# Patient Record
Sex: FEMALE | Race: WHITE | NOT HISPANIC OR LATINO | Employment: OTHER | URBAN - METROPOLITAN AREA
[De-identification: names, ages, dates, MRNs, and addresses within clinical notes are randomized per-mention and may not be internally consistent; named-entity substitution may affect disease eponyms.]

---

## 2023-02-10 PROBLEM — R06.02 SHORTNESS OF BREATH: Status: ACTIVE | Noted: 2023-02-10

## 2023-02-10 PROBLEM — I73.9 CLAUDICATION (CMS-HCC): Status: ACTIVE | Noted: 2023-02-10

## 2023-02-10 PROBLEM — E78.5 HYPERLIPIDEMIA: Status: ACTIVE | Noted: 2023-02-10

## 2023-02-10 PROBLEM — M54.2 NECK PAIN: Status: ACTIVE | Noted: 2023-02-10
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
## 2023-02-10 PROBLEM — H90.3 BILATERAL HIGH FREQUENCY SENSORINEURAL HEARING LOSS: Status: ACTIVE | Noted: 2023-02-10

## 2023-02-10 PROBLEM — R00.0 HEART RATE FAST: Status: ACTIVE | Noted: 2023-02-10

## 2023-02-10 PROBLEM — K44.9 HIATAL HERNIA: Status: ACTIVE | Noted: 2023-02-10

## 2023-02-10 PROBLEM — B99.9 ACUTE INFECTION: Status: ACTIVE | Noted: 2023-02-10

## 2023-02-10 PROBLEM — G47.00 INSOMNIA: Status: ACTIVE | Noted: 2023-02-10

## 2023-02-10 PROBLEM — I10 HYPERTENSION: Status: ACTIVE | Noted: 2023-02-10

## 2023-02-10 PROBLEM — J44.9 COPD (CHRONIC OBSTRUCTIVE PULMONARY DISEASE) (MULTI): Status: ACTIVE | Noted: 2023-02-10

## 2023-02-10 PROBLEM — A04.72 PSEUDOMEMBRANOUS COLITIS: Status: ACTIVE | Noted: 2023-02-10

## 2023-02-10 PROBLEM — G25.81 RESTLESS LEG SYNDROME: Status: ACTIVE | Noted: 2023-02-10

## 2023-02-10 PROBLEM — E04.2 NONTOXIC MULTINODULAR GOITER: Status: ACTIVE | Noted: 2023-02-10

## 2023-02-10 PROBLEM — R21 RASH: Status: ACTIVE | Noted: 2023-02-10

## 2023-02-10 PROBLEM — A04.72 C. DIFFICILE DIARRHEA: Status: ACTIVE | Noted: 2023-02-10

## 2023-02-10 PROBLEM — B35.3 TINEA PEDIS: Status: ACTIVE | Noted: 2023-02-10

## 2023-02-10 PROBLEM — M15.9 GENERALIZED OSTEOARTHROSIS: Status: ACTIVE | Noted: 2023-02-10

## 2023-02-10 PROBLEM — R19.4 CHANGE IN BOWEL HABIT: Status: ACTIVE | Noted: 2023-02-10

## 2023-02-10 PROBLEM — S12.9XXA FRACTURE, CERVICAL VERTEBRA (MULTI): Status: ACTIVE | Noted: 2023-02-10

## 2023-02-10 PROBLEM — E66.3 OVERWEIGHT WITH BODY MASS INDEX (BMI) 25.0-29.9: Status: ACTIVE | Noted: 2023-02-10

## 2023-02-10 PROBLEM — D64.9 ANEMIA: Status: ACTIVE | Noted: 2023-02-10

## 2023-02-10 PROBLEM — E04.1 THYROID NODULE: Status: ACTIVE | Noted: 2023-02-10

## 2023-02-10 PROBLEM — I73.9 ASYMPTOMATIC PERIPHERAL VASCULAR DISEASE (CMS-HCC): Status: ACTIVE | Noted: 2023-02-10

## 2023-02-10 PROBLEM — F41.9 ANXIETY: Status: ACTIVE | Noted: 2023-02-10

## 2023-02-10 PROBLEM — R60.9 EDEMA: Status: ACTIVE | Noted: 2023-02-10

## 2023-02-10 PROBLEM — E56.9 VITAMIN DEFICIENCY: Status: ACTIVE | Noted: 2023-02-10

## 2023-02-10 PROBLEM — R91.1 PULMONARY NODULE: Status: ACTIVE | Noted: 2023-02-10

## 2023-02-10 PROBLEM — R91.8 LUNG MASS: Status: ACTIVE | Noted: 2023-02-10

## 2023-02-10 PROBLEM — M54.9 BACK PAIN: Status: ACTIVE | Noted: 2023-02-10

## 2023-02-10 PROBLEM — I44.7 LBBB (LEFT BUNDLE BRANCH BLOCK): Status: ACTIVE | Noted: 2023-02-10

## 2023-02-10 PROBLEM — R13.10 DYSPHAGIA: Status: ACTIVE | Noted: 2023-02-10

## 2023-02-10 PROBLEM — R01.1 MURMUR, CARDIAC: Status: ACTIVE | Noted: 2023-02-10

## 2023-02-10 PROBLEM — E05.90 HYPERTHYROIDISM: Status: ACTIVE | Noted: 2023-02-10

## 2023-02-10 PROBLEM — G58.9 NERVE DISORDER: Status: ACTIVE | Noted: 2023-02-10

## 2023-02-10 PROBLEM — R07.9 CHEST PAIN: Status: ACTIVE | Noted: 2023-02-10

## 2023-02-10 PROBLEM — R47.9 SPEECH ABNORMALITY: Status: ACTIVE | Noted: 2023-02-10

## 2023-02-10 PROBLEM — L08.9 PUSTULE: Status: ACTIVE | Noted: 2023-02-10

## 2023-02-10 PROBLEM — I35.0 AORTIC STENOSIS: Status: ACTIVE | Noted: 2023-02-10

## 2023-02-10 PROBLEM — R42 DIZZINESS: Status: ACTIVE | Noted: 2023-02-10

## 2023-02-10 PROBLEM — M79.673 FOOT PAIN: Status: ACTIVE | Noted: 2023-02-10

## 2023-02-10 PROBLEM — K21.9 ESOPHAGEAL REFLUX: Status: ACTIVE | Noted: 2023-02-10

## 2023-02-10 RX ORDER — FUROSEMIDE 40 MG/1
40 TABLET ORAL DAILY
COMMUNITY
Start: 2018-11-13 | End: 2023-08-14

## 2023-02-10 RX ORDER — METOPROLOL SUCCINATE 50 MG/1
25 TABLET, EXTENDED RELEASE ORAL 2 TIMES DAILY
COMMUNITY
Start: 2022-12-19 | End: 2023-11-15 | Stop reason: HOSPADM

## 2023-02-10 RX ORDER — BACITRACIN 500 [USP'U]/G
1 OINTMENT TOPICAL 3 TIMES DAILY
Status: ON HOLD | COMMUNITY
Start: 2022-04-06 | End: 2023-10-22 | Stop reason: ALTCHOICE

## 2023-02-10 RX ORDER — TRAZODONE HYDROCHLORIDE 50 MG/1
50 TABLET ORAL NIGHTLY PRN
COMMUNITY
Start: 2019-10-29 | End: 2024-01-08

## 2023-02-10 RX ORDER — FAMOTIDINE 20 MG/1
1 TABLET, FILM COATED ORAL EVERY 12 HOURS
COMMUNITY
Start: 2020-08-06 | End: 2023-11-15 | Stop reason: HOSPADM

## 2023-02-10 RX ORDER — CALCITONIN SALMON 200 [IU]/.09ML
1 SPRAY, METERED NASAL SEE ADMIN INSTRUCTIONS
Status: ON HOLD | COMMUNITY
Start: 2022-06-16 | End: 2023-10-22 | Stop reason: ALTCHOICE

## 2023-02-10 RX ORDER — POTASSIUM CHLORIDE 1.5 G/1.58G
20 POWDER, FOR SOLUTION ORAL DAILY
COMMUNITY
Start: 2019-01-04 | End: 2023-12-19 | Stop reason: HOSPADM

## 2023-02-10 RX ORDER — OMEPRAZOLE 40 MG/1
40 CAPSULE, DELAYED RELEASE ORAL DAILY
Status: ON HOLD | COMMUNITY
Start: 2018-11-13 | End: 2023-10-22 | Stop reason: ALTCHOICE

## 2023-02-10 RX ORDER — LANOLIN ALCOHOL/MO/W.PET/CERES
1 CREAM (GRAM) TOPICAL 3 TIMES WEEKLY
COMMUNITY
Start: 2022-12-19

## 2023-02-10 RX ORDER — ATORVASTATIN CALCIUM 40 MG/1
1 TABLET, FILM COATED ORAL DAILY
COMMUNITY
Start: 2022-12-19 | End: 2023-07-18

## 2023-02-10 RX ORDER — PANTOPRAZOLE SODIUM 40 MG/1
40 TABLET, DELAYED RELEASE ORAL DAILY
COMMUNITY
Start: 2020-03-26 | End: 2023-07-31

## 2023-02-10 RX ORDER — ASPIRIN 81 MG/1
1 TABLET ORAL DAILY
COMMUNITY
Start: 2022-12-19 | End: 2023-11-15 | Stop reason: HOSPADM

## 2023-02-10 RX ORDER — TRIAMCINOLONE ACETONIDE 1 MG/G
1 CREAM TOPICAL 2 TIMES DAILY
COMMUNITY
Start: 2019-07-25 | End: 2023-07-11 | Stop reason: SDUPTHER

## 2023-02-10 RX ORDER — QUINIDINE GLUCONATE 324 MG
1 TABLET, EXTENDED RELEASE ORAL 3 TIMES WEEKLY
COMMUNITY
Start: 2020-06-01 | End: 2023-11-15 | Stop reason: HOSPADM

## 2023-02-10 RX ORDER — NYSTATIN 100000 U/G
1 CREAM TOPICAL 2 TIMES DAILY
Status: ON HOLD | COMMUNITY
Start: 2021-08-13 | End: 2023-10-22 | Stop reason: ALTCHOICE

## 2023-02-10 RX ORDER — LIDOCAINE 560 MG/1
PATCH PERCUTANEOUS; TOPICAL; TRANSDERMAL
COMMUNITY
Start: 2022-06-16 | End: 2023-11-15 | Stop reason: HOSPADM

## 2023-02-10 RX ORDER — SERTRALINE HYDROCHLORIDE 50 MG/1
1 TABLET, FILM COATED ORAL DAILY
COMMUNITY
Start: 2018-11-13 | End: 2023-07-11 | Stop reason: SDUPTHER

## 2023-02-10 RX ORDER — NITROGLYCERIN 0.4 MG/1
0.4 TABLET SUBLINGUAL EVERY 5 MIN PRN
COMMUNITY
Start: 2019-10-29 | End: 2023-11-15 | Stop reason: HOSPADM

## 2023-02-10 RX ORDER — TALC
3 POWDER (GRAM) TOPICAL NIGHTLY
COMMUNITY
Start: 2020-06-01

## 2023-02-10 RX ORDER — ALBUTEROL SULFATE 0.83 MG/ML
2.5 SOLUTION RESPIRATORY (INHALATION) 3 TIMES DAILY
COMMUNITY
Start: 2022-02-21 | End: 2023-11-15 | Stop reason: HOSPADM

## 2023-02-10 RX ORDER — LORATADINE 10 MG/1
1 TABLET ORAL DAILY
COMMUNITY
Start: 2019-11-25 | End: 2023-11-15 | Stop reason: HOSPADM

## 2023-02-10 RX ORDER — ROPINIROLE 5 MG/1
5 TABLET, FILM COATED ORAL NIGHTLY
COMMUNITY
Start: 2022-05-16

## 2023-03-06 ENCOUNTER — APPOINTMENT (OUTPATIENT)
Dept: PRIMARY CARE | Facility: CLINIC | Age: 88
End: 2023-03-06
Payer: MEDICARE

## 2023-03-10 ENCOUNTER — APPOINTMENT (OUTPATIENT)
Dept: PRIMARY CARE | Facility: CLINIC | Age: 88
End: 2023-03-10
Payer: MEDICARE

## 2023-04-03 ENCOUNTER — APPOINTMENT (OUTPATIENT)
Dept: PRIMARY CARE | Facility: CLINIC | Age: 88
End: 2023-04-03
Payer: COMMERCIAL

## 2023-04-05 ENCOUNTER — OFFICE VISIT (OUTPATIENT)
Dept: PRIMARY CARE | Facility: CLINIC | Age: 88
End: 2023-04-05
Payer: MEDICARE

## 2023-04-05 VITALS
OXYGEN SATURATION: 94 % | BODY MASS INDEX: 23.91 KG/M2 | SYSTOLIC BLOOD PRESSURE: 138 MMHG | WEIGHT: 121.8 LBS | HEART RATE: 56 BPM | HEIGHT: 60 IN | DIASTOLIC BLOOD PRESSURE: 76 MMHG

## 2023-04-05 DIAGNOSIS — I10 PRIMARY HYPERTENSION: ICD-10-CM

## 2023-04-05 DIAGNOSIS — E03.8 OTHER SPECIFIED HYPOTHYROIDISM: Primary | ICD-10-CM

## 2023-04-05 PROCEDURE — 3078F DIAST BP <80 MM HG: CPT | Performed by: INTERNAL MEDICINE

## 2023-04-05 PROCEDURE — 99214 OFFICE O/P EST MOD 30 MIN: CPT | Performed by: INTERNAL MEDICINE

## 2023-04-05 PROCEDURE — 3075F SYST BP GE 130 - 139MM HG: CPT | Performed by: INTERNAL MEDICINE

## 2023-04-05 NOTE — PROGRESS NOTES
OFFICE NOTE    NAME OF THE PATIENT: Kalie Ramos    YOB: 1929    CHIEF COMPLAINT:  Ms. Ramos today much better.  She is okay.  She has no problem with breathing.  She is doing okay.  Leg swelling is better.  Family is taking very good care of her.  She can keep the food down.  Things okay.  No problem.  She had a CT scan done.  She is here for follow-up.  She is not sure whether she needs to see an Endocrine.    PAST MEDICAL HISTORY:  Reviewed on EMR, unchanged.    CURRENT MEDICATIONS:  Reviewed on EMR, unchanged.  List reviewed.    ALLERGIES:  Reviewed on EMR, unchanged.    SOCIAL HISTORY:  Reviewed on EMR, unchanged.  She does not smoke and does not drink alcohol.    FAMILY HISTORY:  Reviewed on EMR, unchanged.    REVIEW OF SYSTEMS:  All 12 systems reviewed and pertaining covered in history and physical.    PHYSICAL EXAMINATION  VITAL SIGNS:  As recorded and reviewed from EMR.  RESPIRATORY:  The patient had normal inspirations and expirations.  The breath sounds were equal bilaterally and clear to auscultation.  CARDIOVASCULAR:  The patient had S1 normal, split S2 without obvious rubs, clicks, or murmurs.    GASTROINTESTINAL:  There was no hepatosplenomegaly.  There were no palpable masses and no inguinal nodes.  EXTREMITIES:  Legs had edema.  NEUROLOGIC:  The patient had normal cranial nerves.  The reflexes, sensory, and motor examination were grossly within normal limits.    LAB WORK:  Laboratory testing discussed.  CT scan reviewed.    ASSESSMENT AND PLAN:  Thyroid goiter in about same size.  I looked at the scan.  I do not think there is any pressure on trachea.  I will keep an eye.  I examined the patient.  The patient is doing good.  Pleural effusion, stable.  Monitor.  reactive.  I will just keep an eye.  Family does not want aggressive treatment or diagnostics.  Anemia.  Keep an eye.  Hypertension, okay.  Cholesterol, stable.  Edema, on Lasix.  History of anticipation, doing  okay.  Blood work in six weeks.  See me in six weeks.  Happy to see her anytime sooner if necessary.      Kindly review this note in conjunction with EMR.   Subjective   Patient ID: Kalie Ramos is a 89 y.o. female who presents for Follow-up.      HPI    Review of Systems    Objective   /76   Pulse 56   Ht 1.524 m (5')   Wt 55.2 kg (121 lb 12.8 oz)   SpO2 94%   BMI 23.79 kg/m²       Physical Exam    Assessment/Plan   Problem List Items Addressed This Visit    None

## 2023-05-17 ENCOUNTER — OFFICE VISIT (OUTPATIENT)
Dept: PRIMARY CARE | Facility: CLINIC | Age: 88
End: 2023-05-17
Payer: MEDICARE

## 2023-05-17 ENCOUNTER — LAB (OUTPATIENT)
Dept: LAB | Facility: LAB | Age: 88
End: 2023-05-17
Payer: MEDICARE

## 2023-05-17 VITALS
WEIGHT: 124 LBS | HEIGHT: 60 IN | SYSTOLIC BLOOD PRESSURE: 118 MMHG | BODY MASS INDEX: 24.35 KG/M2 | DIASTOLIC BLOOD PRESSURE: 70 MMHG

## 2023-05-17 DIAGNOSIS — I10 PRIMARY HYPERTENSION: ICD-10-CM

## 2023-05-17 DIAGNOSIS — E78.5 HYPERLIPIDEMIA, UNSPECIFIED HYPERLIPIDEMIA TYPE: ICD-10-CM

## 2023-05-17 DIAGNOSIS — E03.8 OTHER SPECIFIED HYPOTHYROIDISM: ICD-10-CM

## 2023-05-17 DIAGNOSIS — E05.90 HYPERTHYROIDISM: ICD-10-CM

## 2023-05-17 DIAGNOSIS — L57.8 ACTINIC DERMATITIS: ICD-10-CM

## 2023-05-17 LAB
BASOPHILS (10*3/UL) IN BLOOD BY AUTOMATED COUNT: 0.05 X10E9/L (ref 0–0.1)
BASOPHILS/100 LEUKOCYTES IN BLOOD BY AUTOMATED COUNT: 0.4 % (ref 0–2)
EOSINOPHILS (10*3/UL) IN BLOOD BY AUTOMATED COUNT: 0.07 X10E9/L (ref 0–0.4)
EOSINOPHILS/100 LEUKOCYTES IN BLOOD BY AUTOMATED COUNT: 0.5 % (ref 0–6)
ERYTHROCYTE DISTRIBUTION WIDTH (RATIO) BY AUTOMATED COUNT: 13.9 % (ref 11.5–14.5)
ERYTHROCYTE MEAN CORPUSCULAR HEMOGLOBIN CONCENTRATION (G/DL) BY AUTOMATED: 30.3 G/DL (ref 32–36)
ERYTHROCYTE MEAN CORPUSCULAR VOLUME (FL) BY AUTOMATED COUNT: 94 FL (ref 80–100)
ERYTHROCYTES (10*6/UL) IN BLOOD BY AUTOMATED COUNT: 4.36 X10E12/L (ref 4–5.2)
HEMATOCRIT (%) IN BLOOD BY AUTOMATED COUNT: 40.9 % (ref 36–46)
HEMOGLOBIN (G/DL) IN BLOOD: 12.4 G/DL (ref 12–16)
IMMATURE GRANULOCYTES/100 LEUKOCYTES IN BLOOD BY AUTOMATED COUNT: 0.4 % (ref 0–0.9)
LEUKOCYTES (10*3/UL) IN BLOOD BY AUTOMATED COUNT: 13.7 X10E9/L (ref 4.4–11.3)
LYMPHOCYTES (10*3/UL) IN BLOOD BY AUTOMATED COUNT: 1.06 X10E9/L (ref 0.8–3)
LYMPHOCYTES/100 LEUKOCYTES IN BLOOD BY AUTOMATED COUNT: 7.8 % (ref 13–44)
MONOCYTES (10*3/UL) IN BLOOD BY AUTOMATED COUNT: 0.67 X10E9/L (ref 0.05–0.8)
MONOCYTES/100 LEUKOCYTES IN BLOOD BY AUTOMATED COUNT: 4.9 % (ref 2–10)
NEUTROPHILS (10*3/UL) IN BLOOD BY AUTOMATED COUNT: 11.75 X10E9/L (ref 1.6–5.5)
NEUTROPHILS/100 LEUKOCYTES IN BLOOD BY AUTOMATED COUNT: 86 % (ref 40–80)
NRBC (PER 100 WBCS) BY AUTOMATED COUNT: 0 /100 WBC (ref 0–0)
PLATELETS (10*3/UL) IN BLOOD AUTOMATED COUNT: 274 X10E9/L (ref 150–450)

## 2023-05-17 PROCEDURE — 84443 ASSAY THYROID STIM HORMONE: CPT

## 2023-05-17 PROCEDURE — 3078F DIAST BP <80 MM HG: CPT | Performed by: INTERNAL MEDICINE

## 2023-05-17 PROCEDURE — 1159F MED LIST DOCD IN RCRD: CPT | Performed by: INTERNAL MEDICINE

## 2023-05-17 PROCEDURE — 85025 COMPLETE CBC W/AUTO DIFF WBC: CPT

## 2023-05-17 PROCEDURE — 99214 OFFICE O/P EST MOD 30 MIN: CPT | Performed by: INTERNAL MEDICINE

## 2023-05-17 PROCEDURE — 3074F SYST BP LT 130 MM HG: CPT | Performed by: INTERNAL MEDICINE

## 2023-05-17 PROCEDURE — 36415 COLL VENOUS BLD VENIPUNCTURE: CPT

## 2023-05-17 PROCEDURE — 80053 COMPREHEN METABOLIC PANEL: CPT

## 2023-05-17 RX ORDER — CLOTRIMAZOLE AND BETAMETHASONE DIPROPIONATE 10; .64 MG/G; MG/G
1 CREAM TOPICAL 2 TIMES DAILY
Qty: 15 G | Refills: 0 | Status: SHIPPED | OUTPATIENT
Start: 2023-05-17 | End: 2023-07-16

## 2023-05-17 NOTE — PROGRESS NOTES
OFFICE NOTE    NAME OF THE PATIENT: Kalie Ramos    YOB: 1929    CHIEF COMPLAINT:  It is always a delight to serve Ms. Ramos.  She accompanied with her daughter today.  Her leg swelling is much better without even pump she is doing okay.  She has some slight dizzy episode today.  I noticed a three-hour since she wake up she did not eat anything.  She keeps making bed and doing stuff, maybe hypoglycemic event.  She came here for follow-up on various conditions.  She has never passed out, never lost control in urine or bowel.    PAST MEDICAL HISTORY:  Reviewed on EMR, unchanged.    CURRENT MEDICATIONS:  Reviewed on EMR, unchanged.  List reviewed.    ALLERGIES:  Reviewed on EMR, unchanged.    SOCIAL HISTORY:  Reviewed on EMR, unchanged.  She does not smoke and does not drink alcohol.    FAMILY HISTORY:  Reviewed on EMR, unchanged.    REVIEW OF SYSTEMS:  All 12 systems reviewed and pertaining covered in history and physical.    PHYSICAL EXAMINATION  VITAL SIGNS:  As recorded and reviewed from EMR.  RESPIRATORY:  The patient had normal inspirations and expirations.  The breath sounds were equal bilaterally and clear to auscultation.  CARDIOVASCULAR:  The patient had S1 normal, split S2 without obvious rubs, clicks, or murmurs.    GASTROINTESTINAL:  There was no hepatosplenomegaly.  There were no palpable masses and no inguinal nodes.  EXTREMITIES:  Legs had no edema.  NEUROLOGIC:  The patient had normal cranial nerves.  The reflexes, sensory, and motor examination were grossly within normal limits.    LAB WORK:  Laboratory testing discussed.    ASSESSMENT AND PLAN:  Near dizziness, weakness, probably hypoglycemia.  Supportive care.  Eat immediately on waking up, not to delay.  Hypertension, okay.  High cholesterol, okay.  Leg edema, better.  Anticoagulation.  Monitor.  Follow-up appointment in six to eight weeks.  Continue to follow anytime.  She has two lesions in the right eye, looks suspicious.   Refer to  Derm.  Lesion on the right leg and degeneration.  No need to worry.      Kindly review this note in conjunction with EMR.     Subjective   Patient ID: Kalie Ramos is a 89 y.o. female who presents for Follow-up.      HPI    Review of Systems    Objective   /70   Ht 1.524 m (5')   Wt 56.2 kg (124 lb)   BMI 24.22 kg/m²       Physical Exam    Assessment/Plan   Problem List Items Addressed This Visit    None

## 2023-05-18 LAB
ALANINE AMINOTRANSFERASE (SGPT) (U/L) IN SER/PLAS: 34 U/L (ref 7–45)
ALBUMIN (G/DL) IN SER/PLAS: 3.9 G/DL (ref 3.4–5)
ALKALINE PHOSPHATASE (U/L) IN SER/PLAS: 115 U/L (ref 33–136)
ANION GAP IN SER/PLAS: 17 MMOL/L (ref 10–20)
ASPARTATE AMINOTRANSFERASE (SGOT) (U/L) IN SER/PLAS: 38 U/L (ref 9–39)
BILIRUBIN TOTAL (MG/DL) IN SER/PLAS: 1.1 MG/DL (ref 0–1.2)
CALCIUM (MG/DL) IN SER/PLAS: 9.6 MG/DL (ref 8.6–10.6)
CARBON DIOXIDE, TOTAL (MMOL/L) IN SER/PLAS: 31 MMOL/L (ref 21–32)
CHLORIDE (MMOL/L) IN SER/PLAS: 100 MMOL/L (ref 98–107)
CREATININE (MG/DL) IN SER/PLAS: 0.9 MG/DL (ref 0.5–1.05)
GFR FEMALE: 59 ML/MIN/1.73M2
GLUCOSE (MG/DL) IN SER/PLAS: 97 MG/DL (ref 74–99)
POTASSIUM (MMOL/L) IN SER/PLAS: 4.5 MMOL/L (ref 3.5–5.3)
PROTEIN TOTAL: 7.8 G/DL (ref 6.4–8.2)
SODIUM (MMOL/L) IN SER/PLAS: 143 MMOL/L (ref 136–145)
THYROTROPIN (MIU/L) IN SER/PLAS BY DETECTION LIMIT <= 0.05 MIU/L: 1.27 MIU/L (ref 0.44–3.98)
UREA NITROGEN (MG/DL) IN SER/PLAS: 15 MG/DL (ref 6–23)

## 2023-06-06 ENCOUNTER — LAB (OUTPATIENT)
Dept: LAB | Facility: LAB | Age: 88
End: 2023-06-06
Payer: MEDICARE

## 2023-06-06 ENCOUNTER — OFFICE VISIT (OUTPATIENT)
Dept: PRIMARY CARE | Facility: CLINIC | Age: 88
End: 2023-06-06
Payer: MEDICARE

## 2023-06-06 VITALS
DIASTOLIC BLOOD PRESSURE: 70 MMHG | WEIGHT: 124 LBS | HEIGHT: 60 IN | BODY MASS INDEX: 24.35 KG/M2 | SYSTOLIC BLOOD PRESSURE: 122 MMHG

## 2023-06-06 DIAGNOSIS — R07.9 CHEST PAIN, UNSPECIFIED TYPE: Primary | ICD-10-CM

## 2023-06-06 DIAGNOSIS — R07.9 CHEST PAIN, UNSPECIFIED TYPE: ICD-10-CM

## 2023-06-06 PROCEDURE — 85025 COMPLETE CBC W/AUTO DIFF WBC: CPT

## 2023-06-06 PROCEDURE — 80053 COMPREHEN METABOLIC PANEL: CPT

## 2023-06-06 PROCEDURE — 3074F SYST BP LT 130 MM HG: CPT | Performed by: INTERNAL MEDICINE

## 2023-06-06 PROCEDURE — 1159F MED LIST DOCD IN RCRD: CPT | Performed by: INTERNAL MEDICINE

## 2023-06-06 PROCEDURE — 99214 OFFICE O/P EST MOD 30 MIN: CPT | Performed by: INTERNAL MEDICINE

## 2023-06-06 PROCEDURE — 36415 COLL VENOUS BLD VENIPUNCTURE: CPT

## 2023-06-06 PROCEDURE — 3078F DIAST BP <80 MM HG: CPT | Performed by: INTERNAL MEDICINE

## 2023-06-07 LAB
ALANINE AMINOTRANSFERASE (SGPT) (U/L) IN SER/PLAS: 29 U/L (ref 7–45)
ALBUMIN (G/DL) IN SER/PLAS: 3.7 G/DL (ref 3.4–5)
ALKALINE PHOSPHATASE (U/L) IN SER/PLAS: 122 U/L (ref 33–136)
ANION GAP IN SER/PLAS: 11 MMOL/L (ref 10–20)
ASPARTATE AMINOTRANSFERASE (SGOT) (U/L) IN SER/PLAS: 29 U/L (ref 9–39)
BASOPHILS (10*3/UL) IN BLOOD BY AUTOMATED COUNT: 0.05 X10E9/L (ref 0–0.1)
BASOPHILS/100 LEUKOCYTES IN BLOOD BY AUTOMATED COUNT: 0.7 % (ref 0–2)
BILIRUBIN TOTAL (MG/DL) IN SER/PLAS: 1 MG/DL (ref 0–1.2)
CALCIUM (MG/DL) IN SER/PLAS: 9.4 MG/DL (ref 8.6–10.6)
CARBON DIOXIDE, TOTAL (MMOL/L) IN SER/PLAS: 31 MMOL/L (ref 21–32)
CHLORIDE (MMOL/L) IN SER/PLAS: 104 MMOL/L (ref 98–107)
CREATININE (MG/DL) IN SER/PLAS: 0.76 MG/DL (ref 0.5–1.05)
EOSINOPHILS (10*3/UL) IN BLOOD BY AUTOMATED COUNT: 0.21 X10E9/L (ref 0–0.4)
EOSINOPHILS/100 LEUKOCYTES IN BLOOD BY AUTOMATED COUNT: 3 % (ref 0–6)
ERYTHROCYTE DISTRIBUTION WIDTH (RATIO) BY AUTOMATED COUNT: 14.6 % (ref 11.5–14.5)
ERYTHROCYTE MEAN CORPUSCULAR HEMOGLOBIN CONCENTRATION (G/DL) BY AUTOMATED: 29.5 G/DL (ref 32–36)
ERYTHROCYTE MEAN CORPUSCULAR VOLUME (FL) BY AUTOMATED COUNT: 95 FL (ref 80–100)
ERYTHROCYTES (10*6/UL) IN BLOOD BY AUTOMATED COUNT: 4.44 X10E12/L (ref 4–5.2)
GFR FEMALE: 73 ML/MIN/1.73M2
GLUCOSE (MG/DL) IN SER/PLAS: 91 MG/DL (ref 74–99)
HEMATOCRIT (%) IN BLOOD BY AUTOMATED COUNT: 42.1 % (ref 36–46)
HEMOGLOBIN (G/DL) IN BLOOD: 12.4 G/DL (ref 12–16)
IMMATURE GRANULOCYTES/100 LEUKOCYTES IN BLOOD BY AUTOMATED COUNT: 0.3 % (ref 0–0.9)
LEUKOCYTES (10*3/UL) IN BLOOD BY AUTOMATED COUNT: 7.1 X10E9/L (ref 4.4–11.3)
LYMPHOCYTES (10*3/UL) IN BLOOD BY AUTOMATED COUNT: 1.19 X10E9/L (ref 0.8–3)
LYMPHOCYTES/100 LEUKOCYTES IN BLOOD BY AUTOMATED COUNT: 16.8 % (ref 13–44)
MONOCYTES (10*3/UL) IN BLOOD BY AUTOMATED COUNT: 0.53 X10E9/L (ref 0.05–0.8)
MONOCYTES/100 LEUKOCYTES IN BLOOD BY AUTOMATED COUNT: 7.5 % (ref 2–10)
NEUTROPHILS (10*3/UL) IN BLOOD BY AUTOMATED COUNT: 5.07 X10E9/L (ref 1.6–5.5)
NEUTROPHILS/100 LEUKOCYTES IN BLOOD BY AUTOMATED COUNT: 71.7 % (ref 40–80)
NRBC (PER 100 WBCS) BY AUTOMATED COUNT: 0 /100 WBC (ref 0–0)
PLATELETS (10*3/UL) IN BLOOD AUTOMATED COUNT: 236 X10E9/L (ref 150–450)
POTASSIUM (MMOL/L) IN SER/PLAS: 4.5 MMOL/L (ref 3.5–5.3)
PROTEIN TOTAL: 7.1 G/DL (ref 6.4–8.2)
SODIUM (MMOL/L) IN SER/PLAS: 141 MMOL/L (ref 136–145)
UREA NITROGEN (MG/DL) IN SER/PLAS: 15 MG/DL (ref 6–23)

## 2023-06-14 ENCOUNTER — APPOINTMENT (OUTPATIENT)
Dept: PRIMARY CARE | Facility: CLINIC | Age: 88
End: 2023-06-14
Payer: MEDICARE

## 2023-06-26 ENCOUNTER — OFFICE VISIT (OUTPATIENT)
Dept: PRIMARY CARE | Facility: CLINIC | Age: 88
End: 2023-06-26
Payer: MEDICARE

## 2023-06-26 VITALS
WEIGHT: 124 LBS | SYSTOLIC BLOOD PRESSURE: 122 MMHG | DIASTOLIC BLOOD PRESSURE: 74 MMHG | BODY MASS INDEX: 24.35 KG/M2 | HEIGHT: 60 IN

## 2023-06-26 DIAGNOSIS — R60.9 EDEMA, UNSPECIFIED TYPE: Primary | ICD-10-CM

## 2023-06-26 PROCEDURE — 99213 OFFICE O/P EST LOW 20 MIN: CPT | Performed by: INTERNAL MEDICINE

## 2023-06-26 PROCEDURE — 3078F DIAST BP <80 MM HG: CPT | Performed by: INTERNAL MEDICINE

## 2023-06-26 PROCEDURE — 3074F SYST BP LT 130 MM HG: CPT | Performed by: INTERNAL MEDICINE

## 2023-06-26 PROCEDURE — 1159F MED LIST DOCD IN RCRD: CPT | Performed by: INTERNAL MEDICINE

## 2023-06-26 NOTE — PROGRESS NOTES
OFFICE NOTE    NAME OF THE PATIENT: Kalie Ramos    YOB: 1929    CHIEF COMPLAINT:  Kalie Ramos today came here for multiple medical issues.  Appetite and weight are okay.  No problem.  No chest pain.  Taking medications regularly.  Legs okay.  She has a nice summer, family is visiting her.  She lost one hearing aid.  She came here for follow-up on various conditions.    PAST MEDICAL HISTORY:  Reviewed on EMR, unchanged.    CURRENT MEDICATIONS:  Reviewed on EMR, unchanged.  List reviewed.    ALLERGIES:  Reviewed on EMR, unchanged.    SOCIAL HISTORY:  Reviewed on EMR, unchanged.  She does not smoke and does not drink alcohol.    FAMILY HISTORY:  Reviewed on EMR, unchanged.    REVIEW OF SYSTEMS:  All 12 systems reviewed and pertaining covered in history and physical.    PHYSICAL EXAMINATION  VITAL SIGNS:  As recorded and reviewed from EMR.  RESPIRATORY:  The patient had normal inspirations and expirations.  The breath sounds were equal bilaterally and clear to auscultation.  CARDIOVASCULAR:  The patient had S1 normal, split S2 without obvious rubs, clicks, or murmurs.    GASTROINTESTINAL:  There was no hepatosplenomegaly.  There were no palpable masses and no inguinal nodes.  EXTREMITIES:  Legs had 1+ edema.  NEUROLOGIC:  The patient had normal cranial nerves.  The reflexes, sensory, and motor examination were grossly within normal limits.    LAB WORK:  Laboratory testing discussed.    ASSESSMENT AND PLAN:  Edema.  Please take water pill.  CHF, stable.  Anemia.  Monitor.  Dysphagia, doing much better.  Weakness ______ stable.  Repeat blood work ordered.  Follow up in four weeks.  Happy to see her anytime sooner if necessary.      Kindly review this note in conjunction with EMR.   Subjective   Patient ID: Kalie Ramos is a 89 y.o. female who presents for Follow-up.      HPI    Review of Systems    Objective   /74   Ht 1.524 m (5')   Wt 56.2 kg (124 lb)   BMI 24.22 kg/m²        Physical Exam    Assessment/Plan   Problem List Items Addressed This Visit    None

## 2023-06-28 ENCOUNTER — APPOINTMENT (OUTPATIENT)
Dept: PRIMARY CARE | Facility: CLINIC | Age: 88
End: 2023-06-28
Payer: MEDICARE

## 2023-07-11 DIAGNOSIS — F41.9 ANXIETY: ICD-10-CM

## 2023-07-11 DIAGNOSIS — R21 RASH: ICD-10-CM

## 2023-07-11 DIAGNOSIS — F41.9 ANXIETY: Primary | ICD-10-CM

## 2023-07-11 RX ORDER — TRIAMCINOLONE ACETONIDE 1 MG/G
1 CREAM TOPICAL 2 TIMES DAILY
Qty: 454 G | Refills: 3 | Status: SHIPPED | OUTPATIENT
Start: 2023-07-11 | End: 2023-10-27 | Stop reason: HOSPADM

## 2023-07-11 RX ORDER — SERTRALINE HYDROCHLORIDE 50 MG/1
50 TABLET, FILM COATED ORAL DAILY
Qty: 90 TABLET | Refills: 1 | Status: SHIPPED | OUTPATIENT
Start: 2023-07-11

## 2023-07-11 RX ORDER — SERTRALINE HYDROCHLORIDE 50 MG/1
TABLET, FILM COATED ORAL
Qty: 90 TABLET | Refills: 3 | Status: SHIPPED | OUTPATIENT
Start: 2023-07-11 | End: 2023-10-27 | Stop reason: HOSPADM

## 2023-07-11 RX ORDER — TRIAMCINOLONE ACETONIDE 1 MG/G
CREAM TOPICAL
Qty: 454 G | Refills: 11 | Status: SHIPPED | OUTPATIENT
Start: 2023-07-11 | End: 2023-10-27 | Stop reason: HOSPADM

## 2023-07-18 DIAGNOSIS — E78.5 HYPERLIPIDEMIA, UNSPECIFIED HYPERLIPIDEMIA TYPE: Primary | ICD-10-CM

## 2023-07-18 RX ORDER — ATORVASTATIN CALCIUM 40 MG/1
TABLET, FILM COATED ORAL
Qty: 90 TABLET | Refills: 3 | Status: SHIPPED | OUTPATIENT
Start: 2023-07-18 | End: 2023-11-15 | Stop reason: HOSPADM

## 2023-07-19 ENCOUNTER — OFFICE VISIT (OUTPATIENT)
Dept: PRIMARY CARE | Facility: CLINIC | Age: 88
End: 2023-07-19
Payer: MEDICARE

## 2023-07-19 VITALS
BODY MASS INDEX: 25.32 KG/M2 | SYSTOLIC BLOOD PRESSURE: 130 MMHG | DIASTOLIC BLOOD PRESSURE: 76 MMHG | HEIGHT: 60 IN | WEIGHT: 129 LBS

## 2023-07-19 DIAGNOSIS — H66.90 EAR INFECTION: ICD-10-CM

## 2023-07-19 DIAGNOSIS — I10 PRIMARY HYPERTENSION: ICD-10-CM

## 2023-07-19 DIAGNOSIS — R60.9 EDEMA, UNSPECIFIED TYPE: ICD-10-CM

## 2023-07-19 DIAGNOSIS — E05.90 HYPERTHYROIDISM: ICD-10-CM

## 2023-07-19 DIAGNOSIS — E78.5 HYPERLIPIDEMIA, UNSPECIFIED HYPERLIPIDEMIA TYPE: ICD-10-CM

## 2023-07-19 PROCEDURE — 3078F DIAST BP <80 MM HG: CPT | Performed by: INTERNAL MEDICINE

## 2023-07-19 PROCEDURE — 1126F AMNT PAIN NOTED NONE PRSNT: CPT | Performed by: INTERNAL MEDICINE

## 2023-07-19 PROCEDURE — 69210 REMOVE IMPACTED EAR WAX UNI: CPT | Performed by: INTERNAL MEDICINE

## 2023-07-19 PROCEDURE — 1159F MED LIST DOCD IN RCRD: CPT | Performed by: INTERNAL MEDICINE

## 2023-07-19 PROCEDURE — 3075F SYST BP GE 130 - 139MM HG: CPT | Performed by: INTERNAL MEDICINE

## 2023-07-19 PROCEDURE — 99213 OFFICE O/P EST LOW 20 MIN: CPT | Performed by: INTERNAL MEDICINE

## 2023-07-19 RX ORDER — OFLOXACIN 3 MG/ML
5 SOLUTION AURICULAR (OTIC) 2 TIMES DAILY
Qty: 10 ML | Refills: 0 | Status: SHIPPED | OUTPATIENT
Start: 2023-07-19 | End: 2023-07-26

## 2023-07-19 NOTE — PROGRESS NOTES
OFFICE NOTE    NAME OF THE PATIENT: Kalie Ramos    YOB: 1929    CHIEF COMPLAINT:  Ms. Ramos today came here for multiple medical issues.  Her both ears are blocked up.  She is going for new hearing aid fitting, which is [place] hearing aid.  She came here for follow-up on various conditions.  Appetite and weight are okay.  No problem.  She is enjoying summer.  She is off the blood thinner.  She does not want baby aspirin.    PAST MEDICAL HISTORY:  Reviewed on EMR, unchanged.    CURRENT MEDICATIONS:  Reviewed on EMR, unchanged.  List reviewed.    ALLERGIES:  Reviewed on EMR, unchanged.    SOCIAL HISTORY:  Reviewed on EMR, unchanged.  She does not smoke and does not drink alcohol.    FAMILY HISTORY:  Reviewed on EMR, unchanged.    REVIEW OF SYSTEMS:  All 12 systems reviewed and pertaining covered in history and physical.    PHYSICAL EXAMINATION  VITAL SIGNS:  As recorded and reviewed from EMR.  ENT:  Wax and debris.  RESPIRATORY:  The patient had normal inspirations and expirations.  The breath sounds were equal bilaterally and clear to auscultation.  CARDIOVASCULAR:  The patient had S1 normal, split S2 without obvious rubs, clicks, or murmurs.    GASTROINTESTINAL:  There was no hepatosplenomegaly.  There were no palpable masses and no inguinal nodes.  EXTREMITIES:  Legs had edema.  NEUROLOGIC:  The patient had normal cranial nerves.  The reflexes, sensory, and motor examination were grossly within normal limits.    PROCEDURE:  Under aseptic and antiseptic precautions, I cleaned out both the ears with curette and syringing.  Right ear canal is slight bruising.    LAB WORK:  Laboratory testing discussed.    ASSESSMENT AND PLAN:  Bilateral otitis externa.  Floxin drops.  Leg edema, on Lasix and potassium.  Anticoagulation, none.  ______, stable.  Follow-up appointment with me in a month with repeat test.      Kindly review this note in conjunction with EMR.   Subjective   Patient ID: Kalie  Rachel is a 89 y.o. female who presents for Follow-up (Ear cleaning).      HPI    Review of Systems    Objective   /76   Ht 1.524 m (5')   Wt 58.5 kg (129 lb)   BMI 25.19 kg/m²       Physical Exam    Assessment/Plan   Problem List Items Addressed This Visit    None

## 2023-07-24 ENCOUNTER — TELEPHONE (OUTPATIENT)
Dept: PRIMARY CARE | Facility: CLINIC | Age: 88
End: 2023-07-24
Payer: MEDICARE

## 2023-07-31 DIAGNOSIS — E78.5 HYPERLIPIDEMIA, UNSPECIFIED HYPERLIPIDEMIA TYPE: Primary | ICD-10-CM

## 2023-07-31 PROCEDURE — 99233 SBSQ HOSP IP/OBS HIGH 50: CPT | Performed by: INTERNAL MEDICINE

## 2023-07-31 RX ORDER — PANTOPRAZOLE SODIUM 40 MG/1
40 TABLET, DELAYED RELEASE ORAL DAILY
Qty: 90 TABLET | Refills: 3 | Status: SHIPPED | OUTPATIENT
Start: 2023-07-31

## 2023-08-01 ENCOUNTER — APPOINTMENT (OUTPATIENT)
Dept: PRIMARY CARE | Facility: CLINIC | Age: 88
End: 2023-08-01
Payer: MEDICARE

## 2023-08-01 PROCEDURE — 99232 SBSQ HOSP IP/OBS MODERATE 35: CPT | Performed by: INTERNAL MEDICINE

## 2023-08-02 PROCEDURE — 99238 HOSP IP/OBS DSCHRG MGMT 30/<: CPT | Performed by: INTERNAL MEDICINE

## 2023-08-03 ENCOUNTER — TELEPHONE (OUTPATIENT)
Dept: PRIMARY CARE | Facility: CLINIC | Age: 88
End: 2023-08-03
Payer: MEDICARE

## 2023-08-03 ENCOUNTER — PATIENT OUTREACH (OUTPATIENT)
Dept: CARE COORDINATION | Facility: CLINIC | Age: 88
End: 2023-08-03
Payer: MEDICARE

## 2023-08-03 NOTE — TELEPHONE ENCOUNTER
----- Message from Raina Fowler MA sent at 8/3/2023  3:58 PM EDT -----  Blanca is at pt's home getting pt started with [facility] pt is taking Protonix and Pepcid. Blanca would like to know if it is ok to take both or one or the other. Pepcid is taken bid and Protonix every day, Magnesium ox she has 400mg at her home but dc paper has different directions would like to know when the frequency is, ropinirole dc with was 5mg but has only 2mg what dose should she be taking,  Blanca can be reached at [phone].

## 2023-08-03 NOTE — PROGRESS NOTES
Outreach call to patient to support a smooth transition of care from recent admission.  Unable to leave a voicemail message for patient with my contact information.    YVONNE KhanN, RN

## 2023-08-07 ENCOUNTER — LAB (OUTPATIENT)
Dept: LAB | Facility: LAB | Age: 88
End: 2023-08-07
Payer: MEDICARE

## 2023-08-07 ENCOUNTER — OFFICE VISIT (OUTPATIENT)
Dept: PRIMARY CARE | Facility: CLINIC | Age: 88
End: 2023-08-07
Payer: MEDICARE

## 2023-08-07 VITALS
HEIGHT: 61 IN | BODY MASS INDEX: 24.35 KG/M2 | SYSTOLIC BLOOD PRESSURE: 128 MMHG | DIASTOLIC BLOOD PRESSURE: 72 MMHG | WEIGHT: 129 LBS

## 2023-08-07 DIAGNOSIS — E78.5 HYPERLIPIDEMIA, UNSPECIFIED HYPERLIPIDEMIA TYPE: ICD-10-CM

## 2023-08-07 DIAGNOSIS — I10 PRIMARY HYPERTENSION: ICD-10-CM

## 2023-08-07 PROBLEM — R29.6 REPEATED FALLS: Status: ACTIVE | Noted: 2022-11-22

## 2023-08-07 PROBLEM — Z86.711 PERSONAL HISTORY OF PULMONARY EMBOLISM: Status: ACTIVE | Noted: 2022-11-22

## 2023-08-07 PROBLEM — M81.0 AGE-RELATED OSTEOPOROSIS WITHOUT CURRENT PATHOLOGICAL FRACTURE: Status: ACTIVE | Noted: 2022-11-22

## 2023-08-07 PROBLEM — S01.81XD: Status: ACTIVE | Noted: 2022-11-22

## 2023-08-07 PROBLEM — R41.82 ALTERED MENTAL STATUS, UNSPECIFIED: Status: ACTIVE | Noted: 2022-11-22

## 2023-08-07 PROBLEM — Z85.3 PERSONAL HISTORY OF MALIGNANT NEOPLASM OF BREAST: Status: ACTIVE | Noted: 2022-11-22

## 2023-08-07 PROBLEM — K52.832 LYMPHOCYTIC COLITIS: Status: ACTIVE | Noted: 2022-11-22

## 2023-08-07 PROBLEM — H91.90 UNSPECIFIED HEARING LOSS, UNSPECIFIED EAR: Status: ACTIVE | Noted: 2022-11-22

## 2023-08-07 PROBLEM — R53.1 WEAKNESS: Status: ACTIVE | Noted: 2022-11-22

## 2023-08-07 PROBLEM — R44.1 VISUAL HALLUCINATIONS: Status: ACTIVE | Noted: 2022-11-22

## 2023-08-07 PROBLEM — Z98.49 CATARACT EXTRACTION STATUS, UNSPECIFIED EYE: Status: ACTIVE | Noted: 2022-11-22

## 2023-08-07 PROBLEM — M41.9 SCOLIOSIS, UNSPECIFIED: Status: ACTIVE | Noted: 2022-01-01

## 2023-08-07 PROBLEM — I50.32 CHRONIC DIASTOLIC (CONGESTIVE) HEART FAILURE (MULTI): Status: ACTIVE | Noted: 2022-11-19

## 2023-08-07 PROBLEM — Z90.49 ACQUIRED ABSENCE OF OTHER SPECIFIED PARTS OF DIGESTIVE TRACT: Status: ACTIVE | Noted: 2022-11-22

## 2023-08-07 PROBLEM — F33.9 MAJOR DEPRESSIVE DISORDER, RECURRENT, UNSPECIFIED (CMS-HCC): Status: ACTIVE | Noted: 2018-09-25

## 2023-08-07 PROBLEM — Z86.718 PERSONAL HISTORY OF OTHER VENOUS THROMBOSIS AND EMBOLISM: Status: ACTIVE | Noted: 2022-11-22

## 2023-08-07 LAB
ALANINE AMINOTRANSFERASE (SGPT) (U/L) IN SER/PLAS: 73 U/L (ref 7–45)
ALBUMIN (G/DL) IN SER/PLAS: 4.3 G/DL (ref 3.4–5)
ALKALINE PHOSPHATASE (U/L) IN SER/PLAS: 143 U/L (ref 33–136)
ANION GAP IN SER/PLAS: 16 MMOL/L (ref 10–20)
ASPARTATE AMINOTRANSFERASE (SGOT) (U/L) IN SER/PLAS: 69 U/L (ref 9–39)
BILIRUBIN TOTAL (MG/DL) IN SER/PLAS: 0.6 MG/DL (ref 0–1.2)
CALCIUM (MG/DL) IN SER/PLAS: 10.1 MG/DL (ref 8.6–10.6)
CARBON DIOXIDE, TOTAL (MMOL/L) IN SER/PLAS: 34 MMOL/L (ref 21–32)
CHLORIDE (MMOL/L) IN SER/PLAS: 98 MMOL/L (ref 98–107)
CREATININE (MG/DL) IN SER/PLAS: 1.08 MG/DL (ref 0.5–1.05)
ERYTHROCYTE DISTRIBUTION WIDTH (RATIO) BY AUTOMATED COUNT: 14.6 % (ref 11.5–14.5)
ERYTHROCYTE MEAN CORPUSCULAR HEMOGLOBIN CONCENTRATION (G/DL) BY AUTOMATED: 29.6 G/DL (ref 32–36)
ERYTHROCYTE MEAN CORPUSCULAR VOLUME (FL) BY AUTOMATED COUNT: 95 FL (ref 80–100)
ERYTHROCYTES (10*6/UL) IN BLOOD BY AUTOMATED COUNT: 4.36 X10E12/L (ref 4–5.2)
GFR FEMALE: 48 ML/MIN/1.73M2
GLUCOSE (MG/DL) IN SER/PLAS: 91 MG/DL (ref 74–99)
HEMATOCRIT (%) IN BLOOD BY AUTOMATED COUNT: 41.5 % (ref 36–46)
HEMOGLOBIN (G/DL) IN BLOOD: 12.3 G/DL (ref 12–16)
LEUKOCYTES (10*3/UL) IN BLOOD BY AUTOMATED COUNT: 7.3 X10E9/L (ref 4.4–11.3)
NRBC (PER 100 WBCS) BY AUTOMATED COUNT: 0 /100 WBC (ref 0–0)
PLATELETS (10*3/UL) IN BLOOD AUTOMATED COUNT: 350 X10E9/L (ref 150–450)
POTASSIUM (MMOL/L) IN SER/PLAS: 4.6 MMOL/L (ref 3.5–5.3)
PROTEIN TOTAL: 8.4 G/DL (ref 6.4–8.2)
SODIUM (MMOL/L) IN SER/PLAS: 143 MMOL/L (ref 136–145)
UREA NITROGEN (MG/DL) IN SER/PLAS: 25 MG/DL (ref 6–23)

## 2023-08-07 PROCEDURE — 82140 ASSAY OF AMMONIA: CPT

## 2023-08-07 PROCEDURE — 36415 COLL VENOUS BLD VENIPUNCTURE: CPT

## 2023-08-07 PROCEDURE — 80053 COMPREHEN METABOLIC PANEL: CPT

## 2023-08-07 PROCEDURE — 3074F SYST BP LT 130 MM HG: CPT | Performed by: INTERNAL MEDICINE

## 2023-08-07 PROCEDURE — 1159F MED LIST DOCD IN RCRD: CPT | Performed by: INTERNAL MEDICINE

## 2023-08-07 PROCEDURE — 3078F DIAST BP <80 MM HG: CPT | Performed by: INTERNAL MEDICINE

## 2023-08-07 PROCEDURE — 99214 OFFICE O/P EST MOD 30 MIN: CPT | Performed by: INTERNAL MEDICINE

## 2023-08-07 PROCEDURE — 1126F AMNT PAIN NOTED NONE PRSNT: CPT | Performed by: INTERNAL MEDICINE

## 2023-08-07 PROCEDURE — 85027 COMPLETE CBC AUTOMATED: CPT

## 2023-08-07 PROCEDURE — 1036F TOBACCO NON-USER: CPT | Performed by: INTERNAL MEDICINE

## 2023-08-07 NOTE — PROGRESS NOTES
OFFICE NOTE    NAME OF THE PATIENT: Kalie Ramos    YOB: 1929    CHIEF COMPLAINT:  Ms. Kalie Ramos today came here for multiple medical issues.  She was in a hospital.  She has aspiration pneumonitis and CHF.  She is improving.  Family is very careful in feeding.  We decided no more PEG tube ______.  She is coping well.  Her legs swell up at the end of the day.  Feeling okay.  Family is very loving and caring.    PAST MEDICAL HISTORY:  Reviewed on EMR, unchanged.    CURRENT MEDICATIONS:  Reviewed on EMR, unchanged.  List reviewed.    ALLERGIES:  Reviewed on EMR, unchanged.    SOCIAL HISTORY:  Reviewed on EMR, unchanged.    FAMILY HISTORY:  Reviewed on EMR, unchanged.    REVIEW OF SYSTEMS:  All 12 systems reviewed and pertaining covered in history and physical.    PHYSICAL EXAMINATION  VITAL SIGNS:  As recorded and reviewed from EMR.  RESPIRATORY:  The patient had normal inspirations and expirations.  The breath sounds were equal bilaterally and clear to auscultation.  CARDIOVASCULAR:  The patient had S1 normal, split S2 without obvious rubs, clicks, or murmurs.    GASTROINTESTINAL:  There was no hepatosplenomegaly.  There were no palpable masses and no inguinal nodes.  EXTREMITIES:  Legs had dependent edema.  NEUROLOGIC:  The patient had normal cranial nerves.  The reflexes, sensory, and motor examination were grossly within normal limits.  LOCAL EXAM:  Throat movements are good.  SKIN:  Dermatitis present.    LAB WORK:  Laboratory testing discussed.    ASSESSMENT AND PLAN:  Leg edema and dermatitis.  Continue water pills.  Skin irritation.  Bacitracin cream.  Dry skin.  Some coconut oil.  Congestive heart failure, okay.  Renal insufficiency.  Monitor.  Swallowing evaluation, aspiration pneumonitis.  Conservative treatment.  Take all the precautions.  Hypertension, okay.  High cholesterol, stable.  The patient is not on any anticoagulation.  No more blood thinner.  Follow-up appointment  "with me in a month.      Kindly review this note in conjunction with EMR.     Subjective   Patient ID: Kalie Ramos is a 89 y.o. female who presents for Follow-up.      HPI    Review of Systems    Objective   /72   Ht 1.549 m (5' 1\")   Wt 58.5 kg (129 lb)   BMI 24.37 kg/m²       Physical Exam    Assessment/Plan   Problem List Items Addressed This Visit    None        "

## 2023-08-08 LAB — AMMONIA (UMOL/L) IN PLASMA: 332 UMOL/L

## 2023-08-14 DIAGNOSIS — R60.9 EDEMA, UNSPECIFIED TYPE: Primary | ICD-10-CM

## 2023-08-14 RX ORDER — FUROSEMIDE 40 MG/1
40 TABLET ORAL DAILY
Qty: 90 TABLET | Refills: 3 | Status: SHIPPED | OUTPATIENT
Start: 2023-08-14 | End: 2024-02-03 | Stop reason: HOSPADM

## 2023-09-05 ENCOUNTER — LAB (OUTPATIENT)
Dept: LAB | Facility: LAB | Age: 88
End: 2023-09-05
Payer: MEDICARE

## 2023-09-05 ENCOUNTER — OFFICE VISIT (OUTPATIENT)
Dept: PRIMARY CARE | Facility: CLINIC | Age: 88
End: 2023-09-05
Payer: MEDICARE

## 2023-09-05 VITALS
SYSTOLIC BLOOD PRESSURE: 126 MMHG | BODY MASS INDEX: 23.6 KG/M2 | HEIGHT: 61 IN | WEIGHT: 125 LBS | DIASTOLIC BLOOD PRESSURE: 82 MMHG

## 2023-09-05 DIAGNOSIS — E78.5 HYPERLIPIDEMIA, UNSPECIFIED HYPERLIPIDEMIA TYPE: ICD-10-CM

## 2023-09-05 DIAGNOSIS — E05.90 HYPERTHYROIDISM: ICD-10-CM

## 2023-09-05 DIAGNOSIS — R07.9 CHEST PAIN, UNSPECIFIED TYPE: ICD-10-CM

## 2023-09-05 DIAGNOSIS — R60.9 EDEMA, UNSPECIFIED TYPE: ICD-10-CM

## 2023-09-05 DIAGNOSIS — I10 PRIMARY HYPERTENSION: ICD-10-CM

## 2023-09-05 LAB
ALANINE AMINOTRANSFERASE (SGPT) (U/L) IN SER/PLAS: 41 U/L (ref 7–45)
ALBUMIN (G/DL) IN SER/PLAS: 4.1 G/DL (ref 3.4–5)
ALKALINE PHOSPHATASE (U/L) IN SER/PLAS: 116 U/L (ref 33–136)
AMMONIA (UMOL/L) IN PLASMA: 42 UMOL/L
ANION GAP IN SER/PLAS: 15 MMOL/L (ref 10–20)
ASPARTATE AMINOTRANSFERASE (SGOT) (U/L) IN SER/PLAS: 42 U/L (ref 9–39)
BASOPHILS (10*3/UL) IN BLOOD BY AUTOMATED COUNT: 0.05 X10E9/L (ref 0–0.1)
BASOPHILS/100 LEUKOCYTES IN BLOOD BY AUTOMATED COUNT: 0.7 % (ref 0–2)
BILIRUBIN TOTAL (MG/DL) IN SER/PLAS: 0.7 MG/DL (ref 0–1.2)
CALCIUM (MG/DL) IN SER/PLAS: 9.8 MG/DL (ref 8.6–10.6)
CARBON DIOXIDE, TOTAL (MMOL/L) IN SER/PLAS: 33 MMOL/L (ref 21–32)
CHLORIDE (MMOL/L) IN SER/PLAS: 99 MMOL/L (ref 98–107)
CREATININE (MG/DL) IN SER/PLAS: 1.16 MG/DL (ref 0.5–1.05)
EOSINOPHILS (10*3/UL) IN BLOOD BY AUTOMATED COUNT: 0.28 X10E9/L (ref 0–0.4)
EOSINOPHILS/100 LEUKOCYTES IN BLOOD BY AUTOMATED COUNT: 3.9 % (ref 0–6)
ERYTHROCYTE DISTRIBUTION WIDTH (RATIO) BY AUTOMATED COUNT: 14.5 % (ref 11.5–14.5)
ERYTHROCYTE MEAN CORPUSCULAR HEMOGLOBIN CONCENTRATION (G/DL) BY AUTOMATED: 29.5 G/DL (ref 32–36)
ERYTHROCYTE MEAN CORPUSCULAR VOLUME (FL) BY AUTOMATED COUNT: 99 FL (ref 80–100)
ERYTHROCYTES (10*6/UL) IN BLOOD BY AUTOMATED COUNT: 3.8 X10E12/L (ref 4–5.2)
GFR FEMALE: 44 ML/MIN/1.73M2
GLUCOSE (MG/DL) IN SER/PLAS: 84 MG/DL (ref 74–99)
HEMATOCRIT (%) IN BLOOD BY AUTOMATED COUNT: 37.6 % (ref 36–46)
HEMOGLOBIN (G/DL) IN BLOOD: 11.1 G/DL (ref 12–16)
IMMATURE GRANULOCYTES/100 LEUKOCYTES IN BLOOD BY AUTOMATED COUNT: 0.4 % (ref 0–0.9)
LEUKOCYTES (10*3/UL) IN BLOOD BY AUTOMATED COUNT: 7.1 X10E9/L (ref 4.4–11.3)
LYMPHOCYTES (10*3/UL) IN BLOOD BY AUTOMATED COUNT: 1.3 X10E9/L (ref 0.8–3)
LYMPHOCYTES/100 LEUKOCYTES IN BLOOD BY AUTOMATED COUNT: 18.3 % (ref 13–44)
MAGNESIUM (MG/DL) IN SER/PLAS: 2.31 MG/DL (ref 1.6–2.4)
MONOCYTES (10*3/UL) IN BLOOD BY AUTOMATED COUNT: 0.53 X10E9/L (ref 0.05–0.8)
MONOCYTES/100 LEUKOCYTES IN BLOOD BY AUTOMATED COUNT: 7.5 % (ref 2–10)
NEUTROPHILS (10*3/UL) IN BLOOD BY AUTOMATED COUNT: 4.92 X10E9/L (ref 1.6–5.5)
NEUTROPHILS/100 LEUKOCYTES IN BLOOD BY AUTOMATED COUNT: 69.2 % (ref 40–80)
NRBC (PER 100 WBCS) BY AUTOMATED COUNT: 0 /100 WBC (ref 0–0)
PLATELETS (10*3/UL) IN BLOOD AUTOMATED COUNT: 229 X10E9/L (ref 150–450)
POTASSIUM (MMOL/L) IN SER/PLAS: 4.2 MMOL/L (ref 3.5–5.3)
PROTEIN TOTAL: 7.4 G/DL (ref 6.4–8.2)
SODIUM (MMOL/L) IN SER/PLAS: 143 MMOL/L (ref 136–145)
THYROTROPIN (MIU/L) IN SER/PLAS BY DETECTION LIMIT <= 0.05 MIU/L: 0.87 MIU/L (ref 0.44–3.98)
UREA NITROGEN (MG/DL) IN SER/PLAS: 25 MG/DL (ref 6–23)

## 2023-09-05 PROCEDURE — 36415 COLL VENOUS BLD VENIPUNCTURE: CPT

## 2023-09-05 PROCEDURE — 3074F SYST BP LT 130 MM HG: CPT | Performed by: INTERNAL MEDICINE

## 2023-09-05 PROCEDURE — 83735 ASSAY OF MAGNESIUM: CPT

## 2023-09-05 PROCEDURE — 85025 COMPLETE CBC W/AUTO DIFF WBC: CPT

## 2023-09-05 PROCEDURE — 3079F DIAST BP 80-89 MM HG: CPT | Performed by: INTERNAL MEDICINE

## 2023-09-05 PROCEDURE — 82140 ASSAY OF AMMONIA: CPT

## 2023-09-05 PROCEDURE — 80053 COMPREHEN METABOLIC PANEL: CPT

## 2023-09-05 PROCEDURE — 84443 ASSAY THYROID STIM HORMONE: CPT

## 2023-09-05 PROCEDURE — 1126F AMNT PAIN NOTED NONE PRSNT: CPT | Performed by: INTERNAL MEDICINE

## 2023-09-05 PROCEDURE — 1159F MED LIST DOCD IN RCRD: CPT | Performed by: INTERNAL MEDICINE

## 2023-09-05 PROCEDURE — 99213 OFFICE O/P EST LOW 20 MIN: CPT | Performed by: INTERNAL MEDICINE

## 2023-09-05 PROCEDURE — 1036F TOBACCO NON-USER: CPT | Performed by: INTERNAL MEDICINE

## 2023-09-05 ASSESSMENT — ENCOUNTER SYMPTOMS
LOSS OF SENSATION IN FEET: 0
DEPRESSION: 0
OCCASIONAL FEELINGS OF UNSTEADINESS: 0

## 2023-09-05 NOTE — PROGRESS NOTES
"OFFICE NOTE    NAME OF THE PATIENT: Kalie Ramos    YOB: 1929    CHIEF COMPLAINT:  This young lady today came here for multiple medical issues.  She is on the road trip to [place].  Appetite and weight are okay.  She came from [place].  She came here for follow-up on various conditions.    PAST MEDICAL HISTORY:  Reviewed on EMR, unchanged.    CURRENT MEDICATIONS:  Reviewed on EMR, unchanged.  List reviewed.    ALLERGIES:  Reviewed on EMR, unchanged.    SOCIAL HISTORY:  Reviewed on EMR, unchanged.  She does not smoke and does not drink alcohol.    FAMILY HISTORY:  Reviewed on EMR, unchanged.    REVIEW OF SYSTEMS:  All 12 systems reviewed and pertaining covered in history and physical.    PHYSICAL EXAMINATION  VITAL SIGNS:  As recorded and reviewed from EMR.  RESPIRATORY:  The patient had normal inspirations and expirations.  The breath sounds were equal bilaterally and clear to auscultation.  CARDIOVASCULAR:  The patient had S1 normal, split S2 without obvious rubs, clicks, or murmurs.    GASTROINTESTINAL:  There was no hepatosplenomegaly.  There were no palpable masses and no inguinal nodes.  EXTREMITIES:  Legs had no edema.  NEUROLOGIC:  The patient had normal cranial nerves.  The reflexes, sensory, and motor examination were grossly within normal limits.    LAB WORK:  Laboratory testing discussed.    ASSESSMENT AND PLAN:  Anemia.  Monitor.  High ammonia level.  I will keep an eye.  Status post DVT, doing okay.  Weakness, on PT/OT.  Diarrhea, on symptomatic treatment.  Hypoxia, on oxygen.  Question of travel.  She can enjoy her family time.  I will be available for her.      Kindly review this note in conjunction with EMR.   Subjective   Patient ID: Kalie Ramos is a 89 y.o. female who presents for Follow-up.      HPI    Review of Systems    Objective   /82   Ht 1.549 m (5' 1\")   Wt 56.7 kg (125 lb)   BMI 23.62 kg/m²       Physical Exam    Assessment/Plan   Problem List Items " Addressed This Visit       Hyperlipidemia    Relevant Orders    CBC and Auto Differential    Comprehensive Metabolic Panel    Urinalysis with Reflex Microscopic    Ammonia    Magnesium    Hyperthyroidism    Relevant Orders    CBC and Auto Differential    Comprehensive Metabolic Panel    Urinalysis with Reflex Microscopic    Magnesium

## 2023-09-11 DIAGNOSIS — G25.81 RESTLESS LEG SYNDROME: Primary | ICD-10-CM

## 2023-09-11 RX ORDER — ROPINIROLE 2 MG/1
2 TABLET, FILM COATED ORAL NIGHTLY
Qty: 90 TABLET | Refills: 3 | Status: SHIPPED | OUTPATIENT
Start: 2023-09-11 | End: 2023-11-15 | Stop reason: HOSPADM

## 2023-09-28 DIAGNOSIS — D50.9 IRON DEFICIENCY ANEMIA, UNSPECIFIED IRON DEFICIENCY ANEMIA TYPE: ICD-10-CM

## 2023-09-29 ENCOUNTER — OFFICE VISIT (OUTPATIENT)
Dept: PRIMARY CARE | Facility: CLINIC | Age: 88
End: 2023-09-29
Payer: MEDICARE

## 2023-09-29 VITALS
SYSTOLIC BLOOD PRESSURE: 118 MMHG | WEIGHT: 126 LBS | DIASTOLIC BLOOD PRESSURE: 70 MMHG | BODY MASS INDEX: 23.79 KG/M2 | HEIGHT: 61 IN

## 2023-09-29 DIAGNOSIS — Z23 FLU VACCINE NEED: ICD-10-CM

## 2023-09-29 DIAGNOSIS — R60.9 EDEMA, UNSPECIFIED TYPE: ICD-10-CM

## 2023-09-29 DIAGNOSIS — E78.00 HIGH CHOLESTEROL: ICD-10-CM

## 2023-09-29 DIAGNOSIS — N28.9 RENAL INSUFFICIENCY: ICD-10-CM

## 2023-09-29 DIAGNOSIS — L97.909 SUPERFICIAL ULCER OF LOWER EXTREMITY (MULTI): ICD-10-CM

## 2023-09-29 DIAGNOSIS — S80.811A ABRASION OF SKIN OF RIGHT LOWER LEG: ICD-10-CM

## 2023-09-29 DIAGNOSIS — I10 BENIGN HYPERTENSION: Primary | ICD-10-CM

## 2023-09-29 DIAGNOSIS — L03.115 CELLULITIS OF RIGHT LEG WITHOUT FOOT: ICD-10-CM

## 2023-09-29 PROCEDURE — 3078F DIAST BP <80 MM HG: CPT | Performed by: INTERNAL MEDICINE

## 2023-09-29 PROCEDURE — 1126F AMNT PAIN NOTED NONE PRSNT: CPT | Performed by: INTERNAL MEDICINE

## 2023-09-29 PROCEDURE — G0008 ADMIN INFLUENZA VIRUS VAC: HCPCS | Performed by: INTERNAL MEDICINE

## 2023-09-29 PROCEDURE — 90662 IIV NO PRSV INCREASED AG IM: CPT | Performed by: INTERNAL MEDICINE

## 2023-09-29 PROCEDURE — 1036F TOBACCO NON-USER: CPT | Performed by: INTERNAL MEDICINE

## 2023-09-29 PROCEDURE — 1159F MED LIST DOCD IN RCRD: CPT | Performed by: INTERNAL MEDICINE

## 2023-09-29 PROCEDURE — 3074F SYST BP LT 130 MM HG: CPT | Performed by: INTERNAL MEDICINE

## 2023-09-29 PROCEDURE — 99214 OFFICE O/P EST MOD 30 MIN: CPT | Performed by: INTERNAL MEDICINE

## 2023-09-29 RX ORDER — CIPROFLOXACIN 250 MG/1
250 TABLET, FILM COATED ORAL 2 TIMES DAILY
Qty: 20 TABLET | Refills: 0 | Status: SHIPPED | OUTPATIENT
Start: 2023-09-29 | End: 2023-10-09

## 2023-09-29 RX ORDER — DOXYCYCLINE 100 MG/1
100 CAPSULE ORAL 2 TIMES DAILY
Qty: 20 CAPSULE | Refills: 0 | Status: SHIPPED | OUTPATIENT
Start: 2023-09-29 | End: 2023-10-09

## 2023-09-29 ASSESSMENT — ENCOUNTER SYMPTOMS
LOSS OF SENSATION IN FEET: 0
DEPRESSION: 0
OCCASIONAL FEELINGS OF UNSTEADINESS: 0

## 2023-09-30 NOTE — PROGRESS NOTES
"Subjective   Patient ID: Kalie Ramos is a 89 y.o. female who presents for pain and swelling in leg, edema, cellulitis and Follow-up (on various conditions.).    Kalie Ramos today came here for pain and swelling in leg, edema, cellulitis.  She fell down and she pulled herself from the rug.  She has skin abrasion.  Otherwise okay.  No problem.  No fever.  No chills.  She is feeling somewhat down. She had successful family trip to [place].  She came for follow-up on various conditions.    I have personally reviewed the patient's Past Medical History, Medications, Allergies, Social History, and Family History in the EMR.    Review of Systems   All other systems reviewed and are negative.    Objective   /70   Ht 1.549 m (5' 1\")   Wt 57.2 kg (126 lb)   BMI 23.81 kg/m²     Physical Exam  Vitals reviewed.   Cardiovascular:      Heart sounds: Normal heart sounds, S1 normal and S2 normal. No murmur heard.     No friction rub.   Pulmonary:      Effort: Pulmonary effort is normal.      Breath sounds: Normal breath sounds and air entry.   Abdominal:      Palpations: There is no hepatomegaly, splenomegaly or mass.   Musculoskeletal:      Right lower leg: No edema.      Left lower leg: No edema.   Lymphadenopathy:      Lower Body: No right inguinal adenopathy. No left inguinal adenopathy.   Skin:     Comments: Right leg below knee and above ankle mild cellulitis, skin breakage.   Neurological:      Cranial Nerves: Cranial nerves 2-12 are intact.      Sensory: No sensory deficit.      Motor: Motor function is intact.      Deep Tendon Reflexes: Reflexes are normal and symmetric.     LAB WORK: Laboratory testing discussed.    Assessment/Plan   Problem List Items Addressed This Visit             ICD-10-CM       Symptoms and Signs    Edema R60.9    Relevant Medications    ciprofloxacin (Cipro) 250 mg tablet    doxycycline (Vibramycin) 100 mg capsule     Other Visit Diagnoses         Codes    Benign hypertension  "   -  Primary I10    Flu vaccine need     Z23    Relevant Orders    Flu vaccine, quadrivalent, high-dose, preservative free, age 65y+ (FLUZONE) (Completed)    High cholesterol     E78.00    Renal insufficiency     N28.9    Abrasion of skin of right lower leg     S80.811A    Superficial ulcer of lower extremity (CMS/HCC)     L97.909    Cellulitis of right leg without foot     L03.115        1. Skin abrasion, superficial ulcer.  Wash with hot water, bacitracin.  2. Cellulitis.  Cipro and doxycycline given.  3. Hypertension, okay.  4. Cholesterol.  Monitor.  5. Renal insufficiency.  Monitor.  6. Follow-up appointment with me in a week.  Happy to see her anytime sooner if necessary.    Scribe Attestation  By signing my name below, I, Jamie Dunlap   attest that this documentation has been prepared under the direction and in the presence of Avel Bailey MD.

## 2023-10-05 ENCOUNTER — PATIENT OUTREACH (OUTPATIENT)
Dept: CARE COORDINATION | Facility: CLINIC | Age: 88
End: 2023-10-05

## 2023-10-05 ENCOUNTER — APPOINTMENT (OUTPATIENT)
Dept: PRIMARY CARE | Facility: CLINIC | Age: 88
End: 2023-10-05
Payer: MEDICARE

## 2023-10-05 NOTE — PROGRESS NOTES
Outreach call to patient to support a smooth transition of care from recent admission.  Unable to leave a message for patient with my contact information.  Phone rings.    YVONNE KhanN, RN

## 2023-10-20 ENCOUNTER — APPOINTMENT (OUTPATIENT)
Dept: INFUSION THERAPY | Facility: CLINIC | Age: 88
End: 2023-10-20
Payer: MEDICARE

## 2023-10-22 ENCOUNTER — HOSPITAL ENCOUNTER (INPATIENT)
Facility: HOSPITAL | Age: 88
LOS: 5 days | Discharge: HOME | DRG: 291 | End: 2023-10-27
Attending: EMERGENCY MEDICINE | Admitting: INTERNAL MEDICINE
Payer: MEDICARE

## 2023-10-22 ENCOUNTER — APPOINTMENT (OUTPATIENT)
Dept: RADIOLOGY | Facility: HOSPITAL | Age: 88
DRG: 291 | End: 2023-10-22
Payer: MEDICARE

## 2023-10-22 DIAGNOSIS — J18.9 PNEUMONIA OF RIGHT MIDDLE LOBE DUE TO INFECTIOUS ORGANISM: Primary | ICD-10-CM

## 2023-10-22 DIAGNOSIS — J96.21 ACUTE ON CHRONIC RESPIRATORY FAILURE WITH HYPOXIA (MULTI): ICD-10-CM

## 2023-10-22 DIAGNOSIS — I50.23 ACUTE ON CHRONIC SYSTOLIC CONGESTIVE HEART FAILURE (MULTI): ICD-10-CM

## 2023-10-22 DIAGNOSIS — M41.04 INFANTILE IDIOPATHIC SCOLIOSIS OF THORACIC REGION: ICD-10-CM

## 2023-10-22 DIAGNOSIS — K59.00 CONSTIPATION, UNSPECIFIED CONSTIPATION TYPE: ICD-10-CM

## 2023-10-22 DIAGNOSIS — R29.6 REPEATED FALLS: ICD-10-CM

## 2023-10-22 LAB
ALBUMIN SERPL-MCNC: 3.3 G/DL (ref 3.5–5)
ALP BLD-CCNC: 158 U/L (ref 35–125)
ALT SERPL-CCNC: 21 U/L (ref 5–40)
ANION GAP BLDA CALCULATED.4IONS-SCNC: 1 MMO/L (ref 10–25)
ANION GAP SERPL CALC-SCNC: 13 MMOL/L
APPARATUS: ABNORMAL
APPEARANCE UR: CLEAR
AST SERPL-CCNC: 28 U/L (ref 5–40)
BASE EXCESS BLDA CALC-SCNC: 10 MMOL/L (ref -2–3)
BILIRUB SERPL-MCNC: 1.1 MG/DL (ref 0.1–1.2)
BILIRUB UR STRIP.AUTO-MCNC: NEGATIVE MG/DL
BODY TEMPERATURE: 37 DEGREES CELSIUS
BUN SERPL-MCNC: 15 MG/DL (ref 8–25)
CA-I BLDA-SCNC: 1.08 MMOL/L (ref 1.1–1.33)
CALCIUM SERPL-MCNC: 9.1 MG/DL (ref 8.5–10.4)
CHLORIDE BLDA-SCNC: 101 MMOL/L (ref 98–107)
CHLORIDE SERPL-SCNC: 98 MMOL/L (ref 97–107)
CO2 SERPL-SCNC: 27 MMOL/L (ref 24–31)
COLOR UR: YELLOW
CREAT SERPL-MCNC: 0.9 MG/DL (ref 0.4–1.6)
D DIMER PPP FEU-MCNC: 1.82 MG/L FEU (ref 0.19–0.5)
FLUAV RNA RESP QL NAA+PROBE: NOT DETECTED
FLUBV RNA RESP QL NAA+PROBE: NOT DETECTED
GFR SERPL CREATININE-BSD FRML MDRD: 60 ML/MIN/1.73M*2
GLUCOSE BLDA-MCNC: 106 MG/DL (ref 74–99)
GLUCOSE SERPL-MCNC: 101 MG/DL (ref 65–99)
GLUCOSE UR STRIP.AUTO-MCNC: NORMAL MG/DL
HCO3 BLDA-SCNC: 34.3 MMOL/L (ref 22–26)
HCT VFR BLD EST: 34 % (ref 36–46)
HGB BLDA-MCNC: 11.4 G/DL (ref 12–16)
INHALED O2 CONCENTRATION: 36 %
KETONES UR STRIP.AUTO-MCNC: NEGATIVE MG/DL
LACTATE BLDA-SCNC: 1.8 MMOL/L (ref 0.4–2)
LACTATE BLDV-SCNC: 2.2 MMOL/L (ref 0.4–2)
LEUKOCYTE ESTERASE UR QL STRIP.AUTO: ABNORMAL
MAGNESIUM SERPL-MCNC: 2 MG/DL (ref 1.6–3.1)
NITRITE UR QL STRIP.AUTO: NEGATIVE
OXYHGB MFR BLDA: 95.3 % (ref 94–98)
PCO2 BLDA: 44 MM HG (ref 38–42)
PH BLDA: 7.5 PH (ref 7.38–7.42)
PH UR STRIP.AUTO: 7 [PH]
PO2 BLDA: 93 MM HG (ref 85–95)
POTASSIUM BLDA-SCNC: 4 MMOL/L (ref 3.5–5.3)
POTASSIUM SERPL-SCNC: 4.5 MMOL/L (ref 3.4–5.1)
PROT SERPL-MCNC: 7.1 G/DL (ref 5.9–7.9)
PROT UR STRIP.AUTO-MCNC: ABNORMAL MG/DL
RBC # UR STRIP.AUTO: NEGATIVE /UL
SAO2 % BLDA: 97 % (ref 94–100)
SARS-COV-2 RNA RESP QL NAA+PROBE: NOT DETECTED
SODIUM BLDA-SCNC: 132 MMOL/L (ref 136–145)
SODIUM SERPL-SCNC: 138 MMOL/L (ref 133–145)
SP GR UR STRIP.AUTO: 1.02
UROBILINOGEN UR STRIP.AUTO-MCNC: NORMAL MG/DL

## 2023-10-22 PROCEDURE — 36415 COLL VENOUS BLD VENIPUNCTURE: CPT | Performed by: EMERGENCY MEDICINE

## 2023-10-22 PROCEDURE — 81003 URINALYSIS AUTO W/O SCOPE: CPT | Performed by: EMERGENCY MEDICINE

## 2023-10-22 PROCEDURE — 87636 SARSCOV2 & INF A&B AMP PRB: CPT | Performed by: EMERGENCY MEDICINE

## 2023-10-22 PROCEDURE — 2500000001 HC RX 250 WO HCPCS SELF ADMINISTERED DRUGS (ALT 637 FOR MEDICARE OP): Performed by: INTERNAL MEDICINE

## 2023-10-22 PROCEDURE — 96368 THER/DIAG CONCURRENT INF: CPT

## 2023-10-22 PROCEDURE — 87040 BLOOD CULTURE FOR BACTERIA: CPT | Mod: CMCLAB,TRILAB | Performed by: EMERGENCY MEDICINE

## 2023-10-22 PROCEDURE — 87075 CULTR BACTERIA EXCEPT BLOOD: CPT | Mod: CMCLAB,TRILAB | Performed by: EMERGENCY MEDICINE

## 2023-10-22 PROCEDURE — 82805 BLOOD GASES W/O2 SATURATION: CPT

## 2023-10-22 PROCEDURE — 85300 ANTITHROMBIN III ACTIVITY: CPT | Performed by: EMERGENCY MEDICINE

## 2023-10-22 PROCEDURE — 83735 ASSAY OF MAGNESIUM: CPT | Performed by: EMERGENCY MEDICINE

## 2023-10-22 PROCEDURE — 93010 ELECTROCARDIOGRAM REPORT: CPT | Performed by: INTERNAL MEDICINE

## 2023-10-22 PROCEDURE — 99285 EMERGENCY DEPT VISIT HI MDM: CPT | Performed by: EMERGENCY MEDICINE

## 2023-10-22 PROCEDURE — 99223 1ST HOSP IP/OBS HIGH 75: CPT | Performed by: INTERNAL MEDICINE

## 2023-10-22 PROCEDURE — 80053 COMPREHEN METABOLIC PANEL: CPT | Performed by: EMERGENCY MEDICINE

## 2023-10-22 PROCEDURE — 82947 ASSAY GLUCOSE BLOOD QUANT: CPT | Performed by: EMERGENCY MEDICINE

## 2023-10-22 PROCEDURE — 1100000001 HC PRIVATE ROOM DAILY

## 2023-10-22 PROCEDURE — 96365 THER/PROPH/DIAG IV INF INIT: CPT

## 2023-10-22 PROCEDURE — 87086 URINE CULTURE/COLONY COUNT: CPT | Mod: CMCLAB,TRILAB | Performed by: EMERGENCY MEDICINE

## 2023-10-22 PROCEDURE — 2500000004 HC RX 250 GENERAL PHARMACY W/ HCPCS (ALT 636 FOR OP/ED): Performed by: INTERNAL MEDICINE

## 2023-10-22 PROCEDURE — 71275 CT ANGIOGRAPHY CHEST: CPT | Mod: MG

## 2023-10-22 PROCEDURE — 83605 ASSAY OF LACTIC ACID: CPT | Performed by: EMERGENCY MEDICINE

## 2023-10-22 PROCEDURE — C9113 INJ PANTOPRAZOLE SODIUM, VIA: HCPCS | Performed by: INTERNAL MEDICINE

## 2023-10-22 PROCEDURE — 2550000001 HC RX 255 CONTRASTS: Performed by: EMERGENCY MEDICINE

## 2023-10-22 PROCEDURE — 2500000004 HC RX 250 GENERAL PHARMACY W/ HCPCS (ALT 636 FOR OP/ED): Performed by: EMERGENCY MEDICINE

## 2023-10-22 PROCEDURE — 71045 X-RAY EXAM CHEST 1 VIEW: CPT | Mod: FY

## 2023-10-22 RX ORDER — SERTRALINE HYDROCHLORIDE 50 MG/1
50 TABLET, FILM COATED ORAL DAILY
Status: DISCONTINUED | OUTPATIENT
Start: 2023-10-22 | End: 2023-10-22

## 2023-10-22 RX ORDER — QUINIDINE GLUCONATE 324 MG
27 TABLET, EXTENDED RELEASE ORAL DAILY
Status: DISCONTINUED | OUTPATIENT
Start: 2023-10-23 | End: 2023-10-22

## 2023-10-22 RX ORDER — METOPROLOL SUCCINATE 50 MG/1
50 TABLET, EXTENDED RELEASE ORAL DAILY
Status: DISCONTINUED | OUTPATIENT
Start: 2023-10-22 | End: 2023-10-22

## 2023-10-22 RX ORDER — ROPINIROLE 2 MG/1
2 TABLET, FILM COATED ORAL NIGHTLY
Status: DISCONTINUED | OUTPATIENT
Start: 2023-10-22 | End: 2023-10-22

## 2023-10-22 RX ORDER — ALBUTEROL SULFATE 0.83 MG/ML
2.5 SOLUTION RESPIRATORY (INHALATION)
Status: DISCONTINUED | OUTPATIENT
Start: 2023-10-23 | End: 2023-10-27 | Stop reason: HOSPADM

## 2023-10-22 RX ORDER — METOPROLOL TARTRATE 1 MG/ML
5 INJECTION, SOLUTION INTRAVENOUS ONCE
Status: DISCONTINUED | OUTPATIENT
Start: 2023-10-22 | End: 2023-10-22

## 2023-10-22 RX ORDER — BACITRACIN ZINC 500 UNIT/G
1 OINTMENT IN PACKET (EA) TOPICAL 3 TIMES DAILY
Status: DISCONTINUED | OUTPATIENT
Start: 2023-10-22 | End: 2023-10-22 | Stop reason: ENTERED-IN-ERROR

## 2023-10-22 RX ORDER — FERROUS GLUCONATE 325 MG
38 TABLET ORAL
Status: DISCONTINUED | OUTPATIENT
Start: 2023-10-23 | End: 2023-10-27 | Stop reason: HOSPADM

## 2023-10-22 RX ORDER — ALBUTEROL SULFATE 0.83 MG/ML
2.5 SOLUTION RESPIRATORY (INHALATION) 3 TIMES DAILY
Status: DISCONTINUED | OUTPATIENT
Start: 2023-10-22 | End: 2023-10-22

## 2023-10-22 RX ORDER — TALC
3 POWDER (GRAM) TOPICAL NIGHTLY
Status: DISCONTINUED | OUTPATIENT
Start: 2023-10-22 | End: 2023-10-27 | Stop reason: HOSPADM

## 2023-10-22 RX ORDER — POTASSIUM CHLORIDE 1.5 G/1.58G
20 POWDER, FOR SOLUTION ORAL DAILY
Status: DISCONTINUED | OUTPATIENT
Start: 2023-10-23 | End: 2023-10-27 | Stop reason: HOSPADM

## 2023-10-22 RX ORDER — GUAIFENESIN 100 MG/5ML
10 SOLUTION ORAL EVERY 4 HOURS PRN
Status: DISCONTINUED | OUTPATIENT
Start: 2023-10-22 | End: 2023-10-27 | Stop reason: HOSPADM

## 2023-10-22 RX ORDER — SERTRALINE HYDROCHLORIDE 50 MG/1
50 TABLET, FILM COATED ORAL DAILY
Status: DISCONTINUED | OUTPATIENT
Start: 2023-10-22 | End: 2023-10-27 | Stop reason: HOSPADM

## 2023-10-22 RX ORDER — TRIAMCINOLONE ACETONIDE 1 MG/G
1 CREAM TOPICAL 2 TIMES DAILY
Status: DISCONTINUED | OUTPATIENT
Start: 2023-10-22 | End: 2023-10-22

## 2023-10-22 RX ORDER — ACETAMINOPHEN 325 MG/1
650 TABLET ORAL EVERY 6 HOURS PRN
Status: DISCONTINUED | OUTPATIENT
Start: 2023-10-22 | End: 2023-10-27 | Stop reason: HOSPADM

## 2023-10-22 RX ORDER — LIDOCAINE 560 MG/1
1 PATCH PERCUTANEOUS; TOPICAL; TRANSDERMAL NIGHTLY
Status: DISCONTINUED | OUTPATIENT
Start: 2023-10-22 | End: 2023-10-27 | Stop reason: HOSPADM

## 2023-10-22 RX ORDER — LANOLIN ALCOHOL/MO/W.PET/CERES
400 CREAM (GRAM) TOPICAL
Status: DISCONTINUED | OUTPATIENT
Start: 2023-10-24 | End: 2023-10-27 | Stop reason: HOSPADM

## 2023-10-22 RX ORDER — LORATADINE 10 MG/1
10 TABLET ORAL DAILY PRN
Status: DISCONTINUED | OUTPATIENT
Start: 2023-10-22 | End: 2023-10-27 | Stop reason: HOSPADM

## 2023-10-22 RX ORDER — PANTOPRAZOLE SODIUM 40 MG/1
40 TABLET, DELAYED RELEASE ORAL DAILY
Status: DISCONTINUED | OUTPATIENT
Start: 2023-10-23 | End: 2023-10-27 | Stop reason: HOSPADM

## 2023-10-22 RX ORDER — ATORVASTATIN CALCIUM 40 MG/1
40 TABLET, FILM COATED ORAL NIGHTLY
Status: DISCONTINUED | OUTPATIENT
Start: 2023-10-22 | End: 2023-10-27 | Stop reason: HOSPADM

## 2023-10-22 RX ORDER — ALBUTEROL SULFATE 0.83 MG/ML
2.5 SOLUTION RESPIRATORY (INHALATION) EVERY 2 HOUR PRN
Status: DISCONTINUED | OUTPATIENT
Start: 2023-10-22 | End: 2023-10-27 | Stop reason: HOSPADM

## 2023-10-22 RX ORDER — FUROSEMIDE 10 MG/ML
40 INJECTION INTRAMUSCULAR; INTRAVENOUS 2 TIMES DAILY
Status: DISCONTINUED | OUTPATIENT
Start: 2023-10-22 | End: 2023-10-27 | Stop reason: HOSPADM

## 2023-10-22 RX ORDER — CEFTRIAXONE 2 G/50ML
2 INJECTION, SOLUTION INTRAVENOUS ONCE
Status: COMPLETED | OUTPATIENT
Start: 2023-10-22 | End: 2023-10-22

## 2023-10-22 RX ORDER — CALCITONIN SALMON 200 [IU]/.09ML
1 SPRAY, METERED NASAL DAILY
Status: DISCONTINUED | OUTPATIENT
Start: 2023-10-23 | End: 2023-10-27 | Stop reason: HOSPADM

## 2023-10-22 RX ORDER — IPRATROPIUM BROMIDE AND ALBUTEROL SULFATE 2.5; .5 MG/3ML; MG/3ML
3 SOLUTION RESPIRATORY (INHALATION)
Status: DISCONTINUED | OUTPATIENT
Start: 2023-10-22 | End: 2023-10-22

## 2023-10-22 RX ORDER — ASPIRIN 81 MG/1
81 TABLET ORAL ONCE
Status: COMPLETED | OUTPATIENT
Start: 2023-10-23 | End: 2023-10-23

## 2023-10-22 RX ORDER — LORATADINE 10 MG/1
10 TABLET ORAL DAILY
Status: DISCONTINUED | OUTPATIENT
Start: 2023-10-22 | End: 2023-10-22 | Stop reason: ENTERED-IN-ERROR

## 2023-10-22 RX ORDER — FAMOTIDINE 20 MG/1
20 TABLET, FILM COATED ORAL DAILY
Status: DISCONTINUED | OUTPATIENT
Start: 2023-10-24 | End: 2023-10-27 | Stop reason: HOSPADM

## 2023-10-22 RX ORDER — NYSTATIN 100000 U/G
1 CREAM TOPICAL 2 TIMES DAILY
Status: DISCONTINUED | OUTPATIENT
Start: 2023-10-22 | End: 2023-10-27 | Stop reason: HOSPADM

## 2023-10-22 RX ORDER — MULTIVIT-MIN/IRON FUM/FOLIC AC 7.5 MG-4
1 TABLET ORAL ONCE
Status: DISCONTINUED | OUTPATIENT
Start: 2023-10-22 | End: 2023-10-22 | Stop reason: ENTERED-IN-ERROR

## 2023-10-22 RX ORDER — ROPINIROLE 1 MG/1
5 TABLET, FILM COATED ORAL NIGHTLY
Status: DISCONTINUED | OUTPATIENT
Start: 2023-10-22 | End: 2023-10-27 | Stop reason: HOSPADM

## 2023-10-22 RX ORDER — ACETAMINOPHEN 325 MG/1
650 TABLET ORAL EVERY 6 HOURS PRN
Status: DISCONTINUED | OUTPATIENT
Start: 2023-10-22 | End: 2023-10-22

## 2023-10-22 RX ORDER — TRIAMCINOLONE ACETONIDE 1 MG/G
CREAM TOPICAL 2 TIMES DAILY
Status: DISCONTINUED | OUTPATIENT
Start: 2023-10-22 | End: 2023-10-27 | Stop reason: HOSPADM

## 2023-10-22 RX ORDER — ASPIRIN 81 MG/1
81 TABLET ORAL ONCE
Status: DISCONTINUED | OUTPATIENT
Start: 2023-10-22 | End: 2023-10-22 | Stop reason: ENTERED-IN-ERROR

## 2023-10-22 RX ORDER — NITROGLYCERIN 0.4 MG/1
0.4 TABLET SUBLINGUAL EVERY 5 MIN PRN
Status: DISCONTINUED | OUTPATIENT
Start: 2023-10-22 | End: 2023-10-27 | Stop reason: HOSPADM

## 2023-10-22 RX ORDER — PANTOPRAZOLE SODIUM 40 MG/10ML
40 INJECTION, POWDER, LYOPHILIZED, FOR SOLUTION INTRAVENOUS ONCE
Status: COMPLETED | OUTPATIENT
Start: 2023-10-22 | End: 2023-10-22

## 2023-10-22 RX ORDER — METOPROLOL SUCCINATE 25 MG/1
25 TABLET, EXTENDED RELEASE ORAL 2 TIMES DAILY
Status: DISCONTINUED | OUTPATIENT
Start: 2023-10-22 | End: 2023-10-27 | Stop reason: HOSPADM

## 2023-10-22 RX ORDER — FUROSEMIDE 40 MG/1
40 TABLET ORAL DAILY
Status: DISCONTINUED | OUTPATIENT
Start: 2023-10-22 | End: 2023-10-22

## 2023-10-22 RX ORDER — TRAZODONE HYDROCHLORIDE 50 MG/1
50 TABLET ORAL NIGHTLY PRN
Status: DISCONTINUED | OUTPATIENT
Start: 2023-10-22 | End: 2023-10-27 | Stop reason: HOSPADM

## 2023-10-22 RX ORDER — FAMOTIDINE 20 MG/1
20 TABLET, FILM COATED ORAL EVERY 12 HOURS
Status: DISCONTINUED | OUTPATIENT
Start: 2023-10-22 | End: 2023-10-22

## 2023-10-22 RX ORDER — DOCUSATE SODIUM 100 MG/1
100 CAPSULE, LIQUID FILLED ORAL 3 TIMES DAILY PRN
Status: DISCONTINUED | OUTPATIENT
Start: 2023-10-22 | End: 2023-10-27 | Stop reason: HOSPADM

## 2023-10-22 RX ORDER — LANOLIN ALCOHOL/MO/W.PET/CERES
400 CREAM (GRAM) TOPICAL DAILY
Status: DISCONTINUED | OUTPATIENT
Start: 2023-10-22 | End: 2023-10-22 | Stop reason: ENTERED-IN-ERROR

## 2023-10-22 RX ORDER — CEFTRIAXONE 1 G/50ML
1 INJECTION, SOLUTION INTRAVENOUS 2 TIMES DAILY
Status: DISCONTINUED | OUTPATIENT
Start: 2023-10-23 | End: 2023-10-23

## 2023-10-22 RX ADMIN — PANTOPRAZOLE SODIUM 40 MG: 40 INJECTION, POWDER, LYOPHILIZED, FOR SOLUTION INTRAVENOUS at 22:18

## 2023-10-22 RX ADMIN — Medication 3 MG: at 22:09

## 2023-10-22 RX ADMIN — METOPROLOL SUCCINATE 25 MG: 25 TABLET, EXTENDED RELEASE ORAL at 22:24

## 2023-10-22 RX ADMIN — ROPINIROLE HYDROCHLORIDE 5 MG: 1 TABLET, FILM COATED ORAL at 22:07

## 2023-10-22 RX ADMIN — DEXTROSE MONOHYDRATE 500 MG: 50 INJECTION, SOLUTION INTRAVENOUS at 16:00

## 2023-10-22 RX ADMIN — IOHEXOL 75 ML: 350 INJECTION, SOLUTION INTRAVENOUS at 17:35

## 2023-10-22 RX ADMIN — CEFTRIAXONE SODIUM 2 G: 2 INJECTION, SOLUTION INTRAVENOUS at 16:15

## 2023-10-22 RX ADMIN — ATORVASTATIN CALCIUM 40 MG: 40 TABLET, FILM COATED ORAL at 22:09

## 2023-10-22 SDOH — SOCIAL STABILITY: SOCIAL INSECURITY: DO YOU FEEL UNSAFE GOING BACK TO THE PLACE WHERE YOU ARE LIVING?: NO

## 2023-10-22 SDOH — SOCIAL STABILITY: SOCIAL INSECURITY: HAVE YOU HAD THOUGHTS OF HARMING ANYONE ELSE?: NO

## 2023-10-22 SDOH — SOCIAL STABILITY: SOCIAL INSECURITY: ARE THERE ANY APPARENT SIGNS OF INJURIES/BEHAVIORS THAT COULD BE RELATED TO ABUSE/NEGLECT?: NO

## 2023-10-22 SDOH — SOCIAL STABILITY: SOCIAL INSECURITY: DOES ANYONE TRY TO KEEP YOU FROM HAVING/CONTACTING OTHER FRIENDS OR DOING THINGS OUTSIDE YOUR HOME?: NO

## 2023-10-22 SDOH — SOCIAL STABILITY: SOCIAL INSECURITY: DO YOU FEEL ANYONE HAS EXPLOITED OR TAKEN ADVANTAGE OF YOU FINANCIALLY OR OF YOUR PERSONAL PROPERTY?: NO

## 2023-10-22 SDOH — SOCIAL STABILITY: SOCIAL INSECURITY: ARE YOU OR HAVE YOU BEEN THREATENED OR ABUSED PHYSICALLY, EMOTIONALLY, OR SEXUALLY BY ANYONE?: NO

## 2023-10-22 SDOH — SOCIAL STABILITY: SOCIAL INSECURITY: HAS ANYONE EVER THREATENED TO HURT YOUR FAMILY OR YOUR PETS?: NO

## 2023-10-22 SDOH — SOCIAL STABILITY: SOCIAL INSECURITY: ABUSE: ADULT

## 2023-10-22 ASSESSMENT — COLUMBIA-SUICIDE SEVERITY RATING SCALE - C-SSRS
2. HAVE YOU ACTUALLY HAD ANY THOUGHTS OF KILLING YOURSELF?: NO
1. IN THE PAST MONTH, HAVE YOU WISHED YOU WERE DEAD OR WISHED YOU COULD GO TO SLEEP AND NOT WAKE UP?: NO
6. HAVE YOU EVER DONE ANYTHING, STARTED TO DO ANYTHING, OR PREPARED TO DO ANYTHING TO END YOUR LIFE?: NO

## 2023-10-22 ASSESSMENT — ACTIVITIES OF DAILY LIVING (ADL)
FEEDING YOURSELF: INDEPENDENT
BATHING: INDEPENDENT
DRESSING YOURSELF: INDEPENDENT
WALKS IN HOME: INDEPENDENT
JUDGMENT_ADEQUATE_SAFELY_COMPLETE_DAILY_ACTIVITIES: YES
LACK_OF_TRANSPORTATION: NO
HEARING - LEFT EAR: HEARING AID
ADEQUATE_TO_COMPLETE_ADL: YES
PATIENT'S MEMORY ADEQUATE TO SAFELY COMPLETE DAILY ACTIVITIES?: YES
GROOMING: INDEPENDENT
TOILETING: INDEPENDENT
HEARING - RIGHT EAR: HEARING AID

## 2023-10-22 ASSESSMENT — COGNITIVE AND FUNCTIONAL STATUS - GENERAL
TURNING FROM BACK TO SIDE WHILE IN FLAT BAD: A LITTLE
PATIENT BASELINE BEDBOUND: NO
STANDING UP FROM CHAIR USING ARMS: A LITTLE
CLIMB 3 TO 5 STEPS WITH RAILING: A LITTLE
MOBILITY SCORE: 18
MOVING TO AND FROM BED TO CHAIR: A LITTLE
WALKING IN HOSPITAL ROOM: A LITTLE
DAILY ACTIVITIY SCORE: 24
MOVING FROM LYING ON BACK TO SITTING ON SIDE OF FLAT BED WITH BEDRAILS: A LITTLE

## 2023-10-22 ASSESSMENT — PAIN - FUNCTIONAL ASSESSMENT
PAIN_FUNCTIONAL_ASSESSMENT: 0-10
PAIN_FUNCTIONAL_ASSESSMENT: 0-10

## 2023-10-22 ASSESSMENT — CURB65 SCORE
BUN IS GREATER THAN 19 MG/DL: NO
CURB65 SCORE: 3
AGE IS GREATER THAN OR EQUAL TO 65: YES
AGE IS GREATER THAN OR EQUAL TO 65: YES
HAS CONFUSION: YES
SBP LESS THAN 90 OR DBNP LESS THAN 60: YES
RESPIRATORY RATE GREATER THAN OR EQUAL TO 30: NO
SBP LESS THAN 90 OR DBNP LESS THAN 60: YES
HAS CONFUSION: YES
RESPIRATORY RATE GREATER THAN OR EQUAL TO 30: NO

## 2023-10-22 ASSESSMENT — LIFESTYLE VARIABLES
SUBSTANCE_ABUSE_PAST_12_MONTHS: NO
HOW OFTEN DO YOU HAVE A DRINK CONTAINING ALCOHOL: NEVER
PRESCIPTION_ABUSE_PAST_12_MONTHS: NO
SKIP TO QUESTIONS 9-10: 1
AUDIT-C TOTAL SCORE: 0
HOW MANY STANDARD DRINKS CONTAINING ALCOHOL DO YOU HAVE ON A TYPICAL DAY: PATIENT DOES NOT DRINK
HOW OFTEN DO YOU HAVE 6 OR MORE DRINKS ON ONE OCCASION: NEVER
AUDIT-C TOTAL SCORE: 0

## 2023-10-22 ASSESSMENT — PATIENT HEALTH QUESTIONNAIRE - PHQ9
2. FEELING DOWN, DEPRESSED OR HOPELESS: NOT AT ALL
1. LITTLE INTEREST OR PLEASURE IN DOING THINGS: NOT AT ALL
SUM OF ALL RESPONSES TO PHQ9 QUESTIONS 1 & 2: 0

## 2023-10-22 ASSESSMENT — PAIN SCALES - GENERAL: PAINLEVEL_OUTOF10: 0 - NO PAIN

## 2023-10-22 NOTE — ED PROVIDER NOTES
HPI   Chief Complaint   Patient presents with    Shortness of Breath     Sob and confusion today has been being treated for leg infection on right        Patient is a 89-year-old female past medical history of breast cancer, CHF, presents today with complaints of shortness of breath.  Daughter reports that just started today.  She reports she woke up gasping for air and was not acting like herself, seeming confused and disoriented.  Reports she was in her normal state of health yesterday with no complaints.                        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    Body mass index (BMI) 22.0-22.9, adult     BMI 22.0-22.9, adult    Cervicalgia 05/08/2015    Neck pain    CHF (congestive heart failure) (CMS/[facility])     High cholesterol     Hypertension     Leg swelling     Malignant neoplasm of unspecified site of unspecified female breast (CMS/[facility]) 07/19/2013    Adenocarcinoma of breast    Other conditions influencing health status 10/21/2013    Urinary Tract Infection    Personal history of other infectious and parasitic diseases 03/17/2015    History of candidiasis of mouth    Personal history of other specified conditions 03/17/2015    History of abdominal pain    Personal history of pneumonia (recurrent) 09/10/2015    History of pneumonia    Personal history of transient ischemic attack (TIA), and cerebral infarction without residual deficits 09/04/2014    History of stroke     Past Surgical History:   Procedure Laterality Date    BREAST BIOPSY  09/10/2015    Biopsy Breast Open    BREAST LUMPECTOMY  07/19/2013    Breast Surgery Lumpectomy    CARPAL TUNNEL RELEASE  09/04/2014    Neuroplasty Decompression Median Nerve At Carpal Tunnel    CATARACT EXTRACTION  11/02/2015    Cataract Surgery    HAND TENDON SURGERY  11/02/2015    Hand Incision Tendon Sheath Of A Finger    HYSTERECTOMY  07/19/2013     Hysterectomy    MR HEAD ANGIO WO IV CONTRAST  12/17/2020    MR HEAD ANGIO WO IV CONTRAST [facility]    MR HEAD ANGIO WO IV CONTRAST  5/13/2021    MR HEAD ANGIO WO IV CONTRAST [facility]    MR HEAD ANGIO WO IV CONTRAST  5/24/2022    MR HEAD ANGIO WO IV CONTRAST [facility]    MR NECK ANGIO WO IV CONTRAST  5/24/2022    MR NECK ANGIO WO IV CONTRAST [facility]    OTHER SURGICAL HISTORY  11/02/2015    Wrist Carpectomy    ROTATOR CUFF REPAIR  09/10/2015    Rotator Cuff Repair    SENTINEL LYMPH NODE BIOPSY  09/10/2015    [place] Lymph Node Biopsy    SHOULDER SURGERY  07/19/2013    Shoulder Surgery     Family History   Problem Relation Name Age of Onset    Alzheimer's disease Father      Breast cancer Sister      Pancreatic cancer Sister      Arthritis Other       Social History     Tobacco Use    Smoking status: Never    Smokeless tobacco: Never   Substance Use Topics    Alcohol use: Never    Drug use: Never       Physical Exam   ED Triage Vitals [10/22/23 1436]   Temp Heart Rate Resp BP   37.2 °C (99 °F) 57 20 139/59      SpO2 Temp Source Heart Rate Source Patient Position   92 % Oral Monitor Sitting      BP Location FiO2 (%)     Right arm --       Physical Exam  Constitutional:       Appearance: Normal appearance.   HENT:      Head: Normocephalic and atraumatic.      Nose: Nose normal.      Mouth/Throat:      Mouth: Mucous membranes are moist.   Eyes:      Extraocular Movements: Extraocular movements intact.      Conjunctiva/sclera: Conjunctivae normal.      Pupils: Pupils are equal, round, and reactive to light.   Cardiovascular:      Rate and Rhythm: Normal rate and regular rhythm.      Pulses: Normal pulses.   Pulmonary:      Effort: Accessory muscle usage present. No respiratory distress.      Breath sounds: Examination of the right-upper field reveals rhonchi. Examination of the right-middle field reveals rhonchi. Rhonchi present.   Abdominal:      Palpations: Abdomen is soft.       Tenderness: There is no abdominal tenderness. There is no guarding.   Musculoskeletal:         General: No swelling or tenderness. Normal range of motion.      Cervical back: Normal range of motion and neck supple. No rigidity.   Skin:     General: Skin is warm and dry.      Capillary Refill: Capillary refill takes less than 2 seconds.      Findings: No rash.   Neurological:      General: No focal deficit present.      Mental Status: She is alert and oriented to person, place, and time.   Psychiatric:         Mood and Affect: Mood normal.         Thought Content: Thought content normal.         ED Course & MDM   ED Course as of 10/22/23 1815   Sun Oct 22, 2023   1627 D-Dimer Non VTE, Quant (mg/L FEU)(!): 1.82  Elevated, CTA chest ordered [JS]   1814 POCT Lactate, Venous(!): 2.2 [JS]   1814 POCT pH, Arterial(!): 7.50 [JS]   1814 POCT pCO2, Arterial(!): 44 [JS]   1814 POCT pO2, Arterial: 93 [JS]   1814 POCT Lactate, Arterial: 1.8 [JS]   1814 CT angio chest for pulmonary embolism  Bilateral airspace disease.  No pulmonary embolus noted.  I have reviewed and interpreted the images and agree with radiology findings. [JS]      ED Course User Index  [JS] Silvano Zimmer MD         Diagnoses as of 10/22/23 1815   Pneumonia of right middle lobe due to infectious organism       Medical Decision Making  Vital signs show bradycardia, mildly low O2 sat on 3 L (home setting)    89-year-old female presenting with shortness of breath that started this morning.  Daughter reports she was in her normal state of health yesterday.  Reports today she was confused and gasping for air.  Daughter also reports she was recently treated for cellulitis in her right lower extremity, last antibiotic 6 days ago.  Defers to her daughter with entire history, so all information is taken from daughter at patient's request.  Physical exam shows rhonchi in the right upper and the lung fields.  Patient does appear to be having mild difficulty with  breathing which improves when her home oxygen is placed.    Differential diagnosis includes but is not limited to flu, COVID-19, pneumonia, sepsis.    Ordered blood work, chest x-ray, EKG, Rocephin, blood cultures, UA with reflex urine culture.    EKG shows sinus bradycardia at a rate of 57, prolonged OK interval, long QRS, prolonged QTc, mild ST elevation in leads V1, V2 with minimal depression in V4,-V6.  No significant changes from March 2023.    Work-up shows mild lactic acidosis, elevated D-dimer, mild elevation of PCO2.  CTA shows bilateral airspace disease, right greater than left.  Discussed the patient with the primary care physician, patient will be admitted to the hospital    Amount and/or Complexity of Data Reviewed  Independent Historian: caregiver        Procedure  Procedures     Silvano Zimmer MD  10/22/23 1817

## 2023-10-22 NOTE — H&P
History Of Present Illness  Kalie Ramos is a 89 y.o. female presenting with weakness lethargy altered mental status confusion emergency room physician discussed with me patient was found to have pneumonia as well as pleural effusion possible PE D-dimer high it were decided to admit I came and saw the patient earlier in the day patient daughter who is a primary caregiver called me patient with mental status change increased confusion weakness lethargic she was short of breath she was found to pneumonias probably sepsis it were decided to admit right now lying in the bed very weak tired    Shortness of breath dyspnea on exertion extreme weakness not feeling good.     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    Body mass index (BMI) 22.0-22.9, adult     BMI 22.0-22.9, adult    Cervicalgia 05/08/2015    Neck pain    CHF (congestive heart failure) (CMS/Formerly [facility])     High cholesterol     Hypertension     Leg swelling     Malignant neoplasm of unspecified site of unspecified female breast (CMS/Formerly [facility]) 07/19/2013    Adenocarcinoma of breast    Other conditions influencing health status 10/21/2013    Urinary Tract Infection    Personal history of other infectious and parasitic diseases 03/17/2015    History of candidiasis of mouth    Personal history of other specified conditions 03/17/2015    History of abdominal pain    Personal history of pneumonia (recurrent) 09/10/2015    History of pneumonia    Personal history of transient ischemic attack (TIA), and cerebral infarction without residual deficits 09/04/2014    History of stroke     Congestive heart failure pleural effusion history of DVT and pulmonary embolism  Surgical History  Past Surgical History:   Procedure Laterality Date    BREAST BIOPSY  09/10/2015    Biopsy Breast Open    BREAST LUMPECTOMY  07/19/2013    Breast Surgery Lumpectomy    CARPAL TUNNEL RELEASE  09/04/2014     Neuroplasty Decompression Median Nerve At Carpal Tunnel    CATARACT EXTRACTION  11/02/2015    Cataract Surgery    HAND TENDON SURGERY  11/02/2015    Hand Incision Tendon Sheath Of A Finger    HYSTERECTOMY  07/19/2013    Hysterectomy    MR HEAD ANGIO WO IV CONTRAST  12/17/2020    MR HEAD ANGIO WO IV CONTRAST [facility]    MR HEAD ANGIO WO IV CONTRAST  5/13/2021    MR HEAD ANGIO WO IV CONTRAST [facility]    MR HEAD ANGIO WO IV CONTRAST  5/24/2022    MR HEAD ANGIO WO IV CONTRAST [facility]    MR NECK ANGIO WO IV CONTRAST  5/24/2022    MR NECK ANGIO WO IV CONTRAST [facility]    OTHER SURGICAL HISTORY  11/02/2015    Wrist Carpectomy    ROTATOR CUFF REPAIR  09/10/2015    Rotator Cuff Repair    SENTINEL LYMPH NODE BIOPSY  09/10/2015    [place] Lymph Node Biopsy    SHOULDER SURGERY  07/19/2013    Shoulder Surgery        Social History  She reports that she has never smoked. She has never used smokeless tobacco. She reports that she does not drink alcohol and does not use drugs.    Family History  Family History   Problem Relation Name Age of Onset    Alzheimer's disease Father      Breast cancer Sister      Pancreatic cancer Sister      Arthritis Other          Allergies  Niaspan extended-release [niacin] and Naproxen    Review of Systems  I reviewed all systems reviewed as above otherwise is negative.  Physical Exam  HEENT:  Head externally atraumatic, no pallor, no icterus, extraocular movements intact, pupils reactive to light, oral mucosa moist and throat clear.  Neck:  Supple, no JVP, no palpable adenopathy or thyromegaly.  No carotid bruit.  Chest:  Clear to auscultation and resonant.  Dull on percussion breath sounds diminished both sides  Heart:  Regular rate and rhythm, no murmur or gallop could be appreciated.  Abdomen:  Soft, nontender, bowel sounds present, normoactive, no palpable hepatosplenomegaly.  Extremities 2+ edema, pulses present, no cyanosis or clubbing.  CNS:   Patient alert, oriented to time, place and person.  Power 5/5 all over and deep tendon reflexes symmetrical, cranial nerves 2-12 grossly intact.  Skin:  No active rash.  Musculoskeletal:  No joint swelling or erythema, range of movement normal.  Leg 3+ edema red inflamed tender  Last Recorded Vitals  Heart Rate:  [57-62]   Temp:  [37.2 °C (99 °F)]   Resp:  [16-20]   BP: (130-139)/(59-74)   Height:  [152.4 cm (5')]   Weight:  [57 kg (125 lb 10.6 oz)]   SpO2:  [92 %-98 %]       Relevant Results        Results for orders placed or performed during the hospital encounter of 10/22/23 (from the past 24 hour(s))   Blood Gas Lactic Acid, Venous   Result Value Ref Range    POCT Lactate, Venous 2.2 (H) 0.4 - 2.0 mmol/L   Magnesium   Result Value Ref Range    Magnesium 2.00 1.60 - 3.10 mg/dL   Urinalysis with Reflex Microscopic and Culture   Result Value Ref Range    Color, Urine Yellow Light-Yellow, Yellow, Dark-Yellow    Appearance, Urine Clear Clear    Specific Gravity, Urine 1.020 1.005 - 1.035    pH, Urine 7.0 5.0, 5.5, 6.0, 6.5, 7.0, 7.5, 8.0    Protein, Urine 20 (TRACE) NEGATIVE, 10 (TRACE), 20 (TRACE) mg/dL    Glucose, Urine Normal Normal mg/dL    Blood, Urine NEGATIVE NEGATIVE    Ketones, Urine NEGATIVE NEGATIVE mg/dL    Bilirubin, Urine NEGATIVE NEGATIVE    Urobilinogen, Urine Normal Normal mg/dL    Nitrite, Urine NEGATIVE NEGATIVE    Leukocyte Esterase, Urine 75 Anurag/µL (A) NEGATIVE   D-Dimer, Quantitative Non VTE   Result Value Ref Range    D-Dimer Non VTE, Quant (mg/L FEU) 1.82 (H) 0.19 - 0.50 mg/L FEU   Influenza A, and B PCR   Result Value Ref Range    Flu A Result Not Detected Not Detected    Flu B Result Not Detected Not Detected   SARS-CoV-2 RT PCR   Result Value Ref Range    Coronavirus 2019, PCR Not Detected Not Detected   Comprehensive metabolic panel   Result Value Ref Range    Glucose 101 (H) 65 - 99 mg/dL    Sodium 138 133 - 145 mmol/L    Potassium 4.5 3.4 - 5.1 mmol/L    Chloride 98 97 - 107 mmol/L     Bicarbonate 27 24 - 31 mmol/L    Urea Nitrogen 15 8 - 25 mg/dL    Creatinine 0.90 0.40 - 1.60 mg/dL    eGFR 60 (L) >60 mL/min/1.73m*2    Calcium 9.1 8.5 - 10.4 mg/dL    Albumin 3.3 (L) 3.5 - 5.0 g/dL    Alkaline Phosphatase 158 (H) 35 - 125 U/L    Total Protein 7.1 5.9 - 7.9 g/dL    AST 28 5 - 40 U/L    Bilirubin, Total 1.1 0.1 - 1.2 mg/dL    ALT 21 5 - 40 U/L    Anion Gap 13 <=19 mmol/L   Blood Gas Arterial Full Panel   Result Value Ref Range    POCT pH, Arterial 7.50 (H) 7.38 - 7.42 pH    POCT pCO2, Arterial 44 (H) 38 - 42 mm Hg    POCT pO2, Arterial 93 85 - 95 mm Hg    POCT SO2, Arterial 97 94 - 100 %    POCT Oxy Hemoglobin, Arterial 95.3 94.0 - 98.0 %    POCT Hematocrit Calculated, Arterial 34.0 (L) 36.0 - 46.0 %    POCT Sodium, Arterial 132 (L) 136 - 145 mmol/L    POCT Potassium, Arterial 4.0 3.5 - 5.3 mmol/L    POCT Chloride, Arterial 101 98 - 107 mmol/L    POCT Ionized Calcium, Arterial 1.08 (L) 1.10 - 1.33 mmol/L    POCT Glucose, Arterial 106 (H) 74 - 99 mg/dL    POCT Lactate, Arterial 1.8 0.4 - 2.0 mmol/L    POCT Base Excess, Arterial 10.0 (H) -2.0 - 3.0 mmol/L    POCT HCO3 Calculated, Arterial 34.3 (H) 22.0 - 26.0 mmol/L    POCT Hemoglobin, Arterial 11.4 (L) 12.0 - 16.0 g/dL    POCT Anion Gap, Arterial 1 (L) 10 - 25 mmo/L    Patient Temperature 37.0 degrees Celsius    FiO2 36 %    Apparatus CANNULA      Prior to Admission medications    Medication Sig Start Date End Date Taking? Authorizing Provider   albuterol 2.5 mg /3 mL (0.083 %) nebulizer solution Take 3 mL (2.5 mg) by nebulization in the morning, at noon, and at bedtime. 2/21/22   Historical Provider, MD   aspirin 81 mg EC tablet Take 1 tablet (81 mg) by mouth 1 (one) time each day. 12/19/22   Historical Provider, MD   atorvastatin (Lipitor) 40 mg tablet TAKE 1 TABLET DAILY 7/18/23   Avel Bailey MD   bacitracin 500 unit/gram ointment Apply 1 Application topically in the morning and 1 Application at noon and 1 Application in the evening. APPLY  SPARINGLY TO AFFECTED AREA. 4/6/22   Historical Provider, MD   calcitonin, salmon, (Miacalcin) 200 unit/actuation nasal spray Administer 1 spray into affected nostril(s) See administration instructions. INSERT 1 SQUIRT IN ONE NOSTRIL EVERY DAY, ALTERNATING EACH NOSTRIL 6/16/22   Historical Provider, MD   famotidine (Pepcid) 20 mg tablet Take 1 tablet (20 mg) by mouth every 12 (twelve) hours. 8/6/20   Historical Provider, MD   ferrous gluconate (Fergon) 240 (27 Fe) MG tablet Take 1 tablet (27 mg) by mouth in the morning. 6/1/20   Historical Provider, MD   furosemide (Lasix) 40 mg tablet TAKE 1 TABLET DAILY 8/14/23   Avel Bailey MD   lidocaine 4 % patch APPLY AS DIRECTED. 6/16/22   Historical Provider, MD   loratadine (Claritin) 10 mg tablet Take 1 tablet (10 mg) by mouth 1 (one) time each day. 11/25/19   Historical Provider, MD   magnesium oxide (Mag-Ox) 400 mg (241.3 mg magnesium) tablet Take 1 tablet (400 mg) by mouth 1 (one) time each day. 12/19/22   Historical Provider, MD   melatonin 3 mg tablet Take 1 tablet (3 mg) by mouth at bedtime. 6/1/20   Historical Provider, MD   metoprolol succinate XL (Toprol-XL) 50 mg 24 hr tablet Take 1 tablet (50 mg) by mouth 1 (one) time each day. 12/19/22   Historical Provider, MD   multivit-min/folic acid/vit K1 (MULTI FOR HIM, NO IRON, ORAL) Take 1 capsule by mouth 1 (one) time each day. 12/19/22   Historical Provider, MD   nitroglycerin (Nitrostat) 0.4 mg SL tablet 1 tablet (0.4 mg) every 5 (five) minutes if needed for chest pain (UP TO 3 TIMES. IF NO RELIEF CALL 911.). UP TO 3 TIMES. IF NO RELIEF CALL 911. 10/29/19   Historical Provider, MD   nystatin (Mycostatin) cream Apply 1 Application topically in the morning and 1 Application in the evening. APPLY A THIN LAYER TO AFFECTED AREA(S) AND RUB IN WELL TWICE DAILY.. 8/13/21   Historical Provider, MD   omeprazole (PriLOSEC) 40 mg DR capsule Take 1 capsule (40 mg) by mouth 1 (one) time each day. 11/13/18   Historical  Provider, MD   pantoprazole (ProtoNix) 40 mg EC tablet TAKE 1 TABLET DAILY 7/31/23   Avel Bailey MD   potassium chloride (Klor-Con) 20 mEq packet Take 20 mEq by mouth in the morning. MIX AND DRINK 1/4/19   Historical Provider, MD   rOPINIRole (Requip) 2 mg tablet TAKE 1 TABLET AT BEDTIME 9/11/23   Avel Bailey MD   rOPINIRole (Requip) 5 mg tablet Take 1 tablet (5 mg) by mouth at bedtime. 5/16/22   Historical Provider, MD   sertraline (Zoloft) 50 mg tablet TAKE 1 TABLET DAILY 7/11/23   Avel Bailey MD   sertraline (Zoloft) 50 mg tablet Take 1 tablet (50 mg) by mouth once daily. 7/11/23 1/7/24  Avel Bailey MD   traZODone (Desyrel) 50 mg tablet Take 1 tablet (50 mg) by mouth if needed at bedtime for sleep. 10/29/19   Historical Provider, MD   triamcinolone (Kenalog) 0.1 % cream APPLY THIN LAYER TO AFFECTED AREA(S) TWICE A DAY 7/11/23   Avel Bailey MD   triamcinolone (Kenalog) 0.1 % cream Apply 1 Application topically 2 times a day. APPLY THIN LAYER TO AFFECTED AREA(S) TWICE A DAY 7/11/23   Avel Bailey MD       Current Facility-Administered Medications:     acetaminophen (Tylenol) tablet 650 mg, 650 mg, oral, q6h PRN, Avel Bailey MD    acetaminophen (Tylenol) tablet 650 mg, 650 mg, oral, q6h PRN, Avel Bailey MD    azithromycin (Zithromax) in sodium chloride 0.9 % 250 mL  mg, 500 mg, intravenous, q24h, Avel Bailey MD    benzocaine-menthol (Cepastat Sore Throat) 15-3.6 mg lozenge 1 lozenge, 1 lozenge, Mouth/Throat, q4h PRN, Avel Bailey MD    cefTRIAXone (Rocephin) IVPB 1 g, 1 g, intravenous, BID, Avel Bailey MD    docusate sodium (Colace) capsule 100 mg, 100 mg, oral, TID PRN, Avel Bailey MD    furosemide (Lasix) injection 40 mg, 40 mg, intravenous, BID, Avel Bailey MD    guaiFENesin (Robitussin) 100 mg/5 mL syrup 10 mL, 10 mL, oral, q4h PRN, Avel Bailey MD    ipratropium-albuteroL (Duo-Neb) 0.5-2.5 mg/3 mL nebulizer solution 3 mL, 3 mL,  nebulization, q6h, Avel Bailey MD    pantoprazole (ProtoNix) injection 40 mg, 40 mg, intravenous, Once, Avel Bailey MD    sodium chloride (Ocean) 0.65 % nasal spray 1 spray, 1 spray, Each Nostril, 4x daily, Avel Bailey MD    Current Outpatient Medications:     albuterol 2.5 mg /3 mL (0.083 %) nebulizer solution, Take 3 mL (2.5 mg) by nebulization in the morning, at noon, and at bedtime., Disp: , Rfl:     aspirin 81 mg EC tablet, Take 1 tablet (81 mg) by mouth 1 (one) time each day., Disp: , Rfl:     atorvastatin (Lipitor) 40 mg tablet, TAKE 1 TABLET DAILY, Disp: 90 tablet, Rfl: 3    bacitracin 500 unit/gram ointment, Apply 1 Application topically in the morning and 1 Application at noon and 1 Application in the evening. APPLY SPARINGLY TO AFFECTED AREA., Disp: , Rfl:     calcitonin, salmon, (Miacalcin) 200 unit/actuation nasal spray, Administer 1 spray into affected nostril(s) See administration instructions. INSERT 1 SQUIRT IN ONE NOSTRIL EVERY DAY, ALTERNATING EACH NOSTRIL, Disp: , Rfl:     famotidine (Pepcid) 20 mg tablet, Take 1 tablet (20 mg) by mouth every 12 (twelve) hours., Disp: , Rfl:     ferrous gluconate (Fergon) 240 (27 Fe) MG tablet, Take 1 tablet (27 mg) by mouth in the morning., Disp: , Rfl:     furosemide (Lasix) 40 mg tablet, TAKE 1 TABLET DAILY, Disp: 90 tablet, Rfl: 3    lidocaine 4 % patch, APPLY AS DIRECTED., Disp: , Rfl:     loratadine (Claritin) 10 mg tablet, Take 1 tablet (10 mg) by mouth 1 (one) time each day., Disp: , Rfl:     magnesium oxide (Mag-Ox) 400 mg (241.3 mg magnesium) tablet, Take 1 tablet (400 mg) by mouth 1 (one) time each day., Disp: , Rfl:     melatonin 3 mg tablet, Take 1 tablet (3 mg) by mouth at bedtime., Disp: , Rfl:     metoprolol succinate XL (Toprol-XL) 50 mg 24 hr tablet, Take 1 tablet (50 mg) by mouth 1 (one) time each day., Disp: , Rfl:     multivit-min/folic acid/vit K1 (MULTI FOR HIM, NO IRON, ORAL), Take 1 capsule by mouth 1 (one) time each day.,  Disp: , Rfl:     nitroglycerin (Nitrostat) 0.4 mg SL tablet, 1 tablet (0.4 mg) every 5 (five) minutes if needed for chest pain (UP TO 3 TIMES. IF NO RELIEF CALL 911.). UP TO 3 TIMES. IF NO RELIEF CALL 911., Disp: , Rfl:     nystatin (Mycostatin) cream, Apply 1 Application topically in the morning and 1 Application in the evening. APPLY A THIN LAYER TO AFFECTED AREA(S) AND RUB IN WELL TWICE DAILY.., Disp: , Rfl:     omeprazole (PriLOSEC) 40 mg DR capsule, Take 1 capsule (40 mg) by mouth 1 (one) time each day., Disp: , Rfl:     pantoprazole (ProtoNix) 40 mg EC tablet, TAKE 1 TABLET DAILY, Disp: 90 tablet, Rfl: 3    potassium chloride (Klor-Con) 20 mEq packet, Take 20 mEq by mouth in the morning. MIX AND DRINK, Disp: , Rfl:     rOPINIRole (Requip) 2 mg tablet, TAKE 1 TABLET AT BEDTIME, Disp: 90 tablet, Rfl: 3    rOPINIRole (Requip) 5 mg tablet, Take 1 tablet (5 mg) by mouth at bedtime., Disp: , Rfl:     sertraline (Zoloft) 50 mg tablet, TAKE 1 TABLET DAILY, Disp: 90 tablet, Rfl: 3    sertraline (Zoloft) 50 mg tablet, Take 1 tablet (50 mg) by mouth once daily., Disp: 90 tablet, Rfl: 1    traZODone (Desyrel) 50 mg tablet, Take 1 tablet (50 mg) by mouth if needed at bedtime for sleep., Disp: , Rfl:     triamcinolone (Kenalog) 0.1 % cream, APPLY THIN LAYER TO AFFECTED AREA(S) TWICE A DAY, Disp: 454 g, Rfl: 11    triamcinolone (Kenalog) 0.1 % cream, Apply 1 Application topically 2 times a day. APPLY THIN LAYER TO AFFECTED AREA(S) TWICE A DAY, Disp: 454 g, Rfl: 3  CT angio chest for pulmonary embolism    Addendum Date: 10/22/2023    Interpreted By:  Eloise Joshi, ADDENDUM: Large, heterogenous thyroid nodule measuring 5.9 x 6.8 x 2.6 cm: Thyroid ultrasound may be beneficial on an outpatient basis.   Enlarged prevascular node may be reactive.   Signed by: Eloise Joshi 10/22/2023 6:04 PM   -------- ORIGINAL REPORT -------- Dictation workstation:   [id number]    Result Date: 10/22/2023  Interpreted By:  Eloise Joshi, STUDY: CT ANGIO  CHEST FOR PULMONARY EMBOLISM;  10/22/2023 5:34 pm   INDICATION: Signs/Symptoms:acute onset SOB, dimer.   COMPARISON: Chest radiograph dated same day.   ACCESSION NUMBER(S): [id number]   ORDERING CLINICIAN: ALONDRA SELLERS   TECHNIQUE: Helical data acquisition of the chest was obtained contrast volume:without IV contrast material/Optiray 350/Isovue 370.  Images were reformatted in coronal and sagittal planes. Axial and coronal MIP images were created and reviewed.   FINDINGS: POTENTIAL LIMITATIONS OF THE STUDY: None   HEART AND VESSELS: No discrete filling defects within the main pulmonary artery or its branches.   The right and left pulmonary arteries are dilated. The right measures 2.8 cm, and the left measures 2.8 cm.   Atherosclerotic disease of the aorta.   Mild atherosclerotic disease of the coronary vasculature.   Heart size is enlarged.   No evidence of pericardial effusion.   MEDIASTINUM AND JAYDEN, LOWER NECK AND AXILLA: Large, heterogenous thyroid gland containing scattered areas of calcifications measures 5.9 x 6.8 by 2.6 cm (series 4, image 16, and series 6, image 30).   Enlarged prevascular lymph node measures 1.4 by 1.2 cm (series 4, image 24).   Esophagus appears within normal limits as seen.   LUNGS AND AIRWAYS: The trachea and central airways are patent. No endobronchial lesion.   Bilateral confluent airspace disease involving all lung lobes bilaterally, right-greater-than-left. Moderate bilateral pleural effusions.   UPPER ABDOMEN: The visualized subdiaphragmatic structures demonstrate no remarkable findings.   CHEST WALL AND OSSEOUS STRUCTURES: There are no suspicious osseous lesions. Multilevel degenerative changes are present       1. No emboli within the proximal pulmonary vasculature.   2. Bilateral confluent airspace disease involving all lung lobes, right greater than left.   3. Moderate bilateral pleural effusions.   4. Enlarged bilateral main pulmonary arteries can be seen with elevated  pulmonary arterial pressure.   5. Cardiomegaly.   MACRO: None   Signed by: Eloise Joshi 10/22/2023 5:53 PM Dictation workstation:   [id number]    XR chest 1 view    Result Date: 10/22/2023  Interpreted By:  Sally Stinson, STUDY: XR CHEST 1 VIEW; 10/22/2023 3:47 pm   INDICATION: Signs/Symptoms:SOB   COMPARISON: 07/27/2023   ACCESSION NUMBER(S): [id number]   ORDERING CLINICIAN: ALONDRA SELLERS   TECHNIQUE: Frontal chest radiograph   FINDINGS: The cardiomediastinal silhouette is unremarkable. Patchy airspace opacities are noted in both lungs particularly within the right upper lung field. Bibasilar pleural effusions associated atelectasis.   The osseous structures are intact.       Patchy bilateral airspace opacities are noted in both lungs particularly within the right midlung field is concerning for pneumonia. Bibasilar effusions associated atelectasis.     Signed by: Sally Stinson 10/22/2023 4:04 PM Dictation workstation:   [id number]         Assessment/Plan   Principal Problem:    Pneumonia of right middle lobe due to infectious organism  IV antibiotic pulmonary consult    Congestive heart failu earlier    Cardiology    Leg edema    Lasix    Altered mental status    Metabolic and cephalopathy cephalopathy   Because of pneumonia monitor    Possible PE    Anticoagulation    Hypertension  Medication    Chronic renal failure  Monitor        Avel Bailey MD

## 2023-10-23 LAB
ANION GAP SERPL CALC-SCNC: 9 MMOL/L
BASOPHILS # BLD AUTO: 0.03 X10*3/UL (ref 0–0.1)
BASOPHILS NFR BLD AUTO: 0.3 %
BUN SERPL-MCNC: 14 MG/DL (ref 8–25)
CALCIUM SERPL-MCNC: 8.3 MG/DL (ref 8.5–10.4)
CHLORIDE SERPL-SCNC: 100 MMOL/L (ref 97–107)
CO2 SERPL-SCNC: 26 MMOL/L (ref 24–31)
CREAT SERPL-MCNC: 0.9 MG/DL (ref 0.4–1.6)
EOSINOPHIL # BLD AUTO: 0.08 X10*3/UL (ref 0–0.4)
EOSINOPHIL NFR BLD AUTO: 0.8 %
ERYTHROCYTE [DISTWIDTH] IN BLOOD BY AUTOMATED COUNT: 14.7 % (ref 11.5–14.5)
GFR SERPL CREATININE-BSD FRML MDRD: 60 ML/MIN/1.73M*2
GLUCOSE SERPL-MCNC: 99 MG/DL (ref 65–99)
HCT VFR BLD AUTO: 35.6 % (ref 36–46)
HGB BLD-MCNC: 11.3 G/DL (ref 12–16)
IMM GRANULOCYTES # BLD AUTO: 0.03 X10*3/UL (ref 0–0.5)
IMM GRANULOCYTES NFR BLD AUTO: 0.3 % (ref 0–0.9)
LYMPHOCYTES # BLD AUTO: 1.09 X10*3/UL (ref 0.8–3)
LYMPHOCYTES NFR BLD AUTO: 11 %
MAGNESIUM SERPL-MCNC: 2.4 MG/DL (ref 1.6–3.1)
MCH RBC QN AUTO: 29 PG (ref 26–34)
MCHC RBC AUTO-ENTMCNC: 31.7 G/DL (ref 32–36)
MCV RBC AUTO: 92 FL (ref 80–100)
MONOCYTES # BLD AUTO: 0.93 X10*3/UL (ref 0.05–0.8)
MONOCYTES NFR BLD AUTO: 9.3 %
NEUTROPHILS # BLD AUTO: 7.79 X10*3/UL (ref 1.6–5.5)
NEUTROPHILS NFR BLD AUTO: 78.3 %
NRBC BLD-RTO: 0 /100 WBCS (ref 0–0)
NT-PROBNP SERPL-MCNC: ABNORMAL PG/ML (ref 0–624)
PLATELET # BLD AUTO: 197 X10*3/UL (ref 150–450)
PMV BLD AUTO: 10.6 FL (ref 7.5–11.5)
POTASSIUM SERPL-SCNC: 4.2 MMOL/L (ref 3.4–5.1)
RBC # BLD AUTO: 3.89 X10*6/UL (ref 4–5.2)
SODIUM SERPL-SCNC: 135 MMOL/L (ref 133–145)
WBC # BLD AUTO: 10 X10*3/UL (ref 4.4–11.3)

## 2023-10-23 PROCEDURE — 2500000002 HC RX 250 W HCPCS SELF ADMINISTERED DRUGS (ALT 637 FOR MEDICARE OP, ALT 636 FOR OP/ED): Performed by: INTERNAL MEDICINE

## 2023-10-23 PROCEDURE — 36415 COLL VENOUS BLD VENIPUNCTURE: CPT | Performed by: NURSE PRACTITIONER

## 2023-10-23 PROCEDURE — 2500000004 HC RX 250 GENERAL PHARMACY W/ HCPCS (ALT 636 FOR OP/ED): Performed by: NURSE PRACTITIONER

## 2023-10-23 PROCEDURE — 1100000001 HC PRIVATE ROOM DAILY

## 2023-10-23 PROCEDURE — 92610 EVALUATE SWALLOWING FUNCTION: CPT | Mod: GN | Performed by: SPEECH-LANGUAGE PATHOLOGIST

## 2023-10-23 PROCEDURE — 2500000001 HC RX 250 WO HCPCS SELF ADMINISTERED DRUGS (ALT 637 FOR MEDICARE OP): Performed by: INTERNAL MEDICINE

## 2023-10-23 PROCEDURE — 97161 PT EVAL LOW COMPLEX 20 MIN: CPT | Mod: GP

## 2023-10-23 PROCEDURE — 99232 SBSQ HOSP IP/OBS MODERATE 35: CPT | Performed by: INTERNAL MEDICINE

## 2023-10-23 PROCEDURE — 36415 COLL VENOUS BLD VENIPUNCTURE: CPT | Performed by: INTERNAL MEDICINE

## 2023-10-23 PROCEDURE — 94760 N-INVAS EAR/PLS OXIMETRY 1: CPT

## 2023-10-23 PROCEDURE — 94640 AIRWAY INHALATION TREATMENT: CPT

## 2023-10-23 PROCEDURE — 80048 BASIC METABOLIC PNL TOTAL CA: CPT | Performed by: INTERNAL MEDICINE

## 2023-10-23 PROCEDURE — 97165 OT EVAL LOW COMPLEX 30 MIN: CPT | Mod: GO

## 2023-10-23 PROCEDURE — 83880 ASSAY OF NATRIURETIC PEPTIDE: CPT | Performed by: NURSE PRACTITIONER

## 2023-10-23 PROCEDURE — 9420000001 HC RT PATIENT EDUCATION 5 MIN

## 2023-10-23 PROCEDURE — 2500000004 HC RX 250 GENERAL PHARMACY W/ HCPCS (ALT 636 FOR OP/ED): Performed by: INTERNAL MEDICINE

## 2023-10-23 PROCEDURE — 83735 ASSAY OF MAGNESIUM: CPT | Performed by: INTERNAL MEDICINE

## 2023-10-23 PROCEDURE — 85025 COMPLETE CBC W/AUTO DIFF WBC: CPT | Performed by: NURSE PRACTITIONER

## 2023-10-23 RX ORDER — CEFTRIAXONE 1 G/50ML
1 INJECTION, SOLUTION INTRAVENOUS EVERY 12 HOURS
Status: DISCONTINUED | OUTPATIENT
Start: 2023-10-23 | End: 2023-10-27 | Stop reason: HOSPADM

## 2023-10-23 RX ADMIN — AZITHROMYCIN MONOHYDRATE 500 MG: 500 INJECTION, POWDER, LYOPHILIZED, FOR SOLUTION INTRAVENOUS at 18:23

## 2023-10-23 RX ADMIN — PANTOPRAZOLE SODIUM 40 MG: 40 TABLET, DELAYED RELEASE ORAL at 09:35

## 2023-10-23 RX ADMIN — Medication 38 MG OF IRON: at 09:36

## 2023-10-23 RX ADMIN — METHYLPREDNISOLONE SODIUM SUCCINATE 20 MG: 40 INJECTION INTRAMUSCULAR; INTRAVENOUS at 21:27

## 2023-10-23 RX ADMIN — CALCITONIN SALMON 1 SPRAY: 200 SPRAY, METERED NASAL at 09:36

## 2023-10-23 RX ADMIN — ATORVASTATIN CALCIUM 40 MG: 40 TABLET, FILM COATED ORAL at 21:27

## 2023-10-23 RX ADMIN — ASPIRIN 81 MG: 81 TABLET, COATED ORAL at 09:35

## 2023-10-23 RX ADMIN — METOPROLOL SUCCINATE 25 MG: 25 TABLET, EXTENDED RELEASE ORAL at 21:27

## 2023-10-23 RX ADMIN — TRAZODONE HYDROCHLORIDE 50 MG: 50 TABLET ORAL at 21:33

## 2023-10-23 RX ADMIN — METHYLPREDNISOLONE SODIUM SUCCINATE 20 MG: 40 INJECTION INTRAMUSCULAR; INTRAVENOUS at 13:16

## 2023-10-23 RX ADMIN — CEFTRIAXONE SODIUM 1 G: 1 INJECTION, SOLUTION INTRAVENOUS at 10:33

## 2023-10-23 RX ADMIN — METOPROLOL SUCCINATE 25 MG: 25 TABLET, EXTENDED RELEASE ORAL at 09:35

## 2023-10-23 RX ADMIN — FUROSEMIDE 40 MG: 10 INJECTION, SOLUTION INTRAMUSCULAR; INTRAVENOUS at 18:22

## 2023-10-23 RX ADMIN — ALBUTEROL SULFATE 2.5 MG: 2.5 SOLUTION RESPIRATORY (INHALATION) at 06:48

## 2023-10-23 RX ADMIN — ROPINIROLE HYDROCHLORIDE 5 MG: 1 TABLET, FILM COATED ORAL at 21:27

## 2023-10-23 RX ADMIN — NYSTATIN 1 APPLICATION: 100000 CREAM TOPICAL at 21:34

## 2023-10-23 RX ADMIN — ALBUTEROL SULFATE 2.5 MG: 2.5 SOLUTION RESPIRATORY (INHALATION) at 19:17

## 2023-10-23 RX ADMIN — ALBUTEROL SULFATE 2.5 MG: 2.5 SOLUTION RESPIRATORY (INHALATION) at 12:09

## 2023-10-23 RX ADMIN — TRIAMCINOLONE ACETONIDE: 1 CREAM TOPICAL at 09:36

## 2023-10-23 RX ADMIN — Medication 3 MG: at 21:27

## 2023-10-23 RX ADMIN — CEFTRIAXONE SODIUM 1 G: 1 INJECTION, SOLUTION INTRAVENOUS at 22:45

## 2023-10-23 RX ADMIN — FUROSEMIDE 40 MG: 10 INJECTION, SOLUTION INTRAMUSCULAR; INTRAVENOUS at 06:09

## 2023-10-23 RX ADMIN — NYSTATIN 1 APPLICATION: 100000 CREAM TOPICAL at 09:36

## 2023-10-23 RX ADMIN — SERTRALINE 50 MG: 50 TABLET, FILM COATED ORAL at 09:35

## 2023-10-23 RX ADMIN — POTASSIUM CHLORIDE 20 MEQ: 1.5 FOR SOLUTION ORAL at 09:34

## 2023-10-23 RX ADMIN — TRIAMCINOLONE ACETONIDE: 1 CREAM TOPICAL at 21:34

## 2023-10-23 ASSESSMENT — ENCOUNTER SYMPTOMS
CONSTITUTIONAL NEGATIVE: 1
MUSCULOSKELETAL NEGATIVE: 1
HEMATOLOGIC/LYMPHATIC NEGATIVE: 1
NEUROLOGICAL NEGATIVE: 1
GASTROINTESTINAL NEGATIVE: 1
RESPIRATORY NEGATIVE: 1
EYES NEGATIVE: 1
ENDOCRINE NEGATIVE: 1
CARDIOVASCULAR NEGATIVE: 1
PSYCHIATRIC NEGATIVE: 1
ALLERGIC/IMMUNOLOGIC NEGATIVE: 1

## 2023-10-23 ASSESSMENT — COGNITIVE AND FUNCTIONAL STATUS - GENERAL
DRESSING REGULAR UPPER BODY CLOTHING: A LITTLE
STANDING UP FROM CHAIR USING ARMS: A LITTLE
TURNING FROM BACK TO SIDE WHILE IN FLAT BAD: A LITTLE
DAILY ACTIVITIY SCORE: 19
MOVING FROM LYING ON BACK TO SITTING ON SIDE OF FLAT BED WITH BEDRAILS: A LITTLE
DRESSING REGULAR UPPER BODY CLOTHING: A LITTLE
MOVING FROM LYING ON BACK TO SITTING ON SIDE OF FLAT BED WITH BEDRAILS: A LITTLE
DRESSING REGULAR LOWER BODY CLOTHING: A LITTLE
MOBILITY SCORE: 18
MOBILITY SCORE: 18
WALKING IN HOSPITAL ROOM: A LITTLE
TOILETING: A LOT
DRESSING REGULAR LOWER BODY CLOTHING: A LITTLE
STANDING UP FROM CHAIR USING ARMS: A LITTLE
DAILY ACTIVITIY SCORE: 18
CLIMB 3 TO 5 STEPS WITH RAILING: A LITTLE
EATING MEALS: A LITTLE
HELP NEEDED FOR BATHING: A LITTLE
CLIMB 3 TO 5 STEPS WITH RAILING: A LITTLE
MOVING TO AND FROM BED TO CHAIR: A LITTLE
TURNING FROM BACK TO SIDE WHILE IN FLAT BAD: A LITTLE
WALKING IN HOSPITAL ROOM: A LITTLE
HELP NEEDED FOR BATHING: A LITTLE
MOVING TO AND FROM BED TO CHAIR: A LITTLE
TOILETING: A LOT

## 2023-10-23 ASSESSMENT — ACTIVITIES OF DAILY LIVING (ADL)
ADL_ASSISTANCE: INDEPENDENT
BATHING_ASSISTANCE: MINIMAL
ADL_ASSISTANCE: INDEPENDENT

## 2023-10-23 ASSESSMENT — PAIN SCALES - GENERAL
PAINLEVEL_OUTOF10: 0 - NO PAIN

## 2023-10-23 ASSESSMENT — PAIN - FUNCTIONAL ASSESSMENT
PAIN_FUNCTIONAL_ASSESSMENT: 0-10
PAIN_FUNCTIONAL_ASSESSMENT: 0-10

## 2023-10-23 NOTE — NURSING NOTE
Pt resting with eyes closed, respirations even and non labored on 3L O2. Sinus rama on monitor.  Call light within reach, bed locked in low position with alarm on for safety.

## 2023-10-23 NOTE — PROGRESS NOTES
Occupational Therapy    Evaluation    Patient Name: Kalie Ramos  MRN: [id number]  Today's Date: 10/23/2023  Time Calculation  Start Time: 0737  Stop Time: 0756  Time Calculation (min): 19 min    Assessment  IP OT Assessment  OT Assessment: Patient is a 89 year old female admitted with PNA resulting in a decline in ADLs, strength, balance, and activity tolerance, resulting in an increased need for assistance with ADL tasks. Skilled OT to address the above deficits and increase patient's safety and independence with self-care tasks.  Prognosis: Good  Evaluation/Treatment Tolerance: Patient tolerated treatment well  End of Session Communication: Bedside nurse  End of Session Patient Position: Up in chair, Alarm on  Plan:  Treatment Interventions: ADL retraining, Functional transfer training, UE strengthening/ROM, Endurance training, Patient/family training, Neuromuscular reeducation, Compensatory technique education  OT Frequency: 2 times per week  OT Discharge Recommendations: Low intensity level of continued care, 24 hr supervision due to cognition    Subjective   Current Problem:  1. Pneumonia of right middle lobe due to infectious organism          General:  General  Reason for Referral: decline in ADLs and functional mobility  Referred By: Dr. Bailey  Past Medical History Relevant to Rehab: Patient is a 89 year old female who presented to the ED with weakness, lethargy, and AMS. Patient is admitted with PNA. PMH: anemia, HLD, hyperthyroidism  Co-Treatment: PT  Co-Treatment Reason: safety  Prior to Session Communication: Bedside nurse  Patient Position Received: Bed, 3 rail up, Alarm on  Preferred Learning Style: verbal  General Comment: Patient in bed upon arrival and agreeable to participate  Precautions:  Medical Precautions: Fall precautions    Pain:  Pain Assessment  Pain Assessment: 0-10  Pain Score: 0 - No pain    Objective   Cognition:  Overall Cognitive Status: Within Functional Limits     Home  Living:  Type of Home: Condo  Lives With: Alone  Home Adaptive Equipment: Walker rolling or standard, Cane, Other (Comment) (rollator)  Home Layout: One level  Home Access: Stairs to enter with rails  Entrance Stairs-Rails: Both (grab bar on each side)  Entrance Stairs-Number of Steps: 1  Bathroom Shower/Tub: Tub/shower unit  Bathroom Toilet: Standard  Bathroom Equipment: Grab bars in shower, Shower chair with back   Prior Function:  Level of [place]: Independent with ADLs and functional transfers, Needs assistance with homemaking  Receives Help From: Family (daughter)  ADL Assistance: Independent  Homemaking Assistance: Needs assistance  Shopping: Other (Comment) (patient's daughter assists with shopping)  Ambulatory Assistance: Needs assistance  Transfers: Assistive device (rollator in home, cane outside of home)  Gait: Assistive device (rollator in home, cane outside of home)  Stairs: Railings  Vocational: Retired  Prior Function Comments: patient on 3L O2 ATC  IADL History:  Homemaking Responsibilities: No  IADL Comments: daughter assists  ADL:  Eating Assistance: Stand by  Eating Deficit: Setup (seated with items within reach)  Grooming Assistance: Stand by  Grooming Deficit: Steadying, Supervision/safety, Increased time to complete (per clinical judgement)  Bathing Assistance: Minimal  Bathing Deficit: Right lower leg including foot, Left lower leg including foot, Buttocks (per clinical judgement)  UE Dressing Assistance: Minimal  UE Dressing Deficit: Increased time to complete, Steadying, Supervision/safety (doff/don gown)  LE Dressing Assistance: Maximal  LE Dressing Deficit: Thread RLE into pants, Thread LLE into pants, Thread RLE into underwear, Thread LLE into underwear, Pull up over hips, Don/doff R sock, Don/doff L sock (per clinical judgement)  Toileting Assistance with Device: Maximal  Toileting Deficit: Incontinent, Perineal hygiene    Bed Mobility/Transfers: Bed Mobility  Bed Mobility: Yes  Bed  Mobility 1  Bed Mobility 1: Supine to sitting  Level of Assistance 1: Minimum assistance  Bed Mobility Comments 1: head of bed slightly elevated   and Transfers  Transfer: Yes  Transfer 1  Transfer From 1: Bed to  Transfer to 1: Stand  Technique 1: Sit to stand  Transfer Device 1: Walker  Transfer Level of Assistance 1: Minimum assistance  Trials/Comments 1: x3 trials. 3rd stand pivots to the chair  Transfers 2  Transfer From 2: Chair with arms to  Transfer to 2: Stand  Technique 2: Sit to stand  Transfer Device 2: Walker  Transfer Level of Assistance 2: Minimum assistance    IADL's:   Homemaking Responsibilities: No  IADL Comments: daughter assists    Sensation:  Sensation Comment: patient denies numbness/tingling  Strength:  Strength Comments: B UE at least >/= 3+/5 grossly. observed functionally    Extremities:  R UE and LUE   RUE; Within Functional Limits  LUE: Within Functional Limits    Outcome Measures: [facility] Daily Activity  Putting on and taking off regular lower body clothing: A little  Bathing (including washing, rinsing, drying): A little  Putting on and taking off regular upper body clothing: A little  Toileting, which includes using toilet, bedpan or urinal: A lot  Taking care of personal grooming such as brushing teeth: None  Eating Meals: None  Daily Activity - Total Score: 19      Education Documentation  Body Mechanics, taught by Yolanda Foy OT at 10/23/2023  9:08 AM.  Learner: Patient  Readiness: Acceptance  Method: Explanation  Response: Needs Reinforcement    Precautions, taught by Yolanda Foy OT at 10/23/2023  9:08 AM.  Learner: Patient  Readiness: Acceptance  Method: Explanation  Response: Needs Reinforcement    ADL Training, taught by Yolanda Foy OT at 10/23/2023  9:08 AM.  Learner: Patient  Readiness: Acceptance  Method: Explanation  Response: Needs Reinforcement    Education Comments  No comments found.      Goals:   Problem: OT Goals  Goal: ADLs  Description: Patient will  complete ADL tasks with Mod I, using AE as needed, in order to increase patient's safety and independence with self-care tasks.  Outcome: Progressing  Goal: Functional Transfers  Description: Patient will complete functional transfers with Mod I in order to increase patient's safety and independence with ADL tasks.  Outcome: Progressing  Goal: B UE Strengthening  Description: Patient will increase patient's B UE strengthening to 4+/5 for functional transfers.  Outcome: Progressing  Goal: Functional Mobility  Description: Patient will demonstrate the ability to complete item retrieval and functional mobility tasks with Mod I in order to increase patient's safety and independence with self-care tasks.  Outcome: Progressing

## 2023-10-23 NOTE — PROGRESS NOTES
Physical Therapy    Physical Therapy Evaluation    Patient Name: Kalie Ramos  MRN: [id number]  Today's Date: 10/23/2023   Time Calculation  Start Time: 0739  Stop Time: 0758  Time Calculation (min): 19 min    Assessment/Plan   PT Assessment  PT Assessment Results: Decreased strength, Decreased endurance, Impaired balance, Decreased mobility, Decreased safety awareness  Rehab Prognosis: Good  End of Session Communication: Bedside nurse  Assessment Comment: 89 y/o female who presented wt waekaness due3 to PNA and pleural effusion. Pt is an increased falls risk due to impaired balnace, decreased strength and decreased activity tolerance.  End of Session Patient Position: Up in chair, Alarm on  IP OR SWING BED PT PLAN  Inpatient or Swing Bed: Inpatient  PT Plan  Treatment/Interventions: Bed mobility, Transfer training, Stair training, Gait training, Balance training, Strengthening, Endurance training  PT Plan: Skilled PT  PT Frequency: 4 times per week  PT Discharge Recommendations: Low intensity level of continued care, 24 hr supervision due to cognition  Equipment Recommended upon Discharge: Wheeled walker  PT Recommended Transfer Status: Assist x1, Assistive device  PT - OK to Discharge: Yes      Subjective   General Visit Information:  General  Reason for Referral: impaired mobility  Past Medical History Relevant to Rehab: Patient is a 89 year old female who presented to the ED with weakness, lethargy, and AMS. Patient is admitted with PNA. PMH: anemia, HLD, hyperthyroidism  Co-Treatment: OT  Co-Treatment Reason: safe mobility  Prior to Session Communication: Bedside nurse  Patient Position Received: Bed, 3 rail up, Alarm on  General Comment: Supine in bed upon arrival. Agreeable to participate. 3 L O2.  Home Living:  Home Living  Type of Home: Condo  Lives With: Alone  Home Adaptive Equipment: Walker rolling or standard, Cane, Other (Comment) (rollator)  Home Layout: One level  Home Access: Stairs to enter  with rails  Entrance Stairs-Rails: Both (grab bar on each side)  Bathroom Shower/Tub: Tub/shower unit  Bathroom Toilet: Standard  Bathroom Equipment: Grab bars in shower, Shower chair with back  Prior Level of Function:  Prior Function Per Pt/Caregiver Report  Level of [place]: Independent with ADLs and functional transfers, Needs assistance with homemaking  Receives Help From: Family (daughter)  ADL Assistance: Independent  Homemaking Assistance: Needs assistance  Shopping: Other (Comment) (patient's daughter assists with shopping)  Ambulatory Assistance: Needs assistance  Transfers: Assistive device (rollator in home, cane outside of home)  Gait: Assistive device (rollator in home, cane outside of home)  Stairs: Railings  Vocational: Retired  Prior Function Comments: patient on 3L O2 ATC  Precautions:  Precautions  Medical Precautions: Fall precautions  Vital Signs:       Objective   Pain:  Pain Assessment  Pain Assessment: 0-10  Pain Score: 0 - No pain  Cognition:  Cognition  Overall Cognitive Status: Impaired  Orientation Level: Oriented X4 (appeared confused at times)  Following Commands:  (able to follow most simple, motor commands. Repetition and cueing likely due to [place])    General Assessments:      Activity Tolerance  Endurance: Decreased tolerance for upright activites  Activity Tolerance Comments: 3 L O2    Sensation  Sensation Comment: patient denies numbness/tingling    Postural Control  Posture Comment: flexed posture    Static Sitting Balance  Static Sitting-Level of Assistance: Close supervision, Contact guard    Static Standing Balance  Static Standing-Level of Assistance: Contact guard  Static Standing-Comment/Number of Minutes: UE support of walker  Dynamic Standing Balance  Dynamic Standing-Comments: Pt stood with UE support of walker while therapist assisted with surya-care and changing of brief.  Functional Assessments:  Bed Mobility 1  Bed Mobility 1: Supine to sitting  Level of Assistance  1: Minimum assistance  Bed Mobility Comments 1: HOB elevated    Transfer 1  Technique 1: Sit to stand, Stand to sit  Transfer Device 1: Walker  Transfer Level of Assistance 1: Minimum assistance  Trials/Comments 1: x 3 trials. First and second from elevated EOB, third trial from low chair.  Transfers 2  Transfer From 2: Bed to  Transfer to 2: Chair with arms  Transfer Device 2: Walker  Transfer Level of Assistance 2: Contact guard    Ambulation/Gait Training  Ambulation/Gait Training Performed: Yes  Ambulation/Gait Training 1  Surface 1: Level tile  Device 1: Rolling walker  Assistance 1: Contact guard  Quality of Gait 1:  (decreased genia, minimal foot clearance, decreased step height and length, required assistance with walker management.)  Comments/Distance (ft) 1: about 15 ft  Extremity/Trunk Assessments:  Strength RLE  RLE Overall Strength: Greater than or equal to 3/5 as evidenced by functional mobility  LLE   LLE : Exceptions to WFL  Strength LLE  LLE Overall Strength: Greater than or equal to 3/5 as evidenced by functional mobility  Outcome Measures:  [facility] Basic Mobility  Turning from your back to your side while in a flat bed without using bedrails: A little  Moving from lying on your back to sitting on the side of a flat bed without using bedrails: A little  Moving to and from bed to chair (including a wheelchair): A little  Standing up from a chair using your arms (e.g. wheelchair or bedside chair): A little  To walk in hospital room: A little  Climbing 3-5 steps with railing: A little  Basic Mobility - Total Score: 18    Encounter Problems       Encounter Problems (Active)       Balance       dynamic (Progressing)       Start:  10/23/23    Expected End:  11/06/23       Pt will perform sitting/standing activities without LOB            Mobility       STG - Patient will ambulate up and down a curb/step (Progressing)       Start:  10/23/23    Expected End:  11/06/23            bed mobility (Progressing)        Start:  10/23/23    Expected End:  11/06/23       Pt will perform sup to/from sit transfers with supervision         ambulation (Progressing)       Start:  10/23/23    Expected End:  11/06/23       Pt will amb >  100 ft with wheeled walker and mod independence             Pain - Adult          Transfers       sit to stand (Progressing)       Start:  10/23/23    Expected End:  11/06/23       Pt will perform sit to stand transfer with walker and mod independence.                 Education Documentation  Precautions, taught by Shazia Hernandez PT at 10/23/2023 10:10 AM.  Learner: Patient  Readiness: Acceptance  Method: Explanation  Response: Needs Reinforcement    Mobility Training, taught by Shazia Hernandez PT at 10/23/2023 10:10 AM.  Learner: Patient  Readiness: Acceptance  Method: Explanation  Response: Needs Reinforcement    Education Comments  No comments found.

## 2023-10-23 NOTE — CARE PLAN
The patient's goals for the shift include no falls    The clinical goals for the shift include safety    Over the shift, the patient did not make progress toward the following goals. Barriers to progression include eating better. Recommendations to address these barriers include supplements added.

## 2023-10-23 NOTE — NURSING NOTE
Pt arrived to floor with daughter at bedside. Daughter states pt is DNR [facility] jenny bring paper work in tomorrow. Also would like Dr. GARRETT Bailey to look over medication list to see if maybe one of pts meds is causing her to be tired questioning Trazadone especially.  Pt daughter Annette said if Dr. GARRETT Bailey needed he can call her at [phone].

## 2023-10-23 NOTE — CARE PLAN
Problem: Respiratory  Goal: Wean oxygen to maintain O2 saturation per order/standard this shift  Outcome: Progressing

## 2023-10-23 NOTE — CARE PLAN
Problem: OT Goals  Goal: ADLs  Description: Patient will complete ADL tasks with Mod I, using AE as needed, in order to increase patient's safety and independence with self-care tasks.  Outcome: Progressing  Goal: Functional Transfers  Description: Patient will complete functional transfers with Mod I in order to increase patient's safety and independence with ADL tasks.  Outcome: Progressing  Goal: B UE Strengthening  Description: Patient will increase patient's B UE strengthening to 4+/5 for functional transfers.  Outcome: Progressing  Goal: Functional Mobility  Description: Patient will demonstrate the ability to complete item retrieval and functional mobility tasks with Mod I in order to increase patient's safety and independence with self-care tasks.  Outcome: Progressing

## 2023-10-23 NOTE — CARE PLAN
Problem: Balance  Goal: dynamic  Description: Pt will perform sitting/standing activities without LOB  Outcome: Progressing     Problem: Mobility  Goal: STG - Patient will ambulate up and down a curb/step  Outcome: Progressing  Goal: bed mobility  Description: Pt will perform sup to/from sit transfers with supervision  Outcome: Progressing  Goal: ambulation  Description: Pt will amb >  100 ft with wheeled walker and mod independence   Outcome: Progressing     Problem: Transfers  Goal: sit to stand  Description: Pt will perform sit to stand transfer with walker and mod independence.   Outcome: Progressing

## 2023-10-23 NOTE — PROGRESS NOTES
"Speech-Language Pathology    Inpatient Speech-Language Pathology Clinical Swallow Evaluation       Patient Name: Kalie Ramos  MRN: [id number]  : 1929  Today's Date: 10/23/23  Time Calculation  Start Time: 920  Stop Time: 940  Time Calculation (min): 20 min         Recommendations: regular solids with thin liquids. Meds whole or crushed in puree. Chin tuck and effortful swallow strategy with PO intake. Oral care prior to PO trials.          Assessment:  Pt participated in diagnostic trials of 5 oz thin water (3 oz successively swallowed), 3 tsp apple sauce, and 1 karina cracker.     OME and CNE are grossly WFL. Vocal quality and cough are perceptually WFL. Pt has a known h/o dysphagia, and participated in assessment via MBSS 2023 with the following results:     \" There was deep penetration without evidence for aspiration with thin liquids, nectar liquids,  honey liquids, and puree trials. Penetrated material remains in the laryngeal vestibule with high liklihood of eventual aspiration. Penetration depth and ejection is improved with use of effortful swallow and chin tuck strategies.     There are moderate-severe oropharyngeal deficits as characterized by: delayed swallow onset, diminished laryngeal elevation, diminished tongue base retraction, diminished epiglottic inversion, and pharyngeal residue.     Recommend: continuation of regular solids with thin liquids with strict use of the following strategies: effortful swallow, chin tuck, and oral care before all PO trials.\"    Per interview with pt and pt's dtrAnnette, pt is compliant with compensatory swallowing strategies as recommended during MBSS and recalls these independently. She participated in home health dysphagia therapy, but has not been keeping up with her oropharyngeal therex. Pt and dtr denies any difficulties with PO intake; denies any coughing, choking, or change in vocal quality with PO intake.     Oral phase - Oral mucosa moist " and normal in coloration. Mastication, bolus manipulation, and oral clearance timely and functional.     Pharyngeal phase - Although it is a limited assessment technique; Hyolaryngeal elevation/excursion assessed via digital palpation and appeared consistent across all trials. No coughing, choking, nor change in vocal quality present throughout trials. No overt s/s of aspiration present at the bedside. She demonstrates independent use of swallowing strategies (chin tuck and effortful swallow) with each trial completed throughout assessment.     RECOMMEND continuation of regular solids with thin liquids with strict use of the following strategies: effortful swallow, chin tuck, and oral care before all PO trials        Plan:    Discharge dysphagia therapy at this time. No skilled needs. Pt is at baseline function, and is independent with use and recall of compensatory swallowing strategies.           Subjective   Pt is pleasant and participative. Sitting upright in chair. Assisted into bed with walker per pt request. Call bell and personal items within reach.         Prior to Session Communication: Bedside nurse Gricel HENDRIX  Pain:    None      Inpatient Education:  Pt. educated on safe swallow strategies, S/S of aspiration to be aware of, risks of aspiration, recommended diet  Patient gave verbal understanding

## 2023-10-23 NOTE — CARE PLAN
The patient's goals for the shift include no falls    The clinical goals for the shift include no falls    Over the shift, the patient did not make progress toward the following goals. Barriers to progression include incontinence. Recommendations to address these barriers reminders for patient to ring to use restroom every two hours.

## 2023-10-23 NOTE — CONSULTS
Consults    Reason For Consult  Pneumonia    History Of Present Illness  Kalie Ramos is a 89 y.o. female who presented to [facility] Emergency Department yesterday with dyspnea, asthenia, lethargy and altered mental status since waking. Per patient's daughter she was in her normal state of health the previous day. Daughter also reports she was recently treated for cellulitis. A CT angio was obtained secondary to an elevated D-Dimer and was without pulmonary emboli within the proximal pulmonary vasculature but did show bilateral confluent airspace disease involving all lung lobes; right greater than left and moderate bilateral pleural effusions. We were asked to see the patient in this regard.  She denies fevers, chills, nausea, vomiting, diarrhea, chest or abdominal pain.     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    Body mass index (BMI) 22.0-22.9, adult     BMI 22.0-22.9, adult    Cervicalgia 05/08/2015    Neck pain    CHF (congestive heart failure) (CMS/[facility])     High cholesterol     Hypertension     Leg swelling     Malignant neoplasm of unspecified site of unspecified female breast (CMS/[facility]) 07/19/2013    Adenocarcinoma of breast    Other conditions influencing health status 10/21/2013    Urinary Tract Infection    Personal history of other infectious and parasitic diseases 03/17/2015    History of candidiasis of mouth    Personal history of other specified conditions 03/17/2015    History of abdominal pain    Personal history of pneumonia (recurrent) 09/10/2015    History of pneumonia    Personal history of transient ischemic attack (TIA), and cerebral infarction without residual deficits 09/04/2014    History of stroke       Surgical History  Past Surgical History:   Procedure Laterality Date    BREAST BIOPSY  09/10/2015    Biopsy Breast Open    BREAST LUMPECTOMY  07/19/2013    Breast Surgery  Lumpectomy    CARPAL TUNNEL RELEASE  09/04/2014    Neuroplasty Decompression Median Nerve At Carpal Tunnel    CATARACT EXTRACTION  11/02/2015    Cataract Surgery    HAND TENDON SURGERY  11/02/2015    Hand Incision Tendon Sheath Of A Finger    HYSTERECTOMY  07/19/2013    Hysterectomy    MR HEAD ANGIO WO IV CONTRAST  12/17/2020    MR HEAD ANGIO WO IV CONTRAST [facility]    MR HEAD ANGIO WO IV CONTRAST  5/13/2021    MR HEAD ANGIO WO IV CONTRAST [facility]    MR HEAD ANGIO WO IV CONTRAST  5/24/2022    MR HEAD ANGIO WO IV CONTRAST [facility]    MR NECK ANGIO WO IV CONTRAST  5/24/2022    MR NECK ANGIO WO IV CONTRAST [facility]    OTHER SURGICAL HISTORY  11/02/2015    Wrist Carpectomy    ROTATOR CUFF REPAIR  09/10/2015    Rotator Cuff Repair    SENTINEL LYMPH NODE BIOPSY  09/10/2015    [place] Lymph Node Biopsy    SHOULDER SURGERY  07/19/2013    Shoulder Surgery        Social History  Social History     Tobacco Use    Smoking status: Never    Smokeless tobacco: Never   Substance Use Topics    Alcohol use: Never    Drug use: Never       Family History  Family History   Problem Relation Name Age of Onset    Alzheimer's disease Father      Breast cancer Sister      Pancreatic cancer Sister      Arthritis Other          Allergies  Niaspan extended-release [niacin] and Naproxen    Review of Systems   Constitutional: Negative.    HENT: Negative.     Eyes: Negative.    Respiratory: Negative.     Cardiovascular: Negative.    Gastrointestinal: Negative.    Endocrine: Negative.    Genitourinary: Negative.    Musculoskeletal: Negative.    Allergic/Immunologic: Negative.    Neurological: Negative.    Hematological: Negative.    Psychiatric/Behavioral: Negative.          Physical Exam  Vitals and nursing note reviewed.   Constitutional:       Appearance: Normal appearance.   HENT:      Head: Normocephalic and atraumatic.      Nose: Nose normal.      Mouth/Throat:      Mouth: Mucous membranes are  "moist.   Eyes:      Extraocular Movements: Extraocular movements intact.      Conjunctiva/sclera: Conjunctivae normal.      Pupils: Pupils are equal, round, and reactive to light.   Cardiovascular:      Rate and Rhythm: Normal rate and regular rhythm.      Pulses: Normal pulses.      Heart sounds: Normal heart sounds.   Pulmonary:      Effort: Pulmonary effort is normal.      Breath sounds: Wheezing present.      Comments: Mild to moderate end expiratory wheezes bilaterally.  Abdominal:      General: Bowel sounds are normal.      Palpations: Abdomen is soft.   Musculoskeletal:         General: Normal range of motion.      Right lower leg: Edema present.      Left lower leg: Edema present.      Comments: Bilateral lower extremity erythema.   Skin:     General: Skin is warm and dry.      Capillary Refill: Capillary refill takes less than 2 seconds.   Neurological:      General: No focal deficit present.      Mental Status: She is alert and oriented to person, place, and time.   Psychiatric:         Mood and Affect: Mood normal.         Behavior: Behavior normal.          Vital Signs  Blood pressure 112/60, pulse 56, temperature 37 °C (98.6 °F), temperature source Oral, resp. rate 18, height 1.575 m (5' 2\"), weight 55.3 kg (121 lb 14.6 oz), SpO2 95 %.  Oxygen Therapy  SpO2: 95 %  Oxygen Therapy: Supplemental oxygen  O2 Delivery Method: Nasal cannula (3L)  FiO2 (%): 32 %    Medications:  albuterol, 2.5 mg, nebulization, q6h while awake  aspirin, 81 mg, oral, Once  atorvastatin, 40 mg, oral, Nightly  azithromycin, 500 mg, intravenous, q24h  calcitonin (salmon), 1 spray, One Nostril, Daily  cefTRIAXone, 1 g, intravenous, BID  [START ON 10/24/2023] famotidine, 20 mg, oral, Daily  ferrous gluconate, 38 mg of iron, oral, Daily with breakfast  furosemide, 40 mg, intravenous, BID  lidocaine, 1 patch, transdermal, Nightly  [START ON 10/24/2023] magnesium oxide, 400 mg, oral, Once per day on Tue Thu Sat  melatonin, 3 mg, oral, " Nightly  metoprolol succinate XL, 25 mg, oral, BID  nystatin, 1 Application, Topical, BID  pantoprazole, 40 mg, oral, Daily  potassium chloride, 20 mEq, oral, Daily  rOPINIRole, 5 mg, oral, Nightly  sertraline, 50 mg, oral, Daily  triamcinolone, , Topical, BID    PRN medications: acetaminophen, albuterol, benzocaine-menthol, docusate sodium, guaiFENesin, loratadine, nitroglycerin, sodium chloride, traZODone     Results for orders placed or performed during the hospital encounter of 10/22/23 (from the past 24 hour(s))   Blood Gas Lactic Acid, Venous   Result Value Ref Range    POCT Lactate, Venous 2.2 (H) 0.4 - 2.0 mmol/L   Magnesium   Result Value Ref Range    Magnesium 2.00 1.60 - 3.10 mg/dL   Urinalysis with Reflex Microscopic and Culture   Result Value Ref Range    Color, Urine Yellow Light-Yellow, Yellow, Dark-Yellow    Appearance, Urine Clear Clear    Specific Gravity, Urine 1.020 1.005 - 1.035    pH, Urine 7.0 5.0, 5.5, 6.0, 6.5, 7.0, 7.5, 8.0    Protein, Urine 20 (TRACE) NEGATIVE, 10 (TRACE), 20 (TRACE) mg/dL    Glucose, Urine Normal Normal mg/dL    Blood, Urine NEGATIVE NEGATIVE    Ketones, Urine NEGATIVE NEGATIVE mg/dL    Bilirubin, Urine NEGATIVE NEGATIVE    Urobilinogen, Urine Normal Normal mg/dL    Nitrite, Urine NEGATIVE NEGATIVE    Leukocyte Esterase, Urine 75 Anurag/µL (A) NEGATIVE   D-Dimer, Quantitative Non VTE   Result Value Ref Range    D-Dimer Non VTE, Quant (mg/L FEU) 1.82 (H) 0.19 - 0.50 mg/L FEU   Influenza A, and B PCR   Result Value Ref Range    Flu A Result Not Detected Not Detected    Flu B Result Not Detected Not Detected   SARS-CoV-2 RT PCR   Result Value Ref Range    Coronavirus 2019, PCR Not Detected Not Detected   Comprehensive metabolic panel   Result Value Ref Range    Glucose 101 (H) 65 - 99 mg/dL    Sodium 138 133 - 145 mmol/L    Potassium 4.5 3.4 - 5.1 mmol/L    Chloride 98 97 - 107 mmol/L    Bicarbonate 27 24 - 31 mmol/L    Urea Nitrogen 15 8 - 25 mg/dL    Creatinine 0.90 0.40 -  1.60 mg/dL    eGFR 60 (L) >60 mL/min/1.73m*2    Calcium 9.1 8.5 - 10.4 mg/dL    Albumin 3.3 (L) 3.5 - 5.0 g/dL    Alkaline Phosphatase 158 (H) 35 - 125 U/L    Total Protein 7.1 5.9 - 7.9 g/dL    AST 28 5 - 40 U/L    Bilirubin, Total 1.1 0.1 - 1.2 mg/dL    ALT 21 5 - 40 U/L    Anion Gap 13 <=19 mmol/L   Blood Gas Arterial Full Panel   Result Value Ref Range    POCT pH, Arterial 7.50 (H) 7.38 - 7.42 pH    POCT pCO2, Arterial 44 (H) 38 - 42 mm Hg    POCT pO2, Arterial 93 85 - 95 mm Hg    POCT SO2, Arterial 97 94 - 100 %    POCT Oxy Hemoglobin, Arterial 95.3 94.0 - 98.0 %    POCT Hematocrit Calculated, Arterial 34.0 (L) 36.0 - 46.0 %    POCT Sodium, Arterial 132 (L) 136 - 145 mmol/L    POCT Potassium, Arterial 4.0 3.5 - 5.3 mmol/L    POCT Chloride, Arterial 101 98 - 107 mmol/L    POCT Ionized Calcium, Arterial 1.08 (L) 1.10 - 1.33 mmol/L    POCT Glucose, Arterial 106 (H) 74 - 99 mg/dL    POCT Lactate, Arterial 1.8 0.4 - 2.0 mmol/L    POCT Base Excess, Arterial 10.0 (H) -2.0 - 3.0 mmol/L    POCT HCO3 Calculated, Arterial 34.3 (H) 22.0 - 26.0 mmol/L    POCT Hemoglobin, Arterial 11.4 (L) 12.0 - 16.0 g/dL    POCT Anion Gap, Arterial 1 (L) 10 - 25 mmo/L    Patient Temperature 37.0 degrees Celsius    FiO2 36 %    Apparatus CANNULA    Basic Metabolic Panel   Result Value Ref Range    Glucose 99 65 - 99 mg/dL    Sodium 135 133 - 145 mmol/L    Potassium 4.2 3.4 - 5.1 mmol/L    Chloride 100 97 - 107 mmol/L    Bicarbonate 26 24 - 31 mmol/L    Urea Nitrogen 14 8 - 25 mg/dL    Creatinine 0.90 0.40 - 1.60 mg/dL    eGFR 60 (L) >60 mL/min/1.73m*2    Calcium 8.3 (L) 8.5 - 10.4 mg/dL    Anion Gap 9 <=19 mmol/L   Magnesium   Result Value Ref Range    Magnesium 2.40 1.60 - 3.10 mg/dL        Relevant Results  CT angio chest for pulmonary embolism 10/22/20  No emboli within the proximal pulmonary vasculature. Bilateral confluent airspace disease involving all lung lobes, right greater than left. Moderate bilateral pleural effusions. Enlarged  bilateral main pulmonary arteries can be seen with elevated pulmonary arterial pressure. Cardiomegaly.       Assessment/Plan   Acute hypoxic respiratory failure  Acute on chronic systolic congestive heart failure  Pneumonia  Asthenia  Cellulitis  History of adenocarcinoma of breast      Wean oxygen as sats allow  Continue IBD/ICS  Continuous pulse oximetry  Incentive spirometry/pulmonary hygiene  We will add IV Solu-Medrol  Continue IV azithromycin, ceftriaxone  Sputum if able  Follow-up cultures  CBC with differential  proBNP  Cardiology consulted  Swallow evaluation per ST  IV Lasix  Prophylaxis  PT/OT/out of bed  Reviewed with collaborating physician Dr. Maddox  Thank you for this consultation    I spent 25 minutes in the professional and overall care of this patient.      Tracie Lutz, APRN-CNP  [facility]

## 2023-10-24 ENCOUNTER — APPOINTMENT (OUTPATIENT)
Dept: CARDIOLOGY | Facility: HOSPITAL | Age: 88
DRG: 291 | End: 2023-10-24
Payer: MEDICARE

## 2023-10-24 LAB
ATRIAL RATE: 57 BPM
BACTERIA UR CULT: NO GROWTH
P AXIS: 76 DEGREES
P OFFSET: 147 MS
P ONSET: 110 MS
PR INTERVAL: 208 MS
Q ONSET: 214 MS
QRS COUNT: 9 BEATS
QRS DURATION: 124 MS
QT INTERVAL: 538 MS
QTC CALCULATION(BAZETT): 523 MS
QTC FREDERICIA: 529 MS
R AXIS: 27 DEGREES
T AXIS: 174 DEGREES
T OFFSET: 483 MS
VENTRICULAR RATE: 57 BPM

## 2023-10-24 PROCEDURE — 99232 SBSQ HOSP IP/OBS MODERATE 35: CPT | Performed by: INTERNAL MEDICINE

## 2023-10-24 PROCEDURE — 97535 SELF CARE MNGMENT TRAINING: CPT | Mod: GO,CO

## 2023-10-24 PROCEDURE — 97116 GAIT TRAINING THERAPY: CPT | Mod: GP

## 2023-10-24 PROCEDURE — 9420000001 HC RT PATIENT EDUCATION 5 MIN

## 2023-10-24 PROCEDURE — 94760 N-INVAS EAR/PLS OXIMETRY 1: CPT

## 2023-10-24 PROCEDURE — 94640 AIRWAY INHALATION TREATMENT: CPT

## 2023-10-24 PROCEDURE — 99222 1ST HOSP IP/OBS MODERATE 55: CPT | Performed by: INTERNAL MEDICINE

## 2023-10-24 PROCEDURE — 2500000001 HC RX 250 WO HCPCS SELF ADMINISTERED DRUGS (ALT 637 FOR MEDICARE OP): Performed by: INTERNAL MEDICINE

## 2023-10-24 PROCEDURE — 2500000002 HC RX 250 W HCPCS SELF ADMINISTERED DRUGS (ALT 637 FOR MEDICARE OP, ALT 636 FOR OP/ED): Performed by: INTERNAL MEDICINE

## 2023-10-24 PROCEDURE — 2500000004 HC RX 250 GENERAL PHARMACY W/ HCPCS (ALT 636 FOR OP/ED): Performed by: NURSE PRACTITIONER

## 2023-10-24 PROCEDURE — 1100000001 HC PRIVATE ROOM DAILY

## 2023-10-24 PROCEDURE — 93005 ELECTROCARDIOGRAM TRACING: CPT

## 2023-10-24 PROCEDURE — 2500000004 HC RX 250 GENERAL PHARMACY W/ HCPCS (ALT 636 FOR OP/ED): Performed by: INTERNAL MEDICINE

## 2023-10-24 RX ADMIN — METHYLPREDNISOLONE SODIUM SUCCINATE 20 MG: 40 INJECTION INTRAMUSCULAR; INTRAVENOUS at 05:07

## 2023-10-24 RX ADMIN — Medication 3 MG: at 20:27

## 2023-10-24 RX ADMIN — ALBUTEROL SULFATE 2.5 MG: 2.5 SOLUTION RESPIRATORY (INHALATION) at 19:03

## 2023-10-24 RX ADMIN — SERTRALINE 50 MG: 50 TABLET, FILM COATED ORAL at 08:23

## 2023-10-24 RX ADMIN — CEFTRIAXONE SODIUM 1 G: 1 INJECTION, SOLUTION INTRAVENOUS at 10:03

## 2023-10-24 RX ADMIN — Medication 400 MG: at 08:23

## 2023-10-24 RX ADMIN — Medication 38 MG OF IRON: at 08:23

## 2023-10-24 RX ADMIN — METHYLPREDNISOLONE SODIUM SUCCINATE 20 MG: 40 INJECTION INTRAMUSCULAR; INTRAVENOUS at 20:26

## 2023-10-24 RX ADMIN — ROPINIROLE HYDROCHLORIDE 5 MG: 1 TABLET, FILM COATED ORAL at 20:26

## 2023-10-24 RX ADMIN — METHYLPREDNISOLONE SODIUM SUCCINATE 20 MG: 40 INJECTION INTRAMUSCULAR; INTRAVENOUS at 11:24

## 2023-10-24 RX ADMIN — FUROSEMIDE 40 MG: 10 INJECTION, SOLUTION INTRAMUSCULAR; INTRAVENOUS at 17:07

## 2023-10-24 RX ADMIN — NYSTATIN 1 APPLICATION: 100000 CREAM TOPICAL at 08:29

## 2023-10-24 RX ADMIN — CEFTRIAXONE SODIUM 1 G: 1 INJECTION, SOLUTION INTRAVENOUS at 23:14

## 2023-10-24 RX ADMIN — PANTOPRAZOLE SODIUM 40 MG: 40 TABLET, DELAYED RELEASE ORAL at 08:23

## 2023-10-24 RX ADMIN — FUROSEMIDE 40 MG: 10 INJECTION, SOLUTION INTRAMUSCULAR; INTRAVENOUS at 05:08

## 2023-10-24 RX ADMIN — POTASSIUM CHLORIDE 20 MEQ: 1.5 FOR SOLUTION ORAL at 08:24

## 2023-10-24 RX ADMIN — ALBUTEROL SULFATE 2.5 MG: 2.5 SOLUTION RESPIRATORY (INHALATION) at 07:06

## 2023-10-24 RX ADMIN — ATORVASTATIN CALCIUM 40 MG: 40 TABLET, FILM COATED ORAL at 20:27

## 2023-10-24 RX ADMIN — METOPROLOL SUCCINATE 25 MG: 25 TABLET, EXTENDED RELEASE ORAL at 08:23

## 2023-10-24 RX ADMIN — ALBUTEROL SULFATE 2.5 MG: 2.5 SOLUTION RESPIRATORY (INHALATION) at 13:09

## 2023-10-24 RX ADMIN — CALCITONIN SALMON 1 SPRAY: 200 SPRAY, METERED NASAL at 10:03

## 2023-10-24 RX ADMIN — AZITHROMYCIN MONOHYDRATE 500 MG: 500 INJECTION, POWDER, LYOPHILIZED, FOR SOLUTION INTRAVENOUS at 16:54

## 2023-10-24 RX ADMIN — METOPROLOL SUCCINATE 25 MG: 25 TABLET, EXTENDED RELEASE ORAL at 20:30

## 2023-10-24 RX ADMIN — TRIAMCINOLONE ACETONIDE: 1 CREAM TOPICAL at 08:29

## 2023-10-24 RX ADMIN — FAMOTIDINE 20 MG: 20 TABLET ORAL at 08:23

## 2023-10-24 ASSESSMENT — COGNITIVE AND FUNCTIONAL STATUS - GENERAL
DAILY ACTIVITIY SCORE: 17
MOVING TO AND FROM BED TO CHAIR: A LITTLE
DAILY ACTIVITIY SCORE: 19
DRESSING REGULAR UPPER BODY CLOTHING: A LITTLE
TURNING FROM BACK TO SIDE WHILE IN FLAT BAD: A LITTLE
CLIMB 3 TO 5 STEPS WITH RAILING: A LITTLE
PERSONAL GROOMING: A LITTLE
CLIMB 3 TO 5 STEPS WITH RAILING: A LITTLE
DRESSING REGULAR LOWER BODY CLOTHING: A LITTLE
STANDING UP FROM CHAIR USING ARMS: A LITTLE
MOVING FROM LYING ON BACK TO SITTING ON SIDE OF FLAT BED WITH BEDRAILS: A LITTLE
HELP NEEDED FOR BATHING: A LITTLE
STANDING UP FROM CHAIR USING ARMS: A LITTLE
WALKING IN HOSPITAL ROOM: A LITTLE
MOBILITY SCORE: 18
TOILETING: A LITTLE
MOVING TO AND FROM BED TO CHAIR: A LITTLE
HELP NEEDED FOR BATHING: A LITTLE
DRESSING REGULAR LOWER BODY CLOTHING: A LITTLE
EATING MEALS: A LITTLE
TURNING FROM BACK TO SIDE WHILE IN FLAT BAD: A LITTLE
DRESSING REGULAR UPPER BODY CLOTHING: A LITTLE
TOILETING: A LOT
WALKING IN HOSPITAL ROOM: A LITTLE
PERSONAL GROOMING: A LITTLE
MOVING FROM LYING ON BACK TO SITTING ON SIDE OF FLAT BED WITH BEDRAILS: A LITTLE
MOBILITY SCORE: 18

## 2023-10-24 ASSESSMENT — PAIN - FUNCTIONAL ASSESSMENT
PAIN_FUNCTIONAL_ASSESSMENT: 0-10
PAIN_FUNCTIONAL_ASSESSMENT: 0-10

## 2023-10-24 ASSESSMENT — PAIN SCALES - GENERAL
PAINLEVEL_OUTOF10: 0 - NO PAIN

## 2023-10-24 NOTE — PROGRESS NOTES
"Kalie Ramos is a 89 y.o. female on day 2 seen in follow-up for Pneumonia of right middle lobe due to infectious organism.    Subjective   On 2 L nasal cannula oxygen; oxygen sats 95%.  No respiratory complaints.  Denies pain.  Afebrile.     Objective     Physical Exam  Vitals and nursing note reviewed.   Constitutional:       Appearance: Normal appearance.   HENT:      Head: Normocephalic and atraumatic.      Nose: Nose normal.      Mouth/Throat:      Mouth: Mucous membranes are moist.   Eyes:      Extraocular Movements: Extraocular movements intact.      Conjunctiva/sclera: Conjunctivae normal.      Pupils: Pupils are equal, round, and reactive to light.   Cardiovascular:      Rate and Rhythm: Regular rhythm. Bradycardia present.      Pulses: Normal pulses.      Heart sounds: Normal heart sounds.   Pulmonary:      Effort: Pulmonary effort is normal.      Comments: Very mild end expiratory wheezes.    Abdominal:      General: Bowel sounds are normal.      Palpations: Abdomen is soft.   Musculoskeletal:         General: Normal range of motion.   Skin:     General: Skin is warm and dry.      Capillary Refill: Capillary refill takes less than 2 seconds.   Neurological:      General: No focal deficit present.      Mental Status: She is alert and oriented to person, place, and time.   Psychiatric:         Mood and Affect: Mood normal.         Behavior: Behavior normal.         Last Recorded Vitals  Blood pressure 118/52, pulse 65, temperature 36.3 °C (97.3 °F), temperature source Oral, resp. rate 17, height 1.575 m (5' 2\"), weight 55.3 kg (121 lb 14.6 oz), SpO2 95 %.  Intake/Output last 3 Shifts:  I/O last 3 completed shifts:  In: 470 (8.5 mL/kg) [P.O.:170; IV Piggyback:300]  Out: 0 (0 mL/kg)   Weight: 55.3 kg        Medications:   albuterol, 2.5 mg, nebulization, q6h while awake  atorvastatin, 40 mg, oral, Nightly  azithromycin, 500 mg, intravenous, q24h  calcitonin (salmon), 1 spray, One Nostril, " Daily  cefTRIAXone, 1 g, intravenous, q12h  famotidine, 20 mg, oral, Daily  ferrous gluconate, 38 mg of iron, oral, Daily with breakfast  furosemide, 40 mg, intravenous, BID  lidocaine, 1 patch, transdermal, Nightly  magnesium oxide, 400 mg, oral, Once per day on Tue Thu Sat  melatonin, 3 mg, oral, Nightly  methylPREDNISolone sodium succinate (PF), 20 mg, intravenous, q8h  metoprolol succinate XL, 25 mg, oral, BID  nystatin, 1 Application, Topical, BID  pantoprazole, 40 mg, oral, Daily  potassium chloride, 20 mEq, oral, Daily  rOPINIRole, 5 mg, oral, Nightly  sertraline, 50 mg, oral, Daily  triamcinolone, , Topical, BID     PRN medications: acetaminophen, albuterol, benzocaine-menthol, docusate sodium, guaiFENesin, loratadine, nitroglycerin, sodium chloride, traZODone        Results for orders placed or performed during the hospital encounter of 10/22/23 (from the past 24 hour(s))   CBC and Auto Differential   Result Value Ref Range    WBC 10.0 4.4 - 11.3 x10*3/uL    nRBC 0.0 0.0 - 0.0 /100 WBCs    RBC 3.89 (L) 4.00 - 5.20 x10*6/uL    Hemoglobin 11.3 (L) 12.0 - 16.0 g/dL    Hematocrit 35.6 (L) 36.0 - 46.0 %    MCV 92 80 - 100 fL    MCH 29.0 26.0 - 34.0 pg    MCHC 31.7 (L) 32.0 - 36.0 g/dL    RDW 14.7 (H) 11.5 - 14.5 %    Platelets 197 150 - 450 x10*3/uL    MPV 10.6 7.5 - 11.5 fL    Neutrophils % 78.3 40.0 - 80.0 %    Immature Granulocytes %, Automated 0.3 0.0 - 0.9 %    Lymphocytes % 11.0 13.0 - 44.0 %    Monocytes % 9.3 2.0 - 10.0 %    Eosinophils % 0.8 0.0 - 6.0 %    Basophils % 0.3 0.0 - 2.0 %    Neutrophils Absolute 7.79 (H) 1.60 - 5.50 x10*3/uL    Immature Granulocytes Absolute, Automated 0.03 0.00 - 0.50 x10*3/uL    Lymphocytes Absolute 1.09 0.80 - 3.00 x10*3/uL    Monocytes Absolute 0.93 (H) 0.05 - 0.80 x10*3/uL    Eosinophils Absolute 0.08 0.00 - 0.40 x10*3/uL    Basophils Absolute 0.03 0.00 - 0.10 x10*3/uL   NT Pro-BNP   Result Value Ref Range    PROBNP 13,676 (H) 0 - 624 pg/mL      CT angio chest for  pulmonary embolism  Result Date: 10/22/2023  FINDINGS: No emboli within the proximal pulmonary vasculature. Bilateral confluent airspace disease involving all lung lobes, right greater than left. Moderate bilateral pleural effusions. Enlarged bilateral main pulmonary arteries can be seen with elevated pulmonary arterial pressure. Cardiomegaly.       XR chest Result Date: 10/22/2023  FINDINGS: The cardiomediastinal silhouette is unremarkable. Patchy airspace opacities are noted in both lungs particularly within the right upper lung field. Bibasilar pleural effusions associated atelectasis.   The osseous structures are intact.       Assessment/Plan   Acute on chronic hypoxic respiratory failure  Acute on chronic systolic congestive heart failure  Pneumonia  Asthenia  Cellulitis  History of adenocarcinoma of breast    Oxygen at baseline  Continue IBD/ICS  Continuous pulse oximetry  Incentive spirometry/pulmonary hygiene  IV Solu-Medrol-will maintain current dose  Continue IV azithromycin, ceftriaxone  Follow-up cultures  IV Lasix  Prophylaxis  PT/OT/out of bed       I spent 20 minutes in the professional and overall care of this patien      Tracie Lutz, APRN-CNP  [facility]

## 2023-10-24 NOTE — PROGRESS NOTES
PCN received Careport response from [facility] team and they are able to accepot. Care Coordinator Ronald aware. Awaiting [facility] and definitive discharge date.    Omi Harmon

## 2023-10-24 NOTE — CARE PLAN
Problem: Respiratory  Goal: Clear secretions with interventions this shift  Outcome: Progressing  Goal: Minimal/no exertional discomfort or dyspnea this shift  Outcome: Progressing  Goal: No signs of respiratory distress (eg. Use of accessory muscles. Peds grunting)  Outcome: Progressing  Goal: Verbalize decreased shortness of breath this shift  Outcome: Progressing  Goal: Wean oxygen to maintain O2 saturation per order/standard this shift  Outcome: Progressing

## 2023-10-24 NOTE — PROGRESS NOTES
"Kalie Ramos is a 89 y.o. female on day 2 of admission presenting with Pneumonia of right middle lobe due to infectious organism.    Subjective   TCSW engaged with pt at bedside to discuss HHC post discharge. Pt requests TCSW to discuss HHC with her daughter Annette. TCSW placed TCT pt's daughter Annette at approx 12pm to discuss the above information. Annette states pt has discharged home with HHC in the past, and would prefer if she had those services again at discharge. Annette mentions pt has had [facility] in the past and would like to resume services if possible.   TCSW sent a message to HHC liaison Omi, informing him of the above information.        Objective     Physical Exam    Last Recorded Vitals  Blood pressure 118/52, pulse 65, temperature 36.3 °C (97.3 °F), temperature source Oral, resp. rate 17, height 1.575 m (5' 2\"), weight 55.3 kg (121 lb 14.6 oz), SpO2 95 %.  Intake/Output last 3 Shifts:  I/O last 3 completed shifts:  In: 470 (8.5 mL/kg) [P.O.:170; IV Piggyback:300]  Out: 0 (0 mL/kg)   Weight: 55.3 kg     Relevant Results                             Assessment/Plan   Principal Problem:    Pneumonia of right middle lobe due to infectious organism               Ronald Olmstead, LCSW      "

## 2023-10-24 NOTE — PROGRESS NOTES
Kalie Ramos is a 89 y.o. female on day 1 of admission presenting with Pneumonia of right middle lobe due to infectious organism.  Close of breath improving probably will tap pleural effusion  Subjective   Shortness of breath dyspnea on exertion improving       Objective   Orthopnea better  Physical Exam  HEENT:  Head externally atraumatic, no pallor, no icterus, extraocular movements intact, pulls reacting to light, oral mucosa moist and throat clear.  Neck:  Supple, no JVP, no palpable adenopathy or thyromegaly.  No carotid bruit.  Chest:  Clear to auscultation and resonant.  Very dull on percussion at the bases fluid in the lung orthopnea  Heart:  Regular rate and rhythm, no murmur or gallop could be appreciated.  Abdomen:  Soft, nontender, bowel sounds present, normoactive, no palpable hepatosplenomegaly.  Extremities:  No edema, pulses present, no cyanosis or clubbing.  CNS:  Patient alert, oriented to time, place and person.  Power 5/5 all over and deep tendon reflexes symmetrical, cranial nerves 2-12 grossly intact.  Skin:  No active rash.  Musculoskeletal:  No joint swelling or erythema, range of movement normal.  Last Recorded Vitals  Heart Rate:  [54-68]   Temp:  [36.5 °C (97.7 °F)-37 °C (98.6 °F)]   Resp:  [16-20]   BP: (102-136)/(40-75)   SpO2:  [94 %-98 %]     Intake/Output last 3 Shifts:  I/O last 3 completed shifts:  In: 220 (4 mL/kg) [P.O.:170; IV Piggyback:50]  Out: 0 (0 mL/kg)   Weight: 55.3 kg     Relevant Results  No results found for the last 90 days.    Results for orders placed or performed during the hospital encounter of 10/22/23 (from the past 24 hour(s))   Basic Metabolic Panel   Result Value Ref Range    Glucose 99 65 - 99 mg/dL    Sodium 135 133 - 145 mmol/L    Potassium 4.2 3.4 - 5.1 mmol/L    Chloride 100 97 - 107 mmol/L    Bicarbonate 26 24 - 31 mmol/L    Urea Nitrogen 14 8 - 25 mg/dL    Creatinine 0.90 0.40 - 1.60 mg/dL    eGFR 60 (L) >60 mL/min/1.73m*2    Calcium 8.3 (L) 8.5 -  10.4 mg/dL    Anion Gap 9 <=19 mmol/L   Magnesium   Result Value Ref Range    Magnesium 2.40 1.60 - 3.10 mg/dL   CBC and Auto Differential   Result Value Ref Range    WBC 10.0 4.4 - 11.3 x10*3/uL    nRBC 0.0 0.0 - 0.0 /100 WBCs    RBC 3.89 (L) 4.00 - 5.20 x10*6/uL    Hemoglobin 11.3 (L) 12.0 - 16.0 g/dL    Hematocrit 35.6 (L) 36.0 - 46.0 %    MCV 92 80 - 100 fL    MCH 29.0 26.0 - 34.0 pg    MCHC 31.7 (L) 32.0 - 36.0 g/dL    RDW 14.7 (H) 11.5 - 14.5 %    Platelets 197 150 - 450 x10*3/uL    MPV 10.6 7.5 - 11.5 fL    Neutrophils % 78.3 40.0 - 80.0 %    Immature Granulocytes %, Automated 0.3 0.0 - 0.9 %    Lymphocytes % 11.0 13.0 - 44.0 %    Monocytes % 9.3 2.0 - 10.0 %    Eosinophils % 0.8 0.0 - 6.0 %    Basophils % 0.3 0.0 - 2.0 %    Neutrophils Absolute 7.79 (H) 1.60 - 5.50 x10*3/uL    Immature Granulocytes Absolute, Automated 0.03 0.00 - 0.50 x10*3/uL    Lymphocytes Absolute 1.09 0.80 - 3.00 x10*3/uL    Monocytes Absolute 0.93 (H) 0.05 - 0.80 x10*3/uL    Eosinophils Absolute 0.08 0.00 - 0.40 x10*3/uL    Basophils Absolute 0.03 0.00 - 0.10 x10*3/uL   NT Pro-BNP   Result Value Ref Range    PROBNP 13,676 (H) 0 - 624 pg/mL        Current Facility-Administered Medications:     acetaminophen (Tylenol) tablet 650 mg, 650 mg, oral, q6h PRN, Avel Bailey MD    albuterol 2.5 mg /3 mL (0.083 %) nebulizer solution 2.5 mg, 2.5 mg, nebulization, q6h while awake, Avel Bailey MD, 2.5 mg at 10/23/23 1917    albuterol 2.5 mg /3 mL (0.083 %) nebulizer solution 2.5 mg, 2.5 mg, nebulization, q2h PRN, Avel Bailey MD    atorvastatin (Lipitor) tablet 40 mg, 40 mg, oral, Nightly, Avel Bailey MD, 40 mg at 10/22/23 2209    azithromycin (Zithromax) in dextrose 5 % in water (D5W) 250 mL  mg, 500 mg, intravenous, q24h, vAel Bailey MD, Last Rate: 250 mL/hr at 10/23/23 1823, 500 mg at 10/23/23 1823    benzocaine-menthol (Cepastat Sore Throat) 15-3.6 mg lozenge 1 lozenge, 1 lozenge, Mouth/Throat, q4h PRN, Avel  MD Leo    calcitonin (salmon) (Miacalcin) nasal spray 1 spray, 1 spray, One Nostril, Daily, Avel Bailey MD, 1 spray at 10/23/23 0936    cefTRIAXone (Rocephin) IVPB 1 g, 1 g, intravenous, q12h, Avel Bailey MD    docusate sodium (Colace) capsule 100 mg, 100 mg, oral, TID PRN, Avel Bailey MD    [START ON 10/24/2023] famotidine (Pepcid) tablet 20 mg, 20 mg, oral, Daily, Avel Bailey MD    ferrous gluconate (Fergon) 324 (38 Fe) mg tablet 38 mg of iron, 38 mg of iron, oral, Daily with breakfast, Avel Bailey MD, 38 mg of iron at 10/23/23 0936    furosemide (Lasix) injection 40 mg, 40 mg, intravenous, BID, Avel Bailey MD, 40 mg at 10/23/23 1822    guaiFENesin (Robitussin) 100 mg/5 mL syrup 10 mL, 10 mL, oral, q4h PRN, Avel Bailey MD    lidocaine 4 % patch 1 patch, 1 patch, transdermal, Nightly, Avel Bailey MD    loratadine (Claritin) tablet 10 mg, 10 mg, oral, Daily PRN, Avel Bailey MD    [START ON 10/24/2023] magnesium oxide (Mag-Ox) tablet 400 mg, 400 mg, oral, Once per day on Tue Thu Sat, Avel Bailey MD    melatonin tablet 3 mg, 3 mg, oral, Nightly, Avel Bailey MD, 3 mg at 10/22/23 2209    methylPREDNISolone sod succinate (PF) (SOLU-Medrol) 40 mg/mL injection 20 mg, 20 mg, intravenous, q8h, SARAH Wooten-CNP, 20 mg at 10/23/23 1316    metoprolol succinate XL (Toprol-XL) 24 hr tablet 25 mg, 25 mg, oral, BID, Avel Bailey MD, 25 mg at 10/23/23 0935    nitroglycerin (Nitrostat) SL tablet 0.4 mg, 0.4 mg, sublingual, q5 min PRN, Avel Bailey MD    nystatin (Mycostatin) cream 1 Application, 1 Application, Topical, BID, Avel Bailey MD, 1 Application at 10/23/23 0936    pantoprazole (ProtoNix) EC tablet 40 mg, 40 mg, oral, Daily, Avel Bailey MD, 40 mg at 10/23/23 0935    potassium chloride (Klor-Con) packet 20 mEq, 20 mEq, oral, Daily, Avel Bailey MD, 20 mEq at 10/23/23 0934    rOPINIRole (Requip) tablet 5 mg, 5 mg, oral, Nightly, Avel  MD Leo, 5 mg at 10/22/23 2207    sertraline (Zoloft) tablet 50 mg, 50 mg, oral, Daily, Avel Bailey MD, 50 mg at 10/23/23 0935    sodium chloride (Ocean) 0.65 % nasal spray 1 spray, 1 spray, Each Nostril, 4x daily PRN, Avel Bailey MD    traZODone (Desyrel) tablet 50 mg, 50 mg, oral, Nightly PRN, Avel Bailey MD    triamcinolone (Kenalog) 0.1 % cream, , Topical, BID, Avel Bailey MD, Given at 10/23/23 0936   Assessment/Plan   Principal Problem:    Pneumonia of right middle lobe due to infectious organism    CHF    Cardiology    Pleural effusion    Might tap      Hypertension    Medication    Hyperlipidemia    Medication    Weakness    PT OT    Fall precaution continue to follow         Avel Bailey MD

## 2023-10-24 NOTE — NURSING NOTE
Assumed care of patient. Patient is A&Ox3 and is sitting in bed watching tv. Call light in reach. Patient denies pain and is on 3Lnc. Patient is normal sinus on monitor @ 68bpm.

## 2023-10-24 NOTE — PROGRESS NOTES
PCN informed per Care Coordinator Cydmee that patient is agreeable to [facility] at discharge. Per Cydnee, patient has used [facility] team in the past and would like to again. PCN built and sent referral via careport to [facility]. ADOD tomorrow 10/25. [employer] aware. Awaiting response, [facility] and definitive discharge 10/25      Omi Harmon

## 2023-10-24 NOTE — CONSULTS
Consults  History Of Present Illness:    Kalie Ramos is a 89 y.o. female presenting with shortness of breath and altered mental status.     This patient is a 89-year-old white female with a past history including mild to moderate aortic valve stenosis, hypertension, complete left bundle branch block conduction delay, chronic lower extremity edema due to venous insufficiency, nontoxic multinodular goiter, breast lumpectomy for breast carcinoma, bilateral cataract extraction, carpal tunnel release, rotator cuff repair, sentinel lymph node biopsy at time of lumpectomy, and diaphragmatic hernia.  She has a longstanding history of hiatal hernia treated with proton pump inhibitor therapy and H2 blocker therapy with no symptoms of reflux.    The patient was admitted to [facility] 9/5/2018 for laparoscopic paraesophageal hernia repair along with laparoscopic cholecystectomy for documented cholelithiasis.  As part of preoperative evaluation echocardiogram 8/31/2018 demonstrated preserved LV ejection fraction 60 to 65% with moderate concentric LVH mild to moderate left atrial lodgment mild to moderate aortic valvular stenosis estimated PA systolic pressure 35 mmHg.  The peak systolic pressure gradient across the aortic valve was 21 mmHg.  She had a pharmacological nuclear stress test on 8/20/2018 that showed a preserved LV ejection fraction no ischemia or scar there.  The patient's operative procedure was done on 9/5/2018 with some persistent postoperative hypoxia requiring overnight observation in the ICU after extubation.  Patient ultimately restarted on usual Lasix 40 mg daily.    Patient presented back to [facility] emergency room 9/17/2018 with shortness of breath.  CT of the chest abdomen and pelvis showed diffuse thyroid megaly consistent with multinodular goiter no pulmonary embolism.  There was moderate to large bilateral pleural effusions basilar atelectasis.  There is evidence of cholecystectomy  and subcutaneous emphysema. Stool cultures were positive for Giardia antigen.  Repeat echocardiogram again demonstrated mild to moderate LVH preserved LV ejection fraction 60 to 64% mild to moderate aortic valve stenosis trace aortic valve regurgitation mild tricuspid valve regurgitation mild pulmonary hypertension estimated PA systolic pressure in the range of 40 mmHg range patient was treated with IV Lasix.  She developed an extensive DVT left lower extremity by ultrasound 10/2/2018.  A repeat chest CT showed segmental subsegmental pulmonary emboli right lower lobe and lingula without evidence of right-sided cardiac strain with bilateral pleural effusions by basilar atelectasis and large substernal goiter.  She was started on anticoagulation with Coumadin.    Patient was admitted to [facility], [place], operated by [facility] 8/4/2019 with shortness of breath and palpitations.  CT angiogram of the chest showed no pulmonary embolism.  There were bilateral pulmonary infiltrates left lower lobe greatest particularly in the upper portion.  There was moderate left pleural effusion small right pleural effusion enlarged mediastinal nodes reactive adenopathy.  There was again marked for bilateral thyroid megaly multinodular goiter.  There was a suspected nonocclusive DVT left lower extremity.  Was thought to be possibly chronic or recurrent.  proBNP was 3049.  Troponins were negative.  EKG showed sinus rhythm left axis deviation poor R wave progression.  She was switched from Coumadin to Xarelto 10 mg daily.  There was some concern for ongoing aspiration.    Patient was admitted 3/19/2020 for concerns of aspiration with dysphagia gagging choking occasional regurgitation.  CT scan of the neck demonstrated inflammation of the upper third of the esophagus possible neoplasm.  She was seen by speech therapy.  EGD 3/25/2020 showed small hiatal hernia large ulcer in the proximal esophagus highly suggestive of pill induced esophagitis.    Patient admitted  again 4/6/2020 with ongoing dysphagia and inability to swallow almost any consistency.  She was dehydrated Lasix was placed on hold.  Modified barium swallow showed tracheal penetration of thin liquids and some laryngeal penetration with nectar thick liquids.  There was some discussion about PEG tube placement ultimately deferred.    Patient sustained a fall going to the bathroom 12/16/2020 with a sensation of dizziness vertigo.  Her evaluation in the hospital was essentially unremarkable head CT negative for intracranial process.  Repeat echocardiogram demonstrated moderate aortic valve stenosis preserved LV function.    She was admitted again 5/12/2021 with dizziness the patient has been taken off Xarelto for several days prior to that and for dental extraction.  Head CT was negative for any acute findings.  Carotid ultrasound showed mild bilateral plaque.  EKG showed sinus rhythm left bundle branch block.  MRI of brain negative for CVA.  MRI did show 70% stenosis of the P1 segment on the left.  Echocardiogram again showed moderate concentric LV hypertrophy normal LV ejection fraction 55% with moderate aortic valve stenosis.  There was 1+ mitral 2+ tricuspid valve regurgitation moderate pulmonary pretension.    Patient was brought back to the hospital 5/19/2021 with dizziness despite as needed meclizine.  MRI of the brain showed no significant change.  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  Patient was kept on low-dose Lasix for peripheral edema.  Repeat echocardiogram again showed mild to moderate concentric LVH moderate to severe aortic valve stenosis.    Patient again admitted 5/22/2022 with shortness of breath chest x-ray showing bilateral pleural effusions.  Ultrasound lower extremities showed chronic nonocclusive thrombus in the left lower  extremity.  Echocardiogram 5 3/23/2022 showed LV ejection fraction up to 50% range abnormal septal motion due to left bundle branch block conduction delay moderately severe aortic valve stenosis peak systolic pressure gradient 56 mmHg trivial aortic insufficiency 2+ tricuspid valve vegetation moderately severe pulmonary hypertension.  Patient clinically not thought to be candidate for transcatheter aortic valve replacement.  MRI brain showed tiny punctate lacunar infarct right corona radiata related to small vessel disease rather than embolic.  She was kept on lower dose of Xarelto 10 mg daily.  MRA of the neck was negative for high-grade carotid stenosis MRI of brain and again showed high-grade stenosis distal P1 segment of left posterior cerebral artery.    Patient was admitted to [facility] 7/5/2022 with dizziness and unsteadiness.  Work-up was again relatively similar to past evaluations.    She was readmitted to [facility] 11/19/2022 after mechanical fall laceration of her forehead requiring suture repair.  Echocardiogram showed LV ejection fraction had been reduced to 40% with moderately severe aortic valve stenosis trace aortic insufficiency with moderate tricuspid valve regurgitation severe pulm hypertension PA systolic pressure 70 mmHg.  Patient again not thought to be candidate for TAVR.    Patient admitted again [facility] 1/6/2023 with increasing shortness of breath.  Both portable chest x-ray showed multifocal pneumonia bilateral infiltrates.  Ultrasound lower extremity done again positive for chronic DVT left common femoral vein.  Patient was continued on usual low-dose aspirin Atorvastatin Toprol.  Chest CT showed extensive right upper lobe consolidation due to pneumonia possibly tumor.  Moderate bilateral pleural effusions.    Patient again admitted 7/25/2023 with shortness of breath.  Creatinine was 0.9 proBNP 16,000 943.  High-sensitivity troponins negative.  Clinically she was  thought not to be volume overloaded despite elevated proBNP.  Patient had another echocardiogram performed 7/25/2023 showing LV ejection fraction again 35-40% moderate left atrial enlargement mild to moderate right atrial enlargement severe aortic valve stenosis peak systolic pressure gradient 56 mmHg.  There was 1+ mitral valve regurgitation 2+ tricuspid valve regurgitation severe pulmonary hypertension estimated PA systolic pressure 67 mmHg.    Patient is currently admitted through [facility] emergency room with confusion and increased weakness.  Evaluation in the emergency room included a comprehensive metabolic panel creatinine is 0.90.  Arterial blood gases pH 7.5 PCO2 44 PO2 93.  proBNP was 13,676.  CBC unremarkable except hemoglobin 11.3.  Portable chest x-ray showed patchy bilateral airspace opacities in both lungs particularly within the right midlung concerningly for pneumonia with small bibasilar effusions.  CT angiogram of the chest showed no pulmonary emboli by collateral confluent airspace disease all lung lobes right greater than left moderate bilateral pleural effusions enlarged bilateral main pulmonary arteries consistent pulmonary hypertension and cardiomegaly.  Last Recorded Vitals:  Vitals:    10/23/23 2359 10/24/23 0300 10/24/23 0707 10/24/23 0802   BP: (!) 110/48 127/53  118/52   BP Location: Right arm Right arm  Right arm   Patient Position: Lying Lying  Lying   Pulse: 55 63  65   Resp: 20 20  17   Temp: 36.7 °C (98.1 °F) 36.6 °C (97.9 °F)  36.3 °C (97.3 °F)   TempSrc: Oral Axillary  Oral   SpO2: 94% 91% 96% 95%   Weight:       Height:           Last Labs:  CBC - 10/23/2023: 12:50 PM  10.0 11.3 197    35.6      CMP - 10/23/2023:  4:30 AM  8.3 7.1 28 --- 1.1   _ 3.3 21 158      PTT - 1/26/2023:  6:26 PM  1.0   10.2 29.2     Hemoglobin A1C   Date/Time Value Ref Range Status   05/23/2022 10:43 PM 5.7 4.0 - 6.0 % Final     Comment:     Hemoglobin A1C levels are related to mean blood glucose during  the   preceding 2-3 months. The relationship table below may be used as a   general guide. Each 1% increase in HGB A1C is a reflection of an   increase in mean glucose of approximately 30 mg/dl.   Reference: Diabetes Care, volume 29, supplement 1 Jan. 2006                        HGB A1C ................. Approx. Mean Glucose   _______________________________________________   6%   ...............................  120 mg/dl   7%   ...............................  150 mg/dl   8%   ...............................  180 mg/dl   9%   ...............................  210 mg/dl   10%  ...............................  240 mg/dl  Performed at [address] [address]     12/17/2020 03:16 PM 5.7 4.0 - 6.0 % Final     Comment:     Hemoglobin A1C levels are related to mean blood glucose during the   preceding 2-3 months. The relationship table below may be used as a   general guide. Each 1% increase in HGB A1C is a reflection of an   increase in mean glucose of approximately 30 mg/dl.   Reference: Diabetes Care, volume 29, supplement 1 Jan. 2006                        HGB A1C ................. Approx. Mean Glucose   _______________________________________________   6%   ...............................  120 mg/dl   7%   ...............................  150 mg/dl   8%   ...............................  180 mg/dl   9%   ...............................  210 mg/dl   10%  ...............................  240 mg/dl  Performed at [address] [address]       LDL Calculated   Date/Time Value Ref Range Status   01/27/2023 06:04 AM 66 65 - 130 MG/DL Final   11/20/2022 02:47 AM 54 (L) 65 - 130 MG/DL Final   07/06/2022 01:13 AM 54 (L) 65 - 130 MG/DL Final     VLDL   Date/Time Value Ref Range Status   08/03/2018 09:41 AM 25 0 - 40 mg/dL Final   01/18/2018 02:30 PM 47 (H) 0 - 40 mg/dL Final   10/17/2017 11:18 AM 50 (H) 0 - 40 mg/dL Final      Last I/O:  I/O last 3 completed shifts:  In: 470 (8.5  "mL/kg) [P.O.:170; IV Piggyback:300]  Out: 0 (0 mL/kg)   Weight: 55.3 kg     Past Cardiology Tests (Last 3 Years):  EKG:  No results found for this or any previous visit from the past 1095 days.    Echo:  No results found for this or any previous visit from the past 1095 days.    Ejection Fractions:  No results found for: \"EF\"  Cath:  No results found for this or any previous visit from the past 1095 days.    Stress Test:  No results found for this or any previous visit from the past 1095 days.    Cardiac Imaging:  No results found for this or any previous visit from the past 1095 days.      Past Medical History:  She has a past medical history of Atrophic disorder of skin, unspecified (07/19/2013), Body mass index (BMI) 22.0-22.9, adult, Cervicalgia (05/08/2015), CHF (congestive heart failure) (CMS/[facility]), High cholesterol, Hypertension, Leg swelling, Malignant neoplasm of unspecified site of unspecified female breast (CMS/HCC) (07/19/2013), Other conditions influencing health status (10/21/2013), Personal history of other infectious and parasitic diseases (03/17/2015), Personal history of other specified conditions (03/17/2015), Personal history of pneumonia (recurrent) (09/10/2015), and Personal history of transient ischemic attack (TIA), and cerebral infarction without residual deficits (09/04/2014).    Past Surgical History:  She has a past surgical history that includes Breast biopsy (09/10/2015); Rotator cuff repair (09/10/2015); [place] lymph node biopsy (09/10/2015); Cataract extraction (11/02/2015); Other surgical history (11/02/2015); Hand tendon surgery (11/02/2015); Carpal tunnel release (09/04/2014); Breast lumpectomy (07/19/2013); Hysterectomy (07/19/2013); Shoulder surgery (07/19/2013); MR angio head wo IV contrast (12/17/2020); MR angio head wo IV contrast (5/13/2021); MR angio neck wo IV contrast (5/24/2022); and MR angio head wo IV contrast (5/24/2022).      Social History:  She reports that she has " never smoked. She has never used smokeless tobacco. She reports that she does not drink alcohol and does not use drugs.    Family History:  Family History   Problem Relation Name Age of Onset    Alzheimer's disease Father      Breast cancer Sister      Pancreatic cancer Sister      Arthritis Other          Allergies:  Niaspan extended-release [niacin] and Naproxen    Inpatient Medications:  Scheduled medications   Medication Dose Route Frequency    albuterol  2.5 mg nebulization q6h while awake    atorvastatin  40 mg oral Nightly    azithromycin  500 mg intravenous q24h    calcitonin (salmon)  1 spray One Nostril Daily    cefTRIAXone  1 g intravenous q12h    famotidine  20 mg oral Daily    ferrous gluconate  38 mg of iron oral Daily with breakfast    furosemide  40 mg intravenous BID    lidocaine  1 patch transdermal Nightly    magnesium oxide  400 mg oral Once per day on Tue Thu Sat    melatonin  3 mg oral Nightly    methylPREDNISolone sodium succinate (PF)  20 mg intravenous q8h    metoprolol succinate XL  25 mg oral BID    nystatin  1 Application Topical BID    pantoprazole  40 mg oral Daily    potassium chloride  20 mEq oral Daily    rOPINIRole  5 mg oral Nightly    sertraline  50 mg oral Daily    triamcinolone   Topical BID     PRN medications   Medication    acetaminophen    albuterol    benzocaine-menthol    docusate sodium    guaiFENesin    loratadine    nitroglycerin    sodium chloride    traZODone     Continuous Medications   Medication Dose Last Rate     Outpatient Medications:  Current Outpatient Medications   Medication Instructions    albuterol 2.5 mg, nebulization, 3 times daily    aspirin 81 mg EC tablet 1 tablet, oral, Daily    atorvastatin (Lipitor) 40 mg tablet TAKE 1 TABLET DAILY    famotidine (Pepcid) 20 mg tablet 1 tablet, oral, Every 12 hours    ferrous gluconate (Fergon) 240 (27 Fe) MG tablet 1 tablet, oral, 3 times weekly, Mon wed fri    furosemide (LASIX) 40 mg, oral, Daily    lidocaine 4 %  patch APPLY AS DIRECTED.    loratadine (Claritin) 10 mg tablet 1 tablet, oral, Daily    magnesium oxide (Mag-Ox) 400 mg (241.3 mg magnesium) tablet 1 tablet, oral, 3 times weekly, Tues thurs sat    melatonin 3 mg, oral, Nightly    metoprolol succinate XL (TOPROL-XL) 25 mg, oral, 2 times daily    nitroglycerin (NITROSTAT) 0.4 mg, Every 5 min PRN, UP TO 3 TIMES. IF NO RELIEF CALL 911.    pantoprazole (PROTONIX) 40 mg, oral, Daily    potassium chloride (Klor-Con) 20 mEq packet 20 mEq, oral, Daily, MIX AND DRINK    rOPINIRole (REQUIP) 5 mg, oral, Nightly    rOPINIRole (REQUIP) 2 mg, oral, Nightly    sertraline (Zoloft) 50 mg tablet TAKE 1 TABLET DAILY    sertraline (ZOLOFT) 50 mg, oral, Daily    traZODone (DESYREL) 50 mg, oral, Nightly PRN    triamcinolone (Kenalog) 0.1 % cream APPLY THIN LAYER TO AFFECTED AREA(S) TWICE A DAY    triamcinolone (Kenalog) 0.1 % cream 1 Application, Topical, 2 times daily, APPLY THIN LAYER TO AFFECTED AREA(S) TWICE A DAY       Physical Exam:  The patient is a thin upper elderly white female sitting upright in bed eating breakfast.  No respiratory distress on standard low-flow O2 nasal cannula 3 L/min  JVP not elevated carotid pulses 2+ thyroid is enlarged palpably  Moderate thoracic kyphosis shallow air movement diminished breath sounds  Cardiac rhythm regular no premature beats S1-S2 obscured grade 3/6 systolic ejection murmur  No peripheral edema.  Assessment/Plan   10/24: This patient is a 89-year-old white female with extensive issues that include coronary artery disease, severe aortic valvular stenosis, moderate tricuspid and mitral valve regurgitation, severe pulmonary hypertension, COPD with oxygen dependence, hypertension, left bundle branch block conduction delay, lower extremity edema due to venous insufficiency, chronic DVT left lower extremity femoral vein, laparoscopic paraesophageal hiatal hernia repair, ongoing aspiration pneumonias, nontoxic multinodular goiter.  The  patient's last echocardiogram on 7/25/2023 again demonstrated a LV ejection fraction 35-40% severe aortic valve stenosis peak systolic gradient 56 mmHg, mild mitral moderate tricuspid valve regurgitation and severe pulmonary hypertension estimated PA systolic pressure 67 mmHg.  The patient was thought not to be a candidate for transcatheter aortic valve replacement.  She does wear continuous oxygen at 3 L/min at home.  She surprisingly still lives independently in her own condominium.  She has been having increased shortness of breath.  Multiple potential etiologies including her chronic lung disease aspiration pneumonias interstitial fibrosis possible CHF.  She has been on Lasix 40 mg daily and at least for the short-term the dose has been doubled to 40 mg twice daily empirically.  Patient will need to be reevaluated by pulmonology for other etiologies and currently is on both ceftriaxone and azithromycin empirically.  Peripheral IV 10/22/23 20 G Distal;Right;Dorsal Wrist (Active)   Site Assessment Clean;Dry 10/24/23 0000   Dressing Status Clean;Dry 10/24/23 0000   Number of days: 2       Code Status:  No Order    I spent 30 minutes in the professional and overall care of this patient.        Jim Mckinney MD

## 2023-10-24 NOTE — PROGRESS NOTES
Occupational Therapy    OT Treatment    Patient Name: Kalie Ramos  MRN: [id number]  Today's Date: 10/24/2023  Time Calculation  Start Time: 1115  Stop Time: 1143  Time Calculation (min): 28 min         Assessment:  OT Assessment: Pt unsteady with mobility, decreased standing tolerance, presents with fair- balance.  OT Assessment Results: Decreased ADL status, Decreased functional mobility  Plan:  Treatment Interventions: ADL retraining, Functional transfer training  OT Frequency: 2 times per week  OT Discharge Recommendations: Low intensity level of continued care  Treatment Interventions: ADL retraining, Functional transfer training    Subjective   General:  OT Received On: 10/24/23  Prior to Session Communication: Bedside nurse  Patient Position Received: Bed, 3 rail up  General Comment: Agreeable to treatment., 2 Lnc intact  Vital Signs:     Pain:       Objective    Activities of Daily Living: Grooming  Grooming Level of Assistance: Contact guard  Grooming Where Assessed: Standing sinkside  Grooming Comments: unsteady with BUE use    UE Bathing  UE Bathing Level of Assistance: Close supervision  UE Bathing Where Assessed: Sitting sinkside  UE Bathing Comments: sponge bathing  Functional Standing Tolerance:  Time: 3 minutes  Activity: self care  Functional Standing Tolerance Comments: 1 slight LOB at sink  Bed Mobility/Transfers: Bed Mobility  Bed Mobility: Yes  Bed Mobility 1  Bed Mobility 1: Supine to sitting, Scooting (Pt able to scoot hips up to HOB without assist)  Level of Assistance 1: Close supervision  Bed Mobility Comments 1: HOB elevated    Transfer 1  Transfer From 1: Bed to  Transfer to 1: Bed  Transfer Device 1: Walker  Transfer Level of Assistance 1: Contact guard  Trials/Comments 1: Pt completes functional mobility ~ 10 feet x2, use of 02 from bed>bathoom>chair  Transfers 2  Transfer From 2: Chair without arms to  Transfer to 2: Chair without arms  Technique 2: Sit to stand, Stand to  sit  Transfer Level of Assistance 2: Contact guard  Trials/Comments 2: pt using UE on countertop    Outcome Measures:[facility] Daily Activity  Putting on and taking off regular lower body clothing: A little  Bathing (including washing, rinsing, drying): A little  Putting on and taking off regular upper body clothing: A little  Toileting, which includes using toilet, bedpan or urinal: A little  Taking care of personal grooming such as brushing teeth: A little  Eating Meals: None  Daily Activity - Total Score: 19        Education Documentation  ADL Training, taught by LAURENT Camacho at 10/24/2023 12:05 PM.  Learner: Patient  Readiness: Acceptance  Method: Explanation, Demonstration  Response: Demonstrated Understanding, Needs Reinforcement    Education Comments  No comments found.        OP EDUCATION:       Goals:  Problem: OT Goals  Goal: ADLs  Description: Patient will complete ADL tasks with Mod I, using AE as needed, in order to increase patient's safety and independence with self-care tasks.  Outcome: Progressing  Goal: Functional Transfers  Description: Patient will complete functional transfers with Mod I in order to increase patient's safety and independence with ADL tasks.  Outcome: Progressing  Goal: B UE Strengthening  Description: Patient will increase patient's B UE strengthening to 4+/5 for functional transfers.  Outcome: Progressing  Goal: Functional Mobility  Description: Patient will demonstrate the ability to complete item retrieval and functional mobility tasks with Mod I in order to increase patient's safety and independence with self-care tasks.  Outcome: Progressing

## 2023-10-24 NOTE — PROGRESS NOTES
Physical Therapy    Physical Therapy Treatment    Patient Name: Kalie Ramos  MRN: [id number]  Today's Date: 10/24/2023  Time Calculation  Start Time: 1345  Stop Time: 1358  Time Calculation (min): 13 min       Assessment/Plan   PT Assessment  Rehab Prognosis: Good  Medical Staff Made Aware: Yes  End of Session Communication: Bedside nurse  Assessment Comment: Improved and increased amb this date. Cont to require assist x 1 for safety. Cont to anticipate increased activity as medical status improves.  End of Session Patient Position: Up in chair, Alarm off, not on at start of session     PT Plan  Treatment/Interventions: Transfer training, Gait training, Balance training, Endurance training  PT Plan: Skilled PT  PT Frequency: 4 times per week  PT Discharge Recommendations: Low intensity level of continued care, 24 hr supervision due to cognition  Equipment Recommended upon Discharge: Wheeled walker  PT Recommended Transfer Status: Assist x1, Assistive device  PT - OK to Discharge: Yes      General Visit Information:   PT  Visit  PT Received On: 10/24/23    Family/Caregiver Present: Yes  Caregiver Feedback: family member present  Prior to Session Communication: Bedside nurse  Patient Position Received: Bed, 3 rail up  General Comment: Supine in bed, 2 L O2. Pt reluctantly agreeable due to food tray present. Agreeable to get OOB to chair to eat lunch. Pleasant/coopeartive otherwise.    Subjective   Precautions:  Precautions  Medical Precautions: Fall precautions  Vital Signs:       Objective   Pain:  Pain Assessment  Pain Assessment: 0-10  Pain Score: 0 - No pain  Cognition:  Cognition  Overall Cognitive Status: Within Functional Limits  Orientation Level: Oriented X4  Following Commands:  (able to follow commands appropriately)  Postural Control:  Postural Control  Posture Comment: kyphotic posture  Extremity/Trunk Assessments:  Kyphotic posture  Activity Tolerance:  Activity Tolerance  Endurance: Decreased  tolerance for upright activites  Activity Tolerance Comments: 2 L O2  Treatments:  Therapeutic Exercise  Therapeutic Exercise Performed: Yes  Therapeutic Exercise Activity 1: instructed pt on B ankle pumps, LAQs, seated marches and glute sets. Pt demo'd about 2 reps of each to demo understanding. Pt requesting to perform on own after eating.       Bed Mobility 1  Bed Mobility 1: Supine to sitting  Level of Assistance 1: Close supervision  Bed Mobility Comments 1: HOB elevated    Ambulation/Gait Training  Ambulation/Gait Training Performed: Yes  Ambulation/Gait Training 1  Surface 1: Level tile  Device 1: Rolling walker  Assistance 1: Close supervision, Contact guard  Quality of Gait 1:  (decreased genia, flexed posture. Required slight managmenet of walker with turning.)  Comments/Distance (ft) 1: about 20 ft x 2 trials due to line management of O2 tubing  Transfer 1  Technique 1: Sit to stand, Stand to sit  Transfer Device 1: Walker  Transfer Level of Assistance 1: Close supervision  Trials/Comments 1: From elevated EOB, 1 trial      Other Activity  Other Activity Performed: Yes  Other Activity 1: Instructed pt on pursed-lip breathing throughout session    Outcome Measures:  [facility] Basic Mobility  Turning from your back to your side while in a flat bed without using bedrails: A little  Moving from lying on your back to sitting on the side of a flat bed without using bedrails: A little  Moving to and from bed to chair (including a wheelchair): A little  Standing up from a chair using your arms (e.g. wheelchair or bedside chair): A little  To walk in hospital room: A little  Climbing 3-5 steps with railing: A little  Basic Mobility - Total Score: 18    Education Documentation  Precautions, taught by Shazia Hernandez PT at 10/24/2023  2:08 PM.  Learner: Patient  Readiness: Acceptance  Method: Explanation  Response: Verbalizes Understanding    Mobility Training, taught by Shazia Hernandez PT at 10/24/2023  2:08 PM.  Learner:  Patient  Readiness: Acceptance  Method: Explanation  Response: Verbalizes Understanding    Education Comments  No comments found.        OP EDUCATION:       Encounter Problems       Encounter Problems (Active)       Balance       dynamic (Progressing)       Start:  10/23/23    Expected End:  11/06/23       Pt will perform sitting/standing activities without LOB            Mobility       STG - Patient will ambulate up and down a curb/step (Progressing)       Start:  10/23/23    Expected End:  11/06/23            bed mobility (Progressing)       Start:  10/23/23    Expected End:  11/06/23       Pt will perform sup to/from sit transfers with supervision         ambulation (Progressing)       Start:  10/23/23    Expected End:  11/06/23       Pt will amb >  100 ft with wheeled walker and mod independence             Pain - Adult          Transfers       sit to stand (Progressing)       Start:  10/23/23    Expected End:  11/06/23       Pt will perform sit to stand transfer with walker and mod independence.

## 2023-10-25 PROCEDURE — 2500000004 HC RX 250 GENERAL PHARMACY W/ HCPCS (ALT 636 FOR OP/ED): Performed by: INTERNAL MEDICINE

## 2023-10-25 PROCEDURE — 9420000001 HC RT PATIENT EDUCATION 5 MIN

## 2023-10-25 PROCEDURE — 94640 AIRWAY INHALATION TREATMENT: CPT

## 2023-10-25 PROCEDURE — 97116 GAIT TRAINING THERAPY: CPT | Mod: GP

## 2023-10-25 PROCEDURE — 94760 N-INVAS EAR/PLS OXIMETRY 1: CPT

## 2023-10-25 PROCEDURE — 2500000002 HC RX 250 W HCPCS SELF ADMINISTERED DRUGS (ALT 637 FOR MEDICARE OP, ALT 636 FOR OP/ED): Performed by: INTERNAL MEDICINE

## 2023-10-25 PROCEDURE — 99232 SBSQ HOSP IP/OBS MODERATE 35: CPT | Performed by: INTERNAL MEDICINE

## 2023-10-25 PROCEDURE — 1100000001 HC PRIVATE ROOM DAILY

## 2023-10-25 PROCEDURE — 2500000001 HC RX 250 WO HCPCS SELF ADMINISTERED DRUGS (ALT 637 FOR MEDICARE OP): Performed by: INTERNAL MEDICINE

## 2023-10-25 PROCEDURE — 97530 THERAPEUTIC ACTIVITIES: CPT | Mod: GP

## 2023-10-25 PROCEDURE — 2500000004 HC RX 250 GENERAL PHARMACY W/ HCPCS (ALT 636 FOR OP/ED): Performed by: NURSE PRACTITIONER

## 2023-10-25 RX ORDER — PREDNISONE 10 MG/1
10 TABLET ORAL DAILY
Status: DISCONTINUED | OUTPATIENT
Start: 2023-10-31 | End: 2023-10-27 | Stop reason: HOSPADM

## 2023-10-25 RX ORDER — PREDNISONE 20 MG/1
20 TABLET ORAL DAILY
Status: COMPLETED | OUTPATIENT
Start: 2023-10-25 | End: 2023-10-27

## 2023-10-25 RX ORDER — PREDNISONE 5 MG/1
5 TABLET ORAL DAILY
Status: DISCONTINUED | OUTPATIENT
Start: 2023-11-03 | End: 2023-10-27 | Stop reason: HOSPADM

## 2023-10-25 RX ADMIN — ROPINIROLE HYDROCHLORIDE 5 MG: 1 TABLET, FILM COATED ORAL at 20:39

## 2023-10-25 RX ADMIN — NYSTATIN 1 APPLICATION: 100000 CREAM TOPICAL at 10:15

## 2023-10-25 RX ADMIN — ALBUTEROL SULFATE 2.5 MG: 2.5 SOLUTION RESPIRATORY (INHALATION) at 14:06

## 2023-10-25 RX ADMIN — ATORVASTATIN CALCIUM 40 MG: 40 TABLET, FILM COATED ORAL at 20:39

## 2023-10-25 RX ADMIN — Medication 38 MG OF IRON: at 10:13

## 2023-10-25 RX ADMIN — FAMOTIDINE 20 MG: 20 TABLET ORAL at 10:13

## 2023-10-25 RX ADMIN — POTASSIUM CHLORIDE 20 MEQ: 1.5 FOR SOLUTION ORAL at 10:13

## 2023-10-25 RX ADMIN — METOPROLOL SUCCINATE 25 MG: 25 TABLET, EXTENDED RELEASE ORAL at 20:40

## 2023-10-25 RX ADMIN — PREDNISONE 20 MG: 20 TABLET ORAL at 10:13

## 2023-10-25 RX ADMIN — TRIAMCINOLONE ACETONIDE: 1 CREAM TOPICAL at 10:15

## 2023-10-25 RX ADMIN — Medication 3 MG: at 20:39

## 2023-10-25 RX ADMIN — ALBUTEROL SULFATE 2.5 MG: 2.5 SOLUTION RESPIRATORY (INHALATION) at 07:12

## 2023-10-25 RX ADMIN — AZITHROMYCIN MONOHYDRATE 500 MG: 500 INJECTION, POWDER, LYOPHILIZED, FOR SOLUTION INTRAVENOUS at 18:20

## 2023-10-25 RX ADMIN — FUROSEMIDE 40 MG: 10 INJECTION, SOLUTION INTRAMUSCULAR; INTRAVENOUS at 18:20

## 2023-10-25 RX ADMIN — METHYLPREDNISOLONE SODIUM SUCCINATE 20 MG: 40 INJECTION INTRAMUSCULAR; INTRAVENOUS at 04:28

## 2023-10-25 RX ADMIN — FUROSEMIDE 40 MG: 10 INJECTION, SOLUTION INTRAMUSCULAR; INTRAVENOUS at 05:38

## 2023-10-25 RX ADMIN — CEFTRIAXONE SODIUM 1 G: 1 INJECTION, SOLUTION INTRAVENOUS at 22:55

## 2023-10-25 RX ADMIN — METOPROLOL SUCCINATE 25 MG: 25 TABLET, EXTENDED RELEASE ORAL at 10:13

## 2023-10-25 RX ADMIN — SERTRALINE 50 MG: 50 TABLET, FILM COATED ORAL at 10:13

## 2023-10-25 RX ADMIN — CEFTRIAXONE SODIUM 1 G: 1 INJECTION, SOLUTION INTRAVENOUS at 10:15

## 2023-10-25 RX ADMIN — PANTOPRAZOLE SODIUM 40 MG: 40 TABLET, DELAYED RELEASE ORAL at 10:13

## 2023-10-25 ASSESSMENT — COGNITIVE AND FUNCTIONAL STATUS - GENERAL
STANDING UP FROM CHAIR USING ARMS: A LITTLE
MOVING FROM LYING ON BACK TO SITTING ON SIDE OF FLAT BED WITH BEDRAILS: A LITTLE
MOVING TO AND FROM BED TO CHAIR: A LITTLE
PERSONAL GROOMING: A LITTLE
WALKING IN HOSPITAL ROOM: A LITTLE
TOILETING: A LITTLE
HELP NEEDED FOR BATHING: A LITTLE
TURNING FROM BACK TO SIDE WHILE IN FLAT BAD: A LITTLE
DAILY ACTIVITIY SCORE: 19
MOVING FROM LYING ON BACK TO SITTING ON SIDE OF FLAT BED WITH BEDRAILS: A LITTLE
TURNING FROM BACK TO SIDE WHILE IN FLAT BAD: A LITTLE
CLIMB 3 TO 5 STEPS WITH RAILING: A LITTLE
CLIMB 3 TO 5 STEPS WITH RAILING: A LITTLE
DRESSING REGULAR LOWER BODY CLOTHING: A LITTLE
WALKING IN HOSPITAL ROOM: A LITTLE
DRESSING REGULAR UPPER BODY CLOTHING: A LITTLE
TURNING FROM BACK TO SIDE WHILE IN FLAT BAD: A LITTLE
MOVING TO AND FROM BED TO CHAIR: A LITTLE
STANDING UP FROM CHAIR USING ARMS: A LITTLE
WALKING IN HOSPITAL ROOM: A LITTLE
WALKING IN HOSPITAL ROOM: A LITTLE
CLIMB 3 TO 5 STEPS WITH RAILING: A LITTLE
TOILETING: A LITTLE
HELP NEEDED FOR BATHING: A LITTLE
CLIMB 3 TO 5 STEPS WITH RAILING: A LITTLE
DRESSING REGULAR LOWER BODY CLOTHING: A LITTLE
STANDING UP FROM CHAIR USING ARMS: A LITTLE
MOBILITY SCORE: 18
DAILY ACTIVITIY SCORE: 19
PERSONAL GROOMING: A LITTLE
DAILY ACTIVITIY SCORE: 19
MOVING TO AND FROM BED TO CHAIR: A LITTLE
DRESSING REGULAR UPPER BODY CLOTHING: A LITTLE
MOBILITY SCORE: 18
TOILETING: A LITTLE
TURNING FROM BACK TO SIDE WHILE IN FLAT BAD: A LITTLE
MOVING FROM LYING ON BACK TO SITTING ON SIDE OF FLAT BED WITH BEDRAILS: A LITTLE
MOBILITY SCORE: 18
STANDING UP FROM CHAIR USING ARMS: A LITTLE
MOVING FROM LYING ON BACK TO SITTING ON SIDE OF FLAT BED WITH BEDRAILS: A LITTLE
MOVING TO AND FROM BED TO CHAIR: A LITTLE
MOBILITY SCORE: 18
DRESSING REGULAR LOWER BODY CLOTHING: A LITTLE
PERSONAL GROOMING: A LITTLE
HELP NEEDED FOR BATHING: A LITTLE
DRESSING REGULAR UPPER BODY CLOTHING: A LITTLE

## 2023-10-25 ASSESSMENT — PAIN SCALES - GENERAL
PAINLEVEL_OUTOF10: 0 - NO PAIN

## 2023-10-25 ASSESSMENT — PAIN - FUNCTIONAL ASSESSMENT
PAIN_FUNCTIONAL_ASSESSMENT: 0-10

## 2023-10-25 NOTE — CARE PLAN
The patient's goals for the shift include no falls    The clinical goals for the shift include safety      Problem: Safety - Adult  Goal: Free from fall injury  Outcome: Progressing

## 2023-10-25 NOTE — PROGRESS NOTES
Subjective Data:  Patient feeling relatively well not short of breath eating breakfast    Overnight Events:    No new overnight events.     Objective Data:  Last Recorded Vitals:  Vitals:    10/24/23 2020 10/25/23 0000 10/25/23 0429 10/25/23 0712   BP: 113/66 109/52 146/62 143/52   BP Location: Right arm Right arm Right arm Right arm   Patient Position: Lying Lying Lying Lying   Pulse: 72 62 68 64   Resp: 18 18 18 20   Temp: 36.6 °C (97.9 °F) 36.5 °C (97.7 °F)  36.6 °C (97.9 °F)   TempSrc: Oral Oral  Oral   SpO2: 98% 92% 90% 97%   Weight:       Height:           Last Labs:  CBC - 10/23/2023: 12:50 PM  10.0 11.3 197    35.6      CMP - 10/23/2023:  4:30 AM  8.3 7.1 28 --- 1.1   _ 3.3 21 158      PTT - 1/26/2023:  6:26 PM  1.0   10.2 29.2     HGBA1C   Date/Time Value Ref Range Status   05/23/2022 10:43 PM 5.7 4.0 - 6.0 % Final     Comment:     Hemoglobin A1C levels are related to mean blood glucose during the   preceding 2-3 months. The relationship table below may be used as a   general guide. Each 1% increase in HGB A1C is a reflection of an   increase in mean glucose of approximately 30 mg/dl.   Reference: Diabetes Care, volume 29, supplement 1 Jan. 2006                        HGB A1C ................. Approx. Mean Glucose   _______________________________________________   6%   ...............................  120 mg/dl   7%   ...............................  150 mg/dl   8%   ...............................  180 mg/dl   9%   ...............................  210 mg/dl   10%  ...............................  240 mg/dl  Performed at [address] [address]     12/17/2020 03:16 PM 5.7 4.0 - 6.0 % Final     Comment:     Hemoglobin A1C levels are related to mean blood glucose during the   preceding 2-3 months. The relationship table below may be used as a   general guide. Each 1% increase in HGB A1C is a reflection of an   increase in mean glucose of approximately 30 mg/dl.   Reference: Diabetes  "Care, volume 29, supplement 1 Jan. 2006                        HGB A1C ................. Approx. Mean Glucose   _______________________________________________   6%   ...............................  120 mg/dl   7%   ...............................  150 mg/dl   8%   ...............................  180 mg/dl   9%   ...............................  210 mg/dl   10%  ...............................  240 mg/dl  Performed at [address] [address]       LDLCALC   Date/Time Value Ref Range Status   01/27/2023 06:04 AM 66 65 - 130 MG/DL Final   11/20/2022 02:47 AM 54 65 - 130 MG/DL Final   07/06/2022 01:13 AM 54 65 - 130 MG/DL Final     VLDL   Date/Time Value Ref Range Status   08/03/2018 09:41 AM 25 0 - 40 mg/dL Final   01/18/2018 02:30 PM 47 0 - 40 mg/dL Final   10/17/2017 11:18 AM 50 0 - 40 mg/dL Final      Last I/O:  I/O last 3 completed shifts:  In: 350 (6.3 mL/kg) [IV Piggyback:350]  Out: 1000 (18.1 mL/kg) [Urine:1000 (0.5 mL/kg/hr)]  Weight: 55.3 kg     Past Cardiology Tests (Last 3 Years):  EKG:  ECG 12 lead 10/24/2023    Echo:  No results found for this or any previous visit from the past 1095 days.    Ejection Fractions:  No results found for: \"EF\"  Cath:  No results found for this or any previous visit from the past 1095 days.    Stress Test:  No results found for this or any previous visit from the past 1095 days.    Cardiac Imaging:  No results found for this or any previous visit from the past 1095 days.      Inpatient Medications:  Scheduled medications   Medication Dose Route Frequency    albuterol  2.5 mg nebulization q6h while awake    atorvastatin  40 mg oral Nightly    azithromycin  500 mg intravenous q24h    calcitonin (salmon)  1 spray One Nostril Daily    cefTRIAXone  1 g intravenous q12h    famotidine  20 mg oral Daily    ferrous gluconate  38 mg of iron oral Daily with breakfast    furosemide  40 mg intravenous BID    lidocaine  1 patch transdermal Nightly    magnesium oxide "  400 mg oral Once per day on Tue Thu Sat    melatonin  3 mg oral Nightly    methylPREDNISolone sodium succinate (PF)  20 mg intravenous q8h    metoprolol succinate XL  25 mg oral BID    nystatin  1 Application Topical BID    pantoprazole  40 mg oral Daily    potassium chloride  20 mEq oral Daily    rOPINIRole  5 mg oral Nightly    sertraline  50 mg oral Daily    triamcinolone   Topical BID     PRN medications   Medication    acetaminophen    albuterol    benzocaine-menthol    docusate sodium    guaiFENesin    loratadine    nitroglycerin    sodium chloride    traZODone     Continuous Medications   Medication Dose Last Rate       Physical Exam:  The patient is a thin upper elderly white female sitting upright in bed eating breakfast.  No respiratory distress on standard low-flow O2 nasal cannula 3 L/min  JVP not elevated carotid pulses 2+ thyroid is enlarged palpably  Moderate thoracic kyphosis shallow air movement diminished breath sounds  Cardiac rhythm regular no premature beats S1-S2 obscured grade 3/6 systolic ejection murmur  No peripheral edema.     Assessment/Plan   10/24: This patient is a 89-year-old white female with extensive issues that include coronary artery disease, severe aortic valvular stenosis, moderate tricuspid and mitral valve regurgitation, severe pulmonary hypertension, COPD with oxygen dependence, hypertension, left bundle branch block conduction delay, lower extremity edema due to venous insufficiency, chronic DVT left lower extremity femoral vein, laparoscopic paraesophageal hiatal hernia repair, ongoing aspiration pneumonias, nontoxic multinodular goiter.  The patient's last echocardiogram on 7/25/2023 again demonstrated a LV ejection fraction 35-40% severe aortic valve stenosis peak systolic gradient 56 mmHg, mild mitral moderate tricuspid valve regurgitation and severe pulmonary hypertension estimated PA systolic pressure 67 mmHg.  The patient was thought not to be a candidate for  transcatheter aortic valve replacement.  She does wear continuous oxygen at 3 L/min at home.  She surprisingly still lives independently in her own condominium.  She has been having increased shortness of breath.  Multiple potential etiologies including her chronic lung disease aspiration pneumonias interstitial fibrosis possible CHF.  She has been on Lasix 40 mg daily and at least for the short-term the dose has been doubled to 40 mg twice daily empirically.  Patient will need to be reevaluated by pulmonology for other etiologies and currently is on both ceftriaxone and azithromycin empirically.     10/25: The patient is sitting upright in bed not short of breath on standard low-flow O2 nasal cannula 3 L/min eating breakfast.  Recorded input output negative by 950 cc yesterday.  Patient now on increased dose Lasix 40 mg twice daily.  The patient does have progressive and ultimately life limiting valvular heart disease with severe aortic valve stenosis and a decline in her LV ejection fraction over the past surreal is now to 35-40%.  She also has severe pulmonary hypertension as well contributing to the pleural effusions.  As per previous evaluation/she is not a candidate at her age and with her comorbidities for a transcatheter aortic valve replacement.  Will continue increased dose of Lasix 40 mg twice daily for now.  Patient does have some degree of underlying lung disease and has had issues with aspiration in the past.  She has been coached to eat slowly and tilt her chin forward while swallowing in order to reduce potential for aspiration which has occurred in the past.  We will recheck labs which are not available from today.  The patient remains on empiric IV ceftriaxone and azithromycin.  Peripheral IV 10/22/23 20 G Distal;Right;Dorsal Wrist (Active)   Site Assessment Clean;Dry;Intact 10/25/23 0000   Dressing Status Clean;Dry 10/25/23 0000   Number of days: 3       Code Status:  No Order    I spent 20  minutes in the professional and overall care of this patient.        Jim Mckinney MD

## 2023-10-25 NOTE — CARE PLAN
Problem: Respiratory  Goal: No signs of respiratory distress (eg. Use of accessory muscles. Peds grunting)  Outcome: Progressing

## 2023-10-25 NOTE — PROGRESS NOTES
Physical Therapy    Physical Therapy Treatment    Patient Name: Kalie Ramos  MRN: [id number]  Today's Date: 10/25/2023  Time Calculation  Start Time: 1142  Stop Time: 1205  Time Calculation (min): 23 min       Assessment/Plan   PT Assessment  PT Assessment Results: Decreased strength, Decreased endurance, Impaired balance, Decreased mobility, Decreased safety awareness  Rehab Prognosis: Good  Evaluation/Treatment Tolerance: Patient tolerated treatment well  Medical Staff Made Aware: Yes  End of Session Communication: Bedside nurse  Assessment Comment: Improved and increased amb this date. Cont to require assist x 1 for safety. Cont to anticipate increased activity as medical status improves.  End of Session Patient Position: Up in chair, Alarm on     PT Plan  Treatment/Interventions: Transfer training, Gait training, Balance training, Endurance training  PT Plan: Skilled PT  PT Frequency: 4 times per week  PT Discharge Recommendations: Low intensity level of continued care, 24 hr supervision due to cognition  Equipment Recommended upon Discharge: Wheeled walker  PT Recommended Transfer Status: Assist x1, Assistive device  PT - OK to Discharge: Yes      General Visit Information:   PT  Visit  PT Received On: 10/25/23  General  Prior to Session Communication: Bedside nurse  Patient Position Received: Bed, 3 rail up  General Comment: Supine in bed, 3 L O2. Pt agreeable to ambulate in keith and get OOB to chair for lunch.    Subjective   Precautions:  Precautions  Medical Precautions: Fall precautions  Vital Signs:  Vital Signs  SpO2: 96 % (3 L O2 during ambulation)    Objective   Pain:  Pain Assessment  Pain Assessment: 0-10  Pain Score: 0 - No pain  Cognition:  Cognition  Overall Cognitive Status: Within Functional Limits  Orientation Level: Oriented X4  Following Commands:  (able to follow commands appropriately. Appeared confused at times, although able to redirect self.)  Postural Control:  Postural  "Control  Posture Comment: kyphotic posture  Extremity/Trunk Assessments:    Activity Tolerance:  Activity Tolerance  Endurance: Decreased tolerance for upright activites  Activity Tolerance Comments: SpO2 96% on 3 L O2 during ambulationn  Treatments:  Therapeutic Exercise  Therapeutic Exercise Performed: Yes  Therapeutic Exercise Activity 1: B LAQs, ankle pumps, seated marches         Bed Mobility 1  Bed Mobility 1: Supine to sitting  Level of Assistance 1: Close supervision  Bed Mobility Comments 1: HOB elevated    Ambulation/Gait Training  Ambulation/Gait Training Performed: Yes  Ambulation/Gait Training 1  Surface 1: Level tile  Device 1: Rolling walker  Gait Support Devices:  (O2 tank and IV pole)  Assistance 1: Close supervision, Contact guard  Quality of Gait 1:  (decreased genia, flexed posture. Required slight managmenet of walker with turning.)  Comments/Distance (ft) 1: about 250 ft total.  1 short standing rest break and about 125 ft. (\"this feels so good,\" pt stated.)  Transfer 1  Technique 1: Sit to stand, Stand to sit  Transfer Device 1: Walker  Transfer Level of Assistance 1: Close supervision  Trials/Comments 1: 1 trials from elevated EOB    Other Activity  Other Activity Performed: Yes  Other Activity 1:  (Instructed pt on pursed-lip breathing throughout session)    Outcome Measures:  [facility] Basic Mobility  Turning from your back to your side while in a flat bed without using bedrails: A little  Moving from lying on your back to sitting on the side of a flat bed without using bedrails: A little  Moving to and from bed to chair (including a wheelchair): A little  Standing up from a chair using your arms (e.g. wheelchair or bedside chair): A little  To walk in hospital room: A little  Climbing 3-5 steps with railing: A little  Basic Mobility - Total Score: 18    Education Documentation  Precautions, taught by Shazia Hernandez, PT at 10/25/2023  1:01 PM.  Learner: Patient  Readiness: Acceptance  Method: " Explanation  Response: Verbalizes Understanding    Body Mechanics, taught by Shazia Hernandez PT at 10/25/2023  1:01 PM.  Learner: Patient  Readiness: Acceptance  Method: Explanation  Response: Verbalizes Understanding    Mobility Training, taught by Shazia Hernandez PT at 10/25/2023  1:01 PM.  Learner: Patient  Readiness: Acceptance  Method: Explanation  Response: Verbalizes Understanding    Precautions, taught by Shazia Hernandez PT at 10/24/2023  2:08 PM.  Learner: Patient  Readiness: Acceptance  Method: Explanation  Response: Verbalizes Understanding    Mobility Training, taught by Shazia Hernandez PT at 10/24/2023  2:08 PM.  Learner: Patient  Readiness: Acceptance  Method: Explanation  Response: Verbalizes Understanding    Education Comments  No comments found.        OP EDUCATION:       Encounter Problems       Encounter Problems (Active)       Balance       dynamic (Progressing)       Start:  10/23/23    Expected End:  11/06/23       Pt will perform sitting/standing activities without LOB            Mobility       STG - Patient will ambulate up and down a curb/step (Progressing)       Start:  10/23/23    Expected End:  11/06/23            bed mobility (Progressing)       Start:  10/23/23    Expected End:  11/06/23       Pt will perform sup to/from sit transfers with supervision         ambulation (Progressing)       Start:  10/23/23    Expected End:  11/06/23       Pt will amb >  100 ft with wheeled walker and mod independence             Pain - Adult          Transfers       sit to stand (Progressing)       Start:  10/23/23    Expected End:  11/06/23       Pt will perform sit to stand transfer with walker and mod independence.

## 2023-10-25 NOTE — CARE PLAN
Problem: Safety - Adult  Goal: Free from fall injury  Outcome: Progressing     Problem: Discharge Planning  Goal: Discharge to home or other facility with appropriate resources  Outcome: Progressing     Problem: Chronic Conditions and Co-morbidities  Goal: Patient's chronic conditions and co-morbidity symptoms are monitored and maintained or improved  Outcome: Progressing     Problem: Skin  Goal: Participates in plan/prevention/treatment measures  Outcome: Progressing  Goal: Prevent/manage excess moisture  Outcome: Progressing  Goal: Prevent/minimize sheer/friction injuries  Outcome: Progressing  Goal: Promote/optimize nutrition  Outcome: Progressing

## 2023-10-25 NOTE — NURSING NOTE
Patient states she would prefer to go to Rehab vs. HHC.  Left message with [facility]

## 2023-10-25 NOTE — PROGRESS NOTES
"Kalie Ramos is a 89 y.o. female on day 3 seen in follow-up for Pneumonia of right middle lobe due to infectious organism.    Subjective   On 3 L nasal cannula oxygen; oxygen sats 95%.  No respiratory complaints.  Denies pain.  Afebrile.     Objective     Physical Exam  Vitals and nursing note reviewed.   Constitutional:       Appearance: Normal appearance.   HENT:      Head: Normocephalic and atraumatic.      Nose: Nose normal.      Mouth/Throat:      Mouth: Mucous membranes are moist.   Eyes:      Extraocular Movements: Extraocular movements intact.      Conjunctiva/sclera: Conjunctivae normal.      Pupils: Pupils are equal, round, and reactive to light.   Cardiovascular:      Rate and Rhythm: Normal rate and regular rhythm.      Pulses: Normal pulses.      Heart sounds: Normal heart sounds.   Pulmonary:      Effort: Pulmonary effort is normal.      Comments: Very mild end expiratory wheezes.    Abdominal:      General: Bowel sounds are normal.      Palpations: Abdomen is soft.   Musculoskeletal:         General: Normal range of motion.   Skin:     General: Skin is warm and dry.      Capillary Refill: Capillary refill takes less than 2 seconds.   Neurological:      General: No focal deficit present.      Mental Status: She is alert and oriented to person, place, and time.   Psychiatric:         Mood and Affect: Mood normal.         Behavior: Behavior normal.         Last Recorded Vitals  Blood pressure 143/52, pulse 64, temperature 36.6 °C (97.9 °F), temperature source Oral, resp. rate 20, height 1.575 m (5' 2\"), weight 55.3 kg (121 lb 14.6 oz), SpO2 97 %.  Intake/Output last 3 Shifts:  I/O last 3 completed shifts:  In: 350 (6.3 mL/kg) [IV Piggyback:350]  Out: 1000 (18.1 mL/kg) [Urine:1000 (0.5 mL/kg/hr)]  Weight: 55.3 kg        Medications:   albuterol, 2.5 mg, nebulization, q6h while awake  atorvastatin, 40 mg, oral, Nightly  azithromycin, 500 mg, intravenous, q24h  calcitonin (salmon), 1 spray, One " Nostril, Daily  cefTRIAXone, 1 g, intravenous, q12h  famotidine, 20 mg, oral, Daily  ferrous gluconate, 38 mg of iron, oral, Daily with breakfast  furosemide, 40 mg, intravenous, BID  lidocaine, 1 patch, transdermal, Nightly  magnesium oxide, 400 mg, oral, Once per day on Tue Thu Sat  melatonin, 3 mg, oral, Nightly  methylPREDNISolone sodium succinate (PF), 20 mg, intravenous, q8h  metoprolol succinate XL, 25 mg, oral, BID  nystatin, 1 Application, Topical, BID  pantoprazole, 40 mg, oral, Daily  potassium chloride, 20 mEq, oral, Daily  rOPINIRole, 5 mg, oral, Nightly  sertraline, 50 mg, oral, Daily  triamcinolone, , Topical, BID     PRN medications: acetaminophen, albuterol, benzocaine-menthol, docusate sodium, guaiFENesin, loratadine, nitroglycerin, sodium chloride, traZODone        Results for orders placed or performed during the hospital encounter of 10/22/23 (from the past 24 hour(s))   ECG 12 lead   Result Value Ref Range    Ventricular Rate 57 BPM    Atrial Rate 57 BPM    PA Interval 208 ms    QRS Duration 124 ms    QT Interval 538 ms    QTC Calculation(Bazett) 523 ms    P Axis 76 degrees    R Axis 27 degrees    T Axis 174 degrees    QRS Count 9 beats    Q Onset 214 ms    P Onset 110 ms    P Offset 147 ms    T Offset 483 ms    QTC Fredericia 529 ms      CT angio chest for pulmonary embolism  Result Date: 10/22/2023  FINDINGS: No emboli within the proximal pulmonary vasculature. Bilateral confluent airspace disease involving all lung lobes, right greater than left. Moderate bilateral pleural effusions. Enlarged bilateral main pulmonary arteries can be seen with elevated pulmonary arterial pressure. Cardiomegaly.       XR chest Result Date: 10/22/2023  FINDINGS: The cardiomediastinal silhouette is unremarkable. Patchy airspace opacities are noted in both lungs particularly within the right upper lung field. Bibasilar pleural effusions associated atelectasis.   The osseous structures are intact.        Assessment/Plan   Acute on chronic hypoxic respiratory failure  Acute on chronic systolic congestive heart failure  Pneumonia  Asthenia  Cellulitis  History of adenocarcinoma of breast    Oxygen at baseline  Continue IBD/ICS  Continuous pulse oximetry  Incentive spirometry/pulmonary hygiene  IV Solu-Medrol-will change to prednisone with taper  Continue IV azithromycin, ceftriaxone   Follow-up cultures  IV Lasix  Prophylaxis  PT/OT/out of bed  Discharge planning-home with home healthcare       SARAH Wooten-CNP  [facility]

## 2023-10-25 NOTE — NURSING NOTE
Resting comfortably in chair awaiting her lunch tray  Denies any pain or discomfort  Call light is within reach

## 2023-10-25 NOTE — NURSING NOTE
BSSR received  Patient is resting quietly in bed with eyes closed  Call light and belongings are within reach  On 3L NC  No acute distress noted

## 2023-10-25 NOTE — PROGRESS NOTES
Kalie Ramos is a 89 y.o. female on day 3 of admission presenting with Pneumonia of right middle lobe due to infectious organism.  Shortness of breath definite improvement orthopnea better wants to go home  Subjective   Shortness of breath improved       Objective     Physical Exam  HEENT:  Head externally atraumatic, no pallor, no icterus, extraocular movements intact, pulls reacting to light, oral mucosa moist and throat clear.  Neck:  Supple, no JVP, no palpable adenopathy or thyromegaly.  No carotid bruit.  Chest:  Clear to auscultation and resonant.  Basal crepitations breath sounds diminished labored improvement  Heart:  Regular rate and rhythm, no murmur or gallop could be appreciated.  Abdomen:  Soft, nontender, bowel sounds present, normoactive, no palpable hepatosplenomegaly.  Extremities:  No edema, pulses present, no cyanosis or clubbing.  CNS:  Patient alert, oriented to time, place and person.  Power 5/5 all over and deep tendon reflexes symmetrical, cranial nerves 2-12 grossly intact.  Skin:  No active rash.  Musculoskeletal:  No joint swelling or erythema, range of movement normal.  Last Recorded Vitals  Heart Rate:  [55-72]   Temp:  [36.5 °C (97.7 °F)-36.8 °C (98.2 °F)]   Resp:  [18-20]   BP: (109-146)/(43-66)   SpO2:  [90 %-100 %]     Intake/Output last 3 Shifts:  I/O last 3 completed shifts:  In: 350 (6.3 mL/kg) [IV Piggyback:350]  Out: 1000 (18.1 mL/kg) [Urine:1000 (0.5 mL/kg/hr)]  Weight: 55.3 kg     Relevant Results  No results found for the last 90 days.    No results found for this or any previous visit (from the past 24 hour(s)).     Current Facility-Administered Medications:     acetaminophen (Tylenol) tablet 650 mg, 650 mg, oral, q6h PRN, Avel Bailey MD    albuterol 2.5 mg /3 mL (0.083 %) nebulizer solution 2.5 mg, 2.5 mg, nebulization, q6h while awake, Avel Bailey MD, 2.5 mg at 10/25/23 0712    albuterol 2.5 mg /3 mL (0.083 %) nebulizer solution 2.5 mg, 2.5 mg,  nebulization, q2h PRN, Avel Bailey MD    atorvastatin (Lipitor) tablet 40 mg, 40 mg, oral, Nightly, Avel Bailey MD, 40 mg at 10/24/23 2027    azithromycin (Zithromax) in dextrose 5 % in water (D5W) 250 mL  mg, 500 mg, intravenous, q24h, Avel Bailey MD, Stopped at 10/24/23 1754    benzocaine-menthol (Cepastat Sore Throat) 15-3.6 mg lozenge 1 lozenge, 1 lozenge, Mouth/Throat, q4h PRN, Avel Bailey MD    calcitonin (salmon) (Miacalcin) nasal spray 1 spray, 1 spray, One Nostril, Daily, Avel Bailey MD, 1 spray at 10/24/23 1003    cefTRIAXone (Rocephin) IVPB 1 g, 1 g, intravenous, q12h, Avel Bailey MD, Stopped at 10/25/23 1045    docusate sodium (Colace) capsule 100 mg, 100 mg, oral, TID PRN, Avel Bailey MD    famotidine (Pepcid) tablet 20 mg, 20 mg, oral, Daily, Avel Bailey MD, 20 mg at 10/25/23 1013    ferrous gluconate (Fergon) 324 (38 Fe) mg tablet 38 mg of iron, 38 mg of iron, oral, Daily with breakfast, Avel Bailey MD, 38 mg of iron at 10/25/23 1013    furosemide (Lasix) injection 40 mg, 40 mg, intravenous, BID, Avel Bailey MD, 40 mg at 10/25/23 0538    guaiFENesin (Robitussin) 100 mg/5 mL syrup 10 mL, 10 mL, oral, q4h PRN, Avel Bailey MD    lidocaine 4 % patch 1 patch, 1 patch, transdermal, Nightly, Avel Bailey MD    loratadine (Claritin) tablet 10 mg, 10 mg, oral, Daily PRN, Avel Bailey MD    magnesium oxide (Mag-Ox) tablet 400 mg, 400 mg, oral, Once per day on Tue Thu Sat, Avel Bailey MD, 400 mg at 10/24/23 0823    melatonin tablet 3 mg, 3 mg, oral, Nightly, Avel Bailey MD, 3 mg at 10/24/23 2027    metoprolol succinate XL (Toprol-XL) 24 hr tablet 25 mg, 25 mg, oral, BID, Avel Bailey MD, 25 mg at 10/25/23 1013    nitroglycerin (Nitrostat) SL tablet 0.4 mg, 0.4 mg, sublingual, q5 min PRN, Avel Bailey MD    nystatin (Mycostatin) cream 1 Application, 1 Application, Topical, BID, Avel Bailey MD, 1 Application at 10/25/23  1015    pantoprazole (ProtoNix) EC tablet 40 mg, 40 mg, oral, Daily, Avel Bailey MD, 40 mg at 10/25/23 1013    potassium chloride (Klor-Con) packet 20 mEq, 20 mEq, oral, Daily, Avel Bailey MD, 20 mEq at 10/25/23 1013    predniSONE (Deltasone) tablet 20 mg, 20 mg, oral, Daily, 20 mg at 10/25/23 1013 **FOLLOWED BY** [START ON 10/28/2023] predniSONE (Deltasone) tablet 15 mg, 15 mg, oral, Daily **FOLLOWED BY** [START ON 10/31/2023] predniSONE (Deltasone) tablet 10 mg, 10 mg, oral, Daily **FOLLOWED BY** [START ON 11/3/2023] predniSONE (Deltasone) tablet 5 mg, 5 mg, oral, Daily, Tracie Lutz, APRN-CNP    rOPINIRole (Requip) tablet 5 mg, 5 mg, oral, Nightly, Avel Bailey MD, 5 mg at 10/24/23 2026    sertraline (Zoloft) tablet 50 mg, 50 mg, oral, Daily, Avel Bailey MD, 50 mg at 10/25/23 1013    sodium chloride (Ocean) 0.65 % nasal spray 1 spray, 1 spray, Each Nostril, 4x daily PRN, Avel Bailey MD    traZODone (Desyrel) tablet 50 mg, 50 mg, oral, Nightly PRN, Avel Bailey MD, 50 mg at 10/23/23 2133    triamcinolone (Kenalog) 0.1 % cream, , Topical, BID, Avel Bailey MD, Given at 10/25/23 1015   Assessment/Plan   Principal Problem:    Pneumonia of right middle lobe due to infectious organism    CHF stable    Pleural effusion  Monitor    Edema  Better    Hypertension  Med    Patient insist on going home possible home with home care         Avel Bailey MD

## 2023-10-25 NOTE — PROGRESS NOTES
Kalie Ramos is a 89 y.o. female on day 2 of admission presenting with Pneumonia of right middle lobe due to infectious organism.  Feeling better shortness of breath improved    Subjective   Orthopnea improving shortness of breath better       Objective   Feeling okay  Physical Exam  HEENT:  Head externally atraumatic, no pallor, no icterus, extraocular movements intact, pulls reacting to light, oral mucosa moist and throat clear.  Neck:  Supple, no JVP, no palpable adenopathy or thyromegaly.  No carotid bruit.  Chest:  Clear to auscultation and resonant.  Heart:  Regular rate and rhythm, no murmur or gallop could be appreciated.  Abdomen:  Soft, nontender, bowel sounds present, normoactive, no palpable hepatosplenomegaly.  Extremities:  No edema, pulses present, no cyanosis or clubbing.  CNS:  Patient alert, oriented to time, place and person.  Power 5/5 all over and deep tendon reflexes symmetrical, cranial nerves 2-12 grossly intact.  Skin:  No active rash.  Musculoskeletal:  No joint swelling or erythema, range of movement normal.  Last Recorded Vitals  Heart Rate:  [53-68]   Temp:  [36.2 °C (97.2 °F)-36.7 °C (98.1 °F)]   Resp:  [17-20]   BP: (110-129)/(43-53)   SpO2:  [91 %-100 %]     Intake/Output last 3 Shifts:  I/O last 3 completed shifts:  In: 470 (8.5 mL/kg) [P.O.:120; IV Piggyback:350]  Out: 1000 (18.1 mL/kg) [Urine:1000 (0.5 mL/kg/hr)]  Weight: 55.3 kg     Relevant Results  No results found for the last 90 days.    Results for orders placed or performed during the hospital encounter of 10/22/23 (from the past 24 hour(s))   ECG 12 lead   Result Value Ref Range    Ventricular Rate 57 BPM    Atrial Rate 57 BPM    IA Interval 208 ms    QRS Duration 124 ms    QT Interval 538 ms    QTC Calculation(Bazett) 523 ms    P Axis 76 degrees    R Axis 27 degrees    T Axis 174 degrees    QRS Count 9 beats    Q Onset 214 ms    P Onset 110 ms    P Offset 147 ms    T Offset 483 ms    QTC Fredericia 529 ms         Current Facility-Administered Medications:     acetaminophen (Tylenol) tablet 650 mg, 650 mg, oral, q6h PRN, Avel Bailey MD    albuterol 2.5 mg /3 mL (0.083 %) nebulizer solution 2.5 mg, 2.5 mg, nebulization, q6h while awake, Avel Bailey MD, 2.5 mg at 10/24/23 1903    albuterol 2.5 mg /3 mL (0.083 %) nebulizer solution 2.5 mg, 2.5 mg, nebulization, q2h PRN, Avel Bailey MD    atorvastatin (Lipitor) tablet 40 mg, 40 mg, oral, Nightly, Avel Bailey MD, 40 mg at 10/23/23 2127    azithromycin (Zithromax) in dextrose 5 % in water (D5W) 250 mL  mg, 500 mg, intravenous, q24h, Avel Bailey MD, Last Rate: 250 mL/hr at 10/24/23 1654, 500 mg at 10/24/23 1654    benzocaine-menthol (Cepastat Sore Throat) 15-3.6 mg lozenge 1 lozenge, 1 lozenge, Mouth/Throat, q4h PRN, Avel Bailey MD    calcitonin (salmon) (Miacalcin) nasal spray 1 spray, 1 spray, One Nostril, Daily, Avel Bailey MD, 1 spray at 10/24/23 1003    cefTRIAXone (Rocephin) IVPB 1 g, 1 g, intravenous, q12h, Avel Bailey MD, Stopped at 10/24/23 1033    docusate sodium (Colace) capsule 100 mg, 100 mg, oral, TID PRN, Avel Bailey MD    famotidine (Pepcid) tablet 20 mg, 20 mg, oral, Daily, Avel Bailey MD, 20 mg at 10/24/23 0823    ferrous gluconate (Fergon) 324 (38 Fe) mg tablet 38 mg of iron, 38 mg of iron, oral, Daily with breakfast, Avel Bailey MD, 38 mg of iron at 10/24/23 0823    furosemide (Lasix) injection 40 mg, 40 mg, intravenous, BID, Avel Bailey MD, 40 mg at 10/24/23 1707    guaiFENesin (Robitussin) 100 mg/5 mL syrup 10 mL, 10 mL, oral, q4h PRN, Avel Bailey MD    lidocaine 4 % patch 1 patch, 1 patch, transdermal, Nightly, Avel Bailey MD    loratadine (Claritin) tablet 10 mg, 10 mg, oral, Daily PRN, Avel Bailey MD    magnesium oxide (Mag-Ox) tablet 400 mg, 400 mg, oral, Once per day on Tue Thu Sat, Avel Bailey MD, 400 mg at 10/24/23 0823    melatonin tablet 3 mg, 3 mg, oral, Nightly,  Avel Bailey MD, 3 mg at 10/23/23 2127    methylPREDNISolone sod succinate (PF) (SOLU-Medrol) 40 mg/mL injection 20 mg, 20 mg, intravenous, q8h, SARAH Wooten-CNP, 20 mg at 10/24/23 1124    metoprolol succinate XL (Toprol-XL) 24 hr tablet 25 mg, 25 mg, oral, BID, Avel Bailey MD, 25 mg at 10/24/23 0823    nitroglycerin (Nitrostat) SL tablet 0.4 mg, 0.4 mg, sublingual, q5 min PRN, Avel Bailey MD    nystatin (Mycostatin) cream 1 Application, 1 Application, Topical, BID, Avel Bailey MD, 1 Application at 10/24/23 0829    pantoprazole (ProtoNix) EC tablet 40 mg, 40 mg, oral, Daily, Avel Bailey MD, 40 mg at 10/24/23 0823    potassium chloride (Klor-Con) packet 20 mEq, 20 mEq, oral, Daily, Avel Bailey MD, 20 mEq at 10/24/23 0824    rOPINIRole (Requip) tablet 5 mg, 5 mg, oral, Nightly, Avel Bailey MD, 5 mg at 10/23/23 2127    sertraline (Zoloft) tablet 50 mg, 50 mg, oral, Daily, Avel Bailey MD, 50 mg at 10/24/23 0823    sodium chloride (Ocean) 0.65 % nasal spray 1 spray, 1 spray, Each Nostril, 4x daily PRN, Avel Bailey MD    traZODone (Desyrel) tablet 50 mg, 50 mg, oral, Nightly PRN, Avel Bailey MD, 50 mg at 10/23/23 2133    triamcinolone (Kenalog) 0.1 % cream, , Topical, BID, Avel Bailey MD, Given at 10/24/23 0829   Assessment/Plan   Principal Problem:    Pneumonia of right middle lobe due to infectious organism    Congestive heart failure stable    Renal insufficiency  Monitor    Edema    Medication    Depression stable    Leg leg syndrome stable    Discussed with family stable         Avel Bailey MD

## 2023-10-26 PROBLEM — I50.23 ACUTE ON CHRONIC SYSTOLIC CONGESTIVE HEART FAILURE (MULTI): Status: ACTIVE | Noted: 2023-10-26

## 2023-10-26 PROBLEM — J96.21 ACUTE ON CHRONIC RESPIRATORY FAILURE WITH HYPOXIA (MULTI): Status: ACTIVE | Noted: 2023-10-26

## 2023-10-26 LAB
ANION GAP SERPL CALC-SCNC: 10 MMOL/L
BASOPHILS # BLD AUTO: 0.01 X10*3/UL (ref 0–0.1)
BASOPHILS NFR BLD AUTO: 0.1 %
BUN SERPL-MCNC: 25 MG/DL (ref 8–25)
CALCIUM SERPL-MCNC: 8.6 MG/DL (ref 8.5–10.4)
CHLORIDE SERPL-SCNC: 94 MMOL/L (ref 97–107)
CO2 SERPL-SCNC: 33 MMOL/L (ref 24–31)
CREAT SERPL-MCNC: 0.9 MG/DL (ref 0.4–1.6)
EOSINOPHIL # BLD AUTO: 0 X10*3/UL (ref 0–0.4)
EOSINOPHIL NFR BLD AUTO: 0 %
ERYTHROCYTE [DISTWIDTH] IN BLOOD BY AUTOMATED COUNT: 14.1 % (ref 11.5–14.5)
GFR SERPL CREATININE-BSD FRML MDRD: 60 ML/MIN/1.73M*2
GLUCOSE SERPL-MCNC: 106 MG/DL (ref 65–99)
HCT VFR BLD AUTO: 35 % (ref 36–46)
HGB BLD-MCNC: 11 G/DL (ref 12–16)
IMM GRANULOCYTES # BLD AUTO: 0.05 X10*3/UL (ref 0–0.5)
IMM GRANULOCYTES NFR BLD AUTO: 0.5 % (ref 0–0.9)
LYMPHOCYTES # BLD AUTO: 0.71 X10*3/UL (ref 0.8–3)
LYMPHOCYTES NFR BLD AUTO: 7 %
MCH RBC QN AUTO: 29.1 PG (ref 26–34)
MCHC RBC AUTO-ENTMCNC: 31.4 G/DL (ref 32–36)
MCV RBC AUTO: 93 FL (ref 80–100)
MONOCYTES # BLD AUTO: 0.65 X10*3/UL (ref 0.05–0.8)
MONOCYTES NFR BLD AUTO: 6.4 %
NEUTROPHILS # BLD AUTO: 8.69 X10*3/UL (ref 1.6–5.5)
NEUTROPHILS NFR BLD AUTO: 86 %
NRBC BLD-RTO: 0 /100 WBCS (ref 0–0)
PLATELET # BLD AUTO: 243 X10*3/UL (ref 150–450)
PMV BLD AUTO: 10.8 FL (ref 7.5–11.5)
POTASSIUM SERPL-SCNC: 3.9 MMOL/L (ref 3.4–5.1)
RBC # BLD AUTO: 3.78 X10*6/UL (ref 4–5.2)
SODIUM SERPL-SCNC: 137 MMOL/L (ref 133–145)
WBC # BLD AUTO: 10.1 X10*3/UL (ref 4.4–11.3)

## 2023-10-26 PROCEDURE — 36415 COLL VENOUS BLD VENIPUNCTURE: CPT | Performed by: INTERNAL MEDICINE

## 2023-10-26 PROCEDURE — 85025 COMPLETE CBC W/AUTO DIFF WBC: CPT | Performed by: INTERNAL MEDICINE

## 2023-10-26 PROCEDURE — 80048 BASIC METABOLIC PNL TOTAL CA: CPT | Performed by: INTERNAL MEDICINE

## 2023-10-26 PROCEDURE — 2500000004 HC RX 250 GENERAL PHARMACY W/ HCPCS (ALT 636 FOR OP/ED): Performed by: NURSE PRACTITIONER

## 2023-10-26 PROCEDURE — 2500000001 HC RX 250 WO HCPCS SELF ADMINISTERED DRUGS (ALT 637 FOR MEDICARE OP): Performed by: INTERNAL MEDICINE

## 2023-10-26 PROCEDURE — 2500000002 HC RX 250 W HCPCS SELF ADMINISTERED DRUGS (ALT 637 FOR MEDICARE OP, ALT 636 FOR OP/ED): Performed by: INTERNAL MEDICINE

## 2023-10-26 PROCEDURE — 94760 N-INVAS EAR/PLS OXIMETRY 1: CPT

## 2023-10-26 PROCEDURE — 2500000005 HC RX 250 GENERAL PHARMACY W/O HCPCS: Performed by: INTERNAL MEDICINE

## 2023-10-26 PROCEDURE — 94640 AIRWAY INHALATION TREATMENT: CPT

## 2023-10-26 PROCEDURE — 99232 SBSQ HOSP IP/OBS MODERATE 35: CPT | Performed by: INTERNAL MEDICINE

## 2023-10-26 PROCEDURE — 2500000004 HC RX 250 GENERAL PHARMACY W/ HCPCS (ALT 636 FOR OP/ED): Performed by: INTERNAL MEDICINE

## 2023-10-26 PROCEDURE — 1100000001 HC PRIVATE ROOM DAILY

## 2023-10-26 RX ADMIN — METOPROLOL SUCCINATE 25 MG: 25 TABLET, EXTENDED RELEASE ORAL at 22:03

## 2023-10-26 RX ADMIN — ROPINIROLE HYDROCHLORIDE 5 MG: 1 TABLET, FILM COATED ORAL at 22:05

## 2023-10-26 RX ADMIN — Medication 3 MG: at 22:03

## 2023-10-26 RX ADMIN — CEFTRIAXONE SODIUM 1 G: 1 INJECTION, SOLUTION INTRAVENOUS at 10:10

## 2023-10-26 RX ADMIN — Medication 400 MG: at 10:09

## 2023-10-26 RX ADMIN — METOPROLOL SUCCINATE 25 MG: 25 TABLET, EXTENDED RELEASE ORAL at 10:09

## 2023-10-26 RX ADMIN — PREDNISONE 20 MG: 20 TABLET ORAL at 10:09

## 2023-10-26 RX ADMIN — SERTRALINE 50 MG: 50 TABLET, FILM COATED ORAL at 10:09

## 2023-10-26 RX ADMIN — ALBUTEROL SULFATE 2.5 MG: 2.5 SOLUTION RESPIRATORY (INHALATION) at 07:37

## 2023-10-26 RX ADMIN — FUROSEMIDE 40 MG: 10 INJECTION, SOLUTION INTRAMUSCULAR; INTRAVENOUS at 17:50

## 2023-10-26 RX ADMIN — CEFTRIAXONE SODIUM 1 G: 1 INJECTION, SOLUTION INTRAVENOUS at 22:04

## 2023-10-26 RX ADMIN — AZITHROMYCIN MONOHYDRATE 500 MG: 500 INJECTION, POWDER, LYOPHILIZED, FOR SOLUTION INTRAVENOUS at 17:50

## 2023-10-26 RX ADMIN — TRIAMCINOLONE ACETONIDE: 1 CREAM TOPICAL at 10:11

## 2023-10-26 RX ADMIN — ATORVASTATIN CALCIUM 40 MG: 40 TABLET, FILM COATED ORAL at 22:03

## 2023-10-26 RX ADMIN — PANTOPRAZOLE SODIUM 40 MG: 40 TABLET, DELAYED RELEASE ORAL at 10:10

## 2023-10-26 RX ADMIN — NYSTATIN 1 APPLICATION: 100000 CREAM TOPICAL at 10:11

## 2023-10-26 RX ADMIN — FUROSEMIDE 40 MG: 10 INJECTION, SOLUTION INTRAMUSCULAR; INTRAVENOUS at 06:18

## 2023-10-26 RX ADMIN — Medication 38 MG OF IRON: at 10:09

## 2023-10-26 RX ADMIN — FAMOTIDINE 20 MG: 20 TABLET ORAL at 10:09

## 2023-10-26 ASSESSMENT — COGNITIVE AND FUNCTIONAL STATUS - GENERAL
STANDING UP FROM CHAIR USING ARMS: A LITTLE
DAILY ACTIVITIY SCORE: 19
MOVING TO AND FROM BED TO CHAIR: A LITTLE
DRESSING REGULAR LOWER BODY CLOTHING: A LITTLE
TURNING FROM BACK TO SIDE WHILE IN FLAT BAD: A LITTLE
HELP NEEDED FOR BATHING: A LITTLE
TOILETING: A LITTLE
MOVING FROM LYING ON BACK TO SITTING ON SIDE OF FLAT BED WITH BEDRAILS: A LITTLE
DRESSING REGULAR UPPER BODY CLOTHING: A LITTLE
WALKING IN HOSPITAL ROOM: A LITTLE
CLIMB 3 TO 5 STEPS WITH RAILING: A LITTLE
MOBILITY SCORE: 18
PERSONAL GROOMING: A LITTLE

## 2023-10-26 ASSESSMENT — PAIN SCALES - GENERAL: PAINLEVEL_OUTOF10: 0 - NO PAIN

## 2023-10-26 NOTE — PROGRESS NOTES
"Kalie Ramos is a 89 y.o. female on day 4 of admission presenting with Aortic stenosis.    Subjective   During nursing rounds, pt h as no new updates. Cardiology has cleared pt, awaiting clearance from Pulmonology. Pt will discharge home with [facility] when medically cleared.        Objective     Physical Exam    Last Recorded Vitals  Blood pressure 130/56, pulse 56, temperature 36.5 °C (97.7 °F), temperature source Oral, resp. rate 20, height 1.575 m (5' 2\"), weight 55.3 kg (121 lb 14.6 oz), SpO2 99 %.  Intake/Output last 3 Shifts:  I/O last 3 completed shifts:  In: 940 (17 mL/kg) [P.O.:940]  Out: 1001 (18.1 mL/kg) [Urine:400 (0.2 mL/kg/hr); Other:600; Stool:1]  Weight: 55.3 kg     Relevant Results                             Assessment/Plan   Principal Problem:    Aortic stenosis  Active Problems:    COPD (chronic obstructive pulmonary disease) (CMS/HCC)    Pneumonia of right middle lobe due to infectious organism    Acute on chronic respiratory failure with hypoxia (CMS/HCC)    Acute on chronic systolic congestive heart failure (CMS/HCC)               Ronald Olmstead LCSW      "

## 2023-10-26 NOTE — CARE PLAN
The patient's goals for the shift include no falls    The clinical goals for the shift include safety      Problem: Safety - Adult  Goal: Free from fall injury  Outcome: Progressing     Problem: Discharge Planning  Goal: Discharge to home or other facility with appropriate resources  Outcome: Progressing

## 2023-10-26 NOTE — NURSING NOTE
Agree with previous assesment. Patient resting in bed, call light within reach, denies needs and pain at this time.

## 2023-10-26 NOTE — PROGRESS NOTES
Subjective Data:  No new issues    Overnight Events:    None from cardiology     Objective Data:  Last Recorded Vitals:  Vitals:    10/26/23 0529 10/26/23 0737 10/26/23 0921 10/26/23 1125   BP: 143/63  130/56 130/56   BP Location: Right arm  Right arm Right arm   Patient Position: Lying  Lying Lying   Pulse: 51  63 56   Resp: 20  20 20   Temp: 36.6 °C (97.9 °F)  36.3 °C (97.3 °F) 36.5 °C (97.7 °F)   TempSrc: Oral  Oral Oral   SpO2: 99% 98% 98% 99%   Weight:       Height:           Last Labs:  CBC - 10/26/2023:  4:10 AM  10.1 11.0 243    35.0      CMP - 10/26/2023:  4:10 AM  8.6 7.1 28 --- 1.1   _ 3.3 21 158      PTT - 1/26/2023:  6:26 PM  1.0   10.2 29.2        Last I/O:  I/O last 3 completed shifts:  In: 940 (17 mL/kg) [P.O.:940]  Out: 1001 (18.1 mL/kg) [Urine:400 (0.2 mL/kg/hr); Other:600; Stool:1]  Weight: 55.3 kg       Inpatient Medications:  Scheduled medications   Medication Dose Route Frequency    albuterol  2.5 mg nebulization q6h while awake    atorvastatin  40 mg oral Nightly    azithromycin  500 mg intravenous q24h    calcitonin (salmon)  1 spray One Nostril Daily    cefTRIAXone  1 g intravenous q12h    famotidine  20 mg oral Daily    ferrous gluconate  38 mg of iron oral Daily with breakfast    furosemide  40 mg intravenous BID    lidocaine  1 patch transdermal Nightly    magnesium oxide  400 mg oral Once per day on Tue Thu Sat    melatonin  3 mg oral Nightly    metoprolol succinate XL  25 mg oral BID    nystatin  1 Application Topical BID    pantoprazole  40 mg oral Daily    potassium chloride  20 mEq oral Daily    predniSONE  20 mg oral Daily    Followed by    [START ON 10/28/2023] predniSONE  15 mg oral Daily    Followed by    [START ON 10/31/2023] predniSONE  10 mg oral Daily    Followed by    [START ON 11/3/2023] predniSONE  5 mg oral Daily    rOPINIRole  5 mg oral Nightly    sertraline  50 mg oral Daily    triamcinolone   Topical BID     PRN medications   Medication    acetaminophen    albuterol     benzocaine-menthol    docusate sodium    guaiFENesin    loratadine    nitroglycerin    sodium chloride    traZODone     Continuous Medications   Medication Dose Last Rate       Physical Exam:  Neck:  Normal jugular venous pressure,   Lungs:  Few bilateral rhonchi  eart:  Regular rate and rhythm normal S1 and S2, no 3/6 aortic DARIN, no pgallops rubs or clicks, PMI normal, no RV lift  Abdomen:  Soft, good bowel sounds, nontender, no hepatosplenomegaly, no pulsatile mass or bruits.  Extremities:  Good radial, femoral, pedal pulses without pretibial edema  Neurological:  No focal sensory or motor deficits, pupils equal and reactive     Assessment/Plan   1.Systolic HF now stable and euvolemic  2.Severe aortic stenosis, not a TAVR candidate    Plan: OK for discharge from a cardiac standpoint on furosemide 40mg twice dailyI discussed flexible dosing with her since her second lasix dose can be as needed for fluid retention and she agrees.     Site Assessment Clean;Dry;Intact 10/26/23 0800   Dressing Status Clean;Dry 10/26/23 0800   Number of days: 1       Code Status:  No Order            Jonah Lino DO

## 2023-10-26 NOTE — CARE PLAN
Pt. Refused treatment this evening. She stated she already had 2 treatments today.  Problem: Respiratory  Goal: Verbalize decreased shortness of breath this shift  Outcome: Progressing  Goal: Wean oxygen to maintain O2 saturation per order/standard this shift  Outcome: Progressing

## 2023-10-26 NOTE — PROGRESS NOTES
"Occupational Therapy                 Therapy Communication Note    Patient Name: Kalie Ramos  MRN: [id number]  Today's Date: 10/26/2023     Discipline: Occupational Therapy    Missed Visit Reason: Missed Visit Reason: Patient refused    Missed Time: Cancel    Comment:Patient declining OT session today - stating, \"I am going home today.\"   "

## 2023-10-26 NOTE — PROGRESS NOTES
Subjective Data:  Resting comfortably with no new cardiac issues  Overnight Events:    None for cardiology     Objective Data:  Last Recorded Vitals:  Vitals:    10/26/23 0017 10/26/23 0529 10/26/23 0737 10/26/23 0921   BP: 136/61 143/63  130/56   BP Location: Right arm Right arm  Right arm   Patient Position: Lying Lying  Lying   Pulse: 53 51  63   Resp: 20 20  20   Temp: 36.5 °C (97.7 °F) 36.6 °C (97.9 °F)  36.3 °C (97.3 °F)   TempSrc: Oral Oral  Oral   SpO2: 99% 99% 98% 98%   Weight:       Height:           Last Labs:  CBC - 10/26/2023:  4:10 AM  10.1 11.0 243    35.0      CMP - 10/26/2023:  4:10 AM  8.6 7.1 28 --- 1.1   _ 3.3 21 158      PTT - 1/26/2023:  6:26 PM  1.0   10.2 29.2          Last I/O:  I/O last 3 completed shifts:  In: 940 (17 mL/kg) [P.O.:940]  Out: 1001 (18.1 mL/kg) [Urine:400 (0.2 mL/kg/hr); Other:600; Stool:1]  Weight: 55.3 kg     Inpatient Medications:  Scheduled medications   Medication Dose Route Frequency    albuterol  2.5 mg nebulization q6h while awake    atorvastatin  40 mg oral Nightly    azithromycin  500 mg intravenous q24h    calcitonin (salmon)  1 spray One Nostril Daily    cefTRIAXone  1 g intravenous q12h    famotidine  20 mg oral Daily    ferrous gluconate  38 mg of iron oral Daily with breakfast    furosemide  40 mg intravenous BID    lidocaine  1 patch transdermal Nightly    magnesium oxide  400 mg oral Once per day on Tue Thu Sat    melatonin  3 mg oral Nightly    metoprolol succinate XL  25 mg oral BID    nystatin  1 Application Topical BID    pantoprazole  40 mg oral Daily    potassium chloride  20 mEq oral Daily    predniSONE  20 mg oral Daily    Followed by    [START ON 10/28/2023] predniSONE  15 mg oral Daily    Followed by    [START ON 10/31/2023] predniSONE  10 mg oral Daily    Followed by    [START ON 11/3/2023] predniSONE  5 mg oral Daily    rOPINIRole  5 mg oral Nightly    sertraline  50 mg oral Daily    triamcinolone   Topical BID     PRN medications   Medication     acetaminophen    albuterol    benzocaine-menthol    docusate sodium    guaiFENesin    loratadine    nitroglycerin    sodium chloride    traZODone     Continuous Medications   Medication Dose Last Rate       Physical Exam:  Neck:  Normal jugular venous pressure, carotid upstrokes brisk with no bruits  Lungs:  Clear to auscultation without wheezing or rhonchi or rales.  On nasal cannula at 2 L/min O2  Heart:  Regular rate and rhythm normal S1 and S2, 2/6 systolic ejection murmur aortic region, no gallops rubs   Abdomen:  Soft, good bowel sounds, nontender, no hepatosplenomegaly, no pulsatile mass or bruits.  Extremities:  Good radial, femoral, pedal pulses without pretibial edema  Neurological:  No focal sensory or motor deficits,      Assessment/Plan   10/24: This patient is a 89-year-old white female with extensive issues that include coronary artery disease, severe aortic valvular stenosis, moderate tricuspid and mitral valve regurgitation, severe pulmonary hypertension, COPD with oxygen dependence, hypertension, left bundle branch block conduction delay, lower extremity edema due to venous insufficiency, chronic DVT left lower extremity femoral vein, laparoscopic paraesophageal hiatal hernia repair, ongoing aspiration pneumonias, nontoxic multinodular goiter.  The patient's last echocardiogram on 7/25/2023 again demonstrated a LV ejection fraction 35-40% severe aortic valve stenosis peak systolic gradient 56 mmHg, mild mitral moderate tricuspid valve regurgitation and severe pulmonary hypertension estimated PA systolic pressure 67 mmHg.  The patient was thought not to be a candidate for transcatheter aortic valve replacement.  She does wear continuous oxygen at 3 L/min at home.  She surprisingly still lives independently in her own condominium.  She has been having increased shortness of breath.  Multiple potential etiologies including her chronic lung disease aspiration pneumonias interstitial fibrosis possible  CHF.  She has been on Lasix 40 mg daily and at least for the short-term the dose has been doubled to 40 mg twice daily empirically.  Patient will need to be reevaluated by pulmonology for other etiologies and currently is on both ceftriaxone and azithromycin empirically.      10/25: The patient is sitting upright in bed not short of breath on standard low-flow O2 nasal cannula 3 L/min eating breakfast.  Recorded input output negative by 950 cc yesterday.  Patient now on increased dose Lasix 40 mg twice daily.  The patient does have progressive and ultimately life limiting valvular heart disease with severe aortic valve stenosis and a decline in her LV ejection fraction over the past surreal is now to 35-40%.  She also has severe pulmonary hypertension as well contributing to the pleural effusions.  As per previous evaluation/she is not a candidate at her age and with her comorbidities for a transcatheter aortic valve replacement.  Will continue increased dose of Lasix 40 mg twice daily for now.  Patient does have some degree of underlying lung disease and has had issues with aspiration in the past.  She has been coached to eat slowly and tilt her chin forward while swallowing in order to reduce potential for aspiration which has occurred in the past.  We will recheck labs which are not available from today.  The patient remains on empiric IV ceftriaxone and azithromycin.    10/26: She appears clinically euvolemic today.  Her IO was only -61 cc for the last 24 hours.  Blood pressure 130/56 pulse 63, respirations 20, O2 sat on 2 L nasal cannula is 98%.  Converted to furosemide 40 mg twice daily.  As noted by Dr. Mckinney, she is not a candidate for TAVR due to her age and comorbidity.  No other cardiac issues presently.          Code Status:  No Order            Jonah Lino,

## 2023-10-26 NOTE — PROGRESS NOTES
Pulmonary Progress Note 10/26/23     Patient seen in follow-up for respiratory failure    Subjective   Interval History:   Tells me she is feeling better, at baseline.    Objective   Vital signs in last 24 hours:  Temp:  [36.3 °C (97.3 °F)-36.8 °C (98.2 °F)] 36.5 °C (97.7 °F)  Heart Rate:  [51-64] 56  Resp:  [20] 20  BP: (126-143)/(52-67) 130/56    Intake/Output last 3 shifts:  I/O last 3 completed shifts:  In: 940 (17 mL/kg) [P.O.:940]  Out: 1001 (18.1 mL/kg) [Urine:400 (0.2 mL/kg/hr); Other:600; Stool:1]  Weight: 55.3 kg   Intake/Output this shift:  No intake/output data recorded.    Physical Exam  Elderly, frail  Nasal cannula  Diminished without any wheezing  Positive murmur, S1-S2 +  No CCE  Pleasant mood and affect      Scheduled medications  albuterol, 2.5 mg, nebulization, q6h while awake  atorvastatin, 40 mg, oral, Nightly  azithromycin, 500 mg, intravenous, q24h  calcitonin (salmon), 1 spray, One Nostril, Daily  cefTRIAXone, 1 g, intravenous, q12h  famotidine, 20 mg, oral, Daily  ferrous gluconate, 38 mg of iron, oral, Daily with breakfast  furosemide, 40 mg, intravenous, BID  lidocaine, 1 patch, transdermal, Nightly  magnesium oxide, 400 mg, oral, Once per day on Tue Thu Sat  melatonin, 3 mg, oral, Nightly  metoprolol succinate XL, 25 mg, oral, BID  nystatin, 1 Application, Topical, BID  pantoprazole, 40 mg, oral, Daily  potassium chloride, 20 mEq, oral, Daily  predniSONE, 20 mg, oral, Daily   Followed by  [START ON 10/28/2023] predniSONE, 15 mg, oral, Daily   Followed by  [START ON 10/31/2023] predniSONE, 10 mg, oral, Daily   Followed by  [START ON 11/3/2023] predniSONE, 5 mg, oral, Daily  rOPINIRole, 5 mg, oral, Nightly  sertraline, 50 mg, oral, Daily  triamcinolone, , Topical, BID      Continuous medications     PRN medications  PRN medications: acetaminophen, albuterol, benzocaine-menthol, docusate sodium, guaiFENesin, loratadine, nitroglycerin, sodium chloride, traZODone     Labs:  CBC  and BMP reviewed    Imaging:  ECG 12 lead    Result Date: 10/24/2023  Sinus bradycardia Left ventricular hypertrophy with QRS widening and repolarization abnormality Abnormal ECG When compared with ECG of 20-MAR-2023 14:58, Premature ventricular complexes are no longer Present Confirmed by Domi Vaughan ([id number]) on 10/24/2023 10:43:31 AM    CT angio chest for pulmonary embolism    Addendum Date: 10/22/2023    Interpreted By:  Eloise Joshi, ADDENDUM: Large, heterogenous thyroid nodule measuring 5.9 x 6.8 x 2.6 cm: Thyroid ultrasound may be beneficial on an outpatient basis.   Enlarged prevascular node may be reactive.   Signed by: Eloise Joshi 10/22/2023 6:04 PM   -------- ORIGINAL REPORT -------- Dictation workstation:   [id number]    Result Date: 10/22/2023  Interpreted By:  Eloise Joshi, STUDY: CT ANGIO CHEST FOR PULMONARY EMBOLISM;  10/22/2023 5:34 pm   INDICATION: Signs/Symptoms:acute onset SOB, dimer.   COMPARISON: Chest radiograph dated same day.   ACCESSION NUMBER(S): [id number]   ORDERING CLINICIAN: ALONDRA SELLERS   TECHNIQUE: Helical data acquisition of the chest was obtained contrast volume:without IV contrast material/Optiray 350/Isovue 370.  Images were reformatted in coronal and sagittal planes. Axial and coronal MIP images were created and reviewed.   FINDINGS: POTENTIAL LIMITATIONS OF THE STUDY: None   HEART AND VESSELS: No discrete filling defects within the main pulmonary artery or its branches.   The right and left pulmonary arteries are dilated. The right measures 2.8 cm, and the left measures 2.8 cm.   Atherosclerotic disease of the aorta.   Mild atherosclerotic disease of the coronary vasculature.   Heart size is enlarged.   No evidence of pericardial effusion.   MEDIASTINUM AND JAYDEN, LOWER NECK AND AXILLA: Large, heterogenous thyroid gland containing scattered areas of calcifications measures 5.9 x 6.8 by 2.6 cm (series 4, image 16, and series 6, image 30).   Enlarged prevascular lymph node measures  1.4 by 1.2 cm (series 4, image 24).   Esophagus appears within normal limits as seen.   LUNGS AND AIRWAYS: The trachea and central airways are patent. No endobronchial lesion.   Bilateral confluent airspace disease involving all lung lobes bilaterally, right-greater-than-left. Moderate bilateral pleural effusions.   UPPER ABDOMEN: The visualized subdiaphragmatic structures demonstrate no remarkable findings.   CHEST WALL AND OSSEOUS STRUCTURES: There are no suspicious osseous lesions. Multilevel degenerative changes are present       1. No emboli within the proximal pulmonary vasculature.   2. Bilateral confluent airspace disease involving all lung lobes, right greater than left.   3. Moderate bilateral pleural effusions.   4. Enlarged bilateral main pulmonary arteries can be seen with elevated pulmonary arterial pressure.   5. Cardiomegaly.   MACRO: None   Signed by: Eloise Joshi 10/22/2023 5:53 PM Dictation workstation:   [id number]    XR chest 1 view    Result Date: 10/22/2023  Interpreted By:  Sally Stinson, STUDY: XR CHEST 1 VIEW; 10/22/2023 3:47 pm   INDICATION: Signs/Symptoms:SOB   COMPARISON: 07/27/2023   ACCESSION NUMBER(S): [id number]   ORDERING CLINICIAN: ALONDRA SELLERS   TECHNIQUE: Frontal chest radiograph   FINDINGS: The cardiomediastinal silhouette is unremarkable. Patchy airspace opacities are noted in both lungs particularly within the right upper lung field. Bibasilar pleural effusions associated atelectasis.   The osseous structures are intact.       Patchy bilateral airspace opacities are noted in both lungs particularly within the right midlung field is concerning for pneumonia. Bibasilar effusions associated atelectasis.     Signed by: Sally Stinson 10/22/2023 4:04 PM Dictation workstation:   [id number]              Assessment/Plan   Principal Problem:    Aortic stenosis  Active Problems:    COPD (chronic obstructive pulmonary disease) (CMS/HCC)    Pneumonia of right middle lobe due to  infectious organism    Acute on chronic respiratory failure with hypoxia (CMS/HCC)    Acute on chronic systolic congestive heart failure (CMS/HCC)    Suspect this is acute on chronic systolic heart failure in the setting of severe and advanced aortic stenosis.  Cannot rule out component of pneumonia.    Believe can observe off antibiotics  Maintenance Lasix per cardiology  She is at risk for aspiration but appears to have improved with diuresis and is back on her baseline oxygen.  We will recommend aspiration precautions.  Current antibiotic regimen did not necessarily cover for aspiration.  I also suspect a component of chronic lung disease.  She should continue with bronchodilator regimen for this.  Continue with supplemental oxygen  Given age and frailty use, not a candidate for invasive techniques.  Support palliative approach in this situation.       LOS: 4 days      negative...

## 2023-10-26 NOTE — CARE PLAN
Problem: Safety - Adult  Goal: Free from fall injury  Outcome: Progressing     Problem: Discharge Planning  Goal: Discharge to home or other facility with appropriate resources  Outcome: Progressing     Problem: Chronic Conditions and Co-morbidities  Goal: Patient's chronic conditions and co-morbidity symptoms are monitored and maintained or improved  Outcome: Progressing     Problem: Skin  Goal: Participates in plan/prevention/treatment measures  Outcome: Progressing  Goal: Prevent/manage excess moisture  Outcome: Progressing  Goal: Prevent/minimize sheer/friction injuries  Outcome: Progressing  Goal: Promote/optimize nutrition  Outcome: Progressing     Problem: Respiratory  Goal: Clear secretions with interventions this shift  Outcome: Met  Goal: Minimal/no exertional discomfort or dyspnea this shift  Outcome: Met  Goal: No signs of respiratory distress (eg. Use of accessory muscles. Peds grunting)  Outcome: Met

## 2023-10-26 NOTE — NURSING NOTE
BSSR received  Patient is resting in bed on 3L NC  Call light and belongings are within reach  No c/o pain or discomfort noted

## 2023-10-27 ENCOUNTER — PATIENT OUTREACH (OUTPATIENT)
Dept: CASE MANAGEMENT | Facility: HOSPITAL | Age: 88
End: 2023-10-27
Payer: MEDICARE

## 2023-10-27 VITALS
WEIGHT: 121.91 LBS | TEMPERATURE: 97.3 F | HEART RATE: 48 BPM | HEIGHT: 62 IN | RESPIRATION RATE: 18 BRPM | SYSTOLIC BLOOD PRESSURE: 142 MMHG | DIASTOLIC BLOOD PRESSURE: 47 MMHG | BODY MASS INDEX: 22.43 KG/M2 | OXYGEN SATURATION: 100 %

## 2023-10-27 DIAGNOSIS — J40 BRONCHITIS: ICD-10-CM

## 2023-10-27 LAB
ALBUMIN SERPL-MCNC: 3 G/DL (ref 3.5–5)
ALP BLD-CCNC: 130 U/L (ref 35–125)
ALT SERPL-CCNC: 63 U/L (ref 5–40)
ANION GAP SERPL CALC-SCNC: 9 MMOL/L
AST SERPL-CCNC: 66 U/L (ref 5–40)
BACTERIA BLD CULT: NORMAL
BACTERIA BLD CULT: NORMAL
BASOPHILS # BLD AUTO: 0.01 X10*3/UL (ref 0–0.1)
BASOPHILS NFR BLD AUTO: 0.1 %
BILIRUB SERPL-MCNC: 0.2 MG/DL (ref 0.1–1.2)
BUN SERPL-MCNC: 23 MG/DL (ref 8–25)
CALCIUM SERPL-MCNC: 8.7 MG/DL (ref 8.5–10.4)
CHLORIDE SERPL-SCNC: 93 MMOL/L (ref 97–107)
CO2 SERPL-SCNC: 35 MMOL/L (ref 24–31)
CREAT SERPL-MCNC: 0.9 MG/DL (ref 0.4–1.6)
EOSINOPHIL # BLD AUTO: 0.01 X10*3/UL (ref 0–0.4)
EOSINOPHIL NFR BLD AUTO: 0.1 %
ERYTHROCYTE [DISTWIDTH] IN BLOOD BY AUTOMATED COUNT: 13.9 % (ref 11.5–14.5)
GFR SERPL CREATININE-BSD FRML MDRD: 60 ML/MIN/1.73M*2
GLUCOSE SERPL-MCNC: 101 MG/DL (ref 65–99)
HCT VFR BLD AUTO: 38 % (ref 36–46)
HGB BLD-MCNC: 12.1 G/DL (ref 12–16)
IMM GRANULOCYTES # BLD AUTO: 0.04 X10*3/UL (ref 0–0.5)
IMM GRANULOCYTES NFR BLD AUTO: 0.4 % (ref 0–0.9)
LYMPHOCYTES # BLD AUTO: 1.1 X10*3/UL (ref 0.8–3)
LYMPHOCYTES NFR BLD AUTO: 11.7 %
MCH RBC QN AUTO: 28.9 PG (ref 26–34)
MCHC RBC AUTO-ENTMCNC: 31.8 G/DL (ref 32–36)
MCV RBC AUTO: 91 FL (ref 80–100)
MONOCYTES # BLD AUTO: 0.79 X10*3/UL (ref 0.05–0.8)
MONOCYTES NFR BLD AUTO: 8.4 %
NEUTROPHILS # BLD AUTO: 7.45 X10*3/UL (ref 1.6–5.5)
NEUTROPHILS NFR BLD AUTO: 79.3 %
NRBC BLD-RTO: 0 /100 WBCS (ref 0–0)
PLATELET # BLD AUTO: 278 X10*3/UL (ref 150–450)
PMV BLD AUTO: 10.9 FL (ref 7.5–11.5)
POTASSIUM SERPL-SCNC: 3.8 MMOL/L (ref 3.4–5.1)
PROT SERPL-MCNC: 6.8 G/DL (ref 5.9–7.9)
RBC # BLD AUTO: 4.19 X10*6/UL (ref 4–5.2)
SODIUM SERPL-SCNC: 137 MMOL/L (ref 133–145)
WBC # BLD AUTO: 9.4 X10*3/UL (ref 4.4–11.3)

## 2023-10-27 PROCEDURE — 2500000001 HC RX 250 WO HCPCS SELF ADMINISTERED DRUGS (ALT 637 FOR MEDICARE OP): Performed by: INTERNAL MEDICINE

## 2023-10-27 PROCEDURE — 2500000004 HC RX 250 GENERAL PHARMACY W/ HCPCS (ALT 636 FOR OP/ED): Performed by: NURSE PRACTITIONER

## 2023-10-27 PROCEDURE — 2500000004 HC RX 250 GENERAL PHARMACY W/ HCPCS (ALT 636 FOR OP/ED): Performed by: INTERNAL MEDICINE

## 2023-10-27 PROCEDURE — 94640 AIRWAY INHALATION TREATMENT: CPT

## 2023-10-27 PROCEDURE — 2500000002 HC RX 250 W HCPCS SELF ADMINISTERED DRUGS (ALT 637 FOR MEDICARE OP, ALT 636 FOR OP/ED): Performed by: INTERNAL MEDICINE

## 2023-10-27 PROCEDURE — 85025 COMPLETE CBC W/AUTO DIFF WBC: CPT | Performed by: INTERNAL MEDICINE

## 2023-10-27 PROCEDURE — 36415 COLL VENOUS BLD VENIPUNCTURE: CPT | Performed by: INTERNAL MEDICINE

## 2023-10-27 PROCEDURE — 94760 N-INVAS EAR/PLS OXIMETRY 1: CPT

## 2023-10-27 PROCEDURE — 9420000001 HC RT PATIENT EDUCATION 5 MIN

## 2023-10-27 PROCEDURE — 99239 HOSP IP/OBS DSCHRG MGMT >30: CPT | Performed by: INTERNAL MEDICINE

## 2023-10-27 PROCEDURE — 80053 COMPREHEN METABOLIC PANEL: CPT | Performed by: INTERNAL MEDICINE

## 2023-10-27 RX ORDER — AZITHROMYCIN 250 MG/1
TABLET, FILM COATED ORAL
Qty: 6 TABLET | Refills: 0 | Status: SHIPPED | OUTPATIENT
Start: 2023-10-27 | End: 2023-11-01

## 2023-10-27 RX ORDER — PREDNISONE 5 MG/1
15 TABLET ORAL DAILY
Qty: 9 TABLET | Refills: 0 | Status: SHIPPED | OUTPATIENT
Start: 2023-10-28 | End: 2023-10-31

## 2023-10-27 RX ORDER — CEFUROXIME AXETIL 250 MG/1
250 TABLET ORAL 2 TIMES DAILY
Qty: 10 TABLET | Refills: 0 | Status: ON HOLD | OUTPATIENT
Start: 2023-10-27 | End: 2023-11-15 | Stop reason: ALTCHOICE

## 2023-10-27 RX ORDER — GUAIFENESIN 100 MG/5ML
10 SOLUTION ORAL EVERY 4 HOURS PRN
Qty: 120 ML | Refills: 0 | Status: SHIPPED | OUTPATIENT
Start: 2023-10-27 | End: 2023-11-15 | Stop reason: HOSPADM

## 2023-10-27 RX ORDER — PREDNISONE 10 MG/1
10 TABLET ORAL DAILY
Qty: 3 TABLET | Refills: 0 | Status: SHIPPED | OUTPATIENT
Start: 2023-10-31 | End: 2023-11-03

## 2023-10-27 RX ORDER — CALCITONIN SALMON 200 [IU]/.09ML
1 SPRAY, METERED NASAL DAILY
Qty: 10 ML | Refills: 0 | Status: SHIPPED | OUTPATIENT
Start: 2023-10-28

## 2023-10-27 RX ORDER — PREDNISONE 5 MG/1
5 TABLET ORAL DAILY
Qty: 3 TABLET | Refills: 0 | Status: ON HOLD | OUTPATIENT
Start: 2023-11-03 | End: 2023-11-15

## 2023-10-27 RX ORDER — ACETAMINOPHEN 325 MG/1
650 TABLET ORAL EVERY 6 HOURS PRN
Qty: 30 TABLET | Refills: 0 | Status: SHIPPED | OUTPATIENT
Start: 2023-10-27 | End: 2024-03-29 | Stop reason: WASHOUT

## 2023-10-27 RX ORDER — DOCUSATE SODIUM 100 MG/1
100 CAPSULE, LIQUID FILLED ORAL 2 TIMES DAILY
Qty: 60 CAPSULE | Refills: 0 | Status: SHIPPED | OUTPATIENT
Start: 2023-10-27 | End: 2023-12-01 | Stop reason: WASHOUT

## 2023-10-27 RX ADMIN — METOPROLOL SUCCINATE 25 MG: 25 TABLET, EXTENDED RELEASE ORAL at 08:22

## 2023-10-27 RX ADMIN — PREDNISONE 20 MG: 20 TABLET ORAL at 08:21

## 2023-10-27 RX ADMIN — TRIAMCINOLONE ACETONIDE: 1 CREAM TOPICAL at 08:29

## 2023-10-27 RX ADMIN — CALCITONIN SALMON 1 SPRAY: 200 SPRAY, METERED NASAL at 08:24

## 2023-10-27 RX ADMIN — NYSTATIN 1 APPLICATION: 100000 CREAM TOPICAL at 08:23

## 2023-10-27 RX ADMIN — PANTOPRAZOLE SODIUM 40 MG: 40 TABLET, DELAYED RELEASE ORAL at 08:22

## 2023-10-27 RX ADMIN — SERTRALINE 50 MG: 50 TABLET, FILM COATED ORAL at 08:22

## 2023-10-27 RX ADMIN — FAMOTIDINE 20 MG: 20 TABLET ORAL at 08:22

## 2023-10-27 RX ADMIN — POTASSIUM CHLORIDE 20 MEQ: 1.5 FOR SOLUTION ORAL at 08:22

## 2023-10-27 RX ADMIN — Medication 38 MG OF IRON: at 08:22

## 2023-10-27 RX ADMIN — ALBUTEROL SULFATE 2.5 MG: 2.5 SOLUTION RESPIRATORY (INHALATION) at 07:28

## 2023-10-27 ASSESSMENT — COGNITIVE AND FUNCTIONAL STATUS - GENERAL
MOVING TO AND FROM BED TO CHAIR: A LITTLE
DRESSING REGULAR LOWER BODY CLOTHING: A LITTLE
MOBILITY SCORE: 18
PERSONAL GROOMING: A LITTLE
MOVING FROM LYING ON BACK TO SITTING ON SIDE OF FLAT BED WITH BEDRAILS: A LITTLE
TURNING FROM BACK TO SIDE WHILE IN FLAT BAD: A LITTLE
WALKING IN HOSPITAL ROOM: A LITTLE
DRESSING REGULAR UPPER BODY CLOTHING: A LITTLE
STANDING UP FROM CHAIR USING ARMS: A LITTLE
TOILETING: A LITTLE
DAILY ACTIVITIY SCORE: 19
CLIMB 3 TO 5 STEPS WITH RAILING: A LITTLE
HELP NEEDED FOR BATHING: A LITTLE

## 2023-10-27 ASSESSMENT — PAIN SCALES - GENERAL: PAINLEVEL_OUTOF10: 0 - NO PAIN

## 2023-10-27 ASSESSMENT — PAIN SCALES - WONG BAKER: WONGBAKER_NUMERICALRESPONSE: NO HURT

## 2023-10-27 NOTE — PROGRESS NOTES
Kalie Ramos is a 89 y.o. female on day 4 of admission presenting with Aortic stenosis.  Shortness of breath improving CHF improving pneumonia wants to go home    Subjective   Short: Shortness of breath orthopnea better       Objective     Physical Exam  HEENT:  Head externally atraumatic, no pallor, no icterus, extraocular movements intact, pulls reacting to light, oral mucosa moist and throat clear.  Neck:  Supple, no JVP, no palpable adenopathy or thyromegaly.  No carotid bruit.  Chest:  Clear to auscultation and resonant.  Breath sounds are diminished labored done on percussion  Heart:  Regular rate and rhythm, no murmur or gallop could be appreciated.  Abdomen:  Soft, nontender, bowel sounds present, normoactive, no palpable hepatosplenomegaly.  Extremities:  No edema, pulses present, no cyanosis or clubbing.  CNS:  Patient alert, oriented to time, place and person.  Power 5/5 all over and deep tendon reflexes symmetrical, cranial nerves 2-12 grossly intact.  Skin:  No active rash.  Musculoskeletal:  No joint swelling or erythema, range of movement normal.  Last Recorded Vitals  Heart Rate:  [51-63]   Temp:  [36.3 °C (97.3 °F)-37 °C (98.6 °F)]   Resp:  [18-20]   BP: (130-147)/(56-66)   SpO2:  [98 %-99 %]     Intake/Output last 3 Shifts:  I/O last 3 completed shifts:  In: 940 (17 mL/kg) [P.O.:940]  Out: 1601 (29 mL/kg) [Urine:1000 (0.5 mL/kg/hr); Other:600; Stool:1]  Weight: 55.3 kg     Relevant Results  No results found for the last 90 days.    Results for orders placed or performed during the hospital encounter of 10/22/23 (from the past 24 hour(s))   Basic Metabolic Panel   Result Value Ref Range    Glucose 106 (H) 65 - 99 mg/dL    Sodium 137 133 - 145 mmol/L    Potassium 3.9 3.4 - 5.1 mmol/L    Chloride 94 (L) 97 - 107 mmol/L    Bicarbonate 33 (H) 24 - 31 mmol/L    Urea Nitrogen 25 8 - 25 mg/dL    Creatinine 0.90 0.40 - 1.60 mg/dL    eGFR 60 (L) >60 mL/min/1.73m*2    Calcium 8.6 8.5 - 10.4 mg/dL     Anion Gap 10 <=19 mmol/L   CBC and Auto Differential   Result Value Ref Range    WBC 10.1 4.4 - 11.3 x10*3/uL    nRBC 0.0 0.0 - 0.0 /100 WBCs    RBC 3.78 (L) 4.00 - 5.20 x10*6/uL    Hemoglobin 11.0 (L) 12.0 - 16.0 g/dL    Hematocrit 35.0 (L) 36.0 - 46.0 %    MCV 93 80 - 100 fL    MCH 29.1 26.0 - 34.0 pg    MCHC 31.4 (L) 32.0 - 36.0 g/dL    RDW 14.1 11.5 - 14.5 %    Platelets 243 150 - 450 x10*3/uL    MPV 10.8 7.5 - 11.5 fL    Neutrophils % 86.0 40.0 - 80.0 %    Immature Granulocytes %, Automated 0.5 0.0 - 0.9 %    Lymphocytes % 7.0 13.0 - 44.0 %    Monocytes % 6.4 2.0 - 10.0 %    Eosinophils % 0.0 0.0 - 6.0 %    Basophils % 0.1 0.0 - 2.0 %    Neutrophils Absolute 8.69 (H) 1.60 - 5.50 x10*3/uL    Immature Granulocytes Absolute, Automated 0.05 0.00 - 0.50 x10*3/uL    Lymphocytes Absolute 0.71 (L) 0.80 - 3.00 x10*3/uL    Monocytes Absolute 0.65 0.05 - 0.80 x10*3/uL    Eosinophils Absolute 0.00 0.00 - 0.40 x10*3/uL    Basophils Absolute 0.01 0.00 - 0.10 x10*3/uL        Current Facility-Administered Medications:     acetaminophen (Tylenol) tablet 650 mg, 650 mg, oral, q6h PRN, Avel Bailey MD    albuterol 2.5 mg /3 mL (0.083 %) nebulizer solution 2.5 mg, 2.5 mg, nebulization, q6h while awake, Avel Bailey MD, 2.5 mg at 10/26/23 0737    albuterol 2.5 mg /3 mL (0.083 %) nebulizer solution 2.5 mg, 2.5 mg, nebulization, q2h PRN, Avel Bailey MD    atorvastatin (Lipitor) tablet 40 mg, 40 mg, oral, Nightly, Avel Bailey MD, 40 mg at 10/25/23 2039    azithromycin (Zithromax) in dextrose 5 % in water (D5W) 250 mL  mg, 500 mg, intravenous, q24h, Avel Bailey MD, Stopped at 10/26/23 1850    benzocaine-menthol (Cepastat Sore Throat) 15-3.6 mg lozenge 1 lozenge, 1 lozenge, Mouth/Throat, q4h PRN, Avel Bailey MD    calcitonin (salmon) (Miacalcin) nasal spray 1 spray, 1 spray, One Nostril, Daily, Avel Bailey MD, 1 spray at 10/24/23 1003    cefTRIAXone (Rocephin) IVPB 1 g, 1 g, intravenous, q12h,  Avel Bailey MD, Stopped at 10/26/23 1040    docusate sodium (Colace) capsule 100 mg, 100 mg, oral, TID PRN, Avel Bailey MD    famotidine (Pepcid) tablet 20 mg, 20 mg, oral, Daily, Avel Bailey MD, 20 mg at 10/26/23 1009    ferrous gluconate (Fergon) 324 (38 Fe) mg tablet 38 mg of iron, 38 mg of iron, oral, Daily with breakfast, Avel Bailey MD, 38 mg of iron at 10/26/23 1009    furosemide (Lasix) injection 40 mg, 40 mg, intravenous, BID, Avel Bailey MD, 40 mg at 10/26/23 1750    guaiFENesin (Robitussin) 100 mg/5 mL syrup 10 mL, 10 mL, oral, q4h PRN, Avel Bailey MD    lidocaine 4 % patch 1 patch, 1 patch, transdermal, Nightly, Avel Bailey MD    loratadine (Claritin) tablet 10 mg, 10 mg, oral, Daily PRN, Avel Bailey MD    magnesium oxide (Mag-Ox) tablet 400 mg, 400 mg, oral, Once per day on Tue Thu Sat, Avel Bailey MD, 400 mg at 10/26/23 1009    melatonin tablet 3 mg, 3 mg, oral, Nightly, Avel Bailey MD, 3 mg at 10/25/23 2039    metoprolol succinate XL (Toprol-XL) 24 hr tablet 25 mg, 25 mg, oral, BID, Avel Bailey MD, 25 mg at 10/26/23 1009    nitroglycerin (Nitrostat) SL tablet 0.4 mg, 0.4 mg, sublingual, q5 min PRN, Avel Bailey MD    nystatin (Mycostatin) cream 1 Application, 1 Application, Topical, BID, Avel Bailey MD, 1 Application at 10/26/23 1011    pantoprazole (ProtoNix) EC tablet 40 mg, 40 mg, oral, Daily, Avel Bailey MD, 40 mg at 10/26/23 1010    potassium chloride (Klor-Con) packet 20 mEq, 20 mEq, oral, Daily, Avel Bailey MD, 20 mEq at 10/25/23 1013    predniSONE (Deltasone) tablet 20 mg, 20 mg, oral, Daily, 20 mg at 10/26/23 1009 **FOLLOWED BY** [START ON 10/28/2023] predniSONE (Deltasone) tablet 15 mg, 15 mg, oral, Daily **FOLLOWED BY** [START ON 10/31/2023] predniSONE (Deltasone) tablet 10 mg, 10 mg, oral, Daily **FOLLOWED BY** [START ON 11/3/2023] predniSONE (Deltasone) tablet 5 mg, 5 mg, oral, Daily, Tracie Lutz, APRN-CNP     rOPINIRole (Requip) tablet 5 mg, 5 mg, oral, Nightly, Avel Bailey MD, 5 mg at 10/25/23 2039    sertraline (Zoloft) tablet 50 mg, 50 mg, oral, Daily, Avel Bailey MD, 50 mg at 10/26/23 1009    sodium chloride (Ocean) 0.65 % nasal spray 1 spray, 1 spray, Each Nostril, 4x daily PRN, Avel Bailey MD    traZODone (Desyrel) tablet 50 mg, 50 mg, oral, Nightly PRN, Avel Bailey MD, 50 mg at 10/23/23 2133    triamcinolone (Kenalog) 0.1 % cream, , Topical, BID, Avel Bailey MD, Given at 10/26/23 1011   Assessment/Plan   Principal Problem:    Aortic stenosis  Active Problems:    COPD (chronic obstructive pulmonary disease) (CMS/HCC)    Pneumonia of right middle lobe due to infectious organism    Acute on chronic respiratory failure with hypoxia (CMS/HCC)    Acute on chronic systolic congestive heart failure (CMS/HCC)    Weakness  PT OT  Hypoxia  Medication    Possible discharge today         Avel Bailey MD

## 2023-10-27 NOTE — PROGRESS NOTES
"Kalie Ramos is a 89 y.o. female on day 5 of admission presenting with Aortic stenosis.    Subjective   During nursing rounds, RN Informed TCSW pt will discharge today, home with [facility].        Objective     Physical Exam    Last Recorded Vitals  Blood pressure (!) 142/47, pulse (!) 48, temperature 36.3 °C (97.3 °F), temperature source Oral, resp. rate 18, height 1.575 m (5' 2\"), weight 55.3 kg (121 lb 14.6 oz), SpO2 100 %.  Intake/Output last 3 Shifts:  I/O last 3 completed shifts:  In: - (0 mL/kg)   Out: 1500 (27.1 mL/kg) [Urine:1500 (0.8 mL/kg/hr)]  Weight: 55.3 kg     Relevant Results                             Assessment/Plan   Principal Problem:    Aortic stenosis  Active Problems:    COPD (chronic obstructive pulmonary disease) (CMS/HCC)    Pneumonia of right middle lobe due to infectious organism    Acute on chronic respiratory failure with hypoxia (CMS/HCC)    Acute on chronic systolic congestive heart failure (CMS/HCC)    Discharging today, home with [facility]            Ronald Olmstead LCSW      "

## 2023-10-27 NOTE — PROGRESS NOTES
PCN received Careport response from [facility] team and due to the fact that patient just was discharged from [facility] services now Vivi CURRY wants full review on patient document before granting [facility] auth. PCN called and spoke with  Renee and they cannot provide definitive ADOD due to this development. TCC aware    Omi Harmon

## 2023-10-27 NOTE — CARE PLAN
The patient's goals for the shift include no falls    The clinical goals for the shift include antibiotics  Patient will be discharged today

## 2023-10-27 NOTE — PROGRESS NOTES
PCN checked EPIC and patient has an active discharge order. PCN sent Haiku message to Dr. GARRETT Bailey asking to please complete HHC orders for [facility] team. Dr. GARRETT Bailey completed and PCN attached in [facility] for [facility] team. Awaiting SOC.     Omi Harmon

## 2023-10-27 NOTE — PROGRESS NOTES
Admitting Dx  Pleural Effusions, PNA, CHF. PMH of CHF,Severe AVS, EF 35-40% (7/25/23),HTN,CVA,COPD-home 02. Met with pt @ bedside for information exchange & CHF education. Pt reports that she lives home alone. Her daughter drives her to her appts. She follows up regularly with her PCP, Dr GARRETT Bailey. She does not follow up with Cardiologist & does not plan on starting now. She has working scale @ home, but does not perform daily weights, takes her medications as directed. Reports that she is not aware of her CHF Dx, but is aware of her severe AVS.   Provided CHF booklet/folder & reviewed. Educated on CHF,EF,AVS & managing CHF. Reviewed HF flare up symptoms to call MD with. Pt verbalized understanding of CHF education & is ok with being followed via HF Nurse Navigator for CHF management.

## 2023-10-28 NOTE — PROGRESS NOTES
Kalie Ramos is a 89 y.o. female on day 5 of admission presenting with Aortic stenosis.  Feeling much better ready to go home okay by pulmonary and cardiology  Subjective   Shortness of breath improved wants to go home better appetite coming back     Objective   Shortness of breath improved  Physical Exam  HEENT:  Head externally atraumatic, no pallor, no icterus, extraocular movements intact, pulls reacting to light, oral mucosa moist and throat clear.  Neck:  Supple, no JVP, no palpable adenopathy or thyromegaly.  No carotid bruit.  Chest:  Clear to auscultation and resonant.  Heart:  Regular rate and rhythm, no murmur or gallop could be appreciated.  Abdomen:  Soft, nontender, bowel sounds present, normoactive, no palpable hepatosplenomegaly.  Extremities:  No edema, pulses present, no cyanosis or clubbing.  CNS:  Patient alert, oriented to time, place and person.  Power 5/5 all over and deep tendon reflexes symmetrical, cranial nerves 2-12 grossly intact.  Skin:  No active rash.  Musculoskeletal:  No joint swelling or erythema, range of movement normal.  Last Recorded Vitals  Heart Rate:  [48-62]   Temp:  [36.3 °C (97.3 °F)-36.8 °C (98.2 °F)]   Resp:  [16-18]   BP: (127-142)/(40-56)   SpO2:  [98 %-100 %]     Intake/Output last 3 Shifts:  I/O last 3 completed shifts:  In: - (0 mL/kg)   Out: 1250 (22.6 mL/kg) [Urine:1250 (0.6 mL/kg/hr)]  Weight: 55.3 kg     Relevant Results  No results found for the last 90 days.    Results for orders placed or performed during the hospital encounter of 10/22/23 (from the past 24 hour(s))   CBC and Auto Differential   Result Value Ref Range    WBC 9.4 4.4 - 11.3 x10*3/uL    nRBC 0.0 0.0 - 0.0 /100 WBCs    RBC 4.19 4.00 - 5.20 x10*6/uL    Hemoglobin 12.1 12.0 - 16.0 g/dL    Hematocrit 38.0 36.0 - 46.0 %    MCV 91 80 - 100 fL    MCH 28.9 26.0 - 34.0 pg    MCHC 31.8 (L) 32.0 - 36.0 g/dL    RDW 13.9 11.5 - 14.5 %    Platelets 278 150 - 450 x10*3/uL    MPV 10.9 7.5 - 11.5 fL     Neutrophils % 79.3 40.0 - 80.0 %    Immature Granulocytes %, Automated 0.4 0.0 - 0.9 %    Lymphocytes % 11.7 13.0 - 44.0 %    Monocytes % 8.4 2.0 - 10.0 %    Eosinophils % 0.1 0.0 - 6.0 %    Basophils % 0.1 0.0 - 2.0 %    Neutrophils Absolute 7.45 (H) 1.60 - 5.50 x10*3/uL    Immature Granulocytes Absolute, Automated 0.04 0.00 - 0.50 x10*3/uL    Lymphocytes Absolute 1.10 0.80 - 3.00 x10*3/uL    Monocytes Absolute 0.79 0.05 - 0.80 x10*3/uL    Eosinophils Absolute 0.01 0.00 - 0.40 x10*3/uL    Basophils Absolute 0.01 0.00 - 0.10 x10*3/uL   Comprehensive Metabolic Panel   Result Value Ref Range    Glucose 101 (H) 65 - 99 mg/dL    Sodium 137 133 - 145 mmol/L    Potassium 3.8 3.4 - 5.1 mmol/L    Chloride 93 (L) 97 - 107 mmol/L    Bicarbonate 35 (H) 24 - 31 mmol/L    Urea Nitrogen 23 8 - 25 mg/dL    Creatinine 0.90 0.40 - 1.60 mg/dL    eGFR 60 (L) >60 mL/min/1.73m*2    Calcium 8.7 8.5 - 10.4 mg/dL    Albumin 3.0 (L) 3.5 - 5.0 g/dL    Alkaline Phosphatase 130 (H) 35 - 125 U/L    Total Protein 6.8 5.9 - 7.9 g/dL    AST 66 (H) 5 - 40 U/L    Bilirubin, Total 0.2 0.1 - 1.2 mg/dL    ALT 63 (H) 5 - 40 U/L    Anion Gap 9 <=19 mmol/L      No current facility-administered medications for this encounter.    Current Outpatient Medications:     acetaminophen (Tylenol) 325 mg tablet, Take 2 tablets (650 mg) by mouth every 6 hours if needed for moderate pain (4 - 6)., Disp: 30 tablet, Rfl: 0    albuterol 2.5 mg /3 mL (0.083 %) nebulizer solution, Take 3 mL (2.5 mg) by nebulization in the morning, at noon, and at bedtime., Disp: , Rfl:     aspirin 81 mg EC tablet, Take 1 tablet (81 mg) by mouth once daily., Disp: , Rfl:     atorvastatin (Lipitor) 40 mg tablet, TAKE 1 TABLET DAILY, Disp: 90 tablet, Rfl: 3    azithromycin (Zithromax) 250 mg tablet, Take 2 tablets (500 mg) by mouth once daily for 1 day, THEN 1 tablet (250 mg) once daily for 4 days. Take 2 tabs (500 mg) by mouth today, than 1 daily for 4 days.., Disp: 6 tablet, Rfl: 0     benzocaine-menthol (Cepastat Sore Throat) 15-3.6 mg lozenge, Dissolve 1 lozenge in the mouth every 4 hours if needed for sore throat., Disp: 1 lozenge, Rfl: 0    [START ON 10/28/2023] calcitonin, salmon, (Miacalcin) 200 unit/actuation nasal spray, Administer 1 spray into one nostril once daily. Do not start before October 28, 2023., Disp: 10 mL, Rfl: 0    cefuroxime (Ceftin) 250 mg tablet, Take 1 tablet (250 mg) by mouth 2 times a day., Disp: 10 tablet, Rfl: 0    docusate sodium (Colace) 100 mg capsule, Take 1 capsule (100 mg) by mouth 2 times a day., Disp: 60 capsule, Rfl: 0    famotidine (Pepcid) 20 mg tablet, Take 1 tablet (20 mg) by mouth every 12 hours., Disp: , Rfl:     ferrous gluconate (Fergon) 240 (27 Fe) MG tablet, Take 1 tablet (27 mg) by mouth 3 (three) times a week. Mon wed fri, Disp: , Rfl:     furosemide (Lasix) 40 mg tablet, TAKE 1 TABLET DAILY, Disp: 90 tablet, Rfl: 3    guaiFENesin (Robitussin) 100 mg/5 mL syrup, Take 10 mL by mouth every 4 hours if needed for cough., Disp: 120 mL, Rfl: 0    lidocaine 4 % patch, APPLY AS DIRECTED., Disp: , Rfl:     loratadine (Claritin) 10 mg tablet, Take 1 tablet (10 mg) by mouth once daily., Disp: , Rfl:     magnesium oxide (Mag-Ox) 400 mg (241.3 mg magnesium) tablet, Take 1 tablet (400 mg) by mouth 3 times a week. Tues thurs sat, Disp: , Rfl:     melatonin 3 mg tablet, Take 1 tablet (3 mg) by mouth once daily at bedtime., Disp: , Rfl:     metoprolol succinate XL (Toprol-XL) 50 mg 24 hr tablet, Take 0.5 tablets (25 mg) by mouth 2 times a day., Disp: , Rfl:     nitroglycerin (Nitrostat) 0.4 mg SL tablet, 1 tablet (0.4 mg) every 5 minutes if needed for chest pain (UP TO 3 TIMES. IF NO RELIEF CALL 911.). UP TO 3 TIMES. IF NO RELIEF CALL 911., Disp: , Rfl:     oxygen (O2) gas therapy, Inhale 1 each once every 24 hours., Disp: , Rfl:     pantoprazole (ProtoNix) 40 mg EC tablet, TAKE 1 TABLET DAILY, Disp: 90 tablet, Rfl: 3    potassium chloride (Klor-Con) 20 mEq  packet, Take 20 mEq by mouth once daily. MIX AND DRINK, Disp: , Rfl:     [START ON 10/31/2023] predniSONE (Deltasone) 10 mg tablet, Take 1 tablet (10 mg) by mouth once daily for 3 doses. Do not start before October 31, 2023., Disp: 3 tablet, Rfl: 0    [START ON 10/28/2023] predniSONE (Deltasone) 5 mg tablet, Take 3 tablets (15 mg) by mouth once daily for 3 doses. Do not start before October 28, 2023., Disp: 9 tablet, Rfl: 0    [START ON 11/3/2023] predniSONE (Deltasone) 5 mg tablet, Take 1 tablet (5 mg) by mouth once daily for 3 doses. Do not start before November 3, 2023., Disp: 3 tablet, Rfl: 0    rOPINIRole (Requip) 2 mg tablet, TAKE 1 TABLET AT BEDTIME, Disp: 90 tablet, Rfl: 3    rOPINIRole (Requip) 5 mg tablet, Take 1 tablet (5 mg) by mouth once daily at bedtime., Disp: , Rfl:     sertraline (Zoloft) 50 mg tablet, Take 1 tablet (50 mg) by mouth once daily., Disp: 90 tablet, Rfl: 1    sodium chloride (Ocean) 0.65 % nasal spray, Administer 1 spray into each nostril 4 times a day as needed for congestion., Disp: 30 mL, Rfl: 12    traZODone (Desyrel) 50 mg tablet, Take 1 tablet (50 mg) by mouth as needed at bedtime for sleep., Disp: , Rfl:    Assessment/Plan   Principal Problem:    Aortic stenosis  Active Problems:    COPD (chronic obstructive pulmonary disease) (CMS/HCC)    Pneumonia of right middle lobe due to infectious organism    Acute on chronic respiratory failure with hypoxia (CMS/HCC)    Acute on chronic systolic congestive heart failure (CMS/HCC)    Significant improvement discharged home         Avel Bailey MD

## 2023-10-28 NOTE — DISCHARGE SUMMARY
Discharge Diagnosis  Aortic stenosis  Pneumonia shortness of breath CHF good  Issues Requiring Follow-Up  Electrolyte hypoxia    Test Results Pending At Discharge  Pending Labs       Order Current Status    Extra Urine Gray Tube Collected (10/22/23 1501)    Urinalysis with Reflex Microscopic and Culture In process            Hospital Course   89-year-old patient who shortness of breath CHF pneumonia pleural effusion slowly responding to treatment discharge home with home care seen by pulmonary and cardiology    Pertinent Physical Exam At Time of Discharge  Physical Exam  See progress note  Home Medications     Medication List      START taking these medications     acetaminophen 325 mg tablet; Commonly known as: Tylenol; Take 2 tablets   (650 mg) by mouth every 6 hours if needed for moderate pain (4 - 6).   benzocaine-menthol 15-3.6 mg lozenge; Commonly known as: Cepastat Sore   Throat; Dissolve 1 lozenge in the mouth every 4 hours if needed for sore   throat.   cefuroxime 250 mg tablet; Commonly known as: Ceftin; Take 1 tablet (250   mg) by mouth 2 times a day.   docusate sodium 100 mg capsule; Commonly known as: Colace; Take 1   capsule (100 mg) by mouth 2 times a day.   guaiFENesin 100 mg/5 mL syrup; Commonly known as: Robitussin; Take 10 mL   by mouth every 4 hours if needed for cough.   oxygen gas therapy; Commonly known as: O2; Inhale 1 each once every 24   hours.   * predniSONE 5 mg tablet; Commonly known as: Deltasone; Take 3 tablets   (15 mg) by mouth once daily for 3 doses. Do not start before October 28, 2023.; Start taking on: October 28, 2023   * predniSONE 10 mg tablet; Commonly known as: Deltasone; Take 1 tablet   (10 mg) by mouth once daily for 3 doses. Do not start before October 31, 2023.; Start taking on: October 31, 2023   * predniSONE 5 mg tablet; Commonly known as: Deltasone; Take 1 tablet (5   mg) by mouth once daily for 3 doses. Do not start before November 3,   2023.; Start taking on:  November 3, 2023   sodium chloride 0.65 % nasal spray; Commonly known as: Ocean; Administer   1 spray into each nostril 4 times a day as needed for congestion.  * This list has 3 medication(s) that are the same as other medications   prescribed for you. Read the directions carefully, and ask your doctor or   other care provider to review them with you.     CHANGE how you take these medications     calcitonin (salmon) 200 unit/actuation nasal spray; Commonly known as:   Miacalcin; Administer 1 spray into one nostril once daily. Do not start   before October 28, 2023.; Start taking on: October 28, 2023; What changed:   how to take this, when to take this, additional instructions   sertraline 50 mg tablet; Commonly known as: Zoloft; Take 1 tablet (50   mg) by mouth once daily.; What changed: Another medication with the same   name was removed. Continue taking this medication, and follow the   directions you see here.     CONTINUE taking these medications     albuterol 2.5 mg /3 mL (0.083 %) nebulizer solution   aspirin 81 mg EC tablet   atorvastatin 40 mg tablet; Commonly known as: Lipitor; TAKE 1 TABLET   DAILY   Claritin 10 mg tablet; Generic drug: loratadine   famotidine 20 mg tablet; Commonly known as: Pepcid   ferrous gluconate 240 (27 Fe) MG tablet; Commonly known as: Fergon   furosemide 40 mg tablet; Commonly known as: Lasix; TAKE 1 TABLET DAILY   lidocaine 4 % patch   magnesium oxide 400 mg (241.3 mg magnesium) tablet; Commonly known as:   Mag-Ox   melatonin 3 mg tablet   metoprolol succinate XL 50 mg 24 hr tablet; Commonly known as: Toprol-XL   nitroglycerin 0.4 mg SL tablet; Commonly known as: Nitrostat   pantoprazole 40 mg EC tablet; Commonly known as: ProtoNix; TAKE 1 TABLET   DAILY   potassium chloride 20 mEq packet; Commonly known as: Klor-Con   * rOPINIRole 5 mg tablet; Commonly known as: Requip   * rOPINIRole 2 mg tablet; Commonly known as: Requip; TAKE 1 TABLET AT   BEDTIME   traZODone 50 mg tablet;  Commonly known as: Desyrel  * This list has 2 medication(s) that are the same as other medications   prescribed for you. Read the directions carefully, and ask your doctor or   other care provider to review them with you.     STOP taking these medications     triamcinolone 0.1 % cream; Commonly known as: Kenalog       Outpatient Follow-Up  Future Appointments   Date Time Provider Department Center   11/6/2023  1:00 PM Avel Bailey MD [id number] [facility]   11/10/2023 12:30 PM INF 01 [facility] [id number] [facility]   1/2/2024  2:00 PM [facility]     [facility]year-[facility] see PCP 5 days with home care  Avel Bailey MD

## 2023-10-30 ENCOUNTER — PATIENT OUTREACH (OUTPATIENT)
Dept: CARE COORDINATION | Facility: CLINIC | Age: 88
End: 2023-10-30
Payer: MEDICARE

## 2023-10-30 NOTE — PROGRESS NOTES
Admitted 10/22/23 to 10/27/23 with CHF and pneumonia   Home with home care on Cefuroxime, Prednisone taper, and to stop Kenalog cream.   ,PMH:  aortic stenosis  Appt with  PCP 11/6.    Attempted outreach to patient for transition of care completion; left voice mail message.   Enrolled in conversa chat to monitor for 30 day transition period and will outreach if issues arise.    Maureen GORE RN CCM  RN Care Coordinator  [facility]  Phone [phone]

## 2023-10-31 DIAGNOSIS — M15.9 GENERALIZED OSTEOARTHROSIS: Primary | ICD-10-CM

## 2023-10-31 RX ORDER — ALBUTEROL SULFATE 0.83 MG/ML
3 SOLUTION RESPIRATORY (INHALATION) AS NEEDED
Status: CANCELLED | OUTPATIENT
Start: 2023-11-03

## 2023-10-31 RX ORDER — FAMOTIDINE 10 MG/ML
20 INJECTION INTRAVENOUS ONCE AS NEEDED
Status: CANCELLED | OUTPATIENT
Start: 2023-11-03

## 2023-10-31 RX ORDER — EPINEPHRINE 0.3 MG/.3ML
0.3 INJECTION SUBCUTANEOUS EVERY 5 MIN PRN
Status: CANCELLED | OUTPATIENT
Start: 2023-11-03

## 2023-10-31 RX ORDER — DIPHENHYDRAMINE HYDROCHLORIDE 50 MG/ML
50 INJECTION INTRAMUSCULAR; INTRAVENOUS AS NEEDED
Status: CANCELLED | OUTPATIENT
Start: 2023-11-03

## 2023-11-03 ENCOUNTER — PATIENT OUTREACH (OUTPATIENT)
Dept: CASE MANAGEMENT | Facility: HOSPITAL | Age: 88
End: 2023-11-03
Payer: MEDICARE

## 2023-11-03 NOTE — PROGRESS NOTES
Week 1 HF follow up call. Kalie reports that she is doing well with managing her CHF. Forgot to weigh herself this AM, follows low Na diet, takes medications as directed & verbalizes understanding of her cardiac medications. Reviewed HF flare up symptoms to report to MD. She is working on follow up appt with her PCP. She currently does not want to establish a Cardiologist d/t her age. I will continue to follow 30 days post hospital discharge for HF management.

## 2023-11-06 ENCOUNTER — OFFICE VISIT (OUTPATIENT)
Dept: PRIMARY CARE | Facility: CLINIC | Age: 88
End: 2023-11-06
Payer: MEDICARE

## 2023-11-06 ENCOUNTER — LAB (OUTPATIENT)
Dept: LAB | Facility: LAB | Age: 88
End: 2023-11-06
Payer: MEDICARE

## 2023-11-06 VITALS
WEIGHT: 118 LBS | DIASTOLIC BLOOD PRESSURE: 64 MMHG | HEIGHT: 62 IN | SYSTOLIC BLOOD PRESSURE: 102 MMHG | BODY MASS INDEX: 21.71 KG/M2

## 2023-11-06 DIAGNOSIS — I50.9 CONGESTIVE HEART FAILURE, UNSPECIFIED HF CHRONICITY, UNSPECIFIED HEART FAILURE TYPE (MULTI): Primary | ICD-10-CM

## 2023-11-06 DIAGNOSIS — Z79.01 CURRENT USE OF LONG TERM ANTICOAGULATION: ICD-10-CM

## 2023-11-06 DIAGNOSIS — J18.9 PNEUMONIA DUE TO INFECTIOUS ORGANISM, UNSPECIFIED LATERALITY, UNSPECIFIED PART OF LUNG: ICD-10-CM

## 2023-11-06 DIAGNOSIS — E78.00 HIGH CHOLESTEROL: ICD-10-CM

## 2023-11-06 DIAGNOSIS — D50.9 IRON DEFICIENCY ANEMIA, UNSPECIFIED IRON DEFICIENCY ANEMIA TYPE: ICD-10-CM

## 2023-11-06 DIAGNOSIS — N28.9 RENAL INSUFFICIENCY: ICD-10-CM

## 2023-11-06 DIAGNOSIS — D64.9 ANEMIA, UNSPECIFIED TYPE: ICD-10-CM

## 2023-11-06 DIAGNOSIS — I10 HYPERTENSION, UNSPECIFIED TYPE: ICD-10-CM

## 2023-11-06 DIAGNOSIS — M15.9 GENERALIZED OSTEOARTHROSIS: ICD-10-CM

## 2023-11-06 LAB
ALBUMIN SERPL BCP-MCNC: 3.7 G/DL (ref 3.4–5)
ALBUMIN SERPL BCP-MCNC: 3.7 G/DL (ref 3.4–5)
ALP SERPL-CCNC: 130 U/L (ref 33–136)
ALP SERPL-CCNC: 132 U/L (ref 33–136)
ALT SERPL W P-5'-P-CCNC: 49 U/L (ref 7–45)
ALT SERPL W P-5'-P-CCNC: 51 U/L (ref 7–45)
AMMONIA PLAS-SCNC: 29 UMOL/L (ref 16–53)
ANION GAP SERPL CALC-SCNC: 15 MMOL/L (ref 10–20)
ANION GAP SERPL CALC-SCNC: 15 MMOL/L (ref 10–20)
AST SERPL W P-5'-P-CCNC: 36 U/L (ref 9–39)
AST SERPL W P-5'-P-CCNC: 37 U/L (ref 9–39)
BILIRUB SERPL-MCNC: 0.6 MG/DL (ref 0–1.2)
BILIRUB SERPL-MCNC: 0.6 MG/DL (ref 0–1.2)
BUN SERPL-MCNC: 16 MG/DL (ref 6–23)
BUN SERPL-MCNC: 16 MG/DL (ref 6–23)
CA-I BLD-SCNC: 1.19 MMOL/L (ref 1.1–1.33)
CALCIUM SERPL-MCNC: 9.7 MG/DL (ref 8.6–10.6)
CALCIUM SERPL-MCNC: 9.7 MG/DL (ref 8.6–10.6)
CHLORIDE SERPL-SCNC: 101 MMOL/L (ref 98–107)
CHLORIDE SERPL-SCNC: 102 MMOL/L (ref 98–107)
CO2 SERPL-SCNC: 32 MMOL/L (ref 21–32)
CO2 SERPL-SCNC: 33 MMOL/L (ref 21–32)
CREAT SERPL-MCNC: 0.92 MG/DL (ref 0.5–1.05)
CREAT SERPL-MCNC: 0.94 MG/DL (ref 0.5–1.05)
ERYTHROCYTE [DISTWIDTH] IN BLOOD BY AUTOMATED COUNT: 14.6 % (ref 11.5–14.5)
GFR SERPL CREATININE-BSD FRML MDRD: 57 ML/MIN/1.73M*2
GFR SERPL CREATININE-BSD FRML MDRD: 58 ML/MIN/1.73M*2
GLUCOSE SERPL-MCNC: 108 MG/DL (ref 74–99)
GLUCOSE SERPL-MCNC: 108 MG/DL (ref 74–99)
HCT VFR BLD AUTO: 40.9 % (ref 36–46)
HGB BLD-MCNC: 12.1 G/DL (ref 12–16)
MAGNESIUM SERPL-MCNC: 2.21 MG/DL (ref 1.6–2.4)
MCH RBC QN AUTO: 29.2 PG (ref 26–34)
MCHC RBC AUTO-ENTMCNC: 29.6 G/DL (ref 32–36)
MCV RBC AUTO: 99 FL (ref 80–100)
NRBC BLD-RTO: 0 /100 WBCS (ref 0–0)
PLATELET # BLD AUTO: 329 X10*3/UL (ref 150–450)
POTASSIUM SERPL-SCNC: 5.1 MMOL/L (ref 3.5–5.3)
POTASSIUM SERPL-SCNC: 5.2 MMOL/L (ref 3.5–5.3)
PROT SERPL-MCNC: 7.1 G/DL (ref 6.4–8.2)
PROT SERPL-MCNC: 7.2 G/DL (ref 6.4–8.2)
RBC # BLD AUTO: 4.14 X10*6/UL (ref 4–5.2)
SODIUM SERPL-SCNC: 144 MMOL/L (ref 136–145)
SODIUM SERPL-SCNC: 144 MMOL/L (ref 136–145)
WBC # BLD AUTO: 11.4 X10*3/UL (ref 4.4–11.3)

## 2023-11-06 PROCEDURE — 1126F AMNT PAIN NOTED NONE PRSNT: CPT | Performed by: INTERNAL MEDICINE

## 2023-11-06 PROCEDURE — 83735 ASSAY OF MAGNESIUM: CPT

## 2023-11-06 PROCEDURE — 36415 COLL VENOUS BLD VENIPUNCTURE: CPT

## 2023-11-06 PROCEDURE — 99213 OFFICE O/P EST LOW 20 MIN: CPT | Performed by: INTERNAL MEDICINE

## 2023-11-06 PROCEDURE — 1036F TOBACCO NON-USER: CPT | Performed by: INTERNAL MEDICINE

## 2023-11-06 PROCEDURE — 1159F MED LIST DOCD IN RCRD: CPT | Performed by: INTERNAL MEDICINE

## 2023-11-06 PROCEDURE — 82330 ASSAY OF CALCIUM: CPT

## 2023-11-06 PROCEDURE — 3078F DIAST BP <80 MM HG: CPT | Performed by: INTERNAL MEDICINE

## 2023-11-06 PROCEDURE — 3074F SYST BP LT 130 MM HG: CPT | Performed by: INTERNAL MEDICINE

## 2023-11-06 PROCEDURE — 1111F DSCHRG MED/CURRENT MED MERGE: CPT | Performed by: INTERNAL MEDICINE

## 2023-11-06 PROCEDURE — 80053 COMPREHEN METABOLIC PANEL: CPT

## 2023-11-06 PROCEDURE — 82140 ASSAY OF AMMONIA: CPT

## 2023-11-06 PROCEDURE — 85027 COMPLETE CBC AUTOMATED: CPT

## 2023-11-06 NOTE — PROGRESS NOTES
"Subjective   Patient ID: Kalie Ramos is a 89 y.o. female who presents for Follow-up (on various conditions.).    Ms. Kalie Ramos today came here for follow-up appointment on various conditions.  She was in hospital with congestive heart failure, pneumonia.  It is getting better.  Leg swelling improved.  She is not on any anticoagulation.  She came for follow-up on various conditions.    I have personally reviewed the patient's Past Medical History, Medications, Allergies, Social History, and Family History in the EMR.    Review of Systems   All other systems reviewed and are negative.    Objective   /64   Ht 1.575 m (5' 2\")   Wt 53.5 kg (118 lb)   BMI 21.58 kg/m²     Physical Exam  Vitals reviewed.   Cardiovascular:      Heart sounds: Normal heart sounds, S1 normal and S2 normal. No murmur heard.     No friction rub.   Pulmonary:      Effort: Pulmonary effort is normal.      Breath sounds: Wheezing (bilateral) present.      Comments: Basal crepitus.  Breath sounds diminished.  Abdominal:      Palpations: There is no hepatomegaly, splenomegaly or mass.   Musculoskeletal:      Right lower le+ Edema present.      Left lower le+ Edema present.   Lymphadenopathy:      Lower Body: No right inguinal adenopathy. No left inguinal adenopathy.   Neurological:      Cranial Nerves: Cranial nerves 2-12 are intact.      Sensory: No sensory deficit.      Motor: Motor function is intact.      Deep Tendon Reflexes: Reflexes are normal and symmetric.     LAB WORK: Laboratory testing discussed.    Assessment/Plan   Problem List Items Addressed This Visit             ICD-10-CM       Cardiac and Vasculature    Hypertension I10       Hematology and Neoplasia    Anemia D64.9    Relevant Orders    CBC (Completed)     Other Visit Diagnoses         Codes    Congestive heart failure, unspecified HF chronicity, unspecified heart failure type (CMS/HCC)    -  Primary I50.9    Renal insufficiency     N28.9    Relevant " Orders    Comprehensive Metabolic Panel (Completed)    Magnesium    Current use of long term anticoagulation     Z79.01    Pneumonia due to infectious organism, unspecified laterality, unspecified part of lung     J18.9    High cholesterol     E78.00        1. Congestive heart failure, stable.  2. Anticoagulation, only aspirin.  3. Pneumonia, better.  4. Hypertension, okay.  5. High cholesterol, stable.  6. Renal insufficiency.  Monitor.  7. Anemia, stable.  8. I shall see her back in two to three week’s time.  Today’s blood work I will communicate.  9. I will continue to monitor.  10. She is on oxygen, continue oxygen, 3L.    Scribe Attestation  By signing my name below, I, Jamie Lieberman attest that this documentation has been prepared under the direction and in the presence of Avel Bailey MD.

## 2023-11-08 ENCOUNTER — PATIENT OUTREACH (OUTPATIENT)
Dept: CARE COORDINATION | Facility: CLINIC | Age: 88
End: 2023-11-08
Payer: MEDICARE

## 2023-11-08 NOTE — PROGRESS NOTES
Attempted patient outreach for post hospitalization provider appointment follow up; left voice mail message. Per 11/6 PCP note pneumonia improved and heart failure is stable, continues of O2 3L, no med changes, and will follow up on 11/27 .  Will continue to monitor for 30 day transition period and outreach if issues arise.    Maureen GORE RN CCM  RN Care Coordinator  [facility]  Phone [phone]

## 2023-11-09 RX ORDER — EPINEPHRINE 0.3 MG/.3ML
0.3 INJECTION SUBCUTANEOUS EVERY 5 MIN PRN
Status: CANCELLED | OUTPATIENT
Start: 2023-11-09

## 2023-11-09 RX ORDER — DIPHENHYDRAMINE HYDROCHLORIDE 50 MG/ML
50 INJECTION INTRAMUSCULAR; INTRAVENOUS AS NEEDED
Status: CANCELLED | OUTPATIENT
Start: 2023-11-09

## 2023-11-09 RX ORDER — ALBUTEROL SULFATE 0.83 MG/ML
3 SOLUTION RESPIRATORY (INHALATION) AS NEEDED
Status: CANCELLED | OUTPATIENT
Start: 2023-11-09

## 2023-11-09 RX ORDER — FAMOTIDINE 10 MG/ML
20 INJECTION INTRAVENOUS ONCE AS NEEDED
Status: CANCELLED | OUTPATIENT
Start: 2023-11-09

## 2023-11-10 ENCOUNTER — INFUSION (OUTPATIENT)
Dept: INFUSION THERAPY | Facility: CLINIC | Age: 88
End: 2023-11-10
Payer: MEDICARE

## 2023-11-10 VITALS
DIASTOLIC BLOOD PRESSURE: 56 MMHG | RESPIRATION RATE: 18 BRPM | HEART RATE: 49 BPM | OXYGEN SATURATION: 96 % | TEMPERATURE: 97.3 F | SYSTOLIC BLOOD PRESSURE: 149 MMHG

## 2023-11-10 DIAGNOSIS — M15.9 GENERALIZED OSTEOARTHROSIS: Primary | ICD-10-CM

## 2023-11-10 PROCEDURE — 96372 THER/PROPH/DIAG INJ SC/IM: CPT | Mod: INF | Performed by: INTERNAL MEDICINE

## 2023-11-10 PROCEDURE — 2500000004 HC RX 250 GENERAL PHARMACY W/ HCPCS (ALT 636 FOR OP/ED): Mod: JZ | Performed by: INTERNAL MEDICINE

## 2023-11-10 RX ORDER — DIPHENHYDRAMINE HYDROCHLORIDE 50 MG/ML
50 INJECTION INTRAMUSCULAR; INTRAVENOUS AS NEEDED
OUTPATIENT
Start: 2024-05-08

## 2023-11-10 RX ORDER — ALBUTEROL SULFATE 0.83 MG/ML
3 SOLUTION RESPIRATORY (INHALATION) AS NEEDED
OUTPATIENT
Start: 2024-05-08

## 2023-11-10 RX ORDER — EPINEPHRINE 0.3 MG/.3ML
0.3 INJECTION SUBCUTANEOUS EVERY 5 MIN PRN
OUTPATIENT
Start: 2024-05-08

## 2023-11-10 RX ORDER — FAMOTIDINE 10 MG/ML
20 INJECTION INTRAVENOUS ONCE AS NEEDED
OUTPATIENT
Start: 2024-05-08

## 2023-11-10 RX ADMIN — DENOSUMAB 60 MG: 60 INJECTION SUBCUTANEOUS at 13:37

## 2023-11-10 NOTE — PATIENT INSTRUCTIONS
Today you received: ( free text drug name and dose, including premedication's)  Prolia     For:   1. Generalized osteoarthrosis          Please read the  Medication Guide that was given to you and reviewed during todays visit.     (Tell all doctors including dentists that you are taking this medication)     Go to the emergency room or call 911 if:  -You have signs of allergic reaction:   o         Rash, hives, itching.   o         Swollen, blistered, peeling skin.   o         Swelling of face, lips, mouth, tongue or throat.   o         Tightness of chest, trouble breathing, swallowing or talking      Call your doctor:     - If IV / injection site gets red, warm, swollen, itchy or leaks fluid or pus.     (Leave dressing on your IV site for at least 2 hours and keep area clean and dry  - If you get sick or have symptoms of infection or are not feeling well for any reason.    (Wash your hands often, stay away from people who are sick)  - If you have side effects from your medication that do not go away or are bothersome.     (Refer to the teaching your nurse gave you for side effects to call your doctor about)     Common side effects may include:  stuffy nose, headache, feeling tired, muscle aches, upset stomach  - Before receiving any vaccines, Call the [facility] at   if:  - You get sick, are on antibiotics, have had a recent vaccine, have surgery or dental work and your doctor wants your visit rescheduled.  - You need to cancel and reschedule your visit for any reason. Call at least 2 days before your visit if you need to cancel.   - Your insurance changes before your next visit.    (We will need to get approval from your new insurance. This can take up to two weeks.)     The [facility] is opened Monday thru Friday. We are closed on weekends and holidays.     Voice mail will take your call if the center is closed. If you leave a message please allow 24 hours for a call back during  weekdays. If you leave a message on a weekend/holiday, we will call you back the next business day.

## 2023-11-10 NOTE — PROGRESS NOTES
[facility]   infusion Clinic Note   Date: November 10, 2023   Name: Kalie Ramos  : 1929   MRN: [id number]         Reason for Visit:   Injections (Prolia )      Accompanied by:Child/Children   Visit Type:: injection   Diagnosis: Generalized osteoarthrosis    Allergies:   Allergies as of 11/10/2023 - Reviewed 10/31/2023   Allergen Reaction Noted    Niaspan extended-release [niacin] Unknown 02/10/2023    Naproxen Rash 02/10/2023      Current Meds has a current medication list which includes the following prescription(s): acetaminophen, albuterol, aspirin, atorvastatin, benzocaine-menthol, calcitonin (salmon), cefuroxime, docusate sodium, famotidine, ferrous gluconate, furosemide, guaifenesin, lidocaine, loratadine, magnesium oxide, melatonin, metoprolol succinate xl, nitroglycerin, oxygen, pantoprazole, potassium chloride, ropinirole, ropinirole, sertraline, sodium chloride, and trazodone.        Vitals:  Vitals:    11/10/23 1335   BP: 149/56   Pulse: (!) 49   Resp: 18   Temp: 36.3 °C (97.3 °F)   SpO2: 96%      Infusion Pre-procedure Checklist   Allergies reviewed: yes   Medications reviewed: yes   Contraindications to treatment: no   Previous reaction to current treatment: no   Current Health Issues: None   Pain: '    Is the pain different from normal: No   Is the pain tolerable: Yes   Is your Doctor aware: Yes   Contraindications based on patient history: No   Provider notified: Not applicable   Labs:   Creatinine   Date Value Ref Range Status   2023 0.94 0.50 - 1.05 mg/dL Final   2023 0.92 0.50 - 1.05 mg/dL Final      POCT Calcium, Ionized   Date Value Ref Range Status   2023 1.19 1.1 - 1.33 mmol/L Final     Comment:     The performance characteristics of ionized calcium tested  in heparinized plasma or serum have been validated by the  individual  laboratory site where testing is performed.   Testing on heparinized plasma or serum is not approved by   the  FDA; however, such approval is not necessary.       Fall Risk Screening:      Review of Systems - Oncology   Negative for complaint: [] all other systems have been reviewed and are negative for complaint   Infusion Readiness:   Assess patient for the concerns below. Document provider notification as appropriate:  - Does not meet criteria to treat N/A  - Has an active or recent infection with/without current antibiotic use N/A  - Has recent/planned dental work N/A  -Pt confirms calcium supplementation N/A    Initiated By: Monet Coto RN   Time: 1:38 PM     We administered denosumab.    Patient visit conducted today by Monet Coto RN for administration of Prolia Subcutaneous injection administered in right arm(s) and well-tolerated.

## 2023-11-12 ENCOUNTER — HOSPITAL ENCOUNTER (INPATIENT)
Facility: HOSPITAL | Age: 88
LOS: 3 days | Discharge: HOME | DRG: 689 | End: 2023-11-15
Attending: STUDENT IN AN ORGANIZED HEALTH CARE EDUCATION/TRAINING PROGRAM | Admitting: INTERNAL MEDICINE
Payer: MEDICARE

## 2023-11-12 ENCOUNTER — APPOINTMENT (OUTPATIENT)
Dept: RADIOLOGY | Facility: HOSPITAL | Age: 88
DRG: 689 | End: 2023-11-12
Payer: MEDICARE

## 2023-11-12 DIAGNOSIS — J18.9 PNEUMONIA OF RIGHT MIDDLE LOBE DUE TO INFECTIOUS ORGANISM: ICD-10-CM

## 2023-11-12 DIAGNOSIS — N39.0 URINARY TRACT INFECTION WITHOUT HEMATURIA, SITE UNSPECIFIED: ICD-10-CM

## 2023-11-12 DIAGNOSIS — I50.23 ACUTE ON CHRONIC SYSTOLIC CONGESTIVE HEART FAILURE (MULTI): ICD-10-CM

## 2023-11-12 DIAGNOSIS — D52.0 DIETARY FOLATE DEFICIENCY ANEMIA: ICD-10-CM

## 2023-11-12 DIAGNOSIS — J30.89 ACUTE NON-SEASONAL ALLERGIC RHINITIS: ICD-10-CM

## 2023-11-12 DIAGNOSIS — R29.6 REPEATED FALLS: ICD-10-CM

## 2023-11-12 DIAGNOSIS — S09.90XA HEAD INJURY, INITIAL ENCOUNTER: ICD-10-CM

## 2023-11-12 DIAGNOSIS — M41.04 INFANTILE IDIOPATHIC SCOLIOSIS OF THORACIC REGION: ICD-10-CM

## 2023-11-12 DIAGNOSIS — R29.898 WEAKNESS OF BOTH LEGS: Primary | ICD-10-CM

## 2023-11-12 DIAGNOSIS — E86.0 DEHYDRATION: ICD-10-CM

## 2023-11-12 DIAGNOSIS — Z86.711 PERSONAL HISTORY OF PULMONARY EMBOLISM: ICD-10-CM

## 2023-11-12 DIAGNOSIS — I50.32 CHRONIC DIASTOLIC (CONGESTIVE) HEART FAILURE (MULTI): ICD-10-CM
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
## 2023-11-12 DIAGNOSIS — R53.1 WEAKNESS: ICD-10-CM

## 2023-11-12 DIAGNOSIS — J96.21 ACUTE ON CHRONIC RESPIRATORY FAILURE WITH HYPOXIA (MULTI): ICD-10-CM

## 2023-11-12 LAB
ALBUMIN SERPL-MCNC: 3.8 G/DL (ref 3.5–5)
ALP BLD-CCNC: 177 U/L (ref 35–125)
ALT SERPL-CCNC: 34 U/L (ref 5–40)
ANION GAP SERPL CALC-SCNC: 14 MMOL/L
APPEARANCE UR: CLEAR
AST SERPL-CCNC: 29 U/L (ref 5–40)
BASOPHILS # BLD AUTO: 0.03 X10*3/UL (ref 0–0.1)
BASOPHILS NFR BLD AUTO: 0.2 %
BILIRUB SERPL-MCNC: 1.2 MG/DL (ref 0.1–1.2)
BILIRUB UR STRIP.AUTO-MCNC: NEGATIVE MG/DL
BUN SERPL-MCNC: 14 MG/DL (ref 8–25)
CALCIUM SERPL-MCNC: 8.8 MG/DL (ref 8.5–10.4)
CHLORIDE SERPL-SCNC: 98 MMOL/L (ref 97–107)
CK SERPL-CCNC: 189 U/L (ref 24–195)
CO2 SERPL-SCNC: 29 MMOL/L (ref 24–31)
COLOR UR: ABNORMAL
CREAT SERPL-MCNC: 0.7 MG/DL (ref 0.4–1.6)
EOSINOPHIL # BLD AUTO: 0.02 X10*3/UL (ref 0–0.4)
EOSINOPHIL NFR BLD AUTO: 0.1 %
ERYTHROCYTE [DISTWIDTH] IN BLOOD BY AUTOMATED COUNT: 14.3 % (ref 11.5–14.5)
GFR SERPL CREATININE-BSD FRML MDRD: 81 ML/MIN/1.73M*2
GLUCOSE SERPL-MCNC: 122 MG/DL (ref 65–99)
GLUCOSE UR STRIP.AUTO-MCNC: NORMAL MG/DL
HCT VFR BLD AUTO: 46.5 % (ref 36–46)
HGB BLD-MCNC: 14.5 G/DL (ref 12–16)
HYALINE CASTS #/AREA URNS AUTO: ABNORMAL /LPF
IMM GRANULOCYTES # BLD AUTO: 0.09 X10*3/UL (ref 0–0.5)
IMM GRANULOCYTES NFR BLD AUTO: 0.6 % (ref 0–0.9)
KETONES UR STRIP.AUTO-MCNC: NEGATIVE MG/DL
LEUKOCYTE ESTERASE UR QL STRIP.AUTO: ABNORMAL
LYMPHOCYTES # BLD AUTO: 0.69 X10*3/UL (ref 0.8–3)
LYMPHOCYTES NFR BLD AUTO: 4.4 %
MCH RBC QN AUTO: 29.2 PG (ref 26–34)
MCHC RBC AUTO-ENTMCNC: 31.2 G/DL (ref 32–36)
MCV RBC AUTO: 94 FL (ref 80–100)
MONOCYTES # BLD AUTO: 0.75 X10*3/UL (ref 0.05–0.8)
MONOCYTES NFR BLD AUTO: 4.8 %
NEUTROPHILS # BLD AUTO: 14.14 X10*3/UL (ref 1.6–5.5)
NEUTROPHILS NFR BLD AUTO: 89.9 %
NITRITE UR QL STRIP.AUTO: NEGATIVE
NRBC BLD-RTO: 0 /100 WBCS (ref 0–0)
PH UR STRIP.AUTO: 8 [PH]
PLATELET # BLD AUTO: 240 X10*3/UL (ref 150–450)
POTASSIUM SERPL-SCNC: 5 MMOL/L (ref 3.4–5.1)
PROT SERPL-MCNC: 7.7 G/DL (ref 5.9–7.9)
PROT UR STRIP.AUTO-MCNC: ABNORMAL MG/DL
RBC # BLD AUTO: 4.96 X10*6/UL (ref 4–5.2)
RBC # UR STRIP.AUTO: NEGATIVE /UL
RBC #/AREA URNS AUTO: ABNORMAL /HPF
SODIUM SERPL-SCNC: 141 MMOL/L (ref 133–145)
SP GR UR STRIP.AUTO: 1.02
SQUAMOUS #/AREA URNS AUTO: ABNORMAL /HPF
TROPONIN T SERPL-MCNC: 35 NG/L
TROPONIN T SERPL-MCNC: 35 NG/L
UROBILINOGEN UR STRIP.AUTO-MCNC: NORMAL MG/DL
WBC # BLD AUTO: 15.7 X10*3/UL (ref 4.4–11.3)
WBC #/AREA URNS AUTO: ABNORMAL /HPF

## 2023-11-12 PROCEDURE — 94640 AIRWAY INHALATION TREATMENT: CPT

## 2023-11-12 PROCEDURE — 72125 CT NECK SPINE W/O DYE: CPT

## 2023-11-12 PROCEDURE — 1100000001 HC PRIVATE ROOM DAILY

## 2023-11-12 PROCEDURE — 36415 COLL VENOUS BLD VENIPUNCTURE: CPT | Performed by: PHYSICIAN ASSISTANT

## 2023-11-12 PROCEDURE — 93010 ELECTROCARDIOGRAM REPORT: CPT | Performed by: INTERNAL MEDICINE

## 2023-11-12 PROCEDURE — 71046 X-RAY EXAM CHEST 2 VIEWS: CPT | Mod: FY

## 2023-11-12 PROCEDURE — 2500000004 HC RX 250 GENERAL PHARMACY W/ HCPCS (ALT 636 FOR OP/ED): Performed by: PHYSICIAN ASSISTANT

## 2023-11-12 PROCEDURE — 84484 ASSAY OF TROPONIN QUANT: CPT | Performed by: PHYSICIAN ASSISTANT

## 2023-11-12 PROCEDURE — 2500000002 HC RX 250 W HCPCS SELF ADMINISTERED DRUGS (ALT 637 FOR MEDICARE OP, ALT 636 FOR OP/ED): Performed by: INTERNAL MEDICINE

## 2023-11-12 PROCEDURE — 2500000005 HC RX 250 GENERAL PHARMACY W/O HCPCS: Performed by: INTERNAL MEDICINE

## 2023-11-12 PROCEDURE — 99285 EMERGENCY DEPT VISIT HI MDM: CPT | Mod: 25 | Performed by: STUDENT IN AN ORGANIZED HEALTH CARE EDUCATION/TRAINING PROGRAM

## 2023-11-12 PROCEDURE — 2500000001 HC RX 250 WO HCPCS SELF ADMINISTERED DRUGS (ALT 637 FOR MEDICARE OP): Performed by: INTERNAL MEDICINE

## 2023-11-12 PROCEDURE — 82550 ASSAY OF CK (CPK): CPT | Performed by: PHYSICIAN ASSISTANT

## 2023-11-12 PROCEDURE — 94664 DEMO&/EVAL PT USE INHALER: CPT

## 2023-11-12 PROCEDURE — 81001 URINALYSIS AUTO W/SCOPE: CPT | Performed by: PHYSICIAN ASSISTANT

## 2023-11-12 PROCEDURE — 70450 CT HEAD/BRAIN W/O DYE: CPT

## 2023-11-12 PROCEDURE — 73502 X-RAY EXAM HIP UNI 2-3 VIEWS: CPT | Mod: RT,FY

## 2023-11-12 PROCEDURE — 9420000001 HC RT PATIENT EDUCATION 5 MIN

## 2023-11-12 PROCEDURE — 99222 1ST HOSP IP/OBS MODERATE 55: CPT | Performed by: INTERNAL MEDICINE

## 2023-11-12 PROCEDURE — 80053 COMPREHEN METABOLIC PANEL: CPT | Performed by: PHYSICIAN ASSISTANT

## 2023-11-12 PROCEDURE — 85025 COMPLETE CBC W/AUTO DIFF WBC: CPT | Performed by: PHYSICIAN ASSISTANT

## 2023-11-12 PROCEDURE — 87086 URINE CULTURE/COLONY COUNT: CPT | Mod: TRILAB | Performed by: PHYSICIAN ASSISTANT

## 2023-11-12 PROCEDURE — 2500000004 HC RX 250 GENERAL PHARMACY W/ HCPCS (ALT 636 FOR OP/ED): Performed by: INTERNAL MEDICINE

## 2023-11-12 RX ORDER — FAMOTIDINE 20 MG/1
20 TABLET, FILM COATED ORAL EVERY 12 HOURS
Status: DISCONTINUED | OUTPATIENT
Start: 2023-11-12 | End: 2023-11-15 | Stop reason: HOSPADM

## 2023-11-12 RX ORDER — ROPINIROLE 0.5 MG/1
2 TABLET, FILM COATED ORAL NIGHTLY
Status: DISCONTINUED | OUTPATIENT
Start: 2023-11-12 | End: 2023-11-15 | Stop reason: HOSPADM

## 2023-11-12 RX ORDER — ALBUTEROL SULFATE 0.83 MG/ML
2.5 SOLUTION RESPIRATORY (INHALATION) EVERY 2 HOUR PRN
Status: DISCONTINUED | OUTPATIENT
Start: 2023-11-12 | End: 2023-11-15 | Stop reason: HOSPADM

## 2023-11-12 RX ORDER — PANTOPRAZOLE SODIUM 40 MG/1
40 TABLET, DELAYED RELEASE ORAL DAILY
Status: DISCONTINUED | OUTPATIENT
Start: 2023-11-13 | End: 2023-11-15 | Stop reason: HOSPADM

## 2023-11-12 RX ORDER — TALC
3 POWDER (GRAM) TOPICAL NIGHTLY
Status: DISCONTINUED | OUTPATIENT
Start: 2023-11-12 | End: 2023-11-15 | Stop reason: HOSPADM

## 2023-11-12 RX ORDER — LIDOCAINE 560 MG/1
1 PATCH PERCUTANEOUS; TOPICAL; TRANSDERMAL DAILY
Status: DISCONTINUED | OUTPATIENT
Start: 2023-11-13 | End: 2023-11-15 | Stop reason: HOSPADM

## 2023-11-12 RX ORDER — ASPIRIN 81 MG/1
81 TABLET ORAL ONCE
Status: DISCONTINUED | OUTPATIENT
Start: 2023-11-12 | End: 2023-11-15 | Stop reason: HOSPADM

## 2023-11-12 RX ORDER — LORATADINE 10 MG/1
10 TABLET ORAL DAILY
Status: DISCONTINUED | OUTPATIENT
Start: 2023-11-13 | End: 2023-11-15 | Stop reason: HOSPADM

## 2023-11-12 RX ORDER — SERTRALINE HYDROCHLORIDE 50 MG/1
50 TABLET, FILM COATED ORAL DAILY
Status: DISCONTINUED | OUTPATIENT
Start: 2023-11-13 | End: 2023-11-15 | Stop reason: HOSPADM

## 2023-11-12 RX ORDER — PREDNISONE 5 MG/1
5 TABLET ORAL
Status: DISCONTINUED | OUTPATIENT
Start: 2023-11-13 | End: 2023-11-15 | Stop reason: HOSPADM

## 2023-11-12 RX ORDER — DOCUSATE SODIUM 100 MG/1
100 CAPSULE, LIQUID FILLED ORAL 2 TIMES DAILY
Status: DISCONTINUED | OUTPATIENT
Start: 2023-11-12 | End: 2023-11-15 | Stop reason: HOSPADM

## 2023-11-12 RX ORDER — POTASSIUM CHLORIDE 1.5 G/1.58G
20 POWDER, FOR SOLUTION ORAL
Status: DISCONTINUED | OUTPATIENT
Start: 2023-11-13 | End: 2023-11-15 | Stop reason: HOSPADM

## 2023-11-12 RX ORDER — GUAIFENESIN 100 MG/5ML
10 SOLUTION ORAL EVERY 4 HOURS PRN
Status: DISCONTINUED | OUTPATIENT
Start: 2023-11-12 | End: 2023-11-15 | Stop reason: HOSPADM

## 2023-11-12 RX ORDER — ACETAMINOPHEN 325 MG/1
650 TABLET ORAL EVERY 6 HOURS PRN
Status: DISCONTINUED | OUTPATIENT
Start: 2023-11-12 | End: 2023-11-15 | Stop reason: HOSPADM

## 2023-11-12 RX ORDER — NITROGLYCERIN 0.4 MG/1
0.4 TABLET SUBLINGUAL EVERY 5 MIN PRN
Status: DISCONTINUED | OUTPATIENT
Start: 2023-11-12 | End: 2023-11-15 | Stop reason: HOSPADM

## 2023-11-12 RX ORDER — CALCITONIN SALMON 200 [IU]/.09ML
1 SPRAY, METERED NASAL DAILY
Status: DISCONTINUED | OUTPATIENT
Start: 2023-11-13 | End: 2023-11-15 | Stop reason: HOSPADM

## 2023-11-12 RX ORDER — LANOLIN ALCOHOL/MO/W.PET/CERES
400 CREAM (GRAM) TOPICAL 3 TIMES WEEKLY
Status: DISCONTINUED | OUTPATIENT
Start: 2023-11-13 | End: 2023-11-15 | Stop reason: HOSPADM

## 2023-11-12 RX ORDER — DEXTROSE MONOHYDRATE AND SODIUM CHLORIDE 5; .9 G/100ML; G/100ML
50 INJECTION, SOLUTION INTRAVENOUS CONTINUOUS
Status: DISCONTINUED | OUTPATIENT
Start: 2023-11-12 | End: 2023-11-13

## 2023-11-12 RX ORDER — ENOXAPARIN SODIUM 100 MG/ML
40 INJECTION SUBCUTANEOUS DAILY
Status: DISCONTINUED | OUTPATIENT
Start: 2023-11-12 | End: 2023-11-15 | Stop reason: HOSPADM

## 2023-11-12 RX ORDER — ALBUTEROL SULFATE 0.83 MG/ML
2.5 SOLUTION RESPIRATORY (INHALATION) 3 TIMES DAILY
Status: DISCONTINUED | OUTPATIENT
Start: 2023-11-12 | End: 2023-11-15 | Stop reason: HOSPADM

## 2023-11-12 RX ORDER — FERROUS GLUCONATE 325 MG
324 TABLET ORAL 3 TIMES WEEKLY
Status: DISCONTINUED | OUTPATIENT
Start: 2023-11-13 | End: 2023-11-15 | Stop reason: HOSPADM

## 2023-11-12 RX ORDER — ATORVASTATIN CALCIUM 40 MG/1
40 TABLET, FILM COATED ORAL NIGHTLY
Status: DISCONTINUED | OUTPATIENT
Start: 2023-11-12 | End: 2023-11-15 | Stop reason: HOSPADM

## 2023-11-12 RX ORDER — CEFTRIAXONE 1 G/50ML
1 INJECTION, SOLUTION INTRAVENOUS 2 TIMES DAILY
Status: DISCONTINUED | OUTPATIENT
Start: 2023-11-13 | End: 2023-11-15 | Stop reason: HOSPADM

## 2023-11-12 RX ORDER — TRAZODONE HYDROCHLORIDE 50 MG/1
50 TABLET ORAL NIGHTLY PRN
Status: DISCONTINUED | OUTPATIENT
Start: 2023-11-12 | End: 2023-11-15 | Stop reason: HOSPADM

## 2023-11-12 RX ORDER — ACETAMINOPHEN 325 MG/1
650 TABLET ORAL EVERY 6 HOURS PRN
Status: DISCONTINUED | OUTPATIENT
Start: 2023-11-12 | End: 2023-11-12 | Stop reason: SDUPTHER

## 2023-11-12 RX ORDER — CEFTRIAXONE 1 G/50ML
1 INJECTION, SOLUTION INTRAVENOUS ONCE
Status: COMPLETED | OUTPATIENT
Start: 2023-11-12 | End: 2023-11-12

## 2023-11-12 RX ORDER — METOPROLOL SUCCINATE 25 MG/1
25 TABLET, EXTENDED RELEASE ORAL 2 TIMES DAILY
Status: DISCONTINUED | OUTPATIENT
Start: 2023-11-12 | End: 2023-11-14

## 2023-11-12 RX ADMIN — ALBUTEROL SULFATE 2.5 MG: 2.5 SOLUTION RESPIRATORY (INHALATION) at 23:23

## 2023-11-12 RX ADMIN — ATORVASTATIN CALCIUM 40 MG: 40 TABLET, FILM COATED ORAL at 23:22

## 2023-11-12 RX ADMIN — ENOXAPARIN SODIUM 40 MG: 40 INJECTION SUBCUTANEOUS at 20:26

## 2023-11-12 RX ADMIN — FAMOTIDINE 20 MG: 20 TABLET, FILM COATED ORAL at 23:22

## 2023-11-12 RX ADMIN — Medication 3 MG: at 23:22

## 2023-11-12 RX ADMIN — Medication: at 22:00

## 2023-11-12 RX ADMIN — ROPINIROLE HYDROCHLORIDE 2 MG: 0.5 TABLET, FILM COATED ORAL at 23:22

## 2023-11-12 RX ADMIN — DEXTROSE MONOHYDRATE AND SODIUM CHLORIDE 50 ML/HR: 5; .9 INJECTION, SOLUTION INTRAVENOUS at 23:32

## 2023-11-12 RX ADMIN — CEFTRIAXONE SODIUM 1 G: 1 INJECTION, SOLUTION INTRAVENOUS at 20:24

## 2023-11-12 SDOH — SOCIAL STABILITY: SOCIAL INSECURITY: ABUSE: ADULT

## 2023-11-12 SDOH — SOCIAL STABILITY: SOCIAL INSECURITY: DO YOU FEEL UNSAFE GOING BACK TO THE PLACE WHERE YOU ARE LIVING?: NO

## 2023-11-12 SDOH — SOCIAL STABILITY: SOCIAL INSECURITY: ARE THERE ANY APPARENT SIGNS OF INJURIES/BEHAVIORS THAT COULD BE RELATED TO ABUSE/NEGLECT?: NO

## 2023-11-12 SDOH — SOCIAL STABILITY: SOCIAL INSECURITY: WERE YOU ABLE TO COMPLETE ALL THE BEHAVIORAL HEALTH SCREENINGS?: YES

## 2023-11-12 SDOH — SOCIAL STABILITY: SOCIAL INSECURITY: DO YOU FEEL ANYONE HAS EXPLOITED OR TAKEN ADVANTAGE OF YOU FINANCIALLY OR OF YOUR PERSONAL PROPERTY?: NO

## 2023-11-12 SDOH — SOCIAL STABILITY: SOCIAL INSECURITY: DOES ANYONE TRY TO KEEP YOU FROM HAVING/CONTACTING OTHER FRIENDS OR DOING THINGS OUTSIDE YOUR HOME?: NO

## 2023-11-12 SDOH — SOCIAL STABILITY: SOCIAL INSECURITY: HAVE YOU HAD THOUGHTS OF HARMING ANYONE ELSE?: NO

## 2023-11-12 SDOH — SOCIAL STABILITY: SOCIAL INSECURITY: ARE YOU OR HAVE YOU BEEN THREATENED OR ABUSED PHYSICALLY, EMOTIONALLY, OR SEXUALLY BY ANYONE?: NO

## 2023-11-12 SDOH — SOCIAL STABILITY: SOCIAL INSECURITY: HAS ANYONE EVER THREATENED TO HURT YOUR FAMILY OR YOUR PETS?: NO

## 2023-11-12 ASSESSMENT — COGNITIVE AND FUNCTIONAL STATUS - GENERAL
WALKING IN HOSPITAL ROOM: A LITTLE
TURNING FROM BACK TO SIDE WHILE IN FLAT BAD: A LITTLE
MOBILITY SCORE: 21
DAILY ACTIVITIY SCORE: 24
CLIMB 3 TO 5 STEPS WITH RAILING: A LITTLE
PATIENT BASELINE BEDBOUND: NO

## 2023-11-12 ASSESSMENT — PATIENT HEALTH QUESTIONNAIRE - PHQ9
1. LITTLE INTEREST OR PLEASURE IN DOING THINGS: NOT AT ALL
SUM OF ALL RESPONSES TO PHQ9 QUESTIONS 1 & 2: 0
1. LITTLE INTEREST OR PLEASURE IN DOING THINGS: NOT AT ALL
2. FEELING DOWN, DEPRESSED OR HOPELESS: NOT AT ALL
SUM OF ALL RESPONSES TO PHQ9 QUESTIONS 1 & 2: 0
2. FEELING DOWN, DEPRESSED OR HOPELESS: NOT AT ALL

## 2023-11-12 ASSESSMENT — LIFESTYLE VARIABLES
PRESCIPTION_ABUSE_PAST_12_MONTHS: NO
HOW OFTEN DO YOU HAVE 6 OR MORE DRINKS ON ONE OCCASION: NEVER
PRESCIPTION_ABUSE_PAST_12_MONTHS: NO
AUDIT-C TOTAL SCORE: 0
SKIP TO QUESTIONS 9-10: 1
HOW OFTEN DO YOU HAVE A DRINK CONTAINING ALCOHOL: NEVER
AUDIT-C TOTAL SCORE: 0
SUBSTANCE_ABUSE_PAST_12_MONTHS: NO
HOW OFTEN DO YOU HAVE 6 OR MORE DRINKS ON ONE OCCASION: NEVER
SUBSTANCE_ABUSE_PAST_12_MONTHS: NO
AUDIT-C TOTAL SCORE: 0
HOW MANY STANDARD DRINKS CONTAINING ALCOHOL DO YOU HAVE ON A TYPICAL DAY: PATIENT DOES NOT DRINK
HOW OFTEN DO YOU HAVE A DRINK CONTAINING ALCOHOL: NEVER
SKIP TO QUESTIONS 9-10: 1
AUDIT-C TOTAL SCORE: 0
HOW MANY STANDARD DRINKS CONTAINING ALCOHOL DO YOU HAVE ON A TYPICAL DAY: PATIENT DOES NOT DRINK

## 2023-11-12 ASSESSMENT — COLUMBIA-SUICIDE SEVERITY RATING SCALE - C-SSRS
1. IN THE PAST MONTH, HAVE YOU WISHED YOU WERE DEAD OR WISHED YOU COULD GO TO SLEEP AND NOT WAKE UP?: NO
6. HAVE YOU EVER DONE ANYTHING, STARTED TO DO ANYTHING, OR PREPARED TO DO ANYTHING TO END YOUR LIFE?: NO
2. HAVE YOU ACTUALLY HAD ANY THOUGHTS OF KILLING YOURSELF?: NO
6. HAVE YOU EVER DONE ANYTHING, STARTED TO DO ANYTHING, OR PREPARED TO DO ANYTHING TO END YOUR LIFE?: NO
2. HAVE YOU ACTUALLY HAD ANY THOUGHTS OF KILLING YOURSELF?: NO
1. IN THE PAST MONTH, HAVE YOU WISHED YOU WERE DEAD OR WISHED YOU COULD GO TO SLEEP AND NOT WAKE UP?: NO

## 2023-11-12 ASSESSMENT — ACTIVITIES OF DAILY LIVING (ADL)
LACK_OF_TRANSPORTATION: NO
LACK_OF_TRANSPORTATION: NO

## 2023-11-12 ASSESSMENT — PAIN SCALES - GENERAL: PAINLEVEL_OUTOF10: 0 - NO PAIN

## 2023-11-12 ASSESSMENT — PAIN - FUNCTIONAL ASSESSMENT: PAIN_FUNCTIONAL_ASSESSMENT: 0-10

## 2023-11-12 NOTE — H&P
History Of Present Illness  Kalie Ramos is a 89 y.o. female presenting with fall weakness and lethargy she was found in the bathroom on the floor felt very weak and lethargic it were decided to admit for fall UTI mental status change and weakness right now lying in the bed very weak tired for exhausted not feeling good.     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    Body mass index (BMI) 22.0-22.9, adult     BMI 22.0-22.9, adult    Cervicalgia 05/08/2015    Neck pain    CHF (congestive heart failure) (CMS/[facility])     High cholesterol     Hypertension     Leg swelling     Malignant neoplasm of unspecified site of unspecified female breast (CMS/[facility]) 07/19/2013    Adenocarcinoma of breast    Other conditions influencing health status 10/21/2013    Urinary Tract Infection    Personal history of other infectious and parasitic diseases 03/17/2015    History of candidiasis of mouth    Personal history of other specified conditions 03/17/2015    History of abdominal pain    Personal history of pneumonia (recurrent) 09/10/2015    History of pneumonia    Personal history of transient ischemic attack (TIA), and cerebral infarction without residual deficits 09/04/2014    History of stroke       Surgical History  Past Surgical History:   Procedure Laterality Date    BREAST BIOPSY  09/10/2015    Biopsy Breast Open    BREAST LUMPECTOMY  07/19/2013    Breast Surgery Lumpectomy    CARPAL TUNNEL RELEASE  09/04/2014    Neuroplasty Decompression Median Nerve At Carpal Tunnel    CATARACT EXTRACTION  11/02/2015    Cataract Surgery    HAND TENDON SURGERY  11/02/2015    Hand Incision Tendon Sheath Of A Finger    HYSTERECTOMY  07/19/2013    Hysterectomy    MR HEAD ANGIO WO IV CONTRAST  12/17/2020    MR HEAD ANGIO WO IV CONTRAST [facility]    MR HEAD ANGIO WO IV CONTRAST  5/13/2021    MR HEAD ANGIO WO IV CONTRAST [facility]    MR  "HEAD ANGIO WO IV CONTRAST  5/24/2022    MR HEAD ANGIO WO IV CONTRAST [facility]    MR NECK ANGIO WO IV CONTRAST  5/24/2022    MR NECK ANGIO WO IV CONTRAST [facility]    OTHER SURGICAL HISTORY  11/02/2015    Wrist Carpectomy    ROTATOR CUFF REPAIR  09/10/2015    Rotator Cuff Repair    SENTINEL LYMPH NODE BIOPSY  09/10/2015    [place] Lymph Node Biopsy    SHOULDER SURGERY  07/19/2013    Shoulder Surgery        Social History  She reports that she has never smoked. She has never used smokeless tobacco. She reports that she does not drink alcohol and does not use drugs.    Family History  Family History   Problem Relation Name Age of Onset    Alzheimer's disease Father      Breast cancer Sister      Pancreatic cancer Sister      Arthritis Other          Allergies  Niaspan extended-release [niacin] and Naproxen    Review of Systems  I reviewed all systems reviewed as above otherwise is negative.  Previous history of fall and weak  Physical Exam  HEENT:  Head externally atraumatic, no pallor, no icterus, extraocular movements intact, pupils reactive to light, oral mucosa moist and throat clear.  Neck:  Supple, no JVP, no palpable adenopathy or thyromegaly.  No carotid bruit.  Chest:  Clear to auscultation and resonant.  Heart:  Regular rate and rhythm, no murmur or gallop could be appreciated.  Abdomen:  Soft, nontender, bowel sounds present, normoactive, no palpable hepatosplenomegaly.  Extremities:  No edema, pulses present, no cyanosis or clubbing.  CNS:  Patient alert, oriented to time, place and person.  Power 5/5 all over and deep tendon reflexes symmetrical, cranial nerves 2-12 grossly intact.  Generalized muscle pain all over the body weakness  Skin:  No active rash.  Musculoskeletal:  No joint swelling or erythema, range of movement normal.  Very dehydrated  Last Recorded Vitals  Heart Rate:  [59]   Temp:  [36.3 °C (97.3 °F)]   Resp:  [16]   BP: (159)/(71)   Height:  [167.6 cm (5' 6\")] "   Weight:  [52.6 kg (115 lb 15.7 oz)]   SpO2:  [100 %]       Relevant Results  CT head wo IV contrast    Result Date: 11/12/2023  Interpreted By:  Severiano Cano, STUDY: CT HEAD WO IV CONTRAST; 11/12/2023 4:49 pm   INDICATION: fall   COMPARISON: None   ACCESSION NUMBER(S): [id number]   ORDERING CLINICIAN: ALONDRA BRITT   TECHNIQUE: Axial images were obtained through the brain. No IV contrast was administered.   All CT examinations are performed with one or more of the following dose reduction techniques: Automated Exposure Control, adjustment of mA and/or kV according to patient size, or use of iterative reconstruction techniques.   FINDINGS: The ventricles are enlarged but midline in position, with proportional prominence of the sulci, due to atrophy. Patchy periventricular hypodensities are present suggestive of small vessel ischemic white matter disease. There is no intracranial hemorrhage. No mass or mass effect is seen. The osseus structures are unremarkable.       Redemonstration of age related changes without acute intracranial process.   Signed by: Severiano Cano 11/12/2023 5:01 PM Dictation workstation:   [id number]    XR chest 2 views    Result Date: 11/12/2023  Interpreted By:  Severiano Cano, STUDY: XR CHEST 2 VIEWS; 11/12/2023 4:39 pm   INDICATION: Signs/Symptoms:weakness   COMPARISON: October 2023.   ACCESSION NUMBER(S): [id number]   ORDERING CLINICIAN: ALONDRA BRITT   TECHNIQUE: Number of films: Two-view radiographs of the chest were obtained.   FINDINGS: The cardiac silhouette is mildly enlarged. The lungs are hyperinflated, with prominent interstitial markings bilaterally. Tissue prominence of the right paratracheal region is again seen, most likely due to ectatic/tortuous vessels. There are bilateral perihilar interstitial and mild alveolar infiltrates and small bilateral pleural effusions. Previously seen dense right upper lobe infiltrates have significantly improved. There is no  pneumothorax. Extensive degenerative changes involve the spine.       1. Cardiomegaly and mild congestive heart failure on a background of COPD. Also improved infiltrates in the right upper lobe. 2. Right paratracheal tissue prominence again seen, most likely due to ectatic/tortuous vessels.   Signed by: Severiano Cano 11/12/2023 5:00 PM Dictation workstation:   [id number]    XR hip right 2 or 3 views    Result Date: 11/12/2023  Interpreted By:  Severiano Cano, STUDY: XR HIP RIGHT 2 OR 3 VIEWS; ;  11/12/2023 4:39 pm   INDICATION: Signs/Symptoms:right hip xray.   COMPARISON: October 2017   ACCESSION NUMBER(S): [id number]   ORDERING CLINICIAN: ALONDRA BRITT   FINDINGS: There is no fracture or subluxation. Degenerative changes of both hip joints are present, with joint space narrowing and small osteophytes. The alignment is anatomic. The soft tissues are unremarkable. Degenerative change and disc disease also involves the visualized lower lumbar spine. Surgical staples are present on the right side of the abdomen.       Degenerative changes as above without acute fracture or subluxation.   Signed by: Severiano Cano 11/12/2023 4:55 PM Dictation workstation:   [id number]    ECG 12 lead    Result Date: 10/24/2023  Sinus bradycardia Left ventricular hypertrophy with QRS widening and repolarization abnormality Abnormal ECG When compared with ECG of 20-MAR-2023 14:58, Premature ventricular complexes are no longer Present Confirmed by Domi Vaughan ([id number]) on 10/24/2023 10:43:31 AM    CT angio chest for pulmonary embolism    Addendum Date: 10/22/2023    Interpreted By:  Eloise Joshi, ADDENDUM: Large, heterogenous thyroid nodule measuring 5.9 x 6.8 x 2.6 cm: Thyroid ultrasound may be beneficial on an outpatient basis.   Enlarged prevascular node may be reactive.   Signed by: Eloise Joshi 10/22/2023 6:04 PM   -------- ORIGINAL REPORT -------- Dictation workstation:   [id number]    Result Date: 10/22/2023  Interpreted By:  Madelyn  Eloise, STUDY: CT ANGIO CHEST FOR PULMONARY EMBOLISM;  10/22/2023 5:34 pm   INDICATION: Signs/Symptoms:acute onset SOB, dimer.   COMPARISON: Chest radiograph dated same day.   ACCESSION NUMBER(S): [id number]   ORDERING CLINICIAN: ALONDRA SELLERS   TECHNIQUE: Helical data acquisition of the chest was obtained contrast volume:without IV contrast material/Optiray 350/Isovue 370.  Images were reformatted in coronal and sagittal planes. Axial and coronal MIP images were created and reviewed.   FINDINGS: POTENTIAL LIMITATIONS OF THE STUDY: None   HEART AND VESSELS: No discrete filling defects within the main pulmonary artery or its branches.   The right and left pulmonary arteries are dilated. The right measures 2.8 cm, and the left measures 2.8 cm.   Atherosclerotic disease of the aorta.   Mild atherosclerotic disease of the coronary vasculature.   Heart size is enlarged.   No evidence of pericardial effusion.   MEDIASTINUM AND JAYDEN, LOWER NECK AND AXILLA: Large, heterogenous thyroid gland containing scattered areas of calcifications measures 5.9 x 6.8 by 2.6 cm (series 4, image 16, and series 6, image 30).   Enlarged prevascular lymph node measures 1.4 by 1.2 cm (series 4, image 24).   Esophagus appears within normal limits as seen.   LUNGS AND AIRWAYS: The trachea and central airways are patent. No endobronchial lesion.   Bilateral confluent airspace disease involving all lung lobes bilaterally, right-greater-than-left. Moderate bilateral pleural effusions.   UPPER ABDOMEN: The visualized subdiaphragmatic structures demonstrate no remarkable findings.   CHEST WALL AND OSSEOUS STRUCTURES: There are no suspicious osseous lesions. Multilevel degenerative changes are present       1. No emboli within the proximal pulmonary vasculature.   2. Bilateral confluent airspace disease involving all lung lobes, right greater than left.   3. Moderate bilateral pleural effusions.   4. Enlarged bilateral main pulmonary arteries can be  seen with elevated pulmonary arterial pressure.   5. Cardiomegaly.   MACRO: None   Signed by: lEoise Joshi 10/22/2023 5:53 PM Dictation workstation:   [id number]    XR chest 1 view    Result Date: 10/22/2023  Interpreted By:  Sally Stinson, STUDY: XR CHEST 1 VIEW; 10/22/2023 3:47 pm   INDICATION: Signs/Symptoms:SOB   COMPARISON: 07/27/2023   ACCESSION NUMBER(S): [id number]   ORDERING CLINICIAN: ALONDRA SELLERS   TECHNIQUE: Frontal chest radiograph   FINDINGS: The cardiomediastinal silhouette is unremarkable. Patchy airspace opacities are noted in both lungs particularly within the right upper lung field. Bibasilar pleural effusions associated atelectasis.   The osseous structures are intact.       Patchy bilateral airspace opacities are noted in both lungs particularly within the right midlung field is concerning for pneumonia. Bibasilar effusions associated atelectasis.     Signed by: Sally Stinson 10/22/2023 4:04 PM Dictation workstation:   [id number]   Results for orders placed or performed during the hospital encounter of 11/12/23 (from the past 24 hour(s))   CBC and Auto Differential   Result Value Ref Range    WBC 15.7 (H) 4.4 - 11.3 x10*3/uL    nRBC 0.0 0.0 - 0.0 /100 WBCs    RBC 4.96 4.00 - 5.20 x10*6/uL    Hemoglobin 14.5 12.0 - 16.0 g/dL    Hematocrit 46.5 (H) 36.0 - 46.0 %    MCV 94 80 - 100 fL    MCH 29.2 26.0 - 34.0 pg    MCHC 31.2 (L) 32.0 - 36.0 g/dL    RDW 14.3 11.5 - 14.5 %    Platelets 240 150 - 450 x10*3/uL    Neutrophils % 89.9 40.0 - 80.0 %    Immature Granulocytes %, Automated 0.6 0.0 - 0.9 %    Lymphocytes % 4.4 13.0 - 44.0 %    Monocytes % 4.8 2.0 - 10.0 %    Eosinophils % 0.1 0.0 - 6.0 %    Basophils % 0.2 0.0 - 2.0 %    Neutrophils Absolute 14.14 (H) 1.60 - 5.50 x10*3/uL    Immature Granulocytes Absolute, Automated 0.09 0.00 - 0.50 x10*3/uL    Lymphocytes Absolute 0.69 (L) 0.80 - 3.00 x10*3/uL    Monocytes Absolute 0.75 0.05 - 0.80 x10*3/uL    Eosinophils Absolute 0.02 0.00 - 0.40  x10*3/uL    Basophils Absolute 0.03 0.00 - 0.10 x10*3/uL   Comprehensive metabolic panel   Result Value Ref Range    Glucose 122 (H) 65 - 99 mg/dL    Sodium 141 133 - 145 mmol/L    Potassium 5.0 3.4 - 5.1 mmol/L    Chloride 98 97 - 107 mmol/L    Bicarbonate 29 24 - 31 mmol/L    Urea Nitrogen 14 8 - 25 mg/dL    Creatinine 0.70 0.40 - 1.60 mg/dL    eGFR 81 >60 mL/min/1.73m*2    Calcium 8.8 8.5 - 10.4 mg/dL    Albumin 3.8 3.5 - 5.0 g/dL    Alkaline Phosphatase 177 (H) 35 - 125 U/L    Total Protein 7.7 5.9 - 7.9 g/dL    AST 29 5 - 40 U/L    Bilirubin, Total 1.2 0.1 - 1.2 mg/dL    ALT 34 5 - 40 U/L    Anion Gap 14 <=19 mmol/L   Cardiac Enzymes - CPK   Result Value Ref Range    Creatine Kinase 189 24 - 195 U/L   Serial Troponin, Initial ([facility])   Result Value Ref Range    Troponin T, High Sensitivity 35 (H) <=15 ng/L   Urinalysis with Reflex Microscopic and Culture   Result Value Ref Range    Color, Urine Light-Yellow Light-Yellow, Yellow, Dark-Yellow    Appearance, Urine Clear Clear    Specific Gravity, Urine 1.017 1.005 - 1.035    pH, Urine 8.0 5.0, 5.5, 6.0, 6.5, 7.0, 7.5, 8.0    Protein, Urine 30 (1+) (A) NEGATIVE, 10 (TRACE), 20 (TRACE) mg/dL    Glucose, Urine Normal Normal mg/dL    Blood, Urine NEGATIVE NEGATIVE    Ketones, Urine NEGATIVE NEGATIVE mg/dL    Bilirubin, Urine NEGATIVE NEGATIVE    Urobilinogen, Urine Normal Normal mg/dL    Nitrite, Urine NEGATIVE NEGATIVE    Leukocyte Esterase, Urine 25 Anurag/µL (A) NEGATIVE   Microscopic Only, Urine   Result Value Ref Range    WBC, Urine 1-5 1-5, NONE /HPF    RBC, Urine 1-2 NONE, 1-2, 3-5 /HPF    Squamous Epithelial Cells, Urine 1-9 (SPARSE) Reference range not established. /HPF    Hyaline Casts, Urine 1+ (A) NONE /LPF         Results for orders placed or performed during the hospital encounter of 11/12/23 (from the past 24 hour(s))   CBC and Auto Differential   Result Value Ref Range    WBC 15.7 (H) 4.4 - 11.3 x10*3/uL    nRBC 0.0 0.0 - 0.0 /100 WBCs    RBC 4.96 4.00 -  5.20 x10*6/uL    Hemoglobin 14.5 12.0 - 16.0 g/dL    Hematocrit 46.5 (H) 36.0 - 46.0 %    MCV 94 80 - 100 fL    MCH 29.2 26.0 - 34.0 pg    MCHC 31.2 (L) 32.0 - 36.0 g/dL    RDW 14.3 11.5 - 14.5 %    Platelets 240 150 - 450 x10*3/uL    Neutrophils % 89.9 40.0 - 80.0 %    Immature Granulocytes %, Automated 0.6 0.0 - 0.9 %    Lymphocytes % 4.4 13.0 - 44.0 %    Monocytes % 4.8 2.0 - 10.0 %    Eosinophils % 0.1 0.0 - 6.0 %    Basophils % 0.2 0.0 - 2.0 %    Neutrophils Absolute 14.14 (H) 1.60 - 5.50 x10*3/uL    Immature Granulocytes Absolute, Automated 0.09 0.00 - 0.50 x10*3/uL    Lymphocytes Absolute 0.69 (L) 0.80 - 3.00 x10*3/uL    Monocytes Absolute 0.75 0.05 - 0.80 x10*3/uL    Eosinophils Absolute 0.02 0.00 - 0.40 x10*3/uL    Basophils Absolute 0.03 0.00 - 0.10 x10*3/uL   Comprehensive metabolic panel   Result Value Ref Range    Glucose 122 (H) 65 - 99 mg/dL    Sodium 141 133 - 145 mmol/L    Potassium 5.0 3.4 - 5.1 mmol/L    Chloride 98 97 - 107 mmol/L    Bicarbonate 29 24 - 31 mmol/L    Urea Nitrogen 14 8 - 25 mg/dL    Creatinine 0.70 0.40 - 1.60 mg/dL    eGFR 81 >60 mL/min/1.73m*2    Calcium 8.8 8.5 - 10.4 mg/dL    Albumin 3.8 3.5 - 5.0 g/dL    Alkaline Phosphatase 177 (H) 35 - 125 U/L    Total Protein 7.7 5.9 - 7.9 g/dL    AST 29 5 - 40 U/L    Bilirubin, Total 1.2 0.1 - 1.2 mg/dL    ALT 34 5 - 40 U/L    Anion Gap 14 <=19 mmol/L   Cardiac Enzymes - CPK   Result Value Ref Range    Creatine Kinase 189 24 - 195 U/L   Serial Troponin, Initial ([facility])   Result Value Ref Range    Troponin T, High Sensitivity 35 (H) <=15 ng/L   Urinalysis with Reflex Microscopic and Culture   Result Value Ref Range    Color, Urine Light-Yellow Light-Yellow, Yellow, Dark-Yellow    Appearance, Urine Clear Clear    Specific Gravity, Urine 1.017 1.005 - 1.035    pH, Urine 8.0 5.0, 5.5, 6.0, 6.5, 7.0, 7.5, 8.0    Protein, Urine 30 (1+) (A) NEGATIVE, 10 (TRACE), 20 (TRACE) mg/dL    Glucose, Urine Normal Normal mg/dL    Blood, Urine NEGATIVE  NEGATIVE    Ketones, Urine NEGATIVE NEGATIVE mg/dL    Bilirubin, Urine NEGATIVE NEGATIVE    Urobilinogen, Urine Normal Normal mg/dL    Nitrite, Urine NEGATIVE NEGATIVE    Leukocyte Esterase, Urine 25 Anurag/µL (A) NEGATIVE   Microscopic Only, Urine   Result Value Ref Range    WBC, Urine 1-5 1-5, NONE /HPF    RBC, Urine 1-2 NONE, 1-2, 3-5 /HPF    Squamous Epithelial Cells, Urine 1-9 (SPARSE) Reference range not established. /HPF    Hyaline Casts, Urine 1+ (A) NONE /LPF     Prior to Admission medications    Medication Sig Start Date End Date Taking? Authorizing Provider   acetaminophen (Tylenol) 325 mg tablet Take 2 tablets (650 mg) by mouth every 6 hours if needed for moderate pain (4 - 6). 10/27/23   Avel Bailey MD   albuterol 2.5 mg /3 mL (0.083 %) nebulizer solution Take 3 mL (2.5 mg) by nebulization in the morning, at noon, and at bedtime. 2/21/22   Historical Provider, MD   aspirin 81 mg EC tablet Take 1 tablet (81 mg) by mouth once daily. 12/19/22   Historical Provider, MD   atorvastatin (Lipitor) 40 mg tablet TAKE 1 TABLET DAILY 7/18/23   Avel Bailey MD   benzocaine-menthol (Cepastat Sore Throat) 15-3.6 mg lozenge Dissolve 1 lozenge in the mouth every 4 hours if needed for sore throat. 10/27/23   Avel Bailey MD   calcitonin, salmon, (Miacalcin) 200 unit/actuation nasal spray Administer 1 spray into one nostril once daily. Do not start before October 28, 2023. 10/28/23   Avel Bailey MD   cefuroxime (Ceftin) 250 mg tablet Take 1 tablet (250 mg) by mouth 2 times a day. 10/27/23   Avel Bailey MD   docusate sodium (Colace) 100 mg capsule Take 1 capsule (100 mg) by mouth 2 times a day. 10/27/23   Avel Bailey MD   famotidine (Pepcid) 20 mg tablet Take 1 tablet (20 mg) by mouth every 12 hours. 8/6/20   Historical Provider, MD   ferrous gluconate (Fergon) 240 (27 Fe) MG tablet Take 1 tablet (27 mg) by mouth 3 (three) times a week. Mon wed fri 6/1/20   Historical Provider, MD   furosemide  (Lasix) 40 mg tablet TAKE 1 TABLET DAILY 8/14/23   Avel Bailey MD   guaiFENesin (Robitussin) 100 mg/5 mL syrup Take 10 mL by mouth every 4 hours if needed for cough. 10/27/23   Avel Bailey MD   lidocaine 4 % patch APPLY AS DIRECTED. 6/16/22   Historical Provider, MD   loratadine (Claritin) 10 mg tablet Take 1 tablet (10 mg) by mouth once daily. 11/25/19   Historical Provider, MD   magnesium oxide (Mag-Ox) 400 mg (241.3 mg magnesium) tablet Take 1 tablet (400 mg) by mouth 3 times a week. Tues thurs sat 12/19/22   Historical Provider, MD   melatonin 3 mg tablet Take 1 tablet (3 mg) by mouth once daily at bedtime. 6/1/20   Historical Provider, MD   metoprolol succinate XL (Toprol-XL) 50 mg 24 hr tablet Take 0.5 tablets (25 mg) by mouth 2 times a day. 12/19/22   Historical Provider, MD   nitroglycerin (Nitrostat) 0.4 mg SL tablet 1 tablet (0.4 mg) every 5 minutes if needed for chest pain (UP TO 3 TIMES. IF NO RELIEF CALL 911.). UP TO 3 TIMES. IF NO RELIEF CALL 911. 10/29/19   Historical Provider, MD   oxygen (O2) gas therapy Inhale 1 each once every 24 hours. 10/27/23   Avel Bailey MD   pantoprazole (ProtoNix) 40 mg EC tablet TAKE 1 TABLET DAILY 7/31/23   Avel Bailey MD   potassium chloride (Klor-Con) 20 mEq packet Take 20 mEq by mouth once daily. MIX AND DRINK 1/4/19   Historical Provider, MD   predniSONE (Deltasone) 5 mg tablet Take 1 tablet (5 mg) by mouth once daily for 3 doses. Do not start before November 3, 2023. 11/3/23 11/6/23  Avel Bailey MD   rOPINIRole (Requip) 2 mg tablet TAKE 1 TABLET AT BEDTIME 9/11/23   Avel Bailey MD   rOPINIRole (Requip) 5 mg tablet Take 1 tablet (5 mg) by mouth once daily at bedtime. 5/16/22   Historical Provider, MD   sertraline (Zoloft) 50 mg tablet Take 1 tablet (50 mg) by mouth once daily. 7/11/23 1/7/24  Avel Bailey MD   sodium chloride (Ocean) 0.65 % nasal spray Administer 1 spray into each nostril 4 times a day as needed for congestion.  10/27/23   Avel Bailey MD   traZODone (Desyrel) 50 mg tablet Take 1 tablet (50 mg) by mouth as needed at bedtime for sleep. 10/29/19   Historical Provider, MD     No current facility-administered medications for this encounter.    Current Outpatient Medications:     acetaminophen (Tylenol) 325 mg tablet, Take 2 tablets (650 mg) by mouth every 6 hours if needed for moderate pain (4 - 6)., Disp: 30 tablet, Rfl: 0    albuterol 2.5 mg /3 mL (0.083 %) nebulizer solution, Take 3 mL (2.5 mg) by nebulization in the morning, at noon, and at bedtime., Disp: , Rfl:     aspirin 81 mg EC tablet, Take 1 tablet (81 mg) by mouth once daily., Disp: , Rfl:     atorvastatin (Lipitor) 40 mg tablet, TAKE 1 TABLET DAILY, Disp: 90 tablet, Rfl: 3    benzocaine-menthol (Cepastat Sore Throat) 15-3.6 mg lozenge, Dissolve 1 lozenge in the mouth every 4 hours if needed for sore throat., Disp: 1 lozenge, Rfl: 0    calcitonin, salmon, (Miacalcin) 200 unit/actuation nasal spray, Administer 1 spray into one nostril once daily. Do not start before October 28, 2023., Disp: 10 mL, Rfl: 0    cefuroxime (Ceftin) 250 mg tablet, Take 1 tablet (250 mg) by mouth 2 times a day., Disp: 10 tablet, Rfl: 0    docusate sodium (Colace) 100 mg capsule, Take 1 capsule (100 mg) by mouth 2 times a day., Disp: 60 capsule, Rfl: 0    famotidine (Pepcid) 20 mg tablet, Take 1 tablet (20 mg) by mouth every 12 hours., Disp: , Rfl:     ferrous gluconate (Fergon) 240 (27 Fe) MG tablet, Take 1 tablet (27 mg) by mouth 3 (three) times a week. Mon wed fri, Disp: , Rfl:     furosemide (Lasix) 40 mg tablet, TAKE 1 TABLET DAILY, Disp: 90 tablet, Rfl: 3    guaiFENesin (Robitussin) 100 mg/5 mL syrup, Take 10 mL by mouth every 4 hours if needed for cough., Disp: 120 mL, Rfl: 0    lidocaine 4 % patch, APPLY AS DIRECTED., Disp: , Rfl:     loratadine (Claritin) 10 mg tablet, Take 1 tablet (10 mg) by mouth once daily., Disp: , Rfl:     magnesium oxide (Mag-Ox) 400 mg (241.3 mg  magnesium) tablet, Take 1 tablet (400 mg) by mouth 3 times a week. Tues thurs sat, Disp: , Rfl:     melatonin 3 mg tablet, Take 1 tablet (3 mg) by mouth once daily at bedtime., Disp: , Rfl:     metoprolol succinate XL (Toprol-XL) 50 mg 24 hr tablet, Take 0.5 tablets (25 mg) by mouth 2 times a day., Disp: , Rfl:     nitroglycerin (Nitrostat) 0.4 mg SL tablet, 1 tablet (0.4 mg) every 5 minutes if needed for chest pain (UP TO 3 TIMES. IF NO RELIEF CALL 911.). UP TO 3 TIMES. IF NO RELIEF CALL 911., Disp: , Rfl:     oxygen (O2) gas therapy, Inhale 1 each once every 24 hours., Disp: , Rfl:     pantoprazole (ProtoNix) 40 mg EC tablet, TAKE 1 TABLET DAILY, Disp: 90 tablet, Rfl: 3    potassium chloride (Klor-Con) 20 mEq packet, Take 20 mEq by mouth once daily. MIX AND DRINK, Disp: , Rfl:     rOPINIRole (Requip) 2 mg tablet, TAKE 1 TABLET AT BEDTIME, Disp: 90 tablet, Rfl: 3    rOPINIRole (Requip) 5 mg tablet, Take 1 tablet (5 mg) by mouth once daily at bedtime., Disp: , Rfl:     sertraline (Zoloft) 50 mg tablet, Take 1 tablet (50 mg) by mouth once daily., Disp: 90 tablet, Rfl: 1    sodium chloride (Ocean) 0.65 % nasal spray, Administer 1 spray into each nostril 4 times a day as needed for congestion., Disp: 30 mL, Rfl: 12    traZODone (Desyrel) 50 mg tablet, Take 1 tablet (50 mg) by mouth as needed at bedtime for sleep., Disp: , Rfl:   CT head wo IV contrast    Result Date: 11/12/2023  Interpreted By:  Severiano Cano, STUDY: CT HEAD WO IV CONTRAST; 11/12/2023 4:49 pm   INDICATION: fall   COMPARISON: None   ACCESSION NUMBER(S): [id number]   ORDERING CLINICIAN: ALONDRA BRITT   TECHNIQUE: Axial images were obtained through the brain. No IV contrast was administered.   All CT examinations are performed with one or more of the following dose reduction techniques: Automated Exposure Control, adjustment of mA and/or kV according to patient size, or use of iterative reconstruction techniques.   FINDINGS: The ventricles are enlarged  but midline in position, with proportional prominence of the sulci, due to atrophy. Patchy periventricular hypodensities are present suggestive of small vessel ischemic white matter disease. There is no intracranial hemorrhage. No mass or mass effect is seen. The osseus structures are unremarkable.       Redemonstration of age related changes without acute intracranial process.   Signed by: Severiano Cano 11/12/2023 5:01 PM Dictation workstation:   [id number]    XR chest 2 views    Result Date: 11/12/2023  Interpreted By:  Severiano Cano, STUDY: XR CHEST 2 VIEWS; 11/12/2023 4:39 pm   INDICATION: Signs/Symptoms:weakness   COMPARISON: October 2023.   ACCESSION NUMBER(S): [id number]   ORDERING CLINICIAN: ALONDRA BRITT   TECHNIQUE: Number of films: Two-view radiographs of the chest were obtained.   FINDINGS: The cardiac silhouette is mildly enlarged. The lungs are hyperinflated, with prominent interstitial markings bilaterally. Tissue prominence of the right paratracheal region is again seen, most likely due to ectatic/tortuous vessels. There are bilateral perihilar interstitial and mild alveolar infiltrates and small bilateral pleural effusions. Previously seen dense right upper lobe infiltrates have significantly improved. There is no pneumothorax. Extensive degenerative changes involve the spine.       1. Cardiomegaly and mild congestive heart failure on a background of COPD. Also improved infiltrates in the right upper lobe. 2. Right paratracheal tissue prominence again seen, most likely due to ectatic/tortuous vessels.   Signed by: Severiano Cano 11/12/2023 5:00 PM Dictation workstation:   [id number]    XR hip right 2 or 3 views    Result Date: 11/12/2023  Interpreted By:  Severiano Cano, STUDY: XR HIP RIGHT 2 OR 3 VIEWS; ;  11/12/2023 4:39 pm   INDICATION: Signs/Symptoms:right hip xray.   COMPARISON: October 2017   ACCESSION NUMBER(S): [id number]   ORDERING CLINICIAN: ALONDRA BRITT   FINDINGS: There is no  fracture or subluxation. Degenerative changes of both hip joints are present, with joint space narrowing and small osteophytes. The alignment is anatomic. The soft tissues are unremarkable. Degenerative change and disc disease also involves the visualized lower lumbar spine. Surgical staples are present on the right side of the abdomen.       Degenerative changes as above without acute fracture or subluxation.   Signed by: Severiano Cano 11/12/2023 4:55 PM Dictation workstation:   [id number]    ECG 12 lead    Result Date: 10/24/2023  Sinus bradycardia Left ventricular hypertrophy with QRS widening and repolarization abnormality Abnormal ECG When compared with ECG of 20-MAR-2023 14:58, Premature ventricular complexes are no longer Present Confirmed by Domi Vaughan ([id number]) on 10/24/2023 10:43:31 AM    CT angio chest for pulmonary embolism    Addendum Date: 10/22/2023    Interpreted By:  Eloise Joshi, ADDENDUM: Large, heterogenous thyroid nodule measuring 5.9 x 6.8 x 2.6 cm: Thyroid ultrasound may be beneficial on an outpatient basis.   Enlarged prevascular node may be reactive.   Signed by: Eloise Joshi 10/22/2023 6:04 PM   -------- ORIGINAL REPORT -------- Dictation workstation:   [id number]    Result Date: 10/22/2023  Interpreted By:  Eloise Joshi, STUDY: CT ANGIO CHEST FOR PULMONARY EMBOLISM;  10/22/2023 5:34 pm   INDICATION: Signs/Symptoms:acute onset SOB, dimer.   COMPARISON: Chest radiograph dated same day.   ACCESSION NUMBER(S): [id number]   ORDERING CLINICIAN: ALONDRA SELLERS   TECHNIQUE: Helical data acquisition of the chest was obtained contrast volume:without IV contrast material/Optiray 350/Isovue 370.  Images were reformatted in coronal and sagittal planes. Axial and coronal MIP images were created and reviewed.   FINDINGS: POTENTIAL LIMITATIONS OF THE STUDY: None   HEART AND VESSELS: No discrete filling defects within the main pulmonary artery or its branches.   The right and left pulmonary arteries are  dilated. The right measures 2.8 cm, and the left measures 2.8 cm.   Atherosclerotic disease of the aorta.   Mild atherosclerotic disease of the coronary vasculature.   Heart size is enlarged.   No evidence of pericardial effusion.   MEDIASTINUM AND JAYDEN, LOWER NECK AND AXILLA: Large, heterogenous thyroid gland containing scattered areas of calcifications measures 5.9 x 6.8 by 2.6 cm (series 4, image 16, and series 6, image 30).   Enlarged prevascular lymph node measures 1.4 by 1.2 cm (series 4, image 24).   Esophagus appears within normal limits as seen.   LUNGS AND AIRWAYS: The trachea and central airways are patent. No endobronchial lesion.   Bilateral confluent airspace disease involving all lung lobes bilaterally, right-greater-than-left. Moderate bilateral pleural effusions.   UPPER ABDOMEN: The visualized subdiaphragmatic structures demonstrate no remarkable findings.   CHEST WALL AND OSSEOUS STRUCTURES: There are no suspicious osseous lesions. Multilevel degenerative changes are present       1. No emboli within the proximal pulmonary vasculature.   2. Bilateral confluent airspace disease involving all lung lobes, right greater than left.   3. Moderate bilateral pleural effusions.   4. Enlarged bilateral main pulmonary arteries can be seen with elevated pulmonary arterial pressure.   5. Cardiomegaly.   MACRO: None   Signed by: Eloise Joshi 10/22/2023 5:53 PM Dictation workstation:   [id number]    XR chest 1 view    Result Date: 10/22/2023  Interpreted By:  Sally Stinson, STUDY: XR CHEST 1 VIEW; 10/22/2023 3:47 pm   INDICATION: Signs/Symptoms:SOB   COMPARISON: 07/27/2023   ACCESSION NUMBER(S): [id number]   ORDERING CLINICIAN: ALONDRA SELLERS   TECHNIQUE: Frontal chest radiograph   FINDINGS: The cardiomediastinal silhouette is unremarkable. Patchy airspace opacities are noted in both lungs particularly within the right upper lung field. Bibasilar pleural effusions associated atelectasis.   The osseous  structures are intact.       Patchy bilateral airspace opacities are noted in both lungs particularly within the right midlung field is concerning for pneumonia. Bibasilar effusions associated atelectasis.     Signed by: Sally Stinson 10/22/2023 4:04 PM Dictation workstation:   [id number]    No results found for the last 90 days.       Assessment/Plan   Principal Problem:    Weakness of both legs  PT OT    UTI on antibiotic    Chronic renal insufficiency monitor    Congestive heart failure monitor    Intravascular dehydration IV hydration    Low blood pressure monitor    Anemia monitor    Altered mental status metabolic and cephalopathy monitor    PT OT    Follow               Avel Bailey MD

## 2023-11-12 NOTE — ED PROVIDER NOTES
HPI   No chief complaint on file.      89-year-old female presenting emergency department with a chief complaint of fall which occurred last night.  She states she laid on the ground on her car.  All night.  She is unsure if she hit her head or have loss of consciousness.  She denies anticoagulation.  She does complain of pain in her right hip.  She denies nausea vomiting chest pain shortness of breath abdominal pain dysuria.  Complains of generalized weakness.  No other complaint.                          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    Body mass index (BMI) 22.0-22.9, adult     BMI 22.0-22.9, adult    Cervicalgia 05/08/2015    Neck pain    CHF (congestive heart failure) (CMS/[facility])     High cholesterol     Hypertension     Leg swelling     Malignant neoplasm of unspecified site of unspecified female breast (CMS/[facility]) 07/19/2013    Adenocarcinoma of breast    Other conditions influencing health status 10/21/2013    Urinary Tract Infection    Personal history of other infectious and parasitic diseases 03/17/2015    History of candidiasis of mouth    Personal history of other specified conditions 03/17/2015    History of abdominal pain    Personal history of pneumonia (recurrent) 09/10/2015    History of pneumonia    Personal history of transient ischemic attack (TIA), and cerebral infarction without residual deficits 09/04/2014    History of stroke     Past Surgical History:   Procedure Laterality Date    BREAST BIOPSY  09/10/2015    Biopsy Breast Open    BREAST LUMPECTOMY  07/19/2013    Breast Surgery Lumpectomy    CARPAL TUNNEL RELEASE  09/04/2014    Neuroplasty Decompression Median Nerve At Carpal Tunnel    CATARACT EXTRACTION  11/02/2015    Cataract Surgery    HAND TENDON SURGERY  11/02/2015    Hand Incision Tendon Sheath Of A Finger    HYSTERECTOMY  07/19/2013    Hysterectomy    MR HEAD  ANGIO WO IV CONTRAST  12/17/2020    MR HEAD ANGIO WO IV CONTRAST [facility]    MR HEAD ANGIO WO IV CONTRAST  5/13/2021    MR HEAD ANGIO WO IV CONTRAST [facility]    MR HEAD ANGIO WO IV CONTRAST  5/24/2022    MR HEAD ANGIO WO IV CONTRAST [facility]    MR NECK ANGIO WO IV CONTRAST  5/24/2022    MR NECK ANGIO WO IV CONTRAST [facility]    OTHER SURGICAL HISTORY  11/02/2015    Wrist Carpectomy    ROTATOR CUFF REPAIR  09/10/2015    Rotator Cuff Repair    SENTINEL LYMPH NODE BIOPSY  09/10/2015    [place] Lymph Node Biopsy    SHOULDER SURGERY  07/19/2013    Shoulder Surgery     Family History   Problem Relation Name Age of Onset    Alzheimer's disease Father      Breast cancer Sister      Pancreatic cancer Sister      Arthritis Other       Social History     Tobacco Use    Smoking status: Never    Smokeless tobacco: Never   Substance Use Topics    Alcohol use: Never    Drug use: Never       Physical Exam   ED Triage Vitals [11/12/23 1543]   Temp Heart Rate Resp BP   36.3 °C (97.3 °F) 59 16 159/71      SpO2 Temp Source Heart Rate Source Patient Position   100 % Oral Monitor Lying      BP Location FiO2 (%)     Right arm --       Physical Exam  Vitals and nursing note reviewed.   Constitutional:       Appearance: Normal appearance.   HENT:      Head: Normocephalic and atraumatic.      Mouth/Throat:      Mouth: Mucous membranes are dry.   Cardiovascular:      Rate and Rhythm: Normal rate and regular rhythm.   Pulmonary:      Effort: Pulmonary effort is normal.      Breath sounds: Normal breath sounds.   Abdominal:      General: Abdomen is flat.      Palpations: Abdomen is soft.   Musculoskeletal:         General: No swelling, deformity or signs of injury.      Cervical back: Normal range of motion and neck supple.      Comments: Patient with tenderness to right hip however range of motion is intact in lower extremity, no pelvic instability, no midline spinal tenderness   Skin:      General: Skin is warm and dry.   Neurological:      General: No focal deficit present.      Mental Status: She is alert and oriented to person, place, and time.   Psychiatric:         Mood and Affect: Mood normal.         ED Course & MDM   ED Course as of 11/12/23 2126   Sun Nov 12, 2023   1903 EKG ordered and interpreted by me at 1850.  Rate is 52, Rhythm is sinus, Axis is left deviated, QTc is 515, no ST elevation.  Prolonged CA.  Pvc's noted   [JM]      ED Course User Index  [JM] Alejandra Machuca MD         Diagnoses as of 11/12/23 2126   Weakness of both legs   Fall, initial encounter   Head injury, initial encounter   Dehydration   Urinary tract infection without hematuria, site unspecified       Medical Decision Making  I have seen and evaluated this patient.  The attending physician has also seen and evaluated this patient.  Vital signs, laboratory testing and diagnostic images if applicable have been reviewed.  All laboratory and imaging is interpreted by myself unless otherwise stated.  Radiology studies are also formally interpreted by radiologist.    CBC without significant leukocytosis or anemia, metabolic panel without significant renal impairment or electrolyte abnormality.  Creatinine kinase within normal range, negative CT brain and C-spine.  Urinalysis minimally infected.  Patient with generalized weakness, will admit for further treatment management and hydration treatment of UTI.    Labs Reviewed  CBC WITH AUTO DIFFERENTIAL - Abnormal     WBC                           15.7 (*)               nRBC                          0.0                    RBC                           4.96                   Hemoglobin                    14.5                   Hematocrit                    46.5 (*)               MCV                           94                     MCH                           29.2                   MCHC                          31.2 (*)               RDW                           14.3                    Platelets                     240                    Neutrophils %                 89.9                   Immature Granulocytes %, Automated   0.6                    Lymphocytes %                 4.4                    Monocytes %                   4.8                    Eosinophils %                 0.1                    Basophils %                   0.2                    Neutrophils Absolute          14.14 (*)               Immature Granulocytes Absolute, Au*   0.09                   Lymphocytes Absolute          0.69 (*)               Monocytes Absolute            0.75                   Eosinophils Absolute          0.02                   Basophils Absolute            0.03                COMPREHENSIVE METABOLIC PANEL - Abnormal     Glucose                       122 (*)                Sodium                        141                    Potassium                     5.0                    Chloride                      98                     Bicarbonate                   29                     Urea Nitrogen                 14                     Creatinine                    0.70                   eGFR                          81                     Calcium                       8.8                    Albumin                       3.8                    Alkaline Phosphatase          177 (*)                Total Protein                 7.7                    AST                           29                     Bilirubin, Total              1.2                    ALT                           34                     Anion Gap                     14                  SERIAL TROPONIN, INITIAL ([facility]) - Abnormal     Troponin T, High Sensitivity   35 (*)              SERIAL TROPONIN,  2 HOUR ([facility]) - Abnormal     Troponin T, High Sensitivity   35 (*)              URINALYSIS WITH REFLEX MICROSCOPIC AND CULTURE - Abnormal     Color, Urine                                         Appearance, Urine              Clear                  Specific Gravity, Urine       1.017                  pH, Urine                     8.0                    Protein, Urine                30 (1+) (*)               Glucose, Urine                Normal                 Blood, Urine                  NEGATIVE                Ketones, Urine                NEGATIVE                Bilirubin, Urine              NEGATIVE                Urobilinogen, Urine           Normal                 Nitrite, Urine                NEGATIVE                Leukocyte Esterase, Urine     25 Anurag/µL (*)            MICROSCOPIC ONLY, URINE - Abnormal     WBC, Urine                    1-5                    RBC, Urine                    1-2                    Squamous Epithelial Cells, Urine                          Hyaline Casts, Urine          1+ (*)              CREATINE KINASE - Normal     Creatine Kinase               189                 URINE CULTURE  URINALYSIS WITH REFLEX MICROSCOPIC AND CULTURE       Narrative: The following orders were created for panel order Urinalysis with Reflex Microscopic and Culture.                Procedure                               Abnormality         Status                                   ---------                               -----------         ------                                   Urinalysis with Reflex M...[[id number]]  Abnormal            Final result                             Extra Urine Gray Tube[[id number]]                                                                                     Please view results for these tests on the individual orders.  TROPONIN T SERIES, HIGH SENSITIVITY (0, 2 HR, 6 HR)       Narrative: The following orders were created for panel order Troponin T Series, High Sensitivity (0, 2HR, 6HR).                Procedure                               Abnormality         Status                                   ---------                               -----------         ------                                    Serial Troponin, Initial...[[id number]]  Abnormal            Final result                             Serial Troponin, 2 Hour ...[[id number]]  Abnormal            Final result                             Serial Troponin, 6 Hour ...[[id number]]                                                                               Please view results for these tests on the individual orders.  SERIAL TROPONIN, 6 HOUR ([facility])  CT head wo IV contrast   Final Result    Redemonstration of age related changes without acute intracranial    process.          Signed by: Severiano Cano 11/12/2023 5:01 PM    Dictation workstation:   [id number]     CT cervical spine wo IV contrast   Final Result    1. Cervical spondylosis without acute fracture or subluxation.    2. Very large substernal goiter, extending in the visualized portion    of the upper mediastinum.    3. Right upper lobe infiltrates, possibly related to pneumonia.          Signed by: Severiano Cano 11/12/2023 5:05 PM    Dictation workstation:   [id number]     XR hip right 2 or 3 views   Final Result    Degenerative changes as above without acute fracture or subluxation.          Signed by: Severiano Cano 11/12/2023 4:55 PM    Dictation workstation:   [id number]     XR chest 2 views   Final Result    1. Cardiomegaly and mild congestive heart failure on a background of    COPD. Also improved infiltrates in the right upper lobe.    2. Right paratracheal tissue prominence again seen, most likely due    to ectatic/tortuous vessels.          Signed by: Severiano Cano 11/12/2023 5:00 PM    Dictation workstation:   [id number]     Medications  enoxaparin (Lovenox) syringe 40 mg (40 mg subcutaneous Given 11/12/23 2026)  cefTRIAXone (Rocephin) IVPB 1 g (1 g intravenous New Bag 11/12/23 2024)  Current Discharge Medication List                Procedure  Procedures     Silvano Shields PA-C  11/12/23 2126

## 2023-11-13 LAB
ALBUMIN SERPL-MCNC: 2.6 G/DL (ref 3.5–5)
ALP BLD-CCNC: 121 U/L (ref 35–125)
ALT SERPL-CCNC: 21 U/L (ref 5–40)
ANION GAP SERPL CALC-SCNC: 6 MMOL/L
AST SERPL-CCNC: 18 U/L (ref 5–40)
BILIRUB SERPL-MCNC: 0.8 MG/DL (ref 0.1–1.2)
BUN SERPL-MCNC: 15 MG/DL (ref 8–25)
CALCIUM SERPL-MCNC: 7.8 MG/DL (ref 8.5–10.4)
CHLORIDE SERPL-SCNC: 103 MMOL/L (ref 97–107)
CO2 SERPL-SCNC: 30 MMOL/L (ref 24–31)
CREAT SERPL-MCNC: 0.8 MG/DL (ref 0.4–1.6)
GFR SERPL CREATININE-BSD FRML MDRD: 69 ML/MIN/1.73M*2
GLUCOSE SERPL-MCNC: 101 MG/DL (ref 65–99)
HCT VFR BLD AUTO: 33.8 % (ref 36–46)
HCT VFR BLD AUTO: 36 % (ref 36–46)
HCT VFR BLD AUTO: 36.3 % (ref 36–46)
HGB BLD-MCNC: 10.7 G/DL (ref 12–16)
HGB BLD-MCNC: 11 G/DL (ref 12–16)
HGB BLD-MCNC: 11.4 G/DL (ref 12–16)
POTASSIUM SERPL-SCNC: 4 MMOL/L (ref 3.4–5.1)
PROT SERPL-MCNC: 5.4 G/DL (ref 5.9–7.9)
SODIUM SERPL-SCNC: 139 MMOL/L (ref 133–145)
TROPONIN T SERPL-MCNC: 38 NG/L

## 2023-11-13 PROCEDURE — 36415 COLL VENOUS BLD VENIPUNCTURE: CPT | Performed by: INTERNAL MEDICINE

## 2023-11-13 PROCEDURE — 2500000005 HC RX 250 GENERAL PHARMACY W/O HCPCS: Performed by: INTERNAL MEDICINE

## 2023-11-13 PROCEDURE — 2500000001 HC RX 250 WO HCPCS SELF ADMINISTERED DRUGS (ALT 637 FOR MEDICARE OP): Performed by: INTERNAL MEDICINE

## 2023-11-13 PROCEDURE — 85014 HEMATOCRIT: CPT | Performed by: INTERNAL MEDICINE

## 2023-11-13 PROCEDURE — 2500000002 HC RX 250 W HCPCS SELF ADMINISTERED DRUGS (ALT 637 FOR MEDICARE OP, ALT 636 FOR OP/ED): Performed by: INTERNAL MEDICINE

## 2023-11-13 PROCEDURE — 80053 COMPREHEN METABOLIC PANEL: CPT | Performed by: INTERNAL MEDICINE

## 2023-11-13 PROCEDURE — 2500000004 HC RX 250 GENERAL PHARMACY W/ HCPCS (ALT 636 FOR OP/ED): Performed by: INTERNAL MEDICINE

## 2023-11-13 PROCEDURE — 99232 SBSQ HOSP IP/OBS MODERATE 35: CPT | Performed by: INTERNAL MEDICINE

## 2023-11-13 PROCEDURE — 94640 AIRWAY INHALATION TREATMENT: CPT

## 2023-11-13 PROCEDURE — 85018 HEMOGLOBIN: CPT | Performed by: INTERNAL MEDICINE

## 2023-11-13 PROCEDURE — 1100000001 HC PRIVATE ROOM DAILY

## 2023-11-13 PROCEDURE — 99222 1ST HOSP IP/OBS MODERATE 55: CPT | Performed by: INTERNAL MEDICINE

## 2023-11-13 PROCEDURE — 9420000001 HC RT PATIENT EDUCATION 5 MIN

## 2023-11-13 RX ORDER — FUROSEMIDE 40 MG/1
40 TABLET ORAL DAILY
Status: DISCONTINUED | OUTPATIENT
Start: 2023-11-13 | End: 2023-11-15 | Stop reason: HOSPADM

## 2023-11-13 RX ADMIN — FUROSEMIDE 40 MG: 40 TABLET ORAL at 15:28

## 2023-11-13 RX ADMIN — ROPINIROLE HYDROCHLORIDE 2 MG: 0.5 TABLET, FILM COATED ORAL at 21:41

## 2023-11-13 RX ADMIN — Medication: at 20:34

## 2023-11-13 RX ADMIN — ALBUTEROL SULFATE 2.5 MG: 2.5 SOLUTION RESPIRATORY (INHALATION) at 07:57

## 2023-11-13 RX ADMIN — Medication 324 MG: at 08:25

## 2023-11-13 RX ADMIN — LIDOCAINE 1 PATCH: 4 PATCH TOPICAL at 08:28

## 2023-11-13 RX ADMIN — CALCITONIN SALMON 1 SPRAY: 200 SPRAY, METERED NASAL at 08:26

## 2023-11-13 RX ADMIN — LORATADINE 10 MG: 10 TABLET ORAL at 08:25

## 2023-11-13 RX ADMIN — DOCUSATE SODIUM 100 MG: 100 CAPSULE, LIQUID FILLED ORAL at 21:41

## 2023-11-13 RX ADMIN — Medication 400 MG: at 08:25

## 2023-11-13 RX ADMIN — METOPROLOL SUCCINATE 25 MG: 25 TABLET, EXTENDED RELEASE ORAL at 08:25

## 2023-11-13 RX ADMIN — CEFTRIAXONE SODIUM 1 G: 1 INJECTION, SOLUTION INTRAVENOUS at 08:26

## 2023-11-13 RX ADMIN — CEFTRIAXONE SODIUM 1 G: 1 INJECTION, SOLUTION INTRAVENOUS at 21:41

## 2023-11-13 RX ADMIN — POTASSIUM CHLORIDE 20 MEQ: 1.5 FOR SOLUTION ORAL at 08:25

## 2023-11-13 RX ADMIN — Medication 3 MG: at 21:41

## 2023-11-13 RX ADMIN — PREDNISONE 5 MG: 5 TABLET ORAL at 08:24

## 2023-11-13 RX ADMIN — PANTOPRAZOLE SODIUM 40 MG: 40 TABLET, DELAYED RELEASE ORAL at 08:25

## 2023-11-13 RX ADMIN — METOPROLOL SUCCINATE 25 MG: 25 TABLET, EXTENDED RELEASE ORAL at 21:41

## 2023-11-13 RX ADMIN — SERTRALINE 50 MG: 50 TABLET, FILM COATED ORAL at 08:25

## 2023-11-13 RX ADMIN — ALBUTEROL SULFATE 2.5 MG: 2.5 SOLUTION RESPIRATORY (INHALATION) at 12:35

## 2023-11-13 RX ADMIN — ATORVASTATIN CALCIUM 40 MG: 40 TABLET, FILM COATED ORAL at 21:41

## 2023-11-13 RX ADMIN — FAMOTIDINE 20 MG: 20 TABLET, FILM COATED ORAL at 09:27

## 2023-11-13 SDOH — SOCIAL STABILITY: SOCIAL INSECURITY
WITHIN THE LAST YEAR, HAVE TO BEEN RAPED OR FORCED TO HAVE ANY KIND OF SEXUAL ACTIVITY BY YOUR PARTNER OR EX-PARTNER?: NO

## 2023-11-13 SDOH — ECONOMIC STABILITY: FOOD INSECURITY: WITHIN THE PAST 12 MONTHS, THE FOOD YOU BOUGHT JUST DIDN'T LAST AND YOU DIDN'T HAVE MONEY TO GET MORE.: NEVER TRUE

## 2023-11-13 SDOH — ECONOMIC STABILITY: INCOME INSECURITY: IN THE PAST 12 MONTHS, HAS THE ELECTRIC, GAS, OIL, OR WATER COMPANY THREATENED TO SHUT OFF SERVICE IN YOUR HOME?: NO

## 2023-11-13 SDOH — ECONOMIC STABILITY: INCOME INSECURITY: HOW HARD IS IT FOR YOU TO PAY FOR THE VERY BASICS LIKE FOOD, HOUSING, MEDICAL CARE, AND HEATING?: NOT HARD AT ALL

## 2023-11-13 SDOH — ECONOMIC STABILITY: TRANSPORTATION INSECURITY
IN THE PAST 12 MONTHS, HAS THE LACK OF TRANSPORTATION KEPT YOU FROM MEDICAL APPOINTMENTS OR FROM GETTING MEDICATIONS?: NO

## 2023-11-13 SDOH — SOCIAL STABILITY: SOCIAL NETWORK: ARE YOU MARRIED, WIDOWED, DIVORCED, SEPARATED, NEVER MARRIED, OR LIVING WITH A PARTNER?: WIDOWED

## 2023-11-13 SDOH — HEALTH STABILITY: MENTAL HEALTH: HOW OFTEN DO YOU HAVE 6 OR MORE DRINKS ON ONE OCCASION?: NEVER

## 2023-11-13 SDOH — ECONOMIC STABILITY: TRANSPORTATION INSECURITY
IN THE PAST 12 MONTHS, HAS LACK OF TRANSPORTATION KEPT YOU FROM MEETINGS, WORK, OR FROM GETTING THINGS NEEDED FOR DAILY LIVING?: NO

## 2023-11-13 SDOH — ECONOMIC STABILITY: FOOD INSECURITY: WITHIN THE PAST 12 MONTHS, YOU WORRIED THAT YOUR FOOD WOULD RUN OUT BEFORE YOU GOT MONEY TO BUY MORE.: NEVER TRUE

## 2023-11-13 SDOH — HEALTH STABILITY: MENTAL HEALTH
STRESS IS WHEN SOMEONE FEELS TENSE, NERVOUS, ANXIOUS, OR CAN'T SLEEP AT NIGHT BECAUSE THEIR MIND IS TROUBLED. HOW STRESSED ARE YOU?: NOT AT ALL

## 2023-11-13 SDOH — HEALTH STABILITY: MENTAL HEALTH: HOW OFTEN DO YOU HAVE A DRINK CONTAINING ALCOHOL?: NEVER

## 2023-11-13 SDOH — ECONOMIC STABILITY: INCOME INSECURITY: IN THE LAST 12 MONTHS, WAS THERE A TIME WHEN YOU WERE NOT ABLE TO PAY THE MORTGAGE OR RENT ON TIME?: NO

## 2023-11-13 SDOH — SOCIAL STABILITY: SOCIAL INSECURITY: WITHIN THE LAST YEAR, HAVE YOU BEEN HUMILIATED OR EMOTIONALLY ABUSED IN OTHER WAYS BY YOUR PARTNER OR EX-PARTNER?: NO

## 2023-11-13 SDOH — SOCIAL STABILITY: SOCIAL NETWORK: HOW OFTEN DO YOU ATTENT MEETINGS OF THE CLUB OR ORGANIZATION YOU BELONG TO?: NEVER

## 2023-11-13 SDOH — SOCIAL STABILITY: SOCIAL INSECURITY
WITHIN THE LAST YEAR, HAVE YOU BEEN KICKED, HIT, SLAPPED, OR OTHERWISE PHYSICALLY HURT BY YOUR PARTNER OR EX-PARTNER?: NO

## 2023-11-13 SDOH — SOCIAL STABILITY: SOCIAL NETWORK: HOW OFTEN DO YOU GET TOGETHER WITH FRIENDS OR RELATIVES?: MORE THAN THREE TIMES A WEEK

## 2023-11-13 SDOH — SOCIAL STABILITY: SOCIAL INSECURITY: WITHIN THE LAST YEAR, HAVE YOU BEEN AFRAID OF YOUR PARTNER OR EX-PARTNER?: NO

## 2023-11-13 SDOH — SOCIAL STABILITY: SOCIAL NETWORK
DO YOU BELONG TO ANY CLUBS OR ORGANIZATIONS SUCH AS CHURCH GROUPS UNIONS, FRATERNAL OR ATHLETIC GROUPS, OR SCHOOL GROUPS?: NO

## 2023-11-13 SDOH — HEALTH STABILITY: PHYSICAL HEALTH: ON AVERAGE, HOW MANY DAYS PER WEEK DO YOU ENGAGE IN MODERATE TO STRENUOUS EXERCISE (LIKE A BRISK WALK)?: 0 DAYS

## 2023-11-13 SDOH — ECONOMIC STABILITY: HOUSING INSECURITY
IN THE LAST 12 MONTHS, WAS THERE A TIME WHEN YOU DID NOT HAVE A STEADY PLACE TO SLEEP OR SLEPT IN A SHELTER (INCLUDING NOW)?: NO

## 2023-11-13 SDOH — SOCIAL STABILITY: SOCIAL NETWORK
IN A TYPICAL WEEK, HOW MANY TIMES DO YOU TALK ON THE PHONE WITH FAMILY, FRIENDS, OR NEIGHBORS?: MORE THAN THREE TIMES A WEEK

## 2023-11-13 SDOH — HEALTH STABILITY: MENTAL HEALTH: HOW MANY STANDARD DRINKS CONTAINING ALCOHOL DO YOU HAVE ON A TYPICAL DAY?: PATIENT DOES NOT DRINK

## 2023-11-13 SDOH — SOCIAL STABILITY: SOCIAL NETWORK: HOW OFTEN DO YOU ATTEND CHURCH OR RELIGIOUS SERVICES?: 1 TO 4 TIMES PER YEAR

## 2023-11-13 SDOH — HEALTH STABILITY: PHYSICAL HEALTH: ON AVERAGE, HOW MANY MINUTES DO YOU ENGAGE IN EXERCISE AT THIS LEVEL?: 0 MIN

## 2023-11-13 ASSESSMENT — COGNITIVE AND FUNCTIONAL STATUS - GENERAL
TURNING FROM BACK TO SIDE WHILE IN FLAT BAD: A LITTLE
MOBILITY SCORE: 16
WALKING IN HOSPITAL ROOM: A LOT
DRESSING REGULAR LOWER BODY CLOTHING: A LITTLE
CLIMB 3 TO 5 STEPS WITH RAILING: A LOT
WALKING IN HOSPITAL ROOM: A LOT
MOVING TO AND FROM BED TO CHAIR: A LITTLE
HELP NEEDED FOR BATHING: A LITTLE
DAILY ACTIVITIY SCORE: 18
MOVING TO AND FROM BED TO CHAIR: A LITTLE
STANDING UP FROM CHAIR USING ARMS: A LITTLE
CLIMB 3 TO 5 STEPS WITH RAILING: A LOT
TOILETING: A LOT
DRESSING REGULAR LOWER BODY CLOTHING: A LITTLE
MOVING FROM LYING ON BACK TO SITTING ON SIDE OF FLAT BED WITH BEDRAILS: A LITTLE
TOILETING: A LOT
PERSONAL GROOMING: A LITTLE
PERSONAL GROOMING: A LITTLE
STANDING UP FROM CHAIR USING ARMS: A LITTLE
TURNING FROM BACK TO SIDE WHILE IN FLAT BAD: A LITTLE
HELP NEEDED FOR BATHING: A LITTLE
MOVING FROM LYING ON BACK TO SITTING ON SIDE OF FLAT BED WITH BEDRAILS: A LITTLE
DAILY ACTIVITIY SCORE: 18
DRESSING REGULAR UPPER BODY CLOTHING: A LITTLE
MOBILITY SCORE: 16
DRESSING REGULAR UPPER BODY CLOTHING: A LITTLE

## 2023-11-13 ASSESSMENT — ACTIVITIES OF DAILY LIVING (ADL)
PATIENT'S MEMORY ADEQUATE TO SAFELY COMPLETE DAILY ACTIVITIES?: YES
JUDGMENT_ADEQUATE_SAFELY_COMPLETE_DAILY_ACTIVITIES: YES
ASSISTIVE_DEVICE: HEARING AID - RIGHT;WALKER
ADEQUATE_TO_COMPLETE_ADL: YES
LACK_OF_TRANSPORTATION: NO
GROOMING: INDEPENDENT
BATHING: NEEDS ASSISTANCE
FEEDING YOURSELF: INDEPENDENT
TOILETING: INDEPENDENT
WALKS IN HOME: NEEDS ASSISTANCE
HEARING - RIGHT EAR: DIFFICULTY WITH NOISE
HEARING - LEFT EAR: DIFFICULTY WITH NOISE
DRESSING YOURSELF: INDEPENDENT

## 2023-11-13 ASSESSMENT — PAIN SCALES - GENERAL
PAINLEVEL_OUTOF10: 0 - NO PAIN

## 2023-11-13 ASSESSMENT — LIFESTYLE VARIABLES
SKIP TO QUESTIONS 9-10: 1
AUDIT-C TOTAL SCORE: 0

## 2023-11-13 NOTE — PROGRESS NOTES
PCN informed per [facility] that patient is active with [facility] team for SN/PT/OT/HHA. PCN built and sent referral via [facility] to [facility] team asking to verify if active and with said services. Awaiting response, [facility] and definitive discharge date.       Omi Harmon

## 2023-11-13 NOTE — PROGRESS NOTES
"Kalie Ramos is a 89 y.o. female on day 1 of admission presenting with Weakness of both legs.  Shortness of breath somewhat better feeling weak and lethargic    Subjective   Shortness of breath stable feeling better       Objective   Reduced pain feeling better  Physical Exam  HEENT:  Head externally atraumatic, no pallor, no icterus, extraocular movements intact, pulls reacting to light, oral mucosa moist and throat clear.  Neck:  Supple, no JVP, no palpable adenopathy or thyromegaly.  No carotid bruit.  Chest:  Clear to auscultation and resonant.  Heart:  Regular rate and rhythm, no murmur or gallop could be appreciated.  Abdomen:  Soft, nontender, bowel sounds present, normoactive, no palpable hepatosplenomegaly.  Extremities:  No edema, pulses present, no cyanosis or clubbing.  CNS:  Patient alert, oriented to time, place and person.  Power 5/5 all over and deep tendon reflexes symmetrical, cranial nerves 2-12 grossly intact.  Skin:  No active rash.  Musculoskeletal:  No joint swelling or erythema, range of movement normal.  Malaise and  Last Recorded Vitals  Heart Rate:  [52-65]   Temp:  [36.6 °C (97.9 °F)-36.9 °C (98.4 °F)]   Resp:  [16-18]   BP: ()/(40-84)   Height:  [154.9 cm (5' 1\")]   Weight:  [53.2 kg (117 lb 4.6 oz)]   SpO2:  [98 %-100 %]     Intake/Output last 3 Shifts:  I/O last 3 completed shifts:  In: 1326.7 (24.9 mL/kg) [I.V.:1276.7 (24 mL/kg); IV Piggyback:50]  Out: - (0 mL/kg)   Weight: 53.2 kg     Relevant Results  No results found for the last 90 days.    Results for orders placed or performed during the hospital encounter of 11/12/23 (from the past 24 hour(s))   Serial Troponin, 6 Hour ([facility])   Result Value Ref Range    Troponin T, High Sensitivity 38 (H) <=15 ng/L   Hemoglobin and Hematocrit, Blood   Result Value Ref Range    Hemoglobin 10.7 (L) 12.0 - 16.0 g/dL    Hematocrit 33.8 (L) 36.0 - 46.0 %   Comprehensive Metabolic Panel   Result Value Ref Range    Glucose 101 (H) 65 - 99 " mg/dL    Sodium 139 133 - 145 mmol/L    Potassium 4.0 3.4 - 5.1 mmol/L    Chloride 103 97 - 107 mmol/L    Bicarbonate 30 24 - 31 mmol/L    Urea Nitrogen 15 8 - 25 mg/dL    Creatinine 0.80 0.40 - 1.60 mg/dL    eGFR 69 >60 mL/min/1.73m*2    Calcium 7.8 (L) 8.5 - 10.4 mg/dL    Albumin 2.6 (L) 3.5 - 5.0 g/dL    Alkaline Phosphatase 121 35 - 125 U/L    Total Protein 5.4 (L) 5.9 - 7.9 g/dL    AST 18 5 - 40 U/L    Bilirubin, Total 0.8 0.1 - 1.2 mg/dL    ALT 21 5 - 40 U/L    Anion Gap 6 <=19 mmol/L   Hemoglobin and Hematocrit, Blood   Result Value Ref Range    Hemoglobin 11.4 (L) 12.0 - 16.0 g/dL    Hematocrit 36.3 36.0 - 46.0 %   Hemoglobin and Hematocrit, Blood   Result Value Ref Range    Hemoglobin 11.0 (L) 12.0 - 16.0 g/dL    Hematocrit 36.0 36.0 - 46.0 %        Current Facility-Administered Medications:     acetaminophen (Tylenol) tablet 650 mg, 650 mg, oral, q6h PRN, Avel Bailey MD    albuterol 2.5 mg /3 mL (0.083 %) nebulizer solution 2.5 mg, 2.5 mg, nebulization, TID, Avel Bailey MD, 2.5 mg at 11/13/23 1235    albuterol 2.5 mg /3 mL (0.083 %) nebulizer solution 2.5 mg, 2.5 mg, nebulization, q2h PRN, Avel Bailey MD    aspirin EC tablet 81 mg, 81 mg, oral, Once, Avel Bailey MD    atorvastatin (Lipitor) tablet 40 mg, 40 mg, oral, Nightly, Avel Bailey MD, 40 mg at 11/12/23 2322    benzocaine-menthol (Cepastat Sore Throat) 15-3.6 mg lozenge 1 lozenge, 1 lozenge, Mouth/Throat, q4h PRN, Avel Bailey MD    calcitonin (salmon) (Miacalcin) nasal spray 1 spray, 1 spray, One Nostril, Daily, Avel Bailey MD, 1 spray at 11/13/23 0826    cefTRIAXone (Rocephin) IVPB 1 g, 1 g, intravenous, BID, Avel Bailey MD, Stopped at 11/13/23 0856    docusate sodium (Colace) capsule 100 mg, 100 mg, oral, BID, Avel Bailey MD    enoxaparin (Lovenox) syringe 40 mg, 40 mg, subcutaneous, Daily, Avel Bailey MD, 40 mg at 11/12/23 2026    famotidine (Pepcid) tablet 20 mg, 20 mg, oral, q12h, Avel  MD Leo, 20 mg at 11/13/23 0927    ferrous gluconate (Fergon) 324 (38 Fe) mg tablet 324 mg, 324 mg, oral, Once per day on Mon Wed Fri, Avel Bailey MD, 324 mg at 11/13/23 0825    furosemide (Lasix) tablet 40 mg, 40 mg, oral, Daily, Jim Mckinney MD, 40 mg at 11/13/23 1528    guaiFENesin (Robitussin) 100 mg/5 mL syrup 10 mL, 10 mL, oral, q4h PRN, Avel Bailey MD    lidocaine 4 % patch 1 patch, 1 patch, transdermal, Daily, Avel Bailey MD, 1 patch at 11/13/23 0828    loratadine (Claritin) tablet 10 mg, 10 mg, oral, Daily, Avel Bailey MD, 10 mg at 11/13/23 0825    magnesium oxide (Mag-Ox) tablet 400 mg, 400 mg, oral, Once per day on Mon Wed Fri, Avel Bailey MD, 400 mg at 11/13/23 0825    melatonin tablet 3 mg, 3 mg, oral, Nightly, Avel Bailey MD, 3 mg at 11/12/23 2322    metoprolol succinate XL (Toprol-XL) 24 hr tablet 25 mg, 25 mg, oral, BID, Avel Bailey MD, 25 mg at 11/13/23 0825    nitroglycerin (Nitrostat) SL tablet 0.4 mg, 0.4 mg, sublingual, q5 min PRN, Avel Bailey MD    oxygen (O2) therapy, , inhalation, q24h, Avel Bailey MD, Start at 11/12/23 2200    pantoprazole (ProtoNix) EC tablet 40 mg, 40 mg, oral, Daily, Avel Bailey MD, 40 mg at 11/13/23 0825    potassium chloride (Klor-Con) packet 20 mEq, 20 mEq, oral, Daily with breakfast, Avel Bailey MD, 20 mEq at 11/13/23 0825    predniSONE (Deltasone) tablet 5 mg, 5 mg, oral, Daily with breakfast, Avel Bailey MD, 5 mg at 11/13/23 0824    rOPINIRole (Requip) tablet 2 mg, 2 mg, oral, Nightly, Avel Bailey MD, 2 mg at 11/12/23 2322    sertraline (Zoloft) tablet 50 mg, 50 mg, oral, Daily, Avel Bailey MD, 50 mg at 11/13/23 0825    sodium chloride (Ocean) 0.65 % nasal spray 1 spray, 1 spray, Each Nostril, 4x daily PRN, Avel Bailey MD    traZODone (Desyrel) tablet 50 mg, 50 mg, oral, Nightly PRN, Avel Bailey MD   Assessment/Plan   Principal Problem:    Weakness of both legs    PT  OT    Fall    Monitor    Hypertension medication    Dehydration improving    Anxiety depression stable    PPI GERD         Avel Bailey MD

## 2023-11-13 NOTE — PROGRESS NOTES
"Music Therapy Note    Kalie Ramos was referred by nursing    Therapy Session  Referral Type: New referral this admission  Visit Type: New visit  Session Start Time: 1401  Session End Time: 1413  Intervention Delivery: In-person  Conflict of Service: None  Number of family members present: 1  Family Present for Session: Child  Family Participation: Interactive     Pre-assessment  Mood/Affect: Appropriate, Tired/lethargic, Cooperative         Treatment/Interventions  Areas of Focus: Agitation reduction, Anxiety reduction, Relaxation, Stress reduction  Music Therapy Interventions: Active music engagement, Live music listening, Recorded music listening    Post-assessment  Mood/Affect: Calm, Elated, Goal focused, Participative  Continue Visiting: Yes  Total Session Time (min): 12 minutes    Narrative  Assessment Detail: Pt was found sitting upright with daughter by bedside. Pt seemed lethargic and had a hard time hearing MT. Pt was agreable and requested big band music.  Plan: Play patient preferred music to decrease anxiety and agitaiton.  Intervention: Play live version of \"Sentimental Journey\" and recorded version of \"I'm Feeling Good\"  Evaluation: Pt seemed happier, evidenced by a big smile and loud laughter while playing. Pt's body language seemed more relaxed. After session ended, pt expressed how fun it was to play the egg shaker. Daughter expressed deep gratitude for music.  Follow-up: Will continue to follow-up as needed.    Education Documentation  No documentation found.          "

## 2023-11-13 NOTE — PROGRESS NOTES
Patient is currently admitted through [facility] emergency room 11/12/2023 with increasing weakness lethargy and an apparent fall in the bathroom where she was found.  Initial evaluation in the emergency room included a head CT showing redemonstration of age-related changes without acute intracranial process.  PA lateral chest x-ray showed cardiomegaly mild CHF on a background of COPD with improved infiltrate in the right upper lobe.            Patient's daughter Annette called to get information regarding discharge plan. Patient currently has [facility] with RN, PT, ST and home health aide. Daughter wants her to continue living at home with home health care. Daughter checks on her daily (calls her) anhd comes out at least 4x/day. Daughter states she is able to do her own care, has hired help to come and clean her house.   Patient admitted after having fallen in the bathroom and not being able to get up due to generalized weakness.  She lives independently in her own condominium and has been very resistant to review our recommendation for assisted living.  She does have a daughter who lives within 10 minutes.    Per MD Notes, Her overall status appears to be relatively stable.  She is receiving antibiotics for possible urinary tract infection. Per MD note,  still highly advised the patient to be placed into an assisted living facility given her increasing generalized weakness and her need for continuous nasal oxygen therapy.     PLAN: For now, daughter requesting home with continued [facility], Omi HOYT notified. Still needs to be seen by PT/OT, for need for skilled rehab vs home care.

## 2023-11-13 NOTE — CARE PLAN
The patient's goals for the shift include      The clinical goals for the shift include no falls    Problem: Pain - Adult  Goal: Verbalizes/displays adequate comfort level or baseline comfort level  Outcome: Progressing     Problem: Safety - Adult  Goal: Free from fall injury  Outcome: Progressing     Problem: Discharge Planning  Goal: Discharge to home or other facility with appropriate resources  Outcome: Progressing     Problem: Chronic Conditions and Co-morbidities  Goal: Patient's chronic conditions and co-morbidity symptoms are monitored and maintained or improved  Outcome: Progressing     Problem: Fall/Injury  Goal: Not fall by end of shift  Outcome: Progressing  Goal: Be free from injury by end of the shift  Outcome: Progressing  Goal: Verbalize understanding of personal risk factors for fall in the hospital  Outcome: Progressing  Goal: Verbalize understanding of risk factor reduction measures to prevent injury from fall in the home  Outcome: Progressing  Goal: Use assistive devices by end of the shift  Outcome: Progressing  Goal: Pace activities to prevent fatigue by end of the shift  Outcome: Progressing     Problem: Skin  Goal: Decreased wound size/increased tissue granulation at next dressing change  Outcome: Progressing  Flowsheets (Taken 11/13/2023 9221)  Decreased wound size/increased tissue granulation at next dressing change: Protective dressings over bony prominences  Goal: Participates in plan/prevention/treatment measures  Outcome: Progressing  Goal: Prevent/manage excess moisture  Outcome: Progressing  Goal: Prevent/minimize sheer/friction injuries  Outcome: Progressing  Goal: Promote/optimize nutrition  Outcome: Progressing  Goal: Promote skin healing  Outcome: Progressing     Problem: Respiratory  Goal: Clear secretions with interventions this shift  Outcome: Progressing  Goal: Minimize anxiety/maximize coping throughout shift  Outcome: Progressing

## 2023-11-13 NOTE — CARE PLAN
Problem: Respiratory  Goal: Clear secretions with interventions this shift  11/13/2023 0840 by Malachi Crawford, RRT  Outcome: Progressing  11/13/2023 0758 by Malachi Crawford, RRT  Outcome: Progressing  Goal: Minimize anxiety/maximize coping throughout shift  Outcome: Progressing

## 2023-11-13 NOTE — CARE PLAN
Problem: Respiratory  Goal: Clear secretions with interventions this shift  Outcome: Progressing

## 2023-11-13 NOTE — CONSULTS
Consults  History Of Present Illness:    Kalie Ramos is a 89 y.o. female presenting with generalized weakness.     This patient is a 89-year-old white female with a past history including mild to moderate aortic valve stenosis, hypertension, complete left bundle branch block conduction delay, chronic lower extremity edema due to venous insufficiency, nontoxic multinodular goiter, breast lumpectomy for breast carcinoma, bilateral cataract extraction, carpal tunnel release, rotator cuff repair, sentinel lymph node biopsy at time of lumpectomy, and diaphragmatic hernia.  She has a longstanding history of hiatal hernia treated with proton pump inhibitor therapy and H2 blocker therapy with no symptoms of reflux.     The patient was admitted to [facility] 9/5/2018 for laparoscopic paraesophageal hernia repair along with laparoscopic cholecystectomy for documented cholelithiasis.  As part of preoperative evaluation echocardiogram 8/31/2018 demonstrated preserved LV ejection fraction 60 to 65% with moderate concentric LVH mild to moderate left atrial lodgment mild to moderate aortic valvular stenosis estimated PA systolic pressure 35 mmHg.  The peak systolic pressure gradient across the aortic valve was 21 mmHg.  She had a pharmacological nuclear stress test on 8/20/2018 that showed a preserved LV ejection fraction no ischemia or scar there.  The patient's operative procedure was done on 9/5/2018 with some persistent postoperative hypoxia requiring overnight observation in the ICU after extubation.  Patient ultimately restarted on usual Lasix 40 mg daily.     Patient presented back to [facility] emergency room 9/17/2018 with shortness of breath.  CT of the chest abdomen and pelvis showed diffuse thyroid megaly consistent with multinodular goiter no pulmonary embolism.  There was moderate to large bilateral pleural effusions basilar atelectasis.  There is evidence of cholecystectomy and subcutaneous  emphysema. Stool cultures were positive for Giardia antigen.  Repeat echocardiogram again demonstrated mild to moderate LVH preserved LV ejection fraction 60 to 64% mild to moderate aortic valve stenosis trace aortic valve regurgitation mild tricuspid valve regurgitation mild pulmonary hypertension estimated PA systolic pressure in the range of 40 mmHg range patient was treated with IV Lasix.  She developed an extensive DVT left lower extremity by ultrasound 10/2/2018.  A repeat chest CT showed segmental subsegmental pulmonary emboli right lower lobe and lingula without evidence of right-sided cardiac strain with bilateral pleural effusions by basilar atelectasis and large substernal goiter.  She was started on anticoagulation with Coumadin.     Patient was admitted to [facility] 8/4/2019 with shortness of breath and palpitations.  CT angiogram of the chest showed no pulmonary embolism.  There were bilateral pulmonary infiltrates left lower lobe greatest particularly in the upper portion.  There was moderate left pleural effusion small right pleural effusion enlarged mediastinal nodes reactive adenopathy.  There was again marked for bilateral thyroid megaly multinodular goiter.  There was a suspected nonocclusive DVT left lower extremity.  Was thought to be possibly chronic or recurrent.  proBNP was 3049.  Troponins were negative.  EKG showed sinus rhythm left axis deviation poor R wave progression.  She was switched from Coumadin to Xarelto 10 mg daily.  There was some concern for ongoing aspiration.     Patient was admitted 3/19/2020 for concerns of aspiration with dysphagia gagging choking occasional regurgitation.  CT scan of the neck demonstrated inflammation of the upper third of the esophagus possible neoplasm.  She was seen by speech therapy.  EGD 3/25/2020 showed small hiatal hernia large ulcer in the proximal esophagus highly suggestive of pill induced esophagitis.     Patient admitted again 4/6/2020  with ongoing dysphagia and inability to swallow almost any consistency.  She was dehydrated Lasix was placed on hold.  Modified barium swallow showed tracheal penetration of thin liquids and some laryngeal penetration with nectar thick liquids.  There was some discussion about PEG tube placement ultimately deferred.     Patient sustained a fall going to the bathroom 12/16/2020 with a sensation of dizziness vertigo.  Her evaluation in the hospital was essentially unremarkable head CT negative for intracranial process.  Repeat echocardiogram demonstrated moderate aortic valve stenosis preserved LV function.     She was admitted again 5/12/2021 with dizziness the patient has been taken off Xarelto for several days prior to that and for dental extraction.  Head CT was negative for any acute findings.  Carotid ultrasound showed mild bilateral plaque.  EKG showed sinus rhythm left bundle branch block.  MRI of brain negative for CVA.  MRI did show 70% stenosis of the P1 segment on the left.  Echocardiogram again showed moderate concentric LV hypertrophy normal LV ejection fraction 55% with moderate aortic valve stenosis.  There was 1+ mitral 2+ tricuspid valve regurgitation moderate pulmonary pretension.     Patient was brought back to the hospital 5/19/2021 with dizziness despite as needed meclizine.  MRI of the brain showed no significant change.  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  Patient was kept on low-dose Lasix for peripheral edema.  Repeat echocardiogram again showed mild to moderate concentric LVH moderate to severe aortic valve stenosis.     Patient again admitted 5/22/2022 with shortness of breath chest x-ray showing bilateral pleural effusions.  Ultrasound lower extremities showed chronic nonocclusive thrombus in the left lower extremity.   Echocardiogram 5 3/23/2022 showed LV ejection fraction up to 50% range abnormal septal motion due to left bundle branch block conduction delay moderately severe aortic valve stenosis peak systolic pressure gradient 56 mmHg trivial aortic insufficiency 2+ tricuspid valve vegetation moderately severe pulmonary hypertension.  Patient clinically not thought to be candidate for transcatheter aortic valve replacement.  MRI brain showed tiny punctate lacunar infarct right corona radiata related to small vessel disease rather than embolic.  She was kept on lower dose of Xarelto 10 mg daily.  MRA of the neck was negative for high-grade carotid stenosis MRI of brain and again showed high-grade stenosis distal P1 segment of left posterior cerebral artery.     Patient was admitted to [facility] 7/5/2022 with dizziness and unsteadiness.  Work-up was again relatively similar to past evaluations.     She was readmitted to [facility] 11/19/2022 after mechanical fall laceration of her forehead requiring suture repair.  Echocardiogram showed LV ejection fraction had been reduced to 40% with moderately severe aortic valve stenosis trace aortic insufficiency with moderate tricuspid valve regurgitation severe pulm hypertension PA systolic pressure 70 mmHg.  Patient again not thought to be candidate for TAVR.     Patient admitted again [facility] 1/6/2023 with increasing shortness of breath.  Both portable chest x-ray showed multifocal pneumonia bilateral infiltrates.  Ultrasound lower extremity done again positive for chronic DVT left common femoral vein.  Patient was continued on usual low-dose aspirin Atorvastatin Toprol.  Chest CT showed extensive right upper lobe consolidation due to pneumonia possibly tumor.  Moderate bilateral pleural effusions.     Patient again admitted 7/25/2023 with shortness of breath.  Creatinine was 0.9 proBNP 16,000 943.  High-sensitivity troponins negative.  Clinically she was thought not to  be volume overloaded despite elevated proBNP.  Patient had another echocardiogram performed 7/25/2023 showing LV ejection fraction again 35-40% moderate left atrial enlargement mild to moderate right atrial enlargement severe aortic valve stenosis peak systolic pressure gradient 56 mmHg.  There was 1+ mitral valve regurgitation 2+ tricuspid valve regurgitation severe pulmonary hypertension estimated PA systolic pressure 67 mmHg.     Patient was admitted through [facility] emergency room on 10/23/2023 with confusion and increased weakness.  Evaluation in the emergency room included a comprehensive metabolic panel creatinine is 0.90.  Arterial blood gases pH 7.5 PCO2 44 PO2 93.  proBNP was 13,676.  CBC unremarkable except hemoglobin 11.3.  Portable chest x-ray showed patchy bilateral airspace opacities in both lungs particularly within the right midlung concerningly for pneumonia with small bibasilar effusions.  CT angiogram of the chest showed no pulmonary emboli by collateral confluent airspace disease all lung lobes right greater than left moderate bilateral pleural effusions enlarged bilateral main pulmonary arteries consistent pulmonary hypertension and cardiomegaly.  The patient again was thought not to be a candidate for transcatheter aortic valve replacement and remained on continuous nasal oxygen 3 L/min.  Multiple potential etiologies for her chronic lung disease were thought to be possible including aspiration pneumonitis interstitial fibrosis and a possible mild element of CHF.  The patient was placed on IV ceftriaxone and azithromycin empirically.  She was also continued on Lasix and the dose was increased to 40 mg twice daily empirically.  The patient had already received prior instructions to reduce her potential for aspiration while swallowing.    The patient is currently admitted through [facility] emergency room 11/12/2023 with increasing weakness lethargy and an apparent fall in the bathroom where she  was found.  Initial evaluation in the emergency room included a head CT showing redemonstration of age-related changes without acute intracranial process.  PA lateral chest x-ray showed cardiomegaly mild CHF on a background of COPD with improved infiltrate in the right upper lobe.  There is right paratracheal tissue prominence thought to be due to ectatic vessels.  X-ray of the right hip showed DJD but no fracture or subluxation.  She also had a CT angiogram of the chest which was negative for pulmonary embolization.  There was bilateral confluent airspace disease in all lung lobes right greater than left moderate bilateral pleural effusions and enlarged main pulmonary arteries consistent with pulmonary artery hypertension.  This was actually performed during her prior admission in 10/2023.  The initial labs included a CBC WBC of 15,700 hematocrit 46.5.  The comprehensive metabolic panel was relatively unremarkable creatinine 0.70 potassium of 5.0.  CK was 189 high-sensitivity troponin 35.  The second and third high-sensitivity troponins were 35 and 38 respectively.  Repeat CBC today notable for a significant decrease in hemoglobin 10.7 hematocrit 33.8.  A repeat confirmatory's CBC notable for hemoglobin 11.4 hematocrit 36.3  Last Recorded Vitals:  Vitals:    11/13/23 0300 11/13/23 0658 11/13/23 0757 11/13/23 1100   BP: 119/55 (!) 117/48  (!) 121/40   BP Location: Right arm Right arm  Right arm   Patient Position: Lying Lying  Lying   Pulse: 55 53  63   Resp: 16 17  17   Temp: 36.7 °C (98.1 °F) 36.7 °C (98.1 °F)  36.6 °C (97.9 °F)   TempSrc: Axillary Oral  Oral   SpO2: 99% 100% 99% 99%   Weight:       Height:           Last Labs:  CBC - 11/13/2023:  4:14 AM  15.7 10.7 240    33.8      CMP - 11/13/2023:  4:14 AM  7.8 5.4 18 --- 0.8   _ 2.6 21 121      PTT - 1/26/2023:  6:26 PM  1.0   10.2 29.2     Hemoglobin A1C   Date/Time Value Ref Range Status   05/23/2022 10:43 PM 5.7 4.0 - 6.0 % Final     Comment:     Hemoglobin  A1C levels are related to mean blood glucose during the   preceding 2-3 months. The relationship table below may be used as a   general guide. Each 1% increase in HGB A1C is a reflection of an   increase in mean glucose of approximately 30 mg/dl.   Reference: Diabetes Care, volume 29, supplement 1 Jan. 2006                        HGB A1C ................. Approx. Mean Glucose   _______________________________________________   6%   ...............................  120 mg/dl   7%   ...............................  150 mg/dl   8%   ...............................  180 mg/dl   9%   ...............................  210 mg/dl   10%  ...............................  240 mg/dl  Performed at [address] [address]     12/17/2020 03:16 PM 5.7 4.0 - 6.0 % Final     Comment:     Hemoglobin A1C levels are related to mean blood glucose during the   preceding 2-3 months. The relationship table below may be used as a   general guide. Each 1% increase in HGB A1C is a reflection of an   increase in mean glucose of approximately 30 mg/dl.   Reference: Diabetes Care, volume 29, supplement 1 Jan. 2006                        HGB A1C ................. Approx. Mean Glucose   _______________________________________________   6%   ...............................  120 mg/dl   7%   ...............................  150 mg/dl   8%   ...............................  180 mg/dl   9%   ...............................  210 mg/dl   10%  ...............................  240 mg/dl  Performed at [address] [address]       LDL Calculated   Date/Time Value Ref Range Status   01/27/2023 06:04 AM 66 65 - 130 MG/DL Final   11/20/2022 02:47 AM 54 (L) 65 - 130 MG/DL Final   07/06/2022 01:13 AM 54 (L) 65 - 130 MG/DL Final     VLDL   Date/Time Value Ref Range Status   08/03/2018 09:41 AM 25 0 - 40 mg/dL Final   01/18/2018 02:30 PM 47 (H) 0 - 40 mg/dL Final   10/17/2017 11:18 AM 50 (H) 0 - 40 mg/dL Final      Last  "I/O:  I/O last 3 completed shifts:  In: 1326.7 (24.9 mL/kg) [I.V.:1276.7 (24 mL/kg); IV Piggyback:50]  Out: - (0 mL/kg)   Weight: 53.2 kg     Past Cardiology Tests (Last 3 Years):  EKG:  ECG 12 Lead 10/28/2023      ECG 12 lead 10/24/2023    Echo:  No results found for this or any previous visit from the past 1095 days.    Ejection Fractions:  No results found for: \"EF\"  Cath:  No results found for this or any previous visit from the past 1095 days.    Stress Test:  No results found for this or any previous visit from the past 1095 days.    Cardiac Imaging:  No results found for this or any previous visit from the past 1095 days.      Past Medical History:  She has a past medical history of Atrophic disorder of skin, unspecified (07/19/2013), Body mass index (BMI) 22.0-22.9, adult, Cervicalgia (05/08/2015), CHF (congestive heart failure) (CMS/[facility]), High cholesterol, Hypertension, Leg swelling, Malignant neoplasm of unspecified site of unspecified female breast (CMS/HCC) (07/19/2013), Other conditions influencing health status (10/21/2013), Personal history of other infectious and parasitic diseases (03/17/2015), Personal history of other specified conditions (03/17/2015), Personal history of pneumonia (recurrent) (09/10/2015), and Personal history of transient ischemic attack (TIA), and cerebral infarction without residual deficits (09/04/2014).    Past Surgical History:  She has a past surgical history that includes Breast biopsy (09/10/2015); Rotator cuff repair (09/10/2015); [place] lymph node biopsy (09/10/2015); Cataract extraction (11/02/2015); Other surgical history (11/02/2015); Hand tendon surgery (11/02/2015); Carpal tunnel release (09/04/2014); Breast lumpectomy (07/19/2013); Hysterectomy (07/19/2013); Shoulder surgery (07/19/2013); MR angio head wo IV contrast (12/17/2020); MR angio head wo IV contrast (5/13/2021); MR angio neck wo IV contrast (5/24/2022); and MR angio head wo IV contrast (5/24/2022).    "   Social History:  She reports that she has never smoked. She has never used smokeless tobacco. She reports that she does not drink alcohol and does not use drugs.    Family History:  Family History   Problem Relation Name Age of Onset    Alzheimer's disease Father      Breast cancer Sister      Pancreatic cancer Sister      Arthritis Other          Allergies:  Niaspan extended-release [niacin] and Naproxen    Inpatient Medications:  Scheduled medications   Medication Dose Route Frequency    albuterol  2.5 mg nebulization TID    aspirin  81 mg oral Once    atorvastatin  40 mg oral Nightly    calcitonin (salmon)  1 spray One Nostril Daily    cefTRIAXone  1 g intravenous BID    docusate sodium  100 mg oral BID    enoxaparin  40 mg subcutaneous Daily    famotidine  20 mg oral q12h    ferrous gluconate  324 mg oral Once per day on Mon Wed Fri    lidocaine  1 patch transdermal Daily    loratadine  10 mg oral Daily    magnesium oxide  400 mg oral Once per day on Mon Wed Fri    melatonin  3 mg oral Nightly    metoprolol succinate XL  25 mg oral BID    oxygen   inhalation q24h    pantoprazole  40 mg oral Daily    potassium chloride  20 mEq oral Daily with breakfast    predniSONE  5 mg oral Daily with breakfast    rOPINIRole  2 mg oral Nightly    sertraline  50 mg oral Daily     PRN medications   Medication    acetaminophen    albuterol    benzocaine-menthol    guaiFENesin    nitroglycerin    sodium chloride    traZODone     Continuous Medications   Medication Dose Last Rate     Outpatient Medications:  Current Outpatient Medications   Medication Instructions    acetaminophen (TYLENOL) 650 mg, oral, Every 6 hours PRN    albuterol 2.5 mg, nebulization, 3 times daily    aspirin 81 mg EC tablet 1 tablet, oral, Daily    atorvastatin (Lipitor) 40 mg tablet TAKE 1 TABLET DAILY    benzocaine-menthol (Cepastat Sore Throat) 15-3.6 mg lozenge 1 lozenge, Mouth/Throat, Every 4 hours PRN    calcitonin, salmon, (Miacalcin) 200  unit/actuation nasal spray 1 spray, One Nostril, Daily    cefuroxime (CEFTIN) 250 mg, oral, 2 times daily    docusate sodium (COLACE) 100 mg, oral, 2 times daily    famotidine (Pepcid) 20 mg tablet 1 tablet, oral, Every 12 hours    ferrous gluconate (Fergon) 240 (27 Fe) MG tablet 1 tablet, oral, 3 times weekly, Mon wed fri    furosemide (LASIX) 40 mg, oral, Daily    guaiFENesin (Robitussin) 100 mg/5 mL syrup 10 mL, oral, Every 4 hours PRN    lidocaine 4 % patch APPLY AS DIRECTED.    loratadine (Claritin) 10 mg tablet 1 tablet, oral, Daily    magnesium oxide (Mag-Ox) 400 mg (241.3 mg magnesium) tablet 1 tablet, oral, 3 times weekly, Tues thurs sat    melatonin 3 mg, oral, Nightly    metoprolol succinate XL (TOPROL-XL) 25 mg, oral, 2 times daily    nitroglycerin (NITROSTAT) 0.4 mg, Every 5 min PRN, UP TO 3 TIMES. IF NO RELIEF CALL 911.    oxygen (O2) gas therapy 1 each, inhalation, Every 24 hours    pantoprazole (PROTONIX) 40 mg, oral, Daily    potassium chloride (Klor-Con) 20 mEq packet 20 mEq, oral, Daily, MIX AND DRINK    rOPINIRole (REQUIP) 5 mg, oral, Nightly    rOPINIRole (REQUIP) 2 mg, oral, Nightly    sertraline (ZOLOFT) 50 mg, oral, Daily    sodium chloride (Ocean) 0.65 % nasal spray 1 spray, Each Nostril, 4 times daily PRN    traZODone (DESYREL) 50 mg, oral, Nightly PRN       Physical Exam:  The patient is a thin upper elderly white female sitting upright in bed eating breakfast.  No respiratory distress on standard low-flow O2 nasal cannula 3 L/min  JVP not elevated carotid pulses 2+ thyroid is enlarged palpably  Moderate thoracic kyphosis shallow air movement diminished breath sounds  Cardiac rhythm regular no premature beats S1-S2 obscured grade 3/6 systolic ejection murmur  No peripheral edema.     Assessment/Plan   11/13:  This patient is a 89-year-old white female with extensive issues that include coronary artery disease, severe aortic valvular stenosis, moderate tricuspid and mitral valve  regurgitation, severe pulmonary hypertension, COPD with oxygen dependence, hypertension, left bundle branch block conduction delay, lower extremity edema due to venous insufficiency, chronic DVT left lower extremity femoral vein, laparoscopic paraesophageal hiatal hernia repair, ongoing aspiration pneumonias, nontoxic multinodular goiter.  The patient's last echocardiogram on 7/25/2023 again demonstrated a LV ejection fraction 35-40% severe aortic valve stenosis peak systolic gradient 56 mmHg, mild mitral moderate tricuspid valve regurgitation and severe pulmonary hypertension estimated PA systolic pressure 67 mmHg.  The patient was thought not to be a candidate for transcatheter aortic valve replacement.  She does wear continuous oxygen at 3 L/min at home.  She surprisingly still lives independently in her own condominium.  She has been having increased shortness of breath.  Multiple potential etiologies including her chronic lung disease aspiration pneumonias interstitial fibrosis possible CHF.  The patient was recently admitted for several days on 10/23/2023 with increased shortness of breath and ultimately was released on Lasix 40 mg twice daily which she has reduced to daily.  Patient admitted after having fallen in the bathroom and not being able to get up due to generalized weakness.  She lives independently in her own condominium and has been very resistant to review our recommendation for assisted living.  She does have a daughter who lives within 10 minutes.  Her overall status appears to be relatively stable.  She is receiving antibiotics for possible urinary tract infection.  For now we will continue the patient on her usual therapy which does at this point include Lasix 40 mg once daily along with her low-dose aspirin plus atorvastatin 40 mg daily and metoprolol succinate 25 mg twice daily.  Still with highly advised the patient to be placed into an assisted living facility given her increasing  generalized weakness and her need for continuous nasal oxygen therapy.  Peripheral IV 11/12/23 24 G Distal;Right Forearm (Active)   Site Assessment Clean;Dry;Intact 11/13/23 0800   Dressing Status Clean;Dry 11/13/23 0800   Number of days: 1       Code Status:  No Order    I spent 30 minutes in the professional and overall care of this patient.        Jim Mckinney MD

## 2023-11-14 LAB
ALBUMIN SERPL-MCNC: 2.6 G/DL (ref 3.5–5)
ALP BLD-CCNC: 115 U/L (ref 35–125)
ALT SERPL-CCNC: 17 U/L (ref 5–40)
ANION GAP SERPL CALC-SCNC: 9 MMOL/L
AST SERPL-CCNC: 15 U/L (ref 5–40)
BASOPHILS # BLD AUTO: 0.02 X10*3/UL (ref 0–0.1)
BASOPHILS NFR BLD AUTO: 0.3 %
BILIRUB SERPL-MCNC: 0.3 MG/DL (ref 0.1–1.2)
BUN SERPL-MCNC: 14 MG/DL (ref 8–25)
CALCIUM SERPL-MCNC: 7.3 MG/DL (ref 8.5–10.4)
CHLORIDE SERPL-SCNC: 103 MMOL/L (ref 97–107)
CO2 SERPL-SCNC: 28 MMOL/L (ref 24–31)
CREAT SERPL-MCNC: 0.8 MG/DL (ref 0.4–1.6)
EOSINOPHIL # BLD AUTO: 0.1 X10*3/UL (ref 0–0.4)
EOSINOPHIL NFR BLD AUTO: 1.5 %
ERYTHROCYTE [DISTWIDTH] IN BLOOD BY AUTOMATED COUNT: 14.5 % (ref 11.5–14.5)
GFR SERPL CREATININE-BSD FRML MDRD: 69 ML/MIN/1.73M*2
GLUCOSE SERPL-MCNC: 97 MG/DL (ref 65–99)
HCT VFR BLD AUTO: 33.2 % (ref 36–46)
HCT VFR BLD AUTO: 33.2 % (ref 36–46)
HCT VFR BLD AUTO: 35.3 % (ref 36–46)
HCT VFR BLD AUTO: 37.7 % (ref 36–46)
HGB BLD-MCNC: 10.1 G/DL (ref 12–16)
HGB BLD-MCNC: 10.1 G/DL (ref 12–16)
HGB BLD-MCNC: 10.9 G/DL (ref 12–16)
HGB BLD-MCNC: 11.4 G/DL (ref 12–16)
IMM GRANULOCYTES # BLD AUTO: 0.02 X10*3/UL (ref 0–0.5)
IMM GRANULOCYTES NFR BLD AUTO: 0.3 % (ref 0–0.9)
LYMPHOCYTES # BLD AUTO: 0.98 X10*3/UL (ref 0.8–3)
LYMPHOCYTES NFR BLD AUTO: 14.9 %
MCH RBC QN AUTO: 28.5 PG (ref 26–34)
MCHC RBC AUTO-ENTMCNC: 30.4 G/DL (ref 32–36)
MCV RBC AUTO: 94 FL (ref 80–100)
MONOCYTES # BLD AUTO: 0.53 X10*3/UL (ref 0.05–0.8)
MONOCYTES NFR BLD AUTO: 8.1 %
NEUTROPHILS # BLD AUTO: 4.92 X10*3/UL (ref 1.6–5.5)
NEUTROPHILS NFR BLD AUTO: 74.9 %
NRBC BLD-RTO: 0 /100 WBCS (ref 0–0)
PLATELET # BLD AUTO: 187 X10*3/UL (ref 150–450)
POTASSIUM SERPL-SCNC: 4.2 MMOL/L (ref 3.4–5.1)
PROT SERPL-MCNC: 5.7 G/DL (ref 5.9–7.9)
RBC # BLD AUTO: 3.55 X10*6/UL (ref 4–5.2)
SODIUM SERPL-SCNC: 140 MMOL/L (ref 133–145)
WBC # BLD AUTO: 6.6 X10*3/UL (ref 4.4–11.3)

## 2023-11-14 PROCEDURE — 96372 THER/PROPH/DIAG INJ SC/IM: CPT | Performed by: INTERNAL MEDICINE

## 2023-11-14 PROCEDURE — 1100000001 HC PRIVATE ROOM DAILY

## 2023-11-14 PROCEDURE — 85018 HEMOGLOBIN: CPT | Performed by: INTERNAL MEDICINE

## 2023-11-14 PROCEDURE — 85025 COMPLETE CBC W/AUTO DIFF WBC: CPT | Performed by: INTERNAL MEDICINE

## 2023-11-14 PROCEDURE — 99232 SBSQ HOSP IP/OBS MODERATE 35: CPT | Performed by: INTERNAL MEDICINE

## 2023-11-14 PROCEDURE — 2500000001 HC RX 250 WO HCPCS SELF ADMINISTERED DRUGS (ALT 637 FOR MEDICARE OP): Performed by: INTERNAL MEDICINE

## 2023-11-14 PROCEDURE — 2500000004 HC RX 250 GENERAL PHARMACY W/ HCPCS (ALT 636 FOR OP/ED): Performed by: INTERNAL MEDICINE

## 2023-11-14 PROCEDURE — 36415 COLL VENOUS BLD VENIPUNCTURE: CPT | Performed by: INTERNAL MEDICINE

## 2023-11-14 PROCEDURE — 2500000002 HC RX 250 W HCPCS SELF ADMINISTERED DRUGS (ALT 637 FOR MEDICARE OP, ALT 636 FOR OP/ED): Performed by: INTERNAL MEDICINE

## 2023-11-14 PROCEDURE — 85014 HEMATOCRIT: CPT | Performed by: INTERNAL MEDICINE

## 2023-11-14 PROCEDURE — 84075 ASSAY ALKALINE PHOSPHATASE: CPT | Performed by: INTERNAL MEDICINE

## 2023-11-14 PROCEDURE — 94640 AIRWAY INHALATION TREATMENT: CPT

## 2023-11-14 PROCEDURE — 9420000001 HC RT PATIENT EDUCATION 5 MIN

## 2023-11-14 RX ORDER — METOPROLOL SUCCINATE 50 MG/1
50 TABLET, EXTENDED RELEASE ORAL DAILY
Status: DISCONTINUED | OUTPATIENT
Start: 2023-11-15 | End: 2023-11-15 | Stop reason: HOSPADM

## 2023-11-14 RX ORDER — LOSARTAN POTASSIUM 25 MG/1
25 TABLET ORAL DAILY
Status: DISCONTINUED | OUTPATIENT
Start: 2023-11-14 | End: 2023-11-15 | Stop reason: HOSPADM

## 2023-11-14 RX ADMIN — FAMOTIDINE 20 MG: 20 TABLET, FILM COATED ORAL at 10:52

## 2023-11-14 RX ADMIN — ALBUTEROL SULFATE 2.5 MG: 2.5 SOLUTION RESPIRATORY (INHALATION) at 12:42

## 2023-11-14 RX ADMIN — DOCUSATE SODIUM 100 MG: 100 CAPSULE, LIQUID FILLED ORAL at 20:27

## 2023-11-14 RX ADMIN — CALCITONIN SALMON 1 SPRAY: 200 SPRAY, METERED NASAL at 10:51

## 2023-11-14 RX ADMIN — ALBUTEROL SULFATE 2.5 MG: 2.5 SOLUTION RESPIRATORY (INHALATION) at 19:38

## 2023-11-14 RX ADMIN — FUROSEMIDE 40 MG: 40 TABLET ORAL at 08:29

## 2023-11-14 RX ADMIN — ENOXAPARIN SODIUM 40 MG: 40 INJECTION SUBCUTANEOUS at 20:27

## 2023-11-14 RX ADMIN — FAMOTIDINE 20 MG: 20 TABLET, FILM COATED ORAL at 20:27

## 2023-11-14 RX ADMIN — ATORVASTATIN CALCIUM 40 MG: 40 TABLET, FILM COATED ORAL at 20:27

## 2023-11-14 RX ADMIN — CEFTRIAXONE SODIUM 1 G: 1 INJECTION, SOLUTION INTRAVENOUS at 08:23

## 2023-11-14 RX ADMIN — CEFTRIAXONE SODIUM 1 G: 1 INJECTION, SOLUTION INTRAVENOUS at 20:26

## 2023-11-14 RX ADMIN — DOCUSATE SODIUM 100 MG: 100 CAPSULE, LIQUID FILLED ORAL at 08:28

## 2023-11-14 RX ADMIN — SERTRALINE 50 MG: 50 TABLET, FILM COATED ORAL at 08:31

## 2023-11-14 RX ADMIN — ALBUTEROL SULFATE 2.5 MG: 2.5 SOLUTION RESPIRATORY (INHALATION) at 08:53

## 2023-11-14 RX ADMIN — METOPROLOL SUCCINATE 25 MG: 25 TABLET, EXTENDED RELEASE ORAL at 08:30

## 2023-11-14 RX ADMIN — LOSARTAN POTASSIUM 25 MG: 25 TABLET, FILM COATED ORAL at 10:50

## 2023-11-14 RX ADMIN — LORATADINE 10 MG: 10 TABLET ORAL at 08:30

## 2023-11-14 RX ADMIN — ROPINIROLE HYDROCHLORIDE 2 MG: 0.5 TABLET, FILM COATED ORAL at 20:27

## 2023-11-14 RX ADMIN — POTASSIUM CHLORIDE 20 MEQ: 1.5 FOR SOLUTION ORAL at 08:30

## 2023-11-14 RX ADMIN — Medication 3 MG: at 20:27

## 2023-11-14 RX ADMIN — PREDNISONE 5 MG: 5 TABLET ORAL at 08:31

## 2023-11-14 RX ADMIN — PANTOPRAZOLE SODIUM 40 MG: 40 TABLET, DELAYED RELEASE ORAL at 08:30

## 2023-11-14 RX ADMIN — TRAZODONE HYDROCHLORIDE 50 MG: 50 TABLET ORAL at 20:34

## 2023-11-14 ASSESSMENT — COGNITIVE AND FUNCTIONAL STATUS - GENERAL
MOVING TO AND FROM BED TO CHAIR: A LITTLE
WALKING IN HOSPITAL ROOM: A LOT
MOVING FROM LYING ON BACK TO SITTING ON SIDE OF FLAT BED WITH BEDRAILS: A LITTLE
DRESSING REGULAR LOWER BODY CLOTHING: A LITTLE
PERSONAL GROOMING: A LITTLE
STANDING UP FROM CHAIR USING ARMS: A LITTLE
TOILETING: A LOT
DRESSING REGULAR UPPER BODY CLOTHING: A LITTLE
MOBILITY SCORE: 16
DAILY ACTIVITIY SCORE: 18
TURNING FROM BACK TO SIDE WHILE IN FLAT BAD: A LITTLE
CLIMB 3 TO 5 STEPS WITH RAILING: A LOT
HELP NEEDED FOR BATHING: A LITTLE

## 2023-11-14 ASSESSMENT — PAIN SCALES - GENERAL
PAINLEVEL_OUTOF10: 0 - NO PAIN
PAINLEVEL_OUTOF10: 0 - NO PAIN

## 2023-11-14 NOTE — PROGRESS NOTES
Subjective Data:  No new complaints    Overnight Events:    No significant overnight events     Objective Data:  Last Recorded Vitals:  Vitals:    11/13/23 2138 11/13/23 2331 11/14/23 0414 11/14/23 0700   BP: 125/85 (!) 117/40 154/60 149/51   BP Location: Right arm Right arm Right arm Right arm   Patient Position: Sitting Lying Lying Lying   Pulse: 61 58 55 58   Resp: 20 20 20 18   Temp: 36.5 °C (97.7 °F) 36.6 °C (97.9 °F) 36.6 °C (97.9 °F) 36.8 °C (98.2 °F)   TempSrc: Oral Oral Oral Oral   SpO2: 99% 100% 100% 100%   Weight:       Height:           Last Labs:  CBC - 11/14/2023:  4:28 AM;  4:28 AM  6.6 10.1; 10.1 187    33.2; 33.2      CMP - 11/14/2023:  4:28 AM  7.3 5.7 15 --- 0.3   _ 2.6 17 115      PTT - 1/26/2023:  6:26 PM  1.0   10.2 29.2     HGBA1C   Date/Time Value Ref Range Status   05/23/2022 10:43 PM 5.7 4.0 - 6.0 % Final     Comment:     Hemoglobin A1C levels are related to mean blood glucose during the   preceding 2-3 months. The relationship table below may be used as a   general guide. Each 1% increase in HGB A1C is a reflection of an   increase in mean glucose of approximately 30 mg/dl.   Reference: Diabetes Care, volume 29, supplement 1 Jan. 2006                        HGB A1C ................. Approx. Mean Glucose   _______________________________________________   6%   ...............................  120 mg/dl   7%   ...............................  150 mg/dl   8%   ...............................  180 mg/dl   9%   ...............................  210 mg/dl   10%  ...............................  240 mg/dl  Performed at [address] [address]     12/17/2020 03:16 PM 5.7 4.0 - 6.0 % Final     Comment:     Hemoglobin A1C levels are related to mean blood glucose during the   preceding 2-3 months. The relationship table below may be used as a   general guide. Each 1% increase in HGB A1C is a reflection of an   increase in mean glucose of approximately 30 mg/dl.   Reference:  "Diabetes Care, volume 29, supplement 1 Jan. 2006                        HGB A1C ................. Approx. Mean Glucose   _______________________________________________   6%   ...............................  120 mg/dl   7%   ...............................  150 mg/dl   8%   ...............................  180 mg/dl   9%   ...............................  210 mg/dl   10%  ...............................  240 mg/dl  Performed at [address] [address]       LDLCALC   Date/Time Value Ref Range Status   01/27/2023 06:04 AM 66 65 - 130 MG/DL Final   11/20/2022 02:47 AM 54 65 - 130 MG/DL Final   07/06/2022 01:13 AM 54 65 - 130 MG/DL Final     VLDL   Date/Time Value Ref Range Status   08/03/2018 09:41 AM 25 0 - 40 mg/dL Final   01/18/2018 02:30 PM 47 0 - 40 mg/dL Final   10/17/2017 11:18 AM 50 0 - 40 mg/dL Final      Last I/O:  I/O last 3 completed shifts:  In: 1659.7 (31.2 mL/kg) [I.V.:1509.7 (28.4 mL/kg); IV Piggyback:150]  Out: - (0 mL/kg)   Weight: 53.2 kg     Past Cardiology Tests (Last 3 Years):  EKG:  ECG 12 Lead 10/28/2023      ECG 12 lead 10/24/2023    Echo:  No results found for this or any previous visit from the past 1095 days.    Ejection Fractions:  No results found for: \"EF\"  Cath:  No results found for this or any previous visit from the past 1095 days.    Stress Test:  No results found for this or any previous visit from the past 1095 days.    Cardiac Imaging:  No results found for this or any previous visit from the past 1095 days.      Inpatient Medications:  Scheduled medications   Medication Dose Route Frequency    albuterol  2.5 mg nebulization TID    aspirin  81 mg oral Once    atorvastatin  40 mg oral Nightly    calcitonin (salmon)  1 spray One Nostril Daily    cefTRIAXone  1 g intravenous BID    docusate sodium  100 mg oral BID    enoxaparin  40 mg subcutaneous Daily    famotidine  20 mg oral q12h    ferrous gluconate  324 mg oral Once per day on Mon Wed Fri    furosemide  " 40 mg oral Daily    lidocaine  1 patch transdermal Daily    loratadine  10 mg oral Daily    magnesium oxide  400 mg oral Once per day on Mon Wed Fri    melatonin  3 mg oral Nightly    metoprolol succinate XL  25 mg oral BID    oxygen   inhalation q24h    pantoprazole  40 mg oral Daily    potassium chloride  20 mEq oral Daily with breakfast    predniSONE  5 mg oral Daily with breakfast    rOPINIRole  2 mg oral Nightly    sertraline  50 mg oral Daily     PRN medications   Medication    acetaminophen    albuterol    benzocaine-menthol    guaiFENesin    nitroglycerin    sodium chloride    traZODone     Continuous Medications   Medication Dose Last Rate       Physical Exam:  The patient is a thin upper elderly white female sitting upright in bed eating breakfast.  No respiratory distress on standard low-flow O2 nasal cannula 3 L/min  JVP not elevated carotid pulses 2+ thyroid is enlarged palpably  Moderate thoracic kyphosis shallow air movement diminished breath sounds  Cardiac rhythm regular no premature beats S1-S2 obscured grade 3/6 systolic ejection murmur  No peripheral edema.     Assessment/Plan   11/13:  This patient is a 89-year-old white female with extensive issues that include coronary artery disease, severe aortic valvular stenosis, moderate tricuspid and mitral valve regurgitation, severe pulmonary hypertension, COPD with oxygen dependence, hypertension, left bundle branch block conduction delay, lower extremity edema due to venous insufficiency, chronic DVT left lower extremity femoral vein, laparoscopic paraesophageal hiatal hernia repair, ongoing aspiration pneumonias, nontoxic multinodular goiter.  The patient's last echocardiogram on 7/25/2023 again demonstrated a LV ejection fraction 35-40% severe aortic valve stenosis peak systolic gradient 56 mmHg, mild mitral moderate tricuspid valve regurgitation and severe pulmonary hypertension estimated PA systolic pressure 67 mmHg.  The patient was thought not  to be a candidate for transcatheter aortic valve replacement.  She does wear continuous oxygen at 3 L/min at home.  She surprisingly still lives independently in her own condominium.  She has been having increased shortness of breath.  Multiple potential etiologies including her chronic lung disease aspiration pneumonias interstitial fibrosis possible CHF.  The patient was recently admitted for several days on 10/23/2023 with increased shortness of breath and ultimately was released on Lasix 40 mg twice daily which she has reduced to daily.  Patient admitted after having fallen in the bathroom and not being able to get up due to generalized weakness.  She lives independently in her own condominium and has been very resistant to review our recommendation for assisted living.  She does have a daughter who lives within 10 minutes.  Her overall status appears to be relatively stable.  She is receiving antibiotics for possible urinary tract infection.  For now we will continue the patient on her usual therapy which does at this point include Lasix 40 mg once daily along with her low-dose aspirin plus atorvastatin 40 mg daily and metoprolol succinate 25 mg twice daily.  Still with highly advised the patient to be placed into an assisted living facility given her increasing generalized weakness and her need for continuous nasal oxygen therapy.     11/14: The patient is lying comfortably in bed eating a complete breakfast.  She is awake alert conversant in good spirits.  She again declines immediately any suggestion to move to assisted living.  Her telemetry monitor demonstrates atrial fibrillation with a controlled ventricular rate on the metoprolol succinate.  For simplification of administration it would be switched from 25 mg twice daily to 50 mg daily.  Systolic blood pressures ranging primarily between 120-150 mmHg.  The comprehensive metabolic panel is unremarkable.  Hemoglobin unchanged at 10.1 hematocrit 33.2.   Given her reduced LV ejection fraction we will begin low-dose losartan 25 mg daily.    Peripheral IV 11/12/23 24 G Distal;Right Forearm (Active)   Site Assessment Clean;Dry 11/14/23 0000   Dressing Status Clean;Dry 11/14/23 0000   Number of days: 2       Code Status:  No Order    I spent 20 minutes in the professional and overall care of this patient.        Jim Mckinney MD

## 2023-11-14 NOTE — NURSING NOTE
Assumed care of patient. Patient is A&Ox3. Patient is resting in bed on 3L nc. Patient denies pain. Call light in reach.

## 2023-11-14 NOTE — CARE PLAN
The patient's goals for the shift include    Problem: Pain - Adult  Goal: Verbalizes/displays adequate comfort level or baseline comfort level  Outcome: Progressing     Problem: Safety - Adult  Goal: Free from fall injury  Outcome: Progressing     Problem: Discharge Planning  Goal: Discharge to home or other facility with appropriate resources  Outcome: Progressing     Problem: Chronic Conditions and Co-morbidities  Goal: Patient's chronic conditions and co-morbidity symptoms are monitored and maintained or improved  Outcome: Progressing     Problem: Fall/Injury  Goal: Not fall by end of shift  Outcome: Progressing  Goal: Be free from injury by end of the shift  Outcome: Progressing  Goal: Verbalize understanding of personal risk factors for fall in the hospital  Outcome: Progressing  Goal: Verbalize understanding of risk factor reduction measures to prevent injury from fall in the home  Outcome: Progressing  Goal: Use assistive devices by end of the shift  Outcome: Progressing  Goal: Pace activities to prevent fatigue by end of the shift  Outcome: Progressing     Problem: Respiratory  Goal: Clear secretions with interventions this shift  Outcome: Progressing  Goal: Minimize anxiety/maximize coping throughout shift  Outcome: Progressing     Problem: Skin  Goal: Decreased wound size/increased tissue granulation at next dressing change  Outcome: Progressing  Goal: Participates in plan/prevention/treatment measures  Outcome: Progressing  Goal: Prevent/manage excess moisture  Outcome: Progressing  Goal: Prevent/minimize sheer/friction injuries  Outcome: Progressing  Goal: Promote/optimize nutrition  Outcome: Progressing  Goal: Promote skin healing  Outcome: Progressing

## 2023-11-14 NOTE — PROGRESS NOTES
Kalie Ramos is a 89 y.o. female on day 2 of admission presenting with Weakness of both legs.  Feeling much better wants to go home  Subjective   Weakness with improving she insist on going home does not want to go to rehab       Objective   Diffuse muscle and joint pain  Physical Exam  HEENT:  Head externally atraumatic, no pallor, no icterus, extraocular movements intact, pulls reacting to light, oral mucosa moist and throat clear.  Neck:  Supple, no JVP, no palpable adenopathy or thyromegaly.  No carotid bruit.  Chest:  Clear to auscultation and resonant.  Heart:  Regular rate and rhythm, no murmur or gallop could be appreciated.  Abdomen:  Soft, nontender, bowel sounds present, normoactive, no palpable hepatosplenomegaly.  Extremities:  No edema, pulses present, no cyanosis or clubbing.  CNS:  Patient alert, oriented to time, place and person.  Power 5/5 all over and deep tendon reflexes symmetrical, cranial nerves 2-12 grossly intact.  Skin:  No active rash.  Musculoskeletal:  No joint swelling or erythema, range of movement normal.  Last Recorded Vitals  Heart Rate:  [55-62]   Temp:  [36.5 °C (97.7 °F)-36.8 °C (98.2 °F)]   Resp:  [16-20]   BP: (117-154)/(40-85)   SpO2:  [92 %-100 %]     Intake/Output last 3 Shifts:  I/O last 3 completed shifts:  In: 1659.7 (31.2 mL/kg) [I.V.:1509.7 (28.4 mL/kg); IV Piggyback:150]  Out: - (0 mL/kg)   Weight: 53.2 kg     Relevant Results  Susceptibility data from last 90 days.  Collected Specimen Info Organism   11/12/23 Urine from Clean Catch/Voided Enterococcus faecium     Results for orders placed or performed during the hospital encounter of 11/12/23 (from the past 24 hour(s))   Hemoglobin and Hematocrit, Blood   Result Value Ref Range    Hemoglobin 10.1 (L) 12.0 - 16.0 g/dL    Hematocrit 33.2 (L) 36.0 - 46.0 %   Comprehensive Metabolic Panel   Result Value Ref Range    Glucose 97 65 - 99 mg/dL    Sodium 140 133 - 145 mmol/L    Potassium 4.2 3.4 - 5.1 mmol/L     Chloride 103 97 - 107 mmol/L    Bicarbonate 28 24 - 31 mmol/L    Urea Nitrogen 14 8 - 25 mg/dL    Creatinine 0.80 0.40 - 1.60 mg/dL    eGFR 69 >60 mL/min/1.73m*2    Calcium 7.3 (L) 8.5 - 10.4 mg/dL    Albumin 2.6 (L) 3.5 - 5.0 g/dL    Alkaline Phosphatase 115 35 - 125 U/L    Total Protein 5.7 (L) 5.9 - 7.9 g/dL    AST 15 5 - 40 U/L    Bilirubin, Total 0.3 0.1 - 1.2 mg/dL    ALT 17 5 - 40 U/L    Anion Gap 9 <=19 mmol/L   CBC and Auto Differential   Result Value Ref Range    WBC 6.6 4.4 - 11.3 x10*3/uL    nRBC 0.0 0.0 - 0.0 /100 WBCs    RBC 3.55 (L) 4.00 - 5.20 x10*6/uL    Hemoglobin 10.1 (L) 12.0 - 16.0 g/dL    Hematocrit 33.2 (L) 36.0 - 46.0 %    MCV 94 80 - 100 fL    MCH 28.5 26.0 - 34.0 pg    MCHC 30.4 (L) 32.0 - 36.0 g/dL    RDW 14.5 11.5 - 14.5 %    Platelets 187 150 - 450 x10*3/uL    Neutrophils % 74.9 40.0 - 80.0 %    Immature Granulocytes %, Automated 0.3 0.0 - 0.9 %    Lymphocytes % 14.9 13.0 - 44.0 %    Monocytes % 8.1 2.0 - 10.0 %    Eosinophils % 1.5 0.0 - 6.0 %    Basophils % 0.3 0.0 - 2.0 %    Neutrophils Absolute 4.92 1.60 - 5.50 x10*3/uL    Immature Granulocytes Absolute, Automated 0.02 0.00 - 0.50 x10*3/uL    Lymphocytes Absolute 0.98 0.80 - 3.00 x10*3/uL    Monocytes Absolute 0.53 0.05 - 0.80 x10*3/uL    Eosinophils Absolute 0.10 0.00 - 0.40 x10*3/uL    Basophils Absolute 0.02 0.00 - 0.10 x10*3/uL   Hemoglobin and Hematocrit, Blood   Result Value Ref Range    Hemoglobin 10.9 (L) 12.0 - 16.0 g/dL    Hematocrit 35.3 (L) 36.0 - 46.0 %        Current Facility-Administered Medications:     acetaminophen (Tylenol) tablet 650 mg, 650 mg, oral, q6h PRN, Avel Bailey MD    albuterol 2.5 mg /3 mL (0.083 %) nebulizer solution 2.5 mg, 2.5 mg, nebulization, TID, Avel Bailey MD, 2.5 mg at 11/14/23 1242    albuterol 2.5 mg /3 mL (0.083 %) nebulizer solution 2.5 mg, 2.5 mg, nebulization, q2h PRN, Avel Bailey MD    aspirin EC tablet 81 mg, 81 mg, oral, Once, Avel Bailey MD    atorvastatin  (Lipitor) tablet 40 mg, 40 mg, oral, Nightly, Avel Bailey MD, 40 mg at 11/13/23 2141    benzocaine-menthol (Cepastat Sore Throat) 15-3.6 mg lozenge 1 lozenge, 1 lozenge, Mouth/Throat, q4h PRN, Avel Bailey MD    calcitonin (salmon) (Miacalcin) nasal spray 1 spray, 1 spray, One Nostril, Daily, Avel Bailey MD, 1 spray at 11/14/23 1051    cefTRIAXone (Rocephin) IVPB 1 g, 1 g, intravenous, BID, Avel Bailey MD, Stopped at 11/14/23 0853    docusate sodium (Colace) capsule 100 mg, 100 mg, oral, BID, Avel Bailey MD, 100 mg at 11/14/23 0828    enoxaparin (Lovenox) syringe 40 mg, 40 mg, subcutaneous, Daily, Avel Bailey MD, 40 mg at 11/12/23 2026    famotidine (Pepcid) tablet 20 mg, 20 mg, oral, q12h, Avel Bailey MD, 20 mg at 11/14/23 1052    ferrous gluconate (Fergon) 324 (38 Fe) mg tablet 324 mg, 324 mg, oral, Once per day on Mon Wed Fri, Avel Bailey MD, 324 mg at 11/13/23 0825    furosemide (Lasix) tablet 40 mg, 40 mg, oral, Daily, Jim Mckinney MD, 40 mg at 11/14/23 0829    guaiFENesin (Robitussin) 100 mg/5 mL syrup 10 mL, 10 mL, oral, q4h PRN, Avel Bailey MD    lidocaine 4 % patch 1 patch, 1 patch, transdermal, Daily, Avel Bailey MD, 1 patch at 11/13/23 0828    loratadine (Claritin) tablet 10 mg, 10 mg, oral, Daily, Avel Bailey MD, 10 mg at 11/14/23 0830    losartan (Cozaar) tablet 25 mg, 25 mg, oral, Daily, Jim Mckinney MD, 25 mg at 11/14/23 1050    magnesium oxide (Mag-Ox) tablet 400 mg, 400 mg, oral, Once per day on Mon Wed Fri, Avel Bailey MD, 400 mg at 11/13/23 0825    melatonin tablet 3 mg, 3 mg, oral, Nightly, Avel Bailey MD, 3 mg at 11/13/23 2141    [START ON 11/15/2023] metoprolol succinate XL (Toprol-XL) 24 hr tablet 50 mg, 50 mg, oral, Daily, Jim Mckinney MD    nitroglycerin (Nitrostat) SL tablet 0.4 mg, 0.4 mg, sublingual, q5 min PRN, Avel Bailey MD    oxygen (O2) therapy, , inhalation, q24h, Avel Bailey MD, Start at 11/13/23  2034    pantoprazole (ProtoNix) EC tablet 40 mg, 40 mg, oral, Daily, Avel Bailey MD, 40 mg at 11/14/23 0830    potassium chloride (Klor-Con) packet 20 mEq, 20 mEq, oral, Daily with breakfast, Avel Bailey MD, 20 mEq at 11/14/23 0830    predniSONE (Deltasone) tablet 5 mg, 5 mg, oral, Daily with breakfast, Avel Bailey MD, 5 mg at 11/14/23 0831    rOPINIRole (Requip) tablet 2 mg, 2 mg, oral, Nightly, Avel Bailey MD, 2 mg at 11/13/23 2141    sertraline (Zoloft) tablet 50 mg, 50 mg, oral, Daily, Avel Bailey MD, 50 mg at 11/14/23 0831    sodium chloride (Ocean) 0.65 % nasal spray 1 spray, 1 spray, Each Nostril, 4x daily PRN, Avel Bailey MD    traZODone (Desyrel) tablet 50 mg, 50 mg, oral, Nightly PRN, Avel Bailey MD   Assessment/Plan   Principal Problem:    Weakness of both legs    Status post fall doing okay    Hypertension okay    Weakness monitor    Discussed with Patient insist on home with the wound care possible discharge         Avel Bailey MD

## 2023-11-15 VITALS
DIASTOLIC BLOOD PRESSURE: 66 MMHG | SYSTOLIC BLOOD PRESSURE: 151 MMHG | HEART RATE: 53 BPM | TEMPERATURE: 97.9 F | WEIGHT: 117.28 LBS | OXYGEN SATURATION: 99 % | RESPIRATION RATE: 16 BRPM | HEIGHT: 61 IN | BODY MASS INDEX: 22.14 KG/M2

## 2023-11-15 DIAGNOSIS — N39.0 URINARY TRACT INFECTION WITHOUT HEMATURIA, SITE UNSPECIFIED: ICD-10-CM

## 2023-11-15 LAB
ANION GAP SERPL CALC-SCNC: 6 MMOL/L
BACTERIA UR CULT: ABNORMAL
BUN SERPL-MCNC: 13 MG/DL (ref 8–25)
CALCIUM SERPL-MCNC: 7.2 MG/DL (ref 8.5–10.4)
CHLORIDE SERPL-SCNC: 104 MMOL/L (ref 97–107)
CO2 SERPL-SCNC: 30 MMOL/L (ref 24–31)
CREAT SERPL-MCNC: 0.8 MG/DL (ref 0.4–1.6)
GFR SERPL CREATININE-BSD FRML MDRD: 69 ML/MIN/1.73M*2
GLUCOSE SERPL-MCNC: 94 MG/DL (ref 65–99)
HCT VFR BLD AUTO: 34.2 % (ref 36–46)
HCT VFR BLD AUTO: 36.2 % (ref 36–46)
HGB BLD-MCNC: 10.5 G/DL (ref 12–16)
HGB BLD-MCNC: 11.1 G/DL (ref 12–16)
POTASSIUM SERPL-SCNC: 3.9 MMOL/L (ref 3.4–5.1)
SODIUM SERPL-SCNC: 140 MMOL/L (ref 133–145)

## 2023-11-15 PROCEDURE — 2500000004 HC RX 250 GENERAL PHARMACY W/ HCPCS (ALT 636 FOR OP/ED): Performed by: INTERNAL MEDICINE

## 2023-11-15 PROCEDURE — 2500000005 HC RX 250 GENERAL PHARMACY W/O HCPCS: Performed by: INTERNAL MEDICINE

## 2023-11-15 PROCEDURE — 2500000001 HC RX 250 WO HCPCS SELF ADMINISTERED DRUGS (ALT 637 FOR MEDICARE OP): Performed by: INTERNAL MEDICINE

## 2023-11-15 PROCEDURE — 85018 HEMOGLOBIN: CPT | Performed by: INTERNAL MEDICINE

## 2023-11-15 PROCEDURE — 36415 COLL VENOUS BLD VENIPUNCTURE: CPT | Performed by: INTERNAL MEDICINE

## 2023-11-15 PROCEDURE — 99239 HOSP IP/OBS DSCHRG MGMT >30: CPT | Performed by: INTERNAL MEDICINE

## 2023-11-15 PROCEDURE — 2500000002 HC RX 250 W HCPCS SELF ADMINISTERED DRUGS (ALT 637 FOR MEDICARE OP, ALT 636 FOR OP/ED): Performed by: INTERNAL MEDICINE

## 2023-11-15 PROCEDURE — 99232 SBSQ HOSP IP/OBS MODERATE 35: CPT | Performed by: INTERNAL MEDICINE

## 2023-11-15 PROCEDURE — 80048 BASIC METABOLIC PNL TOTAL CA: CPT | Performed by: INTERNAL MEDICINE

## 2023-11-15 PROCEDURE — 94640 AIRWAY INHALATION TREATMENT: CPT

## 2023-11-15 PROCEDURE — 85014 HEMATOCRIT: CPT | Performed by: INTERNAL MEDICINE

## 2023-11-15 RX ORDER — ALBUTEROL SULFATE 0.83 MG/ML
2.5 SOLUTION RESPIRATORY (INHALATION) EVERY 2 HOUR PRN
Qty: 75 ML | Refills: 11 | Status: SHIPPED | OUTPATIENT
Start: 2023-11-15 | End: 2024-03-29 | Stop reason: ALTCHOICE

## 2023-11-15 RX ORDER — CEFUROXIME AXETIL 500 MG/1
500 TABLET ORAL 2 TIMES DAILY
Qty: 20 TABLET | Refills: 0 | Status: SHIPPED | OUTPATIENT
Start: 2023-11-15 | End: 2023-12-01 | Stop reason: WASHOUT

## 2023-11-15 RX ORDER — ATORVASTATIN CALCIUM 40 MG/1
TABLET, FILM COATED ORAL
Qty: 60 TABLET | Refills: 0 | Status: SHIPPED | OUTPATIENT
Start: 2023-11-15 | End: 2024-02-03 | Stop reason: HOSPADM

## 2023-11-15 RX ORDER — PREDNISONE 5 MG/1
5 TABLET ORAL DAILY
Qty: 3 TABLET | Refills: 0 | Status: SHIPPED | OUTPATIENT
Start: 2023-11-15 | End: 2023-11-18

## 2023-11-15 RX ORDER — LORATADINE 10 MG/1
10 TABLET ORAL DAILY
Qty: 30 TABLET | Refills: 0 | Status: SHIPPED | OUTPATIENT
Start: 2023-11-16 | End: 2024-02-05 | Stop reason: HOSPADM

## 2023-11-15 RX ORDER — LOSARTAN POTASSIUM 25 MG/1
25 TABLET ORAL DAILY
Qty: 30 TABLET | Refills: 0 | Status: SHIPPED | OUTPATIENT
Start: 2023-11-16 | End: 2023-12-08 | Stop reason: SDUPTHER

## 2023-11-15 RX ORDER — NITROGLYCERIN 0.4 MG/1
0.4 TABLET SUBLINGUAL EVERY 5 MIN PRN
Qty: 90 TABLET | Refills: 12 | Status: SHIPPED | OUTPATIENT
Start: 2023-11-15

## 2023-11-15 RX ORDER — ASPIRIN 81 MG/1
81 TABLET ORAL ONCE
Qty: 1 TABLET | Refills: 0 | Status: SHIPPED | OUTPATIENT
Start: 2023-11-15 | End: 2023-11-15

## 2023-11-15 RX ORDER — ALBUTEROL SULFATE 0.83 MG/ML
2.5 SOLUTION RESPIRATORY (INHALATION) 3 TIMES DAILY
Qty: 3 ML | Refills: 0 | Status: SHIPPED | OUTPATIENT
Start: 2023-11-15 | End: 2023-12-01 | Stop reason: WASHOUT

## 2023-11-15 RX ORDER — LIDOCAINE 560 MG/1
1 PATCH PERCUTANEOUS; TOPICAL; TRANSDERMAL DAILY
Qty: 30 PATCH | Refills: 0 | Status: SHIPPED | OUTPATIENT
Start: 2023-11-16 | End: 2024-05-27 | Stop reason: WASHOUT

## 2023-11-15 RX ORDER — FERROUS GLUCONATE 325 MG
38 TABLET ORAL 3 TIMES WEEKLY
Qty: 30 TABLET | Refills: 0 | Status: SHIPPED | OUTPATIENT
Start: 2023-11-17 | End: 2024-02-03 | Stop reason: HOSPADM

## 2023-11-15 RX ORDER — METOPROLOL SUCCINATE 50 MG/1
50 TABLET, EXTENDED RELEASE ORAL DAILY
Qty: 30 TABLET | Refills: 1 | Status: SHIPPED | OUTPATIENT
Start: 2023-11-16 | End: 2024-02-03 | Stop reason: HOSPADM

## 2023-11-15 RX ADMIN — POTASSIUM CHLORIDE 20 MEQ: 1.5 FOR SOLUTION ORAL at 08:23

## 2023-11-15 RX ADMIN — PANTOPRAZOLE SODIUM 40 MG: 40 TABLET, DELAYED RELEASE ORAL at 08:23

## 2023-11-15 RX ADMIN — Medication 324 MG: at 08:23

## 2023-11-15 RX ADMIN — LIDOCAINE 1 PATCH: 4 PATCH TOPICAL at 08:22

## 2023-11-15 RX ADMIN — FUROSEMIDE 40 MG: 40 TABLET ORAL at 08:23

## 2023-11-15 RX ADMIN — ALBUTEROL SULFATE 2.5 MG: 2.5 SOLUTION RESPIRATORY (INHALATION) at 07:20

## 2023-11-15 RX ADMIN — LORATADINE 10 MG: 10 TABLET ORAL at 09:00

## 2023-11-15 RX ADMIN — SERTRALINE 50 MG: 50 TABLET, FILM COATED ORAL at 08:23

## 2023-11-15 RX ADMIN — Medication 400 MG: at 08:23

## 2023-11-15 RX ADMIN — LOSARTAN POTASSIUM 25 MG: 25 TABLET, FILM COATED ORAL at 08:23

## 2023-11-15 RX ADMIN — CALCITONIN SALMON 1 SPRAY: 200 SPRAY, METERED NASAL at 08:24

## 2023-11-15 RX ADMIN — FAMOTIDINE 20 MG: 20 TABLET, FILM COATED ORAL at 10:46

## 2023-11-15 RX ADMIN — METOPROLOL SUCCINATE 50 MG: 50 TABLET, EXTENDED RELEASE ORAL at 08:23

## 2023-11-15 RX ADMIN — ALBUTEROL SULFATE 2.5 MG: 2.5 SOLUTION RESPIRATORY (INHALATION) at 13:11

## 2023-11-15 RX ADMIN — PREDNISONE 5 MG: 5 TABLET ORAL at 08:23

## 2023-11-15 ASSESSMENT — COGNITIVE AND FUNCTIONAL STATUS - GENERAL
MOBILITY SCORE: 17
HELP NEEDED FOR BATHING: A LITTLE
CLIMB 3 TO 5 STEPS WITH RAILING: A LOT
DRESSING REGULAR LOWER BODY CLOTHING: A LITTLE
WALKING IN HOSPITAL ROOM: A LITTLE
MOVING FROM LYING ON BACK TO SITTING ON SIDE OF FLAT BED WITH BEDRAILS: A LITTLE
DRESSING REGULAR UPPER BODY CLOTHING: A LITTLE
STANDING UP FROM CHAIR USING ARMS: A LITTLE
TOILETING: A LITTLE
MOVING TO AND FROM BED TO CHAIR: A LITTLE
PERSONAL GROOMING: A LITTLE
DAILY ACTIVITIY SCORE: 19
TURNING FROM BACK TO SIDE WHILE IN FLAT BAD: A LITTLE

## 2023-11-15 ASSESSMENT — PAIN SCALES - GENERAL: PAINLEVEL_OUTOF10: 0 - NO PAIN

## 2023-11-15 NOTE — PROGRESS NOTES
PCN informed patient to discharge today 11/15. PCN attached the home care orders and updated soap not from today 11/15 from Cardiology due to Dr. GARRETT Bailey did not place Face to Face date on orders. No PT ordered either. Dr. GARRETT Bailey is the PCP so if [facility] team feels patient needs PT they can obtain order from Dr. GARRETT Bailey. Awaiting SO.       Omi Harmon

## 2023-11-15 NOTE — PROGRESS NOTES
PCN received [facility]port message from [facility] team and they are able to accept. No indication if patient was still currently active. Patient will need new HCO anyway due to inpatient status at [facility].       Omi Harmon

## 2023-11-15 NOTE — CARE PLAN
Problem: Respiratory  Goal: Clear secretions with interventions this shift  Outcome: Progressing  Goal: Minimize anxiety/maximize coping throughout shift  Outcome: Progressing  Goal: No signs of respiratory distress (eg. Use of accessory muscles. Peds grunting)  Outcome: Progressing

## 2023-11-15 NOTE — CARE PLAN
The patient's goals for the shift include  safety and comfort    The clinical goals for the shift include safety

## 2023-11-15 NOTE — PROGRESS NOTES
"Kalie Ramos is a 89 y.o. female on day 3 of admission presenting with Weakness of both legs.    Subjective   During nursing rounds, RN Informed TCSW pt is anticipated to discharge home today with [facility].     TCSW engaged with pt and pt's daughter at bedside to answer questions regarding Assisted Living in the future. TCSW answered all questions and ensure understanding with both pt and pt's daughter. Pt is not interested in AL at this time. Pt si agreeable to the planned discharge home with [facility] today.        Objective     Physical Exam    Last Recorded Vitals  Blood pressure 151/66, pulse 53, temperature 36.6 °C (97.9 °F), temperature source Oral, resp. rate 16, height 1.549 m (5' 1\"), weight 53.2 kg (117 lb 4.6 oz), SpO2 99 %.  Intake/Output last 3 Shifts:  I/O last 3 completed shifts:  In: 350 (6.6 mL/kg) [P.O.:300; IV Piggyback:50]  Out: - (0 mL/kg)   Weight: 53.2 kg     Relevant Results                             Assessment/Plan   Principal Problem:    Weakness of both legs               Ronald Olmstead, EMW      "

## 2023-11-15 NOTE — CARE PLAN
Problem: Respiratory  Goal: Clear secretions with interventions this shift  Outcome: Progressing  Goal: No signs of respiratory distress (eg. Use of accessory muscles. Peds grunting)  Outcome: Progressing

## 2023-11-15 NOTE — PROGRESS NOTES
Music Therapy Note    Kalie Ramos was referred by nursing    Therapy Session  Referral Type: New referral this admission  Visit Type: Follow-up visit  Session Start Time: 1443  Intervention Delivery: In-person  Conflict of Service: Declined treatment     Narrative  Assessment Detail: Pt sitting up in bed using cellphone. Initially Pt seemed confused about who was MT. After clarification, pt shared that she was communicating with family and was very busy at the moment. Pt declined treatment.  Follow-up: Will continue to follow-up.    Education Documentation  No documentation found.

## 2023-11-15 NOTE — PROGRESS NOTES
Subjective Data:  No new complaints     Overnight Events:    No significant overnight events      Objective Data:  Last Recorded Vitals:  Vitals:    11/14/23 1943 11/15/23 0042 11/15/23 0700 11/15/23 0726   BP:  155/61  168/73   BP Location:  Right arm  Right arm   Patient Position:  Lying  Lying   Pulse:  64  65   Resp:  16  17   Temp:  36.6 °C (97.9 °F)  36.4 °C (97.5 °F)   TempSrc:  Oral  Temporal   SpO2: 96% 100% 100% 100%   Weight:       Height:           Last Labs:  CBC - 11/15/2023:  5:52 AM  6.6 11.1 187    36.2      CMP - 11/15/2023:  5:52 AM  7.2 5.7 15 --- 0.3   _ 2.6 17 115      PTT - 1/26/2023:  6:26 PM  1.0   10.2 29.2     HGBA1C   Date/Time Value Ref Range Status   05/23/2022 10:43 PM 5.7 4.0 - 6.0 % Final     Comment:     Hemoglobin A1C levels are related to mean blood glucose during the   preceding 2-3 months. The relationship table below may be used as a   general guide. Each 1% increase in HGB A1C is a reflection of an   increase in mean glucose of approximately 30 mg/dl.   Reference: [employer], volume 29, supplement 1 Jan. 2006                        HGB A1C ................. Approx. Mean Glucose   _______________________________________________   6%   ...............................  120 mg/dl   7%   ...............................  150 mg/dl   8%   ...............................  180 mg/dl   9%   ...............................  210 mg/dl   10%  ...............................  240 mg/dl  Performed at [address] [address]     12/17/2020 03:16 PM 5.7 4.0 - 6.0 % Final     Comment:     Hemoglobin A1C levels are related to mean blood glucose during the   preceding 2-3 months. The relationship table below may be used as a   general guide. Each 1% increase in HGB A1C is a reflection of an   increase in mean glucose of approximately 30 mg/dl.   Reference: Diabetes Care, volume 29, supplement 1 Jan. 2006                        HGB A1C ................. Approx. Mean  "Glucose   _______________________________________________   6%   ...............................  120 mg/dl   7%   ...............................  150 mg/dl   8%   ...............................  180 mg/dl   9%   ...............................  210 mg/dl   10%  ...............................  240 mg/dl  Performed at [address] [address]       LDLCALC   Date/Time Value Ref Range Status   01/27/2023 06:04 AM 66 65 - 130 MG/DL Final   11/20/2022 02:47 AM 54 65 - 130 MG/DL Final   07/06/2022 01:13 AM 54 65 - 130 MG/DL Final     VLDL   Date/Time Value Ref Range Status   08/03/2018 09:41 AM 25 0 - 40 mg/dL Final   01/18/2018 02:30 PM 47 0 - 40 mg/dL Final   10/17/2017 11:18 AM 50 0 - 40 mg/dL Final      Last I/O:  I/O last 3 completed shifts:  In: 350 (6.6 mL/kg) [P.O.:300; IV Piggyback:50]  Out: - (0 mL/kg)   Weight: 53.2 kg     Past Cardiology Tests (Last 3 Years):  EKG:  ECG 12 Lead 10/28/2023      ECG 12 lead 10/24/2023    Echo:  No results found for this or any previous visit from the past 1095 days.    Ejection Fractions:  No results found for: \"EF\"  Cath:  No results found for this or any previous visit from the past 1095 days.    Stress Test:  No results found for this or any previous visit from the past 1095 days.    Cardiac Imaging:  No results found for this or any previous visit from the past 1095 days.      Inpatient Medications:  Scheduled medications   Medication Dose Route Frequency    albuterol  2.5 mg nebulization TID    aspirin  81 mg oral Once    atorvastatin  40 mg oral Nightly    calcitonin (salmon)  1 spray One Nostril Daily    cefTRIAXone  1 g intravenous BID    docusate sodium  100 mg oral BID    enoxaparin  40 mg subcutaneous Daily    famotidine  20 mg oral q12h    ferrous gluconate  324 mg oral Once per day on Mon Wed Fri    furosemide  40 mg oral Daily    lidocaine  1 patch transdermal Daily    loratadine  10 mg oral Daily    losartan  25 mg oral Daily    magnesium " oxide  400 mg oral Once per day on Mon Wed Fri    melatonin  3 mg oral Nightly    metoprolol succinate XL  50 mg oral Daily    oxygen   inhalation q24h    pantoprazole  40 mg oral Daily    potassium chloride  20 mEq oral Daily with breakfast    predniSONE  5 mg oral Daily with breakfast    rOPINIRole  2 mg oral Nightly    sertraline  50 mg oral Daily     PRN medications   Medication    acetaminophen    albuterol    benzocaine-menthol    guaiFENesin    nitroglycerin    sodium chloride    traZODone     Continuous Medications   Medication Dose Last Rate       Physical Exam:  The patient is a thin upper elderly white female sitting upright in bed eating breakfast.  No respiratory distress on standard low-flow O2 nasal cannula 3 L/min  JVP not elevated carotid pulses 2+ thyroid is enlarged palpably  Moderate thoracic kyphosis shallow air movement diminished breath sounds  Cardiac rhythm regular no premature beats S1-S2 obscured grade 3/6 systolic ejection murmur  No peripheral edema.     Assessment/Plan   11/13:  This patient is a 89-year-old white female with extensive issues that include coronary artery disease, severe aortic valvular stenosis, moderate tricuspid and mitral valve regurgitation, severe pulmonary hypertension, COPD with oxygen dependence, hypertension, left bundle branch block conduction delay, lower extremity edema due to venous insufficiency, chronic DVT left lower extremity femoral vein, laparoscopic paraesophageal hiatal hernia repair, ongoing aspiration pneumonias, nontoxic multinodular goiter.  The patient's last echocardiogram on 7/25/2023 again demonstrated a LV ejection fraction 35-40% severe aortic valve stenosis peak systolic gradient 56 mmHg, mild mitral moderate tricuspid valve regurgitation and severe pulmonary hypertension estimated PA systolic pressure 67 mmHg.  The patient was thought not to be a candidate for transcatheter aortic valve replacement.  She does wear continuous oxygen at 3  L/min at home.  She surprisingly still lives independently in her own condominium.  She has been having increased shortness of breath.  Multiple potential etiologies including her chronic lung disease aspiration pneumonias interstitial fibrosis possible CHF.  The patient was recently admitted for several days on 10/23/2023 with increased shortness of breath and ultimately was released on Lasix 40 mg twice daily which she has reduced to daily.  Patient admitted after having fallen in the bathroom and not being able to get up due to generalized weakness.  She lives independently in her own condominium and has been very resistant to review our recommendation for assisted living.  She does have a daughter who lives within 10 minutes.  Her overall status appears to be relatively stable.  She is receiving antibiotics for possible urinary tract infection.  For now we will continue the patient on her usual therapy which does at this point include Lasix 40 mg once daily along with her low-dose aspirin plus atorvastatin 40 mg daily and metoprolol succinate 25 mg twice daily.  Still with highly advised the patient to be placed into an assisted living facility given her increasing generalized weakness and her need for continuous nasal oxygen therapy.      11/14: The patient is lying comfortably in bed eating a complete breakfast.  She is awake alert conversant in good spirits.  She again declines immediately any suggestion to move to assisted living.  Her telemetry monitor demonstrates atrial fibrillation with a controlled ventricular rate on the metoprolol succinate.  For simplification of administration it would be switched from 25 mg twice daily to 50 mg daily.  Systolic blood pressures ranging primarily between 120-150 mmHg.  The comprehensive metabolic panel is unremarkable.  Hemoglobin unchanged at 10.1 hematocrit 33.2.  Given her reduced LV ejection fraction we will begin low-dose losartan 25 mg daily.    11/16: The  patient is resting comfortably very anxious to go home.  She denies any shortness of breath on her usual nasal oxygen.  She is having multiple family members come to the area from [place] and outside of town to discuss plans with respect to her home situation.  The patient strongly prefers to stay in her own condominium although as she is having difficulty with some of the activities of daily living.  Assisted living has been strongly recommended but resisted by the patient.  Patient's renal parameters remain stable creatinine is 0.80.  Low-dose losartan was started yesterday due to her LV dysfunction.  For now she will remain on the Lasix 40 mg once daily with a second dose as needed.  Hemoglobin is steady at 11.1 hematocrit 36.2.  We will schedule the patient for an outpatient follow-up visit in approximately 2 weeks.     Peripheral IV 11/12/23 24 G Distal;Right Forearm (Active)   Site Assessment Clean;Dry;Intact 11/15/23 0724   Dressing Status Clean;Dry 11/15/23 0724   Number of days: 3       Code Status:  No Order    I spent 20 minutes in the professional and overall care of this patient.        Jim Mckinney MD

## 2023-11-15 NOTE — CARE PLAN
The patient's goals for the shift include      The clinical goals for the shift include safety    Problem: Pain - Adult  Goal: Verbalizes/displays adequate comfort level or baseline comfort level  Outcome: Progressing     Problem: Safety - Adult  Goal: Free from fall injury  Outcome: Progressing     Problem: Discharge Planning  Goal: Discharge to home or other facility with appropriate resources  Outcome: Progressing     Problem: Chronic Conditions and Co-morbidities  Goal: Patient's chronic conditions and co-morbidity symptoms are monitored and maintained or improved  Outcome: Progressing     Problem: Fall/Injury  Goal: Not fall by end of shift  Outcome: Progressing  Goal: Be free from injury by end of the shift  Outcome: Progressing  Goal: Verbalize understanding of personal risk factors for fall in the hospital  Outcome: Progressing  Goal: Verbalize understanding of risk factor reduction measures to prevent injury from fall in the home  Outcome: Progressing  Goal: Use assistive devices by end of the shift  Outcome: Progressing  Goal: Pace activities to prevent fatigue by end of the shift  Outcome: Progressing     Problem: Skin  Goal: Decreased wound size/increased tissue granulation at next dressing change  Outcome: Progressing  Goal: Participates in plan/prevention/treatment measures  Outcome: Progressing  Flowsheets (Taken 11/15/2023 1106)  Participates in plan/prevention/treatment measures: Increase activity/out of bed for meals  Goal: Prevent/manage excess moisture  Outcome: Progressing  Goal: Prevent/minimize sheer/friction injuries  Outcome: Progressing  Goal: Promote/optimize nutrition  Outcome: Progressing  Goal: Promote skin healing  Outcome: Progressing     Problem: Respiratory  Goal: Clear secretions with interventions this shift  Outcome: Progressing  Goal: Minimize anxiety/maximize coping throughout shift  Outcome: Progressing  Goal: No signs of respiratory distress (eg. Use of accessory muscles.  Peds grunting)  Outcome: Progressing

## 2023-11-16 NOTE — DISCHARGE SUMMARY
Discharge Diagnosis  Weakness of both legs  Fall weakness edema mental status change  Issues Requiring Follow-Up  Monitor electrolytes    Test Results Pending At Discharge  Pending Labs       Order Current Status    Hemoglobin and Hematocrit, Blood Collected (11/13/23 0125)            Hospital Course   Patient was found follow weakness lethargic seen by specialist will respond to treatment refused to go to rehab Home with home care    Pertinent Physical Exam At Time of Discharge  Physical Exam  Edema minimum see my progress note  Home Medications     Medication List      START taking these medications     calcitonin (salmon) 200 unit/actuation nasal spray; Commonly known as:   Miacalcin; Administer 1 spray into one nostril once daily. Do not start   before October 28, 2023.   docusate sodium 100 mg capsule; Commonly known as: Colace; Take 1   capsule (100 mg) by mouth 2 times a day.   losartan 25 mg tablet; Commonly known as: Cozaar; Take 1 tablet (25 mg)   by mouth once daily. Do not start before November 16, 2023.; Start taking   on: November 16, 2023     CHANGE how you take these medications     * albuterol 2.5 mg /3 mL (0.083 %) nebulizer solution; Take 3 mL (2.5   mg) by nebulization every 2 hours if needed for wheezing.; What changed:   You were already taking a medication with the same name, and this   prescription was added. Make sure you understand how and when to take   each.   * albuterol 2.5 mg /3 mL (0.083 %) nebulizer solution; Take 3 mL (2.5   mg) by nebulization 3 times a day.; What changed: Another medication with   the same name was added. Make sure you understand how and when to take   each.   aspirin 81 mg EC tablet; Take 1 tablet (81 mg) by mouth 1 time for 1   dose.; What changed: when to take this   atorvastatin 40 mg tablet; Commonly known as: Lipitor; Take 1 tablet (40   mg) by mouth once daily at bedtime for 30 days, THEN 1 tablet (40 mg) once   daily at bedtime.; Start taking on: November  15, 2023; What changed: See   the new instructions.   ferrous gluconate 324 (38 Fe) mg tablet; Commonly known as: Fergon; Take   1 tablet (38 mg of iron) by mouth 3 (three) times a week. Do not start   before November 17, 2023.; Start taking on: November 17, 2023; What   changed: medication strength, how much to take, additional instructions   lidocaine 4 % patch; Place 1 patch over 12 hours on the skin once daily.   Remove & discard patch within 12 hours or as directed by MD. Do not start   before November 16, 2023.; Start taking on: November 16, 2023; What   changed: how much to take, how to take this, when to take this, additional   instructions   loratadine 10 mg tablet; Commonly known as: Claritin; Take 1 tablet (10   mg) by mouth once daily. Do not start before November 16, 2023.; Start   taking on: November 16, 2023; What changed: when to take this   metoprolol succinate XL 50 mg 24 hr tablet; Commonly known as:   Toprol-XL; Take 1 tablet (50 mg) by mouth once daily. Do not crush or   chew. Do not start before November 16, 2023.; Start taking on: November 16, 2023; What changed: how much to take, when to take this, additional   instructions   nitroglycerin 0.4 mg SL tablet; Commonly known as: Nitrostat; Place 1   tablet (0.4 mg) under the tongue every 5 minutes if needed for chest pain   (UP TO 3 TIMES. IF NO RELIEF CALL 911.).; What changed: how to take this,   additional instructions   oxygen gas therapy; Commonly known as: O2; Inhale 2 L/min once every 24   hours.; What changed: how much to take   rOPINIRole 5 mg tablet; Commonly known as: Requip; What changed: Another   medication with the same name was removed. Continue taking this   medication, and follow the directions you see here.  * This list has 2 medication(s) that are the same as other medications   prescribed for you. Read the directions carefully, and ask your doctor or   other care provider to review them with you.     CONTINUE taking these  medications     acetaminophen 325 mg tablet; Commonly known as: Tylenol; Take 2 tablets   (650 mg) by mouth every 6 hours if needed for moderate pain (4 - 6).   benzocaine-menthol 15-3.6 mg lozenge; Commonly known as: Cepastat Sore   Throat; Dissolve 1 lozenge in the mouth every 4 hours if needed for sore   throat.   furosemide 40 mg tablet; Commonly known as: Lasix; TAKE 1 TABLET DAILY   magnesium oxide 400 mg (241.3 mg magnesium) tablet; Commonly known as:   Mag-Ox   melatonin 3 mg tablet   pantoprazole 40 mg EC tablet; Commonly known as: ProtoNix; TAKE 1 TABLET   DAILY   potassium chloride 20 mEq packet; Commonly known as: Klor-Con   predniSONE 5 mg tablet; Commonly known as: Deltasone; Take 1 tablet (5   mg) by mouth once daily for 3 doses.   sertraline 50 mg tablet; Commonly known as: Zoloft; Take 1 tablet (50   mg) by mouth once daily.   sodium chloride 0.65 % nasal spray; Commonly known as: Ocean; Administer   1 spray into each nostril 4 times a day as needed for congestion.   traZODone 50 mg tablet; Commonly known as: Desyrel     STOP taking these medications     famotidine 20 mg tablet; Commonly known as: Pepcid   guaiFENesin 100 mg/5 mL syrup; Commonly known as: Robitussin       Outpatient Follow-Up  Future Appointments   Date Time Provider Department [place]   11/27/2023  1:00 PM Avel Bailey MD [id number] [facility]   12/1/2023 11:30 AM Jim Mckinney MD [id number] [facility]   1/2/2024  2:00 PM Jim Mckinney MD [id number] [facility]   5/8/2024  1:00 PM [id number] INFUSION [id number] [facility]   Discharge home follow-up in 3 to 5 days continue to follow    Avel Bailey MD

## 2023-11-16 NOTE — PROGRESS NOTES
PCN receivbed Careport response from [facility] team and they can perform SO on Friday 11/17. TCC and bedside nurse aware.    Omi Harmon

## 2023-11-16 NOTE — PROGRESS NOTES
Kalie Ramos is a 89 y.o. female on day 3 of admission presenting with Weakness of both legs.  Patient feeling okay insist on going home family takes good care of her  Subjective   Weakness improving insist on going home       Objective   Edema better wants to go home appetite coming back  Physical Exam  HEENT:  Head externally atraumatic, no pallor, no icterus, extraocular movements intact, pulls reacting to light, oral mucosa moist and throat clear.  Neck:  Supple, no JVP, no palpable adenopathy or thyromegaly.  No carotid bruit.  Chest:  Clear to auscultation and resonant.  Heart:  Regular rate and rhythm, no murmur or gallop could be appreciated.  Abdomen:  Soft, nontender, bowel sounds present, normoactive, no palpable hepatosplenomegaly.  Extremities:  No edema, pulses present, no cyanosis or clubbing.  CNS:  Patient alert, oriented to time, place and person.  Power 5/5 all over and deep tendon reflexes symmetrical, cranial nerves 2-12 grossly intact.  Skin:  No active rash.  Musculoskeletal:  No joint swelling or erythema, range of movement normal.  Last Recorded Vitals  Heart Rate:  [53-65]   Temp:  [36.4 °C (97.5 °F)-36.6 °C (97.9 °F)]   Resp:  [16-17]   BP: (151-168)/(61-73)   SpO2:  [99 %-100 %]     Intake/Output last 3 Shifts:  I/O last 3 completed shifts:  In: 300 (5.6 mL/kg) [P.O.:300]  Out: - (0 mL/kg)   Weight: 53.2 kg     Relevant Results  Susceptibility data from last 90 days.  Collected Specimen Info Organism Ampicillin Ciprofloxacin Levofloxacin Nitrofurantoin Penicillin Trimethoprim/Sulfamethoxazole Vancomycin   11/12/23 Urine from Clean Catch/Voided Enterococcus faecium R R R I R R S     Results for orders placed or performed during the hospital encounter of 11/12/23 (from the past 24 hour(s))   Hemoglobin and Hematocrit, Blood   Result Value Ref Range    Hemoglobin 10.5 (L) 12.0 - 16.0 g/dL    Hematocrit 34.2 (L) 36.0 - 46.0 %   Hemoglobin and Hematocrit, Blood   Result Value Ref Range     Hemoglobin 11.1 (L) 12.0 - 16.0 g/dL    Hematocrit 36.2 36.0 - 46.0 %   Basic Metabolic Panel   Result Value Ref Range    Glucose 94 65 - 99 mg/dL    Sodium 140 133 - 145 mmol/L    Potassium 3.9 3.4 - 5.1 mmol/L    Chloride 104 97 - 107 mmol/L    Bicarbonate 30 24 - 31 mmol/L    Urea Nitrogen 13 8 - 25 mg/dL    Creatinine 0.80 0.40 - 1.60 mg/dL    eGFR 69 >60 mL/min/1.73m*2    Calcium 7.2 (L) 8.5 - 10.4 mg/dL    Anion Gap 6 <=19 mmol/L      No current facility-administered medications for this encounter.    Current Outpatient Medications:     acetaminophen (Tylenol) 325 mg tablet, Take 2 tablets (650 mg) by mouth every 6 hours if needed for moderate pain (4 - 6)., Disp: 30 tablet, Rfl: 0    albuterol 2.5 mg /3 mL (0.083 %) nebulizer solution, Take 3 mL (2.5 mg) by nebulization every 2 hours if needed for wheezing., Disp: 75 mL, Rfl: 11    albuterol 2.5 mg /3 mL (0.083 %) nebulizer solution, Take 3 mL (2.5 mg) by nebulization 3 times a day., Disp: 3 mL, Rfl: 0    aspirin 81 mg EC tablet, Take 1 tablet (81 mg) by mouth 1 time for 1 dose., Disp: 1 tablet, Rfl: 0    atorvastatin (Lipitor) 40 mg tablet, Take 1 tablet (40 mg) by mouth once daily at bedtime for 30 days, THEN 1 tablet (40 mg) once daily at bedtime., Disp: 60 tablet, Rfl: 0    benzocaine-menthol (Cepastat Sore Throat) 15-3.6 mg lozenge, Dissolve 1 lozenge in the mouth every 4 hours if needed for sore throat., Disp: 1 lozenge, Rfl: 0    calcitonin, salmon, (Miacalcin) 200 unit/actuation nasal spray, Administer 1 spray into one nostril once daily. Do not start before October 28, 2023., Disp: 10 mL, Rfl: 0    cefuroxime (Ceftin) 500 mg tablet, Take 1 tablet (500 mg) by mouth 2 times a day for 10 days., Disp: 20 tablet, Rfl: 0    docusate sodium (Colace) 100 mg capsule, Take 1 capsule (100 mg) by mouth 2 times a day., Disp: 60 capsule, Rfl: 0    [START ON 11/17/2023] ferrous gluconate (Fergon) 324 (38 Fe) mg tablet, Take 1 tablet (38 mg of iron) by mouth 3  (three) times a week. Do not start before November 17, 2023., Disp: 30 tablet, Rfl: 0    furosemide (Lasix) 40 mg tablet, TAKE 1 TABLET DAILY, Disp: 90 tablet, Rfl: 3    [START ON 11/16/2023] lidocaine 4 % patch, Place 1 patch over 12 hours on the skin once daily. Remove & discard patch within 12 hours or as directed by MD. Do not start before November 16, 2023., Disp: 30 patch, Rfl: 0    [START ON 11/16/2023] loratadine (Claritin) 10 mg tablet, Take 1 tablet (10 mg) by mouth once daily. Do not start before November 16, 2023., Disp: 30 tablet, Rfl: 0    [START ON 11/16/2023] losartan (Cozaar) 25 mg tablet, Take 1 tablet (25 mg) by mouth once daily. Do not start before November 16, 2023., Disp: 30 tablet, Rfl: 0    magnesium oxide (Mag-Ox) 400 mg (241.3 mg magnesium) tablet, Take 1 tablet (400 mg) by mouth 3 times a week. Tues thurs sat, Disp: , Rfl:     melatonin 3 mg tablet, Take 1 tablet (3 mg) by mouth once daily at bedtime., Disp: , Rfl:     [START ON 11/16/2023] metoprolol succinate XL (Toprol-XL) 50 mg 24 hr tablet, Take 1 tablet (50 mg) by mouth once daily. Do not crush or chew. Do not start before November 16, 2023., Disp: 30 tablet, Rfl: 1    nitroglycerin (Nitrostat) 0.4 mg SL tablet, Place 1 tablet (0.4 mg) under the tongue every 5 minutes if needed for chest pain (UP TO 3 TIMES. IF NO RELIEF CALL 911.)., Disp: 90 tablet, Rfl: 12    oxygen (O2) gas therapy, Inhale 2 L/min once every 24 hours., Disp: , Rfl:     pantoprazole (ProtoNix) 40 mg EC tablet, TAKE 1 TABLET DAILY, Disp: 90 tablet, Rfl: 3    potassium chloride (Klor-Con) 20 mEq packet, Take 20 mEq by mouth once daily. MIX AND DRINK, Disp: , Rfl:     predniSONE (Deltasone) 5 mg tablet, Take 1 tablet (5 mg) by mouth once daily for 3 doses., Disp: 3 tablet, Rfl: 0    rOPINIRole (Requip) 5 mg tablet, Take 1 tablet (5 mg) by mouth once daily at bedtime., Disp: , Rfl:     sertraline (Zoloft) 50 mg tablet, Take 1 tablet (50 mg) by mouth once daily., Disp:  90 tablet, Rfl: 1    sodium chloride (Ocean) 0.65 % nasal spray, Administer 1 spray into each nostril 4 times a day as needed for congestion., Disp: 30 mL, Rfl: 12    traZODone (Desyrel) 50 mg tablet, Take 1 tablet (50 mg) by mouth as needed at bedtime for sleep., Disp: , Rfl:    Assessment/Plan   Principal Problem:    Weakness of both legs    Dehydration improved    Fall PT OT    GERD PPI    Hypoxia oxygen    CHF stable    Edema improved    Plan discharge on request separate discharge summary dictated         Avel Bailey MD

## 2023-11-17 ENCOUNTER — PATIENT OUTREACH (OUTPATIENT)
Dept: CARE COORDINATION | Facility: CLINIC | Age: 88
End: 2023-11-17
Payer: MEDICARE

## 2023-11-17 ENCOUNTER — HOSPITAL ENCOUNTER (OUTPATIENT)
Dept: CARDIOLOGY | Facility: HOSPITAL | Age: 88
Discharge: HOME | End: 2023-11-17
Payer: MEDICARE

## 2023-11-17 ENCOUNTER — PATIENT OUTREACH (OUTPATIENT)
Dept: CASE MANAGEMENT | Facility: HOSPITAL | Age: 88
End: 2023-11-17
Payer: MEDICARE

## 2023-11-17 LAB
ATRIAL RATE: 52 BPM
P AXIS: 76 DEGREES
P OFFSET: 140 MS
P ONSET: 103 MS
PR INTERVAL: 218 MS
Q ONSET: 212 MS
QRS COUNT: 9 BEATS
QRS DURATION: 126 MS
QT INTERVAL: 554 MS
QTC CALCULATION(BAZETT): 515 MS
QTC FREDERICIA: 528 MS
R AXIS: -39 DEGREES
T AXIS: 159 DEGREES
T OFFSET: 489 MS
VENTRICULAR RATE: 52 BPM

## 2023-11-17 PROCEDURE — 93005 ELECTROCARDIOGRAM TRACING: CPT

## 2023-11-17 NOTE — PROGRESS NOTES
Spoke with Kalie & her son via telephone for CHF follow up call. Kalie was readmitted to hospital 11/12-11/15 Dx Dehydration,fall & weakness.  Active with [facility]. C/O not being able to sleep. [facility] Nurse is due for visit today. Suggested that she could call Dr GARRETT Bailey & make aware. She is not performing daily weights d/t being in hospital. Doing her best to follow low Na diet. Reviewed HF flare up symptoms to call MD with .  She has follow up appts scheduled with PCP & Cardiologist. I will continue to follow for CHF management.

## 2023-11-17 NOTE — PROGRESS NOTES
Readmitted 11/12/23 to 11/15/23 with fall due to lower extremity weakness.   Declined rehab and home care.    Follow up with PCP 11/27 and cardiology 12/1. Home on Losartan, Calcitonin, and  docusate.   Stop Famotidine and Guaifenesin.     Attempted outreach to patient for transition of care completion;; left voice mail message.  Re-enrolled patient in conversa chat to monitor patient for 30 day transtion period and will outreach if issues arise.    Maureen GORE RN CCM  RN Care Coordinator  [facility]  Phone [phone]

## 2023-11-19 NOTE — DOCUMENTATION CLARIFICATION NOTE
"    PATIENT:               ROBBIN BOONE  ACCT #:                  [id number]  MRN:                       [id number]  :                       1929  ADMIT DATE:       2023 3:25 PM  DISCH DATE:        11/15/2023 4:00 PM  RESPONDING PROVIDER #:        [id number]          PROVIDER RESPONSE TEXT:    Acute on Chronic Systolic Congestive Heart Failure    CDI QUERY TEXT:    UH_CHF    Instruction:    Based on your assessment of the patient and the clinical information, please provide the requested documentation by clicking on the appropriate radio button and enter any additional information if prompted.    Question: Please further clarify the type and acuity of congestive heart failure    When answering this query, please exercise your independent professional judgment. The fact that a question is being asked, does not imply that any particular answer is desired or expected.    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    Clinical Indicators:      2V CXR: \"...There are bilateral perihilar interstitial and mild alveolar infiltrates and small bilateral pleural effusions...  . 1. Cardiomegaly and mild congestive heart failure on a background of COPD. Also improved infiltrates in the right upper lobe.  2. Right paratracheal tissue prominence again seen, most likely due to ectatic/tortuous vessels.\"    11/15 Cardiology Progress Note:\" Low-dose losartan was started yesterday due to her LV dysfunction.  For now she will remain on the Lasix 40 mg once daily with a second dose as needed.\"    Treatment: Lasix 40mg oral daily; losartan 25mg oral daily    Risk Factors: Advanced age, aortic valve stenosis, hx CHF, HTN  Options " provided:  -- Acute Systolic Congestive Heart Failure  -- Acute on Chronic Systolic Congestive Heart Failure  -- Chronic Systolic Congestive Heart Failure  -- Acute Diastolic Congestive Heart Failure  -- Acute on Chronic Diastolic Congestive Heart Failure  -- Chronic Diastolic Congestive Heart Failure  -- Acute combined systolic/diastolic Congestive Heart Failure  -- Acute on Chronic combined systolic/diastolic Congestive Heart Failure  -- Chronic combined systolic/diastolic Congestive Heart Failure  -- Other - I will add my own diagnosis  -- Refer to Clinical Documentation Reviewer    Query created by: Ashly Roman on 11/15/2023 3:08 PM      Electronically signed by:  DIANA DELUNA MD 11/19/2023 10:58 AM

## 2023-11-22 ENCOUNTER — PATIENT OUTREACH (OUTPATIENT)
Dept: CASE MANAGEMENT | Facility: HOSPITAL | Age: 88
End: 2023-11-22
Payer: MEDICARE

## 2023-11-22 NOTE — PROGRESS NOTES
Spoke with Kalie for CHF follow up call. Reports that she is feeling better. Performs daily weights, follows low Na diet, takes medications as directed. Reviewed HF flare up symptoms to report to MD. She has follow up with her PCP next week. I will continue to follow for CHF management.

## 2023-11-27 ENCOUNTER — LAB (OUTPATIENT)
Dept: LAB | Facility: LAB | Age: 88
End: 2023-11-27
Payer: MEDICARE

## 2023-11-27 ENCOUNTER — OFFICE VISIT (OUTPATIENT)
Dept: PRIMARY CARE | Facility: CLINIC | Age: 88
End: 2023-11-27
Payer: MEDICARE

## 2023-11-27 VITALS
BODY MASS INDEX: 21.71 KG/M2 | SYSTOLIC BLOOD PRESSURE: 120 MMHG | HEIGHT: 61 IN | DIASTOLIC BLOOD PRESSURE: 72 MMHG | WEIGHT: 115 LBS

## 2023-11-27 DIAGNOSIS — N18.9 CHRONIC RENAL FAILURE, UNSPECIFIED CKD STAGE: ICD-10-CM

## 2023-11-27 DIAGNOSIS — E78.00 HIGH CHOLESTEROL: ICD-10-CM

## 2023-11-27 DIAGNOSIS — D64.9 ANEMIA, UNSPECIFIED TYPE: ICD-10-CM

## 2023-11-27 DIAGNOSIS — I50.9 CONGESTIVE HEART FAILURE, UNSPECIFIED HF CHRONICITY, UNSPECIFIED HEART FAILURE TYPE (MULTI): ICD-10-CM

## 2023-11-27 DIAGNOSIS — R60.0 LEG EDEMA: Primary | ICD-10-CM

## 2023-11-27 LAB
ALBUMIN SERPL BCP-MCNC: 3.9 G/DL (ref 3.4–5)
ALP SERPL-CCNC: 124 U/L (ref 33–136)
ALT SERPL W P-5'-P-CCNC: 41 U/L (ref 7–45)
ANION GAP SERPL CALC-SCNC: 13 MMOL/L (ref 10–20)
AST SERPL W P-5'-P-CCNC: 42 U/L (ref 9–39)
BILIRUB SERPL-MCNC: 0.6 MG/DL (ref 0–1.2)
BUN SERPL-MCNC: 14 MG/DL (ref 6–23)
CALCIUM SERPL-MCNC: 9.3 MG/DL (ref 8.6–10.6)
CHLORIDE SERPL-SCNC: 102 MMOL/L (ref 98–107)
CO2 SERPL-SCNC: 33 MMOL/L (ref 21–32)
CREAT SERPL-MCNC: 0.9 MG/DL (ref 0.5–1.05)
ERYTHROCYTE [DISTWIDTH] IN BLOOD BY AUTOMATED COUNT: 14.6 % (ref 11.5–14.5)
GFR SERPL CREATININE-BSD FRML MDRD: 60 ML/MIN/1.73M*2
GLUCOSE SERPL-MCNC: 92 MG/DL (ref 74–99)
HCT VFR BLD AUTO: 41.7 % (ref 36–46)
HGB BLD-MCNC: 12.7 G/DL (ref 12–16)
MCH RBC QN AUTO: 28.7 PG (ref 26–34)
MCHC RBC AUTO-ENTMCNC: 30.5 G/DL (ref 32–36)
MCV RBC AUTO: 94 FL (ref 80–100)
NRBC BLD-RTO: 0 /100 WBCS (ref 0–0)
PLATELET # BLD AUTO: 354 X10*3/UL (ref 150–450)
POTASSIUM SERPL-SCNC: 4.7 MMOL/L (ref 3.5–5.3)
PROT SERPL-MCNC: 6.8 G/DL (ref 6.4–8.2)
RBC # BLD AUTO: 4.42 X10*6/UL (ref 4–5.2)
SODIUM SERPL-SCNC: 143 MMOL/L (ref 136–145)
WBC # BLD AUTO: 7.6 X10*3/UL (ref 4.4–11.3)

## 2023-11-27 PROCEDURE — 99214 OFFICE O/P EST MOD 30 MIN: CPT | Performed by: INTERNAL MEDICINE

## 2023-11-27 PROCEDURE — 1159F MED LIST DOCD IN RCRD: CPT | Performed by: INTERNAL MEDICINE

## 2023-11-27 PROCEDURE — 3078F DIAST BP <80 MM HG: CPT | Performed by: INTERNAL MEDICINE

## 2023-11-27 PROCEDURE — 1036F TOBACCO NON-USER: CPT | Performed by: INTERNAL MEDICINE

## 2023-11-27 PROCEDURE — 1111F DSCHRG MED/CURRENT MED MERGE: CPT | Performed by: INTERNAL MEDICINE

## 2023-11-27 PROCEDURE — 1126F AMNT PAIN NOTED NONE PRSNT: CPT | Performed by: INTERNAL MEDICINE

## 2023-11-27 PROCEDURE — 80053 COMPREHEN METABOLIC PANEL: CPT

## 2023-11-27 PROCEDURE — 85027 COMPLETE CBC AUTOMATED: CPT

## 2023-11-27 PROCEDURE — 3074F SYST BP LT 130 MM HG: CPT | Performed by: INTERNAL MEDICINE

## 2023-11-27 PROCEDURE — 82140 ASSAY OF AMMONIA: CPT

## 2023-11-27 PROCEDURE — 36415 COLL VENOUS BLD VENIPUNCTURE: CPT

## 2023-11-27 ASSESSMENT — ENCOUNTER SYMPTOMS
DEPRESSION: 0
LOSS OF SENSATION IN FEET: 0
OCCASIONAL FEELINGS OF UNSTEADINESS: 0

## 2023-11-28 LAB — AMMONIA PLAS-SCNC: 29 UMOL/L (ref 16–53)

## 2023-11-28 NOTE — PROGRESS NOTES
"Subjective   Patient ID: Kalie Ramos is a 89 y.o. female who presents for Follow-up (Other conditions) and Multiple medical issues .    It is always a delight to serve Ms. Ramos, today she came here for follow-up.  Family is taking really good care of her.  They got cameras to watch her.  So, she is living in a home, but really she is not alone.  Whole family is keeping an eye on her.  Feeling okay.  No new problem.  Follow-up on other conditions and in the hospital, they brought all the medication for polypharmacy review.    CURRENT MEDICATIONS: Aspirin, iron pill, fiber, I made aspirin at night time.    I have personally reviewed the patient's Past Medical History, Medications, Allergies, Social History, and Family History in the EMR.    Review of Systems   All other systems reviewed and are negative.    Objective   /72   Ht 1.549 m (5' 1\")   Wt 52.2 kg (115 lb)   BMI 21.73 kg/m²     Physical Exam  Vitals reviewed.   Cardiovascular:      Heart sounds: Normal heart sounds, S1 normal and S2 normal. No murmur heard.     No friction rub.   Pulmonary:      Effort: Pulmonary effort is normal.      Breath sounds: Normal breath sounds and air entry.   Abdominal:      Palpations: There is no hepatomegaly, splenomegaly or mass.   Musculoskeletal:      Right lower leg: Edema (minimal) present.      Left lower leg: Edema (minimal) present.      Comments: Arthritis present.   Lymphadenopathy:      Lower Body: No right inguinal adenopathy. No left inguinal adenopathy.   Neurological:      Cranial Nerves: Cranial nerves 2-12 are intact.      Sensory: No sensory deficit.      Motor: Motor function is intact.      Deep Tendon Reflexes: Reflexes are normal and symmetric.     LAB WORK:  Laboratory testing reviewed.    Assessment/Plan   Problem List Items Addressed This Visit             ICD-10-CM       Hematology and Neoplasia    Anemia D64.9    Relevant Orders    CBC (Completed)     Other Visit Diagnoses         " Codes    Leg edema    -  Primary R60.0    Congestive heart failure, unspecified HF chronicity, unspecified heart failure type (CMS/[facility])     I50.9    Relevant Orders    Comprehensive Metabolic Panel (Completed)    Ammonia (Completed)    High cholesterol     E78.00    Chronic renal failure, unspecified CKD stage     N18.9        1. Leg edema.  Lasix, potassium.  Doing really good..  2. Blood pressure.  I made Toprol in the morning, Zoloft in the evening.  3. High cholesterol, on medication.  Take aspirin at night.  4. Chronic renal failure.  Monitor.  ______.  5. Aspiration.  The patient is coping very well.  6. Overall, the patient is doing good.  Very comfortable.  She can live at home with good family support.  7. I shall see her in three weeks’ time.    Scribe Attestation  By signing my name below, ILizy Scribe attest that this documentation has been prepared under the direction and in the presence of Avel Bailey MD.

## 2023-11-29 ENCOUNTER — PATIENT OUTREACH (OUTPATIENT)
Dept: CASE MANAGEMENT | Facility: HOSPITAL | Age: 88
End: 2023-11-29
Payer: MEDICARE

## 2023-11-29 ENCOUNTER — PATIENT OUTREACH (OUTPATIENT)
Dept: CARE COORDINATION | Facility: CLINIC | Age: 88
End: 2023-11-29
Payer: MEDICARE

## 2023-11-29 NOTE — PROGRESS NOTES
Outreach to patient for post hospitalization provider appt follow up.  .  [facility] will continue home meds; changed Toprol to morning and Zoloft to evening   Appt with cardiology 12/1 and follow up with PCP 12/18.   Patient states she weighs daily, but does not know reason for such, and at her age will add salt to her food if she wants.   This writer educated patient on rationale for daily weights, reportable parameters, and who to contact with such.    Patient states she has 4 supportive children, dtr, Annette manages her medication.  Patient states she is independent, does her own laundry, and keeps herself busy crocheting..  Will monitor patient for completion of 30 day transition period and outreach if issues arise.    Maureen GORE RN CCM  RN Care Coordinator  [facility]  Phone [phone]

## 2023-11-29 NOTE — DOCUMENTATION CLARIFICATION NOTE
"    PATIENT:               ROBBIN BOONE  ACCT #:                  [id number]  MRN:                       [id number]  :                       1929  ADMIT DATE:       2023 3:25 PM  DISCH DATE:        11/15/2023 4:00 PM  RESPONDING PROVIDER #:        [id number]          PROVIDER RESPONSE TEXT:    Metabolic encephalopathy 2/2 Urinary tract infection    CDI QUERY TEXT:    [facility]_Altered Mental Status        Instruction:    Based on your assessment of the patient and the clinical information, please provide the requested documentation by clicking on the appropriate radio button and enter any additional information if prompted.    Question: Please further clarify the most likely etiology of the altered mental status as      When answering this query, please exercise your independent professional judgment. The fact that a question is being asked, does not imply that any particular answer is desired or expected.    The patient's clinical indicators include:  Clinical Information: 89 y.o. female presenting with fall weakness and lethargy...  decided to admit for fall UTI mental status change    Clinical Indicators:   Dr. GARRETT Rollins H&P: \"UTI on antibiotic...Altered mental status metabolic and cephalopathy monitor [sic]\"     CT head:\"Redemonstration of age related changes without acute intracranial process.\"     Urine Culture: Enterococcus faecium,  WBC: 15.7      Treatment: Rocephen 1g IV BID  from - 11/15    Risk Factors: advanced age, lives independently  Options provided:  -- Metabolic encephalopathy 2/2 Urinary tract infection  -- Other - I will add my own diagnosis  -- Refer to Clinical Documentation Reviewer    Query created by: Ashly Roman on 2023 7:40 AM      Electronically signed by:  MING ROLLINS MD 2023 10:48 AM          "

## 2023-11-29 NOTE — PROGRESS NOTES
HF follow up call-Spoke with Kalie, reports that she is doing well. Performs daily weights,doing her best to follow low Na diet, takes medications as directed. Reviewed HF flare up symptoms to call MD with. She completed follow up appt with her PCP & has appt with her Cardiologist Friday. I will continue to follow for CHF management.

## 2023-11-29 NOTE — DOCUMENTATION CLARIFICATION NOTE
"    PATIENT:               ROBBIN BOONE  ACCT #:                  [id number]  MRN:                       [id number]  :                       1929  ADMIT DATE:       2023 3:25 PM  DISCH DATE:        11/15/2023 4:00 PM  RESPONDING PROVIDER #:        [id number]          PROVIDER RESPONSE TEXT:    Metabolic encephalopathy 2/2 Urinary tract infection    CDI QUERY TEXT:    [facility]_Altered Mental Status        Instruction:    Based on your assessment of the patient and the clinical information, please provide the requested documentation by clicking on the appropriate radio button and enter any additional information if prompted.    Question: Please further clarify the most likely etiology of the altered mental status as      When answering this query, please exercise your independent professional judgment. The fact that a question is being asked, does not imply that any particular answer is desired or expected.    The patient's clinical indicators include:  Clinical Information: 89 y.o. female presenting with fall weakness and lethargy...  decided to admit for fall UTI mental status change    Clinical Indicators:   Dr. GARRETT Rollins H&P: \"UTI on antibiotic...Altered mental status metabolic and cephalopathy monitor [sic]\"     CT head:\"Redemonstration of age related changes without acute intracranial process.\"     Urine Culture: Enterococcus faecium,  WBC: 15.7      Treatment: Rocephen 1g IV BID  from - 11/15    Risk Factors: advanced age, lives independently  Options provided:  -- Metabolic encephalopathy 2/2 Urinary tract infection  -- Other - I will add my own diagnosis  -- Refer to Clinical Documentation Reviewer    Query created by: Ashly Roman on 2023 10:55 AM      Electronically signed by:  MING ROLLINS MD 2023 11:39 AM          "

## 2023-11-30 ENCOUNTER — DOCUMENTATION (OUTPATIENT)
Dept: CASE MANAGEMENT | Facility: HOSPITAL | Age: 88
End: 2023-11-30
Payer: MEDICARE

## 2023-12-01 ENCOUNTER — OFFICE VISIT (OUTPATIENT)
Dept: CARDIOLOGY | Facility: CLINIC | Age: 88
End: 2023-12-01
Payer: MEDICARE

## 2023-12-01 VITALS
BODY MASS INDEX: 22.19 KG/M2 | DIASTOLIC BLOOD PRESSURE: 70 MMHG | HEART RATE: 48 BPM | SYSTOLIC BLOOD PRESSURE: 118 MMHG | WEIGHT: 117.5 LBS | OXYGEN SATURATION: 95 % | HEIGHT: 61 IN

## 2023-12-01 DIAGNOSIS — I10 PRIMARY HYPERTENSION: ICD-10-CM

## 2023-12-01 DIAGNOSIS — R53.83 OTHER FATIGUE: ICD-10-CM

## 2023-12-01 DIAGNOSIS — E78.2 MIXED HYPERLIPIDEMIA: ICD-10-CM

## 2023-12-01 DIAGNOSIS — R06.02 SHORTNESS OF BREATH: ICD-10-CM

## 2023-12-01 DIAGNOSIS — I50.23 ACUTE ON CHRONIC SYSTOLIC CONGESTIVE HEART FAILURE (MULTI): ICD-10-CM

## 2023-12-01 PROCEDURE — 1126F AMNT PAIN NOTED NONE PRSNT: CPT | Performed by: INTERNAL MEDICINE

## 2023-12-01 PROCEDURE — 99214 OFFICE O/P EST MOD 30 MIN: CPT | Performed by: INTERNAL MEDICINE

## 2023-12-01 PROCEDURE — 3074F SYST BP LT 130 MM HG: CPT | Performed by: INTERNAL MEDICINE

## 2023-12-01 PROCEDURE — 1036F TOBACCO NON-USER: CPT | Performed by: INTERNAL MEDICINE

## 2023-12-01 PROCEDURE — 3078F DIAST BP <80 MM HG: CPT | Performed by: INTERNAL MEDICINE

## 2023-12-01 PROCEDURE — 1111F DSCHRG MED/CURRENT MED MERGE: CPT | Performed by: INTERNAL MEDICINE

## 2023-12-01 PROCEDURE — 1159F MED LIST DOCD IN RCRD: CPT | Performed by: INTERNAL MEDICINE

## 2023-12-01 RX ORDER — METOPROLOL SUCCINATE 25 MG/1
25 TABLET, EXTENDED RELEASE ORAL DAILY
COMMUNITY
End: 2023-12-19 | Stop reason: HOSPADM

## 2023-12-01 ASSESSMENT — PATIENT HEALTH QUESTIONNAIRE - PHQ9
SUM OF ALL RESPONSES TO PHQ9 QUESTIONS 1 AND 2: 0
2. FEELING DOWN, DEPRESSED OR HOPELESS: NOT AT ALL
1. LITTLE INTEREST OR PLEASURE IN DOING THINGS: NOT AT ALL

## 2023-12-01 ASSESSMENT — PAIN SCALES - GENERAL: PAINLEVEL: 0-NO PAIN

## 2023-12-01 NOTE — PROGRESS NOTES
Subjective   Kalie Ramos is a 89 y.o. female.    Chief Complaint:  Hospital follow up Hypertension, hyperlipidemia, CHF, sob  HPI  This is a 89 y/o female here today for a hospital follow up visit. She was admitted in November with weakness, fall, and altered mental status- see discharge notes. Reviewed medical/surgical history, lab/test results, and current medications.    Review of Systems   All other systems reviewed and are negative.      Objective   Cardiovascular:      PMI at left midclavicular line. Normal rate. Regular rhythm. Normal S1. Normal S2.       Murmurs: There is no murmur.      No gallop.  No click. No rub.   Pulses:     Intact distal pulses.   Edema:     Peripheral edema absent.         Lab Review:   Lab on 11/27/2023   Component Date Value    WBC 11/27/2023 7.6     nRBC 11/27/2023 0.0     RBC 11/27/2023 4.42     Hemoglobin 11/27/2023 12.7     Hematocrit 11/27/2023 41.7     MCV 11/27/2023 94     MCH 11/27/2023 28.7     MCHC 11/27/2023 30.5 (L)     RDW 11/27/2023 14.6 (H)     Platelets 11/27/2023 354     Glucose 11/27/2023 92     Sodium 11/27/2023 143     Potassium 11/27/2023 4.7     Chloride 11/27/2023 102     Bicarbonate 11/27/2023 33 (H)     Anion Gap 11/27/2023 13     Urea Nitrogen 11/27/2023 14     Creatinine 11/27/2023 0.90     eGFR 11/27/2023 60 (L)     Calcium 11/27/2023 9.3     Albumin 11/27/2023 3.9     Alkaline Phosphatase 11/27/2023 124     Total Protein 11/27/2023 6.8     AST 11/27/2023 42 (H)     Bilirubin, Total 11/27/2023 0.6     ALT 11/27/2023 41     Ammonia 11/27/2023 29    Admission on 11/12/2023, Discharged on 11/15/2023   Component Date Value    Ventricular Rate 11/17/2023 52     Atrial Rate 11/17/2023 52     NE Interval 11/17/2023 218     QRS Duration 11/17/2023 126     QT Interval 11/17/2023 554     QTC Calculation(Bazett) 11/17/2023 515     P [place] 11/17/2023 76     R [place] 11/17/2023 -39     T [place] 11/17/2023 159     QRS Count 11/17/2023 9     Q Onset 11/17/2023 212      P Onset 11/17/2023 103     P Offset 11/17/2023 140     T Offset 11/17/2023 489     QTC Fredericia 11/17/2023 528     WBC 11/12/2023 15.7 (H)     nRBC 11/12/2023 0.0     RBC 11/12/2023 4.96     Hemoglobin 11/12/2023 14.5     Hematocrit 11/12/2023 46.5 (H)     MCV 11/12/2023 94     MCH 11/12/2023 29.2     MCHC 11/12/2023 31.2 (L)     RDW 11/12/2023 14.3     Platelets 11/12/2023 240     Neutrophils % 11/12/2023 89.9     Immature Granulocytes %,* 11/12/2023 0.6     Lymphocytes % 11/12/2023 4.4     Monocytes % 11/12/2023 4.8     Eosinophils % 11/12/2023 0.1     Basophils % 11/12/2023 0.2     Neutrophils Absolute 11/12/2023 14.14 (H)     Immature Granulocytes Ab* 11/12/2023 0.09     Lymphocytes Absolute 11/12/2023 0.69 (L)     Monocytes Absolute 11/12/2023 0.75     Eosinophils Absolute 11/12/2023 0.02     Basophils Absolute 11/12/2023 0.03     Glucose 11/12/2023 122 (H)     Sodium 11/12/2023 141     Potassium 11/12/2023 5.0     Chloride 11/12/2023 98     Bicarbonate 11/12/2023 29     Urea Nitrogen 11/12/2023 14     Creatinine 11/12/2023 0.70     eGFR 11/12/2023 81     Calcium 11/12/2023 8.8     Albumin 11/12/2023 3.8     Alkaline Phosphatase 11/12/2023 177 (H)     Total Protein 11/12/2023 7.7     AST 11/12/2023 29     Bilirubin, Total 11/12/2023 1.2     ALT 11/12/2023 34     Anion Gap 11/12/2023 14     Creatine Kinase 11/12/2023 189     Troponin T, High [place]ens[place]* 11/12/2023 35 (H)     Troponin T, High Sensiti* 11/12/2023 35 (H)     Color, Urine 11/12/2023 Light-Yellow     Appearance, Urine 11/12/2023 Clear     Specific Gravity, Urine 11/12/2023 1.017     pH, Urine 11/12/2023 8.0     Protein, Urine 11/12/2023 30 (1+) (A)     Glucose, Urine 11/12/2023 Normal     Blood, Urine 11/12/2023 NEGATIVE     Ketones, Urine 11/12/2023 NEGATIVE     Bilirubin, Urine 11/12/2023 NEGATIVE     Urobilinogen, Urine 11/12/2023 Normal     Nitrite, Urine 11/12/2023 NEGATIVE     Leukocyte Esterase, Urine 11/12/2023 25 Anurag/µL (A)     WBC, Urine  11/12/2023 1-5     RBC, Urine 11/12/2023 1-2     Squamous Epithelial Cell* 11/12/2023 1-9 (SPARSE)     Hyaline Casts, Urine 11/12/2023 1+ (A)     Urine Culture 11/12/2023 20,000 - 80,000 Enterococcus faecium (A)     Troponin T, High Sensiti* 11/12/2023 38 (H)     Hemoglobin 11/13/2023 10.7 (L)     Hematocrit 11/13/2023 33.8 (L)     Glucose 11/13/2023 101 (H)     Sodium 11/13/2023 139     Potassium 11/13/2023 4.0     Chloride 11/13/2023 103     Bicarbonate 11/13/2023 30     Urea Nitrogen 11/13/2023 15     Creatinine 11/13/2023 0.80     eGFR 11/13/2023 69     Calcium 11/13/2023 7.8 (L)     Albumin 11/13/2023 2.6 (L)     Alkaline Phosphatase 11/13/2023 121     Total Protein 11/13/2023 5.4 (L)     AST 11/13/2023 18     Bilirubin, Total 11/13/2023 0.8     ALT 11/13/2023 21     Anion Gap 11/13/2023 6     Hemoglobin 11/13/2023 11.4 (L)     Hematocrit 11/13/2023 36.3     Hemoglobin 11/13/2023 11.0 (L)     Hematocrit 11/13/2023 36.0     Hemoglobin 11/14/2023 10.1 (L)     Hematocrit 11/14/2023 33.2 (L)     Hemoglobin 11/14/2023 10.9 (L)     Hematocrit 11/14/2023 35.3 (L)     Glucose 11/14/2023 97     Sodium 11/14/2023 140     Potassium 11/14/2023 4.2     Chloride 11/14/2023 103     Bicarbonate 11/14/2023 28     Urea Nitrogen 11/14/2023 14     Creatinine 11/14/2023 0.80     eGFR 11/14/2023 69     Calcium 11/14/2023 7.3 (L)     Albumin 11/14/2023 2.6 (L)     Alkaline Phosphatase 11/14/2023 115     Total Protein 11/14/2023 5.7 (L)     AST 11/14/2023 15     Bilirubin, Total 11/14/2023 0.3     ALT 11/14/2023 17     Anion Gap 11/14/2023 9     WBC 11/14/2023 6.6     nRBC 11/14/2023 0.0     RBC 11/14/2023 3.55 (L)     Hemoglobin 11/14/2023 10.1 (L)     Hematocrit 11/14/2023 33.2 (L)     MCV 11/14/2023 94     MCH 11/14/2023 28.5     MCHC 11/14/2023 30.4 (L)     RDW 11/14/2023 14.5     Platelets 11/14/2023 187     Neutrophils % 11/14/2023 74.9     Immature Granulocytes %,* 11/14/2023 0.3     Lymphocytes % 11/14/2023 14.9     Monocytes  % 11/14/2023 8.1     Eosinophils % 11/14/2023 1.5     Basophils % 11/14/2023 0.3     Neutrophils Absolute 11/14/2023 4.92     Immature Granulocytes Ab* 11/14/2023 0.02     Lymphocytes Absolute 11/14/2023 0.98     Monocytes Absolute 11/14/2023 0.53     Eosinophils Absolute 11/14/2023 0.10     Basophils Absolute 11/14/2023 0.02     Hemoglobin 11/15/2023 11.1 (L)     Hematocrit 11/15/2023 36.2     Hemoglobin 11/14/2023 11.4 (L)     Hematocrit 11/14/2023 37.7     Hemoglobin 11/14/2023 10.5 (L)     Hematocrit 11/14/2023 34.2 (L)     Glucose 11/15/2023 94     Sodium 11/15/2023 140     Potassium 11/15/2023 3.9     Chloride 11/15/2023 104     Bicarbonate 11/15/2023 30     Urea Nitrogen 11/15/2023 13     Creatinine 11/15/2023 0.80     eGFR 11/15/2023 69     Calcium 11/15/2023 7.2 (L)     Anion Gap 11/15/2023 6    Lab on 11/06/2023   Component Date Value    Ammonia 11/06/2023 29     Magnesium 11/06/2023 2.21     Glucose 11/06/2023 108 (H)     Sodium 11/06/2023 144     Potassium 11/06/2023 5.2     Chloride 11/06/2023 101     Bicarbonate 11/06/2023 33 (H)     Anion Gap 11/06/2023 15     Urea Nitrogen 11/06/2023 16     Creatinine 11/06/2023 0.94     eGFR 11/06/2023 57 (L)     Calcium 11/06/2023 9.7     Albumin 11/06/2023 3.7     Alkaline Phosphatase 11/06/2023 132     Total Protein 11/06/2023 7.2     AST 11/06/2023 37     Bilirubin, Total 11/06/2023 0.6     ALT 11/06/2023 51 (H)     POCT Calcium, Ionized 11/06/2023 1.19     WBC 11/06/2023 11.4 (H)     nRBC 11/06/2023 0.0     RBC 11/06/2023 4.14     Hemoglobin 11/06/2023 12.1     Hematocrit 11/06/2023 40.9     MCV 11/06/2023 99     MCH 11/06/2023 29.2     MCHC 11/06/2023 29.6 (L)     RDW 11/06/2023 14.6 (H)     Platelets 11/06/2023 329     Glucose 11/06/2023 108 (H)     Sodium 11/06/2023 144     Potassium 11/06/2023 5.1     Chloride 11/06/2023 102     Bicarbonate 11/06/2023 32     Anion Gap 11/06/2023 15     Urea Nitrogen 11/06/2023 16     Creatinine 11/06/2023 0.92     eGFR  11/06/2023 58 (L)     Calcium 11/06/2023 9.7     Albumin 11/06/2023 3.7     Alkaline Phosphatase 11/06/2023 130     Total Protein 11/06/2023 7.1     AST 11/06/2023 36     Bilirubin, Total 11/06/2023 0.6     ALT 11/06/2023 49 (H)    Admission on 10/22/2023, Discharged on 10/27/2023   Component Date Value    Magnesium 10/22/2023 2.00     POCT pH, Arterial 10/22/2023 7.50 (H)     POCT pCO2, Arterial 10/22/2023 44 (H)     POCT pO2, Arterial 10/22/2023 93     POCT SO2, Arterial 10/22/2023 97     POCT Oxy Hemoglobin, Art* 10/22/2023 95.3     POCT Hematocrit Calculat* 10/22/2023 34.0 (L)     POCT Sodium, Arterial 10/22/2023 132 (L)     POCT Potassium, Arterial 10/22/2023 4.0     POCT Chloride, Arterial 10/22/2023 101     POCT Ionized Calcium, Ar* 10/22/2023 1.08 (L)     POCT Glucose, Arterial 10/22/2023 106 (H)     POCT Lactate, Arterial 10/22/2023 1.8     POCT Base Excess, Arteri* 10/22/2023 10.0 (H)     POCT HCO3 Calculated, Ar* 10/22/2023 34.3 (H)     POCT Hemoglobin, Arterial 10/22/2023 11.4 (L)     POCT Anion Gap, Arterial 10/22/2023 1 (L)     Patient Temperature 10/22/2023 37.0     FiO2 10/22/2023 36     Apparatus 10/22/2023 CANNULA     Ventricular Rate 10/24/2023 57     Atrial Rate 10/24/2023 57     FL Interval 10/24/2023 208     QRS Duration 10/24/2023 124     QT Interval 10/24/2023 538     QTC Calculation(Bazett) 10/24/2023 523     P [place] 10/24/2023 76     R [place] 10/24/2023 27     T [place] 10/24/2023 174     QRS Count 10/24/2023 9     Q Onset 10/24/2023 214     P Onset 10/24/2023 110     P Offset 10/24/2023 147     T Offset 10/24/2023 483     QTC Fredericia 10/24/2023 529     POCT Lactate, Venous 10/22/2023 2.2 (H)     Blood Culture 10/22/2023 No growth at 4 days -  FINAL REPORT     Blood Culture 10/22/2023 No growth at 4 days -  FINAL REPORT     Color, Urine 10/22/2023 Yellow     Appearance, Urine 10/22/2023 Clear     Specific Gravity, Urine 10/22/2023 1.020     pH, Urine 10/22/2023 7.0     Protein, Urine  10/22/2023 20 (TRACE)     Glucose, Urine 10/22/2023 Normal     Blood, Urine 10/22/2023 NEGATIVE     Ketones, Urine 10/22/2023 NEGATIVE     Bilirubin, Urine 10/22/2023 NEGATIVE     Urobilinogen, Urine 10/22/2023 Normal     Nitrite, Urine 10/22/2023 NEGATIVE     Leukocyte Esterase, Urine 10/22/2023 75 Anurag/µL (A)     D-Dimer Non VTE, Quant (* 10/22/2023 1.82 (H)     Flu A Result 10/22/2023 Not Detected     Flu B Result 10/22/2023 Not Detected     Coronavirus 2019, PCR 10/22/2023 Not Detected     Urine Culture 10/22/2023 No growth     Glucose 10/22/2023 101 (H)     Sodium 10/22/2023 138     Potassium 10/22/2023 4.5     Chloride 10/22/2023 98     Bicarbonate 10/22/2023 27     Urea Nitrogen 10/22/2023 15     Creatinine 10/22/2023 0.90     eGFR 10/22/2023 60 (L)     Calcium 10/22/2023 9.1     Albumin 10/22/2023 3.3 (L)     Alkaline Phosphatase 10/22/2023 158 (H)     Total Protein 10/22/2023 7.1     AST 10/22/2023 28     Bilirubin, Total 10/22/2023 1.1     ALT 10/22/2023 21     Anion Gap 10/22/2023 13     Glucose 10/23/2023 99     Sodium 10/23/2023 135     Potassium 10/23/2023 4.2     Chloride 10/23/2023 100     Bicarbonate 10/23/2023 26     Urea Nitrogen 10/23/2023 14     Creatinine 10/23/2023 0.90     eGFR 10/23/2023 60 (L)     Calcium 10/23/2023 8.3 (L)     Anion Gap 10/23/2023 9     Magnesium 10/23/2023 2.40     WBC 10/23/2023 10.0     nRBC 10/23/2023 0.0     RBC 10/23/2023 3.89 (L)     Hemoglobin 10/23/2023 11.3 (L)     Hematocrit 10/23/2023 35.6 (L)     MCV 10/23/2023 92     MCH 10/23/2023 29.0     MCHC 10/23/2023 31.7 (L)     RDW 10/23/2023 14.7 (H)     Platelets 10/23/2023 197     MPV 10/23/2023 10.6     Neutrophils % 10/23/2023 78.3     Immature Granulocytes %,* 10/23/2023 0.3     Lymphocytes % 10/23/2023 11.0     Monocytes % 10/23/2023 9.3     Eosinophils % 10/23/2023 0.8     Basophils % 10/23/2023 0.3     Neutrophils Absolute 10/23/2023 7.79 (H)     Immature Granulocytes Ab* 10/23/2023 0.03     Lymphocytes  Absolute 10/23/2023 1.09     Monocytes Absolute 10/23/2023 0.93 (H)     Eosinophils Absolute 10/23/2023 0.08     Basophils Absolute 10/23/2023 0.03     PROBNP 10/23/2023 13,676 (H)     Glucose 10/26/2023 106 (H)     Sodium 10/26/2023 137     Potassium 10/26/2023 3.9     Chloride 10/26/2023 94 (L)     Bicarbonate 10/26/2023 33 (H)     Urea Nitrogen 10/26/2023 25     Creatinine 10/26/2023 0.90     eGFR 10/26/2023 60 (L)     Calcium 10/26/2023 8.6     Anion Gap 10/26/2023 10     WBC 10/26/2023 10.1     nRBC 10/26/2023 0.0     RBC 10/26/2023 3.78 (L)     Hemoglobin 10/26/2023 11.0 (L)     Hematocrit 10/26/2023 35.0 (L)     MCV 10/26/2023 93     MCH 10/26/2023 29.1     MCHC 10/26/2023 31.4 (L)     RDW 10/26/2023 14.1     Platelets 10/26/2023 243     MPV 10/26/2023 10.8     Neutrophils % 10/26/2023 86.0     Immature Granulocytes %,* 10/26/2023 0.5     Lymphocytes % 10/26/2023 7.0     Monocytes % 10/26/2023 6.4     Eosinophils % 10/26/2023 0.0     Basophils % 10/26/2023 0.1     Neutrophils Absolute 10/26/2023 8.69 (H)     Immature Granulocytes Ab* 10/26/2023 0.05     Lymphocytes Absolute 10/26/2023 0.71 (L)     Monocytes Absolute 10/26/2023 0.65     Eosinophils Absolute 10/26/2023 0.00     Basophils Absolute 10/26/2023 0.01     WBC 10/27/2023 9.4     nRBC 10/27/2023 0.0     RBC 10/27/2023 4.19     Hemoglobin 10/27/2023 12.1     Hematocrit 10/27/2023 38.0     MCV 10/27/2023 91     MCH 10/27/2023 28.9     MCHC 10/27/2023 31.8 (L)     RDW 10/27/2023 13.9     Platelets 10/27/2023 278     MPV 10/27/2023 10.9     Neutrophils % 10/27/2023 79.3     Immature Granulocytes %,* 10/27/2023 0.4     Lymphocytes % 10/27/2023 11.7     Monocytes % 10/27/2023 8.4     Eosinophils % 10/27/2023 0.1     Basophils % 10/27/2023 0.1     Neutrophils Absolute 10/27/2023 7.45 (H)     Immature Granulocytes Ab* 10/27/2023 0.04     Lymphocytes Absolute 10/27/2023 1.10     Monocytes Absolute 10/27/2023 0.79     Eosinophils Absolute 10/27/2023 0.01      Basophils Absolute 10/27/2023 0.01     Glucose 10/27/2023 101 (H)     Sodium 10/27/2023 137     Potassium 10/27/2023 3.8     Chloride 10/27/2023 93 (L)     Bicarbonate 10/27/2023 35 (H)     Urea Nitrogen 10/27/2023 23     Creatinine 10/27/2023 0.90     eGFR 10/27/2023 60 (L)     Calcium 10/27/2023 8.7     Albumin 10/27/2023 3.0 (L)     Alkaline Phosphatase 10/27/2023 130 (H)     Total Protein 10/27/2023 6.8     AST 10/27/2023 66 (H)     Bilirubin, Total 10/27/2023 0.2     ALT 10/27/2023 63 (H)     Anion Gap 10/27/2023 9    Lab on 09/05/2023   Component Date Value    Glucose 09/05/2023 84     Sodium 09/05/2023 143     Potassium 09/05/2023 4.2     Chloride 09/05/2023 99     Bicarbonate 09/05/2023 33 (H)     Anion Gap 09/05/2023 15     Urea Nitrogen 09/05/2023 25 (H)     Creatinine 09/05/2023 1.16 (H)     GFR Female 09/05/2023 44 (A)     Calcium 09/05/2023 9.8     Albumin 09/05/2023 4.1     Alkaline Phosphatase 09/05/2023 116     Total Protein 09/05/2023 7.4     AST 09/05/2023 42 (H)     Total Bilirubin 09/05/2023 0.7     ALT (SGPT) 09/05/2023 41     TSH 09/05/2023 0.87     Magnesium 09/05/2023 2.31     WBC 09/05/2023 7.1     nRBC 09/05/2023 0.0     RBC 09/05/2023 3.80 (L)     Hemoglobin 09/05/2023 11.1 (L)     Hematocrit 09/05/2023 37.6     MCV 09/05/2023 99     MCHC 09/05/2023 29.5 (L)     Platelets 09/05/2023 229     RDW 09/05/2023 14.5     Neutrophils % 09/05/2023 69.2     Immature Granulocytes %,* 09/05/2023 0.4     Lymphocytes % 09/05/2023 18.3     Monocytes % 09/05/2023 7.5     Eosinophils % 09/05/2023 3.9     Basophils % 09/05/2023 0.7     Neutrophils Absolute 09/05/2023 4.92     Lymphocytes Absolute 09/05/2023 1.30     Monocytes Absolute 09/05/2023 0.53     Eosinophils Absolute 09/05/2023 0.28     Basophils Absolute 09/05/2023 0.05     Ammonia 09/05/2023 42    Lab on 08/07/2023   Component Date Value    WBC 08/07/2023 7.3     nRBC 08/07/2023 0.0     RBC 08/07/2023 4.36     Hemoglobin 08/07/2023 12.3      Hematocrit 08/07/2023 41.5     MCV 08/07/2023 95     MCHC 08/07/2023 29.6 (L)     Platelets 08/07/2023 350     RDW 08/07/2023 14.6 (H)     Glucose 08/07/2023 91     Sodium 08/07/2023 143     Potassium 08/07/2023 4.6     Chloride 08/07/2023 98     Bicarbonate 08/07/2023 34 (H)     Anion Gap 08/07/2023 16     Urea Nitrogen 08/07/2023 25 (H)     Creatinine 08/07/2023 1.08 (H)     GFR Female 08/07/2023 48 (A)     Calcium 08/07/2023 10.1     Albumin 08/07/2023 4.3     Alkaline Phosphatase 08/07/2023 143 (H)     Total Protein 08/07/2023 8.4 (H)     AST 08/07/2023 69 (H)     Total Bilirubin 08/07/2023 0.6     ALT (SGPT) 08/07/2023 73 (H)     Ammonia 08/07/2023 332 (AA)    Lab on 06/06/2023   Component Date Value    WBC 06/06/2023 7.1     nRBC 06/06/2023 0.0     RBC 06/06/2023 4.44     Hemoglobin 06/06/2023 12.4     Hematocrit 06/06/2023 42.1     MCV 06/06/2023 95     MCHC 06/06/2023 29.5 (L)     Platelets 06/06/2023 236     RDW 06/06/2023 14.6 (H)     Neutrophils % 06/06/2023 71.7     Immature Granulocytes %,* 06/06/2023 0.3     Lymphocytes % 06/06/2023 16.8     Monocytes % 06/06/2023 7.5     Eosinophils % 06/06/2023 3.0     Basophils % 06/06/2023 0.7     Neutrophils Absolute 06/06/2023 5.07     Lymphocytes Absolute 06/06/2023 1.19     Monocytes Absolute 06/06/2023 0.53     Eosinophils Absolute 06/06/2023 0.21     Basophils Absolute 06/06/2023 0.05     Glucose 06/06/2023 91     Sodium 06/06/2023 141     Potassium 06/06/2023 4.5     Chloride 06/06/2023 104     Bicarbonate 06/06/2023 31     Anion Gap 06/06/2023 11     Urea Nitrogen 06/06/2023 15     Creatinine 06/06/2023 0.76     GFR Female 06/06/2023 73     Calcium 06/06/2023 9.4     Albumin 06/06/2023 3.7     Alkaline Phosphatase 06/06/2023 122     Total Protein 06/06/2023 7.1     AST 06/06/2023 29     Total Bilirubin 06/06/2023 1.0     ALT (SGPT) 06/06/2023 29        Assessment/Plan       1. Aortic valve stenosis. Mild to moderate aortic valve stenosis. Echocardiogram  done for preop August 31, 2018 showed a preserved LV ejection fraction of 60 to 65%, moderate concentric LVH, mild to moderate left atrial enlargement, mild to moderate aortic valve stenosis, estimated PASP 35 mmHg. The peak systolic pressure gradient across the aortic valve was 31 mmHg. Repeat echocardiogram done September 2018 showed mild to moderate LVH, preserved LV ejection fraction at 60 to 64%, mild to moderate aortic valve stenosis, trace aortic valve regurgitation, mild tricuspid valve regurgitation, mild pulmonary hypertension, estimated PASP 39 to 44 mmHg. Repeat echo was done December 2020 showing moderate aortic valve stenosis with a relatively intact LV ejection fraction. Echo done May 2021 demonstrated mild to moderate concentric LVH with a low normal LV ejection fraction approximately 55%. There was moderate aortic valve stenosis with an estimated peak and mean systolic pressure gradient 38 mmHg and 24 mmHg respectively with a estimated aortic valve area of 0.95 cm². It was mild 1+ mitral valve regurgitation, moderate 2+ tricuspid valve regurgitation and moderate pulmonary hypertension estimated PA systolic pressure 5 5 mmHg. Repeat echo May 2022 demonstrated preserved LV ejection fraction upper 50% range, mild hypokinesis and dyssynchrony of the anterior septal wall due to IVCD or left bundle branch block conduction delay. There is moderately severe aortic stenosis peak and mean systolic pressure gradients of 56 mmHg and 36 mmHg respectively aortic valve area 0.9 cm² with trivial aortic valve insufficiency. She also had moderate tricuspid valve regurgitation, moderate to severe pulmonary hypertension estimated PASP 60 mmHg. She was thought not to be a candidate for TAVR. Echo done November 2022 demonstrated moderate reduction in the LV ejection fraction to 40% or slightly less with moderately severe aortic valve stenosis and trace aortic insufficiency. Moderate tricuspid valve regurgitation, severe  pulmonary hypertension estimated PASP 70 mmHg.  The patient was recently admitted to [facility] 11/12/2023 - 11/15/2023 after falling in the bathroom.  Telemetry monitor showed atrial fibrillation with a controlled ventricular rate.  She was started on losartan 25 mg daily and switch from metoprolol to tartrate 25 mg twice daily to Toprol-XL 50 mg daily.  The patient still resides at home by her own choice but does have home surveillance cameras.  Her ventricular rate is slightly low and will decrease the Toprol-XL from 50 to 25 mg daily.  She will remain on Lasix 40 mg daily with as needed dosages as well.  She will return in 3 months for  2. DVT PE. She developed extensive DVT of the left lower extremity by ultrasound October 2, 2018. A CT angiogram of the chest at that time showed segmental and subsegmental pulmonary emboli in the right lower lobe and lingula without associated right-sided cardiac strain, with bilateral pleural effusions. Bibasilar atelectasis and a large substernal goiter. She was placed on Coumadin. She then had a repeat DVT noted August 2019. However vascular saw her it was thought to have chronic venous disease with no evidence of recurrent or acute DVT. At this point she was switched to Xarelto 10 mg for DVT prophylaxis. Patient again had ultrasound of lower extremities May 2022 which demonstrates a nonocclusive thrombus extending from the left common femoral vein to the left popliteal vein that was chronic in nature unchanged from previous studies.  3. Hypertension  4. Complete left bundle branch block. Conduction delay.  5. Chronic lower extremity edema. Due to venous insufficiency.  6. Nontoxic multinodular goiter. Patient had CT scan January 2023 showing dense extensive right upper lobe consolidation may be due to tumor with distal postobstructive pneumonia, mediastinal adenopathy is noted that may be reactive or due to neoplasm, small patchy infiltrates in the lingula and left  upper lobe, bilateral lobe airspace consolidation probably atelectasis, moderate bilateral pleural effusions, massively enlarged heterogeneous thyroid extends into the chest and possibly represents a goiter, multiple calcified lung left lung nodules. Patient is in need to see Dr Josse Canales for consultation of this very large mass.  7. History of breast lumpectomy. For breast carcinoma. [place] lymph node biopsy at the time of lumpectomy.  8. Diaphragmatic hernia. Has a longstanding history of hiatal hernia and had been placed on proton pump inhibitor therapy and H2 blocking therapy in the past with no symptoms of acid reflux. She does however have early satiety, reduced appetite and 35 pound weight loss over a period of a year and a half. Admitted to [facility] September 5, 2018 for laparoscopic paraesophageal hernia repair along with laparoscopic cholecystectomy for documented cholelithiasis.  9. CAD. She had a pharmacological nuclear stress test August 20, 2018 that showed a preserved LV ejection fraction shin. No evidence of ischemia or scar. There was incomplete evaluation of the inferior wall due to bowel artifact hiatal hernia.  10. Carotid ultrasound done May 2021 showed mild bilateral atherosclerotic plaque less than 50% stenosis.  11. MRA of the brain done May 2021 showed cyst greater than 70% stenosis of the P1 segment on the left.  12. COPD. On oxygen.   13. Hyperlipidemia.

## 2023-12-04 ENCOUNTER — DOCUMENTATION (OUTPATIENT)
Dept: CARE COORDINATION | Facility: CLINIC | Age: 88
End: 2023-12-04
Payer: MEDICARE

## 2023-12-04 NOTE — PROGRESS NOTES
Per 12/1 cardiology note no medication changes.  Follow up with PCP 12/18.   Will monitor for completion of 30 day transition period and outreach patient if issues arise.    Maureen GORE RN CCM  RN Care Coordinator  [facility]  Phone [phone]

## 2023-12-08 DIAGNOSIS — I50.23 ACUTE ON CHRONIC SYSTOLIC CONGESTIVE HEART FAILURE (MULTI): ICD-10-CM

## 2023-12-08 RX ORDER — LOSARTAN POTASSIUM 25 MG/1
25 TABLET ORAL DAILY
Qty: 90 TABLET | Refills: 0 | Status: SHIPPED | OUTPATIENT
Start: 2023-12-08 | End: 2023-12-14 | Stop reason: SDUPTHER

## 2023-12-13 ENCOUNTER — PATIENT OUTREACH (OUTPATIENT)
Dept: CASE MANAGEMENT | Facility: HOSPITAL | Age: 88
End: 2023-12-13
Payer: MEDICARE

## 2023-12-13 NOTE — PROGRESS NOTES
Follow up phone call made by CHF Clinical Nurse Navigator. Kalie reports she is doing her best to manage her CHF for her age. Performs weights 3 times per week, does her best to follow low Na diet, takes her medications. Follows up with her PCP. Currently she does not wish to follow up with Cardiology. Reviewed HF flare up symptoms. Closing HF case today.

## 2023-12-14 ENCOUNTER — HOSPITAL ENCOUNTER (INPATIENT)
Facility: HOSPITAL | Age: 88
LOS: 5 days | Discharge: NURSING, REHAB OR LONG-TERM CARE | DRG: 522 | End: 2023-12-19
Attending: STUDENT IN AN ORGANIZED HEALTH CARE EDUCATION/TRAINING PROGRAM | Admitting: INTERNAL MEDICINE
Payer: MEDICARE

## 2023-12-14 ENCOUNTER — APPOINTMENT (OUTPATIENT)
Dept: RADIOLOGY | Facility: HOSPITAL | Age: 88
DRG: 522 | End: 2023-12-14
Payer: MEDICARE

## 2023-12-14 ENCOUNTER — HOSPITAL ENCOUNTER (OUTPATIENT)
Dept: CARDIOLOGY | Facility: HOSPITAL | Age: 88
Discharge: HOME | End: 2023-12-14
Payer: MEDICARE

## 2023-12-14 DIAGNOSIS — S72.001A HIP FRACTURE, RIGHT, CLOSED, INITIAL ENCOUNTER (MULTI): ICD-10-CM

## 2023-12-14 DIAGNOSIS — I50.23 ACUTE ON CHRONIC SYSTOLIC CONGESTIVE HEART FAILURE (MULTI): ICD-10-CM
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
## 2023-12-14 LAB
ALBUMIN SERPL-MCNC: 3.6 G/DL (ref 3.5–5)
ALP BLD-CCNC: 130 U/L (ref 35–125)
ALT SERPL-CCNC: 16 U/L (ref 5–40)
ANION GAP SERPL CALC-SCNC: 13 MMOL/L
AST SERPL-CCNC: 20 U/L (ref 5–40)
BASOPHILS # BLD AUTO: 0.04 X10*3/UL (ref 0–0.1)
BASOPHILS NFR BLD AUTO: 0.2 %
BILIRUB SERPL-MCNC: 0.9 MG/DL (ref 0.1–1.2)
BUN SERPL-MCNC: 17 MG/DL (ref 8–25)
CALCIUM SERPL-MCNC: 9 MG/DL (ref 8.5–10.4)
CHLORIDE SERPL-SCNC: 101 MMOL/L (ref 97–107)
CO2 SERPL-SCNC: 26 MMOL/L (ref 24–31)
CREAT SERPL-MCNC: 0.8 MG/DL (ref 0.4–1.6)
EOSINOPHIL # BLD AUTO: 0.02 X10*3/UL (ref 0–0.4)
EOSINOPHIL NFR BLD AUTO: 0.1 %
ERYTHROCYTE [DISTWIDTH] IN BLOOD BY AUTOMATED COUNT: 14.1 % (ref 11.5–14.5)
GFR SERPL CREATININE-BSD FRML MDRD: 69 ML/MIN/1.73M*2
GLUCOSE SERPL-MCNC: 115 MG/DL (ref 65–99)
HCT VFR BLD AUTO: 39.4 % (ref 36–46)
HGB BLD-MCNC: 12.2 G/DL (ref 12–16)
IMM GRANULOCYTES # BLD AUTO: 0.09 X10*3/UL (ref 0–0.5)
IMM GRANULOCYTES NFR BLD AUTO: 0.6 % (ref 0–0.9)
LYMPHOCYTES # BLD AUTO: 0.56 X10*3/UL (ref 0.8–3)
LYMPHOCYTES NFR BLD AUTO: 3.5 %
MAGNESIUM SERPL-MCNC: 2 MG/DL (ref 1.6–3.1)
MCH RBC QN AUTO: 28.3 PG (ref 26–34)
MCHC RBC AUTO-ENTMCNC: 31 G/DL (ref 32–36)
MCV RBC AUTO: 91 FL (ref 80–100)
MONOCYTES # BLD AUTO: 0.94 X10*3/UL (ref 0.05–0.8)
MONOCYTES NFR BLD AUTO: 5.8 %
NEUTROPHILS # BLD AUTO: 14.5 X10*3/UL (ref 1.6–5.5)
NEUTROPHILS NFR BLD AUTO: 89.8 %
NRBC BLD-RTO: 0 /100 WBCS (ref 0–0)
PLATELET # BLD AUTO: 256 X10*3/UL (ref 150–450)
POTASSIUM SERPL-SCNC: 4 MMOL/L (ref 3.4–5.1)
PROT SERPL-MCNC: 7 G/DL (ref 5.9–7.9)
RBC # BLD AUTO: 4.31 X10*6/UL (ref 4–5.2)
SODIUM SERPL-SCNC: 140 MMOL/L (ref 133–145)
TROPONIN T SERPL-MCNC: 40 NG/L
WBC # BLD AUTO: 16.2 X10*3/UL (ref 4.4–11.3)

## 2023-12-14 PROCEDURE — 72131 CT LUMBAR SPINE W/O DYE: CPT

## 2023-12-14 PROCEDURE — 70450 CT HEAD/BRAIN W/O DYE: CPT

## 2023-12-14 PROCEDURE — 72170 X-RAY EXAM OF PELVIS: CPT | Mod: FY

## 2023-12-14 PROCEDURE — 36415 COLL VENOUS BLD VENIPUNCTURE: CPT | Performed by: STUDENT IN AN ORGANIZED HEALTH CARE EDUCATION/TRAINING PROGRAM

## 2023-12-14 PROCEDURE — 1200000002 HC GENERAL ROOM WITH TELEMETRY DAILY

## 2023-12-14 PROCEDURE — 71045 X-RAY EXAM CHEST 1 VIEW: CPT | Mod: FY

## 2023-12-14 PROCEDURE — 2500000005 HC RX 250 GENERAL PHARMACY W/O HCPCS: Performed by: STUDENT IN AN ORGANIZED HEALTH CARE EDUCATION/TRAINING PROGRAM

## 2023-12-14 PROCEDURE — 93010 ELECTROCARDIOGRAM REPORT: CPT | Performed by: INTERNAL MEDICINE

## 2023-12-14 PROCEDURE — 72125 CT NECK SPINE W/O DYE: CPT

## 2023-12-14 PROCEDURE — 2500000005 HC RX 250 GENERAL PHARMACY W/O HCPCS: Performed by: INTERNAL MEDICINE

## 2023-12-14 PROCEDURE — 83735 ASSAY OF MAGNESIUM: CPT | Performed by: STUDENT IN AN ORGANIZED HEALTH CARE EDUCATION/TRAINING PROGRAM

## 2023-12-14 PROCEDURE — 84075 ASSAY ALKALINE PHOSPHATASE: CPT | Performed by: STUDENT IN AN ORGANIZED HEALTH CARE EDUCATION/TRAINING PROGRAM

## 2023-12-14 PROCEDURE — 99285 EMERGENCY DEPT VISIT HI MDM: CPT | Performed by: STUDENT IN AN ORGANIZED HEALTH CARE EDUCATION/TRAINING PROGRAM

## 2023-12-14 PROCEDURE — 84484 ASSAY OF TROPONIN QUANT: CPT | Performed by: STUDENT IN AN ORGANIZED HEALTH CARE EDUCATION/TRAINING PROGRAM

## 2023-12-14 PROCEDURE — 93005 ELECTROCARDIOGRAM TRACING: CPT

## 2023-12-14 PROCEDURE — 2500000004 HC RX 250 GENERAL PHARMACY W/ HCPCS (ALT 636 FOR OP/ED): Performed by: STUDENT IN AN ORGANIZED HEALTH CARE EDUCATION/TRAINING PROGRAM

## 2023-12-14 PROCEDURE — 85025 COMPLETE CBC W/AUTO DIFF WBC: CPT | Performed by: STUDENT IN AN ORGANIZED HEALTH CARE EDUCATION/TRAINING PROGRAM

## 2023-12-14 PROCEDURE — 73552 X-RAY EXAM OF FEMUR 2/>: CPT | Mod: RT,FY

## 2023-12-14 RX ORDER — ALBUTEROL SULFATE 0.83 MG/ML
2.5 SOLUTION RESPIRATORY (INHALATION) EVERY 2 HOUR PRN
Status: DISCONTINUED | OUTPATIENT
Start: 2023-12-14 | End: 2023-12-19 | Stop reason: HOSPADM

## 2023-12-14 RX ORDER — ENOXAPARIN SODIUM 100 MG/ML
40 INJECTION SUBCUTANEOUS NIGHTLY
Status: DISCONTINUED | OUTPATIENT
Start: 2023-12-15 | End: 2023-12-16

## 2023-12-14 RX ORDER — CALCITONIN SALMON 200 [IU]/.09ML
1 SPRAY, METERED NASAL DAILY
Status: DISCONTINUED | OUTPATIENT
Start: 2023-12-15 | End: 2023-12-19 | Stop reason: HOSPADM

## 2023-12-14 RX ORDER — GUAIFENESIN 600 MG/1
600 TABLET, EXTENDED RELEASE ORAL EVERY 12 HOURS PRN
Status: DISCONTINUED | OUTPATIENT
Start: 2023-12-14 | End: 2023-12-19 | Stop reason: HOSPADM

## 2023-12-14 RX ORDER — ACETAMINOPHEN 325 MG/1
650 TABLET ORAL EVERY 4 HOURS PRN
Status: DISCONTINUED | OUTPATIENT
Start: 2023-12-14 | End: 2023-12-19 | Stop reason: HOSPADM

## 2023-12-14 RX ORDER — SERTRALINE HYDROCHLORIDE 50 MG/1
50 TABLET, FILM COATED ORAL DAILY
Status: DISCONTINUED | OUTPATIENT
Start: 2023-12-15 | End: 2023-12-19 | Stop reason: HOSPADM

## 2023-12-14 RX ORDER — LANOLIN ALCOHOL/MO/W.PET/CERES
400 CREAM (GRAM) TOPICAL 3 TIMES WEEKLY
Status: DISCONTINUED | OUTPATIENT
Start: 2023-12-15 | End: 2023-12-19 | Stop reason: HOSPADM

## 2023-12-14 RX ORDER — LOSARTAN POTASSIUM 25 MG/1
25 TABLET ORAL DAILY
Qty: 90 TABLET | Refills: 0 | Status: SHIPPED | OUTPATIENT
Start: 2023-12-14 | End: 2024-04-02 | Stop reason: HOSPADM

## 2023-12-14 RX ORDER — TALC
3 POWDER (GRAM) TOPICAL NIGHTLY
Status: DISCONTINUED | OUTPATIENT
Start: 2023-12-14 | End: 2023-12-16

## 2023-12-14 RX ORDER — ONDANSETRON 4 MG/1
4 TABLET, FILM COATED ORAL EVERY 8 HOURS PRN
Status: DISCONTINUED | OUTPATIENT
Start: 2023-12-14 | End: 2023-12-16

## 2023-12-14 RX ORDER — OXYCODONE AND ACETAMINOPHEN 5; 325 MG/1; MG/1
1 TABLET ORAL ONCE
Status: DISCONTINUED | OUTPATIENT
Start: 2023-12-14 | End: 2023-12-14

## 2023-12-14 RX ORDER — ACETAMINOPHEN 160 MG/5ML
650 SOLUTION ORAL EVERY 4 HOURS PRN
Status: DISCONTINUED | OUTPATIENT
Start: 2023-12-14 | End: 2023-12-19 | Stop reason: HOSPADM

## 2023-12-14 RX ORDER — ACETAMINOPHEN 650 MG/1
650 SUPPOSITORY RECTAL EVERY 4 HOURS PRN
Status: DISCONTINUED | OUTPATIENT
Start: 2023-12-14 | End: 2023-12-19 | Stop reason: HOSPADM

## 2023-12-14 RX ORDER — ACETAMINOPHEN 325 MG/1
650 TABLET ORAL EVERY 6 HOURS PRN
Status: DISCONTINUED | OUTPATIENT
Start: 2023-12-14 | End: 2023-12-19 | Stop reason: HOSPADM

## 2023-12-14 RX ORDER — LOSARTAN POTASSIUM 25 MG/1
25 TABLET ORAL DAILY
Status: DISCONTINUED | OUTPATIENT
Start: 2023-12-15 | End: 2023-12-19 | Stop reason: HOSPADM

## 2023-12-14 RX ORDER — PANTOPRAZOLE SODIUM 40 MG/1
40 TABLET, DELAYED RELEASE ORAL DAILY
Status: DISCONTINUED | OUTPATIENT
Start: 2023-12-15 | End: 2023-12-19 | Stop reason: HOSPADM

## 2023-12-14 RX ORDER — TALC
3 POWDER (GRAM) TOPICAL DAILY
Status: DISCONTINUED | OUTPATIENT
Start: 2023-12-15 | End: 2023-12-15 | Stop reason: SDUPTHER

## 2023-12-14 RX ORDER — POLYETHYLENE GLYCOL 3350 17 G/17G
17 POWDER, FOR SOLUTION ORAL DAILY
Status: DISCONTINUED | OUTPATIENT
Start: 2023-12-15 | End: 2023-12-19 | Stop reason: HOSPADM

## 2023-12-14 RX ORDER — ROPINIROLE 2 MG/1
TABLET, FILM COATED ORAL
COMMUNITY
Start: 2023-12-10 | End: 2023-12-19 | Stop reason: HOSPADM

## 2023-12-14 RX ORDER — ROPINIROLE 1 MG/1
5 TABLET, FILM COATED ORAL NIGHTLY
Status: DISCONTINUED | OUTPATIENT
Start: 2023-12-14 | End: 2023-12-19 | Stop reason: HOSPADM

## 2023-12-14 RX ORDER — METOPROLOL SUCCINATE 50 MG/1
50 TABLET, EXTENDED RELEASE ORAL DAILY
Status: DISCONTINUED | OUTPATIENT
Start: 2023-12-15 | End: 2023-12-19 | Stop reason: HOSPADM

## 2023-12-14 RX ORDER — DOCUSATE SODIUM 100 MG/1
100 CAPSULE, LIQUID FILLED ORAL 2 TIMES DAILY
Status: DISCONTINUED | OUTPATIENT
Start: 2023-12-15 | End: 2023-12-19 | Stop reason: HOSPADM

## 2023-12-14 RX ORDER — NITROGLYCERIN 0.4 MG/1
0.4 TABLET SUBLINGUAL EVERY 5 MIN PRN
Status: DISCONTINUED | OUTPATIENT
Start: 2023-12-14 | End: 2023-12-19 | Stop reason: HOSPADM

## 2023-12-14 RX ORDER — TRAZODONE HYDROCHLORIDE 50 MG/1
50 TABLET ORAL NIGHTLY PRN
Status: DISCONTINUED | OUTPATIENT
Start: 2023-12-14 | End: 2023-12-19 | Stop reason: HOSPADM

## 2023-12-14 RX ORDER — DEXTROSE MONOHYDRATE, SODIUM CHLORIDE, AND POTASSIUM CHLORIDE 50; 1.49; 4.5 G/1000ML; G/1000ML; G/1000ML
100 INJECTION, SOLUTION INTRAVENOUS CONTINUOUS
Status: DISCONTINUED | OUTPATIENT
Start: 2023-12-15 | End: 2023-12-16

## 2023-12-14 RX ORDER — MORPHINE SULFATE 4 MG/ML
4 INJECTION, SOLUTION INTRAMUSCULAR; INTRAVENOUS ONCE
Status: COMPLETED | OUTPATIENT
Start: 2023-12-14 | End: 2023-12-14

## 2023-12-14 RX ORDER — ONDANSETRON HYDROCHLORIDE 2 MG/ML
4 INJECTION, SOLUTION INTRAVENOUS EVERY 8 HOURS PRN
Status: DISCONTINUED | OUTPATIENT
Start: 2023-12-14 | End: 2023-12-19 | Stop reason: HOSPADM

## 2023-12-14 RX ORDER — ATORVASTATIN CALCIUM 40 MG/1
40 TABLET, FILM COATED ORAL NIGHTLY
Status: DISCONTINUED | OUTPATIENT
Start: 2023-12-14 | End: 2023-12-19 | Stop reason: HOSPADM

## 2023-12-14 RX ORDER — GUAIFENESIN/DEXTROMETHORPHAN 100-10MG/5
5 SYRUP ORAL EVERY 4 HOURS PRN
Status: DISCONTINUED | OUTPATIENT
Start: 2023-12-14 | End: 2023-12-19 | Stop reason: HOSPADM

## 2023-12-14 RX ADMIN — Medication: at 22:44

## 2023-12-14 RX ADMIN — MORPHINE SULFATE 4 MG: 4 INJECTION, SOLUTION INTRAMUSCULAR; INTRAVENOUS at 22:35

## 2023-12-14 RX ADMIN — Medication 2 L/MIN: at 23:55

## 2023-12-14 ASSESSMENT — PAIN SCALES - GENERAL: PAINLEVEL_OUTOF10: 10 - WORST POSSIBLE PAIN

## 2023-12-14 ASSESSMENT — COLUMBIA-SUICIDE SEVERITY RATING SCALE - C-SSRS
6. HAVE YOU EVER DONE ANYTHING, STARTED TO DO ANYTHING, OR PREPARED TO DO ANYTHING TO END YOUR LIFE?: NO
1. IN THE PAST MONTH, HAVE YOU WISHED YOU WERE DEAD OR WISHED YOU COULD GO TO SLEEP AND NOT WAKE UP?: NO
2. HAVE YOU ACTUALLY HAD ANY THOUGHTS OF KILLING YOURSELF?: NO

## 2023-12-14 ASSESSMENT — PAIN DESCRIPTION - PROGRESSION: CLINICAL_PROGRESSION: NOT CHANGED

## 2023-12-14 ASSESSMENT — LIFESTYLE VARIABLES
REASON UNABLE TO ASSESS: NO
EVER HAD A DRINK FIRST THING IN THE MORNING TO STEADY YOUR NERVES TO GET RID OF A HANGOVER: NO
EVER FELT BAD OR GUILTY ABOUT YOUR DRINKING: NO
HAVE PEOPLE ANNOYED YOU BY CRITICIZING YOUR DRINKING: NO
HAVE YOU EVER FELT YOU SHOULD CUT DOWN ON YOUR DRINKING: NO

## 2023-12-14 ASSESSMENT — PAIN DESCRIPTION - DESCRIPTORS: DESCRIPTORS: ACHING

## 2023-12-14 ASSESSMENT — PAIN - FUNCTIONAL ASSESSMENT: PAIN_FUNCTIONAL_ASSESSMENT: 0-10

## 2023-12-14 ASSESSMENT — PAIN DESCRIPTION - LOCATION: LOCATION: HIP

## 2023-12-14 ASSESSMENT — PAIN DESCRIPTION - ORIENTATION: ORIENTATION: LEFT

## 2023-12-14 ASSESSMENT — PAIN DESCRIPTION - FREQUENCY: FREQUENCY: CONSTANT/CONTINUOUS

## 2023-12-14 ASSESSMENT — PAIN DESCRIPTION - ONSET: ONSET: SUDDEN

## 2023-12-15 ENCOUNTER — HOSPITAL ENCOUNTER (OUTPATIENT)
Dept: CARDIOLOGY | Facility: HOSPITAL | Age: 88
Discharge: HOME | End: 2023-12-15
Payer: MEDICARE

## 2023-12-15 PROBLEM — S72.001A HIP FRACTURE, RIGHT, CLOSED, INITIAL ENCOUNTER (MULTI): Status: ACTIVE | Noted: 2023-12-14

## 2023-12-15 LAB
ALBUMIN SERPL-MCNC: 3.6 G/DL (ref 3.5–5)
ALP BLD-CCNC: 132 U/L (ref 35–125)
ALT SERPL-CCNC: 16 U/L (ref 5–40)
ANION GAP SERPL CALC-SCNC: 10 MMOL/L
AST SERPL-CCNC: 22 U/L (ref 5–40)
BILIRUB SERPL-MCNC: 0.8 MG/DL (ref 0.1–1.2)
BUN SERPL-MCNC: 16 MG/DL (ref 8–25)
CALCIUM SERPL-MCNC: 9 MG/DL (ref 8.5–10.4)
CHLORIDE SERPL-SCNC: 101 MMOL/L (ref 97–107)
CO2 SERPL-SCNC: 29 MMOL/L (ref 24–31)
CREAT SERPL-MCNC: 0.9 MG/DL (ref 0.4–1.6)
ERYTHROCYTE [DISTWIDTH] IN BLOOD BY AUTOMATED COUNT: 14.2 % (ref 11.5–14.5)
GFR SERPL CREATININE-BSD FRML MDRD: 60 ML/MIN/1.73M*2
GLUCOSE SERPL-MCNC: 112 MG/DL (ref 65–99)
HCT VFR BLD AUTO: 42 % (ref 36–46)
HGB BLD-MCNC: 12.9 G/DL (ref 12–16)
MCH RBC QN AUTO: 28.3 PG (ref 26–34)
MCHC RBC AUTO-ENTMCNC: 30.7 G/DL (ref 32–36)
MCV RBC AUTO: 92 FL (ref 80–100)
NRBC BLD-RTO: 0 /100 WBCS (ref 0–0)
PLATELET # BLD AUTO: 258 X10*3/UL (ref 150–450)
POTASSIUM SERPL-SCNC: 4.6 MMOL/L (ref 3.4–5.1)
PROT SERPL-MCNC: 7.2 G/DL (ref 5.9–7.9)
RBC # BLD AUTO: 4.56 X10*6/UL (ref 4–5.2)
SODIUM SERPL-SCNC: 140 MMOL/L (ref 133–145)
TROPONIN T SERPL-MCNC: 44 NG/L
TROPONIN T SERPL-MCNC: 51 NG/L
WBC # BLD AUTO: 11.4 X10*3/UL (ref 4.4–11.3)

## 2023-12-15 PROCEDURE — 93005 ELECTROCARDIOGRAM TRACING: CPT

## 2023-12-15 PROCEDURE — 93010 ELECTROCARDIOGRAM REPORT: CPT | Performed by: INTERNAL MEDICINE

## 2023-12-15 PROCEDURE — 1200000002 HC GENERAL ROOM WITH TELEMETRY DAILY

## 2023-12-15 PROCEDURE — 36415 COLL VENOUS BLD VENIPUNCTURE: CPT | Performed by: STUDENT IN AN ORGANIZED HEALTH CARE EDUCATION/TRAINING PROGRAM

## 2023-12-15 PROCEDURE — 2500000004 HC RX 250 GENERAL PHARMACY W/ HCPCS (ALT 636 FOR OP/ED): Performed by: INTERNAL MEDICINE

## 2023-12-15 PROCEDURE — 2500000001 HC RX 250 WO HCPCS SELF ADMINISTERED DRUGS (ALT 637 FOR MEDICARE OP)

## 2023-12-15 PROCEDURE — 84484 ASSAY OF TROPONIN QUANT: CPT | Performed by: STUDENT IN AN ORGANIZED HEALTH CARE EDUCATION/TRAINING PROGRAM

## 2023-12-15 PROCEDURE — 2500000001 HC RX 250 WO HCPCS SELF ADMINISTERED DRUGS (ALT 637 FOR MEDICARE OP): Performed by: INTERNAL MEDICINE

## 2023-12-15 PROCEDURE — 99222 1ST HOSP IP/OBS MODERATE 55: CPT | Performed by: INTERNAL MEDICINE

## 2023-12-15 PROCEDURE — 2500000004 HC RX 250 GENERAL PHARMACY W/ HCPCS (ALT 636 FOR OP/ED)

## 2023-12-15 PROCEDURE — 80053 COMPREHEN METABOLIC PANEL: CPT | Performed by: INTERNAL MEDICINE

## 2023-12-15 PROCEDURE — 36415 COLL VENOUS BLD VENIPUNCTURE: CPT | Performed by: INTERNAL MEDICINE

## 2023-12-15 PROCEDURE — 2500000005 HC RX 250 GENERAL PHARMACY W/O HCPCS: Performed by: INTERNAL MEDICINE

## 2023-12-15 PROCEDURE — 85027 COMPLETE CBC AUTOMATED: CPT | Performed by: INTERNAL MEDICINE

## 2023-12-15 RX ORDER — SERTRALINE HYDROCHLORIDE 50 MG/1
50 TABLET, FILM COATED ORAL DAILY
Status: COMPLETED | OUTPATIENT
Start: 2023-12-15 | End: 2023-12-15

## 2023-12-15 RX ORDER — LANOLIN ALCOHOL/MO/W.PET/CERES
400 CREAM (GRAM) TOPICAL 3 TIMES WEEKLY
Status: COMPLETED | OUTPATIENT
Start: 2023-12-15 | End: 2023-12-15

## 2023-12-15 RX ORDER — METOPROLOL SUCCINATE 50 MG/1
50 TABLET, EXTENDED RELEASE ORAL DAILY
Status: COMPLETED | OUTPATIENT
Start: 2023-12-15 | End: 2023-12-15

## 2023-12-15 RX ORDER — CALCITONIN SALMON 200 [IU]/.09ML
1 SPRAY, METERED NASAL DAILY
Status: DISPENSED | OUTPATIENT
Start: 2023-12-15 | End: 2023-12-16

## 2023-12-15 RX ORDER — MORPHINE SULFATE 2 MG/ML
2 INJECTION, SOLUTION INTRAMUSCULAR; INTRAVENOUS EVERY 4 HOURS PRN
Status: DISCONTINUED | OUTPATIENT
Start: 2023-12-15 | End: 2023-12-16

## 2023-12-15 RX ORDER — LOSARTAN POTASSIUM 25 MG/1
25 TABLET ORAL DAILY
Status: COMPLETED | OUTPATIENT
Start: 2023-12-15 | End: 2023-12-15

## 2023-12-15 RX ADMIN — DEXTROSE MONOHYDRATE, SODIUM CHLORIDE, AND POTASSIUM CHLORIDE 100 ML/HR: 50; 4.5; 1.49 INJECTION, SOLUTION INTRAVENOUS at 06:25

## 2023-12-15 RX ADMIN — Medication 400 MG: at 21:16

## 2023-12-15 RX ADMIN — LOSARTAN POTASSIUM 25 MG: 25 TABLET, FILM COATED ORAL at 15:12

## 2023-12-15 RX ADMIN — Medication 2 L/MIN: at 23:55

## 2023-12-15 RX ADMIN — MORPHINE SULFATE 2 MG: 2 INJECTION, SOLUTION INTRAMUSCULAR; INTRAVENOUS at 17:03

## 2023-12-15 RX ADMIN — ACETAMINOPHEN 650 MG: 325 TABLET ORAL at 15:11

## 2023-12-15 RX ADMIN — MORPHINE SULFATE 2 MG: 2 INJECTION, SOLUTION INTRAMUSCULAR; INTRAVENOUS at 02:20

## 2023-12-15 RX ADMIN — SERTRALINE 50 MG: 50 TABLET, FILM COATED ORAL at 15:12

## 2023-12-15 RX ADMIN — ATORVASTATIN CALCIUM 40 MG: 40 TABLET, FILM COATED ORAL at 21:17

## 2023-12-15 RX ADMIN — Medication 3 MG: at 21:17

## 2023-12-15 RX ADMIN — METOPROLOL SUCCINATE 50 MG: 25 TABLET, EXTENDED RELEASE ORAL at 15:12

## 2023-12-15 RX ADMIN — MORPHINE SULFATE 2 MG: 2 INJECTION, SOLUTION INTRAMUSCULAR; INTRAVENOUS at 10:48

## 2023-12-15 RX ADMIN — DOCUSATE SODIUM 100 MG: 100 CAPSULE, LIQUID FILLED ORAL at 21:17

## 2023-12-15 RX ADMIN — ROPINIROLE HYDROCHLORIDE 5 MG: 1 TABLET, FILM COATED ORAL at 21:17

## 2023-12-15 SDOH — SOCIAL STABILITY: SOCIAL INSECURITY: ARE YOU OR HAVE YOU BEEN THREATENED OR ABUSED PHYSICALLY, EMOTIONALLY, OR SEXUALLY BY ANYONE?: NO

## 2023-12-15 SDOH — SOCIAL STABILITY: SOCIAL INSECURITY: HAS ANYONE EVER THREATENED TO HURT YOUR FAMILY OR YOUR PETS?: NO

## 2023-12-15 SDOH — SOCIAL STABILITY: SOCIAL INSECURITY: DOES ANYONE TRY TO KEEP YOU FROM HAVING/CONTACTING OTHER FRIENDS OR DOING THINGS OUTSIDE YOUR HOME?: NO

## 2023-12-15 SDOH — SOCIAL STABILITY: SOCIAL INSECURITY: DO YOU FEEL ANYONE HAS EXPLOITED OR TAKEN ADVANTAGE OF YOU FINANCIALLY OR OF YOUR PERSONAL PROPERTY?: NO

## 2023-12-15 SDOH — SOCIAL STABILITY: SOCIAL INSECURITY: ABUSE: ADULT

## 2023-12-15 SDOH — SOCIAL STABILITY: SOCIAL INSECURITY: DO YOU FEEL UNSAFE GOING BACK TO THE PLACE WHERE YOU ARE LIVING?: NO

## 2023-12-15 SDOH — SOCIAL STABILITY: SOCIAL INSECURITY: ARE THERE ANY APPARENT SIGNS OF INJURIES/BEHAVIORS THAT COULD BE RELATED TO ABUSE/NEGLECT?: NO

## 2023-12-15 SDOH — SOCIAL STABILITY: SOCIAL INSECURITY: HAVE YOU HAD THOUGHTS OF HARMING ANYONE ELSE?: NO

## 2023-12-15 ASSESSMENT — ACTIVITIES OF DAILY LIVING (ADL)
TOILETING: INDEPENDENT
LACK_OF_TRANSPORTATION: NO
HEARING - RIGHT EAR: DIFFICULTY WITH NOISE
WALKS IN HOME: NEEDS ASSISTANCE
BATHING: NEEDS ASSISTANCE
PATIENT'S MEMORY ADEQUATE TO SAFELY COMPLETE DAILY ACTIVITIES?: YES
JUDGMENT_ADEQUATE_SAFELY_COMPLETE_DAILY_ACTIVITIES: YES
ASSISTIVE_DEVICE: DENTURES UPPER;DENTURES LOWER;HEARING AID - LEFT;HEARING AID - RIGHT
DRESSING YOURSELF: INDEPENDENT
HEARING - LEFT EAR: DIFFICULTY WITH NOISE
ADEQUATE_TO_COMPLETE_ADL: YES
GROOMING: INDEPENDENT
FEEDING YOURSELF: INDEPENDENT

## 2023-12-15 ASSESSMENT — COGNITIVE AND FUNCTIONAL STATUS - GENERAL
HELP NEEDED FOR BATHING: A LOT
EATING MEALS: A LITTLE
DRESSING REGULAR UPPER BODY CLOTHING: A LOT
TOILETING: A LOT
WALKING IN HOSPITAL ROOM: TOTAL
TOILETING: A LOT
CLIMB 3 TO 5 STEPS WITH RAILING: TOTAL
CLIMB 3 TO 5 STEPS WITH RAILING: A LOT
DAILY ACTIVITIY SCORE: 23
MOVING FROM LYING ON BACK TO SITTING ON SIDE OF FLAT BED WITH BEDRAILS: A LITTLE
STANDING UP FROM CHAIR USING ARMS: A LOT
CLIMB 3 TO 5 STEPS WITH RAILING: TOTAL
EATING MEALS: A LITTLE
DRESSING REGULAR LOWER BODY CLOTHING: A LOT
MOBILITY SCORE: 17
PATIENT BASELINE BEDBOUND: NO
DAILY ACTIVITIY SCORE: 13
HELP NEEDED FOR BATHING: A LOT
MOVING TO AND FROM BED TO CHAIR: A LOT
WALKING IN HOSPITAL ROOM: A LOT
MOBILITY SCORE: 12
PERSONAL GROOMING: A LOT
TURNING FROM BACK TO SIDE WHILE IN FLAT BAD: A LITTLE
TOILETING: A LITTLE
WALKING IN HOSPITAL ROOM: TOTAL
PERSONAL GROOMING: A LOT
STANDING UP FROM CHAIR USING ARMS: A LOT
DRESSING REGULAR LOWER BODY CLOTHING: A LOT
MOBILITY SCORE: 12
MOVING TO AND FROM BED TO CHAIR: A LITTLE
MOVING FROM LYING ON BACK TO SITTING ON SIDE OF FLAT BED WITH BEDRAILS: A LITTLE
TURNING FROM BACK TO SIDE WHILE IN FLAT BAD: A LITTLE
DRESSING REGULAR UPPER BODY CLOTHING: A LOT
STANDING UP FROM CHAIR USING ARMS: A LOT
DAILY ACTIVITIY SCORE: 13
MOVING TO AND FROM BED TO CHAIR: A LOT

## 2023-12-15 ASSESSMENT — PAIN SCALES - GENERAL
PAINLEVEL_OUTOF10: 0 - NO PAIN
PAINLEVEL_OUTOF10: 3
PAINLEVEL_OUTOF10: 7
PAINLEVEL_OUTOF10: 4
PAINLEVEL_OUTOF10: 0 - NO PAIN
PAINLEVEL_OUTOF10: 5 - MODERATE PAIN
PAINLEVEL_OUTOF10: 3
PAINLEVEL_OUTOF10: 10 - WORST POSSIBLE PAIN

## 2023-12-15 ASSESSMENT — ENCOUNTER SYMPTOMS
CONSTIPATION: 0
JOINT SWELLING: 1
ARTHRALGIAS: 0
MYALGIAS: 0
EYE DISCHARGE: 0
CHEST TIGHTNESS: 0
APNEA: 0
CONFUSION: 0
EYE REDNESS: 0
POLYDIPSIA: 0
VOICE CHANGE: 0
NUMBNESS: 0
HEMATURIA: 0
BRUISES/BLEEDS EASILY: 0
PALPITATIONS: 0
VOMITING: 0
UNEXPECTED WEIGHT CHANGE: 0
CHOKING: 0
ABDOMINAL DISTENTION: 0
TREMORS: 0
BLOOD IN STOOL: 0
APPETITE CHANGE: 0
PHOTOPHOBIA: 0
SINUS PRESSURE: 0
ABDOMINAL PAIN: 0
HYPERACTIVE: 0
NECK PAIN: 0
DECREASED CONCENTRATION: 0
RHINORRHEA: 0
HALLUCINATIONS: 0
SHORTNESS OF BREATH: 0
DIFFICULTY URINATING: 0
NAUSEA: 0
FREQUENCY: 0
TROUBLE SWALLOWING: 0
ADENOPATHY: 0
DYSURIA: 0
STRIDOR: 0
BACK PAIN: 0
POLYPHAGIA: 0
EYE ITCHING: 0
COLOR CHANGE: 0
NECK STIFFNESS: 0
DYSPHORIC MOOD: 0
FACIAL SWELLING: 0
FEVER: 0
LIGHT-HEADEDNESS: 0
DIARRHEA: 0
SORE THROAT: 0
FACIAL ASYMMETRY: 0
DIZZINESS: 0
COUGH: 0
EYE PAIN: 0
CHILLS: 0
SPEECH DIFFICULTY: 0
ACTIVITY CHANGE: 0
DIAPHORESIS: 0
HEADACHES: 0
NERVOUS/ANXIOUS: 0
WOUND: 0
SEIZURES: 0
RECTAL PAIN: 0
FATIGUE: 0
SLEEP DISTURBANCE: 0
WEAKNESS: 0
FLANK PAIN: 0
SINUS PAIN: 0
AGITATION: 0
ANAL BLEEDING: 0
WHEEZING: 0

## 2023-12-15 ASSESSMENT — LIFESTYLE VARIABLES
SKIP TO QUESTIONS 9-10: 1
HOW MANY STANDARD DRINKS CONTAINING ALCOHOL DO YOU HAVE ON A TYPICAL DAY: PATIENT DOES NOT DRINK
HOW OFTEN DO YOU HAVE A DRINK CONTAINING ALCOHOL: NEVER
PRESCIPTION_ABUSE_PAST_12_MONTHS: NO
HOW OFTEN DO YOU HAVE 6 OR MORE DRINKS ON ONE OCCASION: NEVER
SUBSTANCE_ABUSE_PAST_12_MONTHS: NO
AUDIT-C TOTAL SCORE: 0
AUDIT-C TOTAL SCORE: 0

## 2023-12-15 ASSESSMENT — PAIN - FUNCTIONAL ASSESSMENT
PAIN_FUNCTIONAL_ASSESSMENT: 0-10

## 2023-12-15 ASSESSMENT — PAIN DESCRIPTION - DESCRIPTORS: DESCRIPTORS: ACHING

## 2023-12-15 ASSESSMENT — PATIENT HEALTH QUESTIONNAIRE - PHQ9
1. LITTLE INTEREST OR PLEASURE IN DOING THINGS: NOT AT ALL
SUM OF ALL RESPONSES TO PHQ9 QUESTIONS 1 & 2: 0
2. FEELING DOWN, DEPRESSED OR HOPELESS: NOT AT ALL

## 2023-12-15 NOTE — PROGRESS NOTES
Occupational Therapy                 Therapy Communication Note    Patient Name: Kalie Ramos  MRN: [id number]  Today's Date: 12/15/2023     Discipline: Occupational Therapy    Missed Visit Reason: Patient placed on medical hold (pt admitted with acute right femoral neck fx; For probable surgery 12/15/23; Wait for post-op orders with WB status and proceed as appropriate.)    Missed Time: Cancel

## 2023-12-15 NOTE — ED PROVIDER NOTES
HPI   Chief Complaint   Patient presents with    Fall     Patient says she was going to the bathroom and fell complains of right hip pain       83-year-old female who reportedly lives alone presenting for evaluation of fall.  She states that she was in the bathroom but states that she fell without warning and does not know what exactly caused her to fall.  She denies any lightheadedness, dizziness, shortness of breath, chest pain or palpitations prior to the onset of the fall.  She does endorse significant right hip pain at this time as well as right knee pain.  Has been unable to ambulate since then.  Denies use of anticoagulation.  She denies loss of consciousness, head or neck pain.                          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)     High cholesterol     Hypertension     Leg swelling     Malignant neoplasm of unspecified site of unspecified female breast (CMS/[facility]) 07/19/2013    Adenocarcinoma of breast    Other conditions influencing health status 10/21/2013    Urinary Tract Infection    Personal history of other infectious and parasitic diseases 03/17/2015    History of candidiasis of mouth    Personal history of other specified conditions 03/17/2015    History of abdominal pain    Personal history of pneumonia (recurrent) 09/10/2015    History of pneumonia    Personal history of transient ischemic attack (TIA), and cerebral infarction without residual deficits 09/04/2014    History of stroke     Past Surgical History:   Procedure Laterality Date    BREAST BIOPSY  09/10/2015    Biopsy Breast Open    BREAST LUMPECTOMY  07/19/2013    Breast Surgery Lumpectomy    CARPAL TUNNEL RELEASE  09/04/2014    Neuroplasty  Decompression Median Nerve At Carpal Tunnel    CATARACT EXTRACTION  11/02/2015    Cataract Surgery    HAND TENDON SURGERY  11/02/2015    Hand Incision Tendon Sheath Of A Finger    HYSTERECTOMY  07/19/2013    Hysterectomy    MR HEAD ANGIO WO IV CONTRAST  12/17/2020    MR HEAD ANGIO WO IV CONTRAST [facility]    MR HEAD ANGIO WO IV CONTRAST  5/13/2021    MR HEAD ANGIO WO IV CONTRAST [facility]    MR HEAD ANGIO WO IV CONTRAST  5/24/2022    MR HEAD ANGIO WO IV CONTRAST [facility]    MR NECK ANGIO WO IV CONTRAST  5/24/2022    MR NECK ANGIO WO IV CONTRAST [facility]    OTHER SURGICAL HISTORY  11/02/2015    Wrist Carpectomy    ROTATOR CUFF REPAIR  09/10/2015    Rotator Cuff Repair    SENTINEL LYMPH NODE BIOPSY  09/10/2015    [place] Lymph Node Biopsy    SHOULDER SURGERY  07/19/2013    Shoulder Surgery     Family History   Problem Relation Name Age of Onset    Alzheimer's disease Father      Breast cancer Sister      Pancreatic cancer Sister      Arthritis Other       Social History     Tobacco Use    Smoking status: Never    Smokeless tobacco: Never   Substance Use Topics    Alcohol use: Never    Drug use: Never       Physical Exam   ED Triage Vitals [12/14/23 2116]   Temp Heart Rate Resp BP   36.7 °C (98.1 °F) 88 16 148/88      SpO2 Temp Source Heart Rate Source Patient Position   100 % Temporal -- Lying      BP Location FiO2 (%)     -- --       Physical Exam    ED Course & MDM   ED Course as of 12/15/23 0042   u Dec 14, 2023   2155 Patient saturating well on her home 3 L of oxygen. [TL]   2210 X-ray interpretation by myself reviewed at this time.  Patient found to have right hip fracture. [TL]   2219 IMPRESSION:  Fracture of the right femoral neck. Degenerative change of the right knee.   [TL]   2227 IMPRESSION:  1. No acute intracranial findings.  2. Symmetric volume loss of the cerebral hemispheres.  3. Periventricular Microangiopathy.  4. No posttraumatic abnormality. Scalp  contusion anterior to the  right frontal bone without subjacent calvarial fracture.   [TL]   2234 Call placed orthopedics at 22: 30 for recommendations regarding hip fracture.  Thus far remainder of imaging has been negative for any acute traumatic injury.  Lumbar imaging still pending as well as laboratory studies. [TL]   2236 EKG performed at 22: 22 and independently reviewed by provider: Reveals NSR with a rate of 72 bpm, sinus pause noted.  Normal axis, prolonged QRS duration 120 ms left bundle branch block morphology, prolonged AR interval with first-degree AV block, no ST changes, no T wave abnormalities,  No STEMI.  Negative by Sgarbossa criteria [TL]   2246 Chronic degenerative changes noted of the lumbar spine.  No acute traumatic injury. [TL]   2313 Spoke to .  [TL]   2314 Spoke to DR. Muhammad regarding hip fracture. Asked for patient to be admitted to the internal medicine service and is happy to be on consult.  Did ask for patient remain n.p.o. overnight for possible surgical repair tomorrow. [TL]   2340 Troponin noted to be elevated at 40. [TL]   2341 Repeat troponin ordered.  Call placed to medicine team for admission at this time. [TL]      ED Course User Index  [TL] Fazal Howard DO         Diagnoses as of 12/15/23 0042   Fall, initial encounter       Medical Decision Making  33-year-old female presents emerged part for evaluation of fall.  Obvious hip deformity noted.  Neurovascular intact.  Patient did not member the source of the fall and therefore EKG, troponin, base laboratory studies obtained.  Minimal elevation of troponin.  Patient found to have femoral neck fracture.  Admitted to medicine service orthopedic on consult for potential operative intervention.  Daughter at bedside updated.  Pain well-controlled after morphine.  Remained hemodynamically intact and on repeat neurovascular exam at 23:30 right lower extremity remained stable.  No sign of acute traumatic injury  otherwise.        Procedure  Procedures     Fazal Howard DO  12/15/23 0043

## 2023-12-15 NOTE — NURSING NOTE
This nurse call  to confirm orders for ECG. This nurse was given telephone orders with read back to complete ECG and administer daily medications at this time due to patients NPO order being discontinued. This nurse called pharmacy to retime medications. Waiting for further orders. ECG in progress. Will continue to monitor patient to ensure patient safety.

## 2023-12-15 NOTE — PROGRESS NOTES
Kalie Rmaos is a 89 y.o. female on day 1 of admission presenting with Fall, initial encounter.    TCC met with patient  and her daughter Annette at bedside. An explanation of discharge planning was provided.  Assessment completed.  Patient resides alone in a condo. She uses both a walker and a cane.  Patient is able to do light cooking for herself.  She has three sons and her daughter who help with shopping and errands. Patient is on home oxygen 3L.  [facility] is the supplier. Patient does not smoke or drink alcohol.  Patient admits to having a fall in the home. PCP is Dr. GARRETT Bailey. Patient receives her medications through [facility] mail order.  She also uses [facility]. Plan is for surgery tomorrow morning.   PT/OT unable to work with patient until hip surgery completed. Choiced for [facility].  A referral was sent to [facility] via [facility].  Information will need to be sent when it becomes available.   PATIENT DOES NOT HAVE A SECURE DISCHARGE PLAN                 Katherine Skelton RN

## 2023-12-15 NOTE — NURSING NOTE
Patient resting in bed. Bed side shift report received. Vital signs, labs, orders, plan of care reviewed. Fall precautions reviewed and implemented. Bed brakes locked, bed in lowest position, call light within reach, bed alarm activated. Will continue to monitor patient to ensure patient safety.

## 2023-12-15 NOTE — CARE PLAN
Problem: Pain  Goal: My pain/discomfort is manageable  Outcome: Progressing     Problem: Safety  Goal: Patient will be injury free during hospitalization  Outcome: Progressing  Goal: I will remain free of falls  Outcome: Progressing     Problem: Daily Care  Goal: Daily care needs are met  Outcome: Progressing     Problem: Psychosocial Needs  Goal: Demonstrates ability to cope with hospitalization/illness  Outcome: Progressing  Goal: Collaborate with me, my family, and caregiver to identify my specific goals  Outcome: Progressing     Problem: Discharge Barriers  Goal: My discharge needs are met  Outcome: Progressing     Problem: Fall/Injury  Goal: Not fall by end of shift  Outcome: Progressing  Goal: Be free from injury by end of the shift  Outcome: Progressing  Goal: Verbalize understanding of personal risk factors for fall in the hospital  Outcome: Progressing  Goal: Verbalize understanding of risk factor reduction measures to prevent injury from fall in the home  Outcome: Progressing  Goal: Use assistive devices by end of the shift  Outcome: Progressing  Goal: Pace activities to prevent fatigue by end of the shift  Outcome: Progressing   The patient's goals for the shift include sleep    The clinical goals for the shift include pain free

## 2023-12-15 NOTE — NURSING NOTE
This nurse called  to notify her that ECG was completed and to notify her of the results. This nurse left a voicemail. Waiting on a call back.

## 2023-12-15 NOTE — ED NOTES
Per EMS patient was walking to the bathroom when she slipped and fell, patient crawled to bedroom to dial 911. Patient states she lives alone , patient also states that left side cannot be used for vitals or IV due to having breast surgery in the past. Patient wears 3L of oxygen at all times. Patient is AxOx4 but wishes for staff to consult with daughter for information and states that daughter will be here soon. Physician at bedside to assess.     Zenaida Marks LPN  12/14/23 2128       Zenaida Marks LPN  12/14/23 2128

## 2023-12-15 NOTE — CARE PLAN
The patient's goals for the shift include sleep    The clinical goals for the shift include pain free      Problem: Pain  Goal: My pain/discomfort is manageable  Outcome: Progressing     Problem: Safety  Goal: Patient will be injury free during hospitalization  Outcome: Progressing  Goal: I will remain free of falls  Outcome: Progressing     Problem: Daily Care  Goal: Daily care needs are met  Outcome: Progressing     Problem: Psychosocial Needs  Goal: Demonstrates ability to cope with hospitalization/illness  Outcome: Progressing  Goal: Collaborate with me, my family, and caregiver to identify my specific goals  Outcome: Progressing  Flowsheets (Taken 12/15/2023 0341)  Cultural Requests During Hospitalization: none  Spiritual Requests During Hospitalization: none     Problem: Discharge Barriers  Goal: My discharge needs are met  Outcome: Progressing     Problem: Fall/Injury  Goal: Not fall by end of shift  Outcome: Progressing  Goal: Be free from injury by end of the shift  Outcome: Progressing  Goal: Verbalize understanding of personal risk factors for fall in the hospital  Outcome: Progressing  Goal: Verbalize understanding of risk factor reduction measures to prevent injury from fall in the home  Outcome: Progressing  Goal: Use assistive devices by end of the shift  Outcome: Progressing  Goal: Pace activities to prevent fatigue by end of the shift  Outcome: Progressing

## 2023-12-15 NOTE — CONSULTS
Consults    Reason For Consult  Right hip fracture    History Of Present Illness  Kalie Ramos is a 89 y.o. female presenting with right femoral neck fracture.  She fell at home injuring her right hip.     Past Medical History  She has a past medical history of Atrophic disorder of skin, unspecified (07/19/2013), Body mass index (BMI) 22.0-22.9, adult, Cervicalgia (05/08/2015), CHF (congestive heart failure) (CMS/[facility]), High cholesterol, Hypertension, Leg swelling, Malignant neoplasm of unspecified site of unspecified female breast (CMS/[facility]) (07/19/2013), Other conditions influencing health status (10/21/2013), Personal history of other infectious and parasitic diseases (03/17/2015), Personal history of other specified conditions (03/17/2015), Personal history of pneumonia (recurrent) (09/10/2015), and Personal history of transient ischemic attack (TIA), and cerebral infarction without residual deficits (09/04/2014).    Surgical History  She has a past surgical history that includes Breast biopsy (09/10/2015); Rotator cuff repair (09/10/2015); [place] lymph node biopsy (09/10/2015); Cataract extraction (11/02/2015); Other surgical history (11/02/2015); Hand tendon surgery (11/02/2015); Carpal tunnel release (09/04/2014); Breast lumpectomy (07/19/2013); Hysterectomy (07/19/2013); Shoulder surgery (07/19/2013); MR angio head wo IV contrast (12/17/2020); MR angio head wo IV contrast (5/13/2021); MR angio neck wo IV contrast (5/24/2022); and MR angio head wo IV contrast (5/24/2022).     Social History  She reports that she has never smoked. She has never used smokeless tobacco. She reports that she does not drink alcohol and does not use drugs.    Family History  Family History   Problem Relation Name Age of Onset    Alzheimer's disease Father      Breast cancer Sister      Pancreatic cancer Sister      Arthritis Other          Allergies  Niaspan extended-release [niacin] and Naproxen    Review of Systems    "  Physical Exam examination of the right lower extremity finds to be in a shortened and externally rotated position.  She has pain in the right groin area with a degree of movement.  She has intact motor and sensory function distally.     Last Recorded Vitals  Blood pressure 157/76, pulse 67, temperature 36.7 °C (98.1 °F), temperature source Oral, resp. rate 18, height 1.575 m (5' 2\"), weight 53.3 kg (117 lb 8.1 oz), SpO2 100 %.    Relevant Results      Scheduled medications  atorvastatin, 40 mg, oral, Nightly  calcitonin (salmon), 1 spray, One Nostril, Daily  docusate sodium, 100 mg, oral, BID  enoxaparin, 40 mg, subcutaneous, Nightly  losartan, 25 mg, oral, Daily  magnesium oxide, 400 mg, oral, Once per day on Mon Wed Fri  melatonin, 3 mg, oral, Nightly  metoprolol succinate XL, 50 mg, oral, Daily  oxygen, 2 L/min, inhalation, q24h  pantoprazole, 40 mg, oral, Daily  polyethylene glycol, 17 g, oral, Daily  rOPINIRole, 5 mg, oral, Nightly  sertraline, 50 mg, oral, Daily      Continuous medications  potassium [id number]-[id number]%NaCl, 100 mL/hr, Last Rate: 100 mL/hr (12/15/23 0625)      PRN medications  PRN medications: acetaminophen **OR** acetaminophen **OR** acetaminophen, acetaminophen, albuterol, benzocaine-menthol, dextromethorphan-guaifenesin, guaiFENesin, morphine, nitroglycerin, ondansetron **OR** ondansetron, oxygen, sodium chloride, traZODone  Results for orders placed or performed during the hospital encounter of 12/14/23 (from the past 24 hour(s))   ECG 12 lead   Result Value Ref Range    Ventricular Rate 72 BPM    Atrial Rate 72 BPM    DC Interval 242 ms    QRS Duration 128 ms    QT Interval 476 ms    QTC Calculation(Bazett) 521 ms    P Axis 84 degrees    R Axis 50 degrees    T Axis 182 degrees    QRS Count 12 beats    Q Onset 214 ms    P Onset 93 ms    P Offset 127 ms    T Offset 452 ms    QTC Fredericia 506 ms   CBC and Auto Differential   Result Value Ref Range    WBC 16.2 (H) 4.4 - 11.3 x10*3/uL    nRBC " 0.0 0.0 - 0.0 /100 WBCs    RBC 4.31 4.00 - 5.20 x10*6/uL    Hemoglobin 12.2 12.0 - 16.0 g/dL    Hematocrit 39.4 36.0 - 46.0 %    MCV 91 80 - 100 fL    MCH 28.3 26.0 - 34.0 pg    MCHC 31.0 (L) 32.0 - 36.0 g/dL    RDW 14.1 11.5 - 14.5 %    Platelets 256 150 - 450 x10*3/uL    Neutrophils % 89.8 40.0 - 80.0 %    Immature Granulocytes %, Automated 0.6 0.0 - 0.9 %    Lymphocytes % 3.5 13.0 - 44.0 %    Monocytes % 5.8 2.0 - 10.0 %    Eosinophils % 0.1 0.0 - 6.0 %    Basophils % 0.2 0.0 - 2.0 %    Neutrophils Absolute 14.50 (H) 1.60 - 5.50 x10*3/uL    Immature Granulocytes Absolute, Automated 0.09 0.00 - 0.50 x10*3/uL    Lymphocytes Absolute 0.56 (L) 0.80 - 3.00 x10*3/uL    Monocytes Absolute 0.94 (H) 0.05 - 0.80 x10*3/uL    Eosinophils Absolute 0.02 0.00 - 0.40 x10*3/uL    Basophils Absolute 0.04 0.00 - 0.10 x10*3/uL   Comprehensive Metabolic Panel   Result Value Ref Range    Glucose 115 (H) 65 - 99 mg/dL    Sodium 140 133 - 145 mmol/L    Potassium 4.0 3.4 - 5.1 mmol/L    Chloride 101 97 - 107 mmol/L    Bicarbonate 26 24 - 31 mmol/L    Urea Nitrogen 17 8 - 25 mg/dL    Creatinine 0.80 0.40 - 1.60 mg/dL    eGFR 69 >60 mL/min/1.73m*2    Calcium 9.0 8.5 - 10.4 mg/dL    Albumin 3.6 3.5 - 5.0 g/dL    Alkaline Phosphatase 130 (H) 35 - 125 U/L    Total Protein 7.0 5.9 - 7.9 g/dL    AST 20 5 - 40 U/L    Bilirubin, Total 0.9 0.1 - 1.2 mg/dL    ALT 16 5 - 40 U/L    Anion Gap 13 <=19 mmol/L   Magnesium   Result Value Ref Range    Magnesium 2.00 1.60 - 3.10 mg/dL   Serial Troponin, Initial ([facility])   Result Value Ref Range    Troponin T, High Sensitivity 40 (H) <=15 ng/L   Serial Troponin, 2 Hour ([facility])   Result Value Ref Range    Troponin T, High Sensitivity 51 (H) <=15 ng/L   CBC   Result Value Ref Range    WBC 11.4 (H) 4.4 - 11.3 x10*3/uL    nRBC 0.0 0.0 - 0.0 /100 WBCs    RBC 4.56 4.00 - 5.20 x10*6/uL    Hemoglobin 12.9 12.0 - 16.0 g/dL    Hematocrit 42.0 36.0 - 46.0 %    MCV 92 80 - 100 fL    MCH 28.3 26.0 - 34.0 pg    MCHC 30.7 (L)  32.0 - 36.0 g/dL    RDW 14.2 11.5 - 14.5 %    Platelets 258 150 - 450 x10*3/uL   Comprehensive metabolic panel   Result Value Ref Range    Glucose 112 (H) 65 - 99 mg/dL    Sodium 140 133 - 145 mmol/L    Potassium 4.6 3.4 - 5.1 mmol/L    Chloride 101 97 - 107 mmol/L    Bicarbonate 29 24 - 31 mmol/L    Urea Nitrogen 16 8 - 25 mg/dL    Creatinine 0.90 0.40 - 1.60 mg/dL    eGFR 60 (L) >60 mL/min/1.73m*2    Calcium 9.0 8.5 - 10.4 mg/dL    Albumin 3.6 3.5 - 5.0 g/dL    Alkaline Phosphatase 132 (H) 35 - 125 U/L    Total Protein 7.2 5.9 - 7.9 g/dL    AST 22 5 - 40 U/L    Bilirubin, Total 0.8 0.1 - 1.2 mg/dL    ALT 16 5 - 40 U/L    Anion Gap 10 <=19 mmol/L   Serial Troponin, 6 Hour ([facility])   Result Value Ref Range    Troponin T, High Sensitivity 44 (H) <=15 ng/L        Assessment/Plan     I had a lengthy discussion with the patient and her daughter at the bedside regarding my recommendation for right hip hemiarthroplasty to optimize pain control and function.  We discussed the risk of surgery which include but are not limited to infection, neurovascular, blood clot and pulm embolus, postoperative leg length change, dislocation and need for further surgery.  They elected to proceed.  Will bring her to the operating room at the next available opportunity likely in the morning 12/16.    Jarett Michaels MD

## 2023-12-15 NOTE — CONSULTS
Consults  History Of Present Illness:    Kalie Ramos is a 89 y.o. female presenting with .  This patient is a 89-year-old white female with a past history including mild to moderate aortic valve stenosis, hypertension, complete left bundle branch block conduction delay, chronic lower extremity edema due to venous insufficiency, nontoxic multinodular goiter, breast lumpectomy for breast carcinoma, bilateral cataract extraction, carpal tunnel release, rotator cuff repair, sentinel lymph node biopsy at time of lumpectomy, and diaphragmatic hernia.  She has a longstanding history of hiatal hernia treated with proton pump inhibitor therapy and H2 blocker therapy with no symptoms of reflux.     The patient was admitted to [facility] 9/5/2018 for laparoscopic paraesophageal hernia repair along with laparoscopic cholecystectomy for documented cholelithiasis.  As part of preoperative evaluation echocardiogram 8/31/2018 demonstrated preserved LV ejection fraction 60 to 65% with moderate concentric LVH mild to moderate left atrial lodgment mild to moderate aortic valvular stenosis estimated PA systolic pressure 35 mmHg.  The peak systolic pressure gradient across the aortic valve was 21 mmHg.  She had a pharmacological nuclear stress test on 8/20/2018 that showed a preserved LV ejection fraction no ischemia or scar there.  The patient's operative procedure was done on 9/5/2018 with some persistent postoperative hypoxia requiring overnight observation in the ICU after extubation.  Patient ultimately restarted on usual Lasix 40 mg daily.     Patient presented back to [facility] emergency room 9/17/2018 with shortness of breath.  CT of the chest abdomen and pelvis showed diffuse thyroid megaly consistent with multinodular goiter no pulmonary embolism.  There was moderate to large bilateral pleural effusions basilar atelectasis.  There is evidence of cholecystectomy and subcutaneous emphysema. Stool cultures were  positive for Giardia antigen.  Repeat echocardiogram again demonstrated mild to moderate LVH preserved LV ejection fraction 60 to 64% mild to moderate aortic valve stenosis trace aortic valve regurgitation mild tricuspid valve regurgitation mild pulmonary hypertension estimated PA systolic pressure in the range of 40 mmHg range patient was treated with IV Lasix.  She developed an extensive DVT left lower extremity by ultrasound 10/2/2018.  A repeat chest CT showed segmental subsegmental pulmonary emboli right lower lobe and lingula without evidence of right-sided cardiac strain with bilateral pleural effusions by basilar atelectasis and large substernal goiter.  She was started on anticoagulation with Coumadin.     Patient was admitted to [facility], [place], operated by [facility] 8/4/2019 with shortness of breath and palpitations.  CT angiogram of the chest showed no pulmonary embolism.  There were bilateral pulmonary infiltrates left lower lobe greatest particularly in the upper portion.  There was moderate left pleural effusion small right pleural effusion enlarged mediastinal nodes reactive adenopathy.  There was again marked for bilateral thyroid megaly multinodular goiter.  There was a suspected nonocclusive DVT left lower extremity.  Was thought to be possibly chronic or recurrent.  proBNP was 3049.  Troponins were negative.  EKG showed sinus rhythm left axis deviation poor R wave progression.  She was switched from Coumadin to Xarelto 10 mg daily.  There was some concern for ongoing aspiration.     Patient was admitted 3/19/2020 for concerns of aspiration with dysphagia gagging choking occasional regurgitation.  CT scan of the neck demonstrated inflammation of the upper third of the esophagus possible neoplasm.  She was seen by speech therapy.  EGD 3/25/2020 showed small hiatal hernia large ulcer in the proximal esophagus highly suggestive of pill induced esophagitis.     Patient admitted again 4/6/2020 with ongoing dysphagia and  inability to swallow almost any consistency.  She was dehydrated Lasix was placed on hold.  Modified barium swallow showed tracheal penetration of thin liquids and some laryngeal penetration with nectar thick liquids.  There was some discussion about PEG tube placement ultimately deferred.     Patient sustained a fall going to the bathroom 12/16/2020 with a sensation of dizziness vertigo.  Her evaluation in the hospital was essentially unremarkable head CT negative for intracranial process.  Repeat echocardiogram demonstrated moderate aortic valve stenosis preserved LV function.     She was admitted again 5/12/2021 with dizziness the patient has been taken off Xarelto for several days prior to that and for dental extraction.  Head CT was negative for any acute findings.  Carotid ultrasound showed mild bilateral plaque.  EKG showed sinus rhythm left bundle branch block.  MRI of brain negative for CVA.  MRI did show 70% stenosis of the P1 segment on the left.  Echocardiogram again showed moderate concentric LV hypertrophy normal LV ejection fraction 55% with moderate aortic valve stenosis.  There was 1+ mitral 2+ tricuspid valve regurgitation moderate pulmonary pretension.     Patient was brought back to the hospital 5/19/2021 with dizziness despite as needed meclizine.  MRI of the brain showed no significant change.  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  Patient was kept on low-dose Lasix for peripheral edema.  Repeat echocardiogram again showed mild to moderate concentric LVH moderate to severe aortic valve stenosis.     Patient again admitted 5/22/2022 with shortness of breath chest x-ray showing bilateral pleural effusions.  Ultrasound lower extremities showed chronic nonocclusive thrombus in the left lower extremity.  Echocardiogram 5 3/23/2022 showed  LV ejection fraction up to 50% range abnormal septal motion due to left bundle branch block conduction delay moderately severe aortic valve stenosis peak systolic pressure gradient 56 mmHg trivial aortic insufficiency 2+ tricuspid valve vegetation moderately severe pulmonary hypertension.  Patient clinically not thought to be candidate for transcatheter aortic valve replacement.  MRI brain showed tiny punctate lacunar infarct right corona radiata related to small vessel disease rather than embolic.  She was kept on lower dose of Xarelto 10 mg daily.  MRA of the neck was negative for high-grade carotid stenosis MRI of brain and again showed high-grade stenosis distal P1 segment of left posterior cerebral artery.     Patient was admitted to [facility] 7/5/2022 with dizziness and unsteadiness.  Work-up was again relatively similar to past evaluations.     She was readmitted to [facility] 11/19/2022 after mechanical fall laceration of her forehead requiring suture repair.  Echocardiogram showed LV ejection fraction had been reduced to 40% with moderately severe aortic valve stenosis trace aortic insufficiency with moderate tricuspid valve regurgitation severe pulm hypertension PA systolic pressure 70 mmHg.  Patient again not thought to be candidate for TAVR.     Patient admitted again [facility], [place], operated by [facility] 1/6/2023 with increasing shortness of breath.  Both portable chest x-ray showed multifocal pneumonia bilateral infiltrates.  Ultrasound lower extremity done again positive for chronic DVT left common femoral vein.  Patient was continued on usual low-dose aspirin Atorvastatin Toprol.  Chest CT showed extensive right upper lobe consolidation due to pneumonia possibly tumor.  Moderate bilateral pleural effusions.     Patient again admitted 7/25/2023 with shortness of breath.  Creatinine was 0.9 proBNP 16,000 943.  High-sensitivity troponins negative.  Clinically she was thought not to be volume overloaded despite  elevated proBNP.  Patient had another echocardiogram performed 7/25/2023 showing LV ejection fraction again 35-40% moderate left atrial enlargement mild to moderate right atrial enlargement severe aortic valve stenosis peak systolic pressure gradient 56 mmHg.  There was 1+ mitral valve regurgitation 2+ tricuspid valve regurgitation severe pulmonary hypertension estimated PA systolic pressure 67 mmHg.     Patient was admitted through [facility] emergency room on 10/23/2023 with confusion and increased weakness.  Evaluation in the emergency room included a comprehensive metabolic panel creatinine is 0.90.  Arterial blood gases pH 7.5 PCO2 44 PO2 93.  proBNP was 13,676.  CBC unremarkable except hemoglobin 11.3.  Portable chest x-ray showed patchy bilateral airspace opacities in both lungs particularly within the right midlung concerningly for pneumonia with small bibasilar effusions.  CT angiogram of the chest showed no pulmonary emboli by collateral confluent airspace disease all lung lobes right greater than left moderate bilateral pleural effusions enlarged bilateral main pulmonary arteries consistent pulmonary hypertension and cardiomegaly.  The patient again was thought not to be a candidate for transcatheter aortic valve replacement and remained on continuous nasal oxygen 3 L/min.  Multiple potential etiologies for her chronic lung disease were thought to be possible including aspiration pneumonitis interstitial fibrosis and a possible mild element of CHF.  The patient was placed on IV ceftriaxone and azithromycin empirically.  She was also continued on Lasix and the dose was increased to 40 mg twice daily empirically.  The patient had already received prior instructions to reduce her potential for aspiration while swallowing.     The patient was admitted through [facility] emergency room 11/12/2023 with increasing weakness lethargy and an apparent fall in the bathroom where she was found.  Initial evaluation in the  emergency room included a head CT showing redemonstration of age-related changes without acute intracranial process.  PA lateral chest x-ray showed cardiomegaly mild CHF on a background of COPD with improved infiltrate in the right upper lobe.  There is right paratracheal tissue prominence thought to be due to ectatic vessels.  X-ray of the right hip showed DJD but no fracture or subluxation.  She also had a CT angiogram of the chest which was negative for pulmonary embolization.  There was bilateral confluent airspace disease in all lung lobes right greater than left moderate bilateral pleural effusions and enlarged main pulmonary arteries consistent with pulmonary artery hypertension.  This was actually performed during her prior admission in 10/2023.  The initial labs included a CBC WBC of 15,700 hematocrit 46.5.  The comprehensive metabolic panel was relatively unremarkable creatinine 0.70 potassium of 5.0.  CK was 189 high-sensitivity troponin 35.  The second and third high-sensitivity troponins were 35 and 38 respectively.  Repeat CBC today notable for a significant decrease in hemoglobin 10.7 hematocrit 33.8.  A repeat confirmatory's CBC notable for hemoglobin 11.4 hematocrit 36.3 the patient's overall clinical status at that time was relatively stable and she was given antibiotics for possible urinary tract infection.  The patient remained very resistant to the strong recommendation for assisted living.  She was continued on usual therapy of Lasix 40 mg daily aspirin 81 mg daily atorvastatin 40 mg daily metoprolol succinate 25 mg twice daily.  Her telemetry monitor demonstrated atrial fibrillation with a controlled ventricular rate.  The systolic blood pressures were in the 120-150 mmHg range.    The patient is now admitted with a mechanical fall resulting in a fracture of the right femoral neck.  Patient seen by orthopedics and is has been recommended to undergo right total hip replacement scheduled 12/16.   "Labs from today include a hemoglobin of 12.9 WBC 11,400.  Comprehensive metabolic panel is unremarkable with a creatinine of 0.90.  Last Recorded Vitals:  Vitals:    12/14/23 2116 12/15/23 0331 12/15/23 0809 12/15/23 1455   BP: 148/88 142/59 157/76 119/62   BP Location:  Left arm Left arm Left arm   Patient Position: Lying Lying Lying Lying   Pulse: 88 85 67 66   Resp: 16 18 18 18   Temp: 36.7 °C (98.1 °F) 36.6 °C (97.9 °F) 36.7 °C (98.1 °F) 36.7 °C (98.1 °F)   TempSrc: Temporal Oral Oral Oral   SpO2: 100% 100% 100% 98%   Weight: 53.3 kg (117 lb 8.1 oz)      Height: 1.575 m (5' 2\")          Last Labs:  CBC - 12/15/2023:  3:09 AM  11.4 12.9 258    42.0      CMP - 12/15/2023:  3:09 AM  9.0 7.2 22 --- 0.8   _ 3.6 16 132      PTT - 1/26/2023:  6:26 PM  1.0   10.2 29.2     Hemoglobin A1C   Date/Time Value Ref Range Status   05/23/2022 10:43 PM 5.7 4.0 - 6.0 % Final     Comment:     Hemoglobin A1C levels are related to mean blood glucose during the   preceding 2-3 months. The relationship table below may be used as a   general guide. Each 1% increase in HGB A1C is a reflection of an   increase in mean glucose of approximately 30 mg/dl.   Reference: Diabetes Care, volume 29, supplement 1 Jan. 2006                        HGB A1C ................. Approx. Mean Glucose   _______________________________________________   6%   ...............................  120 mg/dl   7%   ...............................  150 mg/dl   8%   ...............................  180 mg/dl   9%   ...............................  210 mg/dl   10%  ...............................  240 mg/dl  Performed at [address] [address]     12/17/2020 03:16 PM 5.7 4.0 - 6.0 % Final     Comment:     Hemoglobin A1C levels are related to mean blood glucose during the   preceding 2-3 months. The relationship table below may be used as a   general guide. Each 1% increase in HGB A1C is a reflection of an   increase in mean glucose of " "approximately 30 mg/dl.   Reference: Diabetes Care, volume 29, supplement 1 Jan. 2006                        HGB A1C ................. Approx. Mean Glucose   _______________________________________________   6%   ...............................  120 mg/dl   7%   ...............................  150 mg/dl   8%   ...............................  180 mg/dl   9%   ...............................  210 mg/dl   10%  ...............................  240 mg/dl  Performed at [address] [address]       LDL Calculated   Date/Time Value Ref Range Status   01/27/2023 06:04 AM 66 65 - 130 MG/DL Final   11/20/2022 02:47 AM 54 (L) 65 - 130 MG/DL Final   07/06/2022 01:13 AM 54 (L) 65 - 130 MG/DL Final     VLDL   Date/Time Value Ref Range Status   08/03/2018 09:41 AM 25 0 - 40 mg/dL Final   01/18/2018 02:30 PM 47 (H) 0 - 40 mg/dL Final   10/17/2017 11:18 AM 50 (H) 0 - 40 mg/dL Final      Last I/O:  No intake/output data recorded.    Past Cardiology Tests (Last 3 Years):  EKG:  ECG 12 lead  (Preliminary)      Electrocardiogram, 12-lead PRN ACS symptoms  (Preliminary)      ECG 12 lead  (Preliminary)      ECG 12 lead 11/17/2023      ECG 12 Lead       ECG 12 Lead       ECG 12 lead 10/24/2023    Echo:  No results found for this or any previous visit from the past 1095 days.    Ejection Fractions:  No results found for: \"EF\"  Cath:  No results found for this or any previous visit from the past 1095 days.    Stress Test:  No results found for this or any previous visit from the past 1095 days.    Cardiac Imaging:  No results found for this or any previous visit from the past 1095 days.      Past Medical History:  She has a past medical history of Atrophic disorder of skin, unspecified (07/19/2013), Body mass index (BMI) 22.0-22.9, adult, Cervicalgia (05/08/2015), CHF (congestive heart failure) (CMS/Formerly [facility]), High cholesterol, Hypertension, Leg swelling, Malignant neoplasm of unspecified site of unspecified female breast " (CMS/Formerly [facility]) (07/19/2013), Other conditions influencing health status (10/21/2013), Personal history of other infectious and parasitic diseases (03/17/2015), Personal history of other specified conditions (03/17/2015), Personal history of pneumonia (recurrent) (09/10/2015), and Personal history of transient ischemic attack (TIA), and cerebral infarction without residual deficits (09/04/2014).    Past Surgical History:  She has a past surgical history that includes Breast biopsy (09/10/2015); Rotator cuff repair (09/10/2015); [place] lymph node biopsy (09/10/2015); Cataract extraction (11/02/2015); Other surgical history (11/02/2015); Hand tendon surgery (11/02/2015); Carpal tunnel release (09/04/2014); Breast lumpectomy (07/19/2013); Hysterectomy (07/19/2013); Shoulder surgery (07/19/2013); MR angio head wo IV contrast (12/17/2020); MR angio head wo IV contrast (5/13/2021); MR angio neck wo IV contrast (5/24/2022); and MR angio head wo IV contrast (5/24/2022).      Social History:  She reports that she has never smoked. She has never used smokeless tobacco. She reports that she does not drink alcohol and does not use drugs.    Family History:  Family History   Problem Relation Name Age of Onset    Alzheimer's disease Father      Breast cancer Sister      Pancreatic cancer Sister      Arthritis Other          Allergies:  Niaspan extended-release [niacin] and Naproxen    Inpatient Medications:  Scheduled medications   Medication Dose Route Frequency    atorvastatin  40 mg oral Nightly    calcitonin (salmon)  1 spray One Nostril Daily    calcitonin (salmon)  1 spray One Nostril Daily    docusate sodium  100 mg oral BID    enoxaparin  40 mg subcutaneous Nightly    losartan  25 mg oral Daily    magnesium oxide  400 mg oral Once per day on Mon Wed Fri    magnesium oxide  400 mg oral Once per day on Mon Wed Fri    melatonin  3 mg oral Nightly    metoprolol succinate XL  50 mg oral Daily    oxygen  2 L/min inhalation q24h     pantoprazole  40 mg oral Daily    polyethylene glycol  17 g oral Daily    rOPINIRole  5 mg oral Nightly    sertraline  50 mg oral Daily     PRN medications   Medication    acetaminophen    Or    acetaminophen    Or    acetaminophen    acetaminophen    albuterol    benzocaine-menthol    dextromethorphan-guaifenesin    guaiFENesin    morphine    nitroglycerin    ondansetron    Or    ondansetron    oxygen    sodium chloride    traZODone     Continuous Medications   Medication Dose Last Rate    potassium [id number]%NaCl  100 mL/hr 100 mL/hr (12/15/23 0625)     Outpatient Medications:  Current Outpatient Medications   Medication Instructions    acetaminophen (TYLENOL) 650 mg, oral, Every 6 hours PRN    albuterol 2.5 mg, nebulization, Every 2 hour PRN    atorvastatin (Lipitor) 40 mg tablet Take 1 tablet (40 mg) by mouth once daily at bedtime for 30 days, THEN 1 tablet (40 mg) once daily at bedtime.    benzocaine-menthol (Cepastat Sore Throat) 15-3.6 mg lozenge 1 lozenge, Mouth/Throat, Every 4 hours PRN    calcitonin, salmon, (Miacalcin) 200 unit/actuation nasal spray 1 spray, One Nostril, Daily    ferrous gluconate (Fergon) 324 (38 Fe) mg tablet 38 mg of iron, oral, 3 times weekly    furosemide (LASIX) 40 mg, oral, Daily    lidocaine 4 % patch 1 patch, transdermal, Daily, Remove & discard patch within 12 hours or as directed by MD.    loratadine (CLARITIN) 10 mg, oral, Daily    losartan (COZAAR) 25 mg, oral, Daily    magnesium oxide (Mag-Ox) 400 mg (241.3 mg magnesium) tablet 1 tablet, oral, 3 times weekly, Tues thurs sat    melatonin 3 mg, oral, Nightly    metoprolol succinate XL (TOPROL-XL) 50 mg, oral, Daily, Do not crush or chew.    metoprolol succinate XL (TOPROL-XL) 25 mg, oral, Daily, Do not crush or chew.    nitroglycerin (NITROSTAT) 0.4 mg, sublingual, Every 5 min PRN    oxygen (O2) 2 L/min, inhalation, Every 24 hours    pantoprazole (PROTONIX) 40 mg, oral, Daily    potassium chloride (Klor-Con) 20 mEq  packet 20 mEq, oral, Daily, MIX AND DRINK    rOPINIRole (Requip) 2 mg tablet     rOPINIRole (REQUIP) 5 mg, oral, Nightly    sertraline (ZOLOFT) 50 mg, oral, Daily    sodium chloride (Ocean) 0.65 % nasal spray 1 spray, Each Nostril, 4 times daily PRN    traZODone (DESYREL) 50 mg, oral, Nightly PRN       Physical Exam:  The patient is a thin upper elderly white female sitting upright in bed eating breakfast.  No respiratory distress on standard low-flow O2 nasal cannula 3 L/min  JVP not elevated carotid pulses 2+ thyroid is enlarged palpably  Moderate thoracic kyphosis shallow air movement diminished breath sounds  Cardiac rhythm regular no premature beats S1-S2 obscured grade 3/6 systolic ejection murmur  No peripheral edema.        Assessment/Plan   12/15:This patient is a 89-year-old white female with extensive issues that include coronary artery disease, severe aortic valvular stenosis, moderate tricuspid and mitral valve regurgitation, severe pulmonary hypertension, COPD with oxygen dependence, hypertension, left bundle branch block conduction delay, lower extremity edema due to venous insufficiency, chronic DVT left lower extremity femoral vein, laparoscopic paraesophageal hiatal hernia repair, ongoing aspiration pneumonias, nontoxic multinodular goiter.  The patient's last echocardiogram on 7/25/2023 again demonstrated a LV ejection fraction 35-40% severe aortic valve stenosis peak systolic gradient 56 mmHg, mild mitral moderate tricuspid valve regurgitation and severe pulmonary hypertension estimated PA systolic pressure 67 mmHg.  The patient was thought not to be a candidate for transcatheter aortic valve replacement.  She does wear continuous oxygen at 3 L/min at home.  [facility]  She has been having increased shortness of breath.  Multiple potential etiologies including her chronic lung disease aspiration pneumonias interstitial fibrosis possible CHF.   The patient was recently admitted for several days on 10/23/2023 with increased shortness of breath and ultimately was released on Lasix 40 mg twice daily which she has reduced to daily.  Patient admitted again on 11/12/2023 after having fallen in the bathroom and not being able to get up due to generalized weakness.  She lives independently in her own condominium and has been very resistant to review our recommendation for assisted living.  At that time it was decided to continue the patient on her usual therapy which does at this point include Lasix 40 mg once daily along with her low-dose aspirin plus atorvastatin 40 mg daily and metoprolol succinate 25 mg twice daily.   highly advised the patient to be placed into an assisted living facility given her increasing generalized weakness and her need for continuous nasal oxygen therapy.  Patient is currently admitted after a mechanical fall with a right femoral neck fracture.  Patient is currently scheduled for right total hip replacement on 12/16/2023.  Given her cardiac history she is at increased but not prohibitive cardiac risk and will require close observation postoperatively.       Code Status:  Full Code    I spent 30 minutes in the professional and overall care of this patient.        Jim Mckinney MD

## 2023-12-15 NOTE — H&P
History Of Present Illness  Kalie Raoms is a 89 y.o. female presenting with fall and hip pain.  Patient was in the bathroom and not sure how she ended up on the floor.  She lost balance.  She crawled to the bathroom when her daughter saw her on the camera.  Patient lives alone.  Denied any dizziness.  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    Body mass index (BMI) 22.0-22.9, adult     BMI 22.0-22.9, adult    Cervicalgia 05/08/2015    Neck pain    CHF (congestive heart failure) (CMS/[facility])     High cholesterol     Hypertension     Leg swelling     Malignant neoplasm of unspecified site of unspecified female breast (CMS/[facility]) 07/19/2013    Adenocarcinoma of breast    Other conditions influencing health status 10/21/2013    Urinary Tract Infection    Personal history of other infectious and parasitic diseases 03/17/2015    History of candidiasis of mouth    Personal history of other specified conditions 03/17/2015    History of abdominal pain    Personal history of pneumonia (recurrent) 09/10/2015    History of pneumonia    Personal history of transient ischemic attack (TIA), and cerebral infarction without residual deficits 09/04/2014    History of stroke       Surgical History  Past Surgical History:   Procedure Laterality Date    BREAST BIOPSY  09/10/2015    Biopsy Breast Open    BREAST LUMPECTOMY  07/19/2013    Breast Surgery Lumpectomy    CARPAL TUNNEL RELEASE  09/04/2014    Neuroplasty Decompression Median Nerve At Carpal Tunnel    CATARACT EXTRACTION  11/02/2015    Cataract Surgery    HAND TENDON SURGERY  11/02/2015    Hand Incision Tendon Sheath Of A Finger    HYSTERECTOMY  07/19/2013    Hysterectomy    MR HEAD ANGIO WO IV CONTRAST  12/17/2020    MR HEAD ANGIO WO IV CONTRAST [facility]    MR HEAD ANGIO WO IV CONTRAST  5/13/2021    MR HEAD ANGIO WO IV CONTRAST [facility]    MR HEAD ANGIO WO IV CONTRAST  5/24/2022    MR HEAD ANGIO WO IV CONTRAST [facility]    MR NECK ANGIO WO IV CONTRAST  5/24/2022    MR NECK ANGIO WO IV CONTRAST [facility]    OTHER SURGICAL HISTORY  11/02/2015    Wrist Carpectomy    ROTATOR CUFF REPAIR  09/10/2015    Rotator Cuff Repair    SENTINEL LYMPH NODE BIOPSY  09/10/2015    [place] Lymph Node Biopsy    SHOULDER SURGERY  07/19/2013    Shoulder Surgery        Social History  She reports that she has never smoked. She has never used smokeless tobacco. She reports that she does not drink alcohol and does not use drugs.    Family History  Family History   Problem Relation Name Age of Onset    Alzheimer's disease Father      Breast cancer Sister      Pancreatic cancer Sister      Arthritis Other          Allergies  Niaspan extended-release [niacin] and Naproxen    Review of Systems   Constitutional:  Negative for activity change, appetite change, chills, diaphoresis, fatigue, fever and unexpected weight change.   HENT:  Negative for congestion, dental problem, drooling, ear discharge, ear pain, facial swelling, hearing loss, mouth sores, nosebleeds, postnasal drip, rhinorrhea, sinus pressure, sinus pain, sneezing, sore throat, tinnitus, trouble swallowing and voice change.    Eyes:  Negative for photophobia, pain, discharge, redness, itching and visual disturbance.   Respiratory:  Negative for apnea, cough, choking, chest tightness, shortness of breath, wheezing and stridor.    Cardiovascular:  Negative for chest pain, palpitations and leg swelling.   Gastrointestinal:  Negative for abdominal distention, abdominal pain, anal bleeding, blood in stool, constipation, diarrhea, nausea, rectal pain and vomiting.   Endocrine: Negative for cold intolerance, heat intolerance, polydipsia, polyphagia and polyuria.   Genitourinary:  Negative for decreased urine volume, difficulty urinating, dysuria, enuresis, flank pain, frequency,  genital sores, hematuria and urgency.   Musculoskeletal:  Positive for gait problem and joint swelling. Negative for arthralgias, back pain, myalgias, neck pain and neck stiffness.   Skin:  Negative for color change, pallor, rash and wound.   Allergic/Immunologic: Negative for environmental allergies, food allergies and immunocompromised state.   Neurological:  Negative for dizziness, tremors, seizures, syncope, facial asymmetry, speech difficulty, weakness, light-headedness, numbness and headaches.   Hematological:  Negative for adenopathy. Does not bruise/bleed easily.   Psychiatric/Behavioral:  Negative for agitation, behavioral problems, confusion, decreased concentration, dysphoric mood, hallucinations, self-injury, sleep disturbance and suicidal ideas. The patient is not nervous/anxious and is not hyperactive.         Physical Exam  Vitals reviewed.   Constitutional:       Appearance: Normal appearance.   HENT:      Head: Normocephalic and atraumatic.      Right Ear: Tympanic membrane, ear canal and external ear normal.      Left Ear: Tympanic membrane, ear canal and external ear normal.      Nose: Nose normal.      Mouth/Throat:      Pharynx: Oropharynx is clear.   Eyes:      Extraocular Movements: Extraocular movements intact.      Conjunctiva/sclera: Conjunctivae normal.      Pupils: Pupils are equal, round, and reactive to light.   Cardiovascular:      Rate and Rhythm: Normal rate and regular rhythm.      Pulses: Normal pulses.      Heart sounds: Normal heart sounds.   Pulmonary:      Effort: Pulmonary effort is normal.      Breath sounds: Normal breath sounds.   Abdominal:      General: Abdomen is flat. Bowel sounds are normal.      Palpations: Abdomen is soft.   Musculoskeletal:         General: Tenderness present.      Cervical back: Normal range of motion and neck supple.      Comments: Left hip tenderness     Skin:     General: Skin is warm and dry.   Neurological:      General: No focal deficit  "present.      Mental Status: She is alert and oriented to person, place, and time.   Psychiatric:         Mood and Affect: Mood normal.          Last Recorded Vitals  Blood pressure 119/62, pulse 66, temperature 36.7 °C (98.1 °F), temperature source Oral, resp. rate 18, height 1.575 m (5' 2\"), weight 53.3 kg (117 lb 8.1 oz), SpO2 98 %.    Relevant Results        Scheduled medications  atorvastatin, 40 mg, oral, Nightly  calcitonin (salmon), 1 spray, One Nostril, Daily  calcitonin (salmon), 1 spray, One Nostril, Daily  docusate sodium, 100 mg, oral, BID  enoxaparin, 40 mg, subcutaneous, Nightly  losartan, 25 mg, oral, Daily  magnesium oxide, 400 mg, oral, Once per day on Mon Wed Fri  magnesium oxide, 400 mg, oral, Once per day on Mon Wed Fri  melatonin, 3 mg, oral, Nightly  metoprolol succinate XL, 50 mg, oral, Daily  oxygen, 2 L/min, inhalation, q24h  pantoprazole, 40 mg, oral, Daily  polyethylene glycol, 17 g, oral, Daily  rOPINIRole, 5 mg, oral, Nightly  sertraline, 50 mg, oral, Daily      Continuous medications  potassium enuuqrh-U4-0.45%NaCl, 100 mL/hr, Last Rate: 100 mL/hr (12/15/23 0625)      PRN medications  PRN medications: acetaminophen **OR** acetaminophen **OR** acetaminophen, acetaminophen, albuterol, benzocaine-menthol, dextromethorphan-guaifenesin, guaiFENesin, morphine, nitroglycerin, ondansetron **OR** ondansetron, oxygen, sodium chloride, traZODone  Electrocardiogram, 12-lead PRN ACS symptoms    Result Date: 12/15/2023  Normal sinus rhythm Left ventricular hypertrophy with QRS widening and repolarization abnormality Abnormal ECG When compared with ECG of 14-DEC-2023 22:22, (unconfirmed) Premature ventricular complexes are no longer Present DE interval has decreased    ECG 12 lead    Result Date: 12/15/2023  Sinus rhythm with Premature atrial complexes Left ventricular hypertrophy with QRS widening and repolarization abnormality Abnormal ECG When compared with ECG of 15-DEC-2023 14:34, (unconfirmed) " Premature atrial complexes are now Present    ECG 12 lead    Result Date: 12/15/2023   Poor data quality, interpretation may be adversely affected Sinus rhythm with marked sinus arrhythmia with 1st degree AV block with occasional Premature ventricular complexes Left ventricular hypertrophy with QRS widening and repolarization abnormality Abnormal ECG When compared with ECG of 12-NOV-2023 18:46, Left bundle branch block is no longer Present    CT lumbar spine wo IV contrast    Result Date: 12/14/2023  Interpreted By:  Jordon Casas, STUDY: CT LUMBAR SPINE WO IV CONTRAST; 12/14/2023 10:15 pm   INDICATION: Signs/Symptoms:fall, doesn't recall cause of fall, right hip and low back pain.   COMPARISON: 2 October 2018.   ACCESSION NUMBER(S): [id number]   ORDERING CLINICIAN: LINDA MCKEE CT was performed with one or more of the following dose reduction techniques: automated exposure control, adjustment of the mA and/or kV according to patient size, or use of iterative reconstruction technique.   PROCEDURE: 2.00 mm  axial images were obtained in bone algorithm without contrast through the lumbar spine. Two dimensional coronal and sagittal images were reformatted for review. Images were also reformatted through the disc spaces.   FINDINGS: Small left-greater-than-right pleural effusions seen with bibasilar compressive atelectasis at each lung base. Moderate colonic stool burden is demonstrated.   There is dextroscoliosis centered at L3. Vacuum phenomenon with severe disc height loss and moderate to severe endplate degenerative change with broadening is seen at L2-L3.   There is retrolisthesis L1 on L2, L2 on L3 and L3 on L4. No acute wedge compression or compression injury. Moderate atherosclerotic disease of the abdominal aorta is demonstrated. Vertebral body heights are preserved.   Axial images demonstrate the following:   T12-L1: Endplate degenerative change without foraminal narrowing or canal stenosis.    L1/2: No canal stenosis with mild left greater than right foraminal narrowing.   L2/3: Severe disc height loss and moderate endplate degenerative change with retrolisthesis and a broad-based disc bulge as well as mild bilateral facet hypertrophy without canal stenosis with mild bilateral foraminal narrowing.   L3/4: Moderate right foraminal narrowing contributed by the scoliosis with moderate endplate degenerative change without left foraminal narrowing with very mild canal narrowed.   L4/5: Mild to moderate right minimal left foraminal narrowing contributed by the scoliosis with a broad-based disc bulge and endplate degenerative change without canal stenosis. There is moderate right and mild left facet hypertrophy.   L5/S1: Moderate right-greater-than-left facet hypertrophy and a mild broad-based disc bulge with mild left without right foraminal narrowing and no canal stenosis.       Multilevel spondylosis of the lumbar spine as described. No posttraumatic abnormality demonstrated. Dextroscoliosis. Findings have progressed compared to the prior examination.       This report has been produced using speech recognition. This exam is available in DICOM format to non-affiliated healthcare facilities on a secure media free searchable basis with prior patient authorization. The patient exposure is reported to a radiation dose index registry. All CT examinations are performed with one or more of the following dose reduction techniques: Automated Exposure Control, Adjustment of mA and/or KV according to patient size, or use of iterative reconstruction techniques.     Signed by: Jordon Casas 12/14/2023 10:39 PM Dictation workstation:   [id number]    CT cervical spine wo IV contrast    Result Date: 12/14/2023  Interpreted By:  Jordon Casas, STUDY: CT CERVICAL SPINE WO IV CONTRAST; 12/14/2023 10:15 pm   INDICATION: Signs/Symptoms:fall.   COMPARISON: None   ACCESSION NUMBER(S): [id number]   ORDERING CLINICIAN: LINDA LUA    TECHNIQUE: CT was performed with one or more of the following dose reduction techniques: automated exposure control, adjustment of the mA and/or kV according to patient size, or use of iterative reconstruction technique.   2.0 mm axial images were obtained from the level of the mastoids, through the cervical spine to the apex of the lungs. Two dimensional coronal and sagittal images were reformatted for review. Images were also reformatted through the disc spaces.   FINDINGS: There is hyper lordosis without malalignment. The prevertebral soft tissues  and airway are unremarkable. Severe endplate degenerative change with broadening and severe disc height loss is greatest at C4-C5 greater than C5-C6 and more mild-to-moderate at the other levels of the cervical spine. Pannus with moderate degenerative change surrounds the dens and anterior arch of C1. Clivus is unremarkable. Visualized skull base is unremarkable. 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 Occipital condyles are intact. Atlantoaxial association is preserved. Ground-glass appearance is seen in the right lung apex that is nonspecific and may be due to atelectasis, edema or inflammation that may be infectious or inflammatory. Thickening with calcification left anterior pleura seen. Soft tissues are otherwise unremarkable.   No post traumatic abnormality of the cervical spine is seen.   Axial images demonstrate:   C2/3: Left greater than right mild facet hypertrophy and minimal uncovertebral hypertrophy without canal stenosis or foraminal narrowing.   C3/4: Moderate left and mild right facet uncovertebral hypertrophy results in moderate to severe bilateral foraminal narrowing without canal stenosis.   C4/5: Endplate degenerative change and right-greater-than-left uncovertebral facet hypertrophy results in severe right-greater-than-left foraminal narrowing and mild canal stenosis from the endplate degenerative change.    C5/6: Endplate degenerative change and broadening with severe right greater left facet and left greater than right uncovertebral hypertrophy results in severe left and moderate right foraminal narrowing with mild canal stenosis.   C6/7: Moderate uncovertebral and left greater than right facet hypertrophy results in moderate left and mild-to-moderate right foraminal narrowing without canal stenosis given the hyperlordosis.   C7/T1: No canal stenosis or foraminal narrowing given the hyperlordosis.       Degenerative changes without post traumatic abnormality of the cervical spine.   This report has been produced using speech recognition. This exam is available in DICOM format to non-affiliated healthcare facilities on a secure media free searchable basis with prior patient authorization. The patient exposure is reported to a radiation dose index registry. All CT examinations are performed with one or more of the following dose reduction techniques: Automated Exposure Control, Adjustment of mA and/or KV according to patient size, or use of iterative reconstruction techniques.   Signed by: Jordon Casas 12/14/2023 10:29 PM Dictation workstation:   [id number]    CT head wo IV contrast    Result Date: 12/14/2023  Interpreted By:  Jordon Casas, STUDY: CT HEAD WO IV CONTRAST; 12/14/2023 10:15 pm   INDICATION: Signs/Symptoms:fall, doesn't recall cause of fall.   COMPARISON: 12 November 2023.   ACCESSION NUMBER(S): [id number]   ORDERING CLINICIAN: LINDA LUA   TECHNIQUE: CT was performed with one or more of the following dose reduction techniques: automated exposure control, adjustment of the mA and/or kV according to patient size, or use of iterative reconstruction technique.       PROCEDURE: 3.0 mm axial images were obtained through the brain, to include the posterior fossa without intravenous contrast enhancement.   FINDINGS: There is symmetric volume loss of the cerebral hemispheres. Moderate decreased attenuation  in the bilateral periventricular white matter, consistent with microangiopathy is noted.  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 This is unchanged compared to previous.. Brainstem is unremarkable. Cerebellar hemispheres are symmetric. No intra- or extra-axial mass or abnormal blood products are demonstrated.   The paranasal sinuses and mastoids as well as calvarium are unremarkable. Small scalp contusion anterior to the right frontal bone without subjacent calvarial fracture demonstrated.       1. No acute intracranial findings. 2. Symmetric volume loss of the cerebral hemispheres. 3. Periventricular Microangiopathy. 4. No posttraumatic abnormality. Scalp contusion anterior to the right frontal bone without subjacent calvarial fracture.   This report has been produced using speech recognition. This exam is available in DICOM format to non-affiliated healthcare facilities on a secure media free searchable basis with prior patient authorization. The patient exposure is reported to a radiation dose index registry. All CT examinations are performed with one or more of the following dose reduction techniques: Automated Exposure Control, Adjustment of mA and/or KV according to patient size, or use of iterative reconstruction techniques.   MACRO: None   Signed by: Jordon Casas 12/14/2023 10:25 PM Dictation workstation:   [id number]    XR pelvis 1-2 views    Result Date: 12/14/2023  Interpreted By:  Jordon Casas, STUDY: XR FEMUR RIGHT 2+ VIEWS; XR PELVIS 1-2 VIEWS; 12/14/2023 10:06 pm; 12/14/2023 10:04 pm   INDICATION: Signs/Symptoms:fall, external rotation and right hip pain; Signs/Symptoms:fall, right hip pain.   COMPARISON: 12 November 2023..   ACCESSION NUMBER(S): [id number]; [id number]   ORDERING CLINICIAN: LINDA LUA   TECHNIQUE: High and low AP and lateral views of the right femur, 4 total views performed. Single AP view of the pelvis.   FINDINGS: Impacted fracture right femoral neck is seen  with foreshortening at the fracture site. Right femoral head is without dislocation of the right acetabulum. Advanced atherosclerotic disease in the thigh is seen. Severe osteoarthritis of the mediolateral compartments of the knee and more moderate in the patellofemoral compartment seen with trace suprapatellar effusion and fabella noted. Moderate left hip osteoarthritis demonstrated. Severe right hip osteoarthritis demonstrated. Mild left greater than right SI joint degenerative change seen.       Fracture of the right femoral neck. Degenerative change of the right knee.   Signed by: Jordon Casas 12/14/2023 10:15 PM Dictation workstation:   [id number]    XR femur right 2+ views    Result Date: 12/14/2023  Interpreted By:  Jordon Casas, STUDY: XR FEMUR RIGHT 2+ VIEWS; XR PELVIS 1-2 VIEWS; 12/14/2023 10:06 pm; 12/14/2023 10:04 pm   INDICATION: Signs/Symptoms:fall, external rotation and right hip pain; Signs/Symptoms:fall, right hip pain.   COMPARISON: 12 November 2023..   ACCESSION NUMBER(S): [id number]; [id number]   ORDERING CLINICIAN: LINDA LUA   TECHNIQUE: High and low AP and lateral views of the right femur, 4 total views performed. Single AP view of the pelvis.   FINDINGS: Impacted fracture right femoral neck is seen with foreshortening at the fracture site. Right femoral head is without dislocation of the right acetabulum. Advanced atherosclerotic disease in the thigh is seen. Severe osteoarthritis of the mediolateral compartments of the knee and more moderate in the patellofemoral compartment seen with trace suprapatellar effusion and fabella noted. Moderate left hip osteoarthritis demonstrated. Severe right hip osteoarthritis demonstrated. Mild left greater than right SI joint degenerative change seen.       Fracture of the right femoral neck. Degenerative change of the right knee.   Signed by: Jordon Casas 12/14/2023 10:15 PM Dictation workstation:   [id number]    XR chest 1 view    Result Date:  12/14/2023  Interpreted By:  Jordon Casas, STUDY: XR CHEST 1 VIEW; 12/14/2023 10:06 pm   INDICATION: Signs/Symptoms:fall.   COMPARISON: 12 November 2023.   ACCESSION NUMBER(S): [id number]   ORDERING CLINICIAN: LINDA LUA   TECHNIQUE: 2 frontal views to include a significantly rightward rotated view of the chest performed.   FINDINGS: Small bilateral pleural effusions with bibasilar compressive atelectasis is seen with increased interstitial markings more on the right than left lung compatible with mild CHF. Cardiomegaly is unchanged compared to previous. Enlarged main pulmonary arteries may be due to secondary pulmonary hypertension from underlying emphysematous change. Adenopathy not excluded.       Mild CHF with small bilateral pleural effusions with bibasilar compressive atelectasis. Unchanged cardiomegaly. No posttraumatic abnormality demonstrated.   Signed by: Jordon Casas 12/14/2023 10:12 PM Dictation workstation:   [id number]    Results for orders placed or performed during the hospital encounter of 12/14/23 (from the past 24 hour(s))   ECG 12 lead   Result Value Ref Range    Ventricular Rate 72 BPM    Atrial Rate 72 BPM    MT Interval 242 ms    QRS Duration 128 ms    QT Interval 476 ms    QTC Calculation(Bazett) 521 ms    P Axis 84 degrees    R Axis 50 degrees    T Axis 182 degrees    QRS Count 12 beats    Q Onset 214 ms    P Onset 93 ms    P Offset 127 ms    T Offset 452 ms    QTC Fredericia 506 ms   CBC and Auto Differential   Result Value Ref Range    WBC 16.2 (H) 4.4 - 11.3 x10*3/uL    nRBC 0.0 0.0 - 0.0 /100 WBCs    RBC 4.31 4.00 - 5.20 x10*6/uL    Hemoglobin 12.2 12.0 - 16.0 g/dL    Hematocrit 39.4 36.0 - 46.0 %    MCV 91 80 - 100 fL    MCH 28.3 26.0 - 34.0 pg    MCHC 31.0 (L) 32.0 - 36.0 g/dL    RDW 14.1 11.5 - 14.5 %    Platelets 256 150 - 450 x10*3/uL    Neutrophils % 89.8 40.0 - 80.0 %    Immature Granulocytes %, Automated 0.6 0.0 - 0.9 %    Lymphocytes % 3.5 13.0 - 44.0 %    Monocytes % 5.8  2.0 - 10.0 %    Eosinophils % 0.1 0.0 - 6.0 %    Basophils % 0.2 0.0 - 2.0 %    Neutrophils Absolute 14.50 (H) 1.60 - 5.50 x10*3/uL    Immature Granulocytes Absolute, Automated 0.09 0.00 - 0.50 x10*3/uL    Lymphocytes Absolute 0.56 (L) 0.80 - 3.00 x10*3/uL    Monocytes Absolute 0.94 (H) 0.05 - 0.80 x10*3/uL    Eosinophils Absolute 0.02 0.00 - 0.40 x10*3/uL    Basophils Absolute 0.04 0.00 - 0.10 x10*3/uL   Comprehensive Metabolic Panel   Result Value Ref Range    Glucose 115 (H) 65 - 99 mg/dL    Sodium 140 133 - 145 mmol/L    Potassium 4.0 3.4 - 5.1 mmol/L    Chloride 101 97 - 107 mmol/L    Bicarbonate 26 24 - 31 mmol/L    Urea Nitrogen 17 8 - 25 mg/dL    Creatinine 0.80 0.40 - 1.60 mg/dL    eGFR 69 >60 mL/min/1.73m*2    Calcium 9.0 8.5 - 10.4 mg/dL    Albumin 3.6 3.5 - 5.0 g/dL    Alkaline Phosphatase 130 (H) 35 - 125 U/L    Total Protein 7.0 5.9 - 7.9 g/dL    AST 20 5 - 40 U/L    Bilirubin, Total 0.9 0.1 - 1.2 mg/dL    ALT 16 5 - 40 U/L    Anion Gap 13 <=19 mmol/L   Magnesium   Result Value Ref Range    Magnesium 2.00 1.60 - 3.10 mg/dL   Serial Troponin, Initial ([facility])   Result Value Ref Range    Troponin T, High Sensitivity 40 (H) <=15 ng/L   Serial Troponin, 2 Hour ([facility])   Result Value Ref Range    Troponin T, High Sensitivity 51 (H) <=15 ng/L   CBC   Result Value Ref Range    WBC 11.4 (H) 4.4 - 11.3 x10*3/uL    nRBC 0.0 0.0 - 0.0 /100 WBCs    RBC 4.56 4.00 - 5.20 x10*6/uL    Hemoglobin 12.9 12.0 - 16.0 g/dL    Hematocrit 42.0 36.0 - 46.0 %    MCV 92 80 - 100 fL    MCH 28.3 26.0 - 34.0 pg    MCHC 30.7 (L) 32.0 - 36.0 g/dL    RDW 14.2 11.5 - 14.5 %    Platelets 258 150 - 450 x10*3/uL   Comprehensive metabolic panel   Result Value Ref Range    Glucose 112 (H) 65 - 99 mg/dL    Sodium 140 133 - 145 mmol/L    Potassium 4.6 3.4 - 5.1 mmol/L    Chloride 101 97 - 107 mmol/L    Bicarbonate 29 24 - 31 mmol/L    Urea Nitrogen 16 8 - 25 mg/dL    Creatinine 0.90 0.40 - 1.60 mg/dL    eGFR 60 (L) >60 mL/min/1.73m*2    Calcium  9.0 8.5 - 10.4 mg/dL    Albumin 3.6 3.5 - 5.0 g/dL    Alkaline Phosphatase 132 (H) 35 - 125 U/L    Total Protein 7.2 5.9 - 7.9 g/dL    AST 22 5 - 40 U/L    Bilirubin, Total 0.8 0.1 - 1.2 mg/dL    ALT 16 5 - 40 U/L    Anion Gap 10 <=19 mmol/L   Serial Troponin, 6 Hour ([facility])   Result Value Ref Range    Troponin T, High Sensitivity 44 (H) <=15 ng/L   Electrocardiogram, 12-lead PRN ACS symptoms   Result Value Ref Range    Ventricular Rate 67 BPM    Atrial Rate 67 BPM    UT Interval 184 ms    QRS Duration 120 ms    QT Interval 504 ms    QTC Calculation(Bazett) 532 ms    P Axis 37 degrees    R Axis -2 degrees    T Axis 172 degrees    QRS Count 11 beats    Q Onset 215 ms    P Onset 123 ms    P Offset 147 ms    T Offset 467 ms    QTC Fredericia 523 ms   ECG 12 lead   Result Value Ref Range    Ventricular Rate 69 BPM    Atrial Rate 69 BPM    UT Interval 188 ms    QRS Duration 120 ms    QT Interval 502 ms    QTC Calculation(Bazett) 537 ms    P Axis 48 degrees    R Axis -6 degrees    T Axis 174 degrees    QRS Count 11 beats    Q Onset 215 ms    P Onset 121 ms    P Offset 152 ms    T Offset 466 ms    QTC Fredericia 526 ms          Assessment/Plan   Principal Problem:    Fall, initial encounter  Active Problems:    COPD (chronic obstructive pulmonary disease) (CMS/[facility])    Hyperlipidemia    Hypertension    Age-related osteoporosis without current pathological fracture    Chronic diastolic (congestive) heart failure (CMS/[facility])    Hip fracture, right, closed, initial encounter (CMS/[facility])      Consult Ortho  Consult cardiology  Clinically patient is stable on her current medication  DVT prophylaxis  Pain medication  Continue home medications  See orders for details     I spent  minutes in the professional and overall care of this patient.      Nakia Bailey MD

## 2023-12-16 ENCOUNTER — APPOINTMENT (OUTPATIENT)
Dept: RADIOLOGY | Facility: HOSPITAL | Age: 88
DRG: 522 | End: 2023-12-16
Payer: MEDICARE

## 2023-12-16 ENCOUNTER — ANESTHESIA EVENT (OUTPATIENT)
Dept: OPERATING ROOM | Facility: HOSPITAL | Age: 88
DRG: 522 | End: 2023-12-16
Payer: MEDICARE

## 2023-12-16 ENCOUNTER — ANESTHESIA (OUTPATIENT)
Dept: OPERATING ROOM | Facility: HOSPITAL | Age: 88
DRG: 522 | End: 2023-12-16
Payer: MEDICARE

## 2023-12-16 PROCEDURE — 7100000001 HC RECOVERY ROOM TIME - INITIAL BASE CHARGE: Performed by: ORTHOPAEDIC SURGERY

## 2023-12-16 PROCEDURE — 2720000007 HC OR 272 NO HCPCS: Performed by: ORTHOPAEDIC SURGERY

## 2023-12-16 PROCEDURE — 0SRR0JZ REPLACEMENT OF RIGHT HIP JOINT, FEMORAL SURFACE WITH SYNTHETIC SUBSTITUTE, OPEN APPROACH: ICD-10-PCS | Performed by: ORTHOPAEDIC SURGERY

## 2023-12-16 PROCEDURE — A27236 PR FEMORAL FX, OPEN TX: Performed by: ANESTHESIOLOGY

## 2023-12-16 PROCEDURE — C1776 JOINT DEVICE (IMPLANTABLE): HCPCS | Performed by: ORTHOPAEDIC SURGERY

## 2023-12-16 PROCEDURE — 3700000001 HC GENERAL ANESTHESIA TIME - INITIAL BASE CHARGE: Performed by: ORTHOPAEDIC SURGERY

## 2023-12-16 PROCEDURE — 7100000002 HC RECOVERY ROOM TIME - EACH INCREMENTAL 1 MINUTE: Performed by: ORTHOPAEDIC SURGERY

## 2023-12-16 PROCEDURE — 2780000003 HC OR 278 NO HCPCS: Performed by: ORTHOPAEDIC SURGERY

## 2023-12-16 PROCEDURE — 99100 ANES PT EXTEME AGE<1 YR&>70: CPT | Performed by: ANESTHESIOLOGY

## 2023-12-16 PROCEDURE — 2500000004 HC RX 250 GENERAL PHARMACY W/ HCPCS (ALT 636 FOR OP/ED): Performed by: ORTHOPAEDIC SURGERY

## 2023-12-16 PROCEDURE — 3600000005 HC OR TIME - INITIAL BASE CHARGE - PROCEDURE LEVEL FIVE: Performed by: ORTHOPAEDIC SURGERY

## 2023-12-16 PROCEDURE — 3700000002 HC GENERAL ANESTHESIA TIME - EACH INCREMENTAL 1 MINUTE: Performed by: ORTHOPAEDIC SURGERY

## 2023-12-16 PROCEDURE — 2500000001 HC RX 250 WO HCPCS SELF ADMINISTERED DRUGS (ALT 637 FOR MEDICARE OP): Performed by: ORTHOPAEDIC SURGERY

## 2023-12-16 PROCEDURE — 99232 SBSQ HOSP IP/OBS MODERATE 35: CPT | Performed by: INTERNAL MEDICINE

## 2023-12-16 PROCEDURE — 2500000005 HC RX 250 GENERAL PHARMACY W/O HCPCS: Performed by: ANESTHESIOLOGIST ASSISTANT

## 2023-12-16 PROCEDURE — A27236 PR FEMORAL FX, OPEN TX: Performed by: ANESTHESIOLOGIST ASSISTANT

## 2023-12-16 PROCEDURE — 99232 SBSQ HOSP IP/OBS MODERATE 35: CPT

## 2023-12-16 PROCEDURE — 1200000002 HC GENERAL ROOM WITH TELEMETRY DAILY

## 2023-12-16 PROCEDURE — 3600000010 HC OR TIME - EACH INCREMENTAL 1 MINUTE - PROCEDURE LEVEL FIVE: Performed by: ORTHOPAEDIC SURGERY

## 2023-12-16 PROCEDURE — 2500000004 HC RX 250 GENERAL PHARMACY W/ HCPCS (ALT 636 FOR OP/ED): Performed by: ANESTHESIOLOGIST ASSISTANT

## 2023-12-16 PROCEDURE — 2500000004 HC RX 250 GENERAL PHARMACY W/ HCPCS (ALT 636 FOR OP/ED): Performed by: INTERNAL MEDICINE

## 2023-12-16 PROCEDURE — 2500000001 HC RX 250 WO HCPCS SELF ADMINISTERED DRUGS (ALT 637 FOR MEDICARE OP): Performed by: INTERNAL MEDICINE

## 2023-12-16 PROCEDURE — A4649 SURGICAL SUPPLIES: HCPCS | Performed by: ORTHOPAEDIC SURGERY

## 2023-12-16 PROCEDURE — 72170 X-RAY EXAM OF PELVIS: CPT

## 2023-12-16 DEVICE — ARTICUL/EZE FEMORAL HEAD DIAMETER 28MM +1.5 12/14 TAPER
Type: IMPLANTABLE DEVICE | Site: HIP | Status: FUNCTIONAL
Brand: ARTICUL/EZE

## 2023-12-16 DEVICE — SELF CENTERING BI-POLAR HEAD 28MM ID 46MM OD
Type: IMPLANTABLE DEVICE | Site: HIP | Status: FUNCTIONAL
Brand: SELF CENTERING

## 2023-12-16 DEVICE — CORAIL HIP SYSTEM CEMENTLESS FEMORAL STEM 12/14 AMT 135 DEGREES KA SIZE 11 HA COATED STANDARD COLLAR
Type: IMPLANTABLE DEVICE | Site: HIP | Status: FUNCTIONAL
Brand: CORAIL

## 2023-12-16 RX ORDER — HYDRALAZINE HYDROCHLORIDE 20 MG/ML
5 INJECTION INTRAMUSCULAR; INTRAVENOUS EVERY 30 MIN PRN
Status: DISCONTINUED | OUTPATIENT
Start: 2023-12-16 | End: 2023-12-16 | Stop reason: HOSPADM

## 2023-12-16 RX ORDER — FENTANYL CITRATE 50 UG/ML
25 INJECTION, SOLUTION INTRAMUSCULAR; INTRAVENOUS EVERY 5 MIN PRN
Status: DISCONTINUED | OUTPATIENT
Start: 2023-12-16 | End: 2023-12-16 | Stop reason: HOSPADM

## 2023-12-16 RX ORDER — ETOMIDATE 2 MG/ML
INJECTION INTRAVENOUS AS NEEDED
Status: DISCONTINUED | OUTPATIENT
Start: 2023-12-16 | End: 2023-12-16

## 2023-12-16 RX ORDER — CEFAZOLIN SODIUM 2 G/100ML
2 INJECTION, SOLUTION INTRAVENOUS ONCE
Status: COMPLETED | OUTPATIENT
Start: 2023-12-16 | End: 2023-12-16

## 2023-12-16 RX ORDER — ALBUTEROL SULFATE 0.83 MG/ML
2.5 SOLUTION RESPIRATORY (INHALATION) ONCE AS NEEDED
Status: DISCONTINUED | OUTPATIENT
Start: 2023-12-16 | End: 2023-12-16 | Stop reason: HOSPADM

## 2023-12-16 RX ORDER — VANCOMYCIN HYDROCHLORIDE 1 G/20ML
INJECTION, POWDER, LYOPHILIZED, FOR SOLUTION INTRAVENOUS AS NEEDED
Status: DISCONTINUED | OUTPATIENT
Start: 2023-12-16 | End: 2023-12-16 | Stop reason: HOSPADM

## 2023-12-16 RX ORDER — IPRATROPIUM BROMIDE 0.5 MG/2.5ML
500 SOLUTION RESPIRATORY (INHALATION) ONCE
Status: DISCONTINUED | OUTPATIENT
Start: 2023-12-16 | End: 2023-12-16 | Stop reason: HOSPADM

## 2023-12-16 RX ORDER — DIPHENHYDRAMINE HYDROCHLORIDE 50 MG/ML
12.5 INJECTION INTRAMUSCULAR; INTRAVENOUS ONCE AS NEEDED
Status: DISCONTINUED | OUTPATIENT
Start: 2023-12-16 | End: 2023-12-16 | Stop reason: HOSPADM

## 2023-12-16 RX ORDER — OXYCODONE HYDROCHLORIDE 5 MG/1
5 TABLET ORAL EVERY 4 HOURS PRN
Status: DISCONTINUED | OUTPATIENT
Start: 2023-12-16 | End: 2023-12-16 | Stop reason: HOSPADM

## 2023-12-16 RX ORDER — SENNOSIDES 8.6 MG/1
2 TABLET ORAL 2 TIMES DAILY
Status: DISCONTINUED | OUTPATIENT
Start: 2023-12-16 | End: 2023-12-19 | Stop reason: HOSPADM

## 2023-12-16 RX ORDER — HYDROCODONE BITARTRATE AND ACETAMINOPHEN 5; 325 MG/1; MG/1
1 TABLET ORAL EVERY 4 HOURS PRN
Status: DISCONTINUED | OUTPATIENT
Start: 2023-12-16 | End: 2023-12-19 | Stop reason: HOSPADM

## 2023-12-16 RX ORDER — PHENYLEPHRINE HCL IN 0.9% NACL 0.4MG/10ML
SYRINGE (ML) INTRAVENOUS AS NEEDED
Status: DISCONTINUED | OUTPATIENT
Start: 2023-12-16 | End: 2023-12-16

## 2023-12-16 RX ORDER — SODIUM CHLORIDE 9 MG/ML
80 INJECTION, SOLUTION INTRAVENOUS CONTINUOUS
Status: ACTIVE | OUTPATIENT
Start: 2023-12-16 | End: 2023-12-17

## 2023-12-16 RX ORDER — ONDANSETRON HYDROCHLORIDE 2 MG/ML
4 INJECTION, SOLUTION INTRAVENOUS ONCE AS NEEDED
Status: DISCONTINUED | OUTPATIENT
Start: 2023-12-16 | End: 2023-12-16 | Stop reason: HOSPADM

## 2023-12-16 RX ORDER — CEFAZOLIN SODIUM 2 G/100ML
2 INJECTION, SOLUTION INTRAVENOUS EVERY 8 HOURS
Status: DISCONTINUED | OUTPATIENT
Start: 2023-12-16 | End: 2023-12-17

## 2023-12-16 RX ORDER — NALOXONE HYDROCHLORIDE 0.4 MG/ML
0.2 INJECTION, SOLUTION INTRAMUSCULAR; INTRAVENOUS; SUBCUTANEOUS EVERY 5 MIN PRN
Status: DISCONTINUED | OUTPATIENT
Start: 2023-12-16 | End: 2023-12-19 | Stop reason: HOSPADM

## 2023-12-16 RX ORDER — ROCURONIUM BROMIDE 10 MG/ML
INJECTION, SOLUTION INTRAVENOUS AS NEEDED
Status: DISCONTINUED | OUTPATIENT
Start: 2023-12-16 | End: 2023-12-16

## 2023-12-16 RX ORDER — TRANEXAMIC ACID 100 MG/ML
INJECTION, SOLUTION INTRAVENOUS AS NEEDED
Status: DISCONTINUED | OUTPATIENT
Start: 2023-12-16 | End: 2023-12-16

## 2023-12-16 RX ORDER — CEFAZOLIN SODIUM 2 G/50ML
2 SOLUTION INTRAVENOUS EVERY 12 HOURS
Status: DISCONTINUED | OUTPATIENT
Start: 2023-12-16 | End: 2023-12-16

## 2023-12-16 RX ORDER — ASPIRIN 325 MG
325 TABLET, DELAYED RELEASE (ENTERIC COATED) ORAL 2 TIMES DAILY
Status: DISCONTINUED | OUTPATIENT
Start: 2023-12-16 | End: 2023-12-19 | Stop reason: HOSPADM

## 2023-12-16 RX ORDER — FENTANYL CITRATE 50 UG/ML
INJECTION, SOLUTION INTRAMUSCULAR; INTRAVENOUS AS NEEDED
Status: DISCONTINUED | OUTPATIENT
Start: 2023-12-16 | End: 2023-12-16

## 2023-12-16 RX ORDER — ACETAMINOPHEN 325 MG/1
650 TABLET ORAL EVERY 6 HOURS SCHEDULED
Status: DISCONTINUED | OUTPATIENT
Start: 2023-12-16 | End: 2023-12-19 | Stop reason: HOSPADM

## 2023-12-16 RX ADMIN — ETOMIDATE 5 MG: 2 INJECTION, SOLUTION INTRAVENOUS at 14:04

## 2023-12-16 RX ADMIN — ROPINIROLE HYDROCHLORIDE 5 MG: 1 TABLET, FILM COATED ORAL at 20:13

## 2023-12-16 RX ADMIN — FENTANYL CITRATE 25 MCG: 0.05 INJECTION, SOLUTION INTRAMUSCULAR; INTRAVENOUS at 14:25

## 2023-12-16 RX ADMIN — HYDROMORPHONE HYDROCHLORIDE 0.5 MG: 1 INJECTION, SOLUTION INTRAMUSCULAR; INTRAVENOUS; SUBCUTANEOUS at 17:28

## 2023-12-16 RX ADMIN — ROCURONIUM BROMIDE 10 MG: 10 INJECTION, SOLUTION INTRAVENOUS at 14:33

## 2023-12-16 RX ADMIN — Medication 100 MCG: at 15:14

## 2023-12-16 RX ADMIN — TRANEXAMIC ACID 2000 MG: 100 INJECTION, SOLUTION INTRAVENOUS at 14:11

## 2023-12-16 RX ADMIN — SUGAMMADEX 200 MG: 100 INJECTION, SOLUTION INTRAVENOUS at 15:37

## 2023-12-16 RX ADMIN — SODIUM CHLORIDE, SODIUM LACTATE, POTASSIUM CHLORIDE, AND CALCIUM CHLORIDE: 600; 310; 30; 20 INJECTION, SOLUTION INTRAVENOUS at 13:55

## 2023-12-16 RX ADMIN — CALCITONIN SALMON 1 SPRAY: 200 SPRAY, METERED NASAL at 09:56

## 2023-12-16 RX ADMIN — SODIUM CHLORIDE 80 ML/HR: 900 INJECTION, SOLUTION INTRAVENOUS at 17:24

## 2023-12-16 RX ADMIN — Medication 100 MCG: at 14:54

## 2023-12-16 RX ADMIN — MORPHINE SULFATE 2 MG: 2 INJECTION, SOLUTION INTRAMUSCULAR; INTRAVENOUS at 09:57

## 2023-12-16 RX ADMIN — ROCURONIUM BROMIDE 30 MG: 10 INJECTION, SOLUTION INTRAVENOUS at 14:05

## 2023-12-16 RX ADMIN — ONDANSETRON HYDROCHLORIDE 4 MG: 2 INJECTION INTRAMUSCULAR; INTRAVENOUS at 15:16

## 2023-12-16 RX ADMIN — ATORVASTATIN CALCIUM 40 MG: 40 TABLET, FILM COATED ORAL at 20:14

## 2023-12-16 RX ADMIN — SENNOSIDES 17.2 MG: 8.6 TABLET, FILM COATED ORAL at 20:14

## 2023-12-16 RX ADMIN — ASPIRIN 325 MG: 325 TABLET, COATED ORAL at 20:14

## 2023-12-16 RX ADMIN — FENTANYL CITRATE 25 MCG: 0.05 INJECTION, SOLUTION INTRAMUSCULAR; INTRAVENOUS at 14:46

## 2023-12-16 RX ADMIN — LOSARTAN POTASSIUM 25 MG: 50 TABLET, FILM COATED ORAL at 09:57

## 2023-12-16 RX ADMIN — CEFAZOLIN SODIUM 2 G: 2 INJECTION, SOLUTION INTRAVENOUS at 14:10

## 2023-12-16 RX ADMIN — HYDROCODONE BITARTRATE AND ACETAMINOPHEN 1 TABLET: 5; 325 TABLET ORAL at 20:13

## 2023-12-16 RX ADMIN — Medication 100 MCG: at 15:41

## 2023-12-16 RX ADMIN — ETOMIDATE 2 MG: 2 INJECTION, SOLUTION INTRAVENOUS at 14:05

## 2023-12-16 RX ADMIN — METOPROLOL SUCCINATE 50 MG: 50 TABLET, EXTENDED RELEASE ORAL at 09:57

## 2023-12-16 RX ADMIN — SERTRALINE 50 MG: 50 TABLET, FILM COATED ORAL at 09:56

## 2023-12-16 RX ADMIN — DOCUSATE SODIUM 100 MG: 100 CAPSULE, LIQUID FILLED ORAL at 20:14

## 2023-12-16 SDOH — HEALTH STABILITY: MENTAL HEALTH: CURRENT SMOKER: 0

## 2023-12-16 ASSESSMENT — COGNITIVE AND FUNCTIONAL STATUS - GENERAL
DRESSING REGULAR UPPER BODY CLOTHING: A LOT
HELP NEEDED FOR BATHING: A LOT
TOILETING: A LOT
CLIMB 3 TO 5 STEPS WITH RAILING: TOTAL
PERSONAL GROOMING: A LOT
HELP NEEDED FOR BATHING: A LOT
MOBILITY SCORE: 11
TOILETING: A LOT
MOVING FROM LYING ON BACK TO SITTING ON SIDE OF FLAT BED WITH BEDRAILS: A LOT
MOBILITY SCORE: 10
DAILY ACTIVITIY SCORE: 13
STANDING UP FROM CHAIR USING ARMS: A LOT
TURNING FROM BACK TO SIDE WHILE IN FLAT BAD: A LOT
PERSONAL GROOMING: A LOT
EATING MEALS: A LITTLE
MOVING FROM LYING ON BACK TO SITTING ON SIDE OF FLAT BED WITH BEDRAILS: A LOT
MOVING TO AND FROM BED TO CHAIR: A LOT
DRESSING REGULAR LOWER BODY CLOTHING: A LOT
TURNING FROM BACK TO SIDE WHILE IN FLAT BAD: A LOT
CLIMB 3 TO 5 STEPS WITH RAILING: TOTAL
DRESSING REGULAR LOWER BODY CLOTHING: A LOT
DAILY ACTIVITIY SCORE: 13
DRESSING REGULAR UPPER BODY CLOTHING: A LOT
WALKING IN HOSPITAL ROOM: TOTAL
STANDING UP FROM CHAIR USING ARMS: A LOT
EATING MEALS: A LITTLE
WALKING IN HOSPITAL ROOM: A LOT
MOVING TO AND FROM BED TO CHAIR: A LOT

## 2023-12-16 ASSESSMENT — PAIN SCALES - GENERAL
PAINLEVEL_OUTOF10: 0 - NO PAIN
PAINLEVEL_OUTOF10: 4
PAIN_LEVEL: 0
PAINLEVEL_OUTOF10: 0 - NO PAIN
PAINLEVEL_OUTOF10: 8
PAINLEVEL_OUTOF10: 10 - WORST POSSIBLE PAIN
PAINLEVEL_OUTOF10: 0 - NO PAIN
PAINLEVEL_OUTOF10: 4
PAINLEVEL_OUTOF10: 0 - NO PAIN
PAINLEVEL_OUTOF10: 5 - MODERATE PAIN
PAINLEVEL_OUTOF10: 0 - NO PAIN

## 2023-12-16 ASSESSMENT — PAIN DESCRIPTION - LOCATION
LOCATION: HIP
LOCATION: HIP

## 2023-12-16 ASSESSMENT — PAIN - FUNCTIONAL ASSESSMENT
PAIN_FUNCTIONAL_ASSESSMENT: 0-10
PAIN_FUNCTIONAL_ASSESSMENT: FLACC (FACE, LEGS, ACTIVITY, CRY, CONSOLABILITY)
PAIN_FUNCTIONAL_ASSESSMENT: 0-10
PAIN_FUNCTIONAL_ASSESSMENT: FLACC (FACE, LEGS, ACTIVITY, CRY, CONSOLABILITY)
PAIN_FUNCTIONAL_ASSESSMENT: 0-10
PAIN_FUNCTIONAL_ASSESSMENT: FLACC (FACE, LEGS, ACTIVITY, CRY, CONSOLABILITY)
PAIN_FUNCTIONAL_ASSESSMENT: 0-10
PAIN_FUNCTIONAL_ASSESSMENT: FLACC (FACE, LEGS, ACTIVITY, CRY, CONSOLABILITY)
PAIN_FUNCTIONAL_ASSESSMENT: 0-10
PAIN_FUNCTIONAL_ASSESSMENT: 0-10

## 2023-12-16 ASSESSMENT — PAIN DESCRIPTION - ORIENTATION: ORIENTATION: RIGHT

## 2023-12-16 NOTE — PROGRESS NOTES
Physical Therapy                 Therapy Communication Note    Patient Name: Kalie Ramos  MRN: [id number]  Today's Date: 12/16/2023     Discipline: Physical Therapy    Missed Visit Reason: Cancel (Pt scheduled for sx for hip fx)    Missed Time: Cancel    Comment:

## 2023-12-16 NOTE — CARE PLAN
The patient's goals for the shift include sleep, make it thru surgery    The clinical goals for the shift include control pain

## 2023-12-16 NOTE — PROGRESS NOTES
Occupational Therapy                 Therapy Communication Note    Patient Name: Kalie Ramos  MRN: [id number]  Today's Date: 12/16/2023     Discipline: Occupational Therapy    Missed Visit Reason: Missed Visit Reason: Patient in a medical procedure    Missed Time: Cancel    Comment:  patient is a cancel due to in surgery for repair of hip fracture

## 2023-12-16 NOTE — SIGNIFICANT EVENT
RIGHT DP. PLUS 2. FOOT WARM AND MOBILE WITH GOOD CAP. REFILL. COLOR AND SENSATION NORMAL. ICE APPLIED TO THE RIGHT HIP.

## 2023-12-16 NOTE — ANESTHESIA PROCEDURE NOTES
Airway  Date/Time: 12/16/2023 2:06 PM  Urgency: elective    Airway not difficult    Staffing  Performed: GURU   Authorized by: Silvano Wilkinson MD    Performed by: GURU Dumont  Patient location during procedure: OR    Indications and Patient Condition  Indications for airway management: anesthesia  Spontaneous ventilation: present  Sedation level: deep  Preoxygenated: yes  Patient position: sniffing  Mask difficulty assessment: 1 - vent by mask    Final Airway Details  Final airway type: endotracheal airway      Successful airway: ETT  Cuffed: yes   Successful intubation technique: video laryngoscopy  Facilitating devices/methods: intubating stylet  Endotracheal tube insertion site: oral  Blade: Tomas  Blade size: #3  ETT size (mm): 7.0  Cormack-Lehane Classification: grade I - full view of glottis  Placement verified by: chest auscultation   Measured from: lips  ETT to lips (cm): 20  Number of attempts at approach: 1    Additional Comments  On pt bed

## 2023-12-16 NOTE — PROGRESS NOTES
"Kalie Ramos is a 89 y.o. female on day 2 of admission presenting with Fall, initial encounter.    Subjective   Doing well no new complaints.  Going for surgery later       Objective     Physical Exam  Vitals reviewed.   Constitutional:       Appearance: Normal appearance.   HENT:      Head: Normocephalic and atraumatic.      Right Ear: Tympanic membrane, ear canal and external ear normal.      Left Ear: Tympanic membrane, ear canal and external ear normal.      Nose: Nose normal.      Mouth/Throat:      Pharynx: Oropharynx is clear.   Eyes:      Extraocular Movements: Extraocular movements intact.      Conjunctiva/sclera: Conjunctivae normal.      Pupils: Pupils are equal, round, and reactive to light.   Cardiovascular:      Rate and Rhythm: Normal rate and regular rhythm.      Pulses: Normal pulses.      Heart sounds: Normal heart sounds.   Pulmonary:      Effort: Pulmonary effort is normal.      Breath sounds: Normal breath sounds.   Abdominal:      General: Abdomen is flat. Bowel sounds are normal.      Palpations: Abdomen is soft.   Musculoskeletal:         General: Tenderness present.      Cervical back: Normal range of motion and neck supple.   Skin:     General: Skin is warm and dry.   Neurological:      General: No focal deficit present.      Mental Status: She is alert and oriented to person, place, and time.   Psychiatric:         Mood and Affect: Mood normal.         Last Recorded Vitals  Blood pressure 119/55, pulse 56, temperature 36 °C (96.8 °F), temperature source Tympanic, resp. rate 23, height 1.575 m (5' 2\"), weight 53.3 kg (117 lb 8.1 oz), SpO2 99 %.  Intake/Output last 3 Shifts:  No intake/output data recorded.    Relevant Results              Scheduled medications  atorvastatin, 40 mg, oral, Nightly  calcitonin (salmon), 1 spray, One Nostril, Daily  docusate sodium, 100 mg, oral, BID  enoxaparin, 40 mg, subcutaneous, Nightly  losartan, 25 mg, oral, Daily  magnesium oxide, 400 mg, oral, " Once per day on Mon Wed Fri  melatonin, 3 mg, oral, Nightly  metoprolol succinate XL, 50 mg, oral, Daily  oxygen, 2 L/min, inhalation, q24h  pantoprazole, 40 mg, oral, Daily  polyethylene glycol, 17 g, oral, Daily  rOPINIRole, 5 mg, oral, Nightly  sertraline, 50 mg, oral, Daily      Continuous medications  potassium okexgrp-R6-9.45%NaCl, 100 mL/hr, Last Rate: Stopped (12/16/23 1135)      PRN medications  PRN medications: acetaminophen **OR** acetaminophen **OR** acetaminophen, acetaminophen, albuterol, benzocaine-menthol, dextromethorphan-guaifenesin, guaiFENesin, morphine, nitroglycerin, ondansetron **OR** ondansetron, oxygen, sodium chloride, traZODone  Electrocardiogram, 12-lead PRN ACS symptoms    Result Date: 12/15/2023  Normal sinus rhythm Left ventricular hypertrophy with QRS widening and repolarization abnormality Abnormal ECG When compared with ECG of 14-DEC-2023 22:22, (unconfirmed) Premature ventricular complexes are no longer Present CA interval has decreased    ECG 12 lead    Result Date: 12/15/2023  Sinus rhythm with Premature atrial complexes Left ventricular hypertrophy with QRS widening and repolarization abnormality Abnormal ECG When compared with ECG of 15-DEC-2023 14:34, (unconfirmed) Premature atrial complexes are now Present    ECG 12 lead    Result Date: 12/15/2023   Poor data quality, interpretation may be adversely affected Sinus rhythm with marked sinus arrhythmia with 1st degree AV block with occasional Premature ventricular complexes Left ventricular hypertrophy with QRS widening and repolarization abnormality Abnormal ECG When compared with ECG of 12-NOV-2023 18:46, Left bundle branch block is no longer Present    CT lumbar spine wo IV contrast    Result Date: 12/14/2023  Interpreted By:  Jordon Casas, STUDY: CT LUMBAR SPINE WO IV CONTRAST; 12/14/2023 10:15 pm   INDICATION: Signs/Symptoms:fall, doesn't recall cause of fall, right hip and low back pain.   COMPARISON: 2 October 2018.    ACCESSION NUMBER(S): [id number]   ORDERING CLINICIAN: LINDA MCKEE CT was performed with one or more of the following dose reduction techniques: automated exposure control, adjustment of the mA and/or kV according to patient size, or use of iterative reconstruction technique.   PROCEDURE: 2.00 mm  axial images were obtained in bone algorithm without contrast through the lumbar spine. Two dimensional coronal and sagittal images were reformatted for review. Images were also reformatted through the disc spaces.   FINDINGS: Small left-greater-than-right pleural effusions seen with bibasilar compressive atelectasis at each lung base. Moderate colonic stool burden is demonstrated.   There is dextroscoliosis centered at L3. Vacuum phenomenon with severe disc height loss and moderate to severe endplate degenerative change with broadening is seen at L2-L3.   There is retrolisthesis L1 on L2, L2 on L3 and L3 on L4. No acute wedge compression or compression injury. Moderate atherosclerotic disease of the abdominal aorta is demonstrated. Vertebral body heights are preserved.   Axial images demonstrate the following:   T12-L1: Endplate degenerative change without foraminal narrowing or canal stenosis.   L1/2: No canal stenosis with mild left greater than right foraminal narrowing.   L2/3: Severe disc height loss and moderate endplate degenerative change with retrolisthesis and a broad-based disc bulge as well as mild bilateral facet hypertrophy without canal stenosis with mild bilateral foraminal narrowing.   L3/4: Moderate right foraminal narrowing contributed by the scoliosis with moderate endplate degenerative change without left foraminal narrowing with very mild canal narrowed.   L4/5: Mild to moderate right minimal left foraminal narrowing contributed by the scoliosis with a broad-based disc bulge and endplate degenerative change without canal stenosis. There is moderate right and mild left facet hypertrophy.    L5/S1: Moderate right-greater-than-left facet hypertrophy and a mild broad-based disc bulge with mild left without right foraminal narrowing and no canal stenosis.       Multilevel spondylosis of the lumbar spine as described. No posttraumatic abnormality demonstrated. Dextroscoliosis. Findings have progressed compared to the prior examination.       This report has been produced using speech recognition. This exam is available in DICOM format to non-affiliated healthcare facilities on a secure media free searchable basis with prior patient authorization. The patient exposure is reported to a radiation dose index registry. All CT examinations are performed with one or more of the following dose reduction techniques: Automated Exposure Control, Adjustment of mA and/or KV according to patient size, or use of iterative reconstruction techniques.     Signed by: Jordon Casas 12/14/2023 10:39 PM Dictation workstation:   [id number]    CT cervical spine wo IV contrast    Result Date: 12/14/2023  Interpreted By:  Jordon Casas, STUDY: CT CERVICAL SPINE WO IV CONTRAST; 12/14/2023 10:15 pm   INDICATION: Signs/Symptoms:fall.   COMPARISON: None   ACCESSION NUMBER(S): [id number]   ORDERING CLINICIAN: LINDA LUA   TECHNIQUE: CT was performed with one or more of the following dose reduction techniques: automated exposure control, adjustment of the mA and/or kV according to patient size, or use of iterative reconstruction technique.   2.0 mm axial images were obtained from the level of the mastoids, through the cervical spine to the apex of the lungs. Two dimensional coronal and sagittal images were reformatted for review. Images were also reformatted through the disc spaces.   FINDINGS: There is hyper lordosis without malalignment. The prevertebral soft tissues  and airway are unremarkable. Severe endplate degenerative change with broadening and severe disc height loss is greatest at C4-C5 greater than C5-C6 and more  mild-to-moderate at the other levels of the cervical spine. Pannus with moderate degenerative change surrounds the dens and anterior arch of C1. Clivus is unremarkable. Visualized skull base is unremarkable. 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 Occipital condyles are intact. Atlantoaxial association is preserved. Ground-glass appearance is seen in the right lung apex that is nonspecific and may be due to atelectasis, edema or inflammation that may be infectious or inflammatory. Thickening with calcification left anterior pleura seen. Soft tissues are otherwise unremarkable.   No post traumatic abnormality of the cervical spine is seen.   Axial images demonstrate:   C2/3: Left greater than right mild facet hypertrophy and minimal uncovertebral hypertrophy without canal stenosis or foraminal narrowing.   C3/4: Moderate left and mild right facet uncovertebral hypertrophy results in moderate to severe bilateral foraminal narrowing without canal stenosis.   C4/5: Endplate degenerative change and right-greater-than-left uncovertebral facet hypertrophy results in severe right-greater-than-left foraminal narrowing and mild canal stenosis from the endplate degenerative change.   C5/6: Endplate degenerative change and broadening with severe right greater left facet and left greater than right uncovertebral hypertrophy results in severe left and moderate right foraminal narrowing with mild canal stenosis.   C6/7: Moderate uncovertebral and left greater than right facet hypertrophy results in moderate left and mild-to-moderate right foraminal narrowing without canal stenosis given the hyperlordosis.   C7/T1: No canal stenosis or foraminal narrowing given the hyperlordosis.       Degenerative changes without post traumatic abnormality of the cervical spine.   This report has been produced using speech recognition. This exam is available in DICOM format to non-[facility]  facilities on a secure media free searchable basis with prior patient authorization. The patient exposure is reported to a radiation dose index registry. All CT examinations are performed with one or more of the following dose reduction techniques: Automated Exposure Control, Adjustment of mA and/or KV according to patient size, or use of iterative reconstruction techniques.   Signed by: Jordon Casas 12/14/2023 10:29 PM Dictation workstation:   [id number]    CT head wo IV contrast    Result Date: 12/14/2023  Interpreted By:  Jordon Casas, STUDY: CT HEAD WO IV CONTRAST; 12/14/2023 10:15 pm   INDICATION: Signs/Symptoms:fall, doesn't recall cause of fall.   COMPARISON: 12 November 2023.   ACCESSION NUMBER(S): [id number]   ORDERING CLINICIAN: LINDA LUA   TECHNIQUE: CT was performed with one or more of the following dose reduction techniques: automated exposure control, adjustment of the mA and/or kV according to patient size, or use of iterative reconstruction technique.       PROCEDURE: 3.0 mm axial images were obtained through the brain, to include the posterior fossa without intravenous contrast enhancement.   FINDINGS: There is symmetric volume loss of the cerebral hemispheres. Moderate decreased attenuation in the bilateral periventricular white matter, consistent with microangiopathy is noted.  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 This is unchanged compared to previous.. Brainstem is unremarkable. Cerebellar hemispheres are symmetric. No intra- or extra-axial mass or abnormal blood products are demonstrated.   The paranasal sinuses and mastoids as well as calvarium are unremarkable. Small scalp contusion anterior to the right frontal bone without subjacent calvarial fracture demonstrated.       1. No acute intracranial findings. 2. Symmetric volume loss of the cerebral hemispheres. 3. Periventricular Microangiopathy. 4. No posttraumatic abnormality. Scalp contusion anterior to the right  frontal bone without subjacent calvarial fracture.   This report has been produced using speech recognition. This exam is available in DICOM format to non-affiliated healthcare facilities on a secure media free searchable basis with prior patient authorization. The patient exposure is reported to a radiation dose index registry. All CT examinations are performed with one or more of the following dose reduction techniques: Automated Exposure Control, Adjustment of mA and/or KV according to patient size, or use of iterative reconstruction techniques.   MACRO: None   Signed by: Jordon Casas 12/14/2023 10:25 PM Dictation workstation:   [id number]    XR pelvis 1-2 views    Result Date: 12/14/2023  Interpreted By:  Jordon Casas, STUDY: XR FEMUR RIGHT 2+ VIEWS; XR PELVIS 1-2 VIEWS; 12/14/2023 10:06 pm; 12/14/2023 10:04 pm   INDICATION: Signs/Symptoms:fall, external rotation and right hip pain; Signs/Symptoms:fall, right hip pain.   COMPARISON: 12 November 2023..   ACCESSION NUMBER(S): [id number]; [id number]   ORDERING CLINICIAN: LINDA LUA   TECHNIQUE: High and low AP and lateral views of the right femur, 4 total views performed. Single AP view of the pelvis.   FINDINGS: Impacted fracture right femoral neck is seen with foreshortening at the fracture site. Right femoral head is without dislocation of the right acetabulum. Advanced atherosclerotic disease in the thigh is seen. Severe osteoarthritis of the mediolateral compartments of the knee and more moderate in the patellofemoral compartment seen with trace suprapatellar effusion and fabella noted. Moderate left hip osteoarthritis demonstrated. Severe right hip osteoarthritis demonstrated. Mild left greater than right SI joint degenerative change seen.       Fracture of the right femoral neck. Degenerative change of the right knee.   Signed by: Jordon Casas 12/14/2023 10:15 PM Dictation workstation:   [id number]    XR femur right 2+ views    Result Date:  12/14/2023  Interpreted By:  Jordon Casas, STUDY: XR FEMUR RIGHT 2+ VIEWS; XR PELVIS 1-2 VIEWS; 12/14/2023 10:06 pm; 12/14/2023 10:04 pm   INDICATION: Signs/Symptoms:fall, external rotation and right hip pain; Signs/Symptoms:fall, right hip pain.   COMPARISON: 12 November 2023..   ACCESSION NUMBER(S): [id number]; [id number]   ORDERING CLINICIAN: LINDA LUA   TECHNIQUE: High and low AP and lateral views of the right femur, 4 total views performed. Single AP view of the pelvis.   FINDINGS: Impacted fracture right femoral neck is seen with foreshortening at the fracture site. Right femoral head is without dislocation of the right acetabulum. Advanced atherosclerotic disease in the thigh is seen. Severe osteoarthritis of the mediolateral compartments of the knee and more moderate in the patellofemoral compartment seen with trace suprapatellar effusion and fabella noted. Moderate left hip osteoarthritis demonstrated. Severe right hip osteoarthritis demonstrated. Mild left greater than right SI joint degenerative change seen.       Fracture of the right femoral neck. Degenerative change of the right knee.   Signed by: Jordon Casas 12/14/2023 10:15 PM Dictation workstation:   [id number]    XR chest 1 view    Result Date: 12/14/2023  Interpreted By:  Jordon Casas, STUDY: XR CHEST 1 VIEW; 12/14/2023 10:06 pm   INDICATION: Signs/Symptoms:fall.   COMPARISON: 12 November 2023.   ACCESSION NUMBER(S): [id number]   ORDERING CLINICIAN: LINDA LUA   TECHNIQUE: 2 frontal views to include a significantly rightward rotated view of the chest performed.   FINDINGS: Small bilateral pleural effusions with bibasilar compressive atelectasis is seen with increased interstitial markings more on the right than left lung compatible with mild CHF. Cardiomegaly is unchanged compared to previous. Enlarged main pulmonary arteries may be due to secondary pulmonary hypertension from underlying emphysematous change. Adenopathy not excluded.        Mild CHF with small bilateral pleural effusions with bibasilar compressive atelectasis. Unchanged cardiomegaly. No posttraumatic abnormality demonstrated.   Signed by: Jordon Casas 12/14/2023 10:12 PM Dictation workstation:   [id number]   Results for orders placed or performed during the hospital encounter of 12/14/23 (from the past 24 hour(s))   Electrocardiogram, 12-lead PRN ACS symptoms   Result Value Ref Range    Ventricular Rate 67 BPM    Atrial Rate 67 BPM    MS Interval 184 ms    QRS Duration 120 ms    QT Interval 504 ms    QTC Calculation(Bazett) 532 ms    P Axis 37 degrees    R Axis -2 degrees    T Axis 172 degrees    QRS Count 11 beats    Q Onset 215 ms    P Onset 123 ms    P Offset 147 ms    T Offset 467 ms    QTC Fredericia 523 ms   ECG 12 lead   Result Value Ref Range    Ventricular Rate 69 BPM    Atrial Rate 69 BPM    MS Interval 188 ms    QRS Duration 120 ms    QT Interval 502 ms    QTC Calculation(Bazett) 537 ms    P Axis 48 degrees    R Axis -6 degrees    T Axis 174 degrees    QRS Count 11 beats    Q Onset 215 ms    P Onset 121 ms    P Offset 152 ms    T Offset 466 ms    QTC Fredericia 526 ms                    Assessment/Plan   Principal Problem:    Fall, initial encounter  Active Problems:    COPD (chronic obstructive pulmonary disease) (CMS/[facility])    Hyperlipidemia    Hypertension    Age-related osteoporosis without current pathological fracture    Chronic diastolic (congestive) heart failure (CMS/[facility])    Hip fracture, right, closed, initial encounter (CMS/[facility])    Patient is medically stable for surgery today  Breathing okay  Blood pressure okay       I spent  minutes in the professional and overall care of this patient.      Nakia Bailey MD

## 2023-12-16 NOTE — NURSING NOTE
Patient alert and oriented x 4. Patient is in bed, resting with family at bedside. Patient assessed at this time. Patient has diminished lung sounds upon auscultation. Patient has dyspnea on exertion. Patient is on 3 liters nasal cannula. Patient is on TELE. Patients abdomen is soft, non distended, non tender with active bowel sounds. Patients skin is thin, and fragile. Patient has generalized edema. Patient has ecchymotic and redness to skin. Patient has weakness to bilateral lower extremities. Patient has fracture to the right femoral neck. Fracture precautions implemented. Patient does not appear to be in any distress. Fall precautions reviewed and implemented. Bed brakes locked, bed in lowest position, call light within reach, bed alarm activated. Will continue to monitor patient to ensure patient safety.

## 2023-12-16 NOTE — NURSING NOTE
Pt resting in bed with family at bedside. No complaints or concerns. Call light within reach.  Pt continues on IV fluids and atb per order.  Dsg dry and intact to rt hip.  Bed alarm in place for pt safety.

## 2023-12-16 NOTE — OP NOTE
Arthroplasty Partial Hip (R) Operative Note     Date: 2023 - 2023  OR Location: [facility] OR    Name: Kalie Ramos, : 1929, Age: 89 y.o., MRN: [id number], Sex: female    Diagnosis  Pre-op Diagnosis     * Hip fracture, right, closed, initial encounter (CMS/[facility]) [S72.001A] Post-op Diagnosis     * Hip fracture, right, closed, initial encounter (CMS/[facility]) [S72.001A]     Procedures  Right hip hemiarthroplasty  SD OPTX FEM FX PROX END NCK INT FIXJ/PROSTC RPLCMT [76622]  Surgeons      * Jarett Michaels - Primary    Resident/Fellow/Other Assistant:  Surgeon(s) and Role:    Procedure Summary  Anesthesia: General  ASA: III  Anesthesia Staff: Anesthesiologist: Silvano Wilkinson MD  C-AA: GURU Dumont  Estimated Blood Loss: 150 mL  Intra-op Medications:   Medication Name Total Dose   vancomycin (Vancocin) vial for injection 1 g   ceFAZolin in dextrose (iso-os) (Ancef) IVPB 2 g 2 g              Anesthesia Record               Intraprocedure I/O Totals       None           Specimen: No specimens collected     Staff:   Circulator: Charley Leary RN  Scrub Person: Dee Dee Pierson         Drains and/or Catheters: * None in log *    Tourniquet Times:         Implants:  Implants       Type Name Action Serial No.      Joint Hip HEAD, FEMORAL, BIPOLAR, SELF-CENTERING, 28 MM, 46 MM - [id number] Implanted      Joint Hip HIP STEM, CORAIL AMT COLLARER, SIZE-11 - [id number] Implanted      Joint Hip HEAD, FEMORAL,  TAPER, ARTICUL/YOU, 28 MM, +1.5 MM NECK, COBALT CHROME - [id number] Implanted               Findings: Displaced right femoral neck fracture    Indications: Kalie Ramos is an 89 y.o. female who is having surgery for Hip fracture, right, closed, initial encounter (CMS/[facility]) [S72.001A].     The patient was seen in the preoperative area. The risks, benefits, complications, treatment options, non-operative alternatives, expected recovery and outcomes were discussed with the patient. The possibilities of  reaction to medication, pulmonary aspiration, injury to surrounding structures, bleeding, recurrent infection, the need for additional procedures, failure to diagnose a condition, and creating a complication requiring transfusion or operation were discussed with the patient. The patient concurred with the proposed plan, giving informed consent.  The site of surgery was properly noted/marked if necessary per policy. The patient has been actively warmed in preoperative area. Preoperative antibiotics have been ordered and given within 1 hours of incision. Venous thrombosis prophylaxis are not indicated.    Procedure Details: The patient was seen in the preoperative holding area and the proper surgical site was identified and marked.  She was then brought to the operating room and placed supine on the operating table.  After induction of satisfactory anesthesia and administration of prophylactic antibiotics she was placed in the lateral decubitus position on a pegboard with all bony prominences padded.  A time-out was called.  She was prepped and draped in standard sterile fashion.    A posterolateral skin incision was created taken out through the subcutaneous tissue and the underlying fascia.  I split the gluteus hugo muscle in line with its fibers.  I then reflected the short external rotators from the posterior aspect of the greater trochanter.  I made a posterior capsulotomy preserving the capsular flap for later repair.  I then dislocated the hip without difficulty and made a femoral neck osteotomy according to preoperative and intraoperative templating.  I removed the femoral head without difficulty.  I then cleared the acetabulum of all intervening soft tissue.  A trial 46 mm bipolar head fit well..    I broached the femur sequentially up to a size 11 component which provided excellent rotational security.  Found the best restoration of leg length offset and stability with a +1.5 neck length.  I then impacted  our final size 11 corail stem.  I reduced the hip without difficulty and found excellent restoration of leg length, offset and stability in all planes.  I then impacted our final size 28 mm / 46 mm/+1.5 bipolar femoral head.  I irrigated the joint area copiously with the pulsatile lavage as well as irrisept irrigation to further in anti sepsis.  I took care to coagulate all bleeding vessels.  I then performed a posterior capsular repair through drill holes in the posterior greater trochanter using 2. FiberWire.  I then closed the deep fascia with interrupted 1. Vicryl suture.  Closed the remainder of the wound in a standard layered fashion with 0 Vicryl followed by 2 0 Vicryl suture.  The skin was approximated with a running 4 Monocryl stitch.  Sterile bandage was applied.  The patient awoke from anesthesia and transferred recovery room in stable condition.  Complications:  None; patient tolerated the procedure well.    Disposition: PACU - hemodynamically stable.  Condition: stable         Additional Details:     Attending Attestation:     Jarett Michaels  Phone Number: [phone]

## 2023-12-16 NOTE — ANESTHESIA POSTPROCEDURE EVALUATION
Patient: Kalie Ramos    Procedure Summary       Date: 12/16/23 Room / Location: [facility] OR 05 / [facility]    Anesthesia Start: 1355 Anesthesia Stop: 1616    Procedure: Arthroplasty Partial Hip (Right: Hip) Diagnosis:       Hip fracture, right, closed, initial encounter (CMS/[facility])      (Hip fracture, right, closed, initial encounter (CMS/[facility]) [S72.001A])    Surgeons: Jarett Michaels MD Responsible Provider: Silvano Wilkinson MD    Anesthesia Type: general ASA Status: 3            Anesthesia Type: general    Vitals Value Taken Time   /89 12/16/23 1618   Temp 36.6 °C (97.9 °F) 12/16/23 1610   Pulse 74 12/16/23 1619   Resp 17 12/16/23 1619   SpO2 100 % 12/16/23 1619   Vitals shown include unvalidated device data.    Anesthesia Post Evaluation    Patient location during evaluation: PACU  Patient participation: complete - patient participated  Level of consciousness: awake  Pain score: 0  Pain management: adequate  Multimodal analgesia pain management approach  Airway patency: patent  Two or more strategies used to mitigate risk of obstructive sleep apnea  Cardiovascular status: acceptable  Respiratory status: acceptable  Hydration status: acceptable  Postoperative Nausea and Vomiting: none        There were no known notable events for this encounter.

## 2023-12-16 NOTE — CARE PLAN
The patient's goals for the shift include sleep    The clinical goals for the shift include pain free    Over the shift, the patient did not make progress toward the following goals. Barriers to progression include . Recommendations to address these barriers include .

## 2023-12-16 NOTE — PROGRESS NOTES
"Kalie Ramos is a 89 y.o. female on day 2 of admission presenting with Fall, initial encounter.    Subjective   Patient resting comfortably in bed. Appears in no acute distress.        Objective     Physical Exam  Cardiovascular:      Rate and Rhythm: Normal rate and regular rhythm.      Heart sounds: Murmur heard.      Comments: 3/6 systolic murmur noted  Pulmonary:      Effort: Pulmonary effort is normal.      Comments: Diminished breath sounds throughout  Abdominal:      General: Bowel sounds are normal.   Musculoskeletal:      Right lower leg: No edema.      Left lower leg: No edema.   Skin:     General: Skin is warm and dry.   Neurological:      Mental Status: She is alert.         Last Recorded Vitals  Blood pressure (!) 107/45, pulse 58, temperature 36.7 °C (98.1 °F), temperature source Oral, resp. rate 18, height 1.575 m (5' 2\"), weight 53.3 kg (117 lb 8.1 oz), SpO2 100 %.  Intake/Output last 3 Shifts:  No intake/output data recorded.    Relevant Results  Results for orders placed or performed during the hospital encounter of 12/14/23 (from the past 24 hour(s))   Electrocardiogram, 12-lead PRN ACS symptoms   Result Value Ref Range    Ventricular Rate 67 BPM    Atrial Rate 67 BPM    NJ Interval 184 ms    QRS Duration 120 ms    QT Interval 504 ms    QTC Calculation(Bazett) 532 ms    P Axis 37 degrees    R Axis -2 degrees    T Axis 172 degrees    QRS Count 11 beats    Q Onset 215 ms    P Onset 123 ms    P Offset 147 ms    T Offset 467 ms    QTC Fredericia 523 ms   ECG 12 lead   Result Value Ref Range    Ventricular Rate 69 BPM    Atrial Rate 69 BPM    NJ Interval 188 ms    QRS Duration 120 ms    QT Interval 502 ms    QTC Calculation(Bazett) 537 ms    P Axis 48 degrees    R Axis -6 degrees    T Axis 174 degrees    QRS Count 11 beats    Q Onset 215 ms    P Onset 121 ms    P Offset 152 ms    T Offset 466 ms    QTC Fredericia 526 ms          Assessment/Plan   Principal Problem:    Fall, initial " encounter  Active Problems:    COPD (chronic obstructive pulmonary disease) (CMS/[facility])    Hyperlipidemia    Hypertension    Age-related osteoporosis without current pathological fracture    Chronic diastolic (congestive) heart failure (CMS/[facility])    Hip fracture, right, closed, initial encounter (CMS/HCC)      12/15:This patient is a 89-year-old white female with extensive issues that include coronary artery disease, severe aortic valvular stenosis, moderate tricuspid and mitral valve regurgitation, severe pulmonary hypertension, COPD with oxygen dependence, hypertension, left bundle branch block conduction delay, lower extremity edema due to venous insufficiency, chronic DVT left lower extremity femoral vein, laparoscopic paraesophageal hiatal hernia repair, ongoing aspiration pneumonias, nontoxic multinodular goiter.  The patient's last echocardiogram on 7/25/2023 again demonstrated a LV ejection fraction 35-40% severe aortic valve stenosis peak systolic gradient 56 mmHg, mild mitral moderate tricuspid valve regurgitation and severe pulmonary hypertension estimated PA systolic pressure 67 mmHg.  The patient was thought not to be a candidate for transcatheter aortic valve replacement.  She does wear continuous oxygen at 3 L/min at home.  She surprisingly still lives independently in her own condominium.  She has been having increased shortness of breath.  Multiple potential etiologies including her chronic lung disease aspiration pneumonias interstitial fibrosis possible CHF.  The patient was recently admitted for several days on 10/23/2023 with increased shortness of breath and ultimately was released on Lasix 40 mg twice daily which she has reduced to daily.  Patient admitted again on 11/12/2023 after having fallen in the bathroom and not being able to get up due to generalized weakness.  She lives independently in her own condominium and has been very resistant to review our recommendation for assisted living.  At  that time it was decided to continue the patient on her usual therapy which does at this point include Lasix 40 mg once daily along with her low-dose aspirin plus atorvastatin 40 mg daily and metoprolol succinate 25 mg twice daily.   highly advised the patient to be placed into an assisted living facility given her increasing generalized weakness and her need for continuous nasal oxygen therapy.  Patient is currently admitted after a mechanical fall with a right femoral neck fracture.  Patient is currently scheduled for right total hip replacement on 12/16/2023.  Given her cardiac history she is at increased but not prohibitive cardiac risk and will require close observation postoperatively.     12/16: As above. This patient was seen in collaboration with Dr. Sosa on coverage for Dr. Mckinney.  Patient n.p.o. for right total hip replacement this morning.  She denies any chest pain, pressure or palpitations overnight.  Unfortunately no lab work is available for this morning.  Blood pressures have been overall normal with her last recorded at 107/45.  She has remained in a rate controlled atrial fibrillation on telemetry.  Patient's pulse oximetry is 100% on 3 L nasal cannula.  She appears overall euvolemic on examination with no edema to her extremities.  Again, given this patient's cardiac history she is at increased risk for surgery; however, this is not a prohibitive cardiac risk.  We will follow this patient postoperatively.            SARAH Jeff-CNP

## 2023-12-16 NOTE — NURSING NOTE
Sent message to dr robbins asking to give or hold tonights dose of lovenox because surgery scheduled tomorrow. Dr robbins said to hold.

## 2023-12-16 NOTE — NURSING NOTE
Pt A&O x3 currently resting in bed. No complaints or concerns. Call light within reach.  Pt continues on IV fliuds per order.  Pt npo for surgery today.

## 2023-12-16 NOTE — ANESTHESIA PREPROCEDURE EVALUATION
Patient: Kalie Ramos    Procedure Information       Date/Time: 12/16/23 1310    Procedure: Arthroplasty Partial Hip (Right: Hip)    Location: [facility] OR 05 / [facility]    Surgeons: Jarett Michaels MD            Relevant Problems   Anesthesia (within normal limits)      Cardiovascular  Severe Aortic Stenosis, cleared but high risk per Cardiology, EF 35-40   (+) Acute on chronic systolic congestive heart failure (CMS/HCC)   (+) Aortic stenosis   (+) Asymptomatic peripheral vascular disease (CMS/HCC)   (+) Chest pain   (+) Chronic diastolic (congestive) heart failure (CMS/HCC)   (+) Hyperlipidemia   (+) Hypertension   (+) LBBB (left bundle branch block)   (+) Murmur, cardiac      Endocrine   (+) Hyperthyroidism   (+) Nontoxic multinodular goiter      GI   (+) Esophageal reflux   (+) Hiatal hernia      /Renal (within normal limits)      Neuro/Psych   (+) Anxiety   (+) Major depressive disorder, recurrent, unspecified (CMS/HCC)      Pulmonary   (+) COPD (chronic obstructive pulmonary disease) (CMS/HCC)   (+) Pneumonia of right middle lobe due to infectious organism      GI/Hepatic (within normal limits)      Hematology   (+) Anemia      Musculoskeletal   (+) Generalized osteoarthrosis   (+) Scoliosis, unspecified      Eyes, Ears, Nose, and Throat   (+) Bilateral high frequency sensorineural hearing loss   (+) Unspecified hearing loss, unspecified ear      Infectious Disease   (+) Acute infection   (+) C. difficile diarrhea   (+) Pneumonia of right middle lobe due to infectious organism   (+) Tinea pedis       Clinical information reviewed:    Allergies  Meds             Allergies   Allergen Reactions    Niaspan Extended-Release [Niacin] Unknown     PATIENT DENIES    Naproxen Rash     Prior to Admission medications    Medication Sig Start Date End Date Taking? Authorizing Provider   acetaminophen (Tylenol) 325 mg tablet Take 2 tablets (650 mg) by mouth every 6 hours if needed for moderate pain (4 - 6).  10/27/23   Avel Bailey MD   albuterol 2.5 mg /3 mL (0.083 %) nebulizer solution Take 3 mL (2.5 mg) by nebulization every 2 hours if needed for wheezing. 11/15/23   Avel Bailey MD   atorvastatin (Lipitor) 40 mg tablet Take 1 tablet (40 mg) by mouth once daily at bedtime for 30 days, THEN 1 tablet (40 mg) once daily at bedtime. 11/15/23 1/14/24  Avel Bailey MD   benzocaine-menthol (Cepastat Sore Throat) 15-3.6 mg lozenge Dissolve 1 lozenge in the mouth every 4 hours if needed for sore throat. 10/27/23   Avel Bailey MD   calcitonin, salmon, (Miacalcin) 200 unit/actuation nasal spray Administer 1 spray into one nostril once daily. Do not start before October 28, 2023. 10/28/23   Avel Bailey MD   ferrous gluconate (Fergon) 324 (38 Fe) mg tablet Take 1 tablet (38 mg of iron) by mouth 3 (three) times a week. Do not start before November 17, 2023. 11/17/23   Avel Bailey MD   furosemide (Lasix) 40 mg tablet TAKE 1 TABLET DAILY  Patient taking differently: Take 1 tablet (40 mg) by mouth once daily. Add additional lasix 40mg and potassium 1 pkt in the mornings if increased swelling in legs 8/14/23   Avel Bailey MD   lidocaine 4 % patch Place 1 patch over 12 hours on the skin once daily. Remove & discard patch within 12 hours or as directed by MD. Do not start before November 16, 2023. 11/16/23   Avel Bailey MD   loratadine (Claritin) 10 mg tablet Take 1 tablet (10 mg) by mouth once daily. Do not start before November 16, 2023. 11/16/23   Avel Bailey MD   losartan (Cozaar) 25 mg tablet Take 1 tablet (25 mg) by mouth once daily. 12/14/23   Avel Bailey MD   magnesium oxide (Mag-Ox) 400 mg (241.3 mg magnesium) tablet Take 1 tablet (400 mg) by mouth 3 times a week. Andrea mooney sat 12/19/22   Historical Provider, MD   melatonin 3 mg tablet Take 1 tablet (3 mg) by mouth once daily at bedtime. 6/1/20   Historical Provider, MD   metoprolol succinate XL (Toprol-XL) 25 mg 24 hr tablet Take  1 tablet (25 mg) by mouth once daily. Do not crush or chew.    Historical Provider, MD   metoprolol succinate XL (Toprol-XL) 50 mg 24 hr tablet Take 1 tablet (50 mg) by mouth once daily. Do not crush or chew. Do not start before November 16, 2023. 11/16/23   Avel Bailey MD   nitroglycerin (Nitrostat) 0.4 mg SL tablet Place 1 tablet (0.4 mg) under the tongue every 5 minutes if needed for chest pain (UP TO 3 TIMES. IF NO RELIEF CALL 911.). 11/15/23   Avel Bailey MD   oxygen (O2) gas therapy Inhale 2 L/min once every 24 hours. 11/15/23   Avel Bailey MD   pantoprazole (ProtoNix) 40 mg EC tablet TAKE 1 TABLET DAILY 7/31/23   Avel Bailey MD   potassium chloride (Klor-Con) 20 mEq packet Take 20 mEq by mouth once daily. MIX AND DRINK 1/4/19   Historical Provider, MD   rOPINIRole (Requip) 2 mg tablet  12/10/23   Historical Provider, MD   rOPINIRole (Requip) 5 mg tablet Take 1 tablet (5 mg) by mouth once daily at bedtime. 5/16/22   Historical Provider, MD   sertraline (Zoloft) 50 mg tablet Take 1 tablet (50 mg) by mouth once daily. 7/11/23 1/7/24  Avel Bailey MD   sodium chloride (Ocean) 0.65 % nasal spray Administer 1 spray into each nostril 4 times a day as needed for congestion. 10/27/23   Avel Bailey MD   traZODone (Desyrel) 50 mg tablet Take 1 tablet (50 mg) by mouth as needed at bedtime for sleep. 10/29/19   Historical Provider, MD   losartan (Cozaar) 25 mg tablet Take 1 tablet (25 mg) by mouth once daily. 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    Body mass index (BMI) 22.0-22.9, adult     BMI 22.0-22.9, adult    Cervicalgia 05/08/2015    Neck pain    CHF (congestive heart failure) (CMS/HCC)     High cholesterol     Hypertension     Leg swelling     Malignant neoplasm of unspecified site of unspecified female breast (CMS/HCC) 07/19/2013    Adenocarcinoma of breast    Other  conditions influencing health status 10/21/2013    Urinary Tract Infection    Personal history of other infectious and parasitic diseases 03/17/2015    History of candidiasis of mouth    Personal history of other specified conditions 03/17/2015    History of abdominal pain    Personal history of pneumonia (recurrent) 09/10/2015    History of pneumonia    Personal history of transient ischemic attack (TIA), and cerebral infarction without residual deficits 09/04/2014    History of stroke     Past Surgical History:   Procedure Laterality Date    BREAST BIOPSY  09/10/2015    Biopsy Breast Open    BREAST LUMPECTOMY  07/19/2013    Breast Surgery Lumpectomy    CARPAL TUNNEL RELEASE  09/04/2014    Neuroplasty Decompression Median Nerve At Carpal Tunnel    CATARACT EXTRACTION  11/02/2015    Cataract Surgery    HAND TENDON SURGERY  11/02/2015    Hand Incision Tendon Sheath Of A Finger    HYSTERECTOMY  07/19/2013    Hysterectomy    MR HEAD ANGIO WO IV CONTRAST  12/17/2020    MR HEAD ANGIO WO IV CONTRAST [facility]    MR HEAD ANGIO WO IV CONTRAST  5/13/2021    MR HEAD ANGIO WO IV CONTRAST [facility]    MR HEAD ANGIO WO IV CONTRAST  5/24/2022    MR HEAD ANGIO WO IV CONTRAST [facility]    MR NECK ANGIO WO IV CONTRAST  5/24/2022    MR NECK ANGIO WO IV CONTRAST [facility]    OTHER SURGICAL HISTORY  11/02/2015    Wrist Carpectomy    ROTATOR CUFF REPAIR  09/10/2015    Rotator Cuff Repair    SENTINEL LYMPH NODE BIOPSY  09/10/2015    [place] Lymph Node Biopsy    SHOULDER SURGERY  07/19/2013    Shoulder Surgery         NPO Detail:  No data recorded     Physical Exam    Airway  Mallampati: II  TM distance: >3 FB  Neck ROM: full     Cardiovascular - normal exam     Dental - normal exam     Pulmonary - normal exam     Abdominal - normal exam             Anesthesia Plan    ASA 3     general     The patient is not a current smoker.  Education provided regarding risk of obstructive sleep  apnea.  intravenous induction   Anesthetic plan and risks discussed with patient.    Plan discussed with [facility].

## 2023-12-17 LAB
ANION GAP SERPL CALC-SCNC: 9 MMOL/L
ATRIAL RATE: 72 BPM
BUN SERPL-MCNC: 15 MG/DL (ref 8–25)
CALCIUM SERPL-MCNC: 7.5 MG/DL (ref 8.5–10.4)
CHLORIDE SERPL-SCNC: 105 MMOL/L (ref 97–107)
CO2 SERPL-SCNC: 25 MMOL/L (ref 24–31)
CREAT SERPL-MCNC: 0.8 MG/DL (ref 0.4–1.6)
ERYTHROCYTE [DISTWIDTH] IN BLOOD BY AUTOMATED COUNT: 14.4 % (ref 11.5–14.5)
GFR SERPL CREATININE-BSD FRML MDRD: 69 ML/MIN/1.73M*2
GLUCOSE SERPL-MCNC: 108 MG/DL (ref 65–99)
HCT VFR BLD AUTO: 36.1 % (ref 36–46)
HGB BLD-MCNC: 10.9 G/DL (ref 12–16)
MCH RBC QN AUTO: 28.7 PG (ref 26–34)
MCHC RBC AUTO-ENTMCNC: 30.2 G/DL (ref 32–36)
MCV RBC AUTO: 95 FL (ref 80–100)
NRBC BLD-RTO: 0 /100 WBCS (ref 0–0)
P AXIS: 84 DEGREES
P OFFSET: 127 MS
P ONSET: 93 MS
PLATELET # BLD AUTO: 220 X10*3/UL (ref 150–450)
POTASSIUM SERPL-SCNC: 4.3 MMOL/L (ref 3.4–5.1)
PR INTERVAL: 242 MS
Q ONSET: 214 MS
QRS COUNT: 12 BEATS
QRS DURATION: 128 MS
QT INTERVAL: 476 MS
QTC CALCULATION(BAZETT): 521 MS
QTC FREDERICIA: 506 MS
R AXIS: 50 DEGREES
RBC # BLD AUTO: 3.8 X10*6/UL (ref 4–5.2)
SODIUM SERPL-SCNC: 139 MMOL/L (ref 133–145)
T AXIS: 182 DEGREES
T OFFSET: 452 MS
VENTRICULAR RATE: 72 BPM
WBC # BLD AUTO: 12.1 X10*3/UL (ref 4.4–11.3)

## 2023-12-17 PROCEDURE — 2500000001 HC RX 250 WO HCPCS SELF ADMINISTERED DRUGS (ALT 637 FOR MEDICARE OP): Performed by: ORTHOPAEDIC SURGERY

## 2023-12-17 PROCEDURE — 83540 ASSAY OF IRON: CPT | Performed by: NURSE PRACTITIONER

## 2023-12-17 PROCEDURE — 1200000002 HC GENERAL ROOM WITH TELEMETRY DAILY

## 2023-12-17 PROCEDURE — 85027 COMPLETE CBC AUTOMATED: CPT | Performed by: INTERNAL MEDICINE

## 2023-12-17 PROCEDURE — 36415 COLL VENOUS BLD VENIPUNCTURE: CPT | Performed by: INTERNAL MEDICINE

## 2023-12-17 PROCEDURE — 97162 PT EVAL MOD COMPLEX 30 MIN: CPT | Mod: GP

## 2023-12-17 PROCEDURE — 82374 ASSAY BLOOD CARBON DIOXIDE: CPT | Performed by: INTERNAL MEDICINE

## 2023-12-17 PROCEDURE — 99232 SBSQ HOSP IP/OBS MODERATE 35: CPT | Performed by: INTERNAL MEDICINE

## 2023-12-17 PROCEDURE — 2500000004 HC RX 250 GENERAL PHARMACY W/ HCPCS (ALT 636 FOR OP/ED): Performed by: ORTHOPAEDIC SURGERY

## 2023-12-17 PROCEDURE — 97530 THERAPEUTIC ACTIVITIES: CPT | Mod: GP

## 2023-12-17 RX ORDER — CEFAZOLIN SODIUM 2 G/100ML
2 INJECTION, SOLUTION INTRAVENOUS EVERY 8 HOURS
Status: COMPLETED | OUTPATIENT
Start: 2023-12-17 | End: 2023-12-17

## 2023-12-17 RX ADMIN — SENNOSIDES 17.2 MG: 8.6 TABLET, FILM COATED ORAL at 22:59

## 2023-12-17 RX ADMIN — ASPIRIN 325 MG: 325 TABLET, COATED ORAL at 22:58

## 2023-12-17 RX ADMIN — ATORVASTATIN CALCIUM 40 MG: 40 TABLET, FILM COATED ORAL at 22:59

## 2023-12-17 RX ADMIN — SENNOSIDES 17.2 MG: 8.6 TABLET, FILM COATED ORAL at 09:30

## 2023-12-17 RX ADMIN — HYDROMORPHONE HYDROCHLORIDE 0.5 MG: 1 INJECTION, SOLUTION INTRAMUSCULAR; INTRAVENOUS; SUBCUTANEOUS at 17:48

## 2023-12-17 RX ADMIN — CEFAZOLIN SODIUM 2 G: 2 INJECTION, SOLUTION INTRAVENOUS at 02:47

## 2023-12-17 RX ADMIN — ACETAMINOPHEN 650 MG: 325 TABLET ORAL at 17:42

## 2023-12-17 RX ADMIN — DOCUSATE SODIUM 100 MG: 100 CAPSULE, LIQUID FILLED ORAL at 22:58

## 2023-12-17 RX ADMIN — SERTRALINE 50 MG: 50 TABLET, FILM COATED ORAL at 09:30

## 2023-12-17 RX ADMIN — ASPIRIN 325 MG: 325 TABLET, COATED ORAL at 09:32

## 2023-12-17 RX ADMIN — PANTOPRAZOLE SODIUM 40 MG: 40 TABLET, DELAYED RELEASE ORAL at 09:31

## 2023-12-17 RX ADMIN — HYDROMORPHONE HYDROCHLORIDE 0.5 MG: 1 INJECTION, SOLUTION INTRAMUSCULAR; INTRAVENOUS; SUBCUTANEOUS at 07:56

## 2023-12-17 RX ADMIN — CALCITONIN SALMON 1 SPRAY: 200 SPRAY, METERED NASAL at 09:29

## 2023-12-17 RX ADMIN — Medication 1 SPRAY: at 09:29

## 2023-12-17 RX ADMIN — LOSARTAN POTASSIUM 25 MG: 50 TABLET, FILM COATED ORAL at 09:30

## 2023-12-17 RX ADMIN — PSYLLIUM HUSK 1 PACKET: 3.4 POWDER ORAL at 09:30

## 2023-12-17 RX ADMIN — POLYETHYLENE GLYCOL 3350 17 G: 17 POWDER, FOR SOLUTION ORAL at 09:30

## 2023-12-17 RX ADMIN — ACETAMINOPHEN 650 MG: 325 TABLET ORAL at 11:09

## 2023-12-17 RX ADMIN — ROPINIROLE HYDROCHLORIDE 5 MG: 1 TABLET, FILM COATED ORAL at 22:58

## 2023-12-17 RX ADMIN — DOCUSATE SODIUM 100 MG: 100 CAPSULE, LIQUID FILLED ORAL at 09:30

## 2023-12-17 RX ADMIN — METOPROLOL SUCCINATE 50 MG: 50 TABLET, EXTENDED RELEASE ORAL at 09:30

## 2023-12-17 RX ADMIN — ACETAMINOPHEN 650 MG: 325 TABLET ORAL at 23:01

## 2023-12-17 RX ADMIN — CEFAZOLIN SODIUM 2 G: 2 INJECTION, SOLUTION INTRAVENOUS at 11:11

## 2023-12-17 ASSESSMENT — PAIN DESCRIPTION - ORIENTATION
ORIENTATION: RIGHT
ORIENTATION: RIGHT

## 2023-12-17 ASSESSMENT — COGNITIVE AND FUNCTIONAL STATUS - GENERAL
MOBILITY SCORE: 11
TOILETING: A LOT
MOVING TO AND FROM BED TO CHAIR: TOTAL
STANDING UP FROM CHAIR USING ARMS: A LOT
DAILY ACTIVITIY SCORE: 13
DRESSING REGULAR UPPER BODY CLOTHING: A LOT
DRESSING REGULAR LOWER BODY CLOTHING: A LOT
PERSONAL GROOMING: A LOT
WALKING IN HOSPITAL ROOM: TOTAL
WALKING IN HOSPITAL ROOM: A LOT
MOBILITY SCORE: 6
TOILETING: A LOT
TURNING FROM BACK TO SIDE WHILE IN FLAT BAD: TOTAL
DRESSING REGULAR LOWER BODY CLOTHING: A LOT
CLIMB 3 TO 5 STEPS WITH RAILING: TOTAL
CLIMB 3 TO 5 STEPS WITH RAILING: TOTAL
STANDING UP FROM CHAIR USING ARMS: TOTAL
EATING MEALS: A LITTLE
HELP NEEDED FOR BATHING: TOTAL
MOVING FROM LYING ON BACK TO SITTING ON SIDE OF FLAT BED WITH BEDRAILS: A LOT
CLIMB 3 TO 5 STEPS WITH RAILING: TOTAL
TURNING FROM BACK TO SIDE WHILE IN FLAT BAD: TOTAL
TURNING FROM BACK TO SIDE WHILE IN FLAT BAD: A LOT
MOVING TO AND FROM BED TO CHAIR: TOTAL
WALKING IN HOSPITAL ROOM: TOTAL
STANDING UP FROM CHAIR USING ARMS: TOTAL
MOVING FROM LYING ON BACK TO SITTING ON SIDE OF FLAT BED WITH BEDRAILS: TOTAL
DAILY ACTIVITIY SCORE: 12
HELP NEEDED FOR BATHING: A LOT
EATING MEALS: A LITTLE
PERSONAL GROOMING: A LOT
MOBILITY SCORE: 6
MOVING FROM LYING ON BACK TO SITTING ON SIDE OF FLAT BED WITH BEDRAILS: TOTAL
MOVING TO AND FROM BED TO CHAIR: A LOT
DRESSING REGULAR UPPER BODY CLOTHING: A LOT

## 2023-12-17 ASSESSMENT — PAIN DESCRIPTION - LOCATION
LOCATION: LEG
LOCATION: LEG

## 2023-12-17 ASSESSMENT — PAIN SCALES - GENERAL
PAINLEVEL_OUTOF10: 0 - NO PAIN
PAINLEVEL_OUTOF10: 9
PAINLEVEL_OUTOF10: 0 - NO PAIN
PAINLEVEL_OUTOF10: 2
PAINLEVEL_OUTOF10: 9
PAINLEVEL_OUTOF10: 0 - NO PAIN
PAINLEVEL_OUTOF10: 0 - NO PAIN

## 2023-12-17 ASSESSMENT — PAIN - FUNCTIONAL ASSESSMENT
PAIN_FUNCTIONAL_ASSESSMENT: 0-10

## 2023-12-17 ASSESSMENT — ACTIVITIES OF DAILY LIVING (ADL)
ADL_ASSISTANCE: INDEPENDENT
ADLS_ADDRESSED: NO

## 2023-12-17 ASSESSMENT — PAIN DESCRIPTION - DESCRIPTORS: DESCRIPTORS: ACHING

## 2023-12-17 NOTE — CARE PLAN
The patient's goals for the shift include sleep/rest    The clinical goals for the shift include pain management, ambulation, mobility      Problem: Pain  Goal: My pain/discomfort is manageable  Outcome: Progressing     Problem: Safety  Goal: Patient will be injury free during hospitalization  Outcome: Progressing  Goal: I will remain free of falls  Outcome: Progressing     Problem: Daily Care  Goal: Daily care needs are met  Outcome: Progressing     Problem: Psychosocial Needs  Goal: Demonstrates ability to cope with hospitalization/illness  Outcome: Progressing  Goal: Collaborate with me, my family, and caregiver to identify my specific goals  Outcome: Progressing     Problem: Discharge Barriers  Goal: My discharge needs are met  Outcome: Progressing     Problem: Fall/Injury  Goal: Not fall by end of shift  Outcome: Progressing  Goal: Be free from injury by end of the shift  Outcome: Progressing  Goal: Verbalize understanding of personal risk factors for fall in the hospital  Outcome: Progressing  Goal: Verbalize understanding of risk factor reduction measures to prevent injury from fall in the home  Outcome: Progressing  Goal: Use assistive devices by end of the shift  Outcome: Progressing  Goal: Pace activities to prevent fatigue by end of the shift  Outcome: Progressing     Problem: Skin  Goal: Decreased wound size/increased tissue granulation at next dressing change  Outcome: Progressing  Goal: Participates in plan/prevention/treatment measures  Outcome: Progressing  Goal: Prevent/manage excess moisture  Outcome: Progressing  Goal: Prevent/minimize sheer/friction injuries  Outcome: Progressing  Goal: Promote/optimize nutrition  Outcome: Progressing  Goal: Promote skin healing  Outcome: Progressing

## 2023-12-17 NOTE — CARE PLAN
The patient's goals for the shift include sleep/rest    The clinical goals for the shift include pain management, ambulation, mobility      Problem: Skin  Goal: Decreased wound size/increased tissue granulation at next dressing change  12/17/2023 1329 by Rashaad Walton RN  Outcome: Progressing  12/17/2023 1328 by Rashaad Walton RN  Flowsheets (Taken 12/17/2023 1328)  Decreased wound size/increased tissue granulation at next dressing change:   Promote sleep for wound healing   Protective dressings over bony prominences  12/17/2023 1221 by Rashaad Walton RN  Outcome: Progressing  Goal: Participates in plan/prevention/treatment measures  12/17/2023 1329 by Rashaad Walton RN  Outcome: Progressing  12/17/2023 1328 by Rashaad Walton RN  Flowsheets (Taken 12/17/2023 1328)  Participates in plan/prevention/treatment measures:   Discuss with provider PT/OT consult   Increase activity/out of bed for meals   Elevate heels  12/17/2023 1221 by Rashaad Walton RN  Outcome: Progressing  Goal: Prevent/manage excess moisture  12/17/2023 1329 by Rashaad Walton RN  Flowsheets (Taken 12/17/2023 1328)  Prevent/manage excess moisture:   Cleanse incontinence/protect with barrier cream   Moisturize dry skin   Follow provider orders for dressing changes   Monitor for/manage infection if present  12/17/2023 1328 by Rashaad Walton RN  Flowsheets (Taken 12/17/2023 1328)  Prevent/manage excess moisture:   Cleanse incontinence/protect with barrier cream   Moisturize dry skin   Follow provider orders for dressing changes   Monitor for/manage infection if present  12/17/2023 1221 by Rashaad Walton RN  Outcome: Progressing  Goal: Prevent/minimize sheer/friction injuries  12/17/2023 1329 by Rashaad Walton RN  Outcome: Progressing  12/17/2023 1328 by Rashaad Walton RN  Flowsheets (Taken 12/17/2023 1328)  Prevent/minimize sheer/friction injuries:   Increase activity/out of bed for meals   Use pull sheet  12/17/2023 1221 by Rashaad Walton RN  Outcome:  Progressing  Goal: Promote/optimize nutrition  12/17/2023 1329 by Rashaad Walton RN  Outcome: Progressing  12/17/2023 1328 by Rashaad Walton RN  Flowsheets (Taken 12/17/2023 1328)  Promote/optimize nutrition:   Discuss with provider if NPO > 2 days   Assist with feeding   Consume > 50% meals/supplements   Monitor/record intake including meals   Offer water/supplements/favorite foods  12/17/2023 1221 by Rashaad Walton RN  Outcome: Progressing  Goal: Promote skin healing  12/17/2023 1329 by Rashaad Walton RN  Outcome: Progressing  12/17/2023 1328 by Rashaad Walton RN  Flowsheets (Taken 12/17/2023 1328)  Promote skin healing:   Protective dressings over bony prominences   Rotate device position/do not position patient on device   Assess skin/pad under line(s)/device(s)  12/17/2023 1221 by Rashaad Walton RN  Outcome: Progressing

## 2023-12-17 NOTE — PROGRESS NOTES
"Kalie Ramos is a 89 y.o. female on day 3 of admission presenting with Fall, initial encounter.    Subjective   Doing okay after surgery.  Underwent right hip hemiarthroplasty       Objective     Physical Exam  Vitals reviewed.   Constitutional:       Appearance: Normal appearance.   HENT:      Head: Normocephalic and atraumatic.      Right Ear: Tympanic membrane, ear canal and external ear normal.      Left Ear: Tympanic membrane, ear canal and external ear normal.      Nose: Nose normal.      Mouth/Throat:      Pharynx: Oropharynx is clear.   Eyes:      Extraocular Movements: Extraocular movements intact.      Conjunctiva/sclera: Conjunctivae normal.      Pupils: Pupils are equal, round, and reactive to light.   Cardiovascular:      Rate and Rhythm: Normal rate and regular rhythm.      Pulses: Normal pulses.      Heart sounds: Normal heart sounds.   Pulmonary:      Effort: Pulmonary effort is normal.      Breath sounds: Normal breath sounds.   Abdominal:      General: Abdomen is flat. Bowel sounds are normal.      Palpations: Abdomen is soft.   Musculoskeletal:      Cervical back: Normal range of motion and neck supple.   Skin:     General: Skin is warm and dry.   Neurological:      General: No focal deficit present.      Mental Status: She is alert.      Comments: Alert oriented x 2   Psychiatric:         Mood and Affect: Mood normal.         Last Recorded Vitals  Blood pressure (!) 121/48, pulse 59, temperature 37.1 °C (98.8 °F), temperature source Temporal, resp. rate 18, height 1.575 m (5' 2\"), weight 53.3 kg (117 lb 8.1 oz), SpO2 100 %.  Intake/Output last 3 Shifts:  I/O last 3 completed shifts:  In: 1298 (24.4 mL/kg) [I.V.:48 (0.9 mL/kg); IV Piggyback:1250]  Out: 450 (8.4 mL/kg) [Urine:450 (0.2 mL/kg/hr)]  Weight: 53.3 kg     Relevant Results              Scheduled medications  acetaminophen, 650 mg, oral, q6h DOV  aspirin, 325 mg, oral, BID  atorvastatin, 40 mg, oral, Nightly  calcitonin (salmon), 1 " spray, One Nostril, Daily  docusate sodium, 100 mg, oral, BID  losartan, 25 mg, oral, Daily  magnesium oxide, 400 mg, oral, Once per day on Mon Wed Fri  metoprolol succinate XL, 50 mg, oral, Daily  pantoprazole, 40 mg, oral, Daily  polyethylene glycol, 17 g, oral, Daily  psyllium, 1 packet, oral, Daily  rOPINIRole, 5 mg, oral, Nightly  sennosides, 2 tablet, oral, BID  sertraline, 50 mg, oral, Daily      Continuous medications     PRN medications  PRN medications: acetaminophen **OR** acetaminophen **OR** acetaminophen, acetaminophen, albuterol, benzocaine-menthol, dextromethorphan-guaifenesin, guaiFENesin, HYDROcodone-acetaminophen, HYDROmorphone, naloxone, naloxone, nitroglycerin, [DISCONTINUED] ondansetron **OR** ondansetron, oxygen, sodium chloride, traZODone  Results for orders placed or performed during the hospital encounter of 12/14/23 (from the past 24 hour(s))   CBC   Result Value Ref Range    WBC 12.1 (H) 4.4 - 11.3 x10*3/uL    nRBC 0.0 0.0 - 0.0 /100 WBCs    RBC 3.80 (L) 4.00 - 5.20 x10*6/uL    Hemoglobin 10.9 (L) 12.0 - 16.0 g/dL    Hematocrit 36.1 36.0 - 46.0 %    MCV 95 80 - 100 fL    MCH 28.7 26.0 - 34.0 pg    MCHC 30.2 (L) 32.0 - 36.0 g/dL    RDW 14.4 11.5 - 14.5 %    Platelets 220 150 - 450 x10*3/uL   Basic Metabolic Panel   Result Value Ref Range    Glucose 108 (H) 65 - 99 mg/dL    Sodium 139 133 - 145 mmol/L    Potassium 4.3 3.4 - 5.1 mmol/L    Chloride 105 97 - 107 mmol/L    Bicarbonate 25 24 - 31 mmol/L    Urea Nitrogen 15 8 - 25 mg/dL    Creatinine 0.80 0.40 - 1.60 mg/dL    eGFR 69 >60 mL/min/1.73m*2    Calcium 7.5 (L) 8.5 - 10.4 mg/dL    Anion Gap 9 <=19 mmol/L     XR pelvis 1-2 views    Result Date: 12/16/2023  Interpreted By:  Ronni Gaines, STUDY: XR PELVIS 1-2 VIEWS; 12/16/2023 4:31 pm   INDICATION: Signs/Symptoms:Post op hip.   COMPARISON: 12/14/2023   ACCESSION NUMBER(S): [id number]   ORDERING CLINICIAN: VALERIO CHARLES   FINDINGS: 1 AP pelvis and 1 AP right femur projection.   The  patient is rotated.   Right total hip arthroplasty is seen. Postop changes about the hip are noted. Severe left hip osteoarthrosis is seen.       Right total hip arthroplasty.   MACRO: None.   Signed by: Ronni Gaines 12/16/2023 4:46 PM Dictation workstation:   [id number]                  Assessment/Plan   Principal Problem:    Fall, initial encounter  Active Problems:    COPD (chronic obstructive pulmonary disease) (CMS/[facility])    Hyperlipidemia    Hypertension    Age-related osteoporosis without current pathological fracture    Chronic diastolic (congestive) heart failure (CMS/[facility])    Hip fracture, right, closed, initial encounter (CMS/[facility])    S/p left Reynaldo arthroplasty  Blood pressure stable  Breathing stable  Postop anemia stable monitor  Plan for rehab       I spent  minutes in the professional and overall care of this patient.      Nakia Bailey MD

## 2023-12-17 NOTE — PROGRESS NOTES
"Kalie Ramos is a 89 y.o. female on day 3 of admission presenting with Fall, initial encounter.    Subjective   Sitting comfortably in bed.  Denies pain.       Objective     Physical Exam right lower extremity and clinically neutral alignment.  Neurovascular intact distally.  Calf soft supple nontender negative Homans' sign.  Dressing clean and dry and intact.    Last Recorded Vitals  Blood pressure 130/62, pulse 65, temperature 36.7 °C (98.1 °F), temperature source Oral, resp. rate 18, height 1.575 m (5' 2\"), weight 53.3 kg (117 lb 8.1 oz), SpO2 100 %.  Intake/Output last 3 Shifts:  I/O last 3 completed shifts:  In: 1298 (24.4 mL/kg) [I.V.:48 (0.9 mL/kg); IV Piggyback:1250]  Out: 450 (8.4 mL/kg) [Urine:450 (0.2 mL/kg/hr)]  Weight: 53.3 kg     Relevant Results      Scheduled medications  acetaminophen, 650 mg, oral, q6h DOV  aspirin, 325 mg, oral, BID  atorvastatin, 40 mg, oral, Nightly  calcitonin (salmon), 1 spray, One Nostril, Daily  ceFAZolin, 2 g, intravenous, q8h  docusate sodium, 100 mg, oral, BID  losartan, 25 mg, oral, Daily  magnesium oxide, 400 mg, oral, Once per day on Mon Wed Fri  metoprolol succinate XL, 50 mg, oral, Daily  pantoprazole, 40 mg, oral, Daily  polyethylene glycol, 17 g, oral, Daily  psyllium, 1 packet, oral, Daily  rOPINIRole, 5 mg, oral, Nightly  sennosides, 2 tablet, oral, BID  sertraline, 50 mg, oral, Daily      Continuous medications  sodium chloride 0.9%, 80 mL/hr, Last Rate: 80 mL/hr (12/16/23 1724)      PRN medications  PRN medications: acetaminophen **OR** acetaminophen **OR** acetaminophen, acetaminophen, albuterol, benzocaine-menthol, dextromethorphan-guaifenesin, guaiFENesin, HYDROcodone-acetaminophen, HYDROmorphone, naloxone, naloxone, nitroglycerin, [DISCONTINUED] ondansetron **OR** ondansetron, oxygen, sodium chloride, traZODone  Results for orders placed or performed during the hospital encounter of 12/14/23 (from the past 24 hour(s))   CBC   Result Value Ref Range    " WBC 12.1 (H) 4.4 - 11.3 x10*3/uL    nRBC 0.0 0.0 - 0.0 /100 WBCs    RBC 3.80 (L) 4.00 - 5.20 x10*6/uL    Hemoglobin 10.9 (L) 12.0 - 16.0 g/dL    Hematocrit 36.1 36.0 - 46.0 %    MCV 95 80 - 100 fL    MCH 28.7 26.0 - 34.0 pg    MCHC 30.2 (L) 32.0 - 36.0 g/dL    RDW 14.4 11.5 - 14.5 %    Platelets 220 150 - 450 x10*3/uL   Basic Metabolic Panel   Result Value Ref Range    Glucose 108 (H) 65 - 99 mg/dL    Sodium 139 133 - 145 mmol/L    Potassium 4.3 3.4 - 5.1 mmol/L    Chloride 105 97 - 107 mmol/L    Bicarbonate 25 24 - 31 mmol/L    Urea Nitrogen 15 8 - 25 mg/dL    Creatinine 0.80 0.40 - 1.60 mg/dL    eGFR 69 >60 mL/min/1.73m*2    Calcium 7.5 (L) 8.5 - 10.4 mg/dL    Anion Gap 9 <=19 mmol/L                            Assessment/Plan   Principal Problem:    Fall, initial encounter  Active Problems:    COPD (chronic obstructive pulmonary disease) (CMS/[facility])    Hyperlipidemia    Hypertension    Age-related osteoporosis without current pathological fracture    Chronic diastolic (congestive) heart failure (CMS/[facility])    Hip fracture, right, closed, initial encounter (CMS/[facility])    Postoperative day #1 right hip hemiarthroplasty.  Continue mobilization.  Discharge planning.             Jarett Michaels MD

## 2023-12-17 NOTE — PROGRESS NOTES
Physical Therapy    Physical Therapy Evaluation & Treatment    Patient Name: Kalie Ramos  MRN: [id number]  Today's Date: 12/17/2023   Time Calculation  Start Time: 1153  Stop Time: 1222  Time Calculation (min): 29 min    Assessment/Plan   PT Assessment  PT Assessment Results: Decreased strength, Decreased range of motion, Decreased endurance, Impaired balance, Decreased mobility, Decreased coordination, Decreased safety awareness, Orthopedic restrictions, Impaired vision, Impaired hearing  Rehab Prognosis: Fair  Evaluation/Treatment Tolerance: Patient limited by pain  Medical Staff Made Aware: Yes  Strengths: Premorbid level of function  Barriers to Participation: Comorbidities  End of Session Communication: Bedside nurse  Assessment Comment: pt with decreased functional mobility following hip fracture repair. pt with increased weakness, impaired tolerance to activity, and poor safety awareness. pt will benefit from continued skilled therapy services to improve functional performance and maximize safety.  End of Session Patient Position: Up in chair, Alarm off, not on at start of session   IP OR SWING BED PT PLAN  Inpatient or Swing Bed: Inpatient  PT Plan  Treatment/Interventions: Bed mobility, Transfer training, Gait training, Balance training, Strengthening, Endurance training, Range of motion, Therapeutic exercise, Therapeutic activity  PT Plan: Skilled PT  PT Frequency: Daily  PT Discharge Recommendations: Moderate intensity level of continued care  Equipment Recommended upon Discharge: Wheeled walker  PT Recommended Transfer Status: Total assist, Assist x2  PT - OK to Discharge: Yes    Subjective     General Visit Information:  General  Reason for Referral: Hip Arthroplasty, fracture repair  Referred By: Jarett Michaels MD  Past Medical History Relevant to Rehab: AS, L BBB, HTN, venous insufficiency, Breast CA s/p lumpectomy, chronic DVT L LE, dysphagia, vertigo, lacunar infarct right corona radiata,  pulmonary HTN, cardiomegaly, CAD, aspiration pneumonia  Family/Caregiver Present: Yes  Caregiver Feedback: son present and encouraging to patient  Prior to Session Communication: Bedside nurse  Patient Position Received: Bed, 3 rail up, Alarm off, not on at start of session  Preferred Learning Style: verbal  General Comment: 89 y.o. female admitted after falling at home sustaining a right femoral neck fracture. pt is now s/p R Hip Hemiarthroplasty 12/16/23.  Home Living:  Home Living  Type of Home: Condo  Lives With: Alone  Home Adaptive Equipment: Walker rolling or standard, Cane (rollator)  Home Layout: One level  Home Access: Stairs to enter with rails  Entrance Stairs-Rails: Both  Entrance Stairs-Number of Steps: 1  Bathroom Shower/Tub: Tub/shower unit  Bathroom Toilet: Standard  Bathroom Equipment: Grab bars in shower, Shower chair with back  Home Living Comments: pt with multiple falls at home  Prior Level of Function:  Prior Function Per Pt/Caregiver Report  Level of [place]: Independent with ADLs and functional transfers, Needs assistance with homemaking  Receives Help From: Family  ADL Assistance: Independent  Homemaking Assistance:  (pt able to do light cooking, has a cleaning lady every 2 weeks, her kids do the shoppoing and errands)  Ambulatory Assistance: Independent (rollator in community, cane or furniture walking in her home)  Vocational: Retired  Prior Function Comments: pt uses 3L O2 via NC at all times, multiple falls, her children help her extensively  Precautions:  Precautions  Hearing/Visual Limitations: glasses, very [place]  LE Weight Bearing Status: Weight Bearing as Tolerated  Medical Precautions: Fall precautions, Oxygen therapy device and L/min  Vital Signs:  Vital Signs  Heart Rate: 81  SpO2: 98 % (4L O2 via NC)  Patient Position: Sitting    Objective   Pain:  Pain Assessment  Pain Assessment: 0-10  Pain Score: 0 - No pain (at rest. increasing discomfort during mobility, calms down  after sitting in chair.)  Pain Type: Surgical pain  Pain Location: Hip  Pain Orientation: Right  Cognition:  Cognition  Overall Cognitive Status: Impaired  Arousal/Alertness: Delayed responses to stimuli  Orientation Level: Oriented X4  Following Commands: Follows one step commands with repetition (and increased time)  Cognition Comments: pt occasionally dozing off when not stimulated    General Assessments:  General Observation  General Observation: right hip silver dressing dry and intact. pt positioned on left hip while in bed, LEs elevated on a pillow.    Activity Tolerance  Endurance: Tolerates less than 10 min exercise, no significant change in vital signs  Activity Tolerance Comments: limted by right hip pain    Sensation  Sensation Comment: denies paresthesias    Coordination  Movements are Fluid and Coordinated: No  Coordination Comment: poor movement quality, significant increase in time to complete tasks.    Postural Control  Posture Comment: severe thoracic kyphosis, rounded shoulders, head-down posture    Static Sitting Balance  Static Sitting-Balance Support: Bilateral upper extremity supported, Feet supported  Static Sitting-Level of Assistance: Contact guard  Static Sitting-Comment/Number of Minutes: while sitting EOB, tends to extend B knees, encouraged to bend knees and allow feet to touch the floor.    Static Standing Balance  Static Standing-Balance Support: Bilateral upper extremity supported  Static Standing-Level of Assistance: Maximum assistance  Static Standing-Comment/Number of Minutes: max A for upright posture after manual guidance of hands on the walker. very forward flexed and heavy anterior lean.  Functional Assessments:  ADL  ADL's Addressed: No    Bed Mobility  Bed Mobility:  (dependent to move B LEs off EOB, elevate trunk, and square hips at EOB using the draw sheet. pt mildly resistive to movements.)    Transfers  Transfer:  (max A to stand from the EOB to the chair after explicit  instructions were provided. unable to fully achieve COG over SEEMA. dependent eccentric control when sitting in chair. vebral cues for hand placement, poor weight bearing throught B LEs..)    Ambulation/Gait Training  Ambulation/Gait Training Performed:  (sliding feet along the floor with rolling walker and dependent assistance for balance and safety. verbal cues for posture however pt continuously flexing forward placing her COG posterior to her SEEMA.)    Stairs  Stairs: No  Extremity/Trunk Assessments:  RLE   RLE :  (post operative limitations.)  LLE   LLE :  (decreased full ROM at end ranges.)  Treatments:  Therapeutic Exercise  Therapeutic Exercise Performed:  (pt performing B LAQ x 15 reps each while seated EOB with CGA for balance. encouraged APs.)    Therapeutic Activity  Therapeutic Activity Performed:  (max A/dependent to transfer from bed to chair with the rolling walker. multiple verbal cues for hand placement and safety throughout the transfer. continuous cues for posture and to try to keep her head up.pt with poor abiltiy to weight shift.)    Balance/Neuromuscular Re-Education  Balance/Neuromuscular Re-Education Activity Performed:  (poor balance during bed to chair transfer, unable to support self with B UEs on corina walker, very weak.)  Outcome Measures:  [facility] Basic Mobility  Turning from your back to your side while in a flat bed without using bedrails: Total  Moving from lying on your back to sitting on the side of a flat bed without using bedrails: Total  Moving to and from bed to chair (including a wheelchair): Total  Standing up from a chair using your arms (e.g. wheelchair or bedside chair): Total  To walk in hospital room: Total  Climbing 3-5 steps with railing: Total  Basic Mobility - Total Score: 6    Encounter Problems       Encounter Problems (Active)       Balance       LTG - Patient will maintain standing balance using the rolling walker to allow for safe mobility (Progressing)       Start:   12/17/23    Expected End:  01/17/24               Mobility       STG - Patient will ambulate 20' with rolling walker and mod A. (Progressing)       Start:  12/17/23    Expected End:  01/17/24               Transfers       STG - Transfer from bed to chair with mod A x 1 using the rolling walker (Progressing)       Start:  12/17/23    Expected End:  01/17/24            STG - Patient to transfer to and from sit to supine with mod A x 1. (Progressing)       Start:  12/17/23    Expected End:  01/17/24            STG - Patient will perform bed mobility safely for pain control and pressure relief. (Progressing)       Start:  12/17/23    Expected End:  01/17/24            STG - Patient will transfer sit to and from stand with mod A x 1 and appropriate hand placement. (Progressing)       Start:  12/17/23    Expected End:  01/17/24                   Education Documentation  Precautions, taught by Ayla Mccartney PT at 12/17/2023  3:54 PM.  Learner: Family, Patient  Readiness: Acceptance  Method: Explanation  Response: Verbalizes Understanding, Needs Reinforcement    Body Mechanics, taught by Ayla Mccartney PT at 12/17/2023  3:54 PM.  Learner: Family, Patient  Readiness: Acceptance  Method: Explanation  Response: Verbalizes Understanding, Needs Reinforcement    Mobility Training, taught by Ayla Mccartney PT at 12/17/2023  3:54 PM.  Learner: Family, Patient  Readiness: Acceptance  Method: Explanation  Response: Verbalizes Understanding, Needs Reinforcement    Education Comments  No comments found.

## 2023-12-17 NOTE — CARE PLAN
The patient's goals for the shift include sleep    The clinical goals for the shift include pain free , npo at midnight    Over the shift, the patient did not make progress toward the following goals. Barriers to progression include . Recommendations to address these barriers include .

## 2023-12-18 ENCOUNTER — APPOINTMENT (OUTPATIENT)
Dept: PRIMARY CARE | Facility: CLINIC | Age: 88
End: 2023-12-18
Payer: MEDICARE

## 2023-12-18 LAB
ATRIAL RATE: 67 BPM
ATRIAL RATE: 69 BPM
IRON SATN MFR SERPL: ABNORMAL %
IRON SERPL-MCNC: <20 UG/DL (ref 30–160)
P AXIS: 37 DEGREES
P AXIS: 48 DEGREES
P OFFSET: 147 MS
P OFFSET: 152 MS
P ONSET: 121 MS
P ONSET: 123 MS
PR INTERVAL: 184 MS
PR INTERVAL: 188 MS
Q ONSET: 215 MS
Q ONSET: 215 MS
QRS COUNT: 11 BEATS
QRS COUNT: 11 BEATS
QRS DURATION: 120 MS
QRS DURATION: 120 MS
QT INTERVAL: 502 MS
QT INTERVAL: 504 MS
QTC CALCULATION(BAZETT): 532 MS
QTC CALCULATION(BAZETT): 537 MS
QTC FREDERICIA: 523 MS
QTC FREDERICIA: 526 MS
R AXIS: -2 DEGREES
R AXIS: -6 DEGREES
T AXIS: 172 DEGREES
T AXIS: 174 DEGREES
T OFFSET: 466 MS
T OFFSET: 467 MS
TIBC SERPL-MCNC: ABNORMAL UG/DL
UIBC SERPL-MCNC: 182 UG/DL (ref 110–370)
VENTRICULAR RATE: 67 BPM
VENTRICULAR RATE: 69 BPM

## 2023-12-18 PROCEDURE — 97110 THERAPEUTIC EXERCISES: CPT | Mod: GP

## 2023-12-18 PROCEDURE — 2500000004 HC RX 250 GENERAL PHARMACY W/ HCPCS (ALT 636 FOR OP/ED): Performed by: NURSE PRACTITIONER

## 2023-12-18 PROCEDURE — 2500000001 HC RX 250 WO HCPCS SELF ADMINISTERED DRUGS (ALT 637 FOR MEDICARE OP): Performed by: ORTHOPAEDIC SURGERY

## 2023-12-18 PROCEDURE — 97530 THERAPEUTIC ACTIVITIES: CPT | Mod: GP

## 2023-12-18 PROCEDURE — 2500000004 HC RX 250 GENERAL PHARMACY W/ HCPCS (ALT 636 FOR OP/ED): Performed by: ORTHOPAEDIC SURGERY

## 2023-12-18 PROCEDURE — 99232 SBSQ HOSP IP/OBS MODERATE 35: CPT | Performed by: INTERNAL MEDICINE

## 2023-12-18 PROCEDURE — 2500000001 HC RX 250 WO HCPCS SELF ADMINISTERED DRUGS (ALT 637 FOR MEDICARE OP): Performed by: INTERNAL MEDICINE

## 2023-12-18 PROCEDURE — 1200000002 HC GENERAL ROOM WITH TELEMETRY DAILY

## 2023-12-18 PROCEDURE — 97166 OT EVAL MOD COMPLEX 45 MIN: CPT | Mod: GO

## 2023-12-18 PROCEDURE — 99222 1ST HOSP IP/OBS MODERATE 55: CPT | Performed by: NURSE PRACTITIONER

## 2023-12-18 PROCEDURE — 99231 SBSQ HOSP IP/OBS SF/LOW 25: CPT | Performed by: INTERNAL MEDICINE

## 2023-12-18 RX ORDER — FUROSEMIDE 40 MG/1
40 TABLET ORAL DAILY
Status: DISCONTINUED | OUTPATIENT
Start: 2023-12-18 | End: 2023-12-19 | Stop reason: HOSPADM

## 2023-12-18 RX ADMIN — IRON SUCROSE 200 MG: 20 INJECTION, SOLUTION INTRAVENOUS at 17:53

## 2023-12-18 RX ADMIN — ATORVASTATIN CALCIUM 40 MG: 40 TABLET, FILM COATED ORAL at 20:57

## 2023-12-18 RX ADMIN — SERTRALINE 50 MG: 50 TABLET, FILM COATED ORAL at 11:25

## 2023-12-18 RX ADMIN — LOSARTAN POTASSIUM 25 MG: 50 TABLET, FILM COATED ORAL at 11:23

## 2023-12-18 RX ADMIN — SENNOSIDES 17.2 MG: 8.6 TABLET, FILM COATED ORAL at 20:57

## 2023-12-18 RX ADMIN — GUAIFENESIN 600 MG: 600 TABLET ORAL at 11:28

## 2023-12-18 RX ADMIN — PANTOPRAZOLE SODIUM 40 MG: 40 TABLET, DELAYED RELEASE ORAL at 11:25

## 2023-12-18 RX ADMIN — ASPIRIN 325 MG: 325 TABLET, COATED ORAL at 20:57

## 2023-12-18 RX ADMIN — POLYETHYLENE GLYCOL 3350 17 G: 17 POWDER, FOR SOLUTION ORAL at 11:24

## 2023-12-18 RX ADMIN — PSYLLIUM HUSK 1 PACKET: 3.4 POWDER ORAL at 11:24

## 2023-12-18 RX ADMIN — DOCUSATE SODIUM 100 MG: 100 CAPSULE, LIQUID FILLED ORAL at 20:57

## 2023-12-18 RX ADMIN — FUROSEMIDE 40 MG: 40 TABLET ORAL at 16:17

## 2023-12-18 RX ADMIN — METOPROLOL SUCCINATE 50 MG: 50 TABLET, EXTENDED RELEASE ORAL at 11:24

## 2023-12-18 RX ADMIN — CALCITONIN SALMON 1 SPRAY: 200 SPRAY, METERED NASAL at 11:22

## 2023-12-18 RX ADMIN — Medication 400 MG: at 11:23

## 2023-12-18 RX ADMIN — ROPINIROLE HYDROCHLORIDE 5 MG: 1 TABLET, FILM COATED ORAL at 20:56

## 2023-12-18 RX ADMIN — ACETAMINOPHEN 650 MG: 325 TABLET ORAL at 11:26

## 2023-12-18 RX ADMIN — HYDROCODONE BITARTRATE AND ACETAMINOPHEN 1 TABLET: 5; 325 TABLET ORAL at 16:16

## 2023-12-18 RX ADMIN — ACETAMINOPHEN 650 MG: 325 TABLET ORAL at 23:35

## 2023-12-18 RX ADMIN — ACETAMINOPHEN 650 MG: 325 TABLET ORAL at 06:30

## 2023-12-18 RX ADMIN — DOCUSATE SODIUM 100 MG: 100 CAPSULE, LIQUID FILLED ORAL at 11:23

## 2023-12-18 RX ADMIN — SENNOSIDES 17.2 MG: 8.6 TABLET, FILM COATED ORAL at 11:25

## 2023-12-18 RX ADMIN — ASPIRIN 325 MG: 325 TABLET, COATED ORAL at 11:21

## 2023-12-18 ASSESSMENT — ENCOUNTER SYMPTOMS
WHEEZING: 0
ABDOMINAL DISTENTION: 0
DIZZINESS: 0
SHORTNESS OF BREATH: 0
DYSURIA: 0
AGITATION: 0
FATIGUE: 0
ACTIVITY CHANGE: 1
HEMATURIA: 0
DIAPHORESIS: 0
HEADACHES: 0
COLOR CHANGE: 0
BRUISES/BLEEDS EASILY: 0
APPETITE CHANGE: 0
CONFUSION: 0
COUGH: 0
FEVER: 0
NUMBNESS: 0
LIGHT-HEADEDNESS: 0
DIARRHEA: 0
NAUSEA: 0
ANAL BLEEDING: 0
CHILLS: 0
ARTHRALGIAS: 1
WOUND: 0
VOMITING: 0
CONSTIPATION: 0
BLOOD IN STOOL: 0
PALPITATIONS: 0
ABDOMINAL PAIN: 0

## 2023-12-18 ASSESSMENT — COGNITIVE AND FUNCTIONAL STATUS - GENERAL
EATING MEALS: A LITTLE
MOVING FROM LYING ON BACK TO SITTING ON SIDE OF FLAT BED WITH BEDRAILS: A LITTLE
MOVING FROM LYING ON BACK TO SITTING ON SIDE OF FLAT BED WITH BEDRAILS: A LOT
WALKING IN HOSPITAL ROOM: A LOT
HELP NEEDED FOR BATHING: A LOT
DAILY ACTIVITIY SCORE: 12
PERSONAL GROOMING: A LITTLE
CLIMB 3 TO 5 STEPS WITH RAILING: TOTAL
PERSONAL GROOMING: A LOT
STANDING UP FROM CHAIR USING ARMS: A LOT
PERSONAL GROOMING: A LITTLE
DRESSING REGULAR UPPER BODY CLOTHING: A LITTLE
MOVING TO AND FROM BED TO CHAIR: A LOT
DRESSING REGULAR LOWER BODY CLOTHING: A LOT
MOBILITY SCORE: 12
DRESSING REGULAR LOWER BODY CLOTHING: A LOT
TOILETING: A LOT
EATING MEALS: A LITTLE
STANDING UP FROM CHAIR USING ARMS: A LOT
DRESSING REGULAR UPPER BODY CLOTHING: A LITTLE
DAILY ACTIVITIY SCORE: 15
DRESSING REGULAR LOWER BODY CLOTHING: A LOT
DRESSING REGULAR UPPER BODY CLOTHING: A LOT
MOVING TO AND FROM BED TO CHAIR: A LOT
TURNING FROM BACK TO SIDE WHILE IN FLAT BAD: A LOT
TOILETING: A LOT
DAILY ACTIVITIY SCORE: 15
CLIMB 3 TO 5 STEPS WITH RAILING: TOTAL
HELP NEEDED FOR BATHING: TOTAL
WALKING IN HOSPITAL ROOM: TOTAL
MOBILITY SCORE: 10
EATING MEALS: A LITTLE
TURNING FROM BACK TO SIDE WHILE IN FLAT BAD: A LOT
HELP NEEDED FOR BATHING: A LOT
TOILETING: A LOT

## 2023-12-18 ASSESSMENT — PAIN DESCRIPTION - LOCATION
LOCATION: LEG
LOCATION: HIP

## 2023-12-18 ASSESSMENT — ACTIVITIES OF DAILY LIVING (ADL)
BATHING_ASSISTANCE: MODERATE
ADL_ASSISTANCE: INDEPENDENT

## 2023-12-18 ASSESSMENT — PAIN SCALES - GENERAL
PAINLEVEL_OUTOF10: 0 - NO PAIN
PAINLEVEL_OUTOF10: 3
PAINLEVEL_OUTOF10: 4
PAINLEVEL_OUTOF10: 0 - NO PAIN
PAINLEVEL_OUTOF10: 6
PAINLEVEL_OUTOF10: 0 - NO PAIN
PAINLEVEL_OUTOF10: 4

## 2023-12-18 ASSESSMENT — PAIN - FUNCTIONAL ASSESSMENT
PAIN_FUNCTIONAL_ASSESSMENT: 0-10
PAIN_FUNCTIONAL_ASSESSMENT: 0-10
PAIN_FUNCTIONAL_ASSESSMENT: FLACC (FACE, LEGS, ACTIVITY, CRY, CONSOLABILITY)
PAIN_FUNCTIONAL_ASSESSMENT: 0-10

## 2023-12-18 ASSESSMENT — PAIN DESCRIPTION - ORIENTATION
ORIENTATION: RIGHT

## 2023-12-18 NOTE — NURSING NOTE
Paged Dr. Swain to inform him about pt removing her hip dressing.  Informed by Call center that Md will be available after 8am.  Made Incoming nurse aware and to follow up with Dr. Michaels.

## 2023-12-18 NOTE — NURSING NOTE
Pt removed her right hip dressing and IV.  Hip shows no sign of edema/bleeding.  Abd applied and new IV placed.  Pt is confused and alert and oriented to self at this time.  Will continue to reorient pt.

## 2023-12-18 NOTE — PROGRESS NOTES
"Kalie Ramos is a 89 y.o. female on day 4 of admission presenting with Fall, initial encounter.    Subjective   Sitting comfortably.  Eating breakfast.  Denies significant pain.       Objective     Physical Exam examination of the right hip demonstrates a replaced ABD pad dressing which is clean dry and intact.  She has intact motor and sensory function distally.  Calf soft supple nontender negative Homans' sign.    Last Recorded Vitals  Blood pressure 120/56, pulse 62, temperature 36.7 °C (98.1 °F), temperature source Oral, resp. rate 18, height 1.575 m (5' 2\"), weight 53.3 kg (117 lb 8.1 oz), SpO2 97 %.  Intake/Output last 3 Shifts:  I/O last 3 completed shifts:  In: 1200 (22.5 mL/kg) [P.O.:220; I.V.:880 (16.5 mL/kg); IV Piggyback:100]  Out: 1200 (22.5 mL/kg) [Urine:1200 (0.6 mL/kg/hr)]  Weight: 53.3 kg     Relevant Results      Scheduled medications  acetaminophen, 650 mg, oral, q6h DOV  aspirin, 325 mg, oral, BID  atorvastatin, 40 mg, oral, Nightly  calcitonin (salmon), 1 spray, One Nostril, Daily  docusate sodium, 100 mg, oral, BID  furosemide, 40 mg, oral, Daily  losartan, 25 mg, oral, Daily  magnesium oxide, 400 mg, oral, Once per day on Mon Wed Fri  metoprolol succinate XL, 50 mg, oral, Daily  pantoprazole, 40 mg, oral, Daily  polyethylene glycol, 17 g, oral, Daily  psyllium, 1 packet, oral, Daily  rOPINIRole, 5 mg, oral, Nightly  sennosides, 2 tablet, oral, BID  sertraline, 50 mg, oral, Daily      Continuous medications     PRN medications  PRN medications: acetaminophen **OR** acetaminophen **OR** acetaminophen, acetaminophen, albuterol, benzocaine-menthol, dextromethorphan-guaifenesin, guaiFENesin, HYDROcodone-acetaminophen, HYDROmorphone, naloxone, naloxone, nitroglycerin, [DISCONTINUED] ondansetron **OR** ondansetron, oxygen, sodium chloride, traZODone  No results found for this or any previous visit (from the past 24 hour(s)).                         Assessment/Plan   Principal Problem:    Fall, " initial encounter  Active Problems:    COPD (chronic obstructive pulmonary disease) (CMS/[facility])    Hyperlipidemia    Hypertension    Age-related osteoporosis without current pathological fracture    Chronic diastolic (congestive) heart failure (CMS/[facility])    Hip fracture, right, closed, initial encounter (CMS/[facility])    Recovering nicely from hip surgery.  She is cleared for discharge from an orthopedic standpoint once she is cleared medically.  She should go to rehab center on twice daily aspirin and perhaps tramadol for pain management.  Follow-up in 1 month.             Jarett Michaels MD

## 2023-12-18 NOTE — PROGRESS NOTES
Subjective Data:      Overnight Events:         Objective Data:  Last Recorded Vitals:  Vitals:    12/17/23 1153 12/17/23 1532 12/18/23 0129 12/18/23 0750   BP:  (!) 121/48 (!) 98/36 120/56   BP Location:  Right arm Right arm Right arm   Patient Position:  Lying Lying Lying   Pulse: 81 59 58 62   Resp:  18 18 18   Temp:  37.1 °C (98.8 °F) 36.6 °C (97.9 °F) 36.7 °C (98.1 °F)   TempSrc:  Temporal Oral Oral   SpO2: 98% 100% 98% 97%   Weight:       Height:           Last Labs:  CBC - 12/17/2023:  5:03 AM  12.1 10.9 220    36.1      CMP - 12/17/2023:  5:03 AM  7.5 7.2 22 --- 0.8   _ 3.6 16 132      PTT - 1/26/2023:  6:26 PM  1.0   10.2 29.2     HGBA1C   Date/Time Value Ref Range Status   05/23/2022 10:43 PM 5.7 4.0 - 6.0 % Final     Comment:     Hemoglobin A1C levels are related to mean blood glucose during the   preceding 2-3 months. The relationship table below may be used as a   general guide. Each 1% increase in HGB A1C is a reflection of an   increase in mean glucose of approximately 30 mg/dl.   Reference: Diabetes [employer], volume 29, supplement 1 Jan. 2006                        HGB A1C ................. Approx. Mean Glucose   _______________________________________________   6%   ...............................  120 mg/dl   7%   ...............................  150 mg/dl   8%   ...............................  180 mg/dl   9%   ...............................  210 mg/dl   10%  ...............................  240 mg/dl  Performed at [address] [address]     12/17/2020 03:16 PM 5.7 4.0 - 6.0 % Final     Comment:     Hemoglobin A1C levels are related to mean blood glucose during the   preceding 2-3 months. The relationship table below may be used as a   general guide. Each 1% increase in HGB A1C is a reflection of an   increase in mean glucose of approximately 30 mg/dl.   Reference: Diabetes Care, volume 29, supplement 1 Jan. 2006                        HGB A1C ................. Approx. Mean  "Glucose   _______________________________________________   6%   ...............................  120 mg/dl   7%   ...............................  150 mg/dl   8%   ...............................  180 mg/dl   9%   ...............................  210 mg/dl   10%  ...............................  240 mg/dl  Performed at [address] [address]       LDLCALC   Date/Time Value Ref Range Status   01/27/2023 06:04 AM 66 65 - 130 MG/DL Final   11/20/2022 02:47 AM 54 65 - 130 MG/DL Final   07/06/2022 01:13 AM 54 65 - 130 MG/DL Final     VLDL   Date/Time Value Ref Range Status   08/03/2018 09:41 AM 25 0 - 40 mg/dL Final   01/18/2018 02:30 PM 47 0 - 40 mg/dL Final   10/17/2017 11:18 AM 50 0 - 40 mg/dL Final      Last I/O:  I/O last 3 completed shifts:  In: 1200 (22.5 mL/kg) [P.O.:220; I.V.:880 (16.5 mL/kg); IV Piggyback:100]  Out: 1200 (22.5 mL/kg) [Urine:1200 (0.6 mL/kg/hr)]  Weight: 53.3 kg     Past Cardiology Tests (Last 3 Years):  EKG:  ECG 12 lead  (Preliminary)      Electrocardiogram, 12-lead PRN ACS symptoms  (Preliminary)      ECG 12 lead       ECG 12 lead 11/17/2023      ECG 12 Lead       ECG 12 Lead       ECG 12 lead 10/24/2023    Echo:  No results found for this or any previous visit from the past 1095 days.    Ejection Fractions:  No results found for: \"EF\"  Cath:  No results found for this or any previous visit from the past 1095 days.    Stress Test:  No results found for this or any previous visit from the past 1095 days.    Cardiac Imaging:  No results found for this or any previous visit from the past 1095 days.      Inpatient Medications:  Scheduled medications   Medication Dose Route Frequency    acetaminophen  650 mg oral q6h DOV    aspirin  325 mg oral BID    atorvastatin  40 mg oral Nightly    calcitonin (salmon)  1 spray One Nostril Daily    docusate sodium  100 mg oral BID    losartan  25 mg oral Daily    magnesium oxide  400 mg oral Once per day on Mon Wed Fri    metoprolol " succinate XL  50 mg oral Daily    pantoprazole  40 mg oral Daily    polyethylene glycol  17 g oral Daily    psyllium  1 packet oral Daily    rOPINIRole  5 mg oral Nightly    sennosides  2 tablet oral BID    sertraline  50 mg oral Daily     PRN medications   Medication    acetaminophen    Or    acetaminophen    Or    acetaminophen    acetaminophen    albuterol    benzocaine-menthol    dextromethorphan-guaifenesin    guaiFENesin    HYDROcodone-acetaminophen    HYDROmorphone    naloxone    naloxone    nitroglycerin    ondansetron    oxygen    sodium chloride    traZODone     Continuous Medications   Medication Dose Last Rate       Physical Exam:  The patient is a thin upper elderly white female sitting upright in bed eating breakfast.  No respiratory distress on standard low-flow O2 nasal cannula 3 L/min  JVP not elevated carotid pulses 2+ thyroid is enlarged palpably  Moderate thoracic kyphosis shallow air movement diminished breath sounds  Cardiac rhythm regular no premature beats S1-S2 obscured grade 3/6 systolic ejection murmur  No peripheral edema.     Assessment/Plan   Principal Problem:    Fall, initial encounter  Active Problems:    COPD (chronic obstructive pulmonary disease) (CMS/[facility])    Hyperlipidemia    Hypertension    Age-related osteoporosis without current pathological fracture    Chronic diastolic (congestive) heart failure (CMS/[facility])    Hip fracture, right, closed, initial encounter (CMS/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  The patient's last echocardiogram on 7/25/2023 again demonstrated a LV ejection fraction 35-40% severe aortic valve stenosis peak systolic gradient 56 mmHg, mild mitral moderate tricuspid valve regurgitation and severe pulmonary hypertension estimated PA systolic pressure 67 mmHg.  The patient was thought not to be a candidate for transcatheter aortic valve replacement.  She does wear continuous oxygen at 3 L/min at home.  She surprisingly still lives independently in her own condominium.  She has been having increased shortness of breath.  Multiple potential etiologies including her chronic lung disease aspiration pneumonias interstitial fibrosis possible CHF.  The patient was recently admitted for several days on 10/23/2023 with increased shortness of breath and ultimately was released on Lasix 40 mg twice daily which she has reduced to daily.  Patient admitted again on 11/12/2023 after having fallen in the bathroom and not being able to get up due to generalized weakness.  She lives independently in her own condominium and has been very resistant to review our recommendation for assisted living.  At that time it was decided to continue the patient on her usual therapy which does at this point include Lasix 40 mg once daily along with her low-dose aspirin plus atorvastatin 40 mg daily and metoprolol succinate 25 mg twice daily.   highly advised the patient to be placed into an assisted living facility given her increasing generalized weakness and her need for continuous nasal oxygen therapy.  Patient is currently admitted after a mechanical fall with a right femoral neck fracture.  Patient is currently scheduled for right total hip replacement on 12/16/2023.  Given her cardiac history she is at increased but not prohibitive cardiac risk and will require close observation postoperatively.      12/16: As above. This patient was seen in collaboration with Dr. Sosa on coverage for Dr. Mckinney.  Patient  n.p.o. for right total hip replacement this morning.  She denies any chest pain, pressure or palpitations overnight.  Unfortunately no lab work is available for this morning.  Blood pressures have been overall normal with her last recorded at 107/45.  She has remained in a rate controlled atrial fibrillation on telemetry.  Patient's pulse oximetry is 100% on 3 L nasal cannula.  She appears overall euvolemic on examination with no edema to her extremities.  Again, given this patient's cardiac history she is at increased risk for surgery; however, this is not a prohibitive cardiac risk.  We will follow this patient postoperatively.      12/18: The patient was seen and events reviewed.  She is sitting in bedside chair eating a meal.  She is awake alert conversant no respiratory distress on her usual nasal oxygen.  Her right-sided total hip replacement was performed on 12/16/2023 without any unusual issues.  CBC from yesterday includes a hemoglobin 10.9 decreased from 12.9 preoperatively.  Basic metabolic panel is unremarkable creatinine 0.8.  Serum iron is less than 20 and will begin IV iron supplementation.  Vital signs are stable with systolic blood pressures in the 120 mmHg range.  Patient will be kept on usual preadmission low-dose losartan 25 mg daily Toprol-XL  25 mg daily and Lasix 40 mg daily.  Arrangements in progress for transfer to rehab facility.    Code Status:  Full Code    I spent 20 minutes in the professional and overall care of this patient.        Jim Mckinney MD

## 2023-12-18 NOTE — CONSULTS
Consults-blood management    Reason For Consult  Post op anemia     History Of Present Illness  Kalie Ramos is a 89 y.o. female presenting to ED 12/14/23  with c/p right hip pain s/p mechanical fall. Xrays +right femoral neck fracture. Right hip hemiarthroplasty done 12/16/23. Pt reports she lives alone, was ambulating to bathroom, slipped on rug and fell, unable to ambulate, her daughter was able to see her on camera, came to pt home and contact EMS. Pt reports hx of anemia, has been taking po iron every other day for past 2-3years. Admit H/H 12.2/39.4. post op H/H 10.9/ 36.1, iron panel shows MARITO. Fe less than 20.   .     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  Lymphocytic colitis  Chronic Deep venous thrombosis   Diastolic heart failure  COPD  Surgical History  She has a past surgical history that includes Breast biopsy (09/10/2015); Rotator cuff repair (09/10/2015); Ray lymph node biopsy (09/10/2015); Cataract extraction (11/02/2015); Other surgical history (11/02/2015); Hand tendon surgery (11/02/2015); Carpal tunnel release (09/04/2014); Breast lumpectomy (07/19/2013); Hysterectomy (07/19/2013); Shoulder surgery (07/19/2013); MR angio head wo IV contrast (12/17/2020); MR angio head wo IV contrast (5/13/2021); MR angio neck wo IV contrast (5/24/2022); and MR angio head wo IV contrast (5/24/2022).     Social History  She reports that she has never smoked. She has never used smokeless tobacco. She reports that she does not drink alcohol and does not use drugs.    Family History  Family History   Problem Relation Name Age of Onset    Alzheimer's disease Father      Breast cancer Sister      Pancreatic cancer Sister      Arthritis Other          Allergies  Niaspan extended-release [niacin] and Naproxen    Review of Systems   Constitutional:  Positive for activity change. Negative for appetite change, chills, diaphoresis,  fatigue and fever.   HENT:  Negative for congestion and nosebleeds.    Eyes:  Negative for visual disturbance.   Respiratory:  Negative for cough, shortness of breath and wheezing.    Cardiovascular:  Positive for leg swelling. Negative for chest pain and palpitations.   Gastrointestinal:  Negative for abdominal distention, abdominal pain, anal bleeding, blood in stool, constipation, diarrhea, nausea and vomiting.   Endocrine: Negative for cold intolerance and heat intolerance.   Genitourinary:  Negative for dysuria and hematuria.   Musculoskeletal:  Positive for arthralgias.   Skin:  Negative for color change, pallor, rash and wound.   Neurological:  Negative for dizziness, light-headedness, numbness and headaches.   Hematological:  Does not bruise/bleed easily.   Psychiatric/Behavioral:  Negative for agitation and confusion.         Physical Exam  Constitutional:       Appearance: Normal appearance.   HENT:      Head: Normocephalic and atraumatic.      Right Ear: External ear normal.      Left Ear: External ear normal.      Nose: Nose normal.      Mouth/Throat:      Mouth: Mucous membranes are moist.   Eyes:      Pupils: Pupils are equal, round, and reactive to light.   Cardiovascular:      Rate and Rhythm: Normal rate and regular rhythm.      Heart sounds: Murmur heard.   Pulmonary:      Effort: Pulmonary effort is normal.      Breath sounds: Normal breath sounds. No wheezing, rhonchi or rales.      Comments: Chronic oxygen 3Lnc  Chest:      Chest wall: No tenderness.   Abdominal:      General: Bowel sounds are normal. There is no distension.      Palpations: Abdomen is soft.      Tenderness: There is no abdominal tenderness.   Musculoskeletal:         General: Swelling and tenderness present.      Cervical back: Normal range of motion.      Comments: Right hip trace swelling, tender right hip,    Skin:     General: Skin is warm and dry.      Coloration: Skin is not pale.      Findings: No bruising, erythema,  "lesion or rash.      Comments: Right hip dressing dry/intact, brace to left ankle   Neurological:      General: No focal deficit present.      Mental Status: She is alert and oriented to person, place, and time. Mental status is at baseline.   Psychiatric:         Mood and Affect: Mood normal.         Behavior: Behavior normal.          Last Recorded Vitals  Blood pressure 120/56, pulse 62, temperature 36.7 °C (98.1 °F), temperature source Oral, resp. rate 18, height 1.575 m (5' 2\"), weight 53.3 kg (117 lb 8.1 oz), SpO2 97 %.    Relevant Results  Results for orders placed or performed during the hospital encounter of 12/14/23 (from the past 96 hour(s))   ECG 12 lead   Result Value Ref Range    Ventricular Rate 72 BPM    Atrial Rate 72 BPM    KS Interval 242 ms    QRS Duration 128 ms    QT Interval 476 ms    QTC Calculation(Bazett) 521 ms    P Axis 84 degrees    R Axis 50 degrees    T Axis 182 degrees    QRS Count 12 beats    Q Onset 214 ms    P Onset 93 ms    P Offset 127 ms    T Offset 452 ms    QTC Fredericia 506 ms   CBC and Auto Differential   Result Value Ref Range    WBC 16.2 (H) 4.4 - 11.3 x10*3/uL    nRBC 0.0 0.0 - 0.0 /100 WBCs    RBC 4.31 4.00 - 5.20 x10*6/uL    Hemoglobin 12.2 12.0 - 16.0 g/dL    Hematocrit 39.4 36.0 - 46.0 %    MCV 91 80 - 100 fL    MCH 28.3 26.0 - 34.0 pg    MCHC 31.0 (L) 32.0 - 36.0 g/dL    RDW 14.1 11.5 - 14.5 %    Platelets 256 150 - 450 x10*3/uL    Neutrophils % 89.8 40.0 - 80.0 %    Immature Granulocytes %, Automated 0.6 0.0 - 0.9 %    Lymphocytes % 3.5 13.0 - 44.0 %    Monocytes % 5.8 2.0 - 10.0 %    Eosinophils % 0.1 0.0 - 6.0 %    Basophils % 0.2 0.0 - 2.0 %    Neutrophils Absolute 14.50 (H) 1.60 - 5.50 x10*3/uL    Immature Granulocytes Absolute, Automated 0.09 0.00 - 0.50 x10*3/uL    Lymphocytes Absolute 0.56 (L) 0.80 - 3.00 x10*3/uL    Monocytes Absolute 0.94 (H) 0.05 - 0.80 x10*3/uL    Eosinophils Absolute 0.02 0.00 - 0.40 x10*3/uL    Basophils Absolute 0.04 0.00 - 0.10 " x10*3/uL   Comprehensive Metabolic Panel   Result Value Ref Range    Glucose 115 (H) 65 - 99 mg/dL    Sodium 140 133 - 145 mmol/L    Potassium 4.0 3.4 - 5.1 mmol/L    Chloride 101 97 - 107 mmol/L    Bicarbonate 26 24 - 31 mmol/L    Urea Nitrogen 17 8 - 25 mg/dL    Creatinine 0.80 0.40 - 1.60 mg/dL    eGFR 69 >60 mL/min/1.73m*2    Calcium 9.0 8.5 - 10.4 mg/dL    Albumin 3.6 3.5 - 5.0 g/dL    Alkaline Phosphatase 130 (H) 35 - 125 U/L    Total Protein 7.0 5.9 - 7.9 g/dL    AST 20 5 - 40 U/L    Bilirubin, Total 0.9 0.1 - 1.2 mg/dL    ALT 16 5 - 40 U/L    Anion Gap 13 <=19 mmol/L   Magnesium   Result Value Ref Range    Magnesium 2.00 1.60 - 3.10 mg/dL   Serial Troponin, Initial ([facility])   Result Value Ref Range    Troponin T, High Sensitivity 40 (H) <=15 ng/L   Serial Troponin, 2 Hour ([facility])   Result Value Ref Range    Troponin T, High Sensitivity 51 (H) <=15 ng/L   CBC   Result Value Ref Range    WBC 11.4 (H) 4.4 - 11.3 x10*3/uL    nRBC 0.0 0.0 - 0.0 /100 WBCs    RBC 4.56 4.00 - 5.20 x10*6/uL    Hemoglobin 12.9 12.0 - 16.0 g/dL    Hematocrit 42.0 36.0 - 46.0 %    MCV 92 80 - 100 fL    MCH 28.3 26.0 - 34.0 pg    MCHC 30.7 (L) 32.0 - 36.0 g/dL    RDW 14.2 11.5 - 14.5 %    Platelets 258 150 - 450 x10*3/uL   Comprehensive metabolic panel   Result Value Ref Range    Glucose 112 (H) 65 - 99 mg/dL    Sodium 140 133 - 145 mmol/L    Potassium 4.6 3.4 - 5.1 mmol/L    Chloride 101 97 - 107 mmol/L    Bicarbonate 29 24 - 31 mmol/L    Urea Nitrogen 16 8 - 25 mg/dL    Creatinine 0.90 0.40 - 1.60 mg/dL    eGFR 60 (L) >60 mL/min/1.73m*2    Calcium 9.0 8.5 - 10.4 mg/dL    Albumin 3.6 3.5 - 5.0 g/dL    Alkaline Phosphatase 132 (H) 35 - 125 U/L    Total Protein 7.2 5.9 - 7.9 g/dL    AST 22 5 - 40 U/L    Bilirubin, Total 0.8 0.1 - 1.2 mg/dL    ALT 16 5 - 40 U/L    Anion Gap 10 <=19 mmol/L   Serial Troponin, 6 Hour ([facility])   Result Value Ref Range    Troponin T, High Sensitivity 44 (H) <=15 ng/L   Electrocardiogram, 12-lead PRN ACS symptoms    Result Value Ref Range    Ventricular Rate 67 BPM    Atrial Rate 67 BPM    ID Interval 184 ms    QRS Duration 120 ms    QT Interval 504 ms    QTC Calculation(Bazett) 532 ms    P Axis 37 degrees    R Axis -2 degrees    T Axis 172 degrees    QRS Count 11 beats    Q Onset 215 ms    P Onset 123 ms    P Offset 147 ms    T Offset 467 ms    QTC Fredericia 523 ms   ECG 12 lead   Result Value Ref Range    Ventricular Rate 69 BPM    Atrial Rate 69 BPM    ID Interval 188 ms    QRS Duration 120 ms    QT Interval 502 ms    QTC Calculation(Bazett) 537 ms    P Axis 48 degrees    R Axis -6 degrees    T Axis 174 degrees    QRS Count 11 beats    Q Onset 215 ms    P Onset 121 ms    P Offset 152 ms    T Offset 466 ms    QTC Fredericia 526 ms   CBC   Result Value Ref Range    WBC 12.1 (H) 4.4 - 11.3 x10*3/uL    nRBC 0.0 0.0 - 0.0 /100 WBCs    RBC 3.80 (L) 4.00 - 5.20 x10*6/uL    Hemoglobin 10.9 (L) 12.0 - 16.0 g/dL    Hematocrit 36.1 36.0 - 46.0 %    MCV 95 80 - 100 fL    MCH 28.7 26.0 - 34.0 pg    MCHC 30.2 (L) 32.0 - 36.0 g/dL    RDW 14.4 11.5 - 14.5 %    Platelets 220 150 - 450 x10*3/uL   Basic Metabolic Panel   Result Value Ref Range    Glucose 108 (H) 65 - 99 mg/dL    Sodium 139 133 - 145 mmol/L    Potassium 4.3 3.4 - 5.1 mmol/L    Chloride 105 97 - 107 mmol/L    Bicarbonate 25 24 - 31 mmol/L    Urea Nitrogen 15 8 - 25 mg/dL    Creatinine 0.80 0.40 - 1.60 mg/dL    eGFR 69 >60 mL/min/1.73m*2    Calcium 7.5 (L) 8.5 - 10.4 mg/dL    Anion Gap 9 <=19 mmol/L   Iron and TIBC   Result Value Ref Range    Iron <20 (L) 30 - 160 ug/dL    UIBC 182 110 - 370 ug/dL    TIBC      % Saturation        Current Facility-Administered Medications:     acetaminophen (Tylenol) tablet 650 mg, 650 mg, oral, q4h PRN, 650 mg at 12/18/23 1126 **OR** acetaminophen (Tylenol) oral liquid 650 mg, 650 mg, oral, q4h PRN **OR** acetaminophen (Tylenol) suppository 650 mg, 650 mg, rectal, q4h PRN, Jarett Michaels MD    acetaminophen (Tylenol) tablet 650 mg, 650 mg,  oral, q6h PRN, Jarett Michaels MD    acetaminophen (Tylenol) tablet 650 mg, 650 mg, oral, q6h DOV, Jarett Michaels MD, 650 mg at 12/18/23 0630    albuterol 2.5 mg /3 mL (0.083 %) nebulizer solution 2.5 mg, 2.5 mg, nebulization, q2h PRN, Jarett Michaels MD    aspirin EC tablet 325 mg, 325 mg, oral, BID, Jarett Michaels MD, 325 mg at 12/18/23 1121    atorvastatin (Lipitor) tablet 40 mg, 40 mg, oral, Nightly, Jarett Michaels MD, 40 mg at 12/17/23 2259    benzocaine-menthol (Cepastat Sore Throat) 15-3.6 mg lozenge 1 lozenge, 1 lozenge, Mouth/Throat, q2h PRN, Jarett Michaels MD    calcitonin (salmon) (Miacalcin) nasal spray 1 spray, 1 spray, One Nostril, Daily, Jarett Michaels MD, 1 spray at 12/18/23 1122    dextromethorphan-guaifenesin (Robitussin DM)  mg/5 mL oral liquid 5 mL, 5 mL, oral, q4h PRN, Jarett Michaels MD    docusate sodium (Colace) capsule 100 mg, 100 mg, oral, BID, Jarett Michaels MD, 100 mg at 12/18/23 1123    furosemide (Lasix) tablet 40 mg, 40 mg, oral, Daily, Jim Mckinney MD    guaiFENesin (Mucinex) 12 hr tablet 600 mg, 600 mg, oral, q12h PRN, Jarett Michaels MD, 600 mg at 12/18/23 1128    HYDROcodone-acetaminophen (Norco) 5-325 mg per tablet 1 tablet, 1 tablet, oral, q4h PRN, Jarett Michaels MD, 1 tablet at 12/16/23 2013    HYDROmorphone (Dilaudid) injection 0.5 mg, 0.5 mg, intravenous, q4h PRN, Jarett Michaels MD, 0.5 mg at 12/17/23 1748    iron sucrose (Venofer) injection 200 mg, 200 mg, intravenous, Daily, Yvrose Cesar, APRN-CNP    losartan (Cozaar) tablet 25 mg, 25 mg, oral, Daily, Jarett Michaels MD, 25 mg at 12/18/23 1123    magnesium oxide (Mag-Ox) tablet 400 mg, 400 mg, oral, Once per day on Mon Wed Fri, Jarett Michaels MD, 400 mg at 12/18/23 1123    metoprolol succinate XL (Toprol-XL) 24 hr tablet 50 mg, 50 mg, oral, Daily, Jarett Michaels MD, 50 mg at 12/18/23 1124    naloxone (Narcan) injection 0.2 mg, 0.2 mg, intravenous, q5 min PRN, Jarett Michaels MD    naloxone (Narcan)  injection 0.2 mg, 0.2 mg, intravenous, q5 min PRN, Jarett Michaels MD    nitroglycerin (Nitrostat) SL tablet 0.4 mg, 0.4 mg, sublingual, q5 min PRN, Jarett Michaels MD    [DISCONTINUED] ondansetron (Zofran) tablet 4 mg, 4 mg, oral, q8h PRN **OR** ondansetron (Zofran) injection 4 mg, 4 mg, intravenous, q8h PRN, Jarett Michaels MD, 4 mg at 12/16/23 1516    oxygen (O2) therapy, , inhalation, Continuous PRN - O2/gases, Jarett Michaels MD, Start at 12/14/23 2244    pantoprazole (ProtoNix) EC tablet 40 mg, 40 mg, oral, Daily, Jarett Michaels MD, 40 mg at 12/18/23 1125    polyethylene glycol (Glycolax, Miralax) packet 17 g, 17 g, oral, Daily, Jarett Michaels MD, 17 g at 12/18/23 1124    psyllium (Metamucil) 3.4 gram packet 1 packet, 1 packet, oral, Daily, Jarett Michaels MD, 1 packet at 12/18/23 1124    rOPINIRole (Requip) tablet 5 mg, 5 mg, oral, Nightly, Jarett Michaels MD, 5 mg at 12/17/23 2258    sennosides (Senokot) tablet 17.2 mg, 2 tablet, oral, BID, Jarett Michaels MD, 17.2 mg at 12/18/23 1125    sertraline (Zoloft) tablet 50 mg, 50 mg, oral, Daily, Jarett Michaels MD, 50 mg at 12/18/23 1125    sodium chloride (Ocean) 0.65 % nasal spray 1 spray, 1 spray, Each Nostril, 4x daily PRN, Jarett Michaels MD, 1 spray at 12/17/23 0929    traZODone (Desyrel) tablet 50 mg, 50 mg, oral, Nightly PRN, Jarett Michaels MD      Assessment/Plan     Right femoral neck fracture  S/p right hemiarthroplasty  COPD  HTN  Chronic diastolic heart failure  Post op normocytic anemia  Anemia of acute blood loss preop and intra-op  Post op iron deficient    Venofer 200mg IV X3  Monitor H/H

## 2023-12-18 NOTE — PROGRESS NOTES
Kalie Ramos is a 89 y.o. female on day 4 of admission presenting with Fall, initial encounter.      Accepted by [facility].  Daughter aware. Precert started by Direct Submit Team.     PATIENT DOES NOT HAVE A SECURE DISCHARGE PLAN.  PRECERT PENDING                         Katherine Skelton RN

## 2023-12-18 NOTE — PROGRESS NOTES
Occupational Therapy    Evaluation  Patient Name: Kalie Ramos  MRN: [id number]  : 1929  Today's Date: 23  Time Calculation  Start Time: 1010  Stop Time: 1036  Time Calculation (min): 26 min       Assessment:  OT Assessment: Pt would benefit from acute OT services  Prognosis: Good  End of Session Communication: Bedside nurse  End of Session Patient Position: Up in chair, Alarm on (all needs in reach, breakfast tray set up, RN aware)  OT Assessment Results: Decreased ADL status, Decreased endurance, Decreased functional mobility  Prognosis: Good  Strengths: Support of extended family/friends  Plan:  Treatment Interventions: ADL retraining, Functional transfer training, UE strengthening/ROM, Equipment evaluation/education, Patient/family training  OT Frequency: 5 times per week  OT Discharge Recommendations: Moderate intensity level of continued care  OT - OK to Discharge: Yes  Treatment Interventions: ADL retraining, Functional transfer training, UE strengthening/ROM, Equipment evaluation/education, Patient/family training    Subjective     General:   OT Received On: 23  General  Reason for Referral: Hip Arthroplasty, fracture repair; 89 y.o. female admitted after falling at home sustaining a right femoral neck fracture. pt is now s/p R Hip Hemiarthroplasty 23.  Past Medical History Relevant to Rehab: AS, L BBB, HTN, venous insufficiency, Breast CA s/p lumpectomy, chronic DVT L LE, dysphagia, vertigo, lacunar infarct right corona radiata, pulmonary HTN, cardiomegaly, CAD, aspiration pneumonia  Missed Visit: No  Missed Visit Reason: Other (Comment)  Family/Caregiver Present: No  Co-Treatment: PT  Prior to Session Communication: Bedside nurse  Patient Position Received: Bed, 3 rail up, Alarm on  General Comment: Cleared by nursing. Pt agreeable to therapy  Precautions:  Hearing/Visual Limitations: glasses, very [place]  LE Weight Bearing Status: Weight Bearing as Tolerated  Medical  Precautions: Fall precautions, Oxygen therapy device and L/min  Vital Signs:     Pain:  Pain Assessment  Pain Assessment: 0-10  Pain Score: 0 - No pain    Objective   Cognition:  Overall Cognitive Status: Within Functional Limits (AOX 3)           Home Living:  Type of Home: Condo  Lives With: Alone  Home Adaptive Equipment: Walker rolling or standard (rollator)  Home Layout: One level  Home Access: Stairs to enter with rails  Entrance Stairs-Rails: Both  Entrance Stairs-Number of Steps: 1  Bathroom Shower/Tub: Tub/shower unit  Bathroom Toilet: Standard  Bathroom Equipment: Grab bars in shower, Shower chair with back  Home Living Comments: h/o multiple falls  Prior Function:  Level of [place]: Independent with ADLs and functional transfers, Needs assistance with homemaking  Receives Help From: Family  ADL Assistance: Independent  Homemaking Assistance: Needs assistance (pt able to do light cooking, has a cleaning lady every 2 weeks, her kids do the shoppoing and errands)  Driving/Transportation: Family/Friend  Ambulatory Assistance:  (rollator in community, cane or furniture walking in her home)  Vocational: Retired  Hand Dominance: Right  Prior Function Comments: pt uses 3L O2 via NC at all times, multiple falls, her children help her extensively    ADL:  Eating Assistance:  (set up)  Grooming Assistance:  (set up)  Bathing Assistance: Moderate  UE Dressing Assistance: Minimal  LE Dressing Assistance: Maximal  Toileting Assistance with Device: Maximal  Activities of Daily Living: Feeding  Feeding Level of Assistance: Setup  Feeding Where Assessed: Chair  Feeding Comments:  (assist opening packages, pt able to cut food and self feed)       LE Dressing  LE Dressing: Yes  Sock Level of Assistance: Maximum assistance  LE Dressing Where Assessed: Bed level  LE Dressing Comments: donning left sock       Activity Tolerance:  Endurance: Decreased tolerance for upright activites  Functional Standing Tolerance:     Bed  Mobility/Transfers: Bed Mobility  Bed Mobility:  (pt min A for trunk upright, able to initiate advancement of BLEs, required max A to scoot hips towards EOB for supine>seated EOB)    Transfers  Transfer:  (mod A for balance and controlled descent sit<>stand bed and chair level; VCs for safe hand and leg placement, increased time to complete)       Therapy/Activity: Therapeutic Activity  Therapeutic Activity Performed:  (mod A for balance for functional mobility ~3-4 steps bed>chair with use of RW, pt with slow movements this date)       Sensation:  Sensation Comment: pt denied numbness/paresthesia BUE  Strength:  Strength Comments: WFL    Hand Function:  Hand Function  Gross Grasp: Functional  Coordination: Functional  Extremities: RUE   RUE : Within Functional Limits and LUE   LUE: Within Functional Limits    Outcome Measures: [facility] Daily Activity  Putting on and taking off regular lower body clothing: A lot  Bathing (including washing, rinsing, drying): A lot  Putting on and taking off regular upper body clothing: A little  Toileting, which includes using toilet, bedpan or urinal: A lot  Taking care of personal grooming such as brushing teeth: A little  Eating Meals: A little  Daily Activity - Total Score: 15      Education Documentation  ADL Training, taught by Toya Madsen OT at 12/18/2023 11:55 AM.  Learner: Patient  Readiness: Acceptance  Method: Explanation, Demonstration  Response: Needs Reinforcement, Verbalizes Understanding    Education Comments  No comments found.      Goals:  Encounter Problems       Encounter Problems (Active)       ADLs       Pt will complete ADL tasks at close supervision with use of AE prn  (Progressing)       Start:  12/18/23    Expected End:  01/08/24               Mobility       Pt will perform functional mobility household distances at close supervision with use of RW.    (Progressing)       Start:  12/18/23    Expected End:  01/08/24               Transfers        Pt will perform functional transfers at close supervision.   (Progressing)       Start:  12/18/23    Expected End:  01/08/24            Pt will perform BUE exercises to improve strength and independence with functional transfers/ADL tasks.   (Progressing)       Start:  12/18/23    Expected End:  01/08/24

## 2023-12-18 NOTE — PROGRESS NOTES
Physical Therapy    Physical Therapy Treatment    Patient Name: Kalie Ramos  MRN: [id number]  Today's Date: 12/18/2023  Time Calculation  Start Time: 1009  Stop Time: 1034  Time Calculation (min): 25 min       Assessment/Plan   PT Assessment  End of Session Communication: Bedside nurse  Assessment Comment: pt slowly improving with mobitliy, pt able to perform more tasks for herself today than yesterday and required less assistnace today as well.  End of Session Patient Position: Up in chair, Alarm on (needs in reach, eating breakfast.)     PT Plan  Treatment/Interventions: Bed mobility, Transfer training, Gait training, Balance training, Strengthening, Endurance training, Range of motion, Therapeutic exercise, Therapeutic activity  PT Plan: Skilled PT  PT Frequency: Daily  PT Discharge Recommendations: Moderate intensity level of continued care  Equipment Recommended upon Discharge: Wheeled walker  PT Recommended Transfer Status: Assist x2, Assistive device (rolling walker)  PT - OK to Discharge: Yes      General Visit Information:   PT  Visit  PT Received On: 12/18/23  Response to Previous Treatment: Patient with no complaints from previous session.  General  Family/Caregiver Present: No  Co-Treatment: OT  Co-Treatment Reason: pt with confusion overnight, pulled out her IV and removed her silver hip dressing. 2 skilled therapists needed for patient and caregiver safety.  Prior to Session Communication: Bedside nurse  Patient Position Received: Bed, 4 rail up, Alarm on  General Comment: pt cleared for therapy by nursing, pt supine in bed, agreeable to therapy.    Subjective     Objective   Pain:  Pain Assessment  Pain Assessment: FLACC (Face, Legs, Activity, Cry, Consolability)  Pain Score: 0 - No pain (no pain at rest, slight pain with mobility.)  Cognition:  Cognition  Cognition Comments: pt is following commands, slightly confused but appropriate with all activity.  Activity Tolerance:  Activity  Tolerance  Activity Tolerance Comments: fair  Treatments:  Therapeutic Exercise  Therapeutic Exercise Performed:  (15 reps in supine with R LE with min A: AP, QS, heel slides, SAQ, hip abd/add)    Therapeutic Activity  Therapeutic Activity Performed:  (pushing up from chair with B UEs to stand with min A while alarm pad placed under pillow on chair.)    Balance/Neuromuscular Re-Education  Balance/Neuromuscular Re-Education Activity Performed:  (sat EOB x 8 minutes wiith good balance and B UE support.)    Bed Mobility  Bed Mobility:  (pt able to move B LEs off EOB with increased time to complete. max A using the draw sheet to square hips at EOB with min A for trunk support.)    Ambulation/Gait Training  Ambulation/Gait Training Performed:  (pt able to ambulate 4 short, shuffled steps to the chair with mod A and the rolling walker. able to advance R LE but difficulty bearing weight through it when stepping with the left foot. forward flexed posture.)  Transfers  Transfer:  (mod A for balance as pt able to place weight through her LEs and push up from bed with B UEs. retropulsive in standing with mod A for balance. verbal cues for sequencing to sit with mod A for eccentric control.)    Stairs  Stairs: No    Outcome Measures:  [facility] Basic Mobility  Turning from your back to your side while in a flat bed without using bedrails: A lot  Moving from lying on your back to sitting on the side of a flat bed without using bedrails: A lot  Moving to and from bed to chair (including a wheelchair): A lot  Standing up from a chair using your arms (e.g. wheelchair or bedside chair): A lot  To walk in hospital room: Total  Climbing 3-5 steps with railing: Total  Basic Mobility - Total Score: 10    Education Documentation  No documentation found.  Education Comments  No comments found.      Encounter Problems       Encounter Problems (Active)       Balance       LTG - Patient will maintain standing balance using the rolling walker to  allow for safe mobility (Progressing)       Start:  12/17/23    Expected End:  01/17/24               Mobility       STG - Patient will ambulate 20' with rolling walker and mod A. (Progressing)       Start:  12/17/23    Expected End:  01/17/24               Transfers       STG - Transfer from bed to chair with mod A x 1 using the rolling walker (Progressing)       Start:  12/17/23    Expected End:  01/17/24            STG - Patient to transfer to and from sit to supine with mod A x 1. (Progressing)       Start:  12/17/23    Expected End:  01/17/24            STG - Patient will perform bed mobility safely for pain control and pressure relief. (Progressing)       Start:  12/17/23    Expected End:  01/17/24            STG - Patient will transfer sit to and from stand with mod A x 1 and appropriate hand placement. (Progressing)       Start:  12/17/23    Expected End:  01/17/24

## 2023-12-19 ENCOUNTER — PHARMACY VISIT (OUTPATIENT)
Dept: PHARMACY | Facility: CLINIC | Age: 88
End: 2023-12-19
Payer: COMMERCIAL

## 2023-12-19 VITALS
HEIGHT: 62 IN | DIASTOLIC BLOOD PRESSURE: 58 MMHG | OXYGEN SATURATION: 98 % | HEART RATE: 63 BPM | TEMPERATURE: 97.9 F | SYSTOLIC BLOOD PRESSURE: 146 MMHG | RESPIRATION RATE: 18 BRPM | BODY MASS INDEX: 21.62 KG/M2 | WEIGHT: 117.5 LBS

## 2023-12-19 LAB
ANION GAP SERPL CALC-SCNC: 10 MMOL/L
BUN SERPL-MCNC: 18 MG/DL (ref 8–25)
CALCIUM SERPL-MCNC: 7.5 MG/DL (ref 8.5–10.4)
CHLORIDE SERPL-SCNC: 104 MMOL/L (ref 97–107)
CO2 SERPL-SCNC: 26 MMOL/L (ref 24–31)
CREAT SERPL-MCNC: 0.9 MG/DL (ref 0.4–1.6)
ERYTHROCYTE [DISTWIDTH] IN BLOOD BY AUTOMATED COUNT: 14.1 % (ref 11.5–14.5)
GFR SERPL CREATININE-BSD FRML MDRD: 60 ML/MIN/1.73M*2
GLUCOSE SERPL-MCNC: 95 MG/DL (ref 65–99)
HCT VFR BLD AUTO: 33.7 % (ref 36–46)
HGB BLD-MCNC: 10.3 G/DL (ref 12–16)
MCH RBC QN AUTO: 28.6 PG (ref 26–34)
MCHC RBC AUTO-ENTMCNC: 30.6 G/DL (ref 32–36)
MCV RBC AUTO: 94 FL (ref 80–100)
NRBC BLD-RTO: 0 /100 WBCS (ref 0–0)
PLATELET # BLD AUTO: 299 X10*3/UL (ref 150–450)
POTASSIUM SERPL-SCNC: 4 MMOL/L (ref 3.4–5.1)
RBC # BLD AUTO: 3.6 X10*6/UL (ref 4–5.2)
SODIUM SERPL-SCNC: 140 MMOL/L (ref 133–145)
WBC # BLD AUTO: 13.5 X10*3/UL (ref 4.4–11.3)

## 2023-12-19 PROCEDURE — 2500000004 HC RX 250 GENERAL PHARMACY W/ HCPCS (ALT 636 FOR OP/ED): Performed by: NURSE PRACTITIONER

## 2023-12-19 PROCEDURE — 36415 COLL VENOUS BLD VENIPUNCTURE: CPT | Performed by: INTERNAL MEDICINE

## 2023-12-19 PROCEDURE — 99239 HOSP IP/OBS DSCHRG MGMT >30: CPT | Performed by: INTERNAL MEDICINE

## 2023-12-19 PROCEDURE — 2500000001 HC RX 250 WO HCPCS SELF ADMINISTERED DRUGS (ALT 637 FOR MEDICARE OP): Performed by: ORTHOPAEDIC SURGERY

## 2023-12-19 PROCEDURE — 2500000004 HC RX 250 GENERAL PHARMACY W/ HCPCS (ALT 636 FOR OP/ED): Performed by: ORTHOPAEDIC SURGERY

## 2023-12-19 PROCEDURE — 85027 COMPLETE CBC AUTOMATED: CPT | Performed by: INTERNAL MEDICINE

## 2023-12-19 PROCEDURE — 2500000001 HC RX 250 WO HCPCS SELF ADMINISTERED DRUGS (ALT 637 FOR MEDICARE OP): Performed by: INTERNAL MEDICINE

## 2023-12-19 PROCEDURE — RXMED WILLOW AMBULATORY MEDICATION CHARGE

## 2023-12-19 PROCEDURE — 97110 THERAPEUTIC EXERCISES: CPT | Mod: GP

## 2023-12-19 PROCEDURE — 80048 BASIC METABOLIC PNL TOTAL CA: CPT | Performed by: INTERNAL MEDICINE

## 2023-12-19 PROCEDURE — 99232 SBSQ HOSP IP/OBS MODERATE 35: CPT | Performed by: INTERNAL MEDICINE

## 2023-12-19 PROCEDURE — 97530 THERAPEUTIC ACTIVITIES: CPT | Mod: GP

## 2023-12-19 PROCEDURE — 99231 SBSQ HOSP IP/OBS SF/LOW 25: CPT | Performed by: NURSE PRACTITIONER

## 2023-12-19 RX ORDER — DOCUSATE SODIUM 100 MG/1
100 CAPSULE, LIQUID FILLED ORAL 2 TIMES DAILY
Start: 2023-12-19 | End: 2024-02-03 | Stop reason: HOSPADM

## 2023-12-19 RX ORDER — POLYETHYLENE GLYCOL 3350 17 G/17G
17 POWDER, FOR SOLUTION ORAL DAILY
Start: 2023-12-20 | End: 2024-02-03 | Stop reason: HOSPADM

## 2023-12-19 RX ORDER — ACETAMINOPHEN 325 MG/1
650 TABLET ORAL EVERY 4 HOURS PRN
Qty: 30 TABLET | Refills: 0 | Status: SHIPPED | OUTPATIENT
Start: 2023-12-19 | End: 2024-03-29 | Stop reason: WASHOUT

## 2023-12-19 RX ORDER — SENNOSIDES 8.6 MG/1
2 TABLET ORAL 2 TIMES DAILY
Start: 2023-12-19 | End: 2024-02-03 | Stop reason: HOSPADM

## 2023-12-19 RX ORDER — NALOXONE HYDROCHLORIDE 0.4 MG/ML
0.2 INJECTION, SOLUTION INTRAMUSCULAR; INTRAVENOUS; SUBCUTANEOUS EVERY 5 MIN PRN
Qty: 1 ML | Status: SHIPPED | OUTPATIENT
Start: 2023-12-19 | End: 2023-12-19 | Stop reason: HOSPADM

## 2023-12-19 RX ORDER — HYDROCODONE BITARTRATE AND ACETAMINOPHEN 5; 325 MG/1; MG/1
1 TABLET ORAL EVERY 4 HOURS PRN
Refills: 0
Start: 2023-12-19 | End: 2024-03-29 | Stop reason: WASHOUT

## 2023-12-19 RX ORDER — ONDANSETRON HYDROCHLORIDE 2 MG/ML
4 INJECTION, SOLUTION INTRAVENOUS EVERY 8 HOURS PRN
Refills: 0
Start: 2023-12-19 | End: 2024-02-03 | Stop reason: HOSPADM

## 2023-12-19 RX ORDER — ASPIRIN 325 MG
325 TABLET, DELAYED RELEASE (ENTERIC COATED) ORAL 2 TIMES DAILY
Start: 2023-12-19 | End: 2024-04-02 | Stop reason: HOSPADM

## 2023-12-19 RX ORDER — GUAIFENESIN 600 MG/1
600 TABLET, EXTENDED RELEASE ORAL EVERY 12 HOURS PRN
Start: 2023-12-19 | End: 2024-03-29 | Stop reason: WASHOUT

## 2023-12-19 RX ADMIN — ASPIRIN 325 MG: 325 TABLET, COATED ORAL at 11:20

## 2023-12-19 RX ADMIN — SERTRALINE 50 MG: 50 TABLET, FILM COATED ORAL at 11:25

## 2023-12-19 RX ADMIN — LOSARTAN POTASSIUM 25 MG: 50 TABLET, FILM COATED ORAL at 11:35

## 2023-12-19 RX ADMIN — CALCITONIN SALMON 1 SPRAY: 200 SPRAY, METERED NASAL at 11:21

## 2023-12-19 RX ADMIN — PANTOPRAZOLE SODIUM 40 MG: 40 TABLET, DELAYED RELEASE ORAL at 11:24

## 2023-12-19 RX ADMIN — POLYETHYLENE GLYCOL 3350 17 G: 17 POWDER, FOR SOLUTION ORAL at 11:25

## 2023-12-19 RX ADMIN — ACETAMINOPHEN 650 MG: 325 TABLET ORAL at 05:58

## 2023-12-19 RX ADMIN — SENNOSIDES 17.2 MG: 8.6 TABLET, FILM COATED ORAL at 11:38

## 2023-12-19 RX ADMIN — PSYLLIUM HUSK 1 PACKET: 3.4 POWDER ORAL at 09:00

## 2023-12-19 RX ADMIN — HYDROCODONE BITARTRATE AND ACETAMINOPHEN 1 TABLET: 5; 325 TABLET ORAL at 02:31

## 2023-12-19 RX ADMIN — FUROSEMIDE 40 MG: 40 TABLET ORAL at 11:22

## 2023-12-19 RX ADMIN — IRON SUCROSE 200 MG: 20 INJECTION, SOLUTION INTRAVENOUS at 11:34

## 2023-12-19 RX ADMIN — METOPROLOL SUCCINATE 50 MG: 50 TABLET, EXTENDED RELEASE ORAL at 11:24

## 2023-12-19 RX ADMIN — DOCUSATE SODIUM 100 MG: 100 CAPSULE, LIQUID FILLED ORAL at 11:21

## 2023-12-19 ASSESSMENT — COGNITIVE AND FUNCTIONAL STATUS - GENERAL
TURNING FROM BACK TO SIDE WHILE IN FLAT BAD: A LITTLE
WALKING IN HOSPITAL ROOM: A LOT
CLIMB 3 TO 5 STEPS WITH RAILING: TOTAL
MOBILITY SCORE: 13
MOVING FROM LYING ON BACK TO SITTING ON SIDE OF FLAT BED WITH BEDRAILS: A LITTLE
MOVING TO AND FROM BED TO CHAIR: A LOT
STANDING UP FROM CHAIR USING ARMS: A LOT

## 2023-12-19 ASSESSMENT — PAIN DESCRIPTION - LOCATION
LOCATION: HIP
LOCATION: HIP

## 2023-12-19 ASSESSMENT — PAIN SCALES - GENERAL
PAINLEVEL_OUTOF10: 0 - NO PAIN
PAINLEVEL_OUTOF10: 5 - MODERATE PAIN
PAINLEVEL_OUTOF10: 5 - MODERATE PAIN
PAINLEVEL_OUTOF10: 0 - NO PAIN

## 2023-12-19 ASSESSMENT — PAIN DESCRIPTION - ORIENTATION
ORIENTATION: RIGHT
ORIENTATION: RIGHT

## 2023-12-19 ASSESSMENT — PAIN - FUNCTIONAL ASSESSMENT
PAIN_FUNCTIONAL_ASSESSMENT: 0-10

## 2023-12-19 NOTE — CARE PLAN
The patient's goals for the shift include sleep/rest    The clinical goals for the shift include pain management, ambulation, mobility      Problem: Pain  Goal: My pain/discomfort is manageable  Outcome: Progressing     Problem: Safety  Goal: Patient will be injury free during hospitalization  Outcome: Progressing  Goal: I will remain free of falls  Outcome: Progressing     Problem: Daily Care  Goal: Daily care needs are met  Outcome: Progressing     Problem: Psychosocial Needs  Goal: Demonstrates ability to cope with hospitalization/illness  Outcome: Progressing  Goal: Collaborate with me, my family, and caregiver to identify my specific goals  Outcome: Progressing     Problem: Discharge Barriers  Goal: My discharge needs are met  Outcome: Progressing     Problem: Fall/Injury  Goal: Not fall by end of shift  Outcome: Progressing  Goal: Be free from injury by end of the shift  Outcome: Progressing  Goal: Verbalize understanding of personal risk factors for fall in the hospital  Outcome: Progressing  Goal: Verbalize understanding of risk factor reduction measures to prevent injury from fall in the home  Outcome: Progressing  Goal: Use assistive devices by end of the shift  Outcome: Progressing  Goal: Pace activities to prevent fatigue by end of the shift  Outcome: Progressing     Problem: Skin  Goal: Decreased wound size/increased tissue granulation at next dressing change  Outcome: Progressing  Goal: Participates in plan/prevention/treatment measures  Outcome: Progressing  Goal: Prevent/manage excess moisture  Outcome: Progressing  Goal: Prevent/minimize sheer/friction injuries  Outcome: Progressing  Goal: Promote/optimize nutrition  Outcome: Progressing  Goal: Promote skin healing  Outcome: Progressing     Problem: ADLs  Goal: Pt will complete ADL tasks at close supervision with use of AE prn   Outcome: Progressing

## 2023-12-19 NOTE — PROGRESS NOTES
Blood management follow up of this MARITO pt on day 5 of admission presenting with right hip femoral neck fracture, she is 3 days s/p right hip hemiarthroplasty. Hx of chronic mild anemia, takes po iron 3xweekly.  Mild drop H/H 10.3/3.7,     Subjective   Reports post op pain controlled, c/o sore tongue making it difficult to eat, tongue is mildly erythemic  no thrush noted. Chronic resp failure, oxygen 3Lnc, denies sob, bradley, cough, congestion, scattered exp rhonchi throughout lung fields.  She denies hemoptysis, abd pain, tenderness, bloating, nausea, emesis, passing flatus, incontinent bowel and bladder. Denies hematochezia, melena, peripheral paresthesia, edema, rashes or lesions            Objective     Physical Exam  Constitutional:       Appearance: Normal appearance.   HENT:      Head: Normocephalic and atraumatic.      Right Ear: External ear normal.      Left Ear: External ear normal.      Mouth/Throat:      Mouth: Mucous membranes are dry.      Pharynx: Posterior oropharyngeal erythema present.      Comments: Tongue with mild erythema  Eyes:      Conjunctiva/sclera: Conjunctivae normal.      Pupils: Pupils are equal, round, and reactive to light.   Cardiovascular:      Rate and Rhythm: Normal rate and regular rhythm.      Heart sounds: Murmur heard.   Pulmonary:      Effort: Pulmonary effort is normal. No respiratory distress.      Breath sounds: Rhonchi present. No wheezing or rales.      Comments: Scattered exp rhonchi  Chest:      Chest wall: No tenderness.   Abdominal:      General: Bowel sounds are normal. There is no distension.      Palpations: Abdomen is soft.      Tenderness: There is no abdominal tenderness.   Musculoskeletal:         General: Swelling and tenderness present.      Cervical back: Normal range of motion and neck supple.      Right lower leg: Edema present.      Comments: Trace right leg edema   Skin:     General: Skin is warm and dry.      Capillary Refill: Capillary refill takes 2 to  "3 seconds.      Coloration: Skin is not pale.      Findings: No bruising, erythema, lesion or rash.      Comments: Dressing right hip dry/intact   Neurological:      General: No focal deficit present.      Mental Status: She is alert and oriented to person, place, and time. Mental status is at baseline.   Psychiatric:         Mood and Affect: Mood normal.         Behavior: Behavior normal.         Last Recorded Vitals  Blood pressure 124/58, pulse 57, temperature 36.3 °C (97.3 °F), temperature source Oral, resp. rate 18, height 1.575 m (5' 2\"), weight 53.3 kg (117 lb 8.1 oz), SpO2 97 %.  Intake/Output last 3 Shifts:  I/O last 3 completed shifts:  In: 480 (9 mL/kg) [P.O.:480]  Out: 700 (13.1 mL/kg) [Urine:700 (0.4 mL/kg/hr)]  Weight: 53.3 kg     Relevant Results  Results for orders placed or performed during the hospital encounter of 12/14/23 (from the past 24 hour(s))   CBC   Result Value Ref Range    WBC 13.5 (H) 4.4 - 11.3 x10*3/uL    nRBC 0.0 0.0 - 0.0 /100 WBCs    RBC 3.60 (L) 4.00 - 5.20 x10*6/uL    Hemoglobin 10.3 (L) 12.0 - 16.0 g/dL    Hematocrit 33.7 (L) 36.0 - 46.0 %    MCV 94 80 - 100 fL    MCH 28.6 26.0 - 34.0 pg    MCHC 30.6 (L) 32.0 - 36.0 g/dL    RDW 14.1 11.5 - 14.5 %    Platelets 299 150 - 450 x10*3/uL   Basic Metabolic Panel   Result Value Ref Range    Glucose 95 65 - 99 mg/dL    Sodium 140 133 - 145 mmol/L    Potassium 4.0 3.4 - 5.1 mmol/L    Chloride 104 97 - 107 mmol/L    Bicarbonate 26 24 - 31 mmol/L    Urea Nitrogen 18 8 - 25 mg/dL    Creatinine 0.90 0.40 - 1.60 mg/dL    eGFR 60 (L) >60 mL/min/1.73m*2    Calcium 7.5 (L) 8.5 - 10.4 mg/dL    Anion Gap 10 <=19 mmol/L      Current Facility-Administered Medications:     acetaminophen (Tylenol) tablet 650 mg, 650 mg, oral, q4h PRN, 650 mg at 12/18/23 1126 **OR** acetaminophen (Tylenol) oral liquid 650 mg, 650 mg, oral, q4h PRN **OR** acetaminophen (Tylenol) suppository 650 mg, 650 mg, rectal, q4h PRN, Jarett Michaels MD    acetaminophen (Tylenol) " tablet 650 mg, 650 mg, oral, q6h PRN, Jarett Michaels MD    acetaminophen (Tylenol) tablet 650 mg, 650 mg, oral, q6h DOV, Jarett Michaels MD, 650 mg at 12/19/23 0558    albuterol 2.5 mg /3 mL (0.083 %) nebulizer solution 2.5 mg, 2.5 mg, nebulization, q2h PRN, Jarett Michaels MD    aspirin EC tablet 325 mg, 325 mg, oral, BID, Jarett Michaels MD, 325 mg at 12/19/23 1120    atorvastatin (Lipitor) tablet 40 mg, 40 mg, oral, Nightly, Jarett Michaels MD, 40 mg at 12/18/23 2057    benzocaine-menthol (Cepastat Sore Throat) 15-3.6 mg lozenge 1 lozenge, 1 lozenge, Mouth/Throat, q2h PRN, Jarett Michaels MD    calcitonin (salmon) (Miacalcin) nasal spray 1 spray, 1 spray, One Nostril, Daily, Jarett Michaels MD, 1 spray at 12/19/23 1121    dextromethorphan-guaifenesin (Robitussin DM)  mg/5 mL oral liquid 5 mL, 5 mL, oral, q4h PRN, Jarett Michaels MD    docusate sodium (Colace) capsule 100 mg, 100 mg, oral, BID, Jarett Michaels MD, 100 mg at 12/19/23 1121    furosemide (Lasix) tablet 40 mg, 40 mg, oral, Daily, Jim Mckinney MD, 40 mg at 12/19/23 1122    guaiFENesin (Mucinex) 12 hr tablet 600 mg, 600 mg, oral, q12h PRN, Jarett Michaels MD, 600 mg at 12/18/23 1128    HYDROcodone-acetaminophen (Norco) 5-325 mg per tablet 1 tablet, 1 tablet, oral, q4h PRN, Jarett Michaels MD, 1 tablet at 12/19/23 0231    HYDROmorphone (Dilaudid) injection 0.5 mg, 0.5 mg, intravenous, q4h PRN, Jarett Michaels MD, 0.5 mg at 12/17/23 1748    iron sucrose (Venofer) injection 200 mg, 200 mg, intravenous, Daily, Yvrose Cesar, APRN-CNP, 200 mg at 12/19/23 1134    losartan (Cozaar) tablet 25 mg, 25 mg, oral, Daily, Jarett Michaels MD, 25 mg at 12/19/23 1135    magnesium oxide (Mag-Ox) tablet 400 mg, 400 mg, oral, Once per day on Mon Wed Fri, Jarett Michaels MD, 400 mg at 12/18/23 1123    metoprolol succinate XL (Toprol-XL) 24 hr tablet 50 mg, 50 mg, oral, Daily, Jarett Michaels MD, 50 mg at 12/19/23 1124    naloxone (Narcan) injection 0.2 mg, 0.2  mg, intravenous, q5 min PRN, Jarett Michaels MD    naloxone (Narcan) injection 0.2 mg, 0.2 mg, intravenous, q5 min PRN, Jarett Michaels MD    nitroglycerin (Nitrostat) SL tablet 0.4 mg, 0.4 mg, sublingual, q5 min PRN, Jarett Michaels MD    [DISCONTINUED] ondansetron (Zofran) tablet 4 mg, 4 mg, oral, q8h PRN **OR** ondansetron (Zofran) injection 4 mg, 4 mg, intravenous, q8h PRN, Jarett Michaels MD, 4 mg at 12/16/23 1516    oxygen (O2) therapy, , inhalation, Continuous PRN - O2/gases, Jarett Michaels MD, Start at 12/14/23 2244    pantoprazole (ProtoNix) EC tablet 40 mg, 40 mg, oral, Daily, Jarett Michaels MD, 40 mg at 12/19/23 1124    polyethylene glycol (Glycolax, Miralax) packet 17 g, 17 g, oral, Daily, Jarett Michaels MD, 17 g at 12/19/23 1125    psyllium (Metamucil) 3.4 gram packet 1 packet, 1 packet, oral, Daily, Jarett Michaels MD, 1 packet at 12/19/23 0900    rOPINIRole (Requip) tablet 5 mg, 5 mg, oral, Nightly, Jarett Michaels MD, 5 mg at 12/18/23 2056    sennosides (Senokot) tablet 17.2 mg, 2 tablet, oral, BID, Jarett Michaels MD, 17.2 mg at 12/19/23 1138    sertraline (Zoloft) tablet 50 mg, 50 mg, oral, Daily, Jarett Michaels MD, 50 mg at 12/19/23 1125    sodium chloride (Ocean) 0.65 % nasal spray 1 spray, 1 spray, Each Nostril, 4x daily PRN, Jarett Michaels MD, 1 spray at 12/17/23 0929    traZODone (Desyrel) tablet 50 mg, 50 mg, oral, Nightly PRN, Jarett Michaels MD                              Assessment/Plan   Right femoral neck fracture  S/p right hemiarthroplasty  COPD  HTN  Chronic diastolic heart failure  Post op normocytic anemia  Anemia of acute blood loss preop and intra-op  Post op iron deficient     Venofer 200mg IV X3  Monitor H/H               Yvrose Cesar, APRN-CNP

## 2023-12-19 NOTE — PROGRESS NOTES
Kalie Ramos is a 89 y.o. female on day 5 of admission presenting with Fall, initial encounter.       Precert is back for [facility].  Dr. JUSTINO Bailey notified.                         Katherine Skelton RN

## 2023-12-19 NOTE — PROGRESS NOTES
Occupational Therapy                 Therapy Communication Note    Patient Name: Kalie Ramos  MRN: [id number]  Today's Date: 12/19/2023     Discipline: Occupational Therapy    Missed Visit Reason: Missed Visit Reason: Patient refused    Missed Time: Attempt    Comment: tx attempted this date, however pt fatigued supine in bed and refusing this date despite max encouragement

## 2023-12-19 NOTE — PROGRESS NOTES
Physical Therapy    Physical Therapy Treatment    Patient Name: Kalie Ramos  MRN: [id number]  Today's Date: 12/19/2023  Time Calculation  Start Time: 1445  Stop Time: 1510  Time Calculation (min): 25 min       Assessment/Plan   PT Assessment  End of Session Communication: Bedside nurse  Assessment Comment: pt with improved bed mobility with HOB elevated and using the bed rail. slow progress with ambulation, difficulty bearing weight through R LE. mildly impulsive.  End of Session Patient Position: Up in chair, Alarm on (needs in reach, daughter present)     PT Plan  Treatment/Interventions: Bed mobility, Transfer training, Gait training, Balance training, Strengthening, Endurance training, Range of motion, Therapeutic exercise, Therapeutic activity  PT Plan: Skilled PT  PT Frequency: Daily  PT Discharge Recommendations: Moderate intensity level of continued care  Equipment Recommended upon Discharge: Wheeled walker  PT Recommended Transfer Status: Assist x1, Assistive device (rolling walker, mod A x 1)  PT - OK to Discharge: Yes      General Visit Information:   PT  Visit  PT Received On: 12/19/23  Response to Previous Treatment: Patient with no complaints from previous session.  General  Family/Caregiver Present: Yes  Caregiver Feedback: daughter present  Prior to Session Communication: Bedside nurse  Patient Position Received: Bed, 3 rail up, Alarm on  Preferred Learning Style: verbal  General Comment: pt cleared for therpay by nursing. pt supine in bed upon arrival. daughter present. pt agreeable to therapy despite feeling tired. daughter is very encouraging for patient to get OOB.    Subjective     Objective   Pain:  Pain Assessment  Pain Score: 0 - No pain  Activity Tolerance:  Activity Tolerance  Endurance: Decreased tolerance for upright activites  Activity Tolerance Comments: fair, more alert sitting in the chair  Treatments:  Therapeutic Exercise  Therapeutic Exercise Performed:  (supine ther ex x 12  reps with min A to complete, AP, heel slides, SAQ, hip abd.  seated ther ex x 20 reps, B LAQ.)    Bed Mobility  Bed Mobility:  (close supervision to transfer supine to sit with HOB elevated and use of bed rail. pt able to square hips at EOB to allow B feet to touch the floor wiht increased time to complete.)    Ambulation/Gait Training  Ambulation/Gait Training Performed:  (mod A to take a few shuffled steps to the chair with the rolling walker. difficulty bearing weight through R LE, right foot tends to be inverted. very kyphotic posture, leaning over the walker.)    Transfers  Transfer:  (mod A for balance upon initial stand, tends to retropulse. mod A for eccentirc control in sitting in chair, alarm activated.)    Stairs  Stairs: No    Outcome Measures:  [facility] Basic Mobility  Turning from your back to your side while in a flat bed without using bedrails: A little  Moving from lying on your back to sitting on the side of a flat bed without using bedrails: A little  Moving to and from bed to chair (including a wheelchair): A lot  Standing up from a chair using your arms (e.g. wheelchair or bedside chair): A lot  To walk in hospital room: A lot  Climbing 3-5 steps with railing: Total  Basic Mobility - Total Score: 13    IP EDUCATION:  Individual(s) Educated: Patient, Child  Education Provided: Body Mechanics, Fall Risk, Post-Op Precautions  Risk and Benefits Discussed with Patient/Caregiver/Other: yes  Patient/Caregiver Demonstrated Understanding: yes  Plan of Care Discussed and Agreed Upon: yes  Patient Response to Education: Patient/Caregiver Verbalized Understanding of Information, Patient/Caregiver Asked Appropriate Questions    Encounter Problems       Encounter Problems (Active)       Balance       LTG - Patient will maintain standing balance using the rolling walker to allow for safe mobility (Progressing)       Start:  12/17/23    Expected End:  01/17/24               Mobility       STG - Patient will  ambulate 20' with rolling walker and mod A. (Progressing)       Start:  12/17/23    Expected End:  01/17/24               Transfers       STG - Transfer from bed to chair with mod A x 1 using the rolling walker (Progressing)       Start:  12/17/23    Expected End:  01/17/24            STG - Patient to transfer to and from sit to supine with mod A x 1. (Progressing)       Start:  12/17/23    Expected End:  01/17/24            STG - Patient will perform bed mobility safely for pain control and pressure relief. (Progressing)       Start:  12/17/23    Expected End:  01/17/24            STG - Patient will transfer sit to and from stand with mod A x 1 and appropriate hand placement. (Progressing)       Start:  12/17/23    Expected End:  01/17/24

## 2023-12-19 NOTE — PROGRESS NOTES
Kalie Ramos is a 89 y.o. female on day 5 of admission presenting with Fall, initial encounter.    Accepted by [facility] .  Pecert started by Direct submit team on 12/18.     PATIENT DOES NOT HAVE A SECURE DISCHARGE PLAN. PRECERT PENDING                         Katherine Skelton RN

## 2023-12-19 NOTE — PROGRESS NOTES
Kalie Ramos is a 89 y.o. female on day 5 of admission presenting with Fall, initial encounter.    Transportation arranged for 1930 .  Daughter Annette notified. 7000 completed.     PATIENT HAS A SECURE DISCHARGE PLAN                           Katherine Skelton RN

## 2023-12-19 NOTE — CARE PLAN
The patient's goals for the shift include sleep/rest    The clinical goals for the shift include Maintain patient safety and manage pain      Problem: Pain  Goal: My pain/discomfort is manageable  Outcome: Progressing     Problem: Safety  Goal: Patient will be injury free during hospitalization  Outcome: Progressing  Goal: I will remain free of falls  Outcome: Progressing     Problem: Daily Care  Goal: Daily care needs are met  Outcome: Progressing     Problem: Psychosocial Needs  Goal: Demonstrates ability to cope with hospitalization/illness  Outcome: Progressing  Goal: Collaborate with me, my family, and caregiver to identify my specific goals  Outcome: Progressing     Problem: Discharge Barriers  Goal: My discharge needs are met  Outcome: Progressing     Problem: Fall/Injury  Goal: Not fall by end of shift  Outcome: Progressing  Goal: Be free from injury by end of the shift  Outcome: Progressing  Goal: Verbalize understanding of personal risk factors for fall in the hospital  Outcome: Progressing  Goal: Verbalize understanding of risk factor reduction measures to prevent injury from fall in the home  Outcome: Progressing  Goal: Use assistive devices by end of the shift  Outcome: Progressing  Goal: Pace activities to prevent fatigue by end of the shift  Outcome: Progressing     Problem: Skin  Goal: Decreased wound size/increased tissue granulation at next dressing change  Outcome: Progressing  Flowsheets (Taken 12/18/2023 2151)  Decreased wound size/increased tissue granulation at next dressing change:   Protective dressings over bony prominences   Promote sleep for wound healing  Goal: Participates in plan/prevention/treatment measures  Outcome: Progressing  Flowsheets (Taken 12/18/2023 2151)  Participates in plan/prevention/treatment measures:   Discuss with provider PT/OT consult   Elevate heels   Increase activity/out of bed for meals  Goal: Prevent/manage excess moisture  Outcome: Progressing  Flowsheets  (Taken 12/18/2023 2151)  Prevent/manage excess moisture:   Follow provider orders for dressing changes   Monitor for/manage infection if present   Moisturize dry skin   Cleanse incontinence/protect with barrier cream  Goal: Prevent/minimize sheer/friction injuries  Outcome: Progressing  Flowsheets (Taken 12/18/2023 2151)  Prevent/minimize sheer/friction injuries:   HOB 30 degrees or less   Increase activity/out of bed for meals   Turn/reposition every 2 hours/use positioning/transfer devices   Use pull sheet  Goal: Promote/optimize nutrition  Outcome: Progressing  Flowsheets (Taken 12/18/2023 2151)  Promote/optimize nutrition:   Consume > 50% meals/supplements   Monitor/record intake including meals   Offer water/supplements/favorite foods  Goal: Promote skin healing  Outcome: Progressing  Flowsheets (Taken 12/18/2023 2151)  Promote skin healing:   Assess skin/pad under line(s)/device(s)   Protective dressings over bony prominences   Turn/reposition every 2 hours/use positioning/transfer devices     Problem: ADLs  Goal: Pt will complete ADL tasks at close supervision with use of AE prn   Outcome: Progressing     Problem: Pain  Goal: Takes deep breaths with improved pain control throughout the shift  Outcome: Progressing  Goal: Turns in bed with improved pain control throughout the shift  Outcome: Progressing  Goal: Walks with improved pain control throughout the shift  Outcome: Progressing  Goal: Performs ADL's with improved pain control throughout shift  Outcome: Progressing  Goal: Participates in PT with improved pain control throughout the shift  Outcome: Progressing  Goal: Free from opioid side effects throughout the shift  Outcome: Progressing  Goal: Free from acute confusion related to pain meds throughout the shift  Outcome: Progressing     Problem: Pain - Adult  Goal: Verbalizes/displays adequate comfort level or baseline comfort level  Outcome: Progressing     Problem: Safety - Adult  Goal: Free from fall  injury  Outcome: Progressing     Problem: Discharge Planning  Goal: Discharge to home or other facility with appropriate resources  Outcome: Progressing     Problem: Chronic Conditions and Co-morbidities  Goal: Patient's chronic conditions and co-morbidity symptoms are monitored and maintained or improved  Outcome: Progressing

## 2023-12-19 NOTE — PROGRESS NOTES
"Kalie Ramos is a 89 y.o. female on day 5 of admission presenting with Fall, initial encounter.    Subjective   Patient is doing well postop.  No chest pain or shortness of breath       Objective     Physical Exam  Vitals reviewed.   Constitutional:       Appearance: Normal appearance.   HENT:      Head: Normocephalic and atraumatic.      Right Ear: Tympanic membrane, ear canal and external ear normal.      Left Ear: Tympanic membrane, ear canal and external ear normal.      Nose: Nose normal.      Mouth/Throat:      Pharynx: Oropharynx is clear.   Eyes:      Extraocular Movements: Extraocular movements intact.      Conjunctiva/sclera: Conjunctivae normal.      Pupils: Pupils are equal, round, and reactive to light.   Cardiovascular:      Rate and Rhythm: Normal rate and regular rhythm.      Pulses: Normal pulses.      Heart sounds: Normal heart sounds.   Pulmonary:      Effort: Pulmonary effort is normal.      Breath sounds: Normal breath sounds.   Abdominal:      General: Abdomen is flat. Bowel sounds are normal.      Palpations: Abdomen is soft.   Musculoskeletal:      Cervical back: Normal range of motion and neck supple.   Skin:     General: Skin is warm and dry.   Neurological:      General: No focal deficit present.      Mental Status: She is alert and oriented to person, place, and time.   Psychiatric:         Mood and Affect: Mood normal.         Last Recorded Vitals  Blood pressure 146/58, pulse 63, temperature 36.6 °C (97.9 °F), temperature source Oral, resp. rate 18, height 1.575 m (5' 2\"), weight 53.3 kg (117 lb 8.1 oz), SpO2 98 %.  Intake/Output last 3 Shifts:  I/O last 3 completed shifts:  In: 480 (9 mL/kg) [P.O.:480]  Out: 700 (13.1 mL/kg) [Urine:700 (0.4 mL/kg/hr)]  Weight: 53.3 kg     Relevant Results              Scheduled medications  acetaminophen, 650 mg, oral, q6h DOV  aspirin, 325 mg, oral, BID  atorvastatin, 40 mg, oral, Nightly  calcitonin (salmon), 1 spray, One Nostril, " Daily  docusate sodium, 100 mg, oral, BID  furosemide, 40 mg, oral, Daily  iron sucrose, 200 mg, intravenous, Daily  losartan, 25 mg, oral, Daily  magnesium oxide, 400 mg, oral, Once per day on Mon Wed Fri  metoprolol succinate XL, 50 mg, oral, Daily  pantoprazole, 40 mg, oral, Daily  polyethylene glycol, 17 g, oral, Daily  psyllium, 1 packet, oral, Daily  rOPINIRole, 5 mg, oral, Nightly  sennosides, 2 tablet, oral, BID  sertraline, 50 mg, oral, Daily      Continuous medications     PRN medications  PRN medications: acetaminophen **OR** acetaminophen **OR** acetaminophen, acetaminophen, albuterol, benzocaine-menthol, dextromethorphan-guaifenesin, guaiFENesin, HYDROcodone-acetaminophen, HYDROmorphone, naloxone, naloxone, nitroglycerin, [DISCONTINUED] ondansetron **OR** ondansetron, oxygen, sodium chloride, traZODone  Results for orders placed or performed during the hospital encounter of 12/14/23 (from the past 24 hour(s))   CBC   Result Value Ref Range    WBC 13.5 (H) 4.4 - 11.3 x10*3/uL    nRBC 0.0 0.0 - 0.0 /100 WBCs    RBC 3.60 (L) 4.00 - 5.20 x10*6/uL    Hemoglobin 10.3 (L) 12.0 - 16.0 g/dL    Hematocrit 33.7 (L) 36.0 - 46.0 %    MCV 94 80 - 100 fL    MCH 28.6 26.0 - 34.0 pg    MCHC 30.6 (L) 32.0 - 36.0 g/dL    RDW 14.1 11.5 - 14.5 %    Platelets 299 150 - 450 x10*3/uL   Basic Metabolic Panel   Result Value Ref Range    Glucose 95 65 - 99 mg/dL    Sodium 140 133 - 145 mmol/L    Potassium 4.0 3.4 - 5.1 mmol/L    Chloride 104 97 - 107 mmol/L    Bicarbonate 26 24 - 31 mmol/L    Urea Nitrogen 18 8 - 25 mg/dL    Creatinine 0.90 0.40 - 1.60 mg/dL    eGFR 60 (L) >60 mL/min/1.73m*2    Calcium 7.5 (L) 8.5 - 10.4 mg/dL    Anion Gap 10 <=19 mmol/L                    Assessment/Plan   Principal Problem:    Fall, initial encounter  Active Problems:    COPD (chronic obstructive pulmonary disease) (CMS/HCC)    Hyperlipidemia    Hypertension    Age-related osteoporosis without current pathological fracture    Chronic diastolic  (congestive) heart failure (CMS/[facility])    Hip fracture, right, closed, initial encounter (CMS/[facility])    Clinically patient is doing well  Blood pressure stable  Breathing stable  H&H stable  Plan for rehab when arrangements made       I spent  minutes in the professional and overall care of this patient.      Nakia Bailey MD

## 2023-12-19 NOTE — NURSING NOTE
Dr. JUSTINO Bailey in with pt at this time, pt being interviewed and assessed..Plan  of care being discussed

## 2023-12-19 NOTE — PROGRESS NOTES
Subjective Data:  The patient is resting comfortably visiting with family member.  No significant pain or shortness of breath.    Overnight Events:    No significant overnight events.     Objective Data:  Last Recorded Vitals:  Vitals:    12/18/23 0750 12/18/23 1604 12/18/23 2331 12/19/23 0820   BP: 120/56 (!) 125/45 112/56 124/58   BP Location: Right arm Right arm Right arm Right arm   Patient Position: Lying Lying Lying Lying   Pulse: 62 64 60 57   Resp: 18 17 18 18   Temp: 36.7 °C (98.1 °F) 36.7 °C (98.1 °F) 36.5 °C (97.7 °F) 36.3 °C (97.3 °F)   TempSrc: Oral Oral Oral Oral   SpO2: 97% 100% 99% 97%   Weight:       Height:           Last Labs:  CBC - 12/19/2023:  5:26 AM  13.5 10.3 299    33.7      CMP - 12/19/2023:  5:26 AM  7.5 7.2 22 --- 0.8   _ 3.6 16 132      PTT - 1/26/2023:  6:26 PM  1.0   10.2 29.2     HGBA1C   Date/Time Value Ref Range Status   05/23/2022 10:43 PM 5.7 4.0 - 6.0 % Final     Comment:     Hemoglobin A1C levels are related to mean blood glucose during the   preceding 2-3 months. The relationship table below may be used as a   general guide. Each 1% increase in HGB A1C is a reflection of an   increase in mean glucose of approximately 30 mg/dl.   Reference: Diabetes [employer], volume 29, supplement 1 Jan. 2006                        HGB A1C ................. Approx. Mean Glucose   _______________________________________________   6%   ...............................  120 mg/dl   7%   ...............................  150 mg/dl   8%   ...............................  180 mg/dl   9%   ...............................  210 mg/dl   10%  ...............................  240 mg/dl  Performed at [address] [address]     12/17/2020 03:16 PM 5.7 4.0 - 6.0 % Final     Comment:     Hemoglobin A1C levels are related to mean blood glucose during the   preceding 2-3 months. The relationship table below may be used as a   general guide. Each 1% increase in HGB A1C is a reflection of an    "increase in mean glucose of approximately 30 mg/dl.   Reference: Diabetes Care, volume 29, supplement 1 Jan. 2006                        HGB A1C ................. Approx. Mean Glucose   _______________________________________________   6%   ...............................  120 mg/dl   7%   ...............................  150 mg/dl   8%   ...............................  180 mg/dl   9%   ...............................  210 mg/dl   10%  ...............................  240 mg/dl  Performed at [address] [address]       LDLCALC   Date/Time Value Ref Range Status   01/27/2023 06:04 AM 66 65 - 130 MG/DL Final   11/20/2022 02:47 AM 54 65 - 130 MG/DL Final   07/06/2022 01:13 AM 54 65 - 130 MG/DL Final     VLDL   Date/Time Value Ref Range Status   08/03/2018 09:41 AM 25 0 - 40 mg/dL Final   01/18/2018 02:30 PM 47 0 - 40 mg/dL Final   10/17/2017 11:18 AM 50 0 - 40 mg/dL Final      Last I/O:  I/O last 3 completed shifts:  In: 480 (9 mL/kg) [P.O.:480]  Out: 700 (13.1 mL/kg) [Urine:700 (0.4 mL/kg/hr)]  Weight: 53.3 kg     Past Cardiology Tests (Last 3 Years):  EKG:  ECG 12 lead       Electrocardiogram, 12-lead PRN ACS symptoms       ECG 12 lead       ECG 12 lead 11/17/2023      ECG 12 Lead       ECG 12 Lead       ECG 12 lead 10/24/2023    Echo:  No results found for this or any previous visit from the past 1095 days.    Ejection Fractions:  No results found for: \"EF\"  Cath:  No results found for this or any previous visit from the past 1095 days.    Stress Test:  No results found for this or any previous visit from the past 1095 days.    Cardiac Imaging:  No results found for this or any previous visit from the past 1095 days.      Inpatient Medications:  Scheduled medications   Medication Dose Route Frequency    acetaminophen  650 mg oral q6h DOV    aspirin  325 mg oral BID    atorvastatin  40 mg oral Nightly    calcitonin (salmon)  1 spray One Nostril Daily    docusate sodium  100 mg oral BID    " furosemide  40 mg oral Daily    iron sucrose  200 mg intravenous Daily    losartan  25 mg oral Daily    magnesium oxide  400 mg oral Once per day on Mon Wed Fri    metoprolol succinate XL  50 mg oral Daily    pantoprazole  40 mg oral Daily    polyethylene glycol  17 g oral Daily    psyllium  1 packet oral Daily    rOPINIRole  5 mg oral Nightly    sennosides  2 tablet oral BID    sertraline  50 mg oral Daily     PRN medications   Medication    acetaminophen    Or    acetaminophen    Or    acetaminophen    acetaminophen    albuterol    benzocaine-menthol    dextromethorphan-guaifenesin    guaiFENesin    HYDROcodone-acetaminophen    HYDROmorphone    naloxone    naloxone    nitroglycerin    ondansetron    oxygen    sodium chloride    traZODone     Continuous Medications   Medication Dose Last Rate       Physical Exam:  The patient is a thin upper elderly white female sitting upright in bed eating breakfast.  No respiratory distress on standard low-flow O2 nasal cannula 3 L/min  JVP not elevated carotid pulses 2+ thyroid is enlarged palpably  Moderate thoracic kyphosis shallow air movement diminished breath sounds  Cardiac rhythm regular no premature beats S1-S2 obscured grade 3/6 systolic ejection murmur  No peripheral edema.     Assessment/Plan   Principal Problem:    Fall, initial encounter  Active Problems:    COPD (chronic obstructive pulmonary disease) (CMS/[facility])    Hyperlipidemia    Hypertension    Age-related osteoporosis without current pathological fracture    Chronic diastolic (congestive) heart failure (CMS/[facility])    Hip fracture, right, closed, initial encounter (CMS/[facility])       12/15:This patient is a 89-year-old white female with extensive issues that include coronary artery disease, severe aortic valvular stenosis, moderate tricuspid and mitral valve regurgitation, severe pulmonary hypertension, COPD with oxygen dependence, hypertension, left bundle branch block conduction delay, lower extremity edema due to  venous insufficiency, chronic DVT left lower extremity femoral vein, laparoscopic paraesophageal hiatal hernia repair, ongoing aspiration pneumonias, nontoxic multinodular goiter.  The patient's last echocardiogram on 7/25/2023 again demonstrated a LV ejection fraction 35-40% severe aortic valve stenosis peak systolic gradient 56 mmHg, mild mitral moderate tricuspid valve regurgitation and severe pulmonary hypertension estimated PA systolic pressure 67 mmHg.  The patient was thought not to be a candidate for transcatheter aortic valve replacement.  She does wear continuous oxygen at 3 L/min at home.  She surprisingly still lives independently in her own condominium.  She has been having increased shortness of breath.  Multiple potential etiologies including her chronic lung disease aspiration pneumonias interstitial fibrosis possible CHF.  The patient was recently admitted for several days on 10/23/2023 with increased shortness of breath and ultimately was released on Lasix 40 mg twice daily which she has reduced to daily.  Patient admitted again on 11/12/2023 after having fallen in the bathroom and not being able to get up due to generalized weakness.  She lives independently in her own condominium and has been very resistant to review our recommendation for assisted living.  At that time it was decided to continue the patient on her usual therapy which does at this point include Lasix 40 mg once daily along with her low-dose aspirin plus atorvastatin 40 mg daily and metoprolol succinate 25 mg twice daily.   highly advised the patient to be placed into an assisted living facility given her increasing generalized weakness and her need for continuous nasal oxygen therapy.  Patient is currently admitted after a mechanical fall with a right femoral neck fracture.  Patient is currently scheduled for right total hip replacement on 12/16/2023.  Given her cardiac history she is at increased but not prohibitive cardiac risk  and will require close observation postoperatively.      12/16: As above. This patient was seen in collaboration with Dr. Sosa on coverage for Dr. Mckinney.  Patient n.p.o. for right total hip replacement this morning.  She denies any chest pain, pressure or palpitations overnight.  Unfortunately no lab work is available for this morning.  Blood pressures have been overall normal with her last recorded at 107/45.  She has remained in a rate controlled atrial fibrillation on telemetry.  Patient's pulse oximetry is 100% on 3 L nasal cannula.  She appears overall euvolemic on examination with no edema to her extremities.  Again, given this patient's cardiac history she is at increased risk for surgery; however, this is not a prohibitive cardiac risk.  We will follow this patient postoperatively.      12/18: The patient was seen and events reviewed.  She is sitting in bedside chair eating a meal.  She is awake alert conversant no respiratory distress on her usual nasal oxygen.  Her right-sided total hip replacement was performed on 12/16/2023 without any unusual issues.  CBC from yesterday includes a hemoglobin 10.9 decreased from 12.9 preoperatively.  Basic metabolic panel is unremarkable creatinine 0.8.  Serum iron is less than 20 and will begin IV iron supplementation.  Vital signs are stable with systolic blood pressures in the 120 mmHg range.  Patient will be kept on usual preadmission low-dose losartan 25 mg daily Toprol-XL  25 mg daily and Lasix 40 mg daily.  Arrangements in progress for transfer to rehab facility.    12/19: Patient recuperating uneventfully.  She is comfortable in bed not short of breath with nasal cannula and no significant right hip pain.  She is visiting with a family member.  Hemoglobin unchanged at 10.3 mild leukocytosis WBC 13,500.  Renal parameters remain unchanged creatinine is 0.9 will continue the patient on the low-dose losartan Toprol and Lasix as noted above.  She is stable from a  cardiac perspective for transfer to rehab when space is available.  Peripheral IV 12/18/23 22 G Proximal;Right;Upper;Anterior Arm (Active)   Site Assessment Clean;Dry;Intact 12/19/23 0800   Dressing Status Clean;Dry 12/19/23 0800   Number of days: 1       Peripheral IV 12/18/23 20 G Proximal;Right;Anterior Forearm (Active)   Site Assessment Clean;Dry;Intact 12/19/23 0800   Dressing Status Clean;Dry 12/19/23 0800   Number of days: 1       Code Status:  Full Code    I spent 20 minutes in the professional and overall care of this patient.        Jim Mckinney MD

## 2023-12-19 NOTE — PROGRESS NOTES
"Kalie Ramos is a 89 y.o. female on day 4 of admission presenting with Fall, initial encounter.    Subjective   Patient is doing extremely well after hip surgery       Objective     Physical Exam  Vitals reviewed.   Constitutional:       Appearance: Normal appearance.   HENT:      Head: Normocephalic and atraumatic.      Right Ear: Tympanic membrane, ear canal and external ear normal.      Left Ear: Tympanic membrane, ear canal and external ear normal.      Nose: Nose normal.      Mouth/Throat:      Pharynx: Oropharynx is clear.   Eyes:      Extraocular Movements: Extraocular movements intact.      Conjunctiva/sclera: Conjunctivae normal.      Pupils: Pupils are equal, round, and reactive to light.   Cardiovascular:      Rate and Rhythm: Normal rate and regular rhythm.      Pulses: Normal pulses.      Heart sounds: Normal heart sounds.   Pulmonary:      Effort: Pulmonary effort is normal.      Breath sounds: Normal breath sounds.   Abdominal:      General: Abdomen is flat. Bowel sounds are normal.      Palpations: Abdomen is soft.   Musculoskeletal:      Cervical back: Normal range of motion and neck supple.   Skin:     General: Skin is warm and dry.   Neurological:      General: No focal deficit present.      Mental Status: She is alert and oriented to person, place, and time.   Psychiatric:         Mood and Affect: Mood normal.         Last Recorded Vitals  Blood pressure (!) 125/45, pulse 64, temperature 36.7 °C (98.1 °F), temperature source Oral, resp. rate 17, height 1.575 m (5' 2\"), weight 53.3 kg (117 lb 8.1 oz), SpO2 100 %.  Intake/Output last 3 Shifts:  I/O last 3 completed shifts:  In: 1560 (29.3 mL/kg) [P.O.:580; I.V.:880 (16.5 mL/kg); IV Piggyback:100]  Out: 1050 (19.7 mL/kg) [Urine:1050 (0.5 mL/kg/hr)]  Weight: 53.3 kg     Relevant Results              Scheduled medications  acetaminophen, 650 mg, oral, q6h DOV  aspirin, 325 mg, oral, BID  atorvastatin, 40 mg, oral, Nightly  calcitonin (salmon), 1 " spray, One Nostril, Daily  docusate sodium, 100 mg, oral, BID  furosemide, 40 mg, oral, Daily  iron sucrose, 200 mg, intravenous, Daily  losartan, 25 mg, oral, Daily  magnesium oxide, 400 mg, oral, Once per day on Mon Wed Fri  metoprolol succinate XL, 50 mg, oral, Daily  pantoprazole, 40 mg, oral, Daily  polyethylene glycol, 17 g, oral, Daily  psyllium, 1 packet, oral, Daily  rOPINIRole, 5 mg, oral, Nightly  sennosides, 2 tablet, oral, BID  sertraline, 50 mg, oral, Daily      Continuous medications     PRN medications  PRN medications: acetaminophen **OR** acetaminophen **OR** acetaminophen, acetaminophen, albuterol, benzocaine-menthol, dextromethorphan-guaifenesin, guaiFENesin, HYDROcodone-acetaminophen, HYDROmorphone, naloxone, naloxone, nitroglycerin, [DISCONTINUED] ondansetron **OR** ondansetron, oxygen, sodium chloride, traZODone                 Assessment/Plan   Principal Problem:    Fall, initial encounter  Active Problems:    COPD (chronic obstructive pulmonary disease) (CMS/[facility])    Hyperlipidemia    Hypertension    Age-related osteoporosis without current pathological fracture    Chronic diastolic (congestive) heart failure (CMS/[facility])    Hip fracture, right, closed, initial encounter (CMS/[facility])    Patient is recovering from the surgery well  Blood pressure stable  COPD stable  PT OT  Plan for rehab         I spent  minutes in the professional and overall care of this patient.      Nakia Bailey MD

## 2023-12-19 NOTE — CARE PLAN
Problem: Pain  Goal: My pain/discomfort is manageable  12/18/2023 2028 by Dominic Pollard RN  Outcome: Progressing  12/18/2023 2020 by Dominic Pollard RN  Outcome: Progressing     Problem: Safety  Goal: Patient will be injury free during hospitalization  12/18/2023 2028 by Dominic Pollard RN  Outcome: Progressing  12/18/2023 2020 by Dominic Pollard RN  Outcome: Progressing  Goal: I will remain free of falls  12/18/2023 2028 by Dominic Pollard RN  Outcome: Progressing  12/18/2023 2020 by Dominic Pollard RN  Outcome: Progressing     Problem: Daily Care  Goal: Daily care needs are met  12/18/2023 2028 by Dominic Pollard RN  Outcome: Progressing  12/18/2023 2020 by Dominic Pollard RN  Outcome: Progressing     Problem: Psychosocial Needs  Goal: Demonstrates ability to cope with hospitalization/illness  12/18/2023 2028 by Dominic Pollard RN  Outcome: Progressing  12/18/2023 2020 by Dominic Pollard RN  Outcome: Progressing  Goal: Collaborate with me, my family, and caregiver to identify my specific goals  12/18/2023 2028 by Dominic Pollard RN  Outcome: Progressing  12/18/2023 2020 by Dominic Pollard RN  Outcome: Progressing     Problem: Discharge Barriers  Goal: My discharge needs are met  12/18/2023 2028 by Dominic Pollard RN  Outcome: Progressing  12/18/2023 2020 by Dominic Pollard RN  Outcome: Progressing     Problem: Fall/Injury  Goal: Not fall by end of shift  12/18/2023 2028 by Dominic Pollard RN  Outcome: Progressing  12/18/2023 2020 by Dominic Pollard RN  Outcome: Progressing  Goal: Be free from injury by end of the shift  12/18/2023 2028 by Dominic Pollard RN  Outcome: Progressing  12/18/2023 2020 by Dominic Pollard RN  Outcome: Progressing  Goal: Verbalize understanding of personal risk factors for fall in the hospital  12/18/2023 2028 by Dominic  Dev Pollard RN  Outcome: Progressing  12/18/2023 2020 by Dominic Pollard RN  Outcome: Progressing  Goal: Verbalize understanding of risk factor reduction measures to prevent injury from fall in the home  12/18/2023 2028 by Dominic Pollard RN  Outcome: Progressing  12/18/2023 2020 by Dominic Pollard RN  Outcome: Progressing  Goal: Use assistive devices by end of the shift  12/18/2023 2028 by Dominic Pollard RN  Outcome: Progressing  12/18/2023 2020 by Dominic Pollard RN  Outcome: Progressing  Goal: Pace activities to prevent fatigue by end of the shift  12/18/2023 2028 by Dominic Pollard RN  Outcome: Progressing  12/18/2023 2020 by Dominic Pollard RN  Outcome: Progressing     Problem: Skin  Goal: Decreased wound size/increased tissue granulation at next dressing change  12/18/2023 2028 by Dominic Pollard RN  Outcome: Progressing  Flowsheets (Taken 12/18/2023 2028)  Decreased wound size/increased tissue granulation at next dressing change:   Promote sleep for wound healing   Protective dressings over bony prominences   Utilize specialty bed per algorithm  12/18/2023 2020 by Dominic Pollard RN  Outcome: Progressing  Goal: Participates in plan/prevention/treatment measures  12/18/2023 2028 by Dominic Pollard RN  Outcome: Progressing  Flowsheets (Taken 12/18/2023 2028)  Participates in plan/prevention/treatment measures:   Discuss with provider PT/OT consult   Elevate heels   Increase activity/out of bed for meals  12/18/2023 2020 by Dominic Pollard RN  Outcome: Progressing  Goal: Prevent/manage excess moisture  12/18/2023 2028 by Dominic Pollard RN  Outcome: Progressing  Flowsheets (Taken 12/18/2023 2028)  Prevent/manage excess moisture:   Use wicking fabric (obtain order)   Cleanse incontinence/protect with barrier cream   Follow provider orders for dressing changes   Moisturize dry skin   Monitor for/manage  infection if present  12/18/2023 2020 by Dominic Pollard RN  Outcome: Progressing  Goal: Prevent/minimize sheer/friction injuries  12/18/2023 2028 by Dominic Pollard RN  Outcome: Progressing  Flowsheets (Taken 12/18/2023 2028)  Prevent/minimize sheer/friction injuries:   Turn/reposition every 2 hours/use positioning/transfer devices   Complete micro-shifts as needed if patient unable. Adjust patient position to relieve pressure points, not a full turn   HOB 30 degrees or less   Use pull sheet   Increase activity/out of bed for meals   Utilize specialty bed per algorithm  12/18/2023 2020 by Dominic Pollard RN  Outcome: Progressing  Goal: Promote/optimize nutrition  12/18/2023 2028 by Dominic Pollard RN  Outcome: Progressing  Flowsheets (Taken 12/18/2023 2028)  Promote/optimize nutrition:   Assist with feeding   Offer water/supplements/favorite foods   Consume > 50% meals/supplements   Reassess MST if dietician not consulted   Discuss with provider if NPO > 2 days   Monitor/record intake including meals  12/18/2023 2020 by Dominic Pollard RN  Outcome: Progressing  Goal: Promote skin healing  12/18/2023 2028 by Dominic Pollard RN  Outcome: Progressing  Flowsheets (Taken 12/18/2023 2028)  Promote skin healing:   Assess skin/pad under line(s)/device(s)   Rotate device position/do not position patient on device   Turn/reposition every 2 hours/use positioning/transfer devices   Ensure correct size (line/device) and apply per  instructions   Protective dressings over bony prominences  12/18/2023 2020 by Dominic Pollard RN  Outcome: Progressing     Problem: ADLs  Goal: Pt will complete ADL tasks at close supervision with use of AE prn   12/18/2023 2028 by Dominic Pollard RN  Outcome: Progressing  12/18/2023 2020 by Dominic Pollard RN  Outcome: Progressing   The patient's goals for the shift include sleep/rest    The clinical goals  for the shift include Pt's pain will be managed this shift with pain reports less than 7/10. Pt will not suffer injury this shift

## 2023-12-20 ENCOUNTER — NURSING HOME VISIT (OUTPATIENT)
Dept: POST ACUTE CARE | Facility: EXTERNAL LOCATION | Age: 88
End: 2023-12-20
Payer: MEDICARE

## 2023-12-20 ENCOUNTER — DOCUMENTATION (OUTPATIENT)
Dept: CARE COORDINATION | Facility: CLINIC | Age: 88
End: 2023-12-20
Payer: MEDICARE

## 2023-12-20 DIAGNOSIS — F32.A DEPRESSION, UNSPECIFIED DEPRESSION TYPE: ICD-10-CM

## 2023-12-20 DIAGNOSIS — I50.32 CHRONIC DIASTOLIC (CONGESTIVE) HEART FAILURE (MULTI): ICD-10-CM

## 2023-12-20 DIAGNOSIS — M19.90 OSTEOARTHRITIS, UNSPECIFIED OSTEOARTHRITIS TYPE, UNSPECIFIED SITE: ICD-10-CM
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
## 2023-12-20 DIAGNOSIS — J44.9 CHRONIC OBSTRUCTIVE PULMONARY DISEASE, UNSPECIFIED COPD TYPE (MULTI): ICD-10-CM

## 2023-12-20 DIAGNOSIS — D64.9 ANEMIA, UNSPECIFIED TYPE: ICD-10-CM

## 2023-12-20 DIAGNOSIS — K21.9 GASTROESOPHAGEAL REFLUX DISEASE WITHOUT ESOPHAGITIS: ICD-10-CM

## 2023-12-20 DIAGNOSIS — R53.1 WEAKNESS: ICD-10-CM

## 2023-12-20 DIAGNOSIS — S72.001A HIP FRACTURE, RIGHT, CLOSED, INITIAL ENCOUNTER (MULTI): Primary | ICD-10-CM

## 2023-12-20 PROCEDURE — 99305 1ST NF CARE MODERATE MDM 35: CPT | Performed by: INTERNAL MEDICINE

## 2023-12-20 NOTE — PROGRESS NOTES
Readmit to [facility] 12/14/23 to 12/17/23 with hip fracture sustained from fall in bathroom.  Status post ORIF right hip.  Discharged to [facility].    Will outreach patient when discharged from SNF.     Maureen GORE RN CCM  RN Care Coordinator  [facility]  Phone [phone]

## 2023-12-20 NOTE — LETTER
Patient: Kalie Ramos  : 1929    Encounter Date: 2023    PLACE OF SERVICE:  [facility]    This is new/initial history and physical.    Subjective  Patient ID: Kalie Ramos is a 89 y.o. female who presents for new/initial history and Annual Exam.    Ms. Kalie Ramos is a 89-year-old female with recent fall and fracture to her right hip.  She has undergone surgical repair.  She is currently on pain control and will be entered into physical and occupational therapy in order to regain strength and function.    Review of Systems   Constitutional:  Negative for chills and fever.   Cardiovascular:  Negative for chest pain.   All other systems reviewed and are negative.    Objective  /76   Pulse 72   Temp 36.6 °C (97.8 °F)   Resp 16     Physical Exam  Vitals reviewed.   Constitutional:       General: She is not in acute distress.     Comments: This is a well-developed, well-nourished female, sitting in a chair.   HENT:      Right Ear: Tympanic membrane, ear canal and external ear normal.      Left Ear: Tympanic membrane, ear canal and external ear normal.   Eyes:      General: No scleral icterus.     Pupils: Pupils are equal, round, and reactive to light.   Neck:      Vascular: No carotid bruit.   Cardiovascular:      Heart sounds: Normal heart sounds, S1 normal and S2 normal. No murmur heard.     No friction rub.   Pulmonary:      Breath sounds: Wheezing (rare scattered) present.   Abdominal:      Palpations: There is no hepatomegaly, splenomegaly or mass.   Musculoskeletal:         General: No swelling or deformity. Normal range of motion.      Cervical back: Neck supple.      Right lower leg: Edema (trace) present.      Left lower leg: Edema (trace) present.   Lymphadenopathy:      Cervical: No cervical adenopathy.      Upper Body:      Right upper body: No axillary adenopathy.      Left upper body: No axillary adenopathy.      Lower Body: No right  inguinal adenopathy. No left inguinal adenopathy.   Skin:     Findings: Wound (hip, intact with no sign of infection) present.   Neurological:      Mental Status: She is oriented to person, place, and time.      Cranial Nerves: Cranial nerves 2-12 are intact. No cranial nerve deficit.      Sensory: No sensory deficit.      Motor: Motor function is intact. No weakness.      Gait: Gait is intact.      Deep Tendon Reflexes: Reflexes normal.   Psychiatric:         Mood and Affect: Mood normal. Mood is not anxious or depressed. Affect is not angry.         Behavior: Behavior is not agitated.         Thought Content: Thought content normal.         Judgment: Judgment normal.     LAB WORK: Laboratory studies were reviewed.    Assessment/Plan  Problem List Items Addressed This Visit             ICD-10-CM       Cardiac and Vasculature    Chronic diastolic (congestive) heart failure (CMS/HCC) I50.32       Gastrointestinal and Abdominal    Esophageal reflux K21.9       Hematology and Neoplasia    Anemia D64.9       Musculoskeletal and Injuries    Fall W19.XXXA       Pulmonary and Pneumonias    COPD (chronic obstructive pulmonary disease) (CMS/HCC) J44.9       Symptoms and Signs    Weakness R53.1       Other    Hip fracture, right, closed, initial encounter (CMS/HCC) - Primary S72.001A     Other Visit Diagnoses         Codes    Depression, unspecified depression type     F32.A    Osteoarthritis, unspecified osteoarthritis type, unspecified site     M19.90        1. Hip fracture, on pain control.  2. CHF, on diuretic.  3. COPD, on bronchodilator therapy.  4. Weakness, on PT/OT.  5. Fall risk, on fall precaution.  6. Depression, on medication.  7. Anemia, follow CBC.  8. Osteoarthritis, on Tylenol.  9. Reflux disease, on PPI.    Scribe Attestation  By signing my name below, Angela LOPES Scribe attest that this documentation has been prepared under the direction and in the presence of Avel Bailey MD.       Electronically  Signed By: Avel Bailey MD   1/16/24  2:26 PM

## 2023-12-20 NOTE — NURSING NOTE
Attempted to call report to [facility], but received no answer. Left message with reception desk stating that they can call back to receive report.

## 2023-12-20 NOTE — DISCHARGE SUMMARY
Discharge Diagnosis  Fall, initial encounter    Issues Requiring Follow-Up  Hip fracture     Test Results Pending At Discharge  Pending Labs       No current pending labs.            Hospital Course   Kalie Ramos is a 89 y.o. female presenting with fall and hip pain.  Patient was in the bathroom and not sure how she ended up on the floor.  She lost balance.  She crawled to the bathroom when her daughter saw her on the camera.  Patient lives alone.  Denied any dizziness.  Found to have right hip fracture   Patient underwent ORIF right hip.  Postop course unremarkable.  Patient discharged to rehab facility for further therapy    Pertinent Physical Exam At Time of Discharge  Physical Exam  Vitals reviewed.   Constitutional:       Appearance: Normal appearance.   HENT:      Head: Normocephalic and atraumatic.      Right Ear: Tympanic membrane, ear canal and external ear normal.      Left Ear: Tympanic membrane, ear canal and external ear normal.      Nose: Nose normal.      Mouth/Throat:      Pharynx: Oropharynx is clear.   Eyes:      Extraocular Movements: Extraocular movements intact.      Conjunctiva/sclera: Conjunctivae normal.      Pupils: Pupils are equal, round, and reactive to light.   Cardiovascular:      Rate and Rhythm: Normal rate and regular rhythm.      Pulses: Normal pulses.      Heart sounds: Normal heart sounds.   Pulmonary:      Effort: Pulmonary effort is normal.      Breath sounds: Normal breath sounds.   Abdominal:      General: Abdomen is flat. Bowel sounds are normal.      Palpations: Abdomen is soft.   Musculoskeletal:      Cervical back: Normal range of motion and neck supple.      Comments: Right hip incision clean   Skin:     General: Skin is warm and dry.   Neurological:      General: No focal deficit present.      Mental Status: She is alert and oriented to person, place, and time.   Psychiatric:         Mood and Affect: Mood normal.         Home Medications     Medication List       START taking these medications     aspirin 325 mg EC tablet; Take 1 tablet (325 mg) by mouth 2 times a day.   docusate sodium 100 mg capsule; Commonly known as: Colace; Take 1   capsule (100 mg) by mouth 2 times a day.   guaiFENesin 600 mg 12 hr tablet; Commonly known as: Mucinex; Take 1   tablet (600 mg) by mouth every 12 hours if needed for congestion. Do not   crush, chew, or split.   HYDROcodone-acetaminophen 5-325 mg tablet; Commonly known as: Norco;   Take 1 tablet by mouth every 4 hours if needed for severe pain (7 - 10).   ondansetron 4 mg/2 mL injection; Commonly known as: Zofran; Infuse 2 mL   (4 mg) into a venous catheter every 8 hours if needed for nausea.   polyethylene glycol 17 gram packet; Commonly known as: Glycolax,   Miralax; Take 17 g by mouth once daily. Do not start before December 20, 2023.; Start taking on: December 20, 2023   psyllium 3.4 gram packet; Commonly known as: Metamucil; Take 1 packet by   mouth once daily. Do not start before December 20, 2023.; Start taking on:   December 20, 2023   sennosides 8.6 mg tablet; Commonly known as: Senokot; Take 2 tablets   (17.2 mg) by mouth 2 times a day.     CHANGE how you take these medications     * acetaminophen 325 mg tablet; Commonly known as: Tylenol; Take 2   tablets (650 mg) by mouth every 6 hours if needed for moderate pain (4 -   6).; What changed: Another medication with the same name was added. Make   sure you understand how and when to take each.   * acetaminophen 325 mg tablet; Commonly known as: Tylenol; Take 2   tablets (650 mg) by mouth every 4 hours if needed for mild pain (1 - 3).;   What changed: You were already taking a medication with the same name, and   this prescription was added. Make sure you understand how and when to take   each.   furosemide 40 mg tablet; Commonly known as: Lasix; TAKE 1 TABLET DAILY;   What changed: additional instructions   metoprolol succinate XL 50 mg 24 hr tablet; Commonly known as:    Toprol-XL; Take 1 tablet (50 mg) by mouth once daily. Do not crush or   chew. Do not start before November 16, 2023.; What changed: Another   medication with the same name was removed. Continue taking this   medication, and follow the directions you see here.   rOPINIRole 5 mg tablet; Commonly known as: Requip; What changed: Another   medication with the same name was removed. Continue taking this   medication, and follow the directions you see here.  * This list has 2 medication(s) that are the same as other medications   prescribed for you. Read the directions carefully, and ask your doctor or   other care provider to review them with you.     CONTINUE taking these medications     albuterol 2.5 mg /3 mL (0.083 %) nebulizer solution; Take 3 mL (2.5 mg)   by nebulization every 2 hours if needed for wheezing.   atorvastatin 40 mg tablet; Commonly known as: Lipitor; Take 1 tablet (40   mg) by mouth once daily at bedtime for 30 days, THEN 1 tablet (40 mg) once   daily at bedtime.; Start taking on: November 15, 2023   benzocaine-menthol 15-3.6 mg lozenge; Commonly known as: Cepastat Sore   Throat; Dissolve 1 lozenge in the mouth every 4 hours if needed for sore   throat.   calcitonin (salmon) 200 unit/actuation nasal spray; Commonly known as:   Miacalcin; Administer 1 spray into one nostril once daily. Do not start   before October 28, 2023.   ferrous gluconate 324 (38 Fe) mg tablet; Commonly known as: Fergon; Take   1 tablet (38 mg of iron) by mouth 3 (three) times a week. Do not start   before November 17, 2023.   lidocaine 4 % patch; Place 1 patch over 12 hours on the skin once daily.   Remove & discard patch within 12 hours or as directed by MD. Do not start   before November 16, 2023.   loratadine 10 mg tablet; Commonly known as: Claritin; Take 1 tablet (10   mg) by mouth once daily. Do not start before November 16, 2023.   losartan 25 mg tablet; Commonly known as: Cozaar; Take 1 tablet (25 mg)   by mouth once  daily.   magnesium oxide 400 mg (241.3 mg magnesium) tablet; Commonly known as:   Mag-Ox   melatonin 3 mg tablet   nitroglycerin 0.4 mg SL tablet; Commonly known as: Nitrostat; Place 1   tablet (0.4 mg) under the tongue every 5 minutes if needed for chest pain   (UP TO 3 TIMES. IF NO RELIEF CALL 911.).   oxygen gas therapy; Commonly known as: O2; Inhale 2 L/min once every 24   hours.   pantoprazole 40 mg EC tablet; Commonly known as: ProtoNix; TAKE 1 TABLET   DAILY   sertraline 50 mg tablet; Commonly known as: Zoloft; Take 1 tablet (50   mg) by mouth once daily.   sodium chloride 0.65 % nasal spray; Commonly known as: Ocean; Administer   1 spray into each nostril 4 times a day as needed for congestion.   traZODone 50 mg tablet; Commonly known as: Desyrel     STOP taking these medications     potassium chloride 20 mEq packet; Commonly known as: Klor-Con       Outpatient Follow-Up  Future Appointments   Date Time Provider Department [place]   2/26/2024  4:00 PM Jim Mckinney MD [id number] [facility]   5/8/2024  1:00 PM [id number] INFUSION [id number] [facility]       Nakia Bailey MD

## 2023-12-20 NOTE — NURSING NOTE
[facility] here to transport patient to [facility] for Rehab. Daughter Annette at bedside, will follow to rehab facility.

## 2023-12-21 ENCOUNTER — LAB REQUISITION (OUTPATIENT)
Dept: LAB | Facility: HOSPITAL | Age: 88
End: 2023-12-21
Payer: MEDICARE

## 2023-12-21 LAB
ANION GAP SERPL CALC-SCNC: 7 MMOL/L
BUN SERPL-MCNC: 14 MG/DL (ref 8–25)
CALCIUM SERPL-MCNC: 7 MG/DL (ref 8.5–10.4)
CHLORIDE SERPL-SCNC: 107 MMOL/L (ref 97–107)
CO2 SERPL-SCNC: 29 MMOL/L (ref 24–31)
CREAT SERPL-MCNC: 0.7 MG/DL (ref 0.4–1.6)
GFR SERPL CREATININE-BSD FRML MDRD: 81 ML/MIN/1.73M*2
GLUCOSE SERPL-MCNC: 85 MG/DL (ref 65–99)
POTASSIUM SERPL-SCNC: 3.4 MMOL/L (ref 3.4–5.1)
SODIUM SERPL-SCNC: 143 MMOL/L (ref 133–145)

## 2023-12-21 PROCEDURE — 80048 BASIC METABOLIC PNL TOTAL CA: CPT

## 2023-12-24 ENCOUNTER — NURSING HOME VISIT (OUTPATIENT)
Dept: POST ACUTE CARE | Facility: EXTERNAL LOCATION | Age: 88
End: 2023-12-24
Payer: MEDICARE

## 2023-12-24 DIAGNOSIS — S72.001A HIP FRACTURE, RIGHT, CLOSED, INITIAL ENCOUNTER (MULTI): Primary | ICD-10-CM

## 2023-12-24 DIAGNOSIS — D64.9 ANEMIA, UNSPECIFIED TYPE: ICD-10-CM

## 2023-12-24 DIAGNOSIS — J44.9 CHRONIC OBSTRUCTIVE PULMONARY DISEASE, UNSPECIFIED COPD TYPE (MULTI): ICD-10-CM
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
## 2023-12-24 DIAGNOSIS — F32.A DEPRESSION, UNSPECIFIED DEPRESSION TYPE: ICD-10-CM

## 2023-12-24 DIAGNOSIS — G62.9 NEUROPATHY: ICD-10-CM

## 2023-12-24 DIAGNOSIS — R53.1 WEAKNESS: ICD-10-CM

## 2023-12-24 DIAGNOSIS — K21.9 GASTROESOPHAGEAL REFLUX DISEASE WITHOUT ESOPHAGITIS: ICD-10-CM

## 2023-12-24 DIAGNOSIS — M19.90 OSTEOARTHRITIS, UNSPECIFIED OSTEOARTHRITIS TYPE, UNSPECIFIED SITE: ICD-10-CM

## 2023-12-24 DIAGNOSIS — I50.32 CHRONIC DIASTOLIC (CONGESTIVE) HEART FAILURE (MULTI): ICD-10-CM

## 2023-12-24 PROCEDURE — 99307 SBSQ NF CARE SF MDM 10: CPT | Performed by: INTERNAL MEDICINE

## 2023-12-24 NOTE — LETTER
Patient: Kalie Ramos  : 1929    Encounter Date: 2023    PLACE OF SERVICE:  [facility]    This is a subsequent visit.    Subjective  Patient ID: Kalie Ramos is a 89 y.o. female who presents for Follow-up.    Ms. Kalie Ramos is a 89-year-old female with history of recent fall and fracture to her right hip.  She has undergone surgical repair.  She has several medical issues including weakness and deconditioning.  She is unable to care for herself and requires supportive care.    Review of Systems   Constitutional:  Negative for chills and fever.   Cardiovascular:  Negative for chest pain.   All other systems reviewed and are negative.    Objective  /80   Pulse 76   Temp 36.7 °C (98 °F)   Resp 18     Physical Exam  Vitals reviewed.   Constitutional:       General: She is not in acute distress.     Comments: This is a well-developed, well-nourished female, sitting in a chair.   HENT:      Right Ear: Tympanic membrane, ear canal and external ear normal.      Left Ear: Tympanic membrane, ear canal and external ear normal.   Eyes:      General: No scleral icterus.     Pupils: Pupils are equal, round, and reactive to light.   Neck:      Vascular: No carotid bruit.   Cardiovascular:      Heart sounds: Normal heart sounds, S1 normal and S2 normal. No murmur heard.     No friction rub.   Pulmonary:      Breath sounds: Wheezing (rare scattered) present.   Abdominal:      Palpations: There is no hepatomegaly, splenomegaly or mass.   Musculoskeletal:         General: No swelling or deformity. Normal range of motion.      Cervical back: Neck supple.      Right lower leg: Edema (trace) present.      Left lower leg: Edema (trace) present.   Lymphadenopathy:      Cervical: No cervical adenopathy.      Upper Body:      Right upper body: No axillary adenopathy.      Left upper body: No axillary adenopathy.      Lower Body: No right inguinal adenopathy. No left  inguinal adenopathy.   Skin:     Findings: Wound (right hip. intact with no sign of infection) present.   Neurological:      Mental Status: She is oriented to person, place, and time.      Cranial Nerves: Cranial nerves 2-12 are intact. No cranial nerve deficit.      Sensory: No sensory deficit.      Motor: Motor function is intact. No weakness.      Gait: Gait is intact.      Deep Tendon Reflexes: Reflexes normal.   Psychiatric:         Mood and Affect: Mood normal. Mood is not anxious or depressed. Affect is not angry.         Behavior: Behavior is not agitated.         Thought Content: Thought content normal.         Judgment: Judgment normal.     LAB WORK:  Laboratory studies were reviewed.    Assessment/Plan  Problem List Items Addressed This Visit             ICD-10-CM       Cardiac and Vasculature    Chronic diastolic (congestive) heart failure (CMS/HCC) I50.32       Gastrointestinal and Abdominal    Esophageal reflux K21.9       Hematology and Neoplasia    Anemia D64.9       Musculoskeletal and Injuries    Fall W19.XXXA       Pulmonary and Pneumonias    COPD (chronic obstructive pulmonary disease) (CMS/[facility]) J44.9       Symptoms and Signs    Weakness R53.1       Other    Hip fracture, right, closed, initial encounter (CMS/HCC) - Primary S72.001A     Other Visit Diagnoses         Codes    Depression, unspecified depression type     F32.A    Osteoarthritis, unspecified osteoarthritis type, unspecified site     M19.90    Neuropathy     G62.9        1. Hip fracture, on pain control.  2. Depression, on medication.  3. COPD, on bronchodilator therapy.  4. Heart failure, on diuretic.  5. Anemia, follow CBC.  6. Weakness, on PT/OT.  7. Fall risk, on fall precaution.  8. Neuropathy, on gabapentin.  9. Osteoarthritis, on Tylenol.  10. Reflux disease, on PPI.    Scribe Attestation  By signing my name below, nAgela LOPES Scribe attest that this documentation has been prepared under the direction and in the presence of  Avel Bailey MD.       Electronically Signed By: Avel Bailey MD   1/16/24  2:26 PM

## 2024-01-02 ENCOUNTER — APPOINTMENT (OUTPATIENT)
Dept: CARDIOLOGY | Facility: CLINIC | Age: 89
End: 2024-01-02
Payer: MEDICARE

## 2024-01-03 ENCOUNTER — LAB REQUISITION (OUTPATIENT)
Dept: LAB | Facility: HOSPITAL | Age: 89
End: 2024-01-03
Payer: MEDICARE

## 2024-01-03 ENCOUNTER — NURSING HOME VISIT (OUTPATIENT)
Dept: POST ACUTE CARE | Facility: EXTERNAL LOCATION | Age: 89
End: 2024-01-03
Payer: MEDICARE

## 2024-01-03 DIAGNOSIS — I50.32 CHRONIC DIASTOLIC (CONGESTIVE) HEART FAILURE (MULTI): ICD-10-CM

## 2024-01-03 DIAGNOSIS — G47.00 INSOMNIA, UNSPECIFIED TYPE: ICD-10-CM

## 2024-01-03 DIAGNOSIS — S72.001A HIP FRACTURE, RIGHT, CLOSED, INITIAL ENCOUNTER (MULTI): Primary | ICD-10-CM

## 2024-01-03 DIAGNOSIS — R53.1 WEAKNESS: ICD-10-CM

## 2024-01-03 DIAGNOSIS — E78.5 HYPERLIPIDEMIA, UNSPECIFIED HYPERLIPIDEMIA TYPE: ICD-10-CM

## 2024-01-03 DIAGNOSIS — K21.9 GASTROESOPHAGEAL REFLUX DISEASE WITHOUT ESOPHAGITIS: ICD-10-CM

## 2024-01-03 DIAGNOSIS — F32.A DEPRESSION, UNSPECIFIED DEPRESSION TYPE: ICD-10-CM

## 2024-01-03 DIAGNOSIS — D64.9 ANEMIA, UNSPECIFIED TYPE: ICD-10-CM

## 2024-01-03 DIAGNOSIS — J44.9 CHRONIC OBSTRUCTIVE PULMONARY DISEASE, UNSPECIFIED COPD TYPE (MULTI): ICD-10-CM
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
## 2024-01-03 DIAGNOSIS — I10 HYPERTENSION, UNSPECIFIED TYPE: ICD-10-CM

## 2024-01-03 LAB
ANION GAP SERPL CALC-SCNC: 9 MMOL/L
BUN SERPL-MCNC: 13 MG/DL (ref 8–25)
CALCIUM SERPL-MCNC: 8.3 MG/DL (ref 8.5–10.4)
CHLORIDE SERPL-SCNC: 106 MMOL/L (ref 97–107)
CO2 SERPL-SCNC: 26 MMOL/L (ref 24–31)
CREAT SERPL-MCNC: 0.6 MG/DL (ref 0.4–1.6)
ERYTHROCYTE [DISTWIDTH] IN BLOOD BY AUTOMATED COUNT: 15.4 % (ref 11.5–14.5)
GFR SERPL CREATININE-BSD FRML MDRD: 83 ML/MIN/1.73M*2
GLUCOSE SERPL-MCNC: 103 MG/DL (ref 65–99)
HCT VFR BLD AUTO: 36.7 % (ref 36–46)
HGB BLD-MCNC: 11.4 G/DL (ref 12–16)
MCH RBC QN AUTO: 28.7 PG (ref 26–34)
MCHC RBC AUTO-ENTMCNC: 31.1 G/DL (ref 32–36)
MCV RBC AUTO: 92 FL (ref 80–100)
NRBC BLD-RTO: 0 /100 WBCS (ref 0–0)
PLATELET # BLD AUTO: 382 X10*3/UL (ref 150–450)
POTASSIUM SERPL-SCNC: 3.8 MMOL/L (ref 3.4–5.1)
RBC # BLD AUTO: 3.97 X10*6/UL (ref 4–5.2)
SODIUM SERPL-SCNC: 141 MMOL/L (ref 133–145)
WBC # BLD AUTO: 10.1 X10*3/UL (ref 4.4–11.3)

## 2024-01-03 PROCEDURE — 99308 SBSQ NF CARE LOW MDM 20: CPT | Performed by: INTERNAL MEDICINE

## 2024-01-03 PROCEDURE — 80048 BASIC METABOLIC PNL TOTAL CA: CPT | Mod: OUT | Performed by: INTERNAL MEDICINE

## 2024-01-03 PROCEDURE — 85027 COMPLETE CBC AUTOMATED: CPT | Mod: OUT | Performed by: INTERNAL MEDICINE

## 2024-01-03 NOTE — LETTER
Patient: Kalie Ramos  : 1929    Encounter Date: 2024    PLACE OF SERVICE: [facility]    This is a subsequent visit.    Subjective  Patient ID: Kalie Ramos is a 89 y.o. female who presents for Follow-up.    Ms. Kalie Ramos is a 89-year-old female with recent history of fall and fracture to the right hip.  She has been surgically repaired and has been entered into physical and occupational therapy in order to regain strength and function.    Review of Systems   Constitutional:  Negative for chills and fever.   Cardiovascular:  Negative for chest pain.   All other systems reviewed and are negative.    Objective  /82   Pulse 78   Temp 36.6 °C (97.9 °F)   Resp 18     Physical Exam  Vitals reviewed.   Constitutional:       General: She is not in acute distress.     Comments: This is a well-developed, well-nourished female sitting in a chair.   HENT:      Right Ear: Tympanic membrane, ear canal and external ear normal.      Left Ear: Tympanic membrane, ear canal and external ear normal.   Eyes:      General: No scleral icterus.     Pupils: Pupils are equal, round, and reactive to light.   Neck:      Vascular: No carotid bruit.   Cardiovascular:      Heart sounds: Normal heart sounds, S1 normal and S2 normal. No murmur heard.     No friction rub.   Pulmonary:      Breath sounds: Wheezing (rare scattered) present.   Abdominal:      Palpations: There is no hepatomegaly, splenomegaly or mass.   Musculoskeletal:         General: No swelling or deformity. Normal range of motion.      Cervical back: Neck supple.      Right lower leg: No edema.      Left lower leg: No edema.   Lymphadenopathy:      Cervical: No cervical adenopathy.      Upper Body:      Right upper body: No axillary adenopathy.      Left upper body: No axillary adenopathy.      Lower Body: No right inguinal adenopathy. No left inguinal adenopathy.   Skin:     Findings: Wound (hip, intact with no  sign of infection.) present.   Neurological:      Mental Status: She is oriented to person, place, and time.      Cranial Nerves: Cranial nerves 2-12 are intact. No cranial nerve deficit.      Sensory: No sensory deficit.      Motor: Motor function is intact. No weakness.      Gait: Gait is intact.      Deep Tendon Reflexes: Reflexes normal.   Psychiatric:         Mood and Affect: Mood normal. Mood is not anxious or depressed. Affect is not angry.         Behavior: Behavior is not agitated.         Thought Content: Thought content normal.         Judgment: Judgment normal.     LAB WORK: Laboratory studies reviewed.    Assessment/Plan  Problem List Items Addressed This Visit             ICD-10-CM       Cardiac and Vasculature    Hyperlipidemia E78.5    Hypertension I10    Chronic diastolic (congestive) heart failure (CMS/HCC) I50.32       Gastrointestinal and Abdominal    Esophageal reflux K21.9       Hematology and Neoplasia    Anemia D64.9       Musculoskeletal and Injuries    Fall W19.XXXA       Pulmonary and Pneumonias    COPD (chronic obstructive pulmonary disease) (CMS/HCC) J44.9       Sleep    Insomnia G47.00       Symptoms and Signs    Weakness R53.1       Other    Hip fracture, right, closed, initial encounter (CMS/HCC) - Primary S72.001A     Other Visit Diagnoses         Codes    Depression, unspecified depression type     F32.A        1. Hip fracture, on pain control.  2. Weakness, on PT/OT.  3. Fall risk, fall precaution.  4. CHF, on diuretic.  5. COPD, on bronchodilator therapy.]  6. Hypertension, med controlled.  7. Hyperlipidemia, on statin.  8. Insomnia, on melatonin.  9. Anemia, follow CBC.  10. Depression, on medication.  11. Reflux disease, on PPI.    Scribe Attestation  By signing my name below, Angela LOPES Scribe attest that this documentation has been prepared under the direction and in the presence of Avel Bailey MD.   All medical record entries made by the scribe were personally  dictated by me I have reviewed the chart and agree the record accurately reflects my personal performance of his history physical examination and management      Electronically Signed By: Avel Bailey MD   1/11/24  9:28 PM

## 2024-01-04 ENCOUNTER — LAB REQUISITION (OUTPATIENT)
Dept: LAB | Facility: HOSPITAL | Age: 89
End: 2024-01-04
Payer: MEDICARE

## 2024-01-04 LAB
ANION GAP SERPL CALC-SCNC: 7 MMOL/L
BUN SERPL-MCNC: 14 MG/DL (ref 8–25)
CALCIUM SERPL-MCNC: 8.3 MG/DL (ref 8.5–10.4)
CHLORIDE SERPL-SCNC: 106 MMOL/L (ref 97–107)
CO2 SERPL-SCNC: 28 MMOL/L (ref 24–31)
CREAT SERPL-MCNC: 0.7 MG/DL (ref 0.4–1.6)
ERYTHROCYTE [DISTWIDTH] IN BLOOD BY AUTOMATED COUNT: 15.3 % (ref 11.5–14.5)
GFR SERPL CREATININE-BSD FRML MDRD: 80 ML/MIN/1.73M*2
GLUCOSE SERPL-MCNC: 85 MG/DL (ref 65–99)
HCT VFR BLD AUTO: 35.2 % (ref 36–46)
HGB BLD-MCNC: 11.3 G/DL (ref 12–16)
MCH RBC QN AUTO: 29.2 PG (ref 26–34)
MCHC RBC AUTO-ENTMCNC: 32.1 G/DL (ref 32–36)
MCV RBC AUTO: 91 FL (ref 80–100)
NRBC BLD-RTO: 0 /100 WBCS (ref 0–0)
PLATELET # BLD AUTO: 368 X10*3/UL (ref 150–450)
POTASSIUM SERPL-SCNC: 4.1 MMOL/L (ref 3.4–5.1)
RBC # BLD AUTO: 3.87 X10*6/UL (ref 4–5.2)
SODIUM SERPL-SCNC: 141 MMOL/L (ref 133–145)
WBC # BLD AUTO: 10.4 X10*3/UL (ref 4.4–11.3)

## 2024-01-04 PROCEDURE — 80048 BASIC METABOLIC PNL TOTAL CA: CPT | Mod: OUT | Performed by: INTERNAL MEDICINE

## 2024-01-04 PROCEDURE — 85027 COMPLETE CBC AUTOMATED: CPT | Mod: OUT | Performed by: INTERNAL MEDICINE

## 2024-01-06 VITALS
DIASTOLIC BLOOD PRESSURE: 80 MMHG | RESPIRATION RATE: 18 BRPM | HEART RATE: 76 BPM | SYSTOLIC BLOOD PRESSURE: 124 MMHG | TEMPERATURE: 98 F

## 2024-01-06 VITALS
HEART RATE: 72 BPM | RESPIRATION RATE: 16 BRPM | SYSTOLIC BLOOD PRESSURE: 124 MMHG | TEMPERATURE: 97.8 F | DIASTOLIC BLOOD PRESSURE: 76 MMHG

## 2024-01-06 VITALS
HEART RATE: 78 BPM | TEMPERATURE: 97.9 F | RESPIRATION RATE: 18 BRPM | SYSTOLIC BLOOD PRESSURE: 126 MMHG | DIASTOLIC BLOOD PRESSURE: 82 MMHG

## 2024-01-06 ASSESSMENT — ENCOUNTER SYMPTOMS
FEVER: 0
FEVER: 0
CHILLS: 0
CHILLS: 0
FEVER: 0
CHILLS: 0

## 2024-01-06 NOTE — PROGRESS NOTES
PLACE OF SERVICE:  [facility]    This is new/initial history and physical.    Subjective   Patient ID: Kalie Ramos is a 89 y.o. female who presents for new/initial history and Annual Exam.    Ms. Kalie Ramos is a 89-year-old female with recent fall and fracture to her right hip.  She has undergone surgical repair.  She is currently on pain control and will be entered into physical and occupational therapy in order to regain strength and function.    Review of Systems   Constitutional:  Negative for chills and fever.   Cardiovascular:  Negative for chest pain.   All other systems reviewed and are negative.    Objective   /76   Pulse 72   Temp 36.6 °C (97.8 °F)   Resp 16     Physical Exam  Vitals reviewed.   Constitutional:       General: She is not in acute distress.     Comments: This is a well-developed, well-nourished female, sitting in a chair.   HENT:      Right Ear: Tympanic membrane, ear canal and external ear normal.      Left Ear: Tympanic membrane, ear canal and external ear normal.   Eyes:      General: No scleral icterus.     Pupils: Pupils are equal, round, and reactive to light.   Neck:      Vascular: No carotid bruit.   Cardiovascular:      Heart sounds: Normal heart sounds, S1 normal and S2 normal. No murmur heard.     No friction rub.   Pulmonary:      Breath sounds: Wheezing (rare scattered) present.   Abdominal:      Palpations: There is no hepatomegaly, splenomegaly or mass.   Musculoskeletal:         General: No swelling or deformity. Normal range of motion.      Cervical back: Neck supple.      Right lower leg: Edema (trace) present.      Left lower leg: Edema (trace) present.   Lymphadenopathy:      Cervical: No cervical adenopathy.      Upper Body:      Right upper body: No axillary adenopathy.      Left upper body: No axillary adenopathy.      Lower Body: No right inguinal adenopathy. No left inguinal adenopathy.   Skin:     Findings: Wound  (hip, intact with no sign of infection) present.   Neurological:      Mental Status: She is oriented to person, place, and time.      Cranial Nerves: Cranial nerves 2-12 are intact. No cranial nerve deficit.      Sensory: No sensory deficit.      Motor: Motor function is intact. No weakness.      Gait: Gait is intact.      Deep Tendon Reflexes: Reflexes normal.   Psychiatric:         Mood and Affect: Mood normal. Mood is not anxious or depressed. Affect is not angry.         Behavior: Behavior is not agitated.         Thought Content: Thought content normal.         Judgment: Judgment normal.     LAB WORK: Laboratory studies were reviewed.    Assessment/Plan   Problem List Items Addressed This Visit             ICD-10-CM       Cardiac and Vasculature    Chronic diastolic (congestive) heart failure (CMS/HCC) I50.32       Gastrointestinal and Abdominal    Esophageal reflux K21.9       Hematology and Neoplasia    Anemia D64.9       Musculoskeletal and Injuries    Fall W19.XXXA       Pulmonary and Pneumonias    COPD (chronic obstructive pulmonary disease) (CMS/HCC) J44.9       Symptoms and Signs    Weakness R53.1       Other    Hip fracture, right, closed, initial encounter (CMS/HCC) - Primary S72.001A     Other Visit Diagnoses         Codes    Depression, unspecified depression type     F32.A    Osteoarthritis, unspecified osteoarthritis type, unspecified site     M19.90        1. Hip fracture, on pain control.  2. CHF, on diuretic.  3. COPD, on bronchodilator therapy.  4. Weakness, on PT/OT.  5. Fall risk, on fall precaution.  6. Depression, on medication.  7. Anemia, follow CBC.  8. Osteoarthritis, on Tylenol.  9. Reflux disease, on PPI.    Scribe Attestation  By signing my name below, Angela LOPES Scribe attest that this documentation has been prepared under the direction and in the presence of Avel Bailey MD.

## 2024-01-06 NOTE — PROGRESS NOTES
PLACE OF SERVICE: [facility]    This is a subsequent visit.    Subjective   Patient ID: Kalie Ramos is a 89 y.o. female who presents for Follow-up.    Ms. Kalie Ramos is a 89-year-old female with recent history of fall and fracture to the right hip.  She has been surgically repaired and has been entered into physical and occupational therapy in order to regain strength and function.    Review of Systems   Constitutional:  Negative for chills and fever.   Cardiovascular:  Negative for chest pain.   All other systems reviewed and are negative.    Objective   /82   Pulse 78   Temp 36.6 °C (97.9 °F)   Resp 18     Physical Exam  Vitals reviewed.   Constitutional:       General: She is not in acute distress.     Comments: This is a well-developed, well-nourished female sitting in a chair.   HENT:      Right Ear: Tympanic membrane, ear canal and external ear normal.      Left Ear: Tympanic membrane, ear canal and external ear normal.   Eyes:      General: No scleral icterus.     Pupils: Pupils are equal, round, and reactive to light.   Neck:      Vascular: No carotid bruit.   Cardiovascular:      Heart sounds: Normal heart sounds, S1 normal and S2 normal. No murmur heard.     No friction rub.   Pulmonary:      Breath sounds: Wheezing (rare scattered) present.   Abdominal:      Palpations: There is no hepatomegaly, splenomegaly or mass.   Musculoskeletal:         General: No swelling or deformity. Normal range of motion.      Cervical back: Neck supple.      Right lower leg: No edema.      Left lower leg: No edema.   Lymphadenopathy:      Cervical: No cervical adenopathy.      Upper Body:      Right upper body: No axillary adenopathy.      Left upper body: No axillary adenopathy.      Lower Body: No right inguinal adenopathy. No left inguinal adenopathy.   Skin:     Findings: Wound (hip, intact with no sign of infection.) present.   Neurological:      Mental Status: She is  oriented to person, place, and time.      Cranial Nerves: Cranial nerves 2-12 are intact. No cranial nerve deficit.      Sensory: No sensory deficit.      Motor: Motor function is intact. No weakness.      Gait: Gait is intact.      Deep Tendon Reflexes: Reflexes normal.   Psychiatric:         Mood and Affect: Mood normal. Mood is not anxious or depressed. Affect is not angry.         Behavior: Behavior is not agitated.         Thought Content: Thought content normal.         Judgment: Judgment normal.     LAB WORK: Laboratory studies reviewed.    Assessment/Plan   Problem List Items Addressed This Visit             ICD-10-CM       Cardiac and Vasculature    Hyperlipidemia E78.5    Hypertension I10    Chronic diastolic (congestive) heart failure (CMS/HCC) I50.32       Gastrointestinal and Abdominal    Esophageal reflux K21.9       Hematology and Neoplasia    Anemia D64.9       Musculoskeletal and Injuries    Fall W19.XXXA       Pulmonary and Pneumonias    COPD (chronic obstructive pulmonary disease) (CMS/HCC) J44.9       Sleep    Insomnia G47.00       Symptoms and Signs    Weakness R53.1       Other    Hip fracture, right, closed, initial encounter (CMS/HCC) - Primary S72.001A     Other Visit Diagnoses         Codes    Depression, unspecified depression type     F32.A        1. Hip fracture, on pain control.  2. Weakness, on PT/OT.  3. Fall risk, fall precaution.  4. CHF, on diuretic.  5. COPD, on bronchodilator therapy.]  6. Hypertension, med controlled.  7. Hyperlipidemia, on statin.  8. Insomnia, on melatonin.  9. Anemia, follow CBC.  10. Depression, on medication.  11. Reflux disease, on PPI.    Scribe Attestation  By signing my name below, IAngela Scribe attest that this documentation has been prepared under the direction and in the presence of Avel Bailey MD.   All medical record entries made by the scribben were personally dictated by me I have reviewed the chart and agree the record accurately  reflects my personal performance of his history physical examination and management

## 2024-01-06 NOTE — PROGRESS NOTES
PLACE OF SERVICE:  [facility]    This is a subsequent visit.    Subjective   Patient ID: Kalie Ramos is a 89 y.o. female who presents for Follow-up.    Ms. Kalie Ramos is a 89-year-old female with history of recent fall and fracture to her right hip.  She has undergone surgical repair.  She has several medical issues including weakness and deconditioning.  She is unable to care for herself and requires supportive care.    Review of Systems   Constitutional:  Negative for chills and fever.   Cardiovascular:  Negative for chest pain.   All other systems reviewed and are negative.    Objective   /80   Pulse 76   Temp 36.7 °C (98 °F)   Resp 18     Physical Exam  Vitals reviewed.   Constitutional:       General: She is not in acute distress.     Comments: This is a well-developed, well-nourished female, sitting in a chair.   HENT:      Right Ear: Tympanic membrane, ear canal and external ear normal.      Left Ear: Tympanic membrane, ear canal and external ear normal.   Eyes:      General: No scleral icterus.     Pupils: Pupils are equal, round, and reactive to light.   Neck:      Vascular: No carotid bruit.   Cardiovascular:      Heart sounds: Normal heart sounds, S1 normal and S2 normal. No murmur heard.     No friction rub.   Pulmonary:      Breath sounds: Wheezing (rare scattered) present.   Abdominal:      Palpations: There is no hepatomegaly, splenomegaly or mass.   Musculoskeletal:         General: No swelling or deformity. Normal range of motion.      Cervical back: Neck supple.      Right lower leg: Edema (trace) present.      Left lower leg: Edema (trace) present.   Lymphadenopathy:      Cervical: No cervical adenopathy.      Upper Body:      Right upper body: No axillary adenopathy.      Left upper body: No axillary adenopathy.      Lower Body: No right inguinal adenopathy. No left inguinal adenopathy.   Skin:     Findings: Wound (right hip. intact with no sign  of infection) present.   Neurological:      Mental Status: She is oriented to person, place, and time.      Cranial Nerves: Cranial nerves 2-12 are intact. No cranial nerve deficit.      Sensory: No sensory deficit.      Motor: Motor function is intact. No weakness.      Gait: Gait is intact.      Deep Tendon Reflexes: Reflexes normal.   Psychiatric:         Mood and Affect: Mood normal. Mood is not anxious or depressed. Affect is not angry.         Behavior: Behavior is not agitated.         Thought Content: Thought content normal.         Judgment: Judgment normal.     LAB WORK:  Laboratory studies were reviewed.    Assessment/Plan   Problem List Items Addressed This Visit             ICD-10-CM       Cardiac and Vasculature    Chronic diastolic (congestive) heart failure (CMS/HCC) I50.32       Gastrointestinal and Abdominal    Esophageal reflux K21.9       Hematology and Neoplasia    Anemia D64.9       Musculoskeletal and Injuries    Fall W19.XXXA       Pulmonary and Pneumonias    COPD (chronic obstructive pulmonary disease) (CMS/HCC) J44.9       Symptoms and Signs    Weakness R53.1       Other    Hip fracture, right, closed, initial encounter (CMS/HCC) - Primary S72.001A     Other Visit Diagnoses         Codes    Depression, unspecified depression type     F32.A    Osteoarthritis, unspecified osteoarthritis type, unspecified site     M19.90    Neuropathy     G62.9        1. Hip fracture, on pain control.  2. Depression, on medication.  3. COPD, on bronchodilator therapy.  4. Heart failure, on diuretic.  5. Anemia, follow CBC.  6. Weakness, on PT/OT.  7. Fall risk, on fall precaution.  8. Neuropathy, on gabapentin.  9. Osteoarthritis, on Tylenol.  10. Reflux disease, on PPI.    Scribe Attestation  By signing my name below, IAngela Scribe attest that this documentation has been prepared under the direction and in the presence of Avel Bailey MD.

## 2024-01-07 ENCOUNTER — NURSING HOME VISIT (OUTPATIENT)
Dept: POST ACUTE CARE | Facility: EXTERNAL LOCATION | Age: 89
End: 2024-01-07
Payer: MEDICARE

## 2024-01-07 DIAGNOSIS — I50.9 CONGESTIVE HEART FAILURE, UNSPECIFIED HF CHRONICITY, UNSPECIFIED HEART FAILURE TYPE (MULTI): ICD-10-CM

## 2024-01-07 DIAGNOSIS — R53.1 WEAKNESS: ICD-10-CM

## 2024-01-07 DIAGNOSIS — J44.9 CHRONIC OBSTRUCTIVE PULMONARY DISEASE, UNSPECIFIED COPD TYPE (MULTI): ICD-10-CM

## 2024-01-07 DIAGNOSIS — G47.00 INSOMNIA, UNSPECIFIED TYPE: ICD-10-CM

## 2024-01-07 DIAGNOSIS — E78.5 HYPERLIPIDEMIA, UNSPECIFIED HYPERLIPIDEMIA TYPE: ICD-10-CM

## 2024-01-07 DIAGNOSIS — I10 HYPERTENSION, UNSPECIFIED TYPE: ICD-10-CM

## 2024-01-07 DIAGNOSIS — S72.001A HIP FRACTURE, RIGHT, CLOSED, INITIAL ENCOUNTER (MULTI): Primary | ICD-10-CM

## 2024-01-07 DIAGNOSIS — D64.9 ANEMIA, UNSPECIFIED TYPE: ICD-10-CM

## 2024-01-07 DIAGNOSIS — Z91.81 AT RISK FOR FALLING: ICD-10-CM

## 2024-01-07 DIAGNOSIS — K21.9 GASTROESOPHAGEAL REFLUX DISEASE WITHOUT ESOPHAGITIS: ICD-10-CM

## 2024-01-07 DIAGNOSIS — M19.90 OSTEOARTHRITIS, UNSPECIFIED OSTEOARTHRITIS TYPE, UNSPECIFIED SITE: ICD-10-CM

## 2024-01-07 PROCEDURE — 99309 SBSQ NF CARE MODERATE MDM 30: CPT | Performed by: INTERNAL MEDICINE

## 2024-01-11 ENCOUNTER — LAB REQUISITION (OUTPATIENT)
Dept: LAB | Facility: HOSPITAL | Age: 89
End: 2024-01-11
Payer: MEDICARE

## 2024-01-11 LAB
ANION GAP SERPL CALC-SCNC: 10 MMOL/L
BUN SERPL-MCNC: 7 MG/DL (ref 8–25)
CALCIUM SERPL-MCNC: 7.8 MG/DL (ref 8.5–10.4)
CHLORIDE SERPL-SCNC: 98 MMOL/L (ref 97–107)
CO2 SERPL-SCNC: 33 MMOL/L (ref 24–31)
CREAT SERPL-MCNC: 0.7 MG/DL (ref 0.4–1.6)
EGFRCR SERPLBLD CKD-EPI 2021: 80 ML/MIN/1.73M*2
ERYTHROCYTE [DISTWIDTH] IN BLOOD BY AUTOMATED COUNT: 15.1 % (ref 11.5–14.5)
GLUCOSE SERPL-MCNC: 94 MG/DL (ref 65–99)
HCT VFR BLD AUTO: 39.9 % (ref 36–46)
HGB BLD-MCNC: 12.3 G/DL (ref 12–16)
MCH RBC QN AUTO: 28.5 PG (ref 26–34)
MCHC RBC AUTO-ENTMCNC: 30.8 G/DL (ref 32–36)
MCV RBC AUTO: 93 FL (ref 80–100)
NRBC BLD-RTO: 0 /100 WBCS (ref 0–0)
PLATELET # BLD AUTO: 347 X10*3/UL (ref 150–450)
POTASSIUM SERPL-SCNC: 3.9 MMOL/L (ref 3.4–5.1)
RBC # BLD AUTO: 4.31 X10*6/UL (ref 4–5.2)
SODIUM SERPL-SCNC: 141 MMOL/L (ref 133–145)
WBC # BLD AUTO: 9.8 X10*3/UL (ref 4.4–11.3)

## 2024-01-11 PROCEDURE — 85027 COMPLETE CBC AUTOMATED: CPT | Mod: OUT | Performed by: INTERNAL MEDICINE

## 2024-01-11 PROCEDURE — 82565 ASSAY OF CREATININE: CPT | Mod: OUT | Performed by: INTERNAL MEDICINE

## 2024-01-15 ENCOUNTER — PATIENT OUTREACH (OUTPATIENT)
Dept: CARE COORDINATION | Facility: CLINIC | Age: 89
End: 2024-01-15
Payer: MEDICARE

## 2024-01-15 VITALS
HEART RATE: 74 BPM | SYSTOLIC BLOOD PRESSURE: 124 MMHG | DIASTOLIC BLOOD PRESSURE: 76 MMHG | RESPIRATION RATE: 18 BRPM | TEMPERATURE: 97.6 F

## 2024-01-15 SDOH — SOCIAL STABILITY: SOCIAL NETWORK: ARE YOU MARRIED, WIDOWED, DIVORCED, SEPARATED, NEVER MARRIED, OR LIVING WITH A PARTNER?: WIDOWED

## 2024-01-15 SDOH — ECONOMIC STABILITY: GENERAL: WOULD YOU LIKE HELP WITH ANY OF THE FOLLOWING NEEDS?: I DONT NEED HELP WITH ANY OF THESE

## 2024-01-15 ASSESSMENT — ENCOUNTER SYMPTOMS
CHILLS: 0
FEVER: 0

## 2024-01-15 NOTE — PROGRESS NOTES
PLACE OF SERVICE:  [facility]    This is a subsequent visit.    Subjective   Patient ID: Kalie Ramos is a 89 y.o. female who presents for Follow-up.    Ms. Kalie Ramos is a 89-year-old female with history of fall and fracture to her right hip.  She has multiple medical problems including depression and heart failure.  She is unable to care for herself and requires supportive care.    Review of Systems   Constitutional:  Negative for chills and fever.   Cardiovascular:  Negative for chest pain.   All other systems reviewed and are negative.    Objective   /76   Pulse 74   Temp 36.4 °C (97.6 °F)   Resp 18     Physical Exam  Vitals reviewed.   Constitutional:       General: She is not in acute distress.     Comments:  This is a well-developed, elderly female, sitting in a chair.   HENT:      Right Ear: Tympanic membrane, ear canal and external ear normal.      Left Ear: Tympanic membrane, ear canal and external ear normal.   Eyes:      General: No scleral icterus.     Pupils: Pupils are equal, round, and reactive to light.   Neck:      Vascular: No carotid bruit.   Cardiovascular:      Heart sounds: Normal heart sounds, S1 normal and S2 normal. No murmur heard.     No friction rub.   Pulmonary:      Effort: Pulmonary effort is normal.      Breath sounds: Wheezing (scattered) present.   Abdominal:      Palpations: There is no hepatomegaly, splenomegaly or mass.   Musculoskeletal:         General: No swelling or deformity. Normal range of motion.      Cervical back: Neck supple.      Right lower leg: Edema (trace) present.      Left lower leg: Edema (trace) present.   Lymphadenopathy:      Cervical: No cervical adenopathy.      Upper Body:      Right upper body: No axillary adenopathy.      Left upper body: No axillary adenopathy.      Lower Body: No right inguinal adenopathy. No left inguinal adenopathy.   Skin:     Comments: Right hip wound is intact with no sign of  infection.   Neurological:      Mental Status: She is oriented to person, place, and time.      Cranial Nerves: Cranial nerves 2-12 are intact. No cranial nerve deficit.      Sensory: No sensory deficit.      Motor: Motor function is intact. No weakness.      Gait: Gait is intact.      Deep Tendon Reflexes: Reflexes normal.   Psychiatric:         Mood and Affect: Mood normal. Mood is not anxious or depressed. Affect is not angry.         Behavior: Behavior is not agitated.         Thought Content: Thought content normal.         Judgment: Judgment normal.     LAB WORK: Laboratory studies reviewed.    Assessment/Plan   Problem List Items Addressed This Visit             ICD-10-CM       Cardiac and Vasculature    Hyperlipidemia E78.5    Hypertension I10       Gastrointestinal and Abdominal    Esophageal reflux K21.9       Hematology and Neoplasia    Anemia D64.9       Pulmonary and Pneumonias    COPD (chronic obstructive pulmonary disease) (CMS/HCC) J44.9       Sleep    Insomnia G47.00       Symptoms and Signs    Weakness R53.1       Other    Hip fracture, right, closed, initial encounter (CMS/HCC) - Primary S72.001A     Other Visit Diagnoses         Codes    Congestive heart failure, unspecified HF chronicity, unspecified heart failure type (CMS/HCC)     I50.9    At risk for falling     Z91.81    Osteoarthritis, unspecified osteoarthritis type, unspecified site     M19.90        1. Hip fracture, on pain control.  2. COPD, on bronchodilator therapy.  3. Congestive heart failure, on diuretic.  4. Hypertension, medically controlled.  5. Hyperlipidemia, on statin.  6. Insomnia, on melatonin.  7. Anemia, follow CBC.  8. Weakness, on PT/OT.  9. Fall risk, fall precaution.  10. Osteoarthritis, on Tylenol.  11. Reflux disease, on PPI.    Scribe Attestation  By signing my name below, IAngela Scribe attest that this documentation has been prepared under the direction and in the presence of Avel Bailey MD.

## 2024-01-15 NOTE — PROGRESS NOTES
Discharged from [facility] 1/13 with [facility].   Brief outreach to dtr who is not currently with patient.   Dtr states has orthopedic post op appt 1/16, is partial weight bearing with walker,  and will reschedule home care visit scheduled 1/16.    Engagement  Call Start Time: 1554 (1/15/2024  3:54 PM)    Medications  Medications reviewed with patient/caregiver?: -- (dtr declined   States home care nurse reviewed 1/14) (1/15/2024  3:54 PM)  Is the patient having any side effects they believe may be caused by any medication additions or changes?: No (1/15/2024  3:54 PM)  Does the patient have all medications ordered at discharge?: Yes (1/15/2024  3:54 PM)  Care Management Interventions: No intervention needed (1/15/2024  3:54 PM)    Appointments  Does the patient have a primary care provider?: Yes (1/15/2024  3:54 PM)  Care Management Interventions: Advised patient to make appointment (1/15/2024  3:54 PM)  Care Management Interventions: -- (Post op orthopedic appt 1/16) (1/15/2024  3:54 PM)    Self Management  What is the home health agency?: [facility]   [facility] 1/14 (1/15/2024  3:54 PM)  Has home health visited the patient within 72 hours of discharge?: Yes (1/15/2024  3:54 PM)    Patient Teaching  Does the patient have access to their discharge instructions?: No (1/15/2024  3:54 PM)  What is the patient's perception of their health status since discharge?: Improving (1/15/2024  3:54 PM)  Is the patient/caregiver able to teach back the hierarchy of who to call/visit for symptoms/problems? PCP, Specialist, Home Health nurse, Urgent Care, ED, 911: Yes (1/15/2024  3:54 PM)    Wrap Up  Call End Time: 1556 (1/15/2024  3:54 PM)      Will monitor for 30 day transition period and outreach if issues arise.    Maureen GORE RN CCM  RN Care Coordinator  [facility]  Phone [phone]

## 2024-01-16 DIAGNOSIS — E78.00 HIGH CHOLESTEROL: ICD-10-CM

## 2024-01-16 DIAGNOSIS — I10 HYPERTENSION, UNSPECIFIED TYPE: ICD-10-CM

## 2024-01-17 ENCOUNTER — PATIENT OUTREACH (OUTPATIENT)
Dept: CARE COORDINATION | Facility: CLINIC | Age: 89
End: 2024-01-17
Payer: MEDICARE

## 2024-01-17 ENCOUNTER — DOCUMENTATION (OUTPATIENT)
Dept: CARE COORDINATION | Facility: CLINIC | Age: 89
End: 2024-01-17
Payer: MEDICARE

## 2024-01-17 NOTE — PROGRESS NOTES
01/17/2024   Received patient as a cheryl referral, patient is being follow by [facility] RN, no outreach.  gian

## 2024-01-18 ENCOUNTER — APPOINTMENT (OUTPATIENT)
Dept: CARDIOLOGY | Facility: CLINIC | Age: 89
End: 2024-01-18
Payer: MEDICARE

## 2024-01-19 ENCOUNTER — LAB (OUTPATIENT)
Dept: LAB | Facility: LAB | Age: 89
End: 2024-01-19
Payer: MEDICARE

## 2024-01-19 DIAGNOSIS — I10 HYPERTENSION, UNSPECIFIED TYPE: ICD-10-CM

## 2024-01-19 DIAGNOSIS — M15.9 GENERALIZED OSTEOARTHROSIS: ICD-10-CM

## 2024-01-19 DIAGNOSIS — E78.00 HIGH CHOLESTEROL: ICD-10-CM

## 2024-01-19 LAB
ALBUMIN SERPL BCP-MCNC: 3.4 G/DL (ref 3.4–5)
ALBUMIN SERPL BCP-MCNC: 3.4 G/DL (ref 3.4–5)
ALP SERPL-CCNC: 117 U/L (ref 33–136)
ALP SERPL-CCNC: 119 U/L (ref 33–136)
ALT SERPL W P-5'-P-CCNC: 15 U/L (ref 7–45)
ALT SERPL W P-5'-P-CCNC: 15 U/L (ref 7–45)
ANION GAP SERPL CALC-SCNC: 10 MMOL/L (ref 10–20)
ANION GAP SERPL CALC-SCNC: 12 MMOL/L (ref 10–20)
AST SERPL W P-5'-P-CCNC: 21 U/L (ref 9–39)
AST SERPL W P-5'-P-CCNC: 22 U/L (ref 9–39)
BILIRUB SERPL-MCNC: 0.8 MG/DL (ref 0–1.2)
BILIRUB SERPL-MCNC: 0.8 MG/DL (ref 0–1.2)
BUN SERPL-MCNC: 14 MG/DL (ref 6–23)
BUN SERPL-MCNC: 14 MG/DL (ref 6–23)
CALCIUM SERPL-MCNC: 9.3 MG/DL (ref 8.6–10.6)
CALCIUM SERPL-MCNC: 9.4 MG/DL (ref 8.6–10.6)
CHLORIDE SERPL-SCNC: 99 MMOL/L (ref 98–107)
CHLORIDE SERPL-SCNC: 99 MMOL/L (ref 98–107)
CO2 SERPL-SCNC: 33 MMOL/L (ref 21–32)
CO2 SERPL-SCNC: 35 MMOL/L (ref 21–32)
CREAT SERPL-MCNC: 0.74 MG/DL (ref 0.5–1.05)
CREAT SERPL-MCNC: 0.78 MG/DL (ref 0.5–1.05)
EGFRCR SERPLBLD CKD-EPI 2021: 70 ML/MIN/1.73M*2
EGFRCR SERPLBLD CKD-EPI 2021: 75 ML/MIN/1.73M*2
ERYTHROCYTE [DISTWIDTH] IN BLOOD BY AUTOMATED COUNT: 15 % (ref 11.5–14.5)
GLUCOSE SERPL-MCNC: 105 MG/DL (ref 74–99)
GLUCOSE SERPL-MCNC: 106 MG/DL (ref 74–99)
HCT VFR BLD AUTO: 40.9 % (ref 36–46)
HGB BLD-MCNC: 12.2 G/DL (ref 12–16)
MCH RBC QN AUTO: 28.4 PG (ref 26–34)
MCHC RBC AUTO-ENTMCNC: 29.8 G/DL (ref 32–36)
MCV RBC AUTO: 95 FL (ref 80–100)
NRBC BLD-RTO: 0 /100 WBCS (ref 0–0)
PLATELET # BLD AUTO: 352 X10*3/UL (ref 150–450)
POTASSIUM SERPL-SCNC: 4.8 MMOL/L (ref 3.5–5.3)
POTASSIUM SERPL-SCNC: 5 MMOL/L (ref 3.5–5.3)
PROT SERPL-MCNC: 6.6 G/DL (ref 6.4–8.2)
PROT SERPL-MCNC: 7 G/DL (ref 6.4–8.2)
RBC # BLD AUTO: 4.3 X10*6/UL (ref 4–5.2)
SODIUM SERPL-SCNC: 139 MMOL/L (ref 136–145)
SODIUM SERPL-SCNC: 139 MMOL/L (ref 136–145)
WBC # BLD AUTO: 13.3 X10*3/UL (ref 4.4–11.3)

## 2024-01-19 PROCEDURE — 36415 COLL VENOUS BLD VENIPUNCTURE: CPT

## 2024-01-19 PROCEDURE — 82140 ASSAY OF AMMONIA: CPT

## 2024-01-19 PROCEDURE — 85027 COMPLETE CBC AUTOMATED: CPT

## 2024-01-19 PROCEDURE — 80053 COMPREHEN METABOLIC PANEL: CPT

## 2024-01-20 LAB — AMMONIA PLAS-SCNC: 30 UMOL/L (ref 16–53)

## 2024-01-22 ENCOUNTER — OFFICE VISIT (OUTPATIENT)
Dept: PRIMARY CARE | Facility: CLINIC | Age: 89
End: 2024-01-22
Payer: MEDICARE

## 2024-01-22 VITALS
HEIGHT: 62 IN | BODY MASS INDEX: 19.69 KG/M2 | SYSTOLIC BLOOD PRESSURE: 128 MMHG | DIASTOLIC BLOOD PRESSURE: 74 MMHG | WEIGHT: 107 LBS

## 2024-01-22 DIAGNOSIS — E78.00 HIGH CHOLESTEROL: ICD-10-CM

## 2024-01-22 DIAGNOSIS — I10 HYPERTENSION, UNSPECIFIED TYPE: ICD-10-CM

## 2024-01-22 DIAGNOSIS — R09.02 HYPOXIA: ICD-10-CM

## 2024-01-22 DIAGNOSIS — R63.0 LOSS OF APPETITE: ICD-10-CM

## 2024-01-22 DIAGNOSIS — E05.90 HYPERTHYROIDISM: ICD-10-CM

## 2024-01-22 DIAGNOSIS — I50.9 CONGESTIVE HEART FAILURE, UNSPECIFIED HF CHRONICITY, UNSPECIFIED HEART FAILURE TYPE (MULTI): Primary | ICD-10-CM

## 2024-01-22 PROCEDURE — 3074F SYST BP LT 130 MM HG: CPT | Performed by: INTERNAL MEDICINE

## 2024-01-22 PROCEDURE — 1159F MED LIST DOCD IN RCRD: CPT | Performed by: INTERNAL MEDICINE

## 2024-01-22 PROCEDURE — 3078F DIAST BP <80 MM HG: CPT | Performed by: INTERNAL MEDICINE

## 2024-01-22 PROCEDURE — 1036F TOBACCO NON-USER: CPT | Performed by: INTERNAL MEDICINE

## 2024-01-22 PROCEDURE — 1126F AMNT PAIN NOTED NONE PRSNT: CPT | Performed by: INTERNAL MEDICINE

## 2024-01-22 PROCEDURE — 99214 OFFICE O/P EST MOD 30 MIN: CPT | Performed by: INTERNAL MEDICINE

## 2024-01-22 PROCEDURE — 1157F ADVNC CARE PLAN IN RCRD: CPT | Performed by: INTERNAL MEDICINE

## 2024-01-23 ASSESSMENT — ENCOUNTER SYMPTOMS
FEVER: 0
CHILLS: 0

## 2024-01-23 NOTE — PROGRESS NOTES
"Subjective   Patient ID: Kalie Ramos is a 89 y.o. female who presents for multiple medical issues.    It is always a delight to serve Ms. Ramos.  I wished she could have spend a little bit more time in rehab.  Feeling okay.  No problem.  She has a blood work done.  Appetite is down. Shortness of breath okay.    I have personally reviewed the patient's Past Medical History, Medications, Allergies, Social History, and Family History in the EMR.    Review of Systems   Constitutional:  Negative for chills and fever.   Cardiovascular:  Negative for chest pain.   All other systems reviewed and are negative.    Objective   /74   Ht 1.575 m (5' 2\")   Wt 48.5 kg (107 lb)   BMI 19.57 kg/m²     Physical Exam  Vitals reviewed.   Cardiovascular:      Heart sounds: Normal heart sounds, S1 normal and S2 normal. No murmur heard.     No friction rub.   Pulmonary:      Breath sounds: Wheezing (Bilateral) present.   Abdominal:      Palpations: There is no hepatomegaly, splenomegaly or mass.   Musculoskeletal:      Right lower leg: Edema (minimal) present.      Left lower leg: Edema (minimal) present.   Lymphadenopathy:      Lower Body: No right inguinal adenopathy. No left inguinal adenopathy.   Neurological:      Cranial Nerves: Cranial nerves 2-12 are intact.      Sensory: No sensory deficit.      Motor: Motor function is intact.      Deep Tendon Reflexes: Reflexes are normal and symmetric.     LAB WORK:  Laboratory testing discussed.    Assessment/Plan   Problem List Items Addressed This Visit             ICD-10-CM       Cardiac and Vasculature    Hypertension I10    Relevant Orders    CBC    Urinalysis with Reflex Microscopic       Endocrine/Metabolic    Hyperthyroidism E05.90    Relevant Orders    Thyroid Stimulating Hormone     Other Visit Diagnoses         Codes    Congestive heart failure, unspecified HF chronicity, unspecified heart failure type (CMS/HCC)    -  Primary I50.9    High cholesterol     E78.00 "    Relevant Orders    Comprehensive Metabolic Panel    Hypoxia     R09.02    Loss of appetite     R63.0        1. Congestive heart failure, improving.  2.  Hypoxia, on oxygen.  3. Loss of appetite.  Feeling okay.  4. Hypertension, okay.  5. High cholesterol, okay.  6. Status post fracture, doing much better.  7. High ______.  8. All questions answered.  9. Follow-up appointment with me in about four weeks.  10. Blood work reviewed.    Jamie Attestation  By signing my name below, I, Jamie Lieberman attest that this documentation has been prepared under the direction and in the presence of Avel Bailey MD.

## 2024-01-24 ENCOUNTER — PATIENT OUTREACH (OUTPATIENT)
Dept: CARE COORDINATION | Facility: CLINIC | Age: 89
End: 2024-01-24
Payer: MEDICARE

## 2024-01-24 NOTE — PROGRESS NOTES
Outreach to patient, for post SNF follow up spoke to dtr who requests call back this afternoon.    Reviewed post SNF PCP note.  Follow up with PCP next month.  Post op R hip note is not imaged.    Maureen GORE RN CCM  RN Care Coordinator  [facility]  Phone [phone]  
Clear

## 2024-01-24 NOTE — PROGRESS NOTES
Outreach to dtr who opines patient was discharged prematurely from SNF as had to be carried into her home because she was unable to do 2 step entry into home.    Dtr verifies receiving home care.   Dtr states patient has Life Alert, but does not use it and that someone is staying with patient most of the time.  Dtr states patient voice quality is weak and is encouraging fluids.   This writer educated dtr not to exceed 64 ounces per day.     Will continue to monitor for completion of 30 day trnasition period and outreach if issues arise.      Maureen GORE RN CCM  RN Care Coordinator  [facility]  Phone [phone]

## 2024-01-29 ENCOUNTER — APPOINTMENT (OUTPATIENT)
Dept: CARDIOLOGY | Facility: HOSPITAL | Age: 89
DRG: 291 | End: 2024-01-29
Payer: MEDICARE

## 2024-01-29 ENCOUNTER — HOSPITAL ENCOUNTER (INPATIENT)
Facility: HOSPITAL | Age: 89
LOS: 5 days | Discharge: NURSING, REHAB OR LONG-TERM CARE | DRG: 291 | End: 2024-02-03
Attending: EMERGENCY MEDICINE | Admitting: INTERNAL MEDICINE
Payer: MEDICARE

## 2024-01-29 ENCOUNTER — APPOINTMENT (OUTPATIENT)
Dept: RADIOLOGY | Facility: HOSPITAL | Age: 89
DRG: 291 | End: 2024-01-29
Payer: MEDICARE

## 2024-01-29 DIAGNOSIS — I50.9 CHRONIC CONGESTIVE HEART FAILURE, UNSPECIFIED HEART FAILURE TYPE (MULTI): ICD-10-CM

## 2024-01-29 DIAGNOSIS — J96.21 ACUTE ON CHRONIC RESPIRATORY FAILURE WITH HYPOXIA (MULTI): ICD-10-CM

## 2024-01-29 DIAGNOSIS — J18.9 PNEUMONIA OF BOTH LUNGS DUE TO INFECTIOUS ORGANISM, UNSPECIFIED PART OF LUNG: ICD-10-CM

## 2024-01-29 DIAGNOSIS — J90 PLEURAL EFFUSION: Primary | ICD-10-CM

## 2024-01-29 DIAGNOSIS — R79.89 ELEVATED TROPONIN: ICD-10-CM

## 2024-01-29 DIAGNOSIS — R06.03 RESPIRATORY DISTRESS: ICD-10-CM

## 2024-01-29 DIAGNOSIS — J44.1 COPD EXACERBATION (MULTI): ICD-10-CM

## 2024-01-29 PROBLEM — N39.0 UTI (URINARY TRACT INFECTION): Status: ACTIVE | Noted: 2024-01-29

## 2024-01-29 PROBLEM — R65.10 SIRS (SYSTEMIC INFLAMMATORY RESPONSE SYNDROME) (MULTI): Status: ACTIVE | Noted: 2024-01-29

## 2024-01-29 LAB
ALBUMIN SERPL-MCNC: 3.6 G/DL (ref 3.5–5)
ALP BLD-CCNC: 174 U/L (ref 35–125)
ALT SERPL-CCNC: 13 U/L (ref 5–40)
ANION GAP BLDA CALCULATED.4IONS-SCNC: 10 MMO/L (ref 10–25)
ANION GAP SERPL CALC-SCNC: 9 MMOL/L
APPARATUS: ABNORMAL
APPEARANCE UR: ABNORMAL
ARTERIAL PATENCY WRIST A: POSITIVE
AST SERPL-CCNC: 19 U/L (ref 5–40)
ATRIAL RATE: 98 BPM
BASE EXCESS BLDA CALC-SCNC: 3.8 MMOL/L (ref -2–3)
BASOPHILS # BLD AUTO: 0.04 X10*3/UL (ref 0–0.1)
BASOPHILS NFR BLD AUTO: 0.3 %
BILIRUB SERPL-MCNC: 0.5 MG/DL (ref 0.1–1.2)
BILIRUB UR STRIP.AUTO-MCNC: NEGATIVE MG/DL
BODY TEMPERATURE: 37 DEGREES CELSIUS
BUN SERPL-MCNC: 12 MG/DL (ref 8–25)
CA-I BLDA-SCNC: 1.17 MMOL/L (ref 1.1–1.33)
CALCIUM SERPL-MCNC: 9.2 MG/DL (ref 8.5–10.4)
CHLORIDE BLDA-SCNC: 102 MMOL/L (ref 98–107)
CHLORIDE SERPL-SCNC: 97 MMOL/L (ref 97–107)
CO2 SERPL-SCNC: 32 MMOL/L (ref 24–31)
COLOR UR: YELLOW
CREAT SERPL-MCNC: 0.7 MG/DL (ref 0.4–1.6)
D DIMER PPP FEU-MCNC: 3.46 MG/L FEU (ref 0.19–0.5)
EGFRCR SERPLBLD CKD-EPI 2021: 80 ML/MIN/1.73M*2
EOSINOPHIL # BLD AUTO: 0.42 X10*3/UL (ref 0–0.4)
EOSINOPHIL NFR BLD AUTO: 3.5 %
ERYTHROCYTE [DISTWIDTH] IN BLOOD BY AUTOMATED COUNT: 15.3 % (ref 11.5–14.5)
FLUAV RNA RESP QL NAA+PROBE: NOT DETECTED
FLUAV RNA RESP QL NAA+PROBE: NOT DETECTED
FLUBV RNA RESP QL NAA+PROBE: NOT DETECTED
FLUBV RNA RESP QL NAA+PROBE: NOT DETECTED
GLUCOSE BLDA-MCNC: 180 MG/DL (ref 74–99)
GLUCOSE SERPL-MCNC: 161 MG/DL (ref 65–99)
GLUCOSE UR STRIP.AUTO-MCNC: NORMAL MG/DL
HCO3 BLDA-SCNC: 29.7 MMOL/L (ref 22–26)
HCT VFR BLD AUTO: 45.4 % (ref 36–46)
HCT VFR BLD EST: 39 % (ref 36–46)
HGB BLD-MCNC: 14 G/DL (ref 12–16)
HGB BLDA-MCNC: 13.1 G/DL (ref 12–16)
IMM GRANULOCYTES # BLD AUTO: 0.07 X10*3/UL (ref 0–0.5)
IMM GRANULOCYTES NFR BLD AUTO: 0.6 % (ref 0–0.9)
INHALED O2 CONCENTRATION: 50 %
KETONES UR STRIP.AUTO-MCNC: NEGATIVE MG/DL
LACTATE BLDA-SCNC: 2 MMOL/L (ref 0.4–2)
LACTATE BLDV-SCNC: 2.1 MMOL/L (ref 0.4–2)
LACTATE BLDV-SCNC: 4.5 MMOL/L (ref 0.4–2)
LDH SERPL-CCNC: 180 U/L (ref 91–227)
LEUKOCYTE ESTERASE UR QL STRIP.AUTO: ABNORMAL
LYMPHOCYTES # BLD AUTO: 1.94 X10*3/UL (ref 0.8–3)
LYMPHOCYTES NFR BLD AUTO: 16 %
MAGNESIUM SERPL-MCNC: 2 MG/DL (ref 1.6–3.1)
MCH RBC QN AUTO: 29 PG (ref 26–34)
MCHC RBC AUTO-ENTMCNC: 30.8 G/DL (ref 32–36)
MCV RBC AUTO: 94 FL (ref 80–100)
MONOCYTES # BLD AUTO: 0.62 X10*3/UL (ref 0.05–0.8)
MONOCYTES NFR BLD AUTO: 5.1 %
NEUTROPHILS # BLD AUTO: 9.06 X10*3/UL (ref 1.6–5.5)
NEUTROPHILS NFR BLD AUTO: 74.5 %
NITRITE UR QL STRIP.AUTO: ABNORMAL
NRBC BLD-RTO: 0 /100 WBCS (ref 0–0)
NT-PROBNP SERPL-MCNC: 9761 PG/ML (ref 0–624)
OXYHGB MFR BLDA: 95.9 % (ref 94–98)
PCO2 BLDA: 49 MM HG (ref 38–42)
PH BLDA: 7.39 PH (ref 7.38–7.42)
PH UR STRIP.AUTO: 8 [PH]
PLATELET # BLD AUTO: 368 X10*3/UL (ref 150–450)
PO2 BLDA: 86 MM HG (ref 85–95)
POTASSIUM BLDA-SCNC: 4.2 MMOL/L (ref 3.5–5.3)
POTASSIUM SERPL-SCNC: 4.4 MMOL/L (ref 3.4–5.1)
PROT SERPL-MCNC: 7.2 G/DL (ref 5.9–7.9)
PROT SERPL-MCNC: 7.9 G/DL (ref 5.9–7.9)
PROT UR STRIP.AUTO-MCNC: ABNORMAL MG/DL
Q ONSET: 210 MS
QRS COUNT: 16 BEATS
QRS DURATION: 122 MS
QT INTERVAL: 426 MS
QTC CALCULATION(BAZETT): 538 MS
QTC FREDERICIA: 498 MS
R AXIS: -14 DEGREES
RBC # BLD AUTO: 4.83 X10*6/UL (ref 4–5.2)
RBC # UR STRIP.AUTO: ABNORMAL /UL
RBC #/AREA URNS AUTO: >20 /HPF
SAO2 % BLDA: 98 % (ref 94–100)
SARS-COV-2 RNA RESP QL NAA+PROBE: NOT DETECTED
SARS-COV-2 RNA RESP QL NAA+PROBE: NOT DETECTED
SODIUM BLDA-SCNC: 137 MMOL/L (ref 136–145)
SODIUM SERPL-SCNC: 138 MMOL/L (ref 133–145)
SP GR UR STRIP.AUTO: >1.05
SPECIMEN DRAWN FROM PATIENT: ABNORMAL
T AXIS: 143 DEGREES
T OFFSET: 423 MS
TROPONIN T SERPL-MCNC: 27 NG/L
UROBILINOGEN UR STRIP.AUTO-MCNC: NORMAL MG/DL
VENTRICULAR RATE: 96 BPM
WBC # BLD AUTO: 12.2 X10*3/UL (ref 4.4–11.3)
WBC #/AREA URNS AUTO: >50 /HPF
WBC CLUMPS #/AREA URNS AUTO: ABNORMAL /HPF

## 2024-01-29 PROCEDURE — 99285 EMERGENCY DEPT VISIT HI MDM: CPT | Performed by: EMERGENCY MEDICINE

## 2024-01-29 PROCEDURE — 96366 THER/PROPH/DIAG IV INF ADDON: CPT

## 2024-01-29 PROCEDURE — 83880 ASSAY OF NATRIURETIC PEPTIDE: CPT | Performed by: EMERGENCY MEDICINE

## 2024-01-29 PROCEDURE — 2500000004 HC RX 250 GENERAL PHARMACY W/ HCPCS (ALT 636 FOR OP/ED): Mod: MUE | Performed by: INTERNAL MEDICINE

## 2024-01-29 PROCEDURE — 71045 X-RAY EXAM CHEST 1 VIEW: CPT

## 2024-01-29 PROCEDURE — 96368 THER/DIAG CONCURRENT INF: CPT

## 2024-01-29 PROCEDURE — 85025 COMPLETE CBC W/AUTO DIFF WBC: CPT | Performed by: EMERGENCY MEDICINE

## 2024-01-29 PROCEDURE — 87040 BLOOD CULTURE FOR BACTERIA: CPT | Mod: TRILAB,91 | Performed by: EMERGENCY MEDICINE

## 2024-01-29 PROCEDURE — 2550000001 HC RX 255 CONTRASTS: Performed by: EMERGENCY MEDICINE

## 2024-01-29 PROCEDURE — 96365 THER/PROPH/DIAG IV INF INIT: CPT

## 2024-01-29 PROCEDURE — 36415 COLL VENOUS BLD VENIPUNCTURE: CPT | Performed by: EMERGENCY MEDICINE

## 2024-01-29 PROCEDURE — 2060000001 HC INTERMEDIATE ICU ROOM DAILY

## 2024-01-29 PROCEDURE — 71275 CT ANGIOGRAPHY CHEST: CPT | Mod: FOREIGN READ | Performed by: RADIOLOGY

## 2024-01-29 PROCEDURE — 99222 1ST HOSP IP/OBS MODERATE 55: CPT | Performed by: INTERNAL MEDICINE

## 2024-01-29 PROCEDURE — 85300 ANTITHROMBIN III ACTIVITY: CPT | Performed by: EMERGENCY MEDICINE

## 2024-01-29 PROCEDURE — 71275 CT ANGIOGRAPHY CHEST: CPT

## 2024-01-29 PROCEDURE — 71045 X-RAY EXAM CHEST 1 VIEW: CPT | Mod: FOREIGN READ | Performed by: RADIOLOGY

## 2024-01-29 PROCEDURE — 83735 ASSAY OF MAGNESIUM: CPT | Performed by: EMERGENCY MEDICINE

## 2024-01-29 PROCEDURE — 97166 OT EVAL MOD COMPLEX 45 MIN: CPT | Mod: GO

## 2024-01-29 PROCEDURE — 97162 PT EVAL MOD COMPLEX 30 MIN: CPT | Mod: GP

## 2024-01-29 PROCEDURE — 83605 ASSAY OF LACTIC ACID: CPT | Performed by: EMERGENCY MEDICINE

## 2024-01-29 PROCEDURE — 94640 AIRWAY INHALATION TREATMENT: CPT

## 2024-01-29 PROCEDURE — 97530 THERAPEUTIC ACTIVITIES: CPT | Mod: GP

## 2024-01-29 PROCEDURE — 2500000005 HC RX 250 GENERAL PHARMACY W/O HCPCS: Performed by: INTERNAL MEDICINE

## 2024-01-29 PROCEDURE — 2500000001 HC RX 250 WO HCPCS SELF ADMINISTERED DRUGS (ALT 637 FOR MEDICARE OP): Performed by: EMERGENCY MEDICINE

## 2024-01-29 PROCEDURE — 83615 LACTATE (LD) (LDH) ENZYME: CPT | Performed by: INTERNAL MEDICINE

## 2024-01-29 PROCEDURE — 2500000002 HC RX 250 W HCPCS SELF ADMINISTERED DRUGS (ALT 637 FOR MEDICARE OP, ALT 636 FOR OP/ED): Mod: MUE | Performed by: INTERNAL MEDICINE

## 2024-01-29 PROCEDURE — 81001 URINALYSIS AUTO W/SCOPE: CPT | Performed by: EMERGENCY MEDICINE

## 2024-01-29 PROCEDURE — 87086 URINE CULTURE/COLONY COUNT: CPT | Mod: TRILAB,WESLAB | Performed by: EMERGENCY MEDICINE

## 2024-01-29 PROCEDURE — 2500000002 HC RX 250 W HCPCS SELF ADMINISTERED DRUGS (ALT 637 FOR MEDICARE OP, ALT 636 FOR OP/ED): Mod: MUE | Performed by: EMERGENCY MEDICINE

## 2024-01-29 PROCEDURE — 96375 TX/PRO/DX INJ NEW DRUG ADDON: CPT

## 2024-01-29 PROCEDURE — 93005 ELECTROCARDIOGRAM TRACING: CPT

## 2024-01-29 PROCEDURE — 87636 SARSCOV2 & INF A&B AMP PRB: CPT | Performed by: EMERGENCY MEDICINE

## 2024-01-29 PROCEDURE — 2500000001 HC RX 250 WO HCPCS SELF ADMINISTERED DRUGS (ALT 637 FOR MEDICARE OP): Performed by: INTERNAL MEDICINE

## 2024-01-29 PROCEDURE — 93010 ELECTROCARDIOGRAM REPORT: CPT | Performed by: INTERNAL MEDICINE

## 2024-01-29 PROCEDURE — 36600 WITHDRAWAL OF ARTERIAL BLOOD: CPT

## 2024-01-29 PROCEDURE — 84155 ASSAY OF PROTEIN SERUM: CPT | Performed by: INTERNAL MEDICINE

## 2024-01-29 PROCEDURE — 94664 DEMO&/EVAL PT USE INHALER: CPT

## 2024-01-29 PROCEDURE — 94640 AIRWAY INHALATION TREATMENT: CPT | Mod: MUE

## 2024-01-29 PROCEDURE — 9420000001 HC RT PATIENT EDUCATION 5 MIN

## 2024-01-29 PROCEDURE — 2500000004 HC RX 250 GENERAL PHARMACY W/ HCPCS (ALT 636 FOR OP/ED): Performed by: EMERGENCY MEDICINE

## 2024-01-29 PROCEDURE — 84484 ASSAY OF TROPONIN QUANT: CPT | Performed by: EMERGENCY MEDICINE

## 2024-01-29 PROCEDURE — 80053 COMPREHEN METABOLIC PANEL: CPT | Performed by: EMERGENCY MEDICINE

## 2024-01-29 PROCEDURE — 84132 ASSAY OF SERUM POTASSIUM: CPT | Performed by: EMERGENCY MEDICINE

## 2024-01-29 RX ORDER — GUAIFENESIN/DEXTROMETHORPHAN 100-10MG/5
5 SYRUP ORAL EVERY 4 HOURS PRN
Status: DISCONTINUED | OUTPATIENT
Start: 2024-01-29 | End: 2024-02-03 | Stop reason: HOSPADM

## 2024-01-29 RX ORDER — GUAIFENESIN 600 MG/1
600 TABLET, EXTENDED RELEASE ORAL EVERY 12 HOURS PRN
Status: DISCONTINUED | OUTPATIENT
Start: 2024-01-29 | End: 2024-02-03 | Stop reason: HOSPADM

## 2024-01-29 RX ORDER — CEFTRIAXONE 2 G/50ML
2 INJECTION, SOLUTION INTRAVENOUS ONCE
Status: DISCONTINUED | OUTPATIENT
Start: 2024-01-29 | End: 2024-01-29 | Stop reason: SDUPTHER

## 2024-01-29 RX ORDER — CALCITONIN SALMON 200 [IU]/.09ML
1 SPRAY, METERED NASAL DAILY
Status: DISCONTINUED | OUTPATIENT
Start: 2024-01-29 | End: 2024-02-03 | Stop reason: HOSPADM

## 2024-01-29 RX ORDER — HEPARIN SODIUM 5000 [USP'U]/ML
5000 INJECTION, SOLUTION INTRAVENOUS; SUBCUTANEOUS EVERY 8 HOURS
Status: DISCONTINUED | OUTPATIENT
Start: 2024-01-29 | End: 2024-02-03 | Stop reason: HOSPADM

## 2024-01-29 RX ORDER — NITROGLYCERIN 0.4 MG/1
0.4 TABLET SUBLINGUAL EVERY 5 MIN PRN
Status: DISCONTINUED | OUTPATIENT
Start: 2024-01-29 | End: 2024-02-03 | Stop reason: HOSPADM

## 2024-01-29 RX ORDER — LANOLIN ALCOHOL/MO/W.PET/CERES
400 CREAM (GRAM) TOPICAL
Status: DISCONTINUED | OUTPATIENT
Start: 2024-01-30 | End: 2024-02-03 | Stop reason: HOSPADM

## 2024-01-29 RX ORDER — ONDANSETRON HYDROCHLORIDE 2 MG/ML
4 INJECTION, SOLUTION INTRAVENOUS EVERY 8 HOURS PRN
Status: DISCONTINUED | OUTPATIENT
Start: 2024-01-29 | End: 2024-02-03 | Stop reason: HOSPADM

## 2024-01-29 RX ORDER — FUROSEMIDE 40 MG/1
40 TABLET ORAL DAILY
Status: DISCONTINUED | OUTPATIENT
Start: 2024-01-29 | End: 2024-01-30

## 2024-01-29 RX ORDER — VANCOMYCIN 1 G/200ML
1 INJECTION, SOLUTION INTRAVENOUS ONCE
Status: CANCELLED | OUTPATIENT
Start: 2024-01-29 | End: 2024-01-29

## 2024-01-29 RX ORDER — ATORVASTATIN CALCIUM 10 MG/1
10 TABLET, FILM COATED ORAL NIGHTLY
Status: DISCONTINUED | OUTPATIENT
Start: 2024-01-29 | End: 2024-02-03 | Stop reason: HOSPADM

## 2024-01-29 RX ORDER — POLYETHYLENE GLYCOL 3350 17 G/17G
17 POWDER, FOR SOLUTION ORAL DAILY
Status: DISCONTINUED | OUTPATIENT
Start: 2024-01-29 | End: 2024-01-29

## 2024-01-29 RX ORDER — TRAZODONE HYDROCHLORIDE 50 MG/1
50 TABLET ORAL NIGHTLY PRN
Status: DISCONTINUED | OUTPATIENT
Start: 2024-01-29 | End: 2024-02-03 | Stop reason: HOSPADM

## 2024-01-29 RX ORDER — DOCUSATE SODIUM 100 MG/1
100 CAPSULE, LIQUID FILLED ORAL 2 TIMES DAILY
Status: DISCONTINUED | OUTPATIENT
Start: 2024-01-29 | End: 2024-01-29

## 2024-01-29 RX ORDER — ACETAMINOPHEN 325 MG/1
650 TABLET ORAL EVERY 6 HOURS PRN
Status: DISCONTINUED | OUTPATIENT
Start: 2024-01-29 | End: 2024-02-03 | Stop reason: HOSPADM

## 2024-01-29 RX ORDER — LORATADINE 10 MG/1
10 TABLET ORAL DAILY
Status: DISCONTINUED | OUTPATIENT
Start: 2024-01-29 | End: 2024-01-29

## 2024-01-29 RX ORDER — GUAIFENESIN 600 MG/1
600 TABLET, EXTENDED RELEASE ORAL EVERY 12 HOURS PRN
Status: DISCONTINUED | OUTPATIENT
Start: 2024-01-29 | End: 2024-01-29

## 2024-01-29 RX ORDER — CEFTRIAXONE 2 G/50ML
2 INJECTION, SOLUTION INTRAVENOUS ONCE
Status: COMPLETED | OUTPATIENT
Start: 2024-01-29 | End: 2024-01-29

## 2024-01-29 RX ORDER — ASPIRIN 325 MG
325 TABLET, DELAYED RELEASE (ENTERIC COATED) ORAL 2 TIMES DAILY
Status: DISCONTINUED | OUTPATIENT
Start: 2024-01-29 | End: 2024-02-03 | Stop reason: HOSPADM

## 2024-01-29 RX ORDER — SENNOSIDES 8.6 MG/1
2 TABLET ORAL 2 TIMES DAILY
Status: DISCONTINUED | OUTPATIENT
Start: 2024-01-29 | End: 2024-01-29

## 2024-01-29 RX ORDER — METOPROLOL SUCCINATE 50 MG/1
50 TABLET, EXTENDED RELEASE ORAL DAILY
Status: DISCONTINUED | OUTPATIENT
Start: 2024-01-29 | End: 2024-01-29

## 2024-01-29 RX ORDER — HYDROCODONE BITARTRATE AND ACETAMINOPHEN 5; 325 MG/1; MG/1
1 TABLET ORAL EVERY 4 HOURS PRN
Status: DISCONTINUED | OUTPATIENT
Start: 2024-01-29 | End: 2024-02-03 | Stop reason: HOSPADM

## 2024-01-29 RX ORDER — METOPROLOL SUCCINATE 25 MG/1
25 TABLET, EXTENDED RELEASE ORAL DAILY
Status: DISCONTINUED | OUTPATIENT
Start: 2024-01-30 | End: 2024-02-03 | Stop reason: HOSPADM

## 2024-01-29 RX ORDER — IPRATROPIUM BROMIDE AND ALBUTEROL SULFATE 2.5; .5 MG/3ML; MG/3ML
3 SOLUTION RESPIRATORY (INHALATION)
Status: DISCONTINUED | OUTPATIENT
Start: 2024-01-29 | End: 2024-01-29

## 2024-01-29 RX ORDER — IPRATROPIUM BROMIDE AND ALBUTEROL SULFATE 2.5; .5 MG/3ML; MG/3ML
3 SOLUTION RESPIRATORY (INHALATION)
Status: DISCONTINUED | OUTPATIENT
Start: 2024-01-29 | End: 2024-02-03 | Stop reason: HOSPADM

## 2024-01-29 RX ORDER — ACETAMINOPHEN 160 MG/5ML
650 SOLUTION ORAL EVERY 4 HOURS PRN
Status: DISCONTINUED | OUTPATIENT
Start: 2024-01-29 | End: 2024-02-03 | Stop reason: HOSPADM

## 2024-01-29 RX ORDER — NAPROXEN SODIUM 220 MG/1
162 TABLET, FILM COATED ORAL ONCE
Status: COMPLETED | OUTPATIENT
Start: 2024-01-29 | End: 2024-01-29

## 2024-01-29 RX ORDER — ACETAMINOPHEN 650 MG/1
650 SUPPOSITORY RECTAL EVERY 4 HOURS PRN
Status: DISCONTINUED | OUTPATIENT
Start: 2024-01-29 | End: 2024-02-03 | Stop reason: HOSPADM

## 2024-01-29 RX ORDER — PANTOPRAZOLE SODIUM 40 MG/1
40 TABLET, DELAYED RELEASE ORAL DAILY
Status: DISCONTINUED | OUTPATIENT
Start: 2024-01-29 | End: 2024-02-03 | Stop reason: HOSPADM

## 2024-01-29 RX ORDER — LANOLIN ALCOHOL/MO/W.PET/CERES
400 CREAM (GRAM) TOPICAL 3 TIMES WEEKLY
Status: DISCONTINUED | OUTPATIENT
Start: 2024-01-29 | End: 2024-01-29

## 2024-01-29 RX ORDER — FERROUS GLUCONATE 325 MG
38 TABLET ORAL 3 TIMES WEEKLY
Status: DISCONTINUED | OUTPATIENT
Start: 2024-01-29 | End: 2024-02-03 | Stop reason: HOSPADM

## 2024-01-29 RX ORDER — ACETAMINOPHEN 325 MG/1
650 TABLET ORAL EVERY 4 HOURS PRN
Status: DISCONTINUED | OUTPATIENT
Start: 2024-01-29 | End: 2024-01-29

## 2024-01-29 RX ORDER — LOSARTAN POTASSIUM 25 MG/1
25 TABLET ORAL DAILY
Status: DISCONTINUED | OUTPATIENT
Start: 2024-01-29 | End: 2024-02-03 | Stop reason: HOSPADM

## 2024-01-29 RX ORDER — TALC
3 POWDER (GRAM) TOPICAL NIGHTLY
Status: DISCONTINUED | OUTPATIENT
Start: 2024-01-29 | End: 2024-02-03 | Stop reason: HOSPADM

## 2024-01-29 RX ORDER — LIDOCAINE 560 MG/1
1 PATCH PERCUTANEOUS; TOPICAL; TRANSDERMAL DAILY
Status: DISCONTINUED | OUTPATIENT
Start: 2024-01-29 | End: 2024-02-03 | Stop reason: HOSPADM

## 2024-01-29 RX ORDER — DEXTROSE MONOHYDRATE, SODIUM CHLORIDE, AND POTASSIUM CHLORIDE 50; 1.49; 4.5 G/1000ML; G/1000ML; G/1000ML
100 INJECTION, SOLUTION INTRAVENOUS CONTINUOUS
Status: DISCONTINUED | OUTPATIENT
Start: 2024-01-29 | End: 2024-01-29

## 2024-01-29 RX ORDER — FAMOTIDINE 20 MG/1
20 TABLET, FILM COATED ORAL 2 TIMES DAILY
Status: DISCONTINUED | OUTPATIENT
Start: 2024-01-29 | End: 2024-02-03 | Stop reason: HOSPADM

## 2024-01-29 RX ORDER — ALBUTEROL SULFATE 0.83 MG/ML
2.5 SOLUTION RESPIRATORY (INHALATION) EVERY 2 HOUR PRN
Status: DISCONTINUED | OUTPATIENT
Start: 2024-01-29 | End: 2024-02-03 | Stop reason: HOSPADM

## 2024-01-29 RX ORDER — SERTRALINE HYDROCHLORIDE 50 MG/1
50 TABLET, FILM COATED ORAL DAILY
Status: DISCONTINUED | OUTPATIENT
Start: 2024-01-29 | End: 2024-02-03 | Stop reason: HOSPADM

## 2024-01-29 RX ORDER — TALC
3 POWDER (GRAM) TOPICAL NIGHTLY
Status: DISCONTINUED | OUTPATIENT
Start: 2024-01-29 | End: 2024-01-29

## 2024-01-29 RX ADMIN — IPRATROPIUM BROMIDE AND ALBUTEROL SULFATE 3 ML: 2.5; .5 SOLUTION RESPIRATORY (INHALATION) at 19:36

## 2024-01-29 RX ADMIN — PIPERACILLIN SODIUM AND TAZOBACTAM SODIUM 2.25 G: 2; .25 INJECTION, SOLUTION INTRAVENOUS at 14:20

## 2024-01-29 RX ADMIN — PIPERACILLIN SODIUM AND TAZOBACTAM SODIUM 2.25 G: 2; .25 INJECTION, SOLUTION INTRAVENOUS at 21:00

## 2024-01-29 RX ADMIN — IPRATROPIUM BROMIDE AND ALBUTEROL SULFATE 3 ML: 2.5; .5 SOLUTION RESPIRATORY (INHALATION) at 14:19

## 2024-01-29 RX ADMIN — SERTRALINE HYDROCHLORIDE 50 MG: 50 TABLET ORAL at 13:50

## 2024-01-29 RX ADMIN — FUROSEMIDE 40 MG: 40 TABLET ORAL at 13:50

## 2024-01-29 RX ADMIN — ASPIRIN 325 MG: 325 TABLET, COATED ORAL at 21:01

## 2024-01-29 RX ADMIN — Medication 4 L/MIN: at 11:15

## 2024-01-29 RX ADMIN — FAMOTIDINE 20 MG: 20 TABLET, FILM COATED ORAL at 21:01

## 2024-01-29 RX ADMIN — IOHEXOL 75 ML: 350 INJECTION, SOLUTION INTRAVENOUS at 05:15

## 2024-01-29 RX ADMIN — LIDOCAINE 1 PATCH: 4 PATCH TOPICAL at 13:52

## 2024-01-29 RX ADMIN — METHYLPREDNISOLONE SODIUM SUCCINATE 125 MG: 125 INJECTION, POWDER, FOR SOLUTION INTRAMUSCULAR; INTRAVENOUS at 03:32

## 2024-01-29 RX ADMIN — AZITHROMYCIN MONOHYDRATE 500 MG: 500 INJECTION, POWDER, LYOPHILIZED, FOR SOLUTION INTRAVENOUS at 04:36

## 2024-01-29 RX ADMIN — Medication 38 MG OF IRON: at 13:50

## 2024-01-29 RX ADMIN — ATORVASTATIN CALCIUM 10 MG: 10 TABLET, FILM COATED ORAL at 21:01

## 2024-01-29 RX ADMIN — IPRATROPIUM BROMIDE AND ALBUTEROL SULFATE 3 ML: 2.5; .5 SOLUTION RESPIRATORY (INHALATION) at 03:22

## 2024-01-29 RX ADMIN — ASPIRIN 162 MG: 81 TABLET, CHEWABLE ORAL at 03:28

## 2024-01-29 RX ADMIN — LOSARTAN POTASSIUM 25 MG: 25 TABLET, FILM COATED ORAL at 13:50

## 2024-01-29 RX ADMIN — HEPARIN SODIUM 5000 UNITS: 5000 INJECTION, SOLUTION INTRAVENOUS; SUBCUTANEOUS at 17:58

## 2024-01-29 RX ADMIN — SODIUM CHLORIDE 1000 ML: 900 INJECTION, SOLUTION INTRAVENOUS at 03:29

## 2024-01-29 RX ADMIN — METOPROLOL SUCCINATE 25 MG: 50 TABLET, EXTENDED RELEASE ORAL at 13:49

## 2024-01-29 RX ADMIN — PANTOPRAZOLE SODIUM 40 MG: 40 TABLET, DELAYED RELEASE ORAL at 13:49

## 2024-01-29 RX ADMIN — CEFTRIAXONE SODIUM 2 G: 2 INJECTION, SOLUTION INTRAVENOUS at 03:32

## 2024-01-29 RX ADMIN — Medication 3 MG: at 21:01

## 2024-01-29 RX ADMIN — ROPINIROLE HYDROCHLORIDE 5 MG: 2 TABLET, FILM COATED ORAL at 21:01

## 2024-01-29 RX ADMIN — IPRATROPIUM BROMIDE AND ALBUTEROL SULFATE 3 ML: 2.5; .5 SOLUTION RESPIRATORY (INHALATION) at 07:41

## 2024-01-29 SDOH — HEALTH STABILITY: PHYSICAL HEALTH: ON AVERAGE, HOW MANY MINUTES DO YOU ENGAGE IN EXERCISE AT THIS LEVEL?: 0 MIN

## 2024-01-29 SDOH — HEALTH STABILITY: PHYSICAL HEALTH: ON AVERAGE, HOW MANY DAYS PER WEEK DO YOU ENGAGE IN MODERATE TO STRENUOUS EXERCISE (LIKE A BRISK WALK)?: 0 DAYS

## 2024-01-29 SDOH — SOCIAL STABILITY: SOCIAL INSECURITY: WITHIN THE LAST YEAR, HAVE YOU BEEN AFRAID OF YOUR PARTNER OR EX-PARTNER?: NO

## 2024-01-29 SDOH — ECONOMIC STABILITY: FOOD INSECURITY: WITHIN THE PAST 12 MONTHS, THE FOOD YOU BOUGHT JUST DIDN'T LAST AND YOU DIDN'T HAVE MONEY TO GET MORE.: NEVER TRUE

## 2024-01-29 SDOH — SOCIAL STABILITY: SOCIAL INSECURITY: WITHIN THE LAST YEAR, HAVE YOU BEEN HUMILIATED OR EMOTIONALLY ABUSED IN OTHER WAYS BY YOUR PARTNER OR EX-PARTNER?: NO

## 2024-01-29 SDOH — ECONOMIC STABILITY: INCOME INSECURITY: IN THE PAST 12 MONTHS, HAS THE ELECTRIC, GAS, OIL, OR WATER COMPANY THREATENED TO SHUT OFF SERVICE IN YOUR HOME?: NO

## 2024-01-29 SDOH — SOCIAL STABILITY: SOCIAL NETWORK: HOW OFTEN DO YOU GET TOGETHER WITH FRIENDS OR RELATIVES?: MORE THAN THREE TIMES A WEEK

## 2024-01-29 SDOH — HEALTH STABILITY: MENTAL HEALTH: HOW MANY STANDARD DRINKS CONTAINING ALCOHOL DO YOU HAVE ON A TYPICAL DAY?: PATIENT DOES NOT DRINK

## 2024-01-29 SDOH — ECONOMIC STABILITY: FOOD INSECURITY: WITHIN THE PAST 12 MONTHS, YOU WORRIED THAT YOUR FOOD WOULD RUN OUT BEFORE YOU GOT MONEY TO BUY MORE.: NEVER TRUE

## 2024-01-29 SDOH — HEALTH STABILITY: MENTAL HEALTH: HOW OFTEN DO YOU HAVE A DRINK CONTAINING ALCOHOL?: NEVER

## 2024-01-29 SDOH — SOCIAL STABILITY: SOCIAL NETWORK: HOW OFTEN DO YOU ATTEND CHURCH OR RELIGIOUS SERVICES?: 1 TO 4 TIMES PER YEAR

## 2024-01-29 SDOH — ECONOMIC STABILITY: INCOME INSECURITY: IN THE LAST 12 MONTHS, WAS THERE A TIME WHEN YOU WERE NOT ABLE TO PAY THE MORTGAGE OR RENT ON TIME?: NO

## 2024-01-29 SDOH — SOCIAL STABILITY: SOCIAL INSECURITY: WERE YOU ABLE TO COMPLETE ALL THE BEHAVIORAL HEALTH SCREENINGS?: YES

## 2024-01-29 SDOH — HEALTH STABILITY: MENTAL HEALTH: HOW OFTEN DO YOU HAVE 6 OR MORE DRINKS ON ONE OCCASION?: NEVER

## 2024-01-29 SDOH — SOCIAL STABILITY: SOCIAL NETWORK: ARE YOU MARRIED, WIDOWED, DIVORCED, SEPARATED, NEVER MARRIED, OR LIVING WITH A PARTNER?: WIDOWED

## 2024-01-29 SDOH — SOCIAL STABILITY: SOCIAL INSECURITY: HAVE YOU HAD THOUGHTS OF HARMING ANYONE ELSE?: NO

## 2024-01-29 SDOH — SOCIAL STABILITY: SOCIAL NETWORK: HOW OFTEN DO YOU ATTENT MEETINGS OF THE CLUB OR ORGANIZATION YOU BELONG TO?: NEVER

## 2024-01-29 SDOH — ECONOMIC STABILITY: HOUSING INSECURITY: IN THE LAST 12 MONTHS, HOW MANY PLACES HAVE YOU LIVED?: 1

## 2024-01-29 SDOH — ECONOMIC STABILITY: INCOME INSECURITY: HOW HARD IS IT FOR YOU TO PAY FOR THE VERY BASICS LIKE FOOD, HOUSING, MEDICAL CARE, AND HEATING?: NOT HARD AT ALL

## 2024-01-29 ASSESSMENT — ENCOUNTER SYMPTOMS
UNEXPECTED WEIGHT CHANGE: 0
SHORTNESS OF BREATH: 1
NECK PAIN: 0
VOMITING: 0
VOICE CHANGE: 0
ABDOMINAL DISTENTION: 0
NUMBNESS: 0
MYALGIAS: 0
SINUS PAIN: 0
CHEST TIGHTNESS: 0
PALPITATIONS: 0
ACTIVITY CHANGE: 0
PHOTOPHOBIA: 0
DECREASED CONCENTRATION: 0
SLEEP DISTURBANCE: 0
ABDOMINAL PAIN: 0
BACK PAIN: 0
WEAKNESS: 0
WOUND: 0
SORE THROAT: 0
HEMATURIA: 0
FLANK PAIN: 0
NERVOUS/ANXIOUS: 0
APPETITE CHANGE: 0
RHINORRHEA: 0
FATIGUE: 0
BRUISES/BLEEDS EASILY: 0
EYE PAIN: 0
EYE ITCHING: 0
RECTAL PAIN: 0
JOINT SWELLING: 0
ADENOPATHY: 0
LIGHT-HEADEDNESS: 0
SINUS PRESSURE: 0
FEVER: 0
BLOOD IN STOOL: 0
APNEA: 0
CONFUSION: 0
AGITATION: 0
STRIDOR: 0
DIFFICULTY URINATING: 0
NAUSEA: 0
COLOR CHANGE: 0
DYSPHORIC MOOD: 0
WHEEZING: 0
POLYPHAGIA: 0
HALLUCINATIONS: 0
ARTHRALGIAS: 0
POLYDIPSIA: 0
SPEECH DIFFICULTY: 0
COUGH: 0
DIARRHEA: 0
TREMORS: 0
FREQUENCY: 0
EYE REDNESS: 0
ANAL BLEEDING: 0
CHOKING: 0
DIAPHORESIS: 0
SEIZURES: 0
HYPERACTIVE: 0
TROUBLE SWALLOWING: 0
DYSURIA: 0
CHILLS: 0
CONSTIPATION: 0
NECK STIFFNESS: 0
FACIAL ASYMMETRY: 0
DIZZINESS: 0
FACIAL SWELLING: 0
EYE DISCHARGE: 0
HEADACHES: 0

## 2024-01-29 ASSESSMENT — COLUMBIA-SUICIDE SEVERITY RATING SCALE - C-SSRS
1. IN THE PAST MONTH, HAVE YOU WISHED YOU WERE DEAD OR WISHED YOU COULD GO TO SLEEP AND NOT WAKE UP?: NO
6. HAVE YOU EVER DONE ANYTHING, STARTED TO DO ANYTHING, OR PREPARED TO DO ANYTHING TO END YOUR LIFE?: NO
2. HAVE YOU ACTUALLY HAD ANY THOUGHTS OF KILLING YOURSELF?: NO

## 2024-01-29 ASSESSMENT — PAIN - FUNCTIONAL ASSESSMENT
PAIN_FUNCTIONAL_ASSESSMENT: 0-10

## 2024-01-29 ASSESSMENT — ACTIVITIES OF DAILY LIVING (ADL)
BATHING: NEEDS ASSISTANCE
ADL_ASSISTANCE: INDEPENDENT
ASSISTIVE_DEVICE: DENTURES LOWER
WALKS IN HOME: NEEDS ASSISTANCE
BATHING_ASSISTANCE: MODERATE
JUDGMENT_ADEQUATE_SAFELY_COMPLETE_DAILY_ACTIVITIES: YES
ADEQUATE_TO_COMPLETE_ADL: YES
TOILETING: INDEPENDENT
DRESSING YOURSELF: NEEDS ASSISTANCE
HEARING - LEFT EAR: HEARING AID
PATIENT'S MEMORY ADEQUATE TO SAFELY COMPLETE DAILY ACTIVITIES?: YES
LACK_OF_TRANSPORTATION: NO
FEEDING YOURSELF: INDEPENDENT
ADL_ASSISTANCE: INDEPENDENT
GROOMING: NEEDS ASSISTANCE
HEARING - RIGHT EAR: HEARING AID

## 2024-01-29 ASSESSMENT — COGNITIVE AND FUNCTIONAL STATUS - GENERAL
STANDING UP FROM CHAIR USING ARMS: A LITTLE
TOILETING: TOTAL
EATING MEALS: A LITTLE
PERSONAL GROOMING: A LITTLE
HELP NEEDED FOR BATHING: A LOT
MOVING FROM LYING ON BACK TO SITTING ON SIDE OF FLAT BED WITH BEDRAILS: A LITTLE
MOVING TO AND FROM BED TO CHAIR: A LITTLE
DAILY ACTIVITIY SCORE: 14
HELP NEEDED FOR BATHING: A LOT
MOBILITY SCORE: 14
MOBILITY SCORE: 14
TOILETING: TOTAL
CLIMB 3 TO 5 STEPS WITH RAILING: TOTAL
DAILY ACTIVITIY SCORE: 14
CLIMB 3 TO 5 STEPS WITH RAILING: TOTAL
DRESSING REGULAR LOWER BODY CLOTHING: A LOT
EATING MEALS: A LITTLE
TURNING FROM BACK TO SIDE WHILE IN FLAT BAD: A LITTLE
DRESSING REGULAR UPPER BODY CLOTHING: A LITTLE
MOVING FROM LYING ON BACK TO SITTING ON SIDE OF FLAT BED WITH BEDRAILS: A LITTLE
WALKING IN HOSPITAL ROOM: TOTAL
WALKING IN HOSPITAL ROOM: TOTAL
STANDING UP FROM CHAIR USING ARMS: A LITTLE
DRESSING REGULAR LOWER BODY CLOTHING: A LOT
PERSONAL GROOMING: A LITTLE
MOVING TO AND FROM BED TO CHAIR: A LITTLE
DRESSING REGULAR UPPER BODY CLOTHING: A LITTLE
TURNING FROM BACK TO SIDE WHILE IN FLAT BAD: A LITTLE

## 2024-01-29 ASSESSMENT — PAIN SCALES - GENERAL
PAINLEVEL_OUTOF10: 0 - NO PAIN

## 2024-01-29 ASSESSMENT — LIFESTYLE VARIABLES
SKIP TO QUESTIONS 9-10: 1
SUBSTANCE_ABUSE_PAST_12_MONTHS: NO
HOW OFTEN DO YOU HAVE 6 OR MORE DRINKS ON ONE OCCASION: NEVER
AUDIT-C TOTAL SCORE: 0
HOW OFTEN DO YOU HAVE A DRINK CONTAINING ALCOHOL: NEVER
SKIP TO QUESTIONS 9-10: 1
PRESCIPTION_ABUSE_PAST_12_MONTHS: NO
AUDIT-C TOTAL SCORE: 0
HOW MANY STANDARD DRINKS CONTAINING ALCOHOL DO YOU HAVE ON A TYPICAL DAY: PATIENT DOES NOT DRINK
AUDIT-C TOTAL SCORE: 0

## 2024-01-29 NOTE — PROGRESS NOTES
Physical Therapy    Physical Therapy Evaluation & Treatment    Patient Name: Kalie Ramos  MRN: [id number]  Today's Date: 1/29/2024   Time Calculation  Start Time: 1120  Stop Time: 1150  Time Calculation (min): 30 min    Assessment/Plan   PT Assessment  PT Assessment Results: Decreased strength, Decreased range of motion, Decreased endurance, Impaired balance, Decreased mobility, Decreased coordination, Decreased cognition, Impaired judgement, Decreased safety awareness  Rehab Prognosis: Good  Evaluation/Treatment Tolerance: Patient tolerated treatment well  Medical Staff Made Aware: Yes  End of Session Communication: Bedside nurse  End of Session Patient Position: Up in chair, Alarm on   IP OR SWING BED PT PLAN  Inpatient or Swing Bed: Inpatient  PT Plan  Treatment/Interventions: Bed mobility, Transfer training, Gait training, Stair training, Balance training, Strengthening, Endurance training, Range of motion, Therapeutic exercise, Therapeutic activity, Home exercise program  PT Plan: Skilled PT  PT Frequency: 4 times per week  PT Discharge Recommendations: Moderate intensity level of continued care  Equipment Recommended upon Discharge: Wheeled walker  PT Recommended Transfer Status: Assist x1, Assistive device  PT - OK to Discharge: Yes      Subjective     General Visit Information:  General  Reason for Referral: Impaired Mobility  Referred By: Dr. JENN Bailey  Past Medical History Relevant to Rehab: COPD, Recent R Hip Fx sp ORIF--WBAT RLE post op  Family/Caregiver Present: Yes  Caregiver Feedback: DTR  Prior to Session Communication: Bedside nurse  Patient Position Received: Bed, 3 rail up, Alarm on  Preferred Learning Style: verbal  General Comment: 89 year old female admits with PNA, Acute on Chronic Respiratory Failure, COPD exacerbation, CHF, and +Tp  Home Living:  Home Living  Type of Home: [place]  Lives With: Alone (family is working on getting her into prison. Until then, they have providing 24/7 care as  someone has been rotating staying with her)  Home Adaptive Equipment: Walker rolling or standard  Home Layout: One level  Home Access: Stairs to enter with rails  Entrance Stairs-Rails: Both  Entrance Stairs-Number of Steps: 1  Bathroom Shower/Tub: Tub/shower unit  Bathroom Equipment: Grab bars in shower, Shower chair with back  Prior Level of Function:  Prior Function Per Pt/Caregiver Report  Level of [place]: Needs assistance with ADLs, Needs assistance with homemaking  Receives Help From: Family  ADL Assistance: Independent (prior to surg. Since has needed assist from family for all with some periods of supervision only dressing reported)  Homemaking Assistance: Needs assistance  Ambulatory Assistance: Independent (with rollator prior to 12/16 surg)  Prior Function Comments: Feels she is still recovering post op  Precautions:  Precautions  Hearing/Visual Limitations: Very Jamul  LE Weight Bearing Status: Weight Bearing as Tolerated (per MR)  Medical Precautions: Fall precautions    Objective   Pain:  Pain Assessment  Pain Assessment: 0-10  Pain Score: 0 - No pain  Cognition:  Cognition  Overall Cognitive Status: Within Functional Limits    General Assessments:  Activity Tolerance  Endurance: Decreased tolerance for upright activites    Sensation  Light Touch: No apparent deficits  Functional Assessments:  Bed Mobility  Bed Mobility: Yes  Bed Mobility 1  Bed Mobility 1: Supine to sitting  Level of Assistance 1: Minimum assistance  Bed Mobility Comments 1: with HOB elevated. Very laborous for Pt    Transfers  Transfer: Yes  Transfer 1  Transfer From 1: Bed to  Transfer to 1: Stand  Technique 1: Sit to stand  Transfer Device 1: Walker  Transfer Level of Assistance 1: Minimum assistance  Trials/Comments 1: very shaky and unsteady---feels like her legs are going to give out on her once standing. Cues on technique  Transfers 2  Transfer From 2: Bed to  Transfer to 2: Chair with arms  Technique 2: Stand  pivot  Transfer Device 2: Walker  Transfer Level of Assistance 2: Minimum assistance  Trials/Comments 2: cues on technique.    Ambulation/Gait Training  Ambulation/Gait Training Performed: Yes  Ambulation/Gait Training 1  Surface 1: Level tile  Device 1: Rolling walker  Assistance 1: Minimum assistance  Quality of Gait 1:  (slow, shuffled, kyphotic, with B hips/knees bent. Feels as if her BLEs are going to give out on her in standing)  Comments/Distance (ft) 1: 2' sideways L/R x3 (8/10 fatigue post)  Extremity/Trunk Assessments:  RLE   RLE : Exceptions to WFL  Strength RLE  RLE Overall Strength: Deficits  R Hip Flexion: 3+/5  R Knee Flexion: 3+/5  R Knee Extension: 3+/5  LLE   LLE : Exceptions to WFL  Strength LLE  LLE Overall Strength: Deficits  L Hip Flexion: 3+/5  L Knee Flexion: 3+/5  L Knee Extension: 3+/5  Treatments:  Bed Mobility  Bed Mobility: Yes  Bed Mobility 1  Bed Mobility 1: Supine to sitting  Level of Assistance 1: Minimum assistance  Bed Mobility Comments 1: with HOB elevated. Very laborous for Pt    Ambulation/Gait Training  Ambulation/Gait Training Performed: Yes  Ambulation/Gait Training 1  Surface 1: Level tile  Device 1: Rolling walker  Assistance 1: Minimum assistance  Quality of Gait 1:  (slow, shuffled, kyphotic, with B hips/knees bent. Feels as if her BLEs are going to give out on her in standing)  Comments/Distance (ft) 1: 2' sideways L/R x3 (8/10 fatigue post)  Transfers  Transfer: Yes  Transfer 1  Transfer From 1: Bed to  Transfer to 1: Stand  Technique 1: Sit to stand  Transfer Device 1: Walker  Transfer Level of Assistance 1: Minimum assistance  Trials/Comments 1: very shaky and unsteady---feels like her legs are going to give out on her once standing. Cues on technique  Transfers 2  Transfer From 2: Bed to  Transfer to 2: Chair with arms  Technique 2: Stand pivot  Transfer Device 2: Walker  Transfer Level of Assistance 2: Minimum assistance  Trials/Comments 2: cues on  technique.    Education as below  Outcome Measures:  [facility] Basic Mobility  Turning from your back to your side while in a flat bed without using bedrails: A little  Moving from lying on your back to sitting on the side of a flat bed without using bedrails: A little  Moving to and from bed to chair (including a wheelchair): A little  Standing up from a chair using your arms (e.g. wheelchair or bedside chair): A little  To walk in hospital room: Total  Climbing 3-5 steps with railing: Total  Basic Mobility - Total Score: 14    Encounter Problems       Encounter Problems (Active)       Balance       Standing Balance (Progressing)       Start:  01/29/24    Expected End:  02/12/24       Pt will demonstrate good static standing balance to promote safe participation with out of bed activity, transfers, and mobility              Mobility       Ambulation (Progressing)       Start:  01/29/24    Expected End:  02/12/24       Pt will ambulate 50' modified independent assist with walker to promote safe home mobility           Entry Step Negotiation (Progressing)       Start:  01/29/24    Expected End:  02/12/24       Pt will ascend/descend 1 stairs with rail(s) on B and modified independent assist to promote safe entry and exit in home environment                Safety       Safe Mobility Techniques (Progressing)       Start:  01/29/24    Expected End:  02/12/24       Pt will correctly identify and demonstrate safe mobility techniques to reduce their risks for falls during their acute care stay               Transfers       Supine to sit (Progressing)       Start:  01/29/24    Expected End:  02/12/24       Pt will transfer supine to sitting at edge of bed with modified independent assist to promote acute care out of bed activity           Sit to stand (Progressing)       Start:  01/29/24    Expected End:  02/12/24       Pt will transfer sit to standing position with modified independent assist and walker to promote safe out of  bed activity           Bed to Chair (Progressing)       Start:  01/29/24    Expected End:  02/12/24       Pt will transfer from sitting edge of bed to the chair with modified independent assist and walker to promote out of bed activity and reduce the risks of prolonged acute care bedrest                  Education Documentation  Mobility Training, taught by Dimitris Narayan, PT at 1/29/2024 12:06 PM.  Learner: Family, Patient  Readiness: Acceptance  Method: Explanation, Demonstration  Response: Verbalizes Understanding, Needs Reinforcement  Comment: safe mobility techniques, fall risk management, importance of OOB activity, risks of prolonged bedrest during acute care stay    Education Comments  No comments found.

## 2024-01-29 NOTE — CONSULTS
Consults  History Of Present Illness:    Kalie Ramos is a 89 y.o. female presenting with shortness of breath.   This patient is a 89-year-old white female with a past history including mild to moderate aortic valve stenosis, hypertension, complete left bundle branch block conduction delay, chronic lower extremity edema due to venous insufficiency, nontoxic multinodular goiter, breast lumpectomy for breast carcinoma, bilateral cataract extraction, carpal tunnel release, rotator cuff repair, sentinel lymph node biopsy at time of lumpectomy, and diaphragmatic hernia.  She has a longstanding history of hiatal hernia treated with proton pump inhibitor therapy and H2 blocker therapy with no symptoms of reflux.     The patient was admitted to [facility], [place], operated by [facility] 9/5/2018 for laparoscopic paraesophageal hernia repair along with laparoscopic cholecystectomy for documented cholelithiasis.  As part of preoperative evaluation echocardiogram 8/31/2018 demonstrated preserved LV ejection fraction 60 to 65% with moderate concentric LVH mild to moderate left atrial lodgment mild to moderate aortic valvular stenosis estimated PA systolic pressure 35 mmHg.  The peak systolic pressure gradient across the aortic valve was 21 mmHg.  She had a pharmacological nuclear stress test on 8/20/2018 that showed a preserved LV ejection fraction no ischemia or scar there.  The patient's operative procedure was done on 9/5/2018 with some persistent postoperative hypoxia requiring overnight observation in the ICU after extubation.  Patient ultimately restarted on usual Lasix 40 mg daily.     Patient presented back to [facility] emergency room 9/17/2018 with shortness of breath.  CT of the chest abdomen and pelvis showed diffuse thyroid megaly consistent with multinodular goiter no pulmonary embolism.  There was moderate to large bilateral pleural effusions basilar atelectasis.  There is evidence of cholecystectomy and subcutaneous emphysema.  Stool cultures were positive for Giardia antigen.  Repeat echocardiogram again demonstrated mild to moderate LVH preserved LV ejection fraction 60 to 64% mild to moderate aortic valve stenosis trace aortic valve regurgitation mild tricuspid valve regurgitation mild pulmonary hypertension estimated PA systolic pressure in the range of 40 mmHg range patient was treated with IV Lasix.  She developed an extensive DVT left lower extremity by ultrasound 10/2/2018.  A repeat chest CT showed segmental subsegmental pulmonary emboli right lower lobe and lingula without evidence of right-sided cardiac strain with bilateral pleural effusions by basilar atelectasis and large substernal goiter.  She was started on anticoagulation with Coumadin.     Patient was admitted to [facility] 8/4/2019 with shortness of breath and palpitations.  CT angiogram of the chest showed no pulmonary embolism.  There were bilateral pulmonary infiltrates left lower lobe greatest particularly in the upper portion.  There was moderate left pleural effusion small right pleural effusion enlarged mediastinal nodes reactive adenopathy.  There was again marked for bilateral thyroid megaly multinodular goiter.  There was a suspected nonocclusive DVT left lower extremity.  Was thought to be possibly chronic or recurrent.  proBNP was 3049.  Troponins were negative.  EKG showed sinus rhythm left axis deviation poor R wave progression.  She was switched from Coumadin to Xarelto 10 mg daily.  There was some concern for ongoing aspiration.     Patient was admitted 3/19/2020 for concerns of aspiration with dysphagia gagging choking occasional regurgitation.  CT scan of the neck demonstrated inflammation of the upper third of the esophagus possible neoplasm.  She was seen by speech therapy.  EGD 3/25/2020 showed small hiatal hernia large ulcer in the proximal esophagus highly suggestive of pill induced esophagitis.     Patient admitted again 4/6/2020 with ongoing  dysphagia and inability to swallow almost any consistency.  She was dehydrated Lasix was placed on hold.  Modified barium swallow showed tracheal penetration of thin liquids and some laryngeal penetration with nectar thick liquids.  There was some discussion about PEG tube placement ultimately deferred.     Patient sustained a fall going to the bathroom 12/16/2020 with a sensation of dizziness vertigo.  Her evaluation in the hospital was essentially unremarkable head CT negative for intracranial process.  Repeat echocardiogram demonstrated moderate aortic valve stenosis preserved LV function.     She was admitted again 5/12/2021 with dizziness the patient has been taken off Xarelto for several days prior to that and for dental extraction.  Head CT was negative for any acute findings.  Carotid ultrasound showed mild bilateral plaque.  EKG showed sinus rhythm left bundle branch block.  MRI of brain negative for CVA.  MRI did show 70% stenosis of the P1 segment on the left.  Echocardiogram again showed moderate concentric LV hypertrophy normal LV ejection fraction 55% with moderate aortic valve stenosis.  There was 1+ mitral 2+ tricuspid valve regurgitation moderate pulmonary pretension.     Patient was brought back to the hospital 5/19/2021 with dizziness despite as needed meclizine.  MRI of the brain showed no significant change.  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  Patient was kept on low-dose Lasix for peripheral edema.  Repeat echocardiogram again showed mild to moderate concentric LVH moderate to severe aortic valve stenosis.     Patient again admitted 5/22/2022 with shortness of breath chest x-ray showing bilateral pleural effusions.  Ultrasound lower extremities showed chronic nonocclusive thrombus in the left lower extremity.  Echocardiogram 5  3/23/2022 showed LV ejection fraction up to 50% range abnormal septal motion due to left bundle branch block conduction delay moderately severe aortic valve stenosis peak systolic pressure gradient 56 mmHg trivial aortic insufficiency 2+ tricuspid valve vegetation moderately severe pulmonary hypertension.  Patient clinically not thought to be candidate for transcatheter aortic valve replacement.  MRI brain showed tiny punctate lacunar infarct right corona radiata related to small vessel disease rather than embolic.  She was kept on lower dose of Xarelto 10 mg daily.  MRA of the neck was negative for high-grade carotid stenosis MRI of brain and again showed high-grade stenosis distal P1 segment of left posterior cerebral artery.     Patient was admitted to [facility] 7/5/2022 with dizziness and unsteadiness.  Work-up was again relatively similar to past evaluations.     She was readmitted to [facility] 11/19/2022 after mechanical fall laceration of her forehead requiring suture repair.  Echocardiogram showed LV ejection fraction had been reduced to 40% with moderately severe aortic valve stenosis trace aortic insufficiency with moderate tricuspid valve regurgitation severe pulm hypertension PA systolic pressure 70 mmHg.  Patient again not thought to be candidate for TAVR.     Patient admitted again [facility] 1/6/2023 with increasing shortness of breath.  Both portable chest x-ray showed multifocal pneumonia bilateral infiltrates.  Ultrasound lower extremity done again positive for chronic DVT left common femoral vein.  Patient was continued on usual low-dose aspirin Atorvastatin Toprol.  Chest CT showed extensive right upper lobe consolidation due to pneumonia possibly tumor.  Moderate bilateral pleural effusions.     Patient again admitted 7/25/2023 with shortness of breath.  Creatinine was 0.9 proBNP 16,000 943.  High-sensitivity troponins negative.  Clinically she was thought not to be volume  overloaded despite elevated proBNP.  Patient had another echocardiogram performed 7/25/2023 showing LV ejection fraction again 35-40% moderate left atrial enlargement mild to moderate right atrial enlargement severe aortic valve stenosis peak systolic pressure gradient 56 mmHg.  There was 1+ mitral valve regurgitation 2+ tricuspid valve regurgitation severe pulmonary hypertension estimated PA systolic pressure 67 mmHg.     Patient was admitted through [facility] emergency room on 10/23/2023 with confusion and increased weakness.  Evaluation in the emergency room included a comprehensive metabolic panel creatinine is 0.90.  Arterial blood gases pH 7.5 PCO2 44 PO2 93.  proBNP was 13,676.  CBC unremarkable except hemoglobin 11.3.  Portable chest x-ray showed patchy bilateral airspace opacities in both lungs particularly within the right midlung concerningly for pneumonia with small bibasilar effusions.  CT angiogram of the chest showed no pulmonary emboli by collateral confluent airspace disease all lung lobes right greater than left moderate bilateral pleural effusions enlarged bilateral main pulmonary arteries consistent pulmonary hypertension and cardiomegaly.  The patient again was thought not to be a candidate for transcatheter aortic valve replacement and remained on continuous nasal oxygen 3 L/min.  Multiple potential etiologies for her chronic lung disease were thought to be possible including aspiration pneumonitis interstitial fibrosis and a possible mild element of CHF.  The patient was placed on IV ceftriaxone and azithromycin empirically.  She was also continued on Lasix and the dose was increased to 40 mg twice daily empirically.  The patient had already received prior instructions to reduce her potential for aspiration while swallowing.     The patient was admitted through [facility] emergency room 11/12/2023 with increasing weakness lethargy and an apparent fall in the bathroom where she was found.  Initial  evaluation in the emergency room included a head CT showing redemonstration of age-related changes without acute intracranial process.  PA lateral chest x-ray showed cardiomegaly mild CHF on a background of COPD with improved infiltrate in the right upper lobe.  There is right paratracheal tissue prominence thought to be due to ectatic vessels.  X-ray of the right hip showed DJD but no fracture or subluxation.  She also had a CT angiogram of the chest which was negative for pulmonary embolization.  There was bilateral confluent airspace disease in all lung lobes right greater than left moderate bilateral pleural effusions and enlarged main pulmonary arteries consistent with pulmonary artery hypertension.  This was actually performed during her prior admission in 10/2023.  The initial labs included a CBC WBC of 15,700 hematocrit 46.5.  The comprehensive metabolic panel was relatively unremarkable creatinine 0.70 potassium of 5.0.  CK was 189 high-sensitivity troponin 35.  The second and third high-sensitivity troponins were 35 and 38 respectively.  Repeat CBC today notable for a significant decrease in hemoglobin 10.7 hematocrit 33.8.  A repeat confirmatory's CBC notable for hemoglobin 11.4 hematocrit 36.3 the patient's overall clinical status at that time was relatively stable and she was given antibiotics for possible urinary tract infection.  The patient remained very resistant to the strong recommendation for assisted living.  She was continued on usual therapy of Lasix 40 mg daily aspirin 81 mg daily atorvastatin 40 mg daily metoprolol succinate 25 mg twice daily.  Her telemetry monitor demonstrated atrial fibrillation with a controlled ventricular rate.  The systolic blood pressures were in the 120-150 mmHg range.     The patient was admitted to [facility] on 12/14/2023 with a mechanical fall resulting in a fracture of the right femoral neck.  Patient seen by orthopedics and is has been recommended to  undergo right total hip replacement scheduled 12/16.  Labs from today include a hemoglobin of 12.9 WBC 11,400.  Comprehensive metabolic panel is unremarkable with a creatinine of 0.90.  The patient did undergo a right total hip replacement on 12/16/2023 that was tolerated relatively uneventfully.  Postoperatively her O2 saturations were 100% on 3 L nasal cannula.  Her hemoglobin decreased slightly to 10.9 but did not require transfusion.  Her renal parameters were stable with a creatinine 0.8.  She was given intravenous iron for a serum iron of less than 20.  The patient was released to a rehab facility.    The patient was brought in by EMS from home with a complaint of shortness of breath.  A family member had evidently come to check on her yesterday and she complained of being short of breath and unable to speak in full sentences due to dyspnea.  According to the patient's daughter the patient has been wearing her nasal oxygen at home continuously at 3 L/min.  In the past she was able to take the oxygen off for up to 2 hours without any documented O2 desaturation.  Recently however when the patient had taken her nasal oxygen for only 5 minutes her O2 saturations were decreased to the 85% range with minimal activity.  She claims to have been compliant with her medications.  She had a CT angiogram performed which demonstrated large bilateral pleural effusions with multifocal areas of consolidation in both lungs most pronounced in the left lower lobe and dependent portion of the left upper lobe and right lower lobe consistent with pneumonia/aspiration with patchy airspace disease in the right upper lobe.  She also had an enlarged heterogeneous thyroid 6.1 cm nodule in the thyroid isthmus with poorly defined margins.  She also had chronic changes of COPD and a pronounced thoracic kyphosis with multilevel age-related osteoporosis.  There was no evidence of pulmonary embolism.  The patient was started on IV azithromycin  and ceftriaxone in the emergency room.  The patient's labs performed in the emergency room included a CBC with a WBC of 12,200.  proBNP was 9761.  The high-sensitivity troponin was 27.  Comprehensive metabolic panel showed a normal creatinine of 0.7 glucose of 161.  2 sets of blood cultures were done still pending.  The nasal swab was negative for influenza AB and coronavirus.    Last Recorded Vitals:  Vitals:    01/29/24 0445 01/29/24 0722 01/29/24 0858 01/29/24 1033   BP: 105/52 98/58 108/50 118/56   BP Location:  Right arm Right arm Right arm   Patient Position:  Sitting Sitting Lying   Pulse: 81 70 73 69   Resp: (!) 27 (!) 21 17 15   Temp:  36.7 °C (98.1 °F) 36.6 °C (97.9 °F) 35.9 °C (96.6 °F)   TempSrc:  Axillary Tympanic Temporal   SpO2: 97% 98% 99% 100%   Weight:       Height:           Last Labs:  CBC - 1/29/2024:  2:58 AM  12.2 14.0 368    45.4      CMP - 1/29/2024:  2:58 AM  9.2 7.9 19 --- 0.5   _ 3.6 13 174      PTT - No results in last year.  _   _ _     Hemoglobin A1C   Date/Time Value Ref Range Status   05/23/2022 10:43 PM 5.7 4.0 - 6.0 % Final     Comment:     Hemoglobin A1C levels are related to mean blood glucose during the   preceding 2-3 months. The relationship table below may be used as a   general guide. Each 1% increase in HGB A1C is a reflection of an   increase in mean glucose of approximately 30 mg/dl.   Reference: Diabetes Care, volume 29, supplement 1 Jan. 2006                        HGB A1C ................. Approx. Mean Glucose   _______________________________________________   6%   ...............................  120 mg/dl   7%   ...............................  150 mg/dl   8%   ...............................  180 mg/dl   9%   ...............................  210 mg/dl   10%  ...............................  240 mg/dl  Performed at [address] [address]     12/17/2020 03:16 PM 5.7 4.0 - 6.0 % Final     Comment:     Hemoglobin A1C levels are related to mean  "blood glucose during the   preceding 2-3 months. The relationship table below may be used as a   general guide. Each 1% increase in HGB A1C is a reflection of an   increase in mean glucose of approximately 30 mg/dl.   Reference: Diabetes Care, volume 29, supplement 1 Jan. 2006                        HGB A1C ................. Approx. Mean Glucose   _______________________________________________   6%   ...............................  120 mg/dl   7%   ...............................  150 mg/dl   8%   ...............................  180 mg/dl   9%   ...............................  210 mg/dl   10%  ...............................  240 mg/dl  Performed at [address] [address]       LDL Calculated   Date/Time Value Ref Range Status   01/27/2023 06:04 AM 66 65 - 130 MG/DL Final   11/20/2022 02:47 AM 54 (L) 65 - 130 MG/DL Final   07/06/2022 01:13 AM 54 (L) 65 - 130 MG/DL Final     VLDL   Date/Time Value Ref Range Status   08/03/2018 09:41 AM 25 0 - 40 mg/dL Final   01/18/2018 02:30 PM 47 (H) 0 - 40 mg/dL Final   10/17/2017 11:18 AM 50 (H) 0 - 40 mg/dL Final      Last I/O:  No intake/output data recorded.    Past Cardiology Tests (Last 3 Years):  EKG:  ECG 12 lead 01/29/2024 (Preliminary)      ECG 12 lead       Electrocardiogram, 12-lead PRN ACS symptoms       ECG 12 lead       ECG 12 lead 11/17/2023      ECG 12 Lead       ECG 12 Lead       ECG 12 lead 10/24/2023    Echo:  No results found for this or any previous visit from the past 1095 days.    Ejection Fractions:  No results found for: \"EF\"  Cath:  No results found for this or any previous visit from the past 1095 days.    Stress Test:  No results found for this or any previous visit from the past 1095 days.    Cardiac Imaging:  No results found for this or any previous visit from the past 1095 days.      Past Medical History:  She has a past medical history of Anemia, Arthritis, At risk for falling, Atrophic disorder of skin, unspecified " (07/19/2013), Body mass index (BMI) 22.0-22.9, adult, Cervicalgia (05/08/2015), CHF (congestive heart failure) (CMS/[facility]), COPD (chronic obstructive pulmonary disease) (CMS/[facility]), Depression, GERD (gastroesophageal reflux disease), High cholesterol, Hip fracture (CMS/[facility]), Hypertension, Insomnia, Leg swelling, Malignant neoplasm of unspecified site of unspecified female breast (CMS/[facility]) (07/19/2013), Neuropathy, Osteoarthritis, Other conditions influencing health status (10/21/2013), Personal history of other infectious and parasitic diseases (03/17/2015), Personal history of other specified conditions (03/17/2015), Personal history of pneumonia (recurrent) (09/10/2015), Personal history of transient ischemic attack (TIA), and cerebral infarction without residual deficits (09/04/2014), Pulmonary embolism (CMS/[facility]), PVD (peripheral vascular disease) (CMS/HCC), Syncope, and Weakness.    Past Surgical History:  She has a past surgical history that includes Breast biopsy (09/10/2015); Rotator cuff repair (09/10/2015); [place] lymph node biopsy (09/10/2015); Cataract extraction (11/02/2015); Other surgical history (11/02/2015); Hand tendon surgery (11/02/2015); Carpal tunnel release (09/04/2014); Breast lumpectomy (07/19/2013); Hysterectomy (07/19/2013); Shoulder surgery (07/19/2013); MR angio head wo IV contrast (12/17/2020); MR angio head wo IV contrast (5/13/2021); MR angio neck wo IV contrast (5/24/2022); and MR angio head wo IV contrast (5/24/2022).      Social History:  She reports that she has never smoked. She has never used smokeless tobacco. She reports that she does not drink alcohol and does not use drugs.    Family History:  Family History   Problem Relation Name Age of Onset    Alzheimer's disease Father      Breast cancer Sister      Pancreatic cancer Sister      Arthritis Other          Allergies:  Niaspan extended-release [niacin] and Naproxen    Inpatient Medications:  Scheduled medications   Medication  Dose Route Frequency    docusate sodium  100 mg oral BID    heparin (porcine)  5,000 Units subcutaneous q8h    ipratropium-albuteroL  3 mL nebulization TID    melatonin  3 mg oral Nightly    piperacillin-tazobactam  2.25 g intravenous q6h    polyethylene glycol  17 g oral Daily     PRN medications   Medication    acetaminophen    Or    acetaminophen    Or    acetaminophen    benzocaine-menthol    dextromethorphan-guaifenesin    guaiFENesin     Continuous Medications   Medication Dose Last Rate     Outpatient Medications:  Current Outpatient Medications   Medication Instructions    acetaminophen (TYLENOL) 650 mg, oral, Every 6 hours PRN    acetaminophen (TYLENOL) 650 mg, oral, Every 4 hours PRN    albuterol 2.5 mg, nebulization, Every 2 hour PRN    aspirin 325 mg, oral, 2 times daily    atorvastatin (Lipitor) 40 mg tablet Take 1 tablet (40 mg) by mouth once daily at bedtime for 30 days, THEN 1 tablet (40 mg) once daily at bedtime.    benzocaine-menthol (Cepastat Sore Throat) 15-3.6 mg lozenge 1 lozenge, Mouth/Throat, Every 4 hours PRN    calcitonin, salmon, (Miacalcin) 200 unit/actuation nasal spray 1 spray, One Nostril, Daily    docusate sodium (COLACE) 100 mg, oral, 2 times daily    famotidine (PEPCID) 20 mg, oral, Every 12 hours    ferrous gluconate (Fergon) 324 (38 Fe) mg tablet 38 mg of iron, oral, 3 times weekly    furosemide (LASIX) 40 mg, oral, Daily    guaiFENesin (MUCINEX) 600 mg, oral, Every 12 hours PRN, Do not crush, chew, or split.    HYDROcodone-acetaminophen (Norco) 5-325 mg tablet 1 tablet, oral, Every 4 hours PRN    lidocaine 4 % patch 1 patch, transdermal, Daily, Remove & discard patch within 12 hours or as directed by MD.    loratadine (CLARITIN) 10 mg, oral, Daily    losartan (COZAAR) 25 mg, oral, Daily    magnesium oxide (Mag-Ox) 400 mg (241.3 mg magnesium) tablet 1 tablet, oral, 3 times weekly, Tues thurs sat    melatonin 3 mg, oral, Nightly    metoprolol succinate XL (TOPROL-XL) 50 mg, oral,  Daily, Do not crush or chew.    nitroglycerin (NITROSTAT) 0.4 mg, sublingual, Every 5 min PRN    ondansetron (ZOFRAN) 4 mg, intravenous, Every 8 hours PRN    oxygen (O2) 2 L/min, inhalation, Every 24 hours    pantoprazole (PROTONIX) 40 mg, oral, Daily    polyethylene glycol (GLYCOLAX, MIRALAX) 17 g, oral, Daily    psyllium (Metamucil) 3.4 gram packet 1 packet, oral, Daily    rOPINIRole (REQUIP) 5 mg, oral, Nightly    sennosides (SENOKOT) 17.2 mg, oral, 2 times daily    sertraline (ZOLOFT) 50 mg, oral, Daily    sodium chloride (Ocean) 0.65 % nasal spray 1 spray, Each Nostril, 4 times daily PRN    traZODone (DESYREL) 50 mg, oral, Nightly PRN       Physical Exam:  The patient is a upper elderly white female awake alert conversant not overtly dyspneic on nasal cannula visiting with daughter.  JVP not elevated carotid and pulses 2+  Moderately severe thoracic kyphosis shallow air movement breath sounds are absent one half of the posterior lung bases bilaterally  Cardiac rhythm irregular S1-S2 obscured grade 3/6 to 4/6 systolic ejection murmur  Abdomen is benign no paraspinal megaly no focal tenderness  No peripheral edema pedal pulses are present     Assessment/Plan     1/29: This patient is a 89-year-old white female with extensive issues that include coronary artery disease, severe aortic valvular stenosis, moderate tricuspid and mitral valve regurgitation, severe pulmonary hypertension, COPD with oxygen dependence, hypertension, left bundle branch block conduction delay, lower extremity edema due to venous insufficiency, chronic DVT left lower extremity femoral vein, laparoscopic paraesophageal hiatal hernia repair, ongoing aspiration pneumonias, nontoxic multinodular goiter.  The patient's last echocardiogram on 7/25/2023 again demonstrated a LV ejection fraction 35-40% severe aortic valve stenosis peak systolic gradient 56 mmHg, mild mitral moderate tricuspid valve regurgitation and severe pulmonary hypertension  estimated PA systolic pressure 67 mmHg.  The patient was thought not to be a candidate for transcatheter aortic valve replacement.  She does wear continuous oxygen at 3 L/min at home.  She surprisingly still lives independently in her own condominium.  She has been having increased shortness of breath.  Multiple potential etiologies including her chronic lung disease aspiration pneumonias interstitial fibrosis possible CHF.  The patient was recently admitted for several days on 10/23/2023 with increased shortness of breath and ultimately was released on Lasix 40 mg twice daily which she has reduced to daily.  Patient admitted again on 11/12/2023 after having fallen in the bathroom and not being able to get up due to generalized weakness.  She lives independently in her own condominium and has been very resistant to review our recommendation for assisted living.  At that time it was decided to continue the patient on her usual therapy which  at that point included Lasix 40 mg once daily along with her low-dose aspirin plus atorvastatin 40 mg daily and metoprolol succinate 25 mg twice daily.    Patient was admitted after a mechanical fall with a right femoral neck fracture and subsequently underwent right total hip replacement on 12/16/2023.  The patient was discharged to rehab facility on usual preadmission low-dose losartan 25 mg daily Toprol-XL 25 mg daily Lasix 40 mg daily with no anticoagulation due to where multiple falls.  Her atrial fibrillation was rate controlled with the low-dose Toprol-XL.  She was readmitted with progressive increased shortness of breath with CTA of the chest demonstrating large bilateral pleural effusions and possible multifocal pneumonia.  The patient has been started on empiric antibiotic therapy with IV ceftriaxone and azithromycin.  She has been thought to be a high risk candidate for aspiration pneumonia in the past.  The pleural effusions presumably are related to her progressively  more severe aortic valvular stenosis along with severe pulmonary hypertension.  Pulmonology will be consulted with respect to treatment of her possible pneumonia and to determine whether her respiratory status would benefit from thoracentesis by interventional radiology.    Peripheral IV 01/29/24 22 G Right Hand (Active)   Site Assessment Clean 01/29/24 0602   Number of days: 0       Code Status:  DNR and No Intubation    I spent 30 minutes in the professional and overall care of this patient.        Jim Mckinney MD

## 2024-01-29 NOTE — CONSULTS
Inpatient consult to Pulmonology  Consult performed by: Pardeep Hobson MD  Consult ordered by: Jim Mckinney MD          Pulmonary Consult    Reason For Consult    History Of Present Illness  Kalie Ramos is a 89 y.o. female presenting with Respiratory distress.  She is brought to emergency department for shortness of breath.  Patient has a history of COPD and CHF.  She was admitted back in December for hip fracture.  Eventually discharged home to rehab.  She was brought in for shortness of breath.  She is on vent mask and respond.  D-dimer was elevated.  CT of the chest shows no pulm embolism.  She does have large bilateral effusions possible pneumonia.     Past Medical History  She has a past medical history of Anemia, Arthritis, At risk for falling, Atrophic disorder of skin, unspecified (07/19/2013), Body mass index (BMI) 22.0-22.9, adult, Cervicalgia (05/08/2015), CHF (congestive heart failure) (CMS/[facility]), COPD (chronic obstructive pulmonary disease) (CMS/[facility]), Depression, GERD (gastroesophageal reflux disease), High cholesterol, Hip fracture (CMS/[facility]), Hypertension, Insomnia, Leg swelling, Malignant neoplasm of unspecified site of unspecified female breast (CMS/[facility]) (07/19/2013), Neuropathy, Osteoarthritis, Other conditions influencing health status (10/21/2013), Personal history of other infectious and parasitic diseases (03/17/2015), Personal history of other specified conditions (03/17/2015), Personal history of pneumonia (recurrent) (09/10/2015), Personal history of transient ischemic attack (TIA), 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    Surgical History  She has a past surgical history that includes Breast biopsy (09/10/2015); Rotator cuff repair (09/10/2015); Garvin lymph node biopsy (09/10/2015); Cataract extraction (11/02/2015); Other surgical history (11/02/2015);  "Hand tendon surgery (11/02/2015); Carpal tunnel release (09/04/2014); Breast lumpectomy (07/19/2013); Hysterectomy (07/19/2013); Shoulder surgery (07/19/2013); MR angio head wo IV contrast (12/17/2020); MR angio head wo IV contrast (5/13/2021); MR angio neck wo IV contrast (5/24/2022); and MR angio head wo IV contrast (5/24/2022).     Social History  She reports that she has never smoked. She has never used smokeless tobacco. She reports that she does not drink alcohol and does not use drugs.      Family History  Family History   Problem Relation Name Age of Onset    No Known Problems Mother      Alzheimer's disease Father      Breast cancer Sister      Pancreatic cancer Sister      Arthritis Other          Allergies  Niaspan extended-release [niacin] and Naproxen    Review of Systems   All other systems reviewed and are negative.      Last Recorded Vitals  Blood pressure 136/69, pulse 71, temperature 36.1 °C (97 °F), temperature source Temporal, resp. rate 17, height 1.575 m (5' 2\"), weight 53.5 kg (117 lb 15.1 oz), SpO2 97 %.    Intake/Output  No intake or output data in the 24 hours ending 01/29/24 1406    Physical Exam  Vitals reviewed.   Constitutional:       Appearance: Normal appearance.   HENT:      Head: Normocephalic and atraumatic.      Mouth/Throat:      Mouth: Mucous membranes are moist.   Eyes:      Extraocular Movements: Extraocular movements intact.      Pupils: Pupils are equal, round, and reactive to light.   Cardiovascular:      Rate and Rhythm: Normal rate and regular rhythm.   Pulmonary:      Effort: Pulmonary effort is normal.      Breath sounds: Normal air entry. Rhonchi present.   Abdominal:      General: Abdomen is flat. Bowel sounds are normal.      Palpations: Abdomen is soft.   Musculoskeletal:         General: Normal range of motion.      Cervical back: Normal range of motion and neck supple.      Right lower leg: Edema present.      Left lower leg: Edema present.   Skin:     General: " Skin is warm and dry.   Neurological:      General: No focal deficit present.      Mental Status: She is alert and oriented to person, place, and time. Mental status is at baseline.      ...    Oxygen Therapy  SpO2: 97 % (post functional mobility)  Medical Gas Therapy: Supplemental oxygen  O2 Delivery Method: Nasal cannula  FiO2 (%):  [35 %-50 %] 35 %    Lines and Tubes:  Peripheral IV 12/19/23 20 G Right;Anterior Forearm (Active)   Placement Date/Time: 12/19/23 1615   Size (Gauge): 20 G  Orientation: Right;Anterior  Location: Forearm  Site Prep: Alcohol  Local Anesthetic: None  Technique: Ultrasound guidance  Placed by: juana HERRING  Insertion attempts: 1  Patient...   Number of days: 40       Peripheral IV 01/29/24 22 G Right Hand (Active)   Placement Date/Time: 01/29/24 0602   Size (Gauge): 22 G  Orientation: Right  Location: Hand   Number of days: 0         Scheduled medications  aspirin, 325 mg, oral, BID  atorvastatin, 10 mg, oral, Nightly  calcitonin (salmon), 1 spray, One Nostril, Daily  docusate sodium, 100 mg, oral, BID  famotidine, 20 mg, oral, BID  ferrous gluconate, 38 mg of iron, oral, Once per day on Mon Wed Fri  furosemide, 40 mg, oral, Daily  heparin (porcine), 5,000 Units, subcutaneous, q8h  ipratropium-albuteroL, 3 mL, nebulization, TID  lidocaine, 1 patch, transdermal, Daily  loratadine, 10 mg, oral, Daily  losartan, 25 mg, oral, Daily  magnesium oxide, 400 mg, oral, Once per day on Mon Wed Fri  melatonin, 3 mg, oral, Nightly  metoprolol succinate XL, 50 mg, oral, Daily  oxygen, 2 L/min, inhalation, q24h  pantoprazole, 40 mg, oral, Daily  piperacillin-tazobactam, 2.25 g, intravenous, q6h  polyethylene glycol, 17 g, oral, Daily  psyllium, 1 packet, oral, Daily  rOPINIRole, 5 mg, oral, Nightly  sennosides, 2 tablet, oral, BID  sertraline, 50 mg, oral, Daily      Continuous medications     PRN medications  PRN medications: [DISCONTINUED] acetaminophen **OR** acetaminophen **OR**  acetaminophen, acetaminophen, albuterol, benzocaine-menthol, dextromethorphan-guaifenesin, guaiFENesin, HYDROcodone-acetaminophen, nitroglycerin, ondansetron, sodium chloride, traZODone    Relevant Results  Results from last 7 days   Lab Units 01/29/24  0258   WBC AUTO x10*3/uL 12.2*   HEMOGLOBIN g/dL 14.0   HEMATOCRIT % 45.4   PLATELETS AUTO x10*3/uL 368      Results from last 7 days   Lab Units 01/29/24  0258   SODIUM mmol/L 138   POTASSIUM mmol/L 4.4   CHLORIDE mmol/L 97   CO2 mmol/L 32*   BUN mg/dL 12   CREATININE mg/dL 0.70   GLUCOSE mg/dL 161*   CALCIUM mg/dL 9.2      Results from last 7 days   Lab Units 01/29/24  0317   POCT PH, ARTERIAL pH 7.39   POCT PCO2, ARTERIAL mm Hg 49*   POCT PO2, ARTERIAL mm Hg 86   POCT HCO3 CALCULATED, ARTERIAL mmol/L 29.7*   POCT BASE EXCESS, ARTERIAL mmol/L 3.8*     CT angio chest for pulmonary embolism 01/29/2024    Narrative  STUDY:  CT Angiogram of the Chest; 1/29/2024 5:34 AM  INDICATION:  Shortness of breath.  Elevated D-dimer.  COMPARISON:  CXR 1/29/2024.  CTA chest 10/22/2023.  ACCESSION NUMBER(S):  [id number]  ORDERING CLINICIAN:  GISELA GUALLPA  TECHNIQUE:  CTA of the chest was performed with intravenous contrast.  Images are reviewed and processed at a workstation according to the CT  angiogram protocol with 3-D and/or MIP post processing imaging  generated.  Omnipaque 350 75 mL was administered intravenously.  Automated mA/kV exposure control was utilized and patient examination  was performed in strict accordance with principles of ALARA.  FINDINGS:  Pulmonary arteries are adequately opacified without large central  acute or chronic filling defects.  Respiratory motion limits  evaluation of the smaller pulmonary arteries.  The thoracic aorta is  normal in course and caliber without dissection or aneurysm.  Mild  calcified atheromatous plaque is seen in the thoracic aorta.  Enlarged, heterogeneous thyroid is identified with a confluent area of  hypodense  nodularity in the thyroid isthmus measuring 5.0 x 6.1 cm in  diameter.  Calcifications are seen in the thyroid lobes.  The heart is normal in size without pericardial effusion.  Mild  calcified coronary plaque is noted.  Thoracic lymph nodes are not  enlarged.  Trachea and mainstem bronchi are patent and clear of debris.  There  are large bilateral pleural effusions layering dependently with  collapse and consolidation of the left lower lobe as well as the  dependent portion of the left upper lobe and lingular segment of left  upper lobe.  Patchy airspace disease is seen in the right upper lobe.  Relaxation atelectasis and mild consolidation is seen in the dependent  portion of the right lower lobe.  There is atelectasis in the central  portion of the right middle lobe.  Increased AP diameter of the thorax  may indicate chronic changes of COPD.  No interstitial lung disease or  definite suspicious pulmonary nodules are identified.  There is no  pneumothorax.  Upper abdomen demonstrates no acute pathology.  Surgical clips are  noted in the upper abdomen.  Mild calcified plaque is seen in the  included upper abdominal aorta.  There are no acute fractures.  No suspicious bony lesions.  Pronounced  thoracic kyphosis is noted.  There is moderate multilevel disc space  narrowing throughout the mid and lower thoracic spine.  Sclerotic  focus is seen within the T2 vertebrae measuring 6 mm in diameter.  Generalized osseous demineralization is noted.    Impression  Large bilateral pleural effusions with multifocal areas of  consolidation in both lungs, most pronounced in the left lower lobe  and dependent portions of the left upper lobe and right lower lobe,  indicating probable severe pneumonia/aspiration with patchy airspace  disease in the right upper lobe.  Enlarged, heterogeneous thyroid with a 6.1 cm nodule in the thyroid  isthmus with poorly defined margins, nonemergent thyroid ultrasound is  recommended for further  characterization.  Chronic changes suggesting COPD.  Pronounced thoracic kyphosis with multilevel age-related osteoporotic  changes in the spine.  Signed by Trent Salinas      Assessment/Plan   Principal Problem:    Respiratory distress  Acute respiratory failure  Congestive heart failure  Bilateral pleural effusions  Severe pulm hypertension  Severe aortic valve stenosis  Moderate mitral and tricuspid valve regurgitation  COPD  Off anticoagulation due to multiple falls.  History of DVT    Acute respite failure with volume overload.  This may be secondary to worsening of her underlying heart disease.  Also change in medication regimen from twice daily Lasix to daily Lasix may be contributing.  Noncompliance with diet or fluid intake.  She does have some component of pneumonia    She is stable this morning.  Continue with IV diuresis  Will order thoracentesis.  Wean oxygen as tolerated  Continue bronchodilators  Continue antibiotic coverage  DVT prophylaxis    Thank you for the consultation    Pardeep Hobson MD  [facility]

## 2024-01-29 NOTE — PROGRESS NOTES
Met with patient and daughter at bedside, introduced self and inquired about new SNF choices per previous CT note.  Daughter and patient chose [facility].  Referral sent to [facility] for processing.  Awaiting updates.  RN TCC following.    1600  Per [place], [facility] is [facility].  Daughter advised.  New SNF list to be compiled and sent to [email or web address].  Awaiting new SNF choices.  RN TCC following.    1625  SNF list emailed to patient's daughter at [email or web address].  Awaiting new SNF choices.    Jaz Garcia RN

## 2024-01-29 NOTE — Clinical Note
Left thoracentesis complete, total of 1.1L fluid removed, sample obtained to be sent to lab.  Yobany out and bandaid to left mid back

## 2024-01-29 NOTE — Clinical Note
Report to Toya Cano RN via [employer] [facility], pt back supine in bed w/ hob elevated and SR up x 2.  Denies any new or worsening pain/ sob at this time

## 2024-01-29 NOTE — H&P
History Of Present Illness  Kalie Ramos is a 89 y.o. female presenting with shortness of breath.  Patient lives alone independently.  Patient has history of aortic valve stenosis had a fall causing right hip fracture underwent ORIF after that she went to the rehab facility.  Patient was discharged from the rehab facility went home better after a week or 2 she started feeling weak again.  She was not moving around much.  Yesterday family found her short of breath      past Medical History  Past Medical History:   Diagnosis Date    Anemia     Arthritis     At risk for falling     Atrophic disorder of skin, unspecified 07/19/2013    Atrophoderma    Body mass index (BMI) 22.0-22.9, adult     BMI 22.0-22.9, adult    Cervicalgia 05/08/2015    Neck pain    CHF (congestive heart failure) (CMS/[facility])     COPD (chronic obstructive pulmonary disease) (CMS/[facility])     Depression     GERD (gastroesophageal reflux disease)     High cholesterol     Hip fracture (CMS/HCC)     Hypertension     Insomnia     Leg swelling     Malignant neoplasm of unspecified site of unspecified female breast (CMS/[facility]) 07/19/2013    Adenocarcinoma of breast    Neuropathy     Osteoarthritis     Other conditions influencing health status 10/21/2013    Urinary Tract Infection    Personal history of other infectious and parasitic diseases 03/17/2015    History of candidiasis of mouth    Personal history of other specified conditions 03/17/2015    History of abdominal pain    Personal history of pneumonia (recurrent) 09/10/2015    History of pneumonia    Personal history of transient ischemic attack (TIA), and cerebral infarction without residual deficits 09/04/2014    History of stroke    Pulmonary embolism (CMS/HCC)     PVD (peripheral vascular disease) (CMS/[facility])     Syncope     Weakness        Surgical History  Past Surgical History:   Procedure Laterality Date    BREAST BIOPSY  09/10/2015    Biopsy Breast Open    BREAST LUMPECTOMY  07/19/2013     Breast Surgery Lumpectomy    CARPAL TUNNEL RELEASE  09/04/2014    Neuroplasty Decompression Median Nerve At Carpal Tunnel    CATARACT EXTRACTION  11/02/2015    Cataract Surgery    HAND TENDON SURGERY  11/02/2015    Hand Incision Tendon Sheath Of A Finger    HYSTERECTOMY  07/19/2013    Hysterectomy    MR HEAD ANGIO WO IV CONTRAST  12/17/2020    MR HEAD ANGIO WO IV CONTRAST [facility]    MR HEAD ANGIO WO IV CONTRAST  5/13/2021    MR HEAD ANGIO WO IV CONTRAST [facility]    MR HEAD ANGIO WO IV CONTRAST  5/24/2022    MR HEAD ANGIO WO IV CONTRAST [facility]    MR NECK ANGIO WO IV CONTRAST  5/24/2022    MR NECK ANGIO WO IV CONTRAST [facility]    OTHER SURGICAL HISTORY  11/02/2015    Wrist Carpectomy    ROTATOR CUFF REPAIR  09/10/2015    Rotator Cuff Repair    SENTINEL LYMPH NODE BIOPSY  09/10/2015    [place] Lymph Node Biopsy    SHOULDER SURGERY  07/19/2013    Shoulder Surgery        Social History  She reports that she has never smoked. She has never used smokeless tobacco. She reports that she does not drink alcohol and does not use drugs.    Family History  Family History   Problem Relation Name Age of Onset    No Known Problems Mother      Alzheimer's disease Father      Breast cancer Sister      Pancreatic cancer Sister      Arthritis Other          Allergies  Niaspan extended-release [niacin] and Naproxen    Review of Systems   Constitutional:  Negative for activity change, appetite change, chills, diaphoresis, fatigue, fever and unexpected weight change.   HENT:  Negative for congestion, dental problem, drooling, ear discharge, ear pain, facial swelling, hearing loss, mouth sores, nosebleeds, postnasal drip, rhinorrhea, sinus pressure, sinus pain, sneezing, sore throat, tinnitus, trouble swallowing and voice change.    Eyes:  Negative for photophobia, pain, discharge, redness, itching and visual disturbance.   Respiratory:  Positive for shortness of breath. Negative for  apnea, cough, choking, chest tightness, wheezing and stridor.    Cardiovascular:  Negative for chest pain, palpitations and leg swelling.   Gastrointestinal:  Negative for abdominal distention, abdominal pain, anal bleeding, blood in stool, constipation, diarrhea, nausea, rectal pain and vomiting.   Endocrine: Negative for cold intolerance, heat intolerance, polydipsia, polyphagia and polyuria.   Genitourinary:  Negative for decreased urine volume, difficulty urinating, dysuria, enuresis, flank pain, frequency, genital sores, hematuria and urgency.   Musculoskeletal:  Negative for arthralgias, back pain, gait problem, joint swelling, myalgias, neck pain and neck stiffness.   Skin:  Negative for color change, pallor, rash and wound.   Allergic/Immunologic: Negative for environmental allergies, food allergies and immunocompromised state.   Neurological:  Negative for dizziness, tremors, seizures, syncope, facial asymmetry, speech difficulty, weakness, light-headedness, numbness and headaches.   Hematological:  Negative for adenopathy. Does not bruise/bleed easily.   Psychiatric/Behavioral:  Negative for agitation, behavioral problems, confusion, decreased concentration, dysphoric mood, hallucinations, self-injury, sleep disturbance and suicidal ideas. The patient is not nervous/anxious and is not hyperactive.         Physical Exam  Vitals reviewed.   Constitutional:       Appearance: Normal appearance.   HENT:      Head: Normocephalic and atraumatic.      Right Ear: Tympanic membrane, ear canal and external ear normal.      Left Ear: Tympanic membrane, ear canal and external ear normal.      Nose: Nose normal.      Mouth/Throat:      Pharynx: Oropharynx is clear.   Eyes:      Extraocular Movements: Extraocular movements intact.      Conjunctiva/sclera: Conjunctivae normal.      Pupils: Pupils are equal, round, and reactive to light.   Cardiovascular:      Rate and Rhythm: Normal rate and regular rhythm.      Pulses:  "Normal pulses.      Heart sounds: Normal heart sounds.   Pulmonary:      Effort: Pulmonary effort is normal.      Breath sounds: Normal breath sounds.      Comments: Diminished air entry at bases  Abdominal:      General: Abdomen is flat. Bowel sounds are normal.      Palpations: Abdomen is soft.   Musculoskeletal:      Cervical back: Normal range of motion and neck supple.      Right lower leg: Edema present.      Left lower leg: Edema present.   Skin:     General: Skin is warm and dry.   Neurological:      General: No focal deficit present.      Mental Status: She is alert and oriented to person, place, and time.   Psychiatric:         Mood and Affect: Mood normal.          Last Recorded Vitals  Blood pressure 102/57, pulse 67, temperature 36.5 °C (97.7 °F), temperature source Temporal, resp. rate 17, height 1.575 m (5' 2\"), weight 53.5 kg (117 lb 15.1 oz), SpO2 97 %.    Relevant Results        Scheduled medications  aspirin, 325 mg, oral, BID  atorvastatin, 10 mg, oral, Nightly  calcitonin (salmon), 1 spray, One Nostril, Daily  docusate sodium, 100 mg, oral, BID  famotidine, 20 mg, oral, BID  ferrous gluconate, 38 mg of iron, oral, Once per day on Mon Wed Fri  furosemide, 40 mg, oral, Daily  heparin (porcine), 5,000 Units, subcutaneous, q8h  ipratropium-albuteroL, 3 mL, nebulization, TID  lidocaine, 1 patch, transdermal, Daily  loratadine, 10 mg, oral, Daily  losartan, 25 mg, oral, Daily  [START ON 1/30/2024] magnesium oxide, 400 mg, oral, Once per day on Tue Thu Sat  melatonin, 3 mg, oral, Nightly  metoprolol succinate XL, 50 mg, oral, Daily  oxygen, 2 L/min, inhalation, q24h  pantoprazole, 40 mg, oral, Daily  piperacillin-tazobactam, 2.25 g, intravenous, q6h  polyethylene glycol, 17 g, oral, Daily  psyllium, 1 packet, oral, Daily  rOPINIRole, 5 mg, oral, Nightly  sennosides, 2 tablet, oral, BID  sertraline, 50 mg, oral, Daily      Continuous medications     PRN medications  PRN medications: [DISCONTINUED] " acetaminophen **OR** acetaminophen **OR** acetaminophen, acetaminophen, albuterol, benzocaine-menthol, dextromethorphan-guaifenesin, guaiFENesin, HYDROcodone-acetaminophen, nitroglycerin, ondansetron, sodium chloride, traZODone  Results for orders placed or performed during the hospital encounter of 01/29/24 (from the past 24 hour(s))   ECG 12 lead   Result Value Ref Range    Ventricular Rate 96 BPM    Atrial Rate 98 BPM    QRS Duration 122 ms    QT Interval 426 ms    QTC Calculation(Bazett) 538 ms    R Axis -14 degrees    T Axis 143 degrees    QRS Count 16 beats    Q Onset 210 ms    T Offset 423 ms    QTC Fredericia 498 ms   CBC with Differential   Result Value Ref Range    WBC 12.2 (H) 4.4 - 11.3 x10*3/uL    nRBC 0.0 0.0 - 0.0 /100 WBCs    RBC 4.83 4.00 - 5.20 x10*6/uL    Hemoglobin 14.0 12.0 - 16.0 g/dL    Hematocrit 45.4 36.0 - 46.0 %    MCV 94 80 - 100 fL    MCH 29.0 26.0 - 34.0 pg    MCHC 30.8 (L) 32.0 - 36.0 g/dL    RDW 15.3 (H) 11.5 - 14.5 %    Platelets 368 150 - 450 x10*3/uL    Neutrophils % 74.5 40.0 - 80.0 %    Immature Granulocytes %, Automated 0.6 0.0 - 0.9 %    Lymphocytes % 16.0 13.0 - 44.0 %    Monocytes % 5.1 2.0 - 10.0 %    Eosinophils % 3.5 0.0 - 6.0 %    Basophils % 0.3 0.0 - 2.0 %    Neutrophils Absolute 9.06 (H) 1.60 - 5.50 x10*3/uL    Immature Granulocytes Absolute, Automated 0.07 0.00 - 0.50 x10*3/uL    Lymphocytes Absolute 1.94 0.80 - 3.00 x10*3/uL    Monocytes Absolute 0.62 0.05 - 0.80 x10*3/uL    Eosinophils Absolute 0.42 (H) 0.00 - 0.40 x10*3/uL    Basophils Absolute 0.04 0.00 - 0.10 x10*3/uL   Comprehensive Metabolic Panel   Result Value Ref Range    Glucose 161 (H) 65 - 99 mg/dL    Sodium 138 133 - 145 mmol/L    Potassium 4.4 3.4 - 5.1 mmol/L    Chloride 97 97 - 107 mmol/L    Bicarbonate 32 (H) 24 - 31 mmol/L    Urea Nitrogen 12 8 - 25 mg/dL    Creatinine 0.70 0.40 - 1.60 mg/dL    eGFR 80 >60 mL/min/1.73m*2    Calcium 9.2 8.5 - 10.4 mg/dL    Albumin 3.6 3.5 - 5.0 g/dL    Alkaline  Phosphatase 174 (H) 35 - 125 U/L    Total Protein 7.9 5.9 - 7.9 g/dL    AST 19 5 - 40 U/L    Bilirubin, Total 0.5 0.1 - 1.2 mg/dL    ALT 13 5 - 40 U/L    Anion Gap 9 <=19 mmol/L   Magnesium   Result Value Ref Range    Magnesium 2.00 1.60 - 3.10 mg/dL   Serial Troponin, Initial ([facility])   Result Value Ref Range    Troponin T, High Sensitivity 27 (HH) <=14 ng/L   NT Pro-BNP   Result Value Ref Range    PROBNP 9,761 (H) 0 - 624 pg/mL   Blood Gas Lactic Acid, Venous   Result Value Ref Range    POCT Lactate, Venous 2.1 (H) 0.4 - 2.0 mmol/L   Blood Culture    Specimen: Peripheral Venipuncture; Blood culture   Result Value Ref Range    Blood Culture Loaded on Instrument - Culture in progress    Blood Culture    Specimen: Peripheral Venipuncture; Blood culture   Result Value Ref Range    Blood Culture Loaded on Instrument - Culture in progress    Sars-CoV-2 and Influenza A/B PCR   Result Value Ref Range    Flu A Result Not Detected Not Detected    Flu B Result Not Detected Not Detected    Coronavirus 2019, PCR Not Detected Not Detected   D-Dimer, Quantitative Non VTE   Result Value Ref Range    D-Dimer Non VTE, Quant (mg/L FEU) 3.46 (H) 0.19 - 0.50 mg/L FEU   Blood Gas Arterial Full Panel   Result Value Ref Range    POCT pH, Arterial 7.39 7.38 - 7.42 pH    POCT pCO2, Arterial 49 (H) 38 - 42 mm Hg    POCT pO2, Arterial 86 85 - 95 mm Hg    POCT SO2, Arterial 98 94 - 100 %    POCT Oxy Hemoglobin, Arterial 95.9 94.0 - 98.0 %    POCT Hematocrit Calculated, Arterial 39.0 36.0 - 46.0 %    POCT Sodium, Arterial 137 136 - 145 mmol/L    POCT Potassium, Arterial 4.2 3.5 - 5.3 mmol/L    POCT Chloride, Arterial 102 98 - 107 mmol/L    POCT Ionized Calcium, Arterial 1.17 1.10 - 1.33 mmol/L    POCT Glucose, Arterial 180 (H) 74 - 99 mg/dL    POCT Lactate, Arterial 2.0 0.4 - 2.0 mmol/L    POCT Base Excess, Arterial 3.8 (H) -2.0 - 3.0 mmol/L    POCT HCO3 Calculated, Arterial 29.7 (H) 22.0 - 26.0 mmol/L    POCT Hemoglobin, Arterial 13.1 12.0 -  16.0 g/dL    POCT Anion Gap, Arterial 10 10 - 25 mmo/L    Patient Temperature 37.0 degrees Celsius    FiO2 50 %    Apparatus VENTI MASK     Site of Arterial Puncture Radial Right     Freedom's Test Positive    Urinalysis with Reflex Culture and Microscopic   Result Value Ref Range    Color, Urine Yellow Light-Yellow, Yellow, Dark-Yellow    Appearance, Urine Turbid (N) Clear    Specific Gravity, Urine >1.050 (N) 1.005 - 1.035    pH, Urine 8.0 5.0, 5.5, 6.0, 6.5, 7.0, 7.5, 8.0    Protein, Urine 70 (1+) (A) NEGATIVE, 10 (TRACE), 20 (TRACE) mg/dL    Glucose, Urine Normal Normal mg/dL    Blood, Urine 1.0 (3+) (A) NEGATIVE    Ketones, Urine NEGATIVE NEGATIVE mg/dL    Bilirubin, Urine NEGATIVE NEGATIVE    Urobilinogen, Urine Normal Normal mg/dL    Nitrite, Urine 2+ (A) NEGATIVE    Leukocyte Esterase, Urine 500 Anurag/µL (A) NEGATIVE   Sars-CoV-2 and Influenza A/B PCR   Result Value Ref Range    Flu A Result Not Detected Not Detected    Flu B Result Not Detected Not Detected    Coronavirus 2019, PCR Not Detected Not Detected   Microscopic Only, Urine   Result Value Ref Range    WBC, Urine >50 (A) 1-5, NONE /HPF    WBC Clumps, Urine MANY Reference range not established. /HPF    RBC, Urine >20 (A) NONE, 1-2, 3-5 /HPF   Blood Gas Lactic Acid, Venous   Result Value Ref Range    POCT Lactate, Venous 4.5 (HH) 0.4 - 2.0 mmol/L   Lactate Dehydrogenase   Result Value Ref Range     91 - 227 U/L   Protein, Total   Result Value Ref Range    Total Protein 7.2 5.9 - 7.9 g/dL     ECG 12 lead    Result Date: 1/29/2024   Poor data quality, interpretation may be adversely affected Sinus tachycardia with occasional Premature ventricular complexes Left bundle branch block Abnormal ECG Confirmed by Deo Thomas ([id number]) on 1/29/2024 4:34:14 PM    CT angio chest for pulmonary embolism    Result Date: 1/29/2024  STUDY: CT Angiogram of the Chest; 1/29/2024 5:34 AM INDICATION: Shortness of breath.  Elevated D-dimer. COMPARISON: CXR 1/29/2024.   CTA chest 10/22/2023. ACCESSION NUMBER(S): [id number] ORDERING CLINICIAN: GISELA GUALLPA TECHNIQUE:  CTA of the chest was performed with intravenous contrast. Images are reviewed and processed at a workstation according to the CT angiogram protocol with 3-D and/or MIP post processing imaging generated.  Omnipaque 350 75 mL was administered intravenously. Automated mA/kV exposure control was utilized and patient examination was performed in strict accordance with principles of ALARA. FINDINGS: Pulmonary arteries are adequately opacified without large central acute or chronic filling defects.  Respiratory motion limits evaluation of the smaller pulmonary arteries.  The thoracic aorta is normal in course and caliber without dissection or aneurysm.  Mild calcified atheromatous plaque is seen in the thoracic aorta. Enlarged, heterogeneous thyroid is identified with a confluent area of hypodense nodularity in the thyroid isthmus measuring 5.0 x 6.1 cm in diameter.  Calcifications are seen in the thyroid lobes. The heart is normal in size without pericardial effusion.  Mild calcified coronary plaque is noted.  Thoracic lymph nodes are not enlarged. Trachea and mainstem bronchi are patent and clear of debris.  There are large bilateral pleural effusions layering dependently with collapse and consolidation of the left lower lobe as well as the dependent portion of the left upper lobe and lingular segment of left upper lobe.  Patchy airspace disease is seen in the right upper lobe. Relaxation atelectasis and mild consolidation is seen in the dependent portion of the right lower lobe.  There is atelectasis in the central portion of the right middle lobe.  Increased AP diameter of the thorax may indicate chronic changes of COPD.  No interstitial lung disease or definite suspicious pulmonary nodules are identified.  There is no pneumothorax. Upper abdomen demonstrates no acute pathology.  Surgical clips are noted in  the upper abdomen.  Mild calcified plaque is seen in the included upper abdominal aorta. There are no acute fractures.  No suspicious bony lesions.  Pronounced thoracic kyphosis is noted.  There is moderate multilevel disc space narrowing throughout the mid and lower thoracic spine.  Sclerotic focus is seen within the T2 vertebrae measuring 6 mm in diameter. Generalized osseous demineralization is noted.    Large bilateral pleural effusions with multifocal areas of consolidation in both lungs, most pronounced in the left lower lobe and dependent portions of the left upper lobe and right lower lobe, indicating probable severe pneumonia/aspiration with patchy airspace disease in the right upper lobe. Enlarged, heterogeneous thyroid with a 6.1 cm nodule in the thyroid isthmus with poorly defined margins, nonemergent thyroid ultrasound is recommended for further characterization. Chronic changes suggesting COPD. Pronounced thoracic kyphosis with multilevel age-related osteoporotic changes in the spine. Signed by Tretn Salinas    XR chest 1 view    Result Date: 1/29/2024  STUDY: Chest Radiograph;  1/29/2024 3:42 AM INDICATION: Shortness of breath. COMPARISON: 12/14/2023 XR Chest one view ACCESSION NUMBER(S): [id number] ORDERING CLINICIAN: GISELA GUALLPA TECHNIQUE:  Frontal chest was obtained at 3:42 hours. FINDINGS: CARDIOMEDIASTINAL SILHOUETTE: Cardiomediastinal silhouette is obscured but appears enlarged. Calcified plaque is seen in the aorta.  LUNGS: There is extensive airspace disease in the left midlung zone and bilateral lung bases.  Increased interstitial markings are seen bilaterally.  There is a probable moderate left effusion and suspected small right effusion.  ABDOMEN: No remarkable upper abdominal findings.  BONES: No acute osseous changes.    Extensive multifocal bilateral airspace disease with probable effusions, likely multilobar pneumonia in the appropriate clinical setting with question of  mild superimposed pulmonary edema. Signed by Trent Salinas        Assessment/Plan   Principal Problem:    Respiratory distress  Active Problems:    Anxiety    Aortic stenosis    Hyperlipidemia    Hypertension    Thyroid nodule    Pneumonia due to infectious organism    Pleural effusion    UTI (urinary tract infection)    SIRS (systemic inflammatory response syndrome) (CMS/[facility])      Broad-spectrum antibiotic  Patient will need thoracocentesis for pleural effusion  Pleural effusion could be from pneumonia versus CHF  IV diuretics  Consult pulmonary  Consult cardiology  Oxygen support  Breathing treatment  Home medications  Patient may need rehab again  Discussed with the family.  They are concerned how long patient can live independently  Will have social service consult       I spent  minutes in the professional and overall care of this patient.      Nakia Bailey MD

## 2024-01-29 NOTE — PROGRESS NOTES
Kalie Ramos is a 89 y.o. female on day 0 of admission presenting with Respiratory distress.    Patient admitted to [facility] with bilateral PNE.  RNCC called patient's daughter Annette at [phone] to conduct assessment.   Patient lives alone in a condo. Uses a walker and cane. Has been increasingly unstable since hip fx in Dec 2023. Uses [employer] provided by [employer]. Annette assists with ADLs, shopping, does the driving. Patient has paid help for housekeeping. Annette calls and visits several times per day.   Patient had a hip fx Dec 2023. Was discharged to [facility] for rehab 12/19/2023, then dc home with [facility] 01/13/2024.   Annette states that patient can no longer be at home alone. She is interested in getting patient to [facility] for assisted living. RNCC explained that is quite an involved process and not handled at the level of care coordination. Advised her to call facility as they have people to guide her through the process. As patient can not return home, RNCC advised SNF placement. Annette is in agreement, RNCC emailed facility list to [email or web address]. She does not want patient to return to [facility].   PT/OT has been ordered, RNCC will follow for gregg LUNA 02/02/2024  Plan is to discharge to SNF. No arrangements have been completed at this time.    Fiona Judge RN

## 2024-01-29 NOTE — PROGRESS NOTES
Occupational Therapy    Evaluation    Patient Name: Kalie Ramos  MRN: [id number]  Today's Date: 1/29/2024  Time Calculation  Start Time: 1322  Stop Time: 1344  Time Calculation (min): 22 min    Assessment  IP OT Assessment  OT Assessment: Patient is a 89 year old female admitted with respiratory distress. Patient is presenting with a decline in strength, balance, activity tolerance, and function, resulting in an increased need for assistance with ADL/IADL tasks. Skilled therapy to address the above deficits in order to increase patient's safety and independence with daily tasks.  Prognosis: Good  Evaluation/Treatment Tolerance: Patient limited by fatigue  End of Session Communication: Bedside nurse  End of Session Patient Position: Up in chair, Alarm on (daughter present)  Plan:  Treatment Interventions: ADL retraining, Functional transfer training, UE strengthening/ROM, Endurance training, Patient/family training, Compensatory technique education  OT Frequency: 2 times per week  OT Discharge Recommendations: Moderate intensity level of continued care  Equipment Recommended upon Discharge: Wheeled walker  OT Recommended Transfer Status: Assist of 1  OT - OK to Discharge: Yes    Subjective   Current Problem:  1. Respiratory distress        2. Pneumonia of both lungs due to infectious organism, unspecified part of lung        3. Acute on chronic respiratory failure with hypoxia (CMS/HCC)        4. COPD exacerbation (CMS/HCC)        5. Chronic congestive heart failure, unspecified heart failure type (CMS/HCC)        6. Elevated troponin          General:  General  Reason for Referral: decline in ADLs  Referred By: Dr. JENN Bailey  Past Medical History Relevant to Rehab: Patient is 89 year old female admits with PNA, Acute on Chronic Respiratory Failure, COPD exacerbation, CHF, and +TpCOPD, Recent R Hip Fx sp ORIF--WBAT RLE post op  Family/Caregiver Present: Yes  Caregiver Feedback: Daughter  Prior to Session  Communication: Bedside nurse  Patient Position Received: Up in chair, Alarm on (on bedpan)  Preferred Learning Style: verbal  General Comment: Patient is cleared by nursing for therapy. Patient is seated on the bedpan in the chair upon arrival and reports she needs assistance  Precautions:  Hearing/Visual Limitations: very hard of hearing  LE Weight Bearing Status: Weight Bearing as Tolerated (per MR)  Medical Precautions: Fall precautions, Oxygen therapy device and L/min (4L O2 via NC)  Vital Signs:  SpO2: 97 % (post functional mobility)  Pain:  Pain Assessment  Pain Assessment: 0-10  Pain Score: 0 - No pain    Objective   Cognition:  Overall Cognitive Status: Within Functional Limits     Home Living:  Type of Home: [place]  Lives With: Alone (family is working on getting her into [facility]. Until then, they have providing 24/7 care as someone has been rotating staying with her)  Home Adaptive Equipment: Walker rolling or standard  Home Layout: One level  Home Access: Stairs to enter with rails  Entrance Stairs-Rails: Both  Entrance Stairs-Number of Steps: 1  Bathroom Shower/Tub: Tub/shower unit  Bathroom Equipment: Grab bars in shower, Shower chair with back  Home Living Comments: patient also has a shower stall with a small lip that she has used post ORIF   Prior Function:  Level of [place]: Needs assistance with ADLs, Needs assistance with homemaking  Receives Help From: Family  ADL Assistance: Independent (prior to surg. Since has needed assist from family for all with some periods of supervision only dressing reported)  Homemaking Assistance: Needs assistance  Ambulatory Assistance: Independent (with rollator prior to 12/16 surg)  Prior Function Comments: Feels she is still recovering post op  IADL History:  Homemaking Responsibilities: No  IADL Comments: family completes  ADL:  Eating Assistance: Stand by  Eating Deficit: Setup (seated with items within reach)  Grooming Assistance: Stand by  Grooming Deficit:  Setup (seated with items within reach, per clinical judgement)  Bathing Assistance: Moderate  Bathing Deficit: Setup, Supervision/safety, Increased time to complete , Right lower leg including foot, Left lower leg including foot, Buttocks (per clinical judgement)  UE Dressing Assistance: Stand by  UE Dressing Deficit: Setup (per clinical judgement)  LE Dressing Assistance: Maximal  LE Dressing Deficit: Thread RLE into pants, Thread LLE into pants, Don/doff R sock, Don/doff L sock, Setup, Requires assistive device for steadying, Supervision/safety, Increased time to complete (per clinical judgement)  Toileting Assistance with Device: Maximal  Toileting Deficit: Perineal hygiene, Clothing management down, Clothing management up  Activity Tolerance:  Endurance: Decreased tolerance for upright activites  Bed Mobility/Transfers: Bed Mobility  Bed Mobility:  (patient up in the chair and remains up in the chair)    Transfers  Transfer: Yes  Transfer 1  Transfer From 1: Chair with arms to  Transfer to 1: Stand  Technique 1: Sit to stand  Transfer Device 1: Walker  Transfer Level of Assistance 1: Minimum assistance  Trials/Comments 1: unsteady of feet. takes ~4 steps forward and 4 backwards 2x (x2 trials)    IADL's:   Homemaking Responsibilities: No  IADL Comments: family completes  Sensation:  Sensation Comment: patient denies numbness/tingling  Strength:  Strength Comments: B UE 4-/5 grossly  Extremities: RUE   RUE : Within Functional Limits and LUE   LUE: Within Functional Limits    Outcome Measures: [facility] Daily Activity  Putting on and taking off regular lower body clothing: A lot  Bathing (including washing, rinsing, drying): A lot  Putting on and taking off regular upper body clothing: A little  Toileting, which includes using toilet, bedpan or urinal: Total  Taking care of personal grooming such as brushing teeth: A little  Eating Meals: A little  Daily Activity - Total Score: 14    Education  Documentation  Precautions, taught by Yolanda Foy OT at 1/29/2024  1:56 PM.  Learner: Patient  Readiness: Acceptance  Method: Explanation, Demonstration  Response: Needs Reinforcement    ADL Training, taught by Yolanda Foy OT at 1/29/2024  1:56 PM.  Learner: Patient  Readiness: Acceptance  Method: Explanation, Demonstration  Response: Needs Reinforcement    Education Comments  No comments found.      Goals:   Encounter Problems       Encounter Problems (Active)       OT Goals       ADLs (Progressing)       Start:  01/29/24    Expected End:  02/29/24       Patient will complete ADL tasks with Mod I, using AE as needed, in order to increase patient's safety and independence with self-care tasks.         Functional Transfers (Progressing)       Start:  01/29/24    Expected End:  02/29/24       Patient will complete functional transfers with Mod I in order to increase patient's safety and independence with daily tasks.         B UE Strengthening (Progressing)       Start:  01/29/24    Expected End:  02/29/24       Patient will increase B UE strength to 4+/5 for functional transfers.         Functional Mobility (Progressing)       Start:  01/29/24    Expected End:  02/29/24       Patient will demonstrate the ability to complete item retrieval and functional mobility with Mod I in order to increase patient's safety and independence with daily tasks.

## 2024-01-29 NOTE — ED PROVIDER NOTES
HPI   Chief Complaint   Patient presents with    Respiratory Distress       4-year-old female with a history of hypertension, CHF, COPD on supplemental oxygen, recent right hip fracture, anemia, GERD and dyslipidemia is brought to the emergency department by EMS from her home with a chief complaint of shortness of breath.  The patient had been healing as expected and had no events over the last few days.  Today family came to check on her and she was suddenly short of breath and could not speak in full sentences.  She denies any recent weight gain but notes that her legs are always swollen.  She has been compliant with her medications and currently is not on a blood thinner as she is a falls risk.  She denies any fevers or productive cough.  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    Body mass index (BMI) 22.0-22.9, adult     BMI 22.0-22.9, adult    Cervicalgia 05/08/2015    Neck pain    CHF (congestive heart failure) (CMS/formerly [facility])     COPD (chronic obstructive pulmonary disease) (CMS/formerly [facility])     Depression     GERD (gastroesophageal reflux disease)     High cholesterol     Hip fracture (CMS/formerly [facility])     Hypertension     Insomnia     Leg swelling     Malignant neoplasm of unspecified site of unspecified female breast (CMS/formerly [facility]) 07/19/2013    Adenocarcinoma of breast    Neuropathy     Osteoarthritis     Other conditions influencing health status 10/21/2013    Urinary Tract Infection    Personal history of other infectious and parasitic diseases 03/17/2015    History of candidiasis of mouth    Personal history of other specified conditions 03/17/2015    History of abdominal pain    Personal history of pneumonia (recurrent) 09/10/2015     History of pneumonia    Personal history of transient ischemic attack (TIA), and cerebral infarction without residual deficits 09/04/2014    History of stroke    Pulmonary embolism (CMS/HCC)     PVD (peripheral vascular disease) (CMS/HCC)     Syncope     Weakness      Past Surgical History:   Procedure Laterality Date    BREAST BIOPSY  09/10/2015    Biopsy Breast Open    BREAST LUMPECTOMY  07/19/2013    Breast Surgery Lumpectomy    CARPAL TUNNEL RELEASE  09/04/2014    Neuroplasty Decompression Median Nerve At Carpal Tunnel    CATARACT EXTRACTION  11/02/2015    Cataract Surgery    HAND TENDON SURGERY  11/02/2015    Hand Incision Tendon Sheath Of A Finger    HYSTERECTOMY  07/19/2013    Hysterectomy    MR HEAD ANGIO WO IV CONTRAST  12/17/2020    MR HEAD ANGIO WO IV CONTRAST [facility]    MR HEAD ANGIO WO IV CONTRAST  5/13/2021    MR HEAD ANGIO WO IV CONTRAST [facility]    MR HEAD ANGIO WO IV CONTRAST  5/24/2022    MR HEAD ANGIO WO IV CONTRAST [facility]    MR NECK ANGIO WO IV CONTRAST  5/24/2022    MR NECK ANGIO WO IV CONTRAST [facility]    OTHER SURGICAL HISTORY  11/02/2015    Wrist Carpectomy    ROTATOR CUFF REPAIR  09/10/2015    Rotator Cuff Repair    SENTINEL LYMPH NODE BIOPSY  09/10/2015    [place] Lymph Node Biopsy    SHOULDER SURGERY  07/19/2013    Shoulder Surgery     Family History   Problem Relation Name Age of Onset    Alzheimer's disease Father      Breast cancer Sister      Pancreatic cancer Sister      Arthritis Other       Social History     Tobacco Use    Smoking status: Never    Smokeless tobacco: Never   Substance Use Topics    Alcohol use: Never    Drug use: Never       Physical Exam   ED Triage Vitals   Temperature Heart Rate Respirations BP   01/29/24 0308 01/29/24 0308 01/29/24 0308 01/29/24 0308   36.1 °C (97 °F) 97 (!) 31 (!) 192/93      Pulse Ox Temp Source Heart Rate Source Patient Position   01/29/24 0301 01/29/24 0308 01/29/24 0308 --   97 %  Temporal Monitor       BP Location FiO2 (%)     01/29/24 0308 01/29/24 0318     Right arm 50 %       Physical Exam  Vitals and nursing note reviewed.   Constitutional:       General: She is in acute distress.      Appearance: She is well-developed. She is ill-appearing. She is not diaphoretic.      Comments: Frail   HENT:      Head: Normocephalic and atraumatic.      Nose: Nose normal.      Mouth/Throat:      Mouth: Mucous membranes are dry.   Eyes:      General: No scleral icterus.        Right eye: No discharge.         Left eye: No discharge.      Extraocular Movements: Extraocular movements intact.      Conjunctiva/sclera: Conjunctivae normal.   Cardiovascular:      Rate and Rhythm: Normal rate and regular rhythm.      Heart sounds: No murmur heard.  Pulmonary:      Effort: Respiratory distress present.      Breath sounds: No stridor. No wheezing or rales.   Abdominal:      Palpations: Abdomen is soft.      Tenderness: There is no abdominal tenderness.   Musculoskeletal:         General: Swelling present.      Cervical back: Neck supple.   Skin:     General: Skin is warm and dry.      Capillary Refill: Capillary refill takes more than 3 seconds.   Neurological:      General: No focal deficit present.      Mental Status: She is alert.   Psychiatric:         Mood and Affect: Mood normal.         ED Course & MDM   ED Course as of 01/29/24 0632   Mon Jan 29, 2024   0605 CT angio chest for pulmonary embolism  IMPRESSION:  Large bilateral pleural effusions with multifocal areas of  consolidation in both lungs, most pronounced in the left lower lobe  and dependent portions of the left upper lobe and right lower lobe,  indicating probable severe pneumonia/aspiration with patchy airspace  disease in the right upper lobe.  Enlarged, heterogeneous thyroid with a 6.1 cm nodule in the thyroid  isthmus with poorly defined margins, nonemergent thyroid ultrasound is  recommended for further characterization.  Chronic changes  suggesting COPD.  Pronounced thoracic kyphosis with multilevel age-related osteoporotic  changes in the spine.   [EG]   0607 ECG 12 lead  See interpretation.  Similar to previous ECG performed on December 15, 2023 [EG]   0609 Troponin T Series, High Sensitivity (0, 2HR, 6HR)(!!)  Elevated but improved from baseline [EG]   0627 Blood Gas Arterial Full Panel(!)  CO2 retention [EG]   0628 CBC with Differential(!)  Mild leukocytosis, otherwise no anemia or thrombocytopenia [EG]   0629 Comprehensive Metabolic Panel(!)  Noncontributory for a nonfasting patient.  Kidney function at baseline [EG]   0629 Troponin T Series, High Sensitivity (0, 2HR, 6HR)(!!) [EG]   0629 NT Pro-BNP(!)  Elevated but improved from baseline [EG]      ED Course User Index  [EG] Yulissa Washington MD         Diagnoses as of 01/29/24 0632   Respiratory distress   Pneumonia of both lungs due to infectious organism, unspecified part of lung   Acute on chronic respiratory failure with hypoxia (CMS/HCC)   COPD exacerbation (CMS/HCC)   Chronic congestive heart failure, unspecified heart failure type (CMS/HCC)   Elevated troponin       Medical Decision Making    HPI:  As Above  PMHx/PSHx/Meds/Allergies/SH/FH as per nursing documentation and reviewed.  Review of systems: Total of 10 systems reviewed and otherwise negative except as noted elsewhere    DDX: As described in MDM    If performed, radiology listed above interpreted by me and confirmed by the Radiologist.  Medications administered during this visit (name and route): see MAR  Social determinants of health considered for this visit: lives at home  If performed, EKG interpreted by me and detailed above    MDM Summary/considerations:  89-year-old female presenting in acute respiratory distress and requiring nonrebreather to maintain saturations above 92%.  After treatment with 2 DuoNebs and Solu-Medrol, the patient's respiratory distress improved, but continues to need supplemental oxygen above  her normal administration at home.  She was weaned to a 35% Ventimask.  The patient has a new oxygen demand and her symptoms came on suddenly and she had a recent hip replacement and is not on blood thinners with increased risk for possible pulmonary embolism.  Her D-dimer was significantly elevated and her evaluation included a CT scan with contrast of her chest that shows no significant pulmonary embolism, but does further characterize the infiltrates that appear to be consistent with pneumonia.  She is being given azithromycin and ceftriaxone IV.  Cultures are taken and pending.  Patient had a minimal lactic acidosis of 2.1.  She was given IV fluids, but a 30 cc/kg bolus was not fully given as she has history of congestive heart failure and does have peripheral edema with concern for possible volume overload.  The case was discussed with the patient's primary care provider who agrees to manage the patient on the stepdown unit in the hospital.    31 minutes of critical care time were spent on re-examinations, close monitoring, data analysis and communication with patient/family/other providers and do not include time spent on procedures. Not concurrent with other providers    The patient was seen and triaged by our nursing/medic staff, their vitals were taken and the staff notes were reviewed.  If the patient arrived by an EMS squad or an outside agency, we discussed the case with transporting EMS medic, police, or other historians. My initial assessment was attention to their airway, breathing, and circulatory status.  We addressed any immediate or life threatening findings and completed a medical history and a physical exam if the patient or those legally responsible were in agreement with this.   Prior to the patient being discharged, I or my PA/NP or the nursing staff discussed the differential, results and discharge plan with the patient and/or family/friend/caregiver if present.  I emphasized the importance  of follow-up in 2-3 days unless otherwise specified.  I explained reasons for the patient to return to the Emergency Department. Additional verbal discharge instructions were also given and discussed with the patient to supplement those generated by the EMR. We also discussed medications that were prescribed (if any) including common side effects and interactions. The patient was advised to abstain from driving, operating heavy machinery or making significant decisions while taking medications such as antihistamines, benzodiazepines, opiates and muscle relaxers. All questions were addressed.  They understand return precautions and discharge instructions. The patient and/or family/friend/caregiver expressed understanding.  **Disclaimer:  This note was dictated by speech recognition technology.  Minor errors in transcription may be present.  Please contact for clarification or corrections.    In the case the patient eloped or refused treatment/admission, we offered to the best of our ability to provide care to the patient at the time of this encounter.    Amount and/or Complexity of Data Reviewed  External Data Reviewed: labs, radiology, ECG and notes.  Labs: ordered. Decision-making details documented in ED Course.  Radiology: ordered and independent interpretation performed. Decision-making details documented in ED Course.  ECG/medicine tests: ordered and independent interpretation performed. Decision-making details documented in ED Course.        Procedure  Procedures     Yulissa Washington MD  01/29/24 1182

## 2024-01-30 ENCOUNTER — APPOINTMENT (OUTPATIENT)
Dept: RADIOLOGY | Facility: HOSPITAL | Age: 89
DRG: 291 | End: 2024-01-30
Payer: MEDICARE

## 2024-01-30 LAB
ALBUMIN SERPL-MCNC: 3.2 G/DL (ref 3.5–5)
ALP BLD-CCNC: 114 U/L (ref 35–125)
ALT SERPL-CCNC: 9 U/L (ref 5–40)
ANION GAP SERPL CALC-SCNC: 10 MMOL/L
AST SERPL-CCNC: 13 U/L (ref 5–40)
BACTERIA UR CULT: NO GROWTH
BASOPHILS # BLD AUTO: 0.01 X10*3/UL (ref 0–0.1)
BASOPHILS NFR BLD AUTO: 0.1 %
BASOPHILS NFR FLD MANUAL: 0 %
BILIRUB SERPL-MCNC: 0.3 MG/DL (ref 0.1–1.2)
BLASTS NFR FLD MANUAL: 0 %
BUN SERPL-MCNC: 16 MG/DL (ref 8–25)
CALCIUM SERPL-MCNC: 8.3 MG/DL (ref 8.5–10.4)
CHLORIDE SERPL-SCNC: 102 MMOL/L (ref 97–107)
CLARITY FLD: CLEAR
CO2 SERPL-SCNC: 26 MMOL/L (ref 24–31)
COLOR FLD: YELLOW
CREAT SERPL-MCNC: 0.8 MG/DL (ref 0.4–1.6)
EGFRCR SERPLBLD CKD-EPI 2021: 68 ML/MIN/1.73M*2
EOSINOPHIL # BLD AUTO: 0 X10*3/UL (ref 0–0.4)
EOSINOPHIL NFR BLD AUTO: 0 %
EOSINOPHIL NFR FLD MANUAL: 0 %
ERYTHROCYTE [DISTWIDTH] IN BLOOD BY AUTOMATED COUNT: 15.6 % (ref 11.5–14.5)
GLUCOSE SERPL-MCNC: 103 MG/DL (ref 65–99)
HCT VFR BLD AUTO: 36 % (ref 36–46)
HGB BLD-MCNC: 11.2 G/DL (ref 12–16)
IMM GRANULOCYTES # BLD AUTO: 0.07 X10*3/UL (ref 0–0.5)
IMM GRANULOCYTES NFR BLD AUTO: 0.5 % (ref 0–0.9)
IMMATURE GRANULOCYTES IN FLUID: 0 %
LYMPHOCYTES # BLD AUTO: 0.75 X10*3/UL (ref 0.8–3)
LYMPHOCYTES NFR BLD AUTO: 5.8 %
LYMPHOCYTES NFR FLD MANUAL: 25 %
MCH RBC QN AUTO: 28.7 PG (ref 26–34)
MCHC RBC AUTO-ENTMCNC: 31.1 G/DL (ref 32–36)
MCV RBC AUTO: 92 FL (ref 80–100)
MONOCYTES # BLD AUTO: 0.6 X10*3/UL (ref 0.05–0.8)
MONOCYTES NFR BLD AUTO: 4.7 %
MONOS+MACROS NFR FLD MANUAL: 28 %
NEUTROPHILS # BLD AUTO: 11.43 X10*3/UL (ref 1.6–5.5)
NEUTROPHILS NFR BLD AUTO: 88.9 %
NEUTROPHILS NFR FLD MANUAL: 47 %
NRBC BLD-RTO: 0 /100 WBCS (ref 0–0)
OTHER CELLS NFR FLD MANUAL: 0 %
PH FLD: 7.6 [PH]
PLASMA CELLS NFR FLD MANUAL: 0 %
PLATELET # BLD AUTO: 324 X10*3/UL (ref 150–450)
POTASSIUM SERPL-SCNC: 4.4 MMOL/L (ref 3.4–5.1)
PROT SERPL-MCNC: 6.6 G/DL (ref 5.9–7.9)
RBC # BLD AUTO: 3.9 X10*6/UL (ref 4–5.2)
RBC # FLD AUTO: 1000 /UL
SODIUM SERPL-SCNC: 138 MMOL/L (ref 133–145)
TOTAL CELLS COUNTED FLD: 100
WBC # BLD AUTO: 12.9 X10*3/UL (ref 4.4–11.3)
WBC # FLD AUTO: 210 /UL

## 2024-01-30 PROCEDURE — 80053 COMPREHEN METABOLIC PANEL: CPT | Performed by: INTERNAL MEDICINE

## 2024-01-30 PROCEDURE — 85025 COMPLETE CBC W/AUTO DIFF WBC: CPT | Performed by: INTERNAL MEDICINE

## 2024-01-30 PROCEDURE — 2500000001 HC RX 250 WO HCPCS SELF ADMINISTERED DRUGS (ALT 637 FOR MEDICARE OP): Performed by: INTERNAL MEDICINE

## 2024-01-30 PROCEDURE — 2060000001 HC INTERMEDIATE ICU ROOM DAILY

## 2024-01-30 PROCEDURE — 88112 CYTOPATH CELL ENHANCE TECH: CPT | Mod: TC | Performed by: INTERNAL MEDICINE

## 2024-01-30 PROCEDURE — 88112 CYTOPATH CELL ENHANCE TECH: CPT | Performed by: PATHOLOGY

## 2024-01-30 PROCEDURE — 87102 FUNGUS ISOLATION CULTURE: CPT | Mod: TRILAB | Performed by: INTERNAL MEDICINE

## 2024-01-30 PROCEDURE — 2500000004 HC RX 250 GENERAL PHARMACY W/ HCPCS (ALT 636 FOR OP/ED): Mod: MUE | Performed by: INTERNAL MEDICINE

## 2024-01-30 PROCEDURE — 36415 COLL VENOUS BLD VENIPUNCTURE: CPT | Performed by: INTERNAL MEDICINE

## 2024-01-30 PROCEDURE — 88305 TISSUE EXAM BY PATHOLOGIST: CPT | Performed by: PATHOLOGY

## 2024-01-30 PROCEDURE — 84157 ASSAY OF PROTEIN OTHER: CPT | Mod: TRILAB | Performed by: INTERNAL MEDICINE

## 2024-01-30 PROCEDURE — 97116 GAIT TRAINING THERAPY: CPT | Mod: GP

## 2024-01-30 PROCEDURE — 89050 BODY FLUID CELL COUNT: CPT | Performed by: INTERNAL MEDICINE

## 2024-01-30 PROCEDURE — 83615 LACTATE (LD) (LDH) ENZYME: CPT | Mod: TRILAB | Performed by: INTERNAL MEDICINE

## 2024-01-30 PROCEDURE — 9420000001 HC RT PATIENT EDUCATION 5 MIN

## 2024-01-30 PROCEDURE — 2500000005 HC RX 250 GENERAL PHARMACY W/O HCPCS: Performed by: INTERNAL MEDICINE

## 2024-01-30 PROCEDURE — 2500000002 HC RX 250 W HCPCS SELF ADMINISTERED DRUGS (ALT 637 FOR MEDICARE OP, ALT 636 FOR OP/ED): Performed by: INTERNAL MEDICINE

## 2024-01-30 PROCEDURE — 82945 GLUCOSE OTHER FLUID: CPT | Mod: TRILAB | Performed by: INTERNAL MEDICINE

## 2024-01-30 PROCEDURE — 83986 ASSAY PH BODY FLUID NOS: CPT | Performed by: INTERNAL MEDICINE

## 2024-01-30 PROCEDURE — 0W9B3ZX DRAINAGE OF LEFT PLEURAL CAVITY, PERCUTANEOUS APPROACH, DIAGNOSTIC: ICD-10-PCS | Performed by: RADIOLOGY

## 2024-01-30 PROCEDURE — 2500000004 HC RX 250 GENERAL PHARMACY W/ HCPCS (ALT 636 FOR OP/ED)

## 2024-01-30 PROCEDURE — 99232 SBSQ HOSP IP/OBS MODERATE 35: CPT | Performed by: INTERNAL MEDICINE

## 2024-01-30 PROCEDURE — 2500000005 HC RX 250 GENERAL PHARMACY W/O HCPCS: Performed by: RADIOLOGY

## 2024-01-30 PROCEDURE — 97110 THERAPEUTIC EXERCISES: CPT | Mod: GP

## 2024-01-30 PROCEDURE — 32555 ASPIRATE PLEURA W/ IMAGING: CPT

## 2024-01-30 PROCEDURE — 87116 MYCOBACTERIA CULTURE: CPT | Mod: TRILAB | Performed by: INTERNAL MEDICINE

## 2024-01-30 PROCEDURE — 94640 AIRWAY INHALATION TREATMENT: CPT | Mod: MUE

## 2024-01-30 PROCEDURE — 87070 CULTURE OTHR SPECIMN AEROBIC: CPT | Mod: TRILAB | Performed by: INTERNAL MEDICINE

## 2024-01-30 PROCEDURE — 89051 BODY FLUID CELL COUNT: CPT | Performed by: INTERNAL MEDICINE

## 2024-01-30 PROCEDURE — 2720000007 HC OR 272 NO HCPCS

## 2024-01-30 RX ORDER — FUROSEMIDE 40 MG/1
40 TABLET ORAL
Status: DISCONTINUED | OUTPATIENT
Start: 2024-01-30 | End: 2024-02-03 | Stop reason: HOSPADM

## 2024-01-30 RX ORDER — LIDOCAINE HYDROCHLORIDE 10 MG/ML
INJECTION, SOLUTION EPIDURAL; INFILTRATION; INTRACAUDAL; PERINEURAL
Status: COMPLETED | OUTPATIENT
Start: 2024-01-30 | End: 2024-01-30

## 2024-01-30 RX ADMIN — PIPERACILLIN SODIUM AND TAZOBACTAM SODIUM 2.25 G: 2; .25 INJECTION, SOLUTION INTRAVENOUS at 10:35

## 2024-01-30 RX ADMIN — Medication 3 MG: at 21:01

## 2024-01-30 RX ADMIN — Medication 3 L/MIN: at 08:00

## 2024-01-30 RX ADMIN — FUROSEMIDE 40 MG: 40 TABLET ORAL at 10:34

## 2024-01-30 RX ADMIN — PIPERACILLIN SODIUM AND TAZOBACTAM SODIUM 2.25 G: 2; .25 INJECTION, SOLUTION INTRAVENOUS at 21:01

## 2024-01-30 RX ADMIN — ATORVASTATIN CALCIUM 10 MG: 10 TABLET, FILM COATED ORAL at 21:01

## 2024-01-30 RX ADMIN — ASPIRIN 325 MG: 325 TABLET, COATED ORAL at 21:01

## 2024-01-30 RX ADMIN — PANTOPRAZOLE SODIUM 40 MG: 40 TABLET, DELAYED RELEASE ORAL at 10:34

## 2024-01-30 RX ADMIN — CALCITONIN SALMON 1 SPRAY: 200 SPRAY, METERED NASAL at 11:46

## 2024-01-30 RX ADMIN — PIPERACILLIN SODIUM AND TAZOBACTAM SODIUM 2.25 G: 2; .25 INJECTION, SOLUTION INTRAVENOUS at 15:58

## 2024-01-30 RX ADMIN — Medication 400 MG: at 10:35

## 2024-01-30 RX ADMIN — Medication 3 L/MIN: at 15:00

## 2024-01-30 RX ADMIN — METOPROLOL SUCCINATE 25 MG: 25 TABLET, EXTENDED RELEASE ORAL at 10:35

## 2024-01-30 RX ADMIN — LIDOCAINE HYDROCHLORIDE 5 ML: 10 INJECTION, SOLUTION EPIDURAL; INFILTRATION; INTRACAUDAL; PERINEURAL at 16:32

## 2024-01-30 RX ADMIN — FUROSEMIDE 40 MG: 40 TABLET ORAL at 17:00

## 2024-01-30 RX ADMIN — IPRATROPIUM BROMIDE AND ALBUTEROL SULFATE 3 ML: 2.5; .5 SOLUTION RESPIRATORY (INHALATION) at 19:04

## 2024-01-30 RX ADMIN — LOSARTAN POTASSIUM 25 MG: 25 TABLET, FILM COATED ORAL at 10:35

## 2024-01-30 RX ADMIN — HEPARIN SODIUM 5000 UNITS: 5000 INJECTION, SOLUTION INTRAVENOUS; SUBCUTANEOUS at 23:20

## 2024-01-30 RX ADMIN — SERTRALINE HYDROCHLORIDE 50 MG: 50 TABLET ORAL at 10:34

## 2024-01-30 RX ADMIN — HEPARIN SODIUM 5000 UNITS: 5000 INJECTION, SOLUTION INTRAVENOUS; SUBCUTANEOUS at 10:35

## 2024-01-30 RX ADMIN — IPRATROPIUM BROMIDE AND ALBUTEROL SULFATE 3 ML: 2.5; .5 SOLUTION RESPIRATORY (INHALATION) at 14:51

## 2024-01-30 RX ADMIN — FAMOTIDINE 20 MG: 20 TABLET, FILM COATED ORAL at 10:34

## 2024-01-30 RX ADMIN — ASPIRIN 325 MG: 325 TABLET, COATED ORAL at 10:34

## 2024-01-30 RX ADMIN — IPRATROPIUM BROMIDE AND ALBUTEROL SULFATE 3 ML: 2.5; .5 SOLUTION RESPIRATORY (INHALATION) at 07:52

## 2024-01-30 RX ADMIN — FAMOTIDINE 20 MG: 20 TABLET, FILM COATED ORAL at 21:00

## 2024-01-30 RX ADMIN — LIDOCAINE 1 PATCH: 4 PATCH TOPICAL at 10:35

## 2024-01-30 RX ADMIN — ROPINIROLE HYDROCHLORIDE 5 MG: 2 TABLET, FILM COATED ORAL at 21:01

## 2024-01-30 RX ADMIN — HEPARIN SODIUM 5000 UNITS: 5000 INJECTION, SOLUTION INTRAVENOUS; SUBCUTANEOUS at 15:59

## 2024-01-30 RX ADMIN — PIPERACILLIN SODIUM AND TAZOBACTAM SODIUM 2.25 G: 2; .25 INJECTION, SOLUTION INTRAVENOUS at 02:08

## 2024-01-30 ASSESSMENT — COGNITIVE AND FUNCTIONAL STATUS - GENERAL
MOVING FROM LYING ON BACK TO SITTING ON SIDE OF FLAT BED WITH BEDRAILS: A LITTLE
PERSONAL GROOMING: A LITTLE
CLIMB 3 TO 5 STEPS WITH RAILING: TOTAL
HELP NEEDED FOR BATHING: A LOT
DAILY ACTIVITIY SCORE: 14
TURNING FROM BACK TO SIDE WHILE IN FLAT BAD: A LITTLE
MOVING FROM LYING ON BACK TO SITTING ON SIDE OF FLAT BED WITH BEDRAILS: A LITTLE
TURNING FROM BACK TO SIDE WHILE IN FLAT BAD: A LITTLE
MOVING FROM LYING ON BACK TO SITTING ON SIDE OF FLAT BED WITH BEDRAILS: A LITTLE
EATING MEALS: A LITTLE
TURNING FROM BACK TO SIDE WHILE IN FLAT BAD: A LITTLE
HELP NEEDED FOR BATHING: A LOT
MOVING TO AND FROM BED TO CHAIR: A LITTLE
CLIMB 3 TO 5 STEPS WITH RAILING: TOTAL
DRESSING REGULAR LOWER BODY CLOTHING: A LOT
DRESSING REGULAR UPPER BODY CLOTHING: A LITTLE
DRESSING REGULAR LOWER BODY CLOTHING: A LOT
TOILETING: TOTAL
WALKING IN HOSPITAL ROOM: A LOT
STANDING UP FROM CHAIR USING ARMS: A LITTLE
MOVING TO AND FROM BED TO CHAIR: A LITTLE
CLIMB 3 TO 5 STEPS WITH RAILING: A LITTLE
TOILETING: TOTAL
WALKING IN HOSPITAL ROOM: A LOT
WALKING IN HOSPITAL ROOM: A LITTLE
MOBILITY SCORE: 15
MOVING TO AND FROM BED TO CHAIR: A LITTLE
EATING MEALS: A LITTLE
PERSONAL GROOMING: A LITTLE
DRESSING REGULAR UPPER BODY CLOTHING: A LITTLE
MOBILITY SCORE: 15
STANDING UP FROM CHAIR USING ARMS: A LITTLE
DAILY ACTIVITIY SCORE: 14

## 2024-01-30 ASSESSMENT — PAIN - FUNCTIONAL ASSESSMENT
PAIN_FUNCTIONAL_ASSESSMENT: 0-10
PAIN_FUNCTIONAL_ASSESSMENT: 0-10

## 2024-01-30 ASSESSMENT — PAIN SCALES - GENERAL
PAINLEVEL_OUTOF10: 0 - NO PAIN

## 2024-01-30 NOTE — PROGRESS NOTES
Subjective Data:  Patient lying in bed eating breakfast not short of breath on nasal cannula    Overnight Events:    No significant overnight events.     Objective Data:  Last Recorded Vitals:  Vitals:    01/29/24 2333 01/30/24 0338 01/30/24 0729 01/30/24 0755   BP: 125/65 130/70 149/70    BP Location: Right arm Right arm Right arm    Patient Position: Lying Lying Lying    Pulse: 73 72 67    Resp: 18 19 19    Temp: 36.6 °C (97.9 °F) 36.6 °C (97.9 °F) 36.5 °C (97.7 °F)    TempSrc: Temporal Temporal Temporal    SpO2: 98% 93% 100% 99%   Weight:  56.3 kg (124 lb 1.9 oz)     Height:           Last Labs:  CBC - 1/30/2024:  5:34 AM  12.9 11.2 324    36.0      CMP - 1/30/2024:  5:34 AM  8.3 6.6 13 --- 0.3   _ 3.2 9 114      PTT - No results in last year.  _   _ _     HGBA1C   Date/Time Value Ref Range Status   05/23/2022 10:43 PM 5.7 4.0 - 6.0 % Final     Comment:     Hemoglobin A1C levels are related to mean blood glucose during the   preceding 2-3 months. The relationship table below may be used as a   general guide. Each 1% increase in HGB A1C is a reflection of an   increase in mean glucose of approximately 30 mg/dl.   Reference: Diabetes Care, volume 29, supplement 1 Jan. 2006                        HGB A1C ................. Approx. Mean Glucose   _______________________________________________   6%   ...............................  120 mg/dl   7%   ...............................  150 mg/dl   8%   ...............................  180 mg/dl   9%   ...............................  210 mg/dl   10%  ...............................  240 mg/dl  Performed at [address] [address]     12/17/2020 03:16 PM 5.7 4.0 - 6.0 % Final     Comment:     Hemoglobin A1C levels are related to mean blood glucose during the   preceding 2-3 months. The relationship table below may be used as a   general guide. Each 1% increase in HGB A1C is a reflection of an   increase in mean glucose of approximately 30 mg/dl.    "Reference: Diabetes Care, volume 29, supplement 1 Jan. 2006                        HGB A1C ................. Approx. Mean Glucose   _______________________________________________   6%   ...............................  120 mg/dl   7%   ...............................  150 mg/dl   8%   ...............................  180 mg/dl   9%   ...............................  210 mg/dl   10%  ...............................  240 mg/dl  Performed at [address] [address]       LDLCALC   Date/Time Value Ref Range Status   01/27/2023 06:04 AM 66 65 - 130 MG/DL Final   11/20/2022 02:47 AM 54 65 - 130 MG/DL Final   07/06/2022 01:13 AM 54 65 - 130 MG/DL Final     VLDL   Date/Time Value Ref Range Status   08/03/2018 09:41 AM 25 0 - 40 mg/dL Final   01/18/2018 02:30 PM 47 0 - 40 mg/dL Final   10/17/2017 11:18 AM 50 0 - 40 mg/dL Final      Last I/O:  No intake/output data recorded.    Past Cardiology Tests (Last 3 Years):  EKG:  ECG 12 lead 01/29/2024      ECG 12 lead       Electrocardiogram, 12-lead PRN ACS symptoms       ECG 12 lead       ECG 12 lead 11/17/2023      ECG 12 Lead       ECG 12 Lead       ECG 12 lead 10/24/2023    Echo:  No results found for this or any previous visit from the past 1095 days.    Ejection Fractions:  No results found for: \"EF\"  Cath:  No results found for this or any previous visit from the past 1095 days.    Stress Test:  No results found for this or any previous visit from the past 1095 days.    Cardiac Imaging:  No results found for this or any previous visit from the past 1095 days.      Inpatient Medications:  Scheduled medications   Medication Dose Route Frequency    aspirin  325 mg oral BID    atorvastatin  10 mg oral Nightly    calcitonin (salmon)  1 spray One Nostril Daily    famotidine  20 mg oral BID    ferrous gluconate  38 mg of iron oral Once per day on Mon Wed Fri    furosemide  40 mg oral Daily    heparin (porcine)  5,000 Units subcutaneous q8h    " ipratropium-albuteroL  3 mL nebulization TID    lidocaine  1 patch transdermal Daily    losartan  25 mg oral Daily    magnesium oxide  400 mg oral Once per day on Tue Thu Sat    melatonin  3 mg oral Nightly    metoprolol succinate XL  25 mg oral Daily    oxygen  2 L/min inhalation q8h    pantoprazole  40 mg oral Daily    piperacillin-tazobactam  2.25 g intravenous q6h    rOPINIRole  5 mg oral Nightly    sertraline  50 mg oral Daily     PRN medications   Medication    acetaminophen    Or    acetaminophen    acetaminophen    albuterol    benzocaine-menthol    dextromethorphan-guaifenesin    guaiFENesin    HYDROcodone-acetaminophen    nitroglycerin    ondansetron    sodium chloride    traZODone     Continuous Medications   Medication Dose Last Rate       Physical Exam:  The patient is a upper elderly white female awake alert conversant not overtly dyspneic on nasal cannula visiting with daughter.  JVP not elevated carotid and pulses 2+  Moderately severe thoracic kyphosis shallow air movement breath sounds are absent one half of the posterior lung bases bilaterally  Cardiac rhythm irregular S1-S2 obscured grade 3/6 to 4/6 systolic ejection murmur  Abdomen is benign no paraspinal megaly no focal tenderness  No peripheral edema pedal pulses are present        Assessment/Plan     1/29: This patient is a 89-year-old white female with extensive issues that include coronary artery disease, severe aortic valvular stenosis, moderate tricuspid and mitral valve regurgitation, severe pulmonary hypertension, COPD with oxygen dependence, hypertension, left bundle branch block conduction delay, lower extremity edema due to venous insufficiency, chronic DVT left lower extremity femoral vein, laparoscopic paraesophageal hiatal hernia repair, ongoing aspiration pneumonias, nontoxic multinodular goiter.  The patient's last echocardiogram on 7/25/2023 again demonstrated a LV ejection fraction 35-40% severe aortic valve stenosis peak  systolic gradient 56 mmHg, mild mitral moderate tricuspid valve regurgitation and severe pulmonary hypertension estimated PA systolic pressure 67 mmHg.  The patient was thought not to be a candidate for transcatheter aortic valve replacement.  She does wear continuous oxygen at 3 L/min at home.  She surprisingly still lives independently in her own condominium.  She has been having increased shortness of breath.  Multiple potential etiologies including her chronic lung disease aspiration pneumonias interstitial fibrosis possible CHF.  The patient was recently admitted for several days on 10/23/2023 with increased shortness of breath and ultimately was released on Lasix 40 mg twice daily which she has reduced to daily.  Patient admitted again on 11/12/2023 after having fallen in the bathroom and not being able to get up due to generalized weakness.  She lives independently in her own condominium and has been very resistant to review our recommendation for assisted living.  At that time it was decided to continue the patient on her usual therapy which  at that point included Lasix 40 mg once daily along with her low-dose aspirin plus atorvastatin 40 mg daily and metoprolol succinate 25 mg twice daily.    Patient was admitted after a mechanical fall with a right femoral neck fracture and subsequently underwent right total hip replacement on 12/16/2023.  The patient was discharged to rehab facility on usual preadmission low-dose losartan 25 mg daily Toprol-XL 25 mg daily Lasix 40 mg daily with no anticoagulation due to where multiple falls.  Her atrial fibrillation was rate controlled with the low-dose Toprol-XL.  She was readmitted with progressive increased shortness of breath with CTA of the chest demonstrating large bilateral pleural effusions and possible multifocal pneumonia.  The patient has been started on empiric antibiotic therapy with IV ceftriaxone and azithromycin.  She has been thought to be a high risk  candidate for aspiration pneumonia in the past.  The pleural effusions presumably are related to her progressively more severe aortic valvular stenosis along with severe pulmonary hypertension.  Pulmonology will be consulted with respect to treatment of her possible pneumonia and to determine whether her respiratory status would benefit from thoracentesis by interventional radiology.     1/30: The patient is lying in bed comfortable eating breakfast which she enjoyed.  No respiratory distress on O2 nasal cannula.  She has been seen by pulmonology and interventional radiology has been contacted for thoracentesis.  Patient's acute on chronic respiratory failure thought to be multifactorial related to volume overload due to worsening valvular heart disease and pulmonary hypertension possible noncompliance with diuretic therapy and?  Pneumonia.  The patient's comprehensive metabolic panel relatively unremarkable creatinine is 0.8.  CBC notable for only mild leukocytosis 12,900.  The patient's p.o. Lasix will be increased for now to 40 mg twice daily.  The patient is now agreeable along with other family members to be placed into assisted living at the time of hospital discharge    Peripheral IV 01/29/24 22 G Right Hand (Active)   Site Assessment Clean;Dry;Intact 01/30/24 0800   Dressing Status Clean;Dry;Occlusive 01/30/24 0800   Number of days: 1       Code Status:  DNR and No Intubation    I spent 20 minutes in the professional and overall care of this patient.        Jim Mckinney MD

## 2024-01-30 NOTE — PROGRESS NOTES
Patient admitted from [facility], but does not want to return.  She is from home alone in a condo at baseline.  SNF choices received from daughter for [facility], [facility] and [facility].  ([facility] already denied - [facility].)  Referrals sent to [facility] for processing.  Awaiting updates.  Patient will need precert prior to discharge (could take 48-72 hours).   RN [facility] following.     Jaz Garcia RN

## 2024-01-30 NOTE — PROGRESS NOTES
Physical Therapy    Physical Therapy Treatment    Patient Name: Kalie Ramos  MRN: [id number]  Today's Date: 1/30/2024  Time Calculation  Start Time: 1503  Stop Time: 1531  Time Calculation (min): 28 min       Assessment/Plan   PT Assessment  Evaluation/Treatment Tolerance: Patient tolerated treatment well  Medical Staff Made Aware: Yes  End of Session Communication: Bedside nurse  End of Session Patient Position: Bed, 3 rail up, Alarm on  PT Plan  Inpatient/Swing Bed or Outpatient: Inpatient  PT Plan  Treatment/Interventions: Bed mobility, Transfer training, Gait training, Stair training, Balance training, Strengthening, Endurance training, Range of motion, Therapeutic exercise, Therapeutic activity, Home exercise program  PT Plan: Skilled PT  PT Frequency: 4 times per week  PT Discharge Recommendations: Moderate intensity level of continued care  Equipment Recommended upon Discharge: Wheeled walker  PT Recommended Transfer Status: Assist x1, Assistive device  PT - OK to Discharge: Yes      General Visit Information:   PT  Visit  PT Received On: 01/30/24  Response to Previous Treatment: Patient with no complaints from previous session.  General  Family/Caregiver Present: Yes  Caregiver Feedback: DTR  Prior to Session Communication: Bedside nurse  Patient Position Received: Bed, 3 rail up, Alarm on  Preferred Learning Style: verbal  General Comment: Agreeable to PT follow up    Subjective   Precautions:  Precautions  Hearing/Visual Limitations: very hard of hearing  LE Weight Bearing Status: Weight Bearing as Tolerated  Medical Precautions: Fall precautions, Oxygen therapy device and L/min    Objective   Pain:  Pain Assessment  Pain Assessment: 0-10  Pain Score: 0 - No pain  Cognition:  Cognition  Overall Cognitive Status: Within Functional Limits    Activity Tolerance:  Activity Tolerance  Endurance: Decreased tolerance for upright activites  Rate of Perceived Exertion (RPE): 10/10 (post  gait)  Treatments:  Therapeutic Exercise  Therapeutic Exercise Performed: Yes  Therapeutic Exercise Activity 1: Pt educated on and completes: B ankle pumps x20, Quad Sets x15, Heel Slides x15, Resisted Hip Abd x15, Resisted Hip Add x15, Straight Leg Raise x10, and Glute sets x10    Bed Mobility  Bed Mobility: Yes  Bed Mobility 1  Bed Mobility 1: Supine to sitting  Level of Assistance 1: Close supervision  Bed Mobility Comments 1: with HOB elevated. Very slow and laborous but self completed with cuing only today  Bed Mobility 2  Bed Mobility  2: Sitting to supine  Level of Assistance 2: Close supervision  Bed Mobility Comments 2: with HOB up--very laborous but self completed for Pt  Bed Mobility 3  Bed Mobility 3: Scooting  Level of Assistance 3: Dependent  Bed Mobility Comments 3: with use of green sheet    Ambulation/Gait Training  Ambulation/Gait Training Performed: Yes  Ambulation/Gait Training 1  Surface 1: Level tile  Device 1: Rolling walker  Assistance 1: Moderate assistance  Quality of Gait 1:  (very unsteady on feet today often rocking in all directions. Easily loses balance. Still reports feeling as if BLEs are going to buckle out from under her)  Comments/Distance (ft) 1: 15  Transfers  Transfer: Yes  Transfer 1  Transfer From 1: Bed to  Transfer to 1: Stand  Technique 1: Sit to stand  Transfer Device 1: Walker  Transfer Level of Assistance 1: Minimum assistance  Trials/Comments 1: steadying assist    Outcome Measures:  [facility] Basic Mobility  Turning from your back to your side while in a flat bed without using bedrails: A little  Moving from lying on your back to sitting on the side of a flat bed without using bedrails: A little  Moving to and from bed to chair (including a wheelchair): A little  Standing up from a chair using your arms (e.g. wheelchair or bedside chair): A little  To walk in hospital room: A lot  Climbing 3-5 steps with railing: Total  Basic Mobility - Total Score: 15    Education  Documentation  Home Exercise Program, taught by Dimitris Narayan, PT at 1/30/2024  3:42 PM.  Learner: Family, Patient  Readiness: Acceptance  Method: Explanation, Demonstration  Response: Needs Reinforcement  Comment: safe mobility techniques, AD use, HEP    Mobility Training, taught by Dimitris Narayan, PT at 1/30/2024  3:42 PM.  Learner: Family, Patient  Readiness: Acceptance  Method: Explanation, Demonstration  Response: Needs Reinforcement  Comment: safe mobility techniques, AD use, HEP    Education Comments  No comments found.        OP EDUCATION:  Outpatient Education  Individual(s) Educated: Patient  Education Provided: Body Mechanics, Fall Risk, Home Exercise Program, Home Safety, POC, Posture  Risk and Benefits Discussed with Patient/Caregiver/Other: yes  Patient/Caregiver Demonstrated Understanding: yes  Plan of Care Discussed and Agreed Upon: yes  Patient Response to Education: Patient/Caregiver Verbalized Understanding of Information    Encounter Problems       Encounter Problems (Active)       Balance       Standing Balance (Progressing)       Start:  01/29/24    Expected End:  02/12/24       Pt will demonstrate good static standing balance to promote safe participation with out of bed activity, transfers, and mobility              Mobility       Ambulation (Progressing)       Start:  01/29/24    Expected End:  02/12/24       Pt will ambulate 50' modified independent assist with walker to promote safe home mobility           Entry Step Negotiation (Progressing)       Start:  01/29/24    Expected End:  02/12/24       Pt will ascend/descend 1 stairs with rail(s) on B and modified independent assist to promote safe entry and exit in home environment                Safety       Safe Mobility Techniques (Progressing)       Start:  01/29/24    Expected End:  02/12/24       Pt will correctly identify and demonstrate safe mobility techniques to reduce their risks for falls during their acute care stay                Transfers       Supine to sit (Progressing)       Start:  01/29/24    Expected End:  02/12/24       Pt will transfer supine to sitting at edge of bed with modified independent assist to promote acute care out of bed activity           Sit to stand (Progressing)       Start:  01/29/24    Expected End:  02/12/24       Pt will transfer sit to standing position with modified independent assist and walker to promote safe out of bed activity           Bed to Chair (Progressing)       Start:  01/29/24    Expected End:  02/12/24       Pt will transfer from sitting edge of bed to the chair with modified independent assist and walker to promote out of bed activity and reduce the risks of prolonged acute care bedrest

## 2024-01-30 NOTE — CARE PLAN
Problem: Skin  Goal: Decreased wound size/increased tissue granulation at next dressing change  Outcome: Progressing  Goal: Participates in plan/prevention/treatment measures  Outcome: Progressing  Goal: Prevent/manage excess moisture  Outcome: Progressing  Goal: Prevent/minimize sheer/friction injuries  Outcome: Progressing  Goal: Promote/optimize nutrition  Outcome: Progressing  Goal: Promote skin healing  Outcome: Progressing   The patient's goals for the shift include improved breathing    The clinical goals for the shift include Monitor vitals/lab    Over the shift, the patient did not make progress toward the following goals. Barriers to progression include . Recommendations to address these barriers include .

## 2024-01-30 NOTE — CARE PLAN
The patient's goals for the shift include improved breathing    The clinical goals for the shift include Monitor vitals/lab    Over the shift, the patient did not make progress toward the following goals. Barriers to progression include \. Recommendations to address these barriers include \.

## 2024-01-30 NOTE — PROGRESS NOTES
Reviewed careport:  [facility] accepted, no beds til Saturday  [facility]- Reviewing   [facility] - declined  [facility] : declined [facility]  Will inform Patient.     Jaz Olea RN     1734: attempted to reach daughter Annette, no answer, left message regarding SNF choices.    1745: Daughter called back, updated her regarding SNF choices, [facility] is a good choice. Told her bed availabilty won't be until Saturday. Will inform Dr Bailey.     Jaz Olea RN

## 2024-01-30 NOTE — CONSULTS
"Nutrition Assessement Note    Nutrition Assessment    Reason for Assessment: Dietitian discretion (CHF dx)    Reason for Hospital Admission:  Kalie Ramos is a 89 y.o. female who is admitted for SOB, CHF. Plan for thoracentesis. Recent hip fracture on 12/14 with ORIF on 12/16. Family at bedside. Pt in and out of sleep during this visit.     Nutrition History:  Food and Nutrient History: overall appetite and po intake decreased. pt reports eating 3 small meals and 1 snack per day. early satiety at meals. family at bedside reports sometime pt wakes up and eats in the middle of the night.  Energy Intake: Fair 50-75 %  Oral Problems: Chewing difficulty bottom dentures do not fit at this time    Anthropometrics:  Ht: 157.5 cm (5' 2\"), Wt: 56.3 kg (124 lb 1.9 oz), BMI: 22.7    Weight Change:  Daily Weight  01/30/24 : 56.3 kg (124 lb 1.9 oz)  01/22/24 : 48.5 kg (107 lb)  12/14/23 : 53.3 kg (117 lb 8.1 oz)  12/01/23 : 53.3 kg (117 lb 8 oz)  11/27/23 : 52.2 kg (115 lb)  11/12/23 : 53.2 kg (117 lb 4.6 oz)  11/06/23 : 53.5 kg (118 lb)  10/22/23 : 55.3 kg (121 lb 14.6 oz)  09/29/23 : 57.2 kg (126 lb)  09/05/23 : 56.7 kg (125 lb)    Weight History / % Weight Change: current wt of 123# on bedscale with multiple blakets. pt believes she has lost wt but unclear wt hx. wt of 107# on 1/22/24 - pt and family unsure if this is accurate.    Nutrition Focused Physical Exam Findings:   Subcutaneous Fat Loss  Orbital Fat Pads: Severe (dark circles, hollowing and loose skin)  Buccal Fat Pads: Mild-Moderate (flat cheeks, minimal bounce)    Muscle Wasting  Temporalis: Severe (hollowed scooping depression)  Pectoralis (Clavicular Region): Mild-Moderate (some protrusion of clavicle)  Deltoid/Trapezius: Mild-Moderate (slight protrusion of acromion process)  Interosseous: Mild-Moderate (slightly depressed area between thumb and forefinger)    Nutrition Significant Labs:  Lab Results   Component Value Date    WBC 12.9 (H) 01/30/2024    HGB " 11.2 (L) 01/30/2024    HCT 36.0 01/30/2024     01/30/2024    CHOL 137 01/27/2023    TRIG 74 01/27/2023    HDL 56 01/27/2023    ALT 9 01/30/2024    AST 13 01/30/2024     01/30/2024    K 4.4 01/30/2024     01/30/2024    CREATININE 0.80 01/30/2024    BUN 16 01/30/2024    CO2 26 01/30/2024    TSH 0.87 09/05/2023    INR 1.0 01/26/2023    HGBA1C 5.7 05/23/2022       Current Facility-Administered Medications:     [DISCONTINUED] acetaminophen (Tylenol) tablet 650 mg, 650 mg, oral, q4h PRN **OR** acetaminophen (Tylenol) oral liquid 650 mg, 650 mg, oral, q4h PRN **OR** acetaminophen (Tylenol) suppository 650 mg, 650 mg, rectal, q4h PRN, Nakia Bailey MD    acetaminophen (Tylenol) tablet 650 mg, 650 mg, oral, q6h PRN, Nakia Bailey MD    albuterol 2.5 mg /3 mL (0.083 %) nebulizer solution 2.5 mg, 2.5 mg, nebulization, q2h PRN, Nakia Bailey MD    aspirin EC tablet 325 mg, 325 mg, oral, BID, Nakia Bailey MD, 325 mg at 01/30/24 1034    atorvastatin (Lipitor) tablet 10 mg, 10 mg, oral, Nightly, Nakia Bailey MD, 10 mg at 01/29/24 2101    benzocaine-menthol (Cepastat Sore Throat) 15-3.6 mg lozenge 1 lozenge, 1 lozenge, Mouth/Throat, q4h PRN, Nakia Bailey MD    calcitonin (salmon) (Miacalcin) nasal spray 1 spray, 1 spray, One Nostril, Daily, Nakia Bailey MD, 1 spray at 01/30/24 1146    dextromethorphan-guaifenesin (Robitussin DM)  mg/5 mL oral liquid 5 mL, 5 mL, oral, q4h PRN, Nakia Bailey MD    famotidine (Pepcid) tablet 20 mg, 20 mg, oral, BID, Nakia Bailey MD, 20 mg at 01/30/24 1034    ferrous gluconate (Fergon) 324 (38 Fe) mg tablet 38 mg of iron, 38 mg of iron, oral, Once per day on Mon Wed Fri, Nakia Bailey MD, 38 mg of iron at 01/29/24 1350    furosemide (Lasix) tablet 40 mg, 40 mg, oral, BID with meals, Jim Mckinney MD, 40 mg at 01/30/24 1034    guaiFENesin (Mucinex) 12 hr tablet 600 mg, 600 mg, oral, q12h PRN, Nakia Bailey MD    heparin (porcine) injection 5,000 Units, 5,000 Units, subcutaneous,  q8h, Nakia Bailey MD, 5,000 Units at 01/30/24 1035    HYDROcodone-acetaminophen (Norco) 5-325 mg per tablet 1 tablet, 1 tablet, oral, q4h PRN, Nakia Bailey MD    ipratropium-albuteroL (Duo-Neb) 0.5-2.5 mg/3 mL nebulizer solution 3 mL, 3 mL, nebulization, TID, Nakia Bailey MD, 3 mL at 01/30/24 0752    lidocaine 4 % patch 1 patch, 1 patch, transdermal, Daily, Nakia Bailey MD, 1 patch at 01/30/24 1035    losartan (Cozaar) tablet 25 mg, 25 mg, oral, Daily, Nakia Bailey MD, 25 mg at 01/30/24 1035    magnesium oxide (Mag-Ox) tablet 400 mg, 400 mg, oral, Once per day on Tue Thu Sat, Aftab Bee, PharmD, 400 mg at 01/30/24 1035    melatonin tablet 3 mg, 3 mg, oral, Nightly, Nakia Bailey MD, 3 mg at 01/29/24 2101    metoprolol succinate XL (Toprol-XL) 24 hr tablet 25 mg, 25 mg, oral, Daily, Nakia Bailey MD, 25 mg at 01/30/24 1035    nitroglycerin (Nitrostat) SL tablet 0.4 mg, 0.4 mg, sublingual, q5 min PRN, Nakia Bailey MD    ondansetron (Zofran) injection 4 mg, 4 mg, intravenous, q8h PRN, Nakia Bailey MD    oxygen (O2) therapy, 2 L/min, inhalation, q8h, Nakia Bailey MD, 3 L/min at 01/30/24 0800    pantoprazole (ProtoNix) EC tablet 40 mg, 40 mg, oral, Daily, Nakia Bailey MD, 40 mg at 01/30/24 1034    piperacillin-tazobactam-dextrose (Zosyn) IV 2.25 g, 2.25 g, intravenous, q6h, Nakia Bailey MD, Stopped at 01/30/24 1105    rOPINIRole (Requip) tablet 5 mg, 5 mg, oral, Nightly, Nakia Bailey MD, 5 mg at 01/29/24 2101    sertraline (Zoloft) tablet 50 mg, 50 mg, oral, Daily, Nakia Bailey MD, 50 mg at 01/30/24 1034    sodium chloride (Ocean) 0.65 % nasal spray 1 spray, 1 spray, Each Nostril, 4x daily PRN, Nakia Bailey MD    traZODone (Desyrel) tablet 50 mg, 50 mg, oral, Nightly PRN, Nakia Bailey MD    Dietary Orders (From admission, onward)       Start     Ordered    01/29/24 0650  Adult diet Regular  Diet effective now        Question:  Diet type  Answer:  Regular    01/29/24 0652                  Estimated Needs:   Estimated  Energy Needs  Total Energy Estimated Needs (kCal): 1691 kCal  Total Estimated Energy Need per Day (kCal/kg): 30 kCal/kg  Method for Estimating Needs: actual wt    Estimated Protein Needs  Total Protein Estimated Needs (g): 68 g  Total Protein Estimated Needs (g/kg): 1.2 g/kg  Method for Estimating Needs: actual wt    Estimated Fluid Needs  Method for Estimating Needs: 1 ml/kcal        Nutrition Diagnosis   Nutrition Diagnosis:  Malnutrition Diagnosis  Patient has Malnutrition Diagnosis: Yes  Diagnosis Status: New  Malnutrition Diagnosis: Severe malnutrition related to acute disease or injury  As Evidenced by: moderate to severe subcutaneous fat loss and muscle wasting, po intake </= 75% for >/= 1 month, reported wt loss by pt and family       Nutrition Interventions/Recommendations   Nutrition Interventions and Recommendations:    Nutrition Prescription:  Individualized Nutrition Prescription Provided for : 1691 kcals and 68g protein to be provided via diet and supplements    Nutrition Interventions:   Food and/or Nutrient Delivery Interventions  Interventions: Meals and snacks, Medical food supplement  Meals and Snacks: General healthful diet  Goal: provide as ordered  Medical Food Supplement: Commercial beverage  Goal: vanilla ensure compact TID to provide 220 kcals and 9g protein each    Education Documentation  No documentation found.           Nutrition Monitoring and Evaluation   Monitoring/Evaluation:   Food/Nutrient Related History Monitoring  Monitoring and Evaluation Plan: Energy intake  Energy Intake: Estimated energy intake  Criteria: pt to tolerate >/= 75% estimated needs    Body Composition/Growth/Weight History  Monitoring and Evaluation Plan: Weight  Weight: Measured weight  Criteria: pt will maintain/gain non-fluid related wt       Time Spent/Follow-up:   Follow Up  Time Spent (min): 30 minutes  Last Date of Nutrition Visit: 01/30/24  Nutrition Follow-Up Needed?: 3-5 days  Follow up Comment:  2/2/24

## 2024-01-30 NOTE — PROGRESS NOTES
"Kalie Ramos is a 89 y.o. female on day 1 of admission presenting with Respiratory distress.    Subjective   Patient is waiting for getting thoracocentesis       Objective     Physical Exam  Vitals reviewed.   Constitutional:       Appearance: Normal appearance.   HENT:      Head: Normocephalic and atraumatic.      Right Ear: Tympanic membrane, ear canal and external ear normal.      Left Ear: Tympanic membrane, ear canal and external ear normal.      Nose: Nose normal.      Mouth/Throat:      Pharynx: Oropharynx is clear.   Eyes:      Extraocular Movements: Extraocular movements intact.      Conjunctiva/sclera: Conjunctivae normal.      Pupils: Pupils are equal, round, and reactive to light.   Cardiovascular:      Rate and Rhythm: Normal rate and regular rhythm.      Pulses: Normal pulses.      Heart sounds: Normal heart sounds.   Pulmonary:      Effort: Pulmonary effort is normal.      Breath sounds: Normal breath sounds.      Comments: Diminished air entry bilaterally  Abdominal:      General: Abdomen is flat. Bowel sounds are normal.      Palpations: Abdomen is soft.   Musculoskeletal:      Cervical back: Normal range of motion and neck supple.   Skin:     General: Skin is warm and dry.   Neurological:      General: No focal deficit present.      Mental Status: She is alert and oriented to person, place, and time.   Psychiatric:         Mood and Affect: Mood normal.         Last Recorded Vitals  Blood pressure 128/56, pulse 75, temperature 36.7 °C (98.1 °F), temperature source Temporal, resp. rate 17, height 1.575 m (5' 2\"), weight 56.3 kg (124 lb 1.9 oz), SpO2 96 %.  Intake/Output last 3 Shifts:  No intake/output data recorded.    Relevant Results                        Malnutrition Diagnosis Status: New  Malnutrition Diagnosis: Severe malnutrition related to acute disease or injury  As Evidenced by: moderate to severe subcutaneous fat loss and muscle wasting, po intake </= 75% for >/= 1 month, reported " wt loss by pt and family  I agree with the dietitian's malnutrition diagnosis.  Scheduled medications  aspirin, 325 mg, oral, BID  atorvastatin, 10 mg, oral, Nightly  calcitonin (salmon), 1 spray, One Nostril, Daily  famotidine, 20 mg, oral, BID  ferrous gluconate, 38 mg of iron, oral, Once per day on Mon Wed Fri  furosemide, 40 mg, oral, BID with meals  heparin (porcine), 5,000 Units, subcutaneous, q8h  ipratropium-albuteroL, 3 mL, nebulization, TID  lidocaine, 1 patch, transdermal, Daily  losartan, 25 mg, oral, Daily  magnesium oxide, 400 mg, oral, Once per day on Tue Thu Sat  melatonin, 3 mg, oral, Nightly  metoprolol succinate XL, 25 mg, oral, Daily  oxygen, 2 L/min, inhalation, q8h  pantoprazole, 40 mg, oral, Daily  piperacillin-tazobactam, 2.25 g, intravenous, q6h  rOPINIRole, 5 mg, oral, Nightly  sertraline, 50 mg, oral, Daily      Continuous medications     PRN medications  PRN medications: [DISCONTINUED] acetaminophen **OR** acetaminophen **OR** acetaminophen, acetaminophen, albuterol, benzocaine-menthol, dextromethorphan-guaifenesin, guaiFENesin, HYDROcodone-acetaminophen, nitroglycerin, ondansetron, sodium chloride, traZODone  Results for orders placed or performed during the hospital encounter of 01/29/24 (from the past 24 hour(s))   Comprehensive metabolic panel   Result Value Ref Range    Glucose 103 (H) 65 - 99 mg/dL    Sodium 138 133 - 145 mmol/L    Potassium 4.4 3.4 - 5.1 mmol/L    Chloride 102 97 - 107 mmol/L    Bicarbonate 26 24 - 31 mmol/L    Urea Nitrogen 16 8 - 25 mg/dL    Creatinine 0.80 0.40 - 1.60 mg/dL    eGFR 68 >60 mL/min/1.73m*2    Calcium 8.3 (L) 8.5 - 10.4 mg/dL    Albumin 3.2 (L) 3.5 - 5.0 g/dL    Alkaline Phosphatase 114 35 - 125 U/L    Total Protein 6.6 5.9 - 7.9 g/dL    AST 13 5 - 40 U/L    Bilirubin, Total 0.3 0.1 - 1.2 mg/dL    ALT 9 5 - 40 U/L    Anion Gap 10 <=19 mmol/L   CBC and Auto Differential   Result Value Ref Range    WBC 12.9 (H) 4.4 - 11.3 x10*3/uL    nRBC 0.0 0.0 -  0.0 /100 WBCs    RBC 3.90 (L) 4.00 - 5.20 x10*6/uL    Hemoglobin 11.2 (L) 12.0 - 16.0 g/dL    Hematocrit 36.0 36.0 - 46.0 %    MCV 92 80 - 100 fL    MCH 28.7 26.0 - 34.0 pg    MCHC 31.1 (L) 32.0 - 36.0 g/dL    RDW 15.6 (H) 11.5 - 14.5 %    Platelets 324 150 - 450 x10*3/uL    Neutrophils % 88.9 40.0 - 80.0 %    Immature Granulocytes %, Automated 0.5 0.0 - 0.9 %    Lymphocytes % 5.8 13.0 - 44.0 %    Monocytes % 4.7 2.0 - 10.0 %    Eosinophils % 0.0 0.0 - 6.0 %    Basophils % 0.1 0.0 - 2.0 %    Neutrophils Absolute 11.43 (H) 1.60 - 5.50 x10*3/uL    Immature Granulocytes Absolute, Automated 0.07 0.00 - 0.50 x10*3/uL    Lymphocytes Absolute 0.75 (L) 0.80 - 3.00 x10*3/uL    Monocytes Absolute 0.60 0.05 - 0.80 x10*3/uL    Eosinophils Absolute 0.00 0.00 - 0.40 x10*3/uL    Basophils Absolute 0.01 0.00 - 0.10 x10*3/uL         Assessment/Plan   Principal Problem:    Respiratory distress  Active Problems:    Anxiety    Aortic stenosis    Hyperlipidemia    Hypertension    Thyroid nodule    Pneumonia due to infectious organism    Pleural effusion    UTI (urinary tract infection)    SIRS (systemic inflammatory response syndrome) (CMS/[facility])    Plan for thoracocentesis  Empiric antibiotics  Diuretics for CHF  Aortic stenosis  worse  Urine culture pending  Plan for rehab placement       I spent  minutes in the professional and overall care of this patient.      Nakia Bailey MD

## 2024-01-30 NOTE — CARE PLAN
Problem: Pain  Goal: My pain/discomfort is manageable  Outcome: Progressing     Problem: Safety  Goal: Patient will be injury free during hospitalization  Outcome: Progressing  Goal: I will remain free of falls  Outcome: Progressing     Problem: Daily Care  Goal: Daily care needs are met  Outcome: Progressing     Problem: Psychosocial Needs  Goal: Demonstrates ability to cope with hospitalization/illness  Outcome: Progressing  Goal: Collaborate with me, my family, and caregiver to identify my specific goals  Outcome: Progressing     Problem: Discharge Barriers  Goal: My discharge needs are met  Outcome: Progressing   The patient's goals for the shift include improved breathing    The clinical goals for the shift include Monitor vitals/lab    Over the shift, the patient did not make progress toward the following goals. Barriers to progression include . Recommendations to address these barriers include .

## 2024-01-30 NOTE — PROGRESS NOTES
Pulmonary Progress Note 01/30/24     Patient seen in follow-up for acute on chronic respiratory failure with hypoxia in the setting of CHF and bilateral effusions.    Subjective   Interval History:   Breathing is fair.  Remains on her nasal cannula    Objective   Vital signs in last 24 hours:  Temp:  [36.5 °C (97.7 °F)-36.7 °C (98.1 °F)] 36.7 °C (98.1 °F)  Heart Rate:  [67-73] 71  Resp:  [15-19] 19  BP: (102-149)/(53-70) 135/64  FiO2 (%):  [32 %] 32 %    Intake/Output last 3 shifts:  No intake/output data recorded.  Intake/Output this shift:  I/O this shift:  In: 50 [IV Piggyback:50]  Out: -     Physical Exam  Vitals reviewed.   Constitutional:       General: She is not in acute distress.     Comments: Elderly and frail   Eyes:      Extraocular Movements: Extraocular movements intact.      Conjunctiva/sclera: Conjunctivae normal.   Cardiovascular:      Rate and Rhythm: Normal rate.      Heart sounds: Murmur heard.   Pulmonary:      Effort: Pulmonary effort is normal.      Breath sounds: Examination of the right-lower field reveals decreased breath sounds. Examination of the left-lower field reveals decreased breath sounds. Decreased breath sounds present.   Musculoskeletal:      Right lower leg: No edema.      Left lower leg: No edema.   Skin:     General: Skin is warm and dry.   Neurological:      General: No focal deficit present.      Mental Status: She is alert.   Psychiatric:         Mood and Affect: Mood normal.         Behavior: Behavior normal.         Scheduled medications  aspirin, 325 mg, oral, BID  atorvastatin, 10 mg, oral, Nightly  calcitonin (salmon), 1 spray, One Nostril, Daily  famotidine, 20 mg, oral, BID  ferrous gluconate, 38 mg of iron, oral, Once per day on Mon Wed Fri  furosemide, 40 mg, oral, BID with meals  heparin (porcine), 5,000 Units, subcutaneous, q8h  ipratropium-albuteroL, 3 mL, nebulization, TID  lidocaine, 1 patch, transdermal, Daily  losartan, 25 mg, oral,  Daily  magnesium oxide, 400 mg, oral, Once per day on Tue Thu Sat  melatonin, 3 mg, oral, Nightly  metoprolol succinate XL, 25 mg, oral, Daily  oxygen, 2 L/min, inhalation, q8h  pantoprazole, 40 mg, oral, Daily  piperacillin-tazobactam, 2.25 g, intravenous, q6h  rOPINIRole, 5 mg, oral, Nightly  sertraline, 50 mg, oral, Daily      Continuous medications     PRN medications  PRN medications: [DISCONTINUED] acetaminophen **OR** acetaminophen **OR** acetaminophen, acetaminophen, albuterol, benzocaine-menthol, dextromethorphan-guaifenesin, guaiFENesin, HYDROcodone-acetaminophen, nitroglycerin, ondansetron, sodium chloride, traZODone     Labs:  Lab Results   Component Value Date     01/30/2024    K 4.4 01/30/2024     01/30/2024    CO2 26 01/30/2024    BUN 16 01/30/2024    CREATININE 0.80 01/30/2024    GLUCOSE 103 (H) 01/30/2024    CALCIUM 8.3 (L) 01/30/2024     Lab Results   Component Value Date    WBC 12.9 (H) 01/30/2024    HGB 11.2 (L) 01/30/2024    HCT 36.0 01/30/2024    MCV 92 01/30/2024     01/30/2024       Imaging:      CT angio chest for pulmonary embolism    Result Date: 1/29/2024  STUDY: CT Angiogram of the Chest; 1/29/2024 5:34 AM INDICATION: Shortness of breath.  Elevated D-dimer. COMPARISON: CXR 1/29/2024.  CTA chest 10/22/2023. ACCESSION NUMBER(S): [id number] ORDERING CLINICIAN: GISELA GUALLPA TECHNIQUE:  CTA of the chest was performed with intravenous contrast. Images are reviewed and processed at a workstation according to the CT angiogram protocol with 3-D and/or MIP post processing imaging generated.  Omnipaque 350 75 mL was administered intravenously. Automated mA/kV exposure control was utilized and patient examination was performed in strict accordance with principles of ALARA. FINDINGS: Pulmonary arteries are adequately opacified without large central acute or chronic filling defects.  Respiratory motion limits evaluation of the smaller pulmonary arteries.  The thoracic  aorta is normal in course and caliber without dissection or aneurysm.  Mild calcified atheromatous plaque is seen in the thoracic aorta. Enlarged, heterogeneous thyroid is identified with a confluent area of hypodense nodularity in the thyroid isthmus measuring 5.0 x 6.1 cm in diameter.  Calcifications are seen in the thyroid lobes. The heart is normal in size without pericardial effusion.  Mild calcified coronary plaque is noted.  Thoracic lymph nodes are not enlarged. Trachea and mainstem bronchi are patent and clear of debris.  There are large bilateral pleural effusions layering dependently with collapse and consolidation of the left lower lobe as well as the dependent portion of the left upper lobe and lingular segment of left upper lobe.  Patchy airspace disease is seen in the right upper lobe. Relaxation atelectasis and mild consolidation is seen in the dependent portion of the right lower lobe.  There is atelectasis in the central portion of the right middle lobe.  Increased AP diameter of the thorax may indicate chronic changes of COPD.  No interstitial lung disease or definite suspicious pulmonary nodules are identified.  There is no pneumothorax. Upper abdomen demonstrates no acute pathology.  Surgical clips are noted in the upper abdomen.  Mild calcified plaque is seen in the included upper abdominal aorta. There are no acute fractures.  No suspicious bony lesions.  Pronounced thoracic kyphosis is noted.  There is moderate multilevel disc space narrowing throughout the mid and lower thoracic spine.  Sclerotic focus is seen within the T2 vertebrae measuring 6 mm in diameter. Generalized osseous demineralization is noted.    Large bilateral pleural effusions with multifocal areas of consolidation in both lungs, most pronounced in the left lower lobe and dependent portions of the left upper lobe and right lower lobe, indicating probable severe pneumonia/aspiration with patchy airspace disease in the right  upper lobe. Enlarged, heterogeneous thyroid with a 6.1 cm nodule in the thyroid isthmus with poorly defined margins, nonemergent thyroid ultrasound is recommended for further characterization. Chronic changes suggesting COPD. Pronounced thoracic kyphosis with multilevel age-related osteoporotic changes in the spine. Signed by Trent Salinas    XR chest 1 view    Result Date: 1/29/2024  STUDY: Chest Radiograph;  1/29/2024 3:42 AM INDICATION: Shortness of breath. COMPARISON: 12/14/2023 XR Chest one view ACCESSION NUMBER(S): [id number] ORDERING CLINICIAN: GISELA GUALLPA TECHNIQUE:  Frontal chest was obtained at 3:42 hours. FINDINGS: CARDIOMEDIASTINAL SILHOUETTE: Cardiomediastinal silhouette is obscured but appears enlarged. Calcified plaque is seen in the aorta.  LUNGS: There is extensive airspace disease in the left midlung zone and bilateral lung bases.  Increased interstitial markings are seen bilaterally.  There is a probable moderate left effusion and suspected small right effusion.  ABDOMEN: No remarkable upper abdominal findings.  BONES: No acute osseous changes.    Extensive multifocal bilateral airspace disease with probable effusions, likely multilobar pneumonia in the appropriate clinical setting with question of mild superimposed pulmonary edema. Signed by Trent Salinas              Assessment/Plan   Principal Problem:    Respiratory distress  Active Problems:    Anxiety    Aortic stenosis    Hyperlipidemia    Hypertension    Thyroid nodule    Pneumonia due to infectious organism    Pleural effusion    UTI (urinary tract infection)    SIRS (systemic inflammatory response syndrome) (CMS/HCC)    Congestive heart failure leading to acute on chronic respiratory failure.  Bilateral effusions are likely in the setting of CHF.    Thoracentesis ordered by my colleague, will follow-up on the results  Diuresis  Supplemental oxygen  Empiric antibiotic coverage, follow-up on cultures and if negative can observe  off  Aspiration precautions  Prophylaxis    Wisam Winslow MD  Pulmonary/CC Medicine  [facility]       LOS: 1 day

## 2024-01-31 ENCOUNTER — APPOINTMENT (OUTPATIENT)
Dept: RADIOLOGY | Facility: HOSPITAL | Age: 89
DRG: 291 | End: 2024-01-31
Payer: MEDICARE

## 2024-01-31 LAB
GLUCOSE FLD-MCNC: 103 MG/DL
LDH FLD L TO P-CCNC: 51 U/L
PROT FLD-MCNC: 2.4 G/DL

## 2024-01-31 PROCEDURE — 97535 SELF CARE MNGMENT TRAINING: CPT | Mod: GO,CO

## 2024-01-31 PROCEDURE — 94760 N-INVAS EAR/PLS OXIMETRY 1: CPT | Mod: MUE

## 2024-01-31 PROCEDURE — 9420000001 HC RT PATIENT EDUCATION 5 MIN

## 2024-01-31 PROCEDURE — 99232 SBSQ HOSP IP/OBS MODERATE 35: CPT | Performed by: INTERNAL MEDICINE

## 2024-01-31 PROCEDURE — 2500000004 HC RX 250 GENERAL PHARMACY W/ HCPCS (ALT 636 FOR OP/ED): Performed by: INTERNAL MEDICINE

## 2024-01-31 PROCEDURE — 2500000002 HC RX 250 W HCPCS SELF ADMINISTERED DRUGS (ALT 637 FOR MEDICARE OP, ALT 636 FOR OP/ED): Performed by: INTERNAL MEDICINE

## 2024-01-31 PROCEDURE — 2500000005 HC RX 250 GENERAL PHARMACY W/O HCPCS: Performed by: INTERNAL MEDICINE

## 2024-01-31 PROCEDURE — 2060000001 HC INTERMEDIATE ICU ROOM DAILY

## 2024-01-31 PROCEDURE — 2500000001 HC RX 250 WO HCPCS SELF ADMINISTERED DRUGS (ALT 637 FOR MEDICARE OP): Performed by: INTERNAL MEDICINE

## 2024-01-31 PROCEDURE — 97116 GAIT TRAINING THERAPY: CPT | Mod: GP,CQ

## 2024-01-31 PROCEDURE — 97110 THERAPEUTIC EXERCISES: CPT | Mod: GP,CQ

## 2024-01-31 PROCEDURE — 94640 AIRWAY INHALATION TREATMENT: CPT | Mod: MUE

## 2024-01-31 PROCEDURE — 97110 THERAPEUTIC EXERCISES: CPT | Mod: GO,CO

## 2024-01-31 RX ADMIN — Medication 38 MG OF IRON: at 09:41

## 2024-01-31 RX ADMIN — IPRATROPIUM BROMIDE AND ALBUTEROL SULFATE 3 ML: 2.5; .5 SOLUTION RESPIRATORY (INHALATION) at 12:44

## 2024-01-31 RX ADMIN — IPRATROPIUM BROMIDE AND ALBUTEROL SULFATE 3 ML: 2.5; .5 SOLUTION RESPIRATORY (INHALATION) at 07:13

## 2024-01-31 RX ADMIN — LOSARTAN POTASSIUM 25 MG: 25 TABLET, FILM COATED ORAL at 09:41

## 2024-01-31 RX ADMIN — IPRATROPIUM BROMIDE AND ALBUTEROL SULFATE 3 ML: 2.5; .5 SOLUTION RESPIRATORY (INHALATION) at 19:43

## 2024-01-31 RX ADMIN — PANTOPRAZOLE SODIUM 40 MG: 40 TABLET, DELAYED RELEASE ORAL at 09:41

## 2024-01-31 RX ADMIN — CALCITONIN SALMON 1 SPRAY: 200 SPRAY, METERED NASAL at 09:43

## 2024-01-31 RX ADMIN — Medication 2 L/MIN: at 07:00

## 2024-01-31 RX ADMIN — ASPIRIN 325 MG: 325 TABLET, COATED ORAL at 09:41

## 2024-01-31 RX ADMIN — PIPERACILLIN SODIUM AND TAZOBACTAM SODIUM 2.25 G: 2; .25 INJECTION, SOLUTION INTRAVENOUS at 20:56

## 2024-01-31 RX ADMIN — SERTRALINE HYDROCHLORIDE 50 MG: 50 TABLET ORAL at 09:42

## 2024-01-31 RX ADMIN — PIPERACILLIN SODIUM AND TAZOBACTAM SODIUM 2.25 G: 2; .25 INJECTION, SOLUTION INTRAVENOUS at 14:39

## 2024-01-31 RX ADMIN — Medication 2 L/MIN: at 15:00

## 2024-01-31 RX ADMIN — LIDOCAINE 1 PATCH: 4 PATCH TOPICAL at 09:42

## 2024-01-31 RX ADMIN — Medication 2 L/MIN: at 23:00

## 2024-01-31 RX ADMIN — ATORVASTATIN CALCIUM 10 MG: 10 TABLET, FILM COATED ORAL at 20:57

## 2024-01-31 RX ADMIN — TRAZODONE HYDROCHLORIDE 50 MG: 50 TABLET ORAL at 20:57

## 2024-01-31 RX ADMIN — FUROSEMIDE 40 MG: 40 TABLET ORAL at 09:41

## 2024-01-31 RX ADMIN — METOPROLOL SUCCINATE 25 MG: 25 TABLET, EXTENDED RELEASE ORAL at 09:41

## 2024-01-31 RX ADMIN — HEPARIN SODIUM 5000 UNITS: 5000 INJECTION, SOLUTION INTRAVENOUS; SUBCUTANEOUS at 16:04

## 2024-01-31 RX ADMIN — HEPARIN SODIUM 5000 UNITS: 5000 INJECTION, SOLUTION INTRAVENOUS; SUBCUTANEOUS at 23:53

## 2024-01-31 RX ADMIN — Medication 3 MG: at 20:58

## 2024-01-31 RX ADMIN — FUROSEMIDE 40 MG: 40 TABLET ORAL at 16:04

## 2024-01-31 RX ADMIN — ASPIRIN 325 MG: 325 TABLET, COATED ORAL at 20:57

## 2024-01-31 RX ADMIN — HEPARIN SODIUM 5000 UNITS: 5000 INJECTION, SOLUTION INTRAVENOUS; SUBCUTANEOUS at 09:41

## 2024-01-31 RX ADMIN — ROPINIROLE HYDROCHLORIDE 5 MG: 2 TABLET, FILM COATED ORAL at 20:57

## 2024-01-31 RX ADMIN — FAMOTIDINE 20 MG: 20 TABLET, FILM COATED ORAL at 09:42

## 2024-01-31 RX ADMIN — FAMOTIDINE 20 MG: 20 TABLET, FILM COATED ORAL at 20:57

## 2024-01-31 RX ADMIN — PIPERACILLIN SODIUM AND TAZOBACTAM SODIUM 2.25 G: 2; .25 INJECTION, SOLUTION INTRAVENOUS at 03:43

## 2024-01-31 ASSESSMENT — COGNITIVE AND FUNCTIONAL STATUS - GENERAL
PERSONAL GROOMING: A LITTLE
MOVING TO AND FROM BED TO CHAIR: A LITTLE
EATING MEALS: A LITTLE
DAILY ACTIVITIY SCORE: 17
MOVING TO AND FROM BED TO CHAIR: A LOT
DRESSING REGULAR UPPER BODY CLOTHING: A LITTLE
HELP NEEDED FOR BATHING: A LITTLE
MOVING TO AND FROM BED TO CHAIR: A LITTLE
EATING MEALS: A LITTLE
CLIMB 3 TO 5 STEPS WITH RAILING: TOTAL
DRESSING REGULAR UPPER BODY CLOTHING: A LITTLE
TURNING FROM BACK TO SIDE WHILE IN FLAT BAD: A LOT
MOBILITY SCORE: 11
PERSONAL GROOMING: A LITTLE
TURNING FROM BACK TO SIDE WHILE IN FLAT BAD: A LITTLE
PATIENT BASELINE BEDBOUND: NO
STANDING UP FROM CHAIR USING ARMS: A LITTLE
DAILY ACTIVITIY SCORE: 15
CLIMB 3 TO 5 STEPS WITH RAILING: TOTAL
STANDING UP FROM CHAIR USING ARMS: A LITTLE
TOILETING: A LOT
TOILETING: A LOT
DRESSING REGULAR UPPER BODY CLOTHING: A LITTLE
HELP NEEDED FOR BATHING: A LOT
MOBILITY SCORE: 15
MOVING FROM LYING ON BACK TO SITTING ON SIDE OF FLAT BED WITH BEDRAILS: A LITTLE
STANDING UP FROM CHAIR USING ARMS: A LOT
PERSONAL GROOMING: A LITTLE
DRESSING REGULAR UPPER BODY CLOTHING: A LITTLE
WALKING IN HOSPITAL ROOM: A LOT
HELP NEEDED FOR BATHING: A LOT
TURNING FROM BACK TO SIDE WHILE IN FLAT BAD: A LITTLE
DRESSING REGULAR LOWER BODY CLOTHING: A LOT
DRESSING REGULAR LOWER BODY CLOTHING: A LITTLE
WALKING IN HOSPITAL ROOM: A LOT
MOVING FROM LYING ON BACK TO SITTING ON SIDE OF FLAT BED WITH BEDRAILS: A LITTLE
CLIMB 3 TO 5 STEPS WITH RAILING: TOTAL
TURNING FROM BACK TO SIDE WHILE IN FLAT BAD: A LITTLE
TOILETING: TOTAL
MOVING TO AND FROM BED TO CHAIR: A LITTLE
MOVING FROM LYING ON BACK TO SITTING ON SIDE OF FLAT BED WITH BEDRAILS: A LOT
HELP NEEDED FOR BATHING: A LOT
MOVING FROM LYING ON BACK TO SITTING ON SIDE OF FLAT BED WITH BEDRAILS: A LITTLE
WALKING IN HOSPITAL ROOM: A LOT
TOILETING: A LOT
DAILY ACTIVITIY SCORE: 14
MOBILITY SCORE: 15
CLIMB 3 TO 5 STEPS WITH RAILING: TOTAL
WALKING IN HOSPITAL ROOM: A LOT
DRESSING REGULAR LOWER BODY CLOTHING: A LOT
DAILY ACTIVITIY SCORE: 15
EATING MEALS: A LITTLE
DRESSING REGULAR LOWER BODY CLOTHING: A LOT
PERSONAL GROOMING: A LITTLE
EATING MEALS: A LITTLE
MOBILITY SCORE: 15
STANDING UP FROM CHAIR USING ARMS: A LITTLE

## 2024-01-31 ASSESSMENT — PAIN - FUNCTIONAL ASSESSMENT
PAIN_FUNCTIONAL_ASSESSMENT: 0-10

## 2024-01-31 ASSESSMENT — PAIN SCALES - GENERAL
PAINLEVEL_OUTOF10: 0 - NO PAIN
PAINLEVEL_OUTOF10: 0 - NO PAIN
PAINLEVEL_OUTOF10: 3
PAINLEVEL_OUTOF10: 0 - NO PAIN

## 2024-01-31 ASSESSMENT — ACTIVITIES OF DAILY LIVING (ADL)
BATHING_LEVEL_OF_ASSISTANCE: MAXIMUM ASSISTANCE
BATHING_WHERE_ASSESSED: OTHER (COMMENT)

## 2024-01-31 NOTE — CARE PLAN
Problem: Pain  Goal: My pain/discomfort is manageable  Outcome: Progressing     Problem: Safety  Goal: Patient will be injury free during hospitalization  Outcome: Progressing  Goal: I will remain free of falls  Outcome: Progressing     Problem: Daily Care  Goal: Daily care needs are met  Outcome: Progressing     Problem: Psychosocial Needs  Goal: Demonstrates ability to cope with hospitalization/illness  Outcome: Progressing  Goal: Collaborate with me, my family, and caregiver to identify my specific goals  Outcome: Progressing     Problem: Discharge Barriers  Goal: My discharge needs are met  Outcome: Progressing     Problem: Skin  Goal: Decreased wound size/increased tissue granulation at next dressing change  Outcome: Progressing  Goal: Participates in plan/prevention/treatment measures  Outcome: Progressing  Goal: Prevent/manage excess moisture  Outcome: Progressing  Goal: Prevent/minimize sheer/friction injuries  Outcome: Progressing  Goal: Promote/optimize nutrition  Outcome: Progressing  Goal: Promote skin healing  Outcome: Progressing   The patient's goals for the shift include improved breathing    The clinical goals for the shift include Monitor vitals    Over the shift, the patient did not make progress toward the following goals. Barriers to progression include . Recommendations to address these barriers include .

## 2024-01-31 NOTE — PROGRESS NOTES
Subjective Data:  The patient is resting comfortably not short of breath on nasal cannula    Overnight Events:    As described below     Objective Data:  Last Recorded Vitals:  Vitals:    01/30/24 2229 01/30/24 2334 01/31/24 0443 01/31/24 0700   BP:  119/57 141/67    BP Location:  Right arm Right arm    Patient Position:  Lying Lying    Pulse:  75 68    Resp:  17 19    Temp:  36.9 °C (98.4 °F) 36.5 °C (97.7 °F)    TempSrc:  Temporal Temporal    SpO2: 98% 98% 96% 96%   Weight:   55.5 kg (122 lb 5.7 oz)    Height:           Last Labs:  CBC - 1/30/2024:  5:34 AM  12.9 11.2 324    36.0      CMP - 1/30/2024:  5:34 AM  8.3 6.6 13 --- 0.3   _ 3.2 9 114      PTT - No results in last year.  _   _ _     HGBA1C   Date/Time Value Ref Range Status   05/23/2022 10:43 PM 5.7 4.0 - 6.0 % Final     Comment:     Hemoglobin A1C levels are related to mean blood glucose during the   preceding 2-3 months. The relationship table below may be used as a   general guide. Each 1% increase in HGB A1C is a reflection of an   increase in mean glucose of approximately 30 mg/dl.   Reference: Diabetes [employer], volume 29, supplement 1 Jan. 2006                        HGB A1C ................. Approx. Mean Glucose   _______________________________________________   6%   ...............................  120 mg/dl   7%   ...............................  150 mg/dl   8%   ...............................  180 mg/dl   9%   ...............................  210 mg/dl   10%  ...............................  240 mg/dl  Performed at [address] [address]     12/17/2020 03:16 PM 5.7 4.0 - 6.0 % Final     Comment:     Hemoglobin A1C levels are related to mean blood glucose during the   preceding 2-3 months. The relationship table below may be used as a   general guide. Each 1% increase in HGB A1C is a reflection of an   increase in mean glucose of approximately 30 mg/dl.   Reference: Diabetes Care, volume 29, supplement 1 Jan. 2006            "             HGB A1C ................. Approx. Mean Glucose   _______________________________________________   6%   ...............................  120 mg/dl   7%   ...............................  150 mg/dl   8%   ...............................  180 mg/dl   9%   ...............................  210 mg/dl   10%  ...............................  240 mg/dl  Performed at [address] [address]       LDLCALC   Date/Time Value Ref Range Status   01/27/2023 06:04 AM 66 65 - 130 MG/DL Final   11/20/2022 02:47 AM 54 65 - 130 MG/DL Final   07/06/2022 01:13 AM 54 65 - 130 MG/DL Final     VLDL   Date/Time Value Ref Range Status   08/03/2018 09:41 AM 25 0 - 40 mg/dL Final   01/18/2018 02:30 PM 47 0 - 40 mg/dL Final   10/17/2017 11:18 AM 50 0 - 40 mg/dL Final      Last I/O:  I/O last 3 completed shifts:  In: 100 (1.8 mL/kg) [IV Piggyback:100]  Out: - (0 mL/kg)   Weight: 55.5 kg     Past Cardiology Tests (Last 3 Years):  EKG:  ECG 12 lead 01/29/2024      ECG 12 lead       Electrocardiogram, 12-lead PRN ACS symptoms       ECG 12 lead       ECG 12 lead 11/17/2023      ECG 12 Lead       ECG 12 Lead       ECG 12 lead 10/24/2023    Echo:  No results found for this or any previous visit from the past 1095 days.    Ejection Fractions:  No results found for: \"EF\"  Cath:  No results found for this or any previous visit from the past 1095 days.    Stress Test:  No results found for this or any previous visit from the past 1095 days.    Cardiac Imaging:  No results found for this or any previous visit from the past 1095 days.      Inpatient Medications:  Scheduled medications   Medication Dose Route Frequency    aspirin  325 mg oral BID    atorvastatin  10 mg oral Nightly    calcitonin (salmon)  1 spray One Nostril Daily    famotidine  20 mg oral BID    ferrous gluconate  38 mg of iron oral Once per day on Mon Wed Fri    furosemide  40 mg oral BID with meals    heparin (porcine)  5,000 Units subcutaneous q8h    " ipratropium-albuteroL  3 mL nebulization TID    lidocaine  1 patch transdermal Daily    losartan  25 mg oral Daily    magnesium oxide  400 mg oral Once per day on Tue Thu Sat    melatonin  3 mg oral Nightly    metoprolol succinate XL  25 mg oral Daily    oxygen  2 L/min inhalation q8h    pantoprazole  40 mg oral Daily    piperacillin-tazobactam  2.25 g intravenous q6h    rOPINIRole  5 mg oral Nightly    sertraline  50 mg oral Daily     PRN medications   Medication    acetaminophen    Or    acetaminophen    acetaminophen    albuterol    benzocaine-menthol    dextromethorphan-guaifenesin    guaiFENesin    HYDROcodone-acetaminophen    nitroglycerin    ondansetron    sodium chloride    traZODone     Continuous Medications   Medication Dose Last Rate       Physical Exam:  The patient is a upper elderly white female awake alert conversant not overtly dyspneic on nasal cannula visiting with daughter.  JVP not elevated carotid and pulses 2+  Moderately severe thoracic kyphosis shallow air movement breath sounds are absent one half of the posterior lung bases bilaterally  Cardiac rhythm irregular S1-S2 obscured grade 3/6 to 4/6 systolic ejection murmur  Abdomen is benign no paraspinal megaly no focal tenderness  No peripheral edema pedal pulses are present     Assessment/Plan 1/29: This patient is a 89-year-old white female with extensive issues that include coronary artery disease, severe aortic valvular stenosis, moderate tricuspid and mitral valve regurgitation, severe pulmonary hypertension, COPD with oxygen dependence, hypertension, left bundle branch block conduction delay, lower extremity edema due to venous insufficiency, chronic DVT left lower extremity femoral vein, laparoscopic paraesophageal hiatal hernia repair, ongoing aspiration pneumonias, nontoxic multinodular goiter.  The patient's last echocardiogram on 7/25/2023 again demonstrated a LV ejection fraction 35-40% severe aortic valve stenosis peak systolic  gradient 56 mmHg, mild mitral moderate tricuspid valve regurgitation and severe pulmonary hypertension estimated PA systolic pressure 67 mmHg.  The patient was thought not to be a candidate for transcatheter aortic valve replacement.  She does wear continuous oxygen at 3 L/min at home.  She surprisingly still lives independently in her own condominium.  She has been having increased shortness of breath.  Multiple potential etiologies including her chronic lung disease aspiration pneumonias interstitial fibrosis possible CHF.  The patient was recently admitted for several days on 10/23/2023 with increased shortness of breath and ultimately was released on Lasix 40 mg twice daily which she has reduced to daily.  Patient admitted again on 11/12/2023 after having fallen in the bathroom and not being able to get up due to generalized weakness.  She lives independently in her own condominium and has been very resistant to review our recommendation for assisted living.  At that time it was decided to continue the patient on her usual therapy which  at that point included Lasix 40 mg once daily along with her low-dose aspirin plus atorvastatin 40 mg daily and metoprolol succinate 25 mg twice daily.    Patient was admitted after a mechanical fall with a right femoral neck fracture and subsequently underwent right total hip replacement on 12/16/2023.  The patient was discharged to rehab facility on usual preadmission low-dose losartan 25 mg daily Toprol-XL 25 mg daily Lasix 40 mg daily with no anticoagulation due to where multiple falls.  Her atrial fibrillation was rate controlled with the low-dose Toprol-XL.  She was readmitted with progressive increased shortness of breath with CTA of the chest demonstrating large bilateral pleural effusions and possible multifocal pneumonia.  The patient has been started on empiric antibiotic therapy with IV ceftriaxone and azithromycin.  She has been thought to be a high risk candidate  for aspiration pneumonia in the past.  The pleural effusions presumably are related to her progressively more severe aortic valvular stenosis along with severe pulmonary hypertension.  Pulmonology will be consulted with respect to treatment of her possible pneumonia and to determine whether her respiratory status would benefit from thoracentesis by interventional radiology.      1/30: The patient is lying in bed comfortable eating breakfast which she enjoyed.  No respiratory distress on O2 nasal cannula.  She has been seen by pulmonology and interventional radiology has been contacted for thoracentesis.  Patient's acute on chronic respiratory failure thought to be multifactorial related to volume overload due to worsening valvular heart disease and pulmonary hypertension possible noncompliance with diuretic therapy and?  Pneumonia.  The patient's comprehensive metabolic panel relatively unremarkable creatinine is 0.8.  CBC notable for only mild leukocytosis 12,900.  The patient's p.o. Lasix will be increased for now to 40 mg twice daily.  The patient is now agreeable along with other family members to be placed into assisted living at the time of hospital discharge    1/31: The patient underwent an ultrasound-guided thoracentesis of the right pleural effusion yesterday well-tolerated 1.5 L of fluid approximately removed.  Fluid analysis is still in process.  Breathing remains unlabored.  On examination there is improved air movement at the right lung base.  Left-sided thoracentesis will evidently be deferred unless she has ongoing and persistent dyspnea.  No new lab work from today will recheck tomorrow.  Telemetry monitor demonstrates sinus rhythm first-degree AV block in the 70/min range.  Systolic blood pressures in the 120-140 mmHg range.  Patient remains on increased dose of Lasix 40 mg twice daily.    Peripheral IV 01/29/24 22 G Right Hand (Active)   Site Assessment Clean;Dry;Intact 01/31/24 0741   Dressing  Status Clean;Dry;Occlusive 01/31/24 0741   Number of days: 2       Code Status:  DNR and No Intubation    I spent 20 minutes in the professional and overall care of this patient.        Jim Mckinney MD

## 2024-01-31 NOTE — PROGRESS NOTES
Physical Therapy    Physical Therapy Treatment    Patient Name: Kalie Ramos  MRN: [id number]  Today's Date: 1/31/2024  Time Calculation  Start Time: 1434  Stop Time: 1458  Time Calculation (min): 24 min       Assessment/Plan   PT Assessment  PT Assessment Results: Decreased strength, Decreased range of motion, Decreased endurance, Impaired balance, Decreased mobility, Decreased coordination, Decreased cognition, Impaired judgement, Decreased safety awareness  Rehab Prognosis: Good  End of Session Communication: Bedside nurse  End of Session Patient Position: Bed, 3 rail up  PT Plan  Inpatient/Swing Bed or Outpatient: Inpatient  PT Plan  Treatment/Interventions: Bed mobility, Transfer training, Gait training, Therapeutic exercise  PT Plan: Skilled PT  PT Frequency: 4 times per week  PT Discharge Recommendations: Moderate intensity level of continued care  Equipment Recommended upon Discharge: Wheeled walker  PT Recommended Transfer Status: Assist x1, Assistive device  PT - OK to Discharge: Yes      General Visit Information:   PT  Visit  PT Received On: 01/31/24  Response to Previous Treatment: Patient with no complaints from previous session.  General  Family/Caregiver Present: Yes  Caregiver Feedback: DTR  Prior to Session Communication: Bedside nurse  Patient Position Received: Up in chair  Preferred Learning Style: verbal  General Comment: agreeable to treatment    Subjective   Precautions:  Precautions  Medical Precautions: Fall precautions, Oxygen therapy device and L/min (3L oxygen)  Vital Signs:       Objective   Pain:  Pain Assessment  Pain Assessment: 0-10  Pain Score: 0 - No pain  Cognition:     Postural Control:     Extremity/Trunk Assessments:    Activity Tolerance:     Treatments:  Therapeutic Exercise  Therapeutic Exercise Performed: Yes  Therapeutic Exercise Activity 1: Pt performed seated bilat LE ankle pumps/heel raises, glute sets, LAQ, hip flexion, carmelo hip adduction and resisted hip  "abduction x15 reps each.    Bed Mobility  Bed Mobility: Yes  Bed Mobility 1  Bed Mobility 1: Sitting to supine  Level of Assistance 1: Minimum assistance  Bed Mobility Comments 1: Pt needed light min assist with bilat LE's onto bed sit to supine.    Ambulation/Gait Training  Ambulation/Gait Training Performed: Yes  Ambulation/Gait Training 1  Surface 1: Level tile  Device 1: Rolling walker  Gait Support Devices: Wheelchair follow  Assistance 1: Moderate assistance  Quality of Gait 1: Shuffling gait  Comments/Distance (ft) 1: Pt ambulated with RW ~20' x1 min assist of 2 and close chair follow for safety. Slow genia, decreased bilat step height and length. Mod unsteady throughout. Pt reports legs feel \"very weak\"  Transfers  Transfer: Yes  Transfer 1  Transfer From 1: Chair with arms to  Transfer to 1: Stand  Technique 1: Sit to stand  Transfer Device 1: Walker  Transfer Level of Assistance 1: Minimum assistance  Trials/Comments 1: Min assist to steady, pt a little retro upon standing, min assist to get COG over SEEMA.  Transfers 2  Transfer From 2: Stand to  Transfer to 2: Sit, Bed  Technique 2: Stand to sit  Transfer Level of Assistance 2: Minimum assistance    Outcome Measures:  [facility] Basic Mobility  Turning from your back to your side while in a flat bed without using bedrails: A little  Moving from lying on your back to sitting on the side of a flat bed without using bedrails: A little  Moving to and from bed to chair (including a wheelchair): A little  Standing up from a chair using your arms (e.g. wheelchair or bedside chair): A little  To walk in hospital room: A lot  Climbing 3-5 steps with railing: Total  Basic Mobility - Total Score: 15    Education Documentation  Handouts, taught by Inna Gambino PTA at 1/31/2024  4:09 PM.  Learner: Patient  Readiness: Acceptance  Method: Explanation  Response: Verbalizes Understanding, Needs Reinforcement    Precautions, taught by Inna Gambino PTA at 1/31/2024  4:09 " PM.  Learner: Patient  Readiness: Acceptance  Method: Explanation  Response: Verbalizes Understanding, Needs Reinforcement    Body Mechanics, taught by Inna Gambino PTA at 1/31/2024  4:09 PM.  Learner: Patient  Readiness: Acceptance  Method: Explanation  Response: Verbalizes Understanding, Needs Reinforcement    Home Exercise Program, taught by Inna Gambino PTA at 1/31/2024  4:09 PM.  Learner: Patient  Readiness: Acceptance  Method: Explanation  Response: Verbalizes Understanding, Needs Reinforcement    Mobility Training, taught by Inna Gambino PTA at 1/31/2024  4:09 PM.  Learner: Patient  Readiness: Acceptance  Method: Explanation  Response: Verbalizes Understanding, Needs Reinforcement    Education Comments  No comments found.        OP EDUCATION:       Encounter Problems       Encounter Problems (Active)       Balance       Standing Balance (Progressing)       Start:  01/29/24    Expected End:  02/12/24       Pt will demonstrate good static standing balance to promote safe participation with out of bed activity, transfers, and mobility              Mobility       Ambulation (Progressing)       Start:  01/29/24    Expected End:  02/12/24       Pt will ambulate 50' modified independent assist with walker to promote safe home mobility           Entry Step Negotiation (Progressing)       Start:  01/29/24    Expected End:  02/12/24       Pt will ascend/descend 1 stairs with rail(s) on B and modified independent assist to promote safe entry and exit in home environment                Safety       Safe Mobility Techniques (Progressing)       Start:  01/29/24    Expected End:  02/12/24       Pt will correctly identify and demonstrate safe mobility techniques to reduce their risks for falls during their acute care stay               Transfers       Supine to sit (Progressing)       Start:  01/29/24    Expected End:  02/12/24       Pt will transfer supine to sitting at edge of bed with modified independent assist to  promote acute care out of bed activity           Sit to stand (Progressing)       Start:  01/29/24    Expected End:  02/12/24       Pt will transfer sit to standing position with modified independent assist and walker to promote safe out of bed activity           Bed to Chair (Progressing)       Start:  01/29/24    Expected End:  02/12/24       Pt will transfer from sitting edge of bed to the chair with modified independent assist and walker to promote out of bed activity and reduce the risks of prolonged acute care bedrest

## 2024-01-31 NOTE — PROGRESS NOTES
Patient admitted from [facility], but does not want to return.  She is from home alone in a condo at baseline.  SNF choices received from daughter for [facility], [facility] and [place].  ([facility] already denied - OON.)  Referrals sent to [facility] for processing.  Per [facility], [facility] can accept patient with bed available 2/3.  Previous RN TCC notified Dr. JUSTINO Bailey.  Daughter agreeable to [facility] as next LOC.  Notified facility of [facility].  Requested precert to be initiated by  Direct Submit Team.  Precert could take 48-72 hours.   Awaiting updates.  RN TCC following.    1150  This RN TCC received a call from [facility].  They are going to do an on-site 2/1/24.  Patient will be transitioning to [facility] after rehab.  RN TCC following.     Jaz Garcia RN

## 2024-01-31 NOTE — PROGRESS NOTES
Pulmonary Progress Note 01/31/24     Patient seen in follow-up for acute on chronic respiratory failure with hypoxia in the setting of CHF and bilateral effusions.    Subjective   Interval History:   Thoracentesis performed yesterday.  Breathing she feels is improved.    Objective   Vital signs in last 24 hours:  Temp:  [35.9 °C (96.6 °F)-36.9 °C (98.4 °F)] 36.4 °C (97.5 °F)  Heart Rate:  [68-76] 76  Resp:  [14-19] 15  BP: (119-141)/(57-87) 128/87  FiO2 (%):  [2 %-32 %] 32 %    Intake/Output last 3 shifts:  I/O last 3 completed shifts:  In: 100 (1.8 mL/kg) [IV Piggyback:100]  Out: - (0 mL/kg)   Weight: 55.5 kg   Intake/Output this shift:  No intake/output data recorded.    Physical Exam  Vitals reviewed.   Constitutional:       General: She is not in acute distress.     Comments: Elderly and frail   Eyes:      Extraocular Movements: Extraocular movements intact.      Conjunctiva/sclera: Conjunctivae normal.   Cardiovascular:      Rate and Rhythm: Normal rate.      Heart sounds: Murmur heard.   Pulmonary:      Effort: Pulmonary effort is normal.      Breath sounds: Decreased air movement present.   Musculoskeletal:      Right lower leg: No edema.      Left lower leg: No edema.   Skin:     General: Skin is warm and dry.   Neurological:      General: No focal deficit present.      Mental Status: She is alert.   Psychiatric:         Mood and Affect: Mood normal.         Behavior: Behavior normal.         Scheduled medications  aspirin, 325 mg, oral, BID  atorvastatin, 10 mg, oral, Nightly  calcitonin (salmon), 1 spray, One Nostril, Daily  famotidine, 20 mg, oral, BID  ferrous gluconate, 38 mg of iron, oral, Once per day on Mon Wed Fri  furosemide, 40 mg, oral, BID with meals  heparin (porcine), 5,000 Units, subcutaneous, q8h  ipratropium-albuteroL, 3 mL, nebulization, TID  lidocaine, 1 patch, transdermal, Daily  losartan, 25 mg, oral, Daily  magnesium oxide, 400 mg, oral, Once per day on Tue Thu  Sat  melatonin, 3 mg, oral, Nightly  metoprolol succinate XL, 25 mg, oral, Daily  oxygen, 2 L/min, inhalation, q8h  pantoprazole, 40 mg, oral, Daily  piperacillin-tazobactam, 2.25 g, intravenous, q6h  rOPINIRole, 5 mg, oral, Nightly  sertraline, 50 mg, oral, Daily      Continuous medications     PRN medications  PRN medications: [DISCONTINUED] acetaminophen **OR** acetaminophen **OR** acetaminophen, acetaminophen, albuterol, benzocaine-menthol, dextromethorphan-guaifenesin, guaiFENesin, HYDROcodone-acetaminophen, nitroglycerin, ondansetron, sodium chloride, traZODone     Labs:  Lab Results   Component Value Date     01/30/2024    K 4.4 01/30/2024     01/30/2024    CO2 26 01/30/2024    BUN 16 01/30/2024    CREATININE 0.80 01/30/2024    GLUCOSE 103 (H) 01/30/2024    CALCIUM 8.3 (L) 01/30/2024     Lab Results   Component Value Date    WBC 12.9 (H) 01/30/2024    HGB 11.2 (L) 01/30/2024    HCT 36.0 01/30/2024    MCV 92 01/30/2024     01/30/2024       Imaging:      CT angio chest for pulmonary embolism    Result Date: 1/29/2024  STUDY: CT Angiogram of the Chest; 1/29/2024 5:34 AM INDICATION: Shortness of breath.  Elevated D-dimer. COMPARISON: CXR 1/29/2024.  CTA chest 10/22/2023. ACCESSION NUMBER(S): [id number] ORDERING CLINICIAN: GISELA GUALLPA TECHNIQUE:  CTA of the chest was performed with intravenous contrast. Images are reviewed and processed at a workstation according to the CT angiogram protocol with 3-D and/or MIP post processing imaging generated.  Omnipaque 350 75 mL was administered intravenously. Automated mA/kV exposure control was utilized and patient examination was performed in strict accordance with principles of ALARA. FINDINGS: Pulmonary arteries are adequately opacified without large central acute or chronic filling defects.  Respiratory motion limits evaluation of the smaller pulmonary arteries.  The thoracic aorta is normal in course and caliber without dissection or  aneurysm.  Mild calcified atheromatous plaque is seen in the thoracic aorta. Enlarged, heterogeneous thyroid is identified with a confluent area of hypodense nodularity in the thyroid isthmus measuring 5.0 x 6.1 cm in diameter.  Calcifications are seen in the thyroid lobes. The heart is normal in size without pericardial effusion.  Mild calcified coronary plaque is noted.  Thoracic lymph nodes are not enlarged. Trachea and mainstem bronchi are patent and clear of debris.  There are large bilateral pleural effusions layering dependently with collapse and consolidation of the left lower lobe as well as the dependent portion of the left upper lobe and lingular segment of left upper lobe.  Patchy airspace disease is seen in the right upper lobe. Relaxation atelectasis and mild consolidation is seen in the dependent portion of the right lower lobe.  There is atelectasis in the central portion of the right middle lobe.  Increased AP diameter of the thorax may indicate chronic changes of COPD.  No interstitial lung disease or definite suspicious pulmonary nodules are identified.  There is no pneumothorax. Upper abdomen demonstrates no acute pathology.  Surgical clips are noted in the upper abdomen.  Mild calcified plaque is seen in the included upper abdominal aorta. There are no acute fractures.  No suspicious bony lesions.  Pronounced thoracic kyphosis is noted.  There is moderate multilevel disc space narrowing throughout the mid and lower thoracic spine.  Sclerotic focus is seen within the T2 vertebrae measuring 6 mm in diameter. Generalized osseous demineralization is noted.    Large bilateral pleural effusions with multifocal areas of consolidation in both lungs, most pronounced in the left lower lobe and dependent portions of the left upper lobe and right lower lobe, indicating probable severe pneumonia/aspiration with patchy airspace disease in the right upper lobe. Enlarged, heterogeneous thyroid with a 6.1 cm  nodule in the thyroid isthmus with poorly defined margins, nonemergent thyroid ultrasound is recommended for further characterization. Chronic changes suggesting COPD. Pronounced thoracic kyphosis with multilevel age-related osteoporotic changes in the spine. Signed by Trent Salinas    XR chest 1 view    Result Date: 1/29/2024  STUDY: Chest Radiograph;  1/29/2024 3:42 AM INDICATION: Shortness of breath. COMPARISON: 12/14/2023 XR Chest one view ACCESSION NUMBER(S): [id number] ORDERING CLINICIAN: GISELA GUALLPA TECHNIQUE:  Frontal chest was obtained at 3:42 hours. FINDINGS: CARDIOMEDIASTINAL SILHOUETTE: Cardiomediastinal silhouette is obscured but appears enlarged. Calcified plaque is seen in the aorta.  LUNGS: There is extensive airspace disease in the left midlung zone and bilateral lung bases.  Increased interstitial markings are seen bilaterally.  There is a probable moderate left effusion and suspected small right effusion.  ABDOMEN: No remarkable upper abdominal findings.  BONES: No acute osseous changes.    Extensive multifocal bilateral airspace disease with probable effusions, likely multilobar pneumonia in the appropriate clinical setting with question of mild superimposed pulmonary edema. Signed by Trent Salinas              Assessment/Plan   Principal Problem:    Respiratory distress  Active Problems:    Anxiety    Aortic stenosis    Hyperlipidemia    Hypertension    Thyroid nodule    Pneumonia due to infectious organism    Pleural effusion    UTI (urinary tract infection)    SIRS (systemic inflammatory response syndrome) (CMS/HCC)      Breathing is overall improved compared with admission.  Not surprisingly pleural fluid from the left is transudative.    Diuresis  Supplemental oxygen  Repeat chest x-ray to assess response to diuresis, will get a two-view in the morning  Aspiration precautions  Prophylaxis    Wisam Winslow MD  Pulmonary/[facility] pulmonary [facility]       LOS: 2 days

## 2024-02-01 ENCOUNTER — APPOINTMENT (OUTPATIENT)
Dept: RADIOLOGY | Facility: HOSPITAL | Age: 89
DRG: 291 | End: 2024-02-01
Payer: MEDICARE

## 2024-02-01 LAB
ANION GAP SERPL CALC-SCNC: 6 MMOL/L
BASOPHILS # BLD AUTO: 0.03 X10*3/UL (ref 0–0.1)
BASOPHILS NFR BLD AUTO: 0.4 %
BUN SERPL-MCNC: 13 MG/DL (ref 8–25)
CALCIUM SERPL-MCNC: 8.4 MG/DL (ref 8.5–10.4)
CHLORIDE SERPL-SCNC: 103 MMOL/L (ref 97–107)
CO2 SERPL-SCNC: 30 MMOL/L (ref 24–31)
CREAT SERPL-MCNC: 0.8 MG/DL (ref 0.4–1.6)
EGFRCR SERPLBLD CKD-EPI 2021: 68 ML/MIN/1.73M*2
EOSINOPHIL # BLD AUTO: 0.33 X10*3/UL (ref 0–0.4)
EOSINOPHIL NFR BLD AUTO: 4.1 %
ERYTHROCYTE [DISTWIDTH] IN BLOOD BY AUTOMATED COUNT: 15.4 % (ref 11.5–14.5)
GLUCOSE SERPL-MCNC: 86 MG/DL (ref 65–99)
HCT VFR BLD AUTO: 34 % (ref 36–46)
HGB BLD-MCNC: 10.7 G/DL (ref 12–16)
IMM GRANULOCYTES # BLD AUTO: 0.02 X10*3/UL (ref 0–0.5)
IMM GRANULOCYTES NFR BLD AUTO: 0.3 % (ref 0–0.9)
LYMPHOCYTES # BLD AUTO: 0.98 X10*3/UL (ref 0.8–3)
LYMPHOCYTES NFR BLD AUTO: 12.3 %
MCH RBC QN AUTO: 28.7 PG (ref 26–34)
MCHC RBC AUTO-ENTMCNC: 31.5 G/DL (ref 32–36)
MCV RBC AUTO: 91 FL (ref 80–100)
MONOCYTES # BLD AUTO: 0.61 X10*3/UL (ref 0.05–0.8)
MONOCYTES NFR BLD AUTO: 7.7 %
NEUTROPHILS # BLD AUTO: 5.99 X10*3/UL (ref 1.6–5.5)
NEUTROPHILS NFR BLD AUTO: 75.2 %
NRBC BLD-RTO: 0 /100 WBCS (ref 0–0)
PLATELET # BLD AUTO: 263 X10*3/UL (ref 150–450)
POTASSIUM SERPL-SCNC: 3.9 MMOL/L (ref 3.4–5.1)
RBC # BLD AUTO: 3.73 X10*6/UL (ref 4–5.2)
SODIUM SERPL-SCNC: 139 MMOL/L (ref 133–145)
WBC # BLD AUTO: 8 X10*3/UL (ref 4.4–11.3)

## 2024-02-01 PROCEDURE — 1200000002 HC GENERAL ROOM WITH TELEMETRY DAILY

## 2024-02-01 PROCEDURE — 2500000001 HC RX 250 WO HCPCS SELF ADMINISTERED DRUGS (ALT 637 FOR MEDICARE OP): Performed by: INTERNAL MEDICINE

## 2024-02-01 PROCEDURE — 2500000005 HC RX 250 GENERAL PHARMACY W/O HCPCS: Performed by: INTERNAL MEDICINE

## 2024-02-01 PROCEDURE — 80048 BASIC METABOLIC PNL TOTAL CA: CPT | Performed by: INTERNAL MEDICINE

## 2024-02-01 PROCEDURE — 74230 X-RAY XM SWLNG FUNCJ C+: CPT

## 2024-02-01 PROCEDURE — 97110 THERAPEUTIC EXERCISES: CPT | Mod: GP,CQ

## 2024-02-01 PROCEDURE — 97116 GAIT TRAINING THERAPY: CPT | Mod: GP,CQ

## 2024-02-01 PROCEDURE — 9420000001 HC RT PATIENT EDUCATION 5 MIN

## 2024-02-01 PROCEDURE — 94640 AIRWAY INHALATION TREATMENT: CPT | Mod: MUE

## 2024-02-01 PROCEDURE — 36415 COLL VENOUS BLD VENIPUNCTURE: CPT | Performed by: INTERNAL MEDICINE

## 2024-02-01 PROCEDURE — 99233 SBSQ HOSP IP/OBS HIGH 50: CPT | Performed by: INTERNAL MEDICINE

## 2024-02-01 PROCEDURE — 2500000004 HC RX 250 GENERAL PHARMACY W/ HCPCS (ALT 636 FOR OP/ED)

## 2024-02-01 PROCEDURE — 92610 EVALUATE SWALLOWING FUNCTION: CPT | Mod: GN | Performed by: SPEECH-LANGUAGE PATHOLOGIST

## 2024-02-01 PROCEDURE — 71045 X-RAY EXAM CHEST 1 VIEW: CPT

## 2024-02-01 PROCEDURE — 85025 COMPLETE CBC W/AUTO DIFF WBC: CPT | Performed by: INTERNAL MEDICINE

## 2024-02-01 PROCEDURE — 2500000002 HC RX 250 W HCPCS SELF ADMINISTERED DRUGS (ALT 637 FOR MEDICARE OP, ALT 636 FOR OP/ED): Performed by: INTERNAL MEDICINE

## 2024-02-01 PROCEDURE — 99232 SBSQ HOSP IP/OBS MODERATE 35: CPT | Performed by: INTERNAL MEDICINE

## 2024-02-01 PROCEDURE — 94760 N-INVAS EAR/PLS OXIMETRY 1: CPT | Mod: MUE

## 2024-02-01 PROCEDURE — 92611 MOTION FLUOROSCOPY/SWALLOW: CPT | Mod: GN | Performed by: SPEECH-LANGUAGE PATHOLOGIST

## 2024-02-01 PROCEDURE — 92526 ORAL FUNCTION THERAPY: CPT | Mod: GN | Performed by: SPEECH-LANGUAGE PATHOLOGIST

## 2024-02-01 PROCEDURE — 2500000004 HC RX 250 GENERAL PHARMACY W/ HCPCS (ALT 636 FOR OP/ED): Performed by: INTERNAL MEDICINE

## 2024-02-01 RX ADMIN — FAMOTIDINE 20 MG: 20 TABLET, FILM COATED ORAL at 09:58

## 2024-02-01 RX ADMIN — HEPARIN SODIUM 5000 UNITS: 5000 INJECTION, SOLUTION INTRAVENOUS; SUBCUTANEOUS at 16:43

## 2024-02-01 RX ADMIN — SERTRALINE HYDROCHLORIDE 50 MG: 50 TABLET ORAL at 09:58

## 2024-02-01 RX ADMIN — BARIUM SULFATE 60 ML: 0.81 POWDER, FOR SUSPENSION ORAL at 11:26

## 2024-02-01 RX ADMIN — BARIUM SULFATE 10 ML: 400 SUSPENSION ORAL at 11:26

## 2024-02-01 RX ADMIN — ASPIRIN 325 MG: 325 TABLET, COATED ORAL at 09:58

## 2024-02-01 RX ADMIN — ATORVASTATIN CALCIUM 10 MG: 10 TABLET, FILM COATED ORAL at 21:22

## 2024-02-01 RX ADMIN — PIPERACILLIN SODIUM AND TAZOBACTAM SODIUM 2.25 G: 2; .25 INJECTION, SOLUTION INTRAVENOUS at 05:21

## 2024-02-01 RX ADMIN — PIPERACILLIN SODIUM AND TAZOBACTAM SODIUM 2.25 G: 2; .25 INJECTION, SOLUTION INTRAVENOUS at 15:52

## 2024-02-01 RX ADMIN — FUROSEMIDE 40 MG: 40 TABLET ORAL at 16:43

## 2024-02-01 RX ADMIN — LIDOCAINE 1 PATCH: 4 PATCH TOPICAL at 09:59

## 2024-02-01 RX ADMIN — PIPERACILLIN SODIUM AND TAZOBACTAM SODIUM 2.25 G: 2; .25 INJECTION, SOLUTION INTRAVENOUS at 09:59

## 2024-02-01 RX ADMIN — Medication 2 L/MIN: at 07:00

## 2024-02-01 RX ADMIN — ROPINIROLE HYDROCHLORIDE 5 MG: 2 TABLET, FILM COATED ORAL at 21:22

## 2024-02-01 RX ADMIN — METOPROLOL SUCCINATE 25 MG: 25 TABLET, EXTENDED RELEASE ORAL at 09:58

## 2024-02-01 RX ADMIN — FUROSEMIDE 40 MG: 40 TABLET ORAL at 09:58

## 2024-02-01 RX ADMIN — Medication 400 MG: at 09:58

## 2024-02-01 RX ADMIN — HEPARIN SODIUM 5000 UNITS: 5000 INJECTION, SOLUTION INTRAVENOUS; SUBCUTANEOUS at 09:58

## 2024-02-01 RX ADMIN — Medication 3 MG: at 21:22

## 2024-02-01 RX ADMIN — IPRATROPIUM BROMIDE AND ALBUTEROL SULFATE 3 ML: 2.5; .5 SOLUTION RESPIRATORY (INHALATION) at 07:19

## 2024-02-01 RX ADMIN — LOSARTAN POTASSIUM 25 MG: 25 TABLET, FILM COATED ORAL at 09:58

## 2024-02-01 RX ADMIN — IPRATROPIUM BROMIDE AND ALBUTEROL SULFATE 3 ML: 2.5; .5 SOLUTION RESPIRATORY (INHALATION) at 19:14

## 2024-02-01 RX ADMIN — PIPERACILLIN SODIUM AND TAZOBACTAM SODIUM 2.25 G: 2; .25 INJECTION, SOLUTION INTRAVENOUS at 21:23

## 2024-02-01 RX ADMIN — PANTOPRAZOLE SODIUM 40 MG: 40 TABLET, DELAYED RELEASE ORAL at 09:58

## 2024-02-01 RX ADMIN — BARIUM SULFATE 20 ML: 400 SUSPENSION ORAL at 11:27

## 2024-02-01 RX ADMIN — BARIUM SULFATE 10 ML: 400 PASTE ORAL at 11:24

## 2024-02-01 RX ADMIN — CALCITONIN SALMON 1 SPRAY: 200 SPRAY, METERED NASAL at 09:59

## 2024-02-01 RX ADMIN — Medication 2 L/MIN: at 15:00

## 2024-02-01 RX ADMIN — FAMOTIDINE 20 MG: 20 TABLET, FILM COATED ORAL at 21:22

## 2024-02-01 RX ADMIN — IPRATROPIUM BROMIDE AND ALBUTEROL SULFATE 3 ML: 2.5; .5 SOLUTION RESPIRATORY (INHALATION) at 11:58

## 2024-02-01 RX ADMIN — ASPIRIN 325 MG: 325 TABLET, COATED ORAL at 21:22

## 2024-02-01 RX ADMIN — Medication 2 L/MIN: at 23:00

## 2024-02-01 ASSESSMENT — COGNITIVE AND FUNCTIONAL STATUS - GENERAL
MOVING FROM LYING ON BACK TO SITTING ON SIDE OF FLAT BED WITH BEDRAILS: A LITTLE
MOVING TO AND FROM BED TO CHAIR: A LITTLE
HELP NEEDED FOR BATHING: A LITTLE
MOVING FROM LYING ON BACK TO SITTING ON SIDE OF FLAT BED WITH BEDRAILS: A LITTLE
MOBILITY SCORE: 16
TURNING FROM BACK TO SIDE WHILE IN FLAT BAD: A LITTLE
TOILETING: A LITTLE
STANDING UP FROM CHAIR USING ARMS: A LITTLE
EATING MEALS: A LITTLE
WALKING IN HOSPITAL ROOM: A LITTLE
MOBILITY SCORE: 16
MOVING TO AND FROM BED TO CHAIR: A LITTLE
DRESSING REGULAR UPPER BODY CLOTHING: A LITTLE
CLIMB 3 TO 5 STEPS WITH RAILING: TOTAL
STANDING UP FROM CHAIR USING ARMS: A LITTLE
DAILY ACTIVITIY SCORE: 18
TURNING FROM BACK TO SIDE WHILE IN FLAT BAD: A LITTLE
DRESSING REGULAR LOWER BODY CLOTHING: A LITTLE
CLIMB 3 TO 5 STEPS WITH RAILING: TOTAL
WALKING IN HOSPITAL ROOM: A LITTLE
PERSONAL GROOMING: A LITTLE

## 2024-02-01 ASSESSMENT — PAIN - FUNCTIONAL ASSESSMENT
PAIN_FUNCTIONAL_ASSESSMENT: 0-10

## 2024-02-01 ASSESSMENT — PAIN SCALES - GENERAL
PAINLEVEL_OUTOF10: 0 - NO PAIN

## 2024-02-01 NOTE — PROGRESS NOTES
Physical Therapy    Physical Therapy Treatment    Patient Name: Kalie Ramos  MRN: [id number]  Today's Date: 2/1/2024  Time Calculation  Start Time: 1255  Stop Time: 1323  Time Calculation (min): 28 min       Assessment/Plan   PT Assessment  PT Assessment Results: Decreased strength, Decreased range of motion, Decreased endurance, Impaired balance, Decreased mobility, Decreased coordination, Decreased cognition, Impaired judgement, Decreased safety awareness  Rehab Prognosis: Good  End of Session Communication: Bedside nurse  End of Session Patient Position: Up in chair  PT Plan  Inpatient/Swing Bed or Outpatient: Inpatient  PT Plan  Treatment/Interventions: Bed mobility, Transfer training, Gait training, Therapeutic exercise  PT Plan: Skilled PT  PT Frequency: 4 times per week  PT Discharge Recommendations: Moderate intensity level of continued care  Equipment Recommended upon Discharge: Wheeled walker  PT Recommended Transfer Status: Assist x1, Assistive device  PT - OK to Discharge: Yes      General Visit Information:   PT  Visit  PT Received On: 02/01/24  Response to Previous Treatment: Patient with no complaints from previous session.  General  Family/Caregiver Present: Yes  Caregiver Feedback: DTR  Prior to Session Communication: Bedside nurse  Patient Position Received: Bed, 4 rail up  Preferred Learning Style: verbal  General Comment: agreeable to treatment    Subjective   Precautions:  Precautions  Hearing/Visual Limitations: very hard of hearing  Medical Precautions: Fall precautions, Oxygen therapy device and L/min (3L oxygen)  Vital Signs:       Objective   Pain:  Pain Assessment  Pain Assessment: 0-10  Pain Score: 0 - No pain  Cognition:  Cognition  Overall Cognitive Status: Within Functional Limits  Postural Control:     Extremity/Trunk Assessments:    Activity Tolerance:     Treatments:  Therapeutic Exercise  Therapeutic Exercise Performed: Yes  Therapeutic Exercise Activity 1: Pt performed seated  bilat LE ankle pumps/heel raises, glute sets, LAQ, hip flexion, carmelo hip adduction and resisted hip abduction x15 reps each.    Bed Mobility  Bed Mobility: Yes  Bed Mobility 1  Bed Mobility 1: Supine to sitting  Level of Assistance 1: Minimum assistance  Bed Mobility Comments 1: Pt needed light min assist with trunk up supine to sitting.    Ambulation/Gait Training  Ambulation/Gait Training Performed: Yes  Ambulation/Gait Training 1  Surface 1: Level tile  Device 1: Rolling walker  Gait Support Devices: Wheelchair follow  Assistance 1: Minimum assistance  Quality of Gait 1: Shuffling gait  Comments/Distance (ft) 1: Pt ambulated with RW and 3L oxygen ~35' x1 min assist of 1 and close chair follow for safety. Pt ambulated at slow pace, steps a little shuffled. Slightly forward flexed posture. Pt needed occasional assist to manage walker.  Transfers  Transfer: Yes  Transfer 1  Transfer From 1: Bed to  Transfer to 1: Stand  Technique 1: Sit to stand  Transfer Device 1: Walker  Transfer Level of Assistance 1: Minimum assistance  Trials/Comments 1: Min assist to steady, pt a little retro upon standing, min assist to get COG over SEEMA.  Transfers 2  Transfer From 2: Stand to  Transfer to 2: Sit, Chair with arms  Technique 2: Stand to sit  Transfer Level of Assistance 2: Minimum assistance    Outcome Measures:  [facility] Basic Mobility  Turning from your back to your side while in a flat bed without using bedrails: A little  Moving from lying on your back to sitting on the side of a flat bed without using bedrails: A little  Moving to and from bed to chair (including a wheelchair): A little  Standing up from a chair using your arms (e.g. wheelchair or bedside chair): A little  To walk in hospital room: A little  Climbing 3-5 steps with railing: Total  Basic Mobility - Total Score: 16    Education Documentation  Handouts, taught by Inna Gambino PTA at 2/1/2024  1:45 PM.  Learner: Patient  Readiness: Acceptance  Method:  Explanation  Response: Verbalizes Understanding, Needs Reinforcement    Precautions, taught by Inna Gambino PTA at 2/1/2024  1:45 PM.  Learner: Patient  Readiness: Acceptance  Method: Explanation  Response: Verbalizes Understanding, Needs Reinforcement    Body Mechanics, taught by Inna Gambino PTA at 2/1/2024  1:45 PM.  Learner: Patient  Readiness: Acceptance  Method: Explanation  Response: Verbalizes Understanding, Needs Reinforcement    Home Exercise Program, taught by Inna Gambino PTA at 2/1/2024  1:45 PM.  Learner: Patient  Readiness: Acceptance  Method: Explanation  Response: Verbalizes Understanding, Needs Reinforcement    Mobility Training, taught by Inna Gambino PTA at 2/1/2024  1:45 PM.  Learner: Patient  Readiness: Acceptance  Method: Explanation  Response: Verbalizes Understanding, Needs Reinforcement    Education Comments  No comments found.        OP EDUCATION:       Encounter Problems       Encounter Problems (Active)       Balance       Standing Balance (Progressing)       Start:  01/29/24    Expected End:  02/12/24       Pt will demonstrate good static standing balance to promote safe participation with out of bed activity, transfers, and mobility              Mobility       Ambulation (Progressing)       Start:  01/29/24    Expected End:  02/12/24       Pt will ambulate 50' modified independent assist with walker to promote safe home mobility           Entry Step Negotiation (Progressing)       Start:  01/29/24    Expected End:  02/12/24       Pt will ascend/descend 1 stairs with rail(s) on B and modified independent assist to promote safe entry and exit in home environment                Safety       Safe Mobility Techniques (Progressing)       Start:  01/29/24    Expected End:  02/12/24       Pt will correctly identify and demonstrate safe mobility techniques to reduce their risks for falls during their acute care stay               Transfers       Supine to sit (Progressing)       Start:   01/29/24    Expected End:  02/12/24       Pt will transfer supine to sitting at edge of bed with modified independent assist to promote acute care out of bed activity           Sit to stand (Progressing)       Start:  01/29/24    Expected End:  02/12/24       Pt will transfer sit to standing position with modified independent assist and walker to promote safe out of bed activity           Bed to Chair (Progressing)       Start:  01/29/24    Expected End:  02/12/24       Pt will transfer from sitting edge of bed to the chair with modified independent assist and walker to promote out of bed activity and reduce the risks of prolonged acute care bedrest

## 2024-02-01 NOTE — PROGRESS NOTES
"Kalie Ramos is a 89 y.o. female on day 2 of admission presenting with Respiratory distress.    Subjective   Breathing is doing better after having thoracocentesis       Objective     Physical Exam  Vitals reviewed.   Constitutional:       Appearance: Normal appearance.   HENT:      Head: Normocephalic and atraumatic.      Right Ear: Tympanic membrane, ear canal and external ear normal.      Left Ear: Tympanic membrane, ear canal and external ear normal.      Nose: Nose normal.      Mouth/Throat:      Pharynx: Oropharynx is clear.   Eyes:      Extraocular Movements: Extraocular movements intact.      Conjunctiva/sclera: Conjunctivae normal.      Pupils: Pupils are equal, round, and reactive to light.   Cardiovascular:      Rate and Rhythm: Normal rate and regular rhythm.      Pulses: Normal pulses.      Heart sounds: Normal heart sounds.   Pulmonary:      Effort: Pulmonary effort is normal.      Breath sounds: Normal breath sounds.      Comments: Diminished air entry at bases  Abdominal:      General: Abdomen is flat. Bowel sounds are normal.      Palpations: Abdomen is soft.   Musculoskeletal:      Cervical back: Normal range of motion and neck supple.   Skin:     General: Skin is warm and dry.   Neurological:      General: No focal deficit present.      Mental Status: She is alert and oriented to person, place, and time.   Psychiatric:         Mood and Affect: Mood normal.         Last Recorded Vitals  Blood pressure 127/51, pulse 74, temperature 36.9 °C (98.4 °F), temperature source Temporal, resp. rate 21, height 1.575 m (5' 2\"), weight 55.5 kg (122 lb 5.7 oz), SpO2 98 %.  Intake/Output last 3 Shifts:  I/O last 3 completed shifts:  In: 100 (1.8 mL/kg) [IV Piggyback:100]  Out: - (0 mL/kg)   Weight: 55.5 kg     Relevant Results              Scheduled medications  aspirin, 325 mg, oral, BID  atorvastatin, 10 mg, oral, Nightly  calcitonin (salmon), 1 spray, One Nostril, Daily  famotidine, 20 mg, oral, " BID  ferrous gluconate, 38 mg of iron, oral, Once per day on Mon Wed Fri  furosemide, 40 mg, oral, BID with meals  heparin (porcine), 5,000 Units, subcutaneous, q8h  ipratropium-albuteroL, 3 mL, nebulization, TID  lidocaine, 1 patch, transdermal, Daily  losartan, 25 mg, oral, Daily  magnesium oxide, 400 mg, oral, Once per day on Tue Thu Sat  melatonin, 3 mg, oral, Nightly  metoprolol succinate XL, 25 mg, oral, Daily  oxygen, 2 L/min, inhalation, q8h  pantoprazole, 40 mg, oral, Daily  piperacillin-tazobactam, 2.25 g, intravenous, q6h  rOPINIRole, 5 mg, oral, Nightly  sertraline, 50 mg, oral, Daily      Continuous medications     PRN medications  PRN medications: [DISCONTINUED] acetaminophen **OR** acetaminophen **OR** acetaminophen, acetaminophen, albuterol, benzocaine-menthol, dextromethorphan-guaifenesin, guaiFENesin, HYDROcodone-acetaminophen, nitroglycerin, ondansetron, sodium chloride, traZODone  No results found for this or any previous visit (from the past 24 hour(s)).  US thoracentesis    Result Date: 1/31/2024  Interpreted By:  Rey Monte, STUDY: US THORACENTESIS; 1/30/2024 4:52 pm   INDICATION: Signs/Symptoms:hypoxia, shortness of breath.   COMPARISON: None   ACCESSION NUMBER(S): [id number]   ORDERING CLINICIAN: JESS BONILLA   TECHNIQUE: Informed consent obtained. Patient positionedsitting upright. Skin prepped, draped and anesthetized. Under ultrasound guidance, a centesis catheter/needle was advanced into leftpleural cavity.   FINDINGS: A total of 1.1 L of dark yellow fluid was aspirated. A sample was sent for analysis. The patient tolerated the procedure well.       Ultrasound-guided left thoracentesis.     Signed by: Rey Monte 1/31/2024 4:14 PM Dictation workstation:   [id number]             Malnutrition Diagnosis Status: New  Malnutrition Diagnosis: Severe malnutrition related to acute disease or injury  As Evidenced by: moderate to severe subcutaneous fat loss and muscle wasting, po  intake </= 75% for >/= 1 month, reported wt loss by pt and family  I agree with the dietitian's malnutrition diagnosis.      Assessment/Plan   Principal Problem:    Respiratory distress  Active Problems:    Anxiety    Aortic stenosis    Hyperlipidemia    Hypertension    Thyroid nodule    Pneumonia due to infectious organism    Pleural effusion    UTI (urinary tract infection)    SIRS (systemic inflammatory response syndrome) (CMS/HCC)    Thoracocentesis done  Pleural fluid transudate  Diuretics to help with the CHF  Patient has severe aortic valve stenosis which could be contributing  Continue oxygen support  Plan for rehab at discharge       I spent  minutes in the professional and overall care of this patient.      Nakia Bailey MD

## 2024-02-01 NOTE — NURSING NOTE
Patient arrived to unit from 401 in stable condition. Patient is A&O x 3. Reoriented patient to room and use of call light/bed controls. Patient upright in bed eating dinner. All needs addressed. Call light within reach.

## 2024-02-01 NOTE — PROGRESS NOTES
"Speech-Language Pathology    Inpatient Speech-Language Pathology Clinical Swallow Evaluation       Patient Name: Kalie Ramos  MRN: [id number]  : 1929  Today's Date: 24  Time Calculation  Start Time: 0845  Stop Time: 916  Time Calculation (min): 31 min       Reason for Consultation:  Assess oropharyngeal swallow function in the setting of recurrent pneumonia.     Recommendations:  Risk for Aspiration: Yes     Other recommendations pending MBSS    MBSS today (24) at 11:00.     Skilled dysphagia treatment is warranted  at next level of care.     Assessment:  Consistencies Trialed:     Pt participated in diagnostic meal assessment with pancakes, grapes, cantaloupe, hot tea.    OME and CNE are grossly WFL. Vocal quality and cough are perceptually WFL.    Oral phase - Oral mucosa moist and normal in coloration. Mastication, bolus manipulation, and oral clearance timely and functional.     Pharyngeal phase - Although it is a limited assessment technique; Hyolaryngeal elevation/excursion assessed via digital palpation and appeared consistent across all trials. No coughing, choking, nor change in vocal quality present throughout trials. No overt s/s of aspiration present at the bedside.     Given pt's history of recurrent pneumonia, recommend MBSS for further assessment of oropharyngeal swallow function to r/o silent aspiration.     Relevant Imaging Results:  2023 MBSS:   \" There was deep penetration without evidence for aspiration with thin liquids, nectar liquids,  honey liquids, and puree trials. Penetrated material remains in the laryngeal vestibule with high liklihood of eventual aspiration. Penetration depth and ejection is improved with use of effortful swallow and chin tuck strategies.      There are moderate-severe oropharyngeal deficits as characterized by: delayed swallow onset, diminished laryngeal elevation, diminished tongue base retraction, diminished epiglottic inversion, and " "pharyngeal residue.      Recommend: continuation of regular solids with thin liquids with strict use of the following strategies: effortful swallow, chin tuck, and oral care before all PO trials.\"  1/29/24 CT chest:   IMPRESSION:  Large bilateral pleural effusions with multifocal areas of  consolidation in both lungs, most pronounced in the left lower lobe and dependent portions of the left upper lobe and right lower lobe, indicating probable severe pneumonia/aspiration with patchy airspace disease in the right upper lobe.  Enlarged, heterogeneous thyroid with a 6.1 cm nodule in the thyroid isthmus with poorly defined margins, nonemergent thyroid ultrasound is recommended for further characterization.  Chronic changes suggesting COPD. Pronounced thoracic kyphosis with multilevel age-related osteoporotic changes in the spine.  Plan:  SLP Plan: Skilled SLP   SLP Frequency: Other (Comment)   Duration: Current admission       Discussed POC: Patient, Nursing, Physician   Discussed Risks/Benefits: Yes, Patient, Nursing   Patient/Caregiver Agreeable: Yes     Skilled dysphagia treatment is warranted due to for further education and training on dysphagia management including instruction on oropharyngeal therapeutic exercises and/or compensatory swallowing strategies    Goals:  1) Pt to tolerate recommended diet without overt s/s of aspiration in order to ensure pt is demonstrating adequate swallow function for meeting nutrition and hydration needs.   2) Pt to participate in oropharyngeal therapeutic exercises to improve oropharyngeal swallow function for return to baseline diet level.   3) Pt to participate in assessment of oropharyngeal swallow function via MBSS for assessment of possible aspiration and to determine least restrictive diet for meeting pt's nutritional and hydration needs.     Subjective   Patient is pleasant and participative throughout. Shares that she knows she's supposed to tuck her chin when she " swallows, but often forgets to do it with solids. Does remember with liquids.     General Visit Information:  Past medical history:   Per H&P:   History Of Present Illness  Kalie Ramos is a 89 y.o. female presenting with shortness of breath.  Patient lives alone independently.  Patient has history of aortic valve stenosis had a fall causing right hip fracture underwent ORIF after that she went to the rehab facility.  Patient was discharged from the rehab facility went home better after a week or 2 she started feeling weak again.  She was not moving around much.  Yesterday family found her short of breath       past Medical History  Medical History[]Expand by Default        Past Medical History:   Diagnosis Date    Anemia      Arthritis      At risk for falling      Atrophic disorder of skin, unspecified 07/19/2013     Atrophoderma    Body mass index (BMI) 22.0-22.9, adult       BMI 22.0-22.9, adult    Cervicalgia 05/08/2015     Neck pain    CHF (congestive heart failure) (CMS/[facility])      COPD (chronic obstructive pulmonary disease) (CMS/[facility])      Depression      GERD (gastroesophageal reflux disease)      High cholesterol      Hip fracture (CMS/[facility])      Hypertension      Insomnia      Leg swelling      Malignant neoplasm of unspecified site of unspecified female breast (CMS/HCC) 07/19/2013     Adenocarcinoma of breast    Neuropathy      Osteoarthritis      Other conditions influencing health status 10/21/2013     Urinary Tract Infection    Personal history of other infectious and parasitic diseases 03/17/2015     History of candidiasis of mouth    Personal history of other specified conditions 03/17/2015     History of abdominal pain    Personal history of pneumonia (recurrent) 09/10/2015     History of pneumonia    Personal history of transient ischemic attack (TIA), and cerebral infarction without residual deficits 09/04/2014     History of stroke    Pulmonary embolism (CMS/HCC)      PVD (peripheral vascular  disease) (CMS/HCC)      Syncope      Weakness         Prior to Session Communication: Bedside nurse       Pain:  Pain Assessment  Pain Assessment: 0-10 Denies pain      Education:   Patient education completed regarding results and recommendations of SLP evaluation. Pt verbalizes understanding with all patient questions answered to satisfaction.     Care Team involvement:   Communicated with bedside nurse prior to evaluation with clearance for patient participation obtained, Results of the bedside swallowing evaluation were discussed with nurse and verbalized understanding was noted. , and Results of bedside evaluation relayed to physician via [employer] secure chat

## 2024-02-01 NOTE — PROGRESS NOTES
"Speech-Language Pathology        Modified Barium Swallow Study     Patient Name: Kalie Ramos  MRN: [id number]  : 1929  Today's Date: 24  Time Calculation  Start Time: 1105  Stop Time: 1140  Time Calculation (min): 35 min       Recommendations:  - Regular solids with nectar thickened liquids (chin tuck with liquids).   - Medications whole in puree or with thickened liquids  - Consider use of hanks free water protocol to mitigate risks of dehydration associated with thickened liquids.]  - Continue dysphagia tx during admission and s/p discharge.   - Repeat MBSS in 4-6 weeks or prior to diet upgrade.    Assessment/Impression:    Full detailed SLP/Radiologist Modified Barium Swallow study report can be found under Chart Review tab, Imaging tab and  titled \"FL Modified Barium Swallow Study\"      Moderate oropharyngeal dysphagia is present with deficits as discussed in complete report. Patient is noted with SILENT aspiration during and after the swallow of thin liquids. Aspiration is eliminated during the with use of chin tuck strategy, however deep penetration remains with thin liquid residues in laryngeal vestibule that are noted to slowly aspirate throughout testing. Given patient's history of dysphagia and difficulty with recalling and applying use of chin tuck strategy in prior opportunities, recommend liquid downgrade to nectar thickened liquids. To help mitigate risks of dehydration second to thickened liquids, the hanks free water protocol is also recommended.     Plan:  Treatment/Interventions: Pharyngeal exercises, Oral motor exercises, Patient/family education, Bolus trials  SLP Plan: Skilled SLP warranted  SLP Frequency: 3x per week  Duration: current admission    Discussed POC: Patient, Dtr, Annette, Nursing, physician  Discussed Risks/Benefits: Yes  Patient/Caregiver Agreeable: Yes    Pain:   Rating 0-10: denies pain    GOALS:  Pt. to tolerate least restrictive diet without pulmonary " compromise,   Pt. to use safe swallow strategies independently in all observed trials ,   Pt. to tolerate trials of thin liquids with SLP only with no s/s aspiration or laryngeal penetration ,   Pt. to complete pharyngeal strengthening techniques    Treatment:   Treatment time: 15 minutes  Lengthy discussion regarding compensatory swallowing strategies and hanks free water protocol with pt and dtr following examination. Pt verbalizes understanding, demonstrating adequate return of knowledge and recall of need for chin tuck strategy. Handouts sent to dtr via email to further support.    Education:   Pt. And dtr Educated on results of MBS study, recommended diet and recommended safe swallow strategies

## 2024-02-01 NOTE — PROGRESS NOTES
"Kalie Ramos is a 89 y.o. female on day 3 of admission presenting with Respiratory distress.    Subjective   Patient had modified barium swallow done.  Patient is at risk for aspiration.  She is quite weak but breathing is little better       Objective     Physical Exam  Vitals reviewed.   Constitutional:       Appearance: Normal appearance.   HENT:      Head: Normocephalic and atraumatic.      Right Ear: Tympanic membrane, ear canal and external ear normal.      Left Ear: Tympanic membrane, ear canal and external ear normal.      Nose: Nose normal.      Mouth/Throat:      Pharynx: Oropharynx is clear.   Eyes:      Extraocular Movements: Extraocular movements intact.      Conjunctiva/sclera: Conjunctivae normal.      Pupils: Pupils are equal, round, and reactive to light.   Cardiovascular:      Rate and Rhythm: Normal rate and regular rhythm.      Pulses: Normal pulses.      Heart sounds: Normal heart sounds.   Pulmonary:      Effort: Pulmonary effort is normal.      Breath sounds: Normal breath sounds.      Comments: Diminished at bases  Abdominal:      General: Abdomen is flat. Bowel sounds are normal.      Palpations: Abdomen is soft.   Musculoskeletal:      Cervical back: Normal range of motion and neck supple.   Skin:     General: Skin is warm and dry.   Neurological:      General: No focal deficit present.      Mental Status: She is alert and oriented to person, place, and time.   Psychiatric:         Mood and Affect: Mood normal.         Last Recorded Vitals  Blood pressure 133/72, pulse 71, temperature 36.4 °C (97.5 °F), temperature source Temporal, resp. rate 20, height 1.575 m (5' 2\"), weight 55.2 kg (121 lb 11.1 oz), SpO2 98 %.  Intake/Output last 3 Shifts:  No intake/output data recorded.    Relevant Results              Scheduled medications  aspirin, 325 mg, oral, BID  atorvastatin, 10 mg, oral, Nightly  calcitonin (salmon), 1 spray, One Nostril, Daily  famotidine, 20 mg, oral, BID  ferrous " gluconate, 38 mg of iron, oral, Once per day on Mon Wed Fri  furosemide, 40 mg, oral, BID with meals  heparin (porcine), 5,000 Units, subcutaneous, q8h  ipratropium-albuteroL, 3 mL, nebulization, TID  lidocaine, 1 patch, transdermal, Daily  losartan, 25 mg, oral, Daily  magnesium oxide, 400 mg, oral, Once per day on Tue Thu Sat  melatonin, 3 mg, oral, Nightly  metoprolol succinate XL, 25 mg, oral, Daily  oxygen, 2 L/min, inhalation, q8h  pantoprazole, 40 mg, oral, Daily  piperacillin-tazobactam, 2.25 g, intravenous, q6h  rOPINIRole, 5 mg, oral, Nightly  sertraline, 50 mg, oral, Daily      Continuous medications     PRN medications  PRN medications: [DISCONTINUED] acetaminophen **OR** acetaminophen **OR** acetaminophen, acetaminophen, albuterol, benzocaine-menthol, dextromethorphan-guaifenesin, guaiFENesin, HYDROcodone-acetaminophen, nitroglycerin, ondansetron, sodium chloride, traZODone  Results for orders placed or performed during the hospital encounter of 01/29/24 (from the past 24 hour(s))   CBC and Auto Differential   Result Value Ref Range    WBC 8.0 4.4 - 11.3 x10*3/uL    nRBC 0.0 0.0 - 0.0 /100 WBCs    RBC 3.73 (L) 4.00 - 5.20 x10*6/uL    Hemoglobin 10.7 (L) 12.0 - 16.0 g/dL    Hematocrit 34.0 (L) 36.0 - 46.0 %    MCV 91 80 - 100 fL    MCH 28.7 26.0 - 34.0 pg    MCHC 31.5 (L) 32.0 - 36.0 g/dL    RDW 15.4 (H) 11.5 - 14.5 %    Platelets 263 150 - 450 x10*3/uL    Neutrophils % 75.2 40.0 - 80.0 %    Immature Granulocytes %, Automated 0.3 0.0 - 0.9 %    Lymphocytes % 12.3 13.0 - 44.0 %    Monocytes % 7.7 2.0 - 10.0 %    Eosinophils % 4.1 0.0 - 6.0 %    Basophils % 0.4 0.0 - 2.0 %    Neutrophils Absolute 5.99 (H) 1.60 - 5.50 x10*3/uL    Immature Granulocytes Absolute, Automated 0.02 0.00 - 0.50 x10*3/uL    Lymphocytes Absolute 0.98 0.80 - 3.00 x10*3/uL    Monocytes Absolute 0.61 0.05 - 0.80 x10*3/uL    Eosinophils Absolute 0.33 0.00 - 0.40 x10*3/uL    Basophils Absolute 0.03 0.00 - 0.10 x10*3/uL   Basic Metabolic  Panel   Result Value Ref Range    Glucose 86 65 - 99 mg/dL    Sodium 139 133 - 145 mmol/L    Potassium 3.9 3.4 - 5.1 mmol/L    Chloride 103 97 - 107 mmol/L    Bicarbonate 30 24 - 31 mmol/L    Urea Nitrogen 13 8 - 25 mg/dL    Creatinine 0.80 0.40 - 1.60 mg/dL    eGFR 68 >60 mL/min/1.73m*2    Calcium 8.4 (L) 8.5 - 10.4 mg/dL    Anion Gap 6 <=19 mmol/L     FL modified barium swallow study    Result Date: 2/1/2024  Interpreted By:  Jeras, Vjekoslav, and Casey Corinne STUDY: FL MODIFIED BARIUM SWALLOW STUDY;; 2/1/2024 11:25 am   INDICATION: Signs/Symptoms:Recurrent pneumonia.   COMPARISON: 08/01/2023   ACCESSION NUMBER(S): [id number]   ORDERING CLINICIAN: WARREN ROLLINS   TECHNIQUE: MBSS completed. Informed verbal consent obtained prior to completion of exam. Trials of thin, nectar thick, honey thick, puree, soft-solids, and regular solids given. Fluoroscopy time :  2 minutes 42 seconds with radiation dose air kerma 6.3 mGy.   SLP: Corinne E. Casey, M.A. CCC-SLP Phone/Pager: [phone] opt 2, via [employer], or via [employer] secure chat   SPEECH FINDINGS: Reason for referral: Recurrent pneumonia Patient hx: Anemia, COPD, GERD, dysphagia Respiratory status: Nasal cannula Previous diet: Regular solids with thin liquids. Chin tuck with liquids.   FINAL SPEECH RECOMMENDATIONS   Diet recommendations/feeding strategies: Regular solids with nectar thickened liquids (chin tuck with liquids). Consider use of hanks free water protocol to mitigate risks of dehydration associated with thickened liquids.   Follow-up speech therapy recommended: Yes.   Short term goals: Pt. to tolerate least restrictive diet without pulmonary compromise, Pt. to use safe swallow strategies independently in all observed trials , Pt. to tolerate trials of thin liquids with SLP only with no s/s aspiration or laryngeal penetration , Pt. to complete pharyngeal strengthening techniques   Education provided: Yes. Results and recommendations discussed verbally with pt  following exam. She verbalizes understanding, asking appropriate questions to follow up. Discussed with pt's Dtr, Annette, via telephone per pt request as well. Emailed Annette handouts of education material regarding aspiration pneumonia, thickened liquids, and the Rodriguez free water protocol per request.   Treatment Provided today: Yes - discussed compensatory swallowing strategies with pt demonstrating adequate knowledge and return. However, given her h/o difficulty with recalling swallowing strategies, further review and treatment is indicated.   Additional consult suggested: Follow up SLP tx at next level of care   Repeat study/ dc plan: Continue dysphagia therapy during admission and at next level of care. Repeat MBSS in 4-6 weeks or prior to diet upgrade.   Mechanics of the swallow summary: *Oral phase: Prolonged mastication, but functional for adequate PO intake. Delayed swallow onset with bolus head at the pyriform sinuses for thin and nectar liquids. Bolus head at the level of the valleculae for honey liquids, puree, and cracker trials. *Pharyngeal phase: Decreased hyolaryngeal excursion, partial epiglottic inversion, diminished laryngeal vestibule closure with contrast present for thin and nectar liquid trials. *Esophageal phase: WNL duration/distention of UES. AP view and esophageal sweep deferred in light of pt positioning, fatigue, and limitations of spacing with Hausted chair.   SLP impressions with severity rating: Moderate oropharyngeal dysphagia is present with deficits as discussed above. Patient is noted with SILENT aspiration during and after the swallow of thin liquids. Aspiration is eliminated during the with use of chin tuck strategy, however deep penetration remains with thin liquid residues in laryngeal vestibule that are noted to slowly aspirate throughout testing. Given patient's history of dysphagia and difficulty with recalling and applying use of chin tuck strategy in prior opportunities,  recommend liquid downgrade to nectar thickened liquids. To help mitigate risks of dehydration second to thickened liquids, the hanks free water protocol is also recommended.   Thin Liquids (MBSS) Rosenbek's Penetration Aspiration Scale, Thin Liquids (MBSS): 8. SILENT ASPIRATION - contrast passes glottis, visible residue, NO pt response   Response to Aspiration, Thin Liquid (MBSS): absent response, silent aspiration,       Nectar Thick Liquids (MBSS) Rosenbek's Penetration Aspiration Scale, Nectar thick liquids (MBSS): 3. PENETRATION with LOW ASPIRATION risk - contrast remains above vocal cords, visible residue     Honey Thick Liquids (MBSS) Rosenbek's Penetration Aspiration Scale, Honey thick liquids (MBSS): 1. NO ASPIRATION & NO PENETRATION - no aspiration, contrast does not enter airway     Purees (MBSS) Rosenbek's Penetration Aspiration Scale, Purees (MBSS): 1. NO ASPIRATION & NO PENETRATION - no aspiration, contrast does not enter airway     Solids (MBSS) Rosenbek's Penetration Aspiration Scale, Solids (MBSS): 1. NO ASPIRATION & NO PENETRATION - no aspiration, contrast does not enter airway     Speech Therapy section of this report signed by Corinne E. Casey, M.A. CCC-SLP on 2/1/2024 at 11:47 am.   RADIOLOGY FINDINGS: Limited characterization visualized portions of the osseous structures unremarkable within limits of this exam         1. Full report as described above by speech pathology.   MACRO: None   Signed by: Jan Sheridan 2/1/2024 12:10 PM Dictation workstation:   [id number]    XR chest 1 view    Result Date: 2/1/2024  Interpreted By:  Severiano Cano, STUDY: XR CHEST 1 VIEW; 2/1/2024 5:55 am   INDICATION: Signs/Symptoms:Follow-up thoracentesis CHF pneumonia   COMPARISON: 01/29/2024   ACCESSION NUMBER(S): [id number]   ORDERING CLINICIAN: DIANA DELUNA   FINDINGS: The study is limited due to rotation, respiratory motion, projection of patient's head over the upper chest and poor inspiratory effort,  with resultant crowding of the pulmonary vasculature. The cardiac silhouette appears prominent, exaggerated by the technique. Bilateral infiltrates and pleural effusions are again seen, somehow worsened on the right side and improved on the left. There is no pneumothorax. Degenerative changes involve the spine and shoulders.       Very limited exam. Cardiomegaly and congestive heart failure again seen.   Signed by: Severiano Cano 2/1/2024 9:59 AM Dictation workstation:   [id number]             Malnutrition Diagnosis Status: New  Malnutrition Diagnosis: Severe malnutrition related to acute disease or injury  As Evidenced by: moderate to severe subcutaneous fat loss and muscle wasting, po intake </= 75% for >/= 1 month, reported wt loss by pt and family  I agree with the dietitian's malnutrition diagnosis.      Assessment/Plan   Principal Problem:    Respiratory distress  Active Problems:    Anxiety    Aortic stenosis    Hyperlipidemia    Hypertension    Thyroid nodule    Pneumonia due to infectious organism    Pleural effusion    UTI (urinary tract infection)    SIRS (systemic inflammatory response syndrome) (CMS/[facility])    S/p thoracocentesis  Aspiration risk diet modified  Continue antibiotics  Urine culture negative  Continue diuretics  Plan for rehab       I spent  minutes in the professional and overall care of this patient.      Nakia Bailey MD

## 2024-02-01 NOTE — PROGRESS NOTES
Pulmonary Follow-up/Chart Check    Patient was going off the floor for modified barium swallow and came by to around.    Unable to see physically.  Discussed with bedside nurse.    No overnight events.  Vital signs reviewed  Remains unchanged on oxygen requirements    This morning's laboratory values reviewed and shows no leukocytosis, BMP shows preserved creatinine    Pleural cultures thus far show no growth    Chest x-ray performed this morning 1 view was personally reviewed and shows to me ongoing congestive heart failure with bilateral effusions although improved on the left status post thoracentesis with send on the right      Impression/recommendations:  Acute on chronic respiratory failure with hypoxia  Bilateral pleural effusions: Transudative on the left and imagine centimeters on the right  Congestive heart failure: In the setting of right-sided disease, valvular heart disease  Contributing aspiration pneumonitis    Continue with diuresis  Agree with modified barium swallow  Empiric antibiotics  Aspiration precautions  Patient with refractory heart failure due to the above mentioned mechanisms.  Would favor conservative approach in this scenario.  Consider palliative care    Wisam Winslow MD  Pulmonary/CC Medicine  [facility]

## 2024-02-01 NOTE — CARE PLAN
The patient's goals for the shift include improved breathing    The clinical goals for the shift include MBS per speech    Over the shift, the patient did not make progress toward the following goals. Barriers to progression include increased weakness. Recommendations to address these barriers include encoura  Problem: Safety  Goal: Patient will be injury free during hospitalization  Outcome: Progressing  Goal: I will remain free of falls  Outcome: Progressing     Problem: Daily Care  Goal: Daily care needs are met  Outcome: Progressing     Problem: Skin  Goal: Decreased wound size/increased tissue granulation at next dressing change  Outcome: Progressing  Flowsheets (Taken 2/1/2024 1111)  Decreased wound size/increased tissue granulation at next dressing change: Utilize specialty bed per algorithm  Goal: Participates in plan/prevention/treatment measures  Outcome: Progressing  Flowsheets (Taken 2/1/2024 1111)  Participates in plan/prevention/treatment measures:   Increase activity/out of bed for meals   Elevate heels  Goal: Prevent/manage excess moisture  Outcome: Progressing  Flowsheets (Taken 2/1/2024 1111)  Prevent/manage excess moisture: Moisturize dry skin  Goal: Prevent/minimize sheer/friction injuries  Outcome: Progressing  Flowsheets (Taken 2/1/2024 1111)  Prevent/minimize sheer/friction injuries:   HOB 30 degrees or less   Increase activity/out of bed for meals  Goal: Promote/optimize nutrition  Outcome: Progressing  Flowsheets (Taken 2/1/2024 1111)  Promote/optimize nutrition:   Assist with feeding   Monitor/record intake including meals  Goal: Promote skin healing  Outcome: Progressing  Flowsheets (Taken 2/1/2024 1111)  Promote skin healing: Protective dressings over bony prominences   ge patient to get OOB.

## 2024-02-01 NOTE — PROGRESS NOTES
Patient admitted from [facility], but does not want to return.  She is from home alone in a condo at baseline.  [facility] can accept patient with bed available 2/3.  Previous RN TCC notified Dr. JUSTINO Bailey.  Daughter agreeable to [facility] as next LOC.  Precert is back and good through 2/3.  Bed available 2/3/24.  Patient can discharge to [facility] on 2/3/24 when bed is available.  [facility] liaison to do an on-site today at 2 pm.  Patient will transition to [facility] after SNF discharge.  RN TCC following.     Jaz Garcia RN

## 2024-02-01 NOTE — PROGRESS NOTES
Subjective Data:  Patient somewhat more somnolent today receiving a breathing treatment    Overnight Events:    As discussed below.     Objective Data:  Last Recorded Vitals:  Vitals:    02/01/24 0700 02/01/24 0728 02/01/24 1158 02/01/24 1203   BP:  149/74  131/69   BP Location:  Right arm  Right arm   Patient Position:  Lying  Lying   Pulse:  66  64   Resp:  18  14   Temp:  36.1 °C (97 °F)  36.2 °C (97.2 °F)   TempSrc:  Temporal  Temporal   SpO2: 99% 99% 99% 98%   Weight:       Height:           Last Labs:  CBC - 2/1/2024:  5:26 AM  8.0 10.7 263    34.0      CMP - 2/1/2024:  5:26 AM  8.4 6.6 13 --- 0.3   _ 3.2 9 114      PTT - No results in last year.  _   _ _     HGBA1C   Date/Time Value Ref Range Status   05/23/2022 10:43 PM 5.7 4.0 - 6.0 % Final     Comment:     Hemoglobin A1C levels are related to mean blood glucose during the   preceding 2-3 months. The relationship table below may be used as a   general guide. Each 1% increase in HGB A1C is a reflection of an   increase in mean glucose of approximately 30 mg/dl.   Reference: Diabetes [employer], volume 29, supplement 1 Jan. 2006                        HGB A1C ................. Approx. Mean Glucose   _______________________________________________   6%   ...............................  120 mg/dl   7%   ...............................  150 mg/dl   8%   ...............................  180 mg/dl   9%   ...............................  210 mg/dl   10%  ...............................  240 mg/dl  Performed at [address] [address]     12/17/2020 03:16 PM 5.7 4.0 - 6.0 % Final     Comment:     Hemoglobin A1C levels are related to mean blood glucose during the   preceding 2-3 months. The relationship table below may be used as a   general guide. Each 1% increase in HGB A1C is a reflection of an   increase in mean glucose of approximately 30 mg/dl.   Reference: Diabetes Care, volume 29, supplement 1 Jan. 2006                        HGB A1C  "................. Approx. Mean Glucose   _______________________________________________   6%   ...............................  120 mg/dl   7%   ...............................  150 mg/dl   8%   ...............................  180 mg/dl   9%   ...............................  210 mg/dl   10%  ...............................  240 mg/dl  Performed at [address] [address]       LDLCALC   Date/Time Value Ref Range Status   01/27/2023 06:04 AM 66 65 - 130 MG/DL Final   11/20/2022 02:47 AM 54 65 - 130 MG/DL Final   07/06/2022 01:13 AM 54 65 - 130 MG/DL Final     VLDL   Date/Time Value Ref Range Status   08/03/2018 09:41 AM 25 0 - 40 mg/dL Final   01/18/2018 02:30 PM 47 0 - 40 mg/dL Final   10/17/2017 11:18 AM 50 0 - 40 mg/dL Final      Last I/O:  No intake/output data recorded.    Past Cardiology Tests (Last 3 Years):  EKG:  ECG 12 lead 01/29/2024      ECG 12 lead       Electrocardiogram, 12-lead PRN ACS symptoms       ECG 12 lead       ECG 12 lead 11/17/2023      ECG 12 Lead       ECG 12 Lead       ECG 12 lead 10/24/2023    Echo:  No results found for this or any previous visit from the past 1095 days.    Ejection Fractions:  No results found for: \"EF\"  Cath:  No results found for this or any previous visit from the past 1095 days.    Stress Test:  No results found for this or any previous visit from the past 1095 days.    Cardiac Imaging:  No results found for this or any previous visit from the past 1095 days.      Inpatient Medications:  Scheduled medications   Medication Dose Route Frequency    aspirin  325 mg oral BID    atorvastatin  10 mg oral Nightly    calcitonin (salmon)  1 spray One Nostril Daily    famotidine  20 mg oral BID    ferrous gluconate  38 mg of iron oral Once per day on Mon Wed Fri    furosemide  40 mg oral BID with meals    heparin (porcine)  5,000 Units subcutaneous q8h    ipratropium-albuteroL  3 mL nebulization TID    lidocaine  1 patch transdermal Daily    losartan  " 25 mg oral Daily    magnesium oxide  400 mg oral Once per day on Tue Thu Sat    melatonin  3 mg oral Nightly    metoprolol succinate XL  25 mg oral Daily    oxygen  2 L/min inhalation q8h    pantoprazole  40 mg oral Daily    piperacillin-tazobactam  2.25 g intravenous q6h    rOPINIRole  5 mg oral Nightly    sertraline  50 mg oral Daily     PRN medications   Medication    acetaminophen    Or    acetaminophen    acetaminophen    albuterol    benzocaine-menthol    dextromethorphan-guaifenesin    guaiFENesin    HYDROcodone-acetaminophen    nitroglycerin    ondansetron    sodium chloride    traZODone     Continuous Medications   Medication Dose Last Rate       Physical Exam:  The patient is a upper elderly white female awake alert conversant not overtly dyspneic on nasal cannula visiting with daughter.  JVP not elevated carotid and pulses 2+  Moderately severe thoracic kyphosis shallow air movement breath sounds are absent one half of the posterior lung bases bilaterally  Cardiac rhythm irregular S1-S2 obscured grade 3/6 to 4/6 systolic ejection murmur  Abdomen is benign no paraspinal megaly no focal tenderness  No peripheral edema pedal pulses are present     Assessment/Plan     1/29: This patient is a 89-year-old white female with extensive issues that include coronary artery disease, severe aortic valvular stenosis, moderate tricuspid and mitral valve regurgitation, severe pulmonary hypertension, COPD with oxygen dependence, hypertension, left bundle branch block conduction delay, lower extremity edema due to venous insufficiency, chronic DVT left lower extremity femoral vein, laparoscopic paraesophageal hiatal hernia repair, ongoing aspiration pneumonias, nontoxic multinodular goiter.  The patient's last echocardiogram on 7/25/2023 again demonstrated a LV ejection fraction 35-40% severe aortic valve stenosis peak systolic gradient 56 mmHg, mild mitral moderate tricuspid valve regurgitation and severe pulmonary  hypertension estimated PA systolic pressure 67 mmHg.  The patient was thought not to be a candidate for transcatheter aortic valve replacement.  She does wear continuous oxygen at 3 L/min at home.  She surprisingly still lives independently in her own condominium.  She has been having increased shortness of breath.  Multiple potential etiologies including her chronic lung disease aspiration pneumonias interstitial fibrosis possible CHF.  The patient was recently admitted for several days on 10/23/2023 with increased shortness of breath and ultimately was released on Lasix 40 mg twice daily which she has reduced to daily.  Patient admitted again on 11/12/2023 after having fallen in the bathroom and not being able to get up due to generalized weakness.  She lives independently in her own condominium and has been very resistant to review our recommendation for assisted living.  At that time it was decided to continue the patient on her usual therapy which  at that point included Lasix 40 mg once daily along with her low-dose aspirin plus atorvastatin 40 mg daily and metoprolol succinate 25 mg twice daily.    Patient was admitted after a mechanical fall with a right femoral neck fracture and subsequently underwent right total hip replacement on 12/16/2023.  The patient was discharged to rehab facility on usual preadmission low-dose losartan 25 mg daily Toprol-XL 25 mg daily Lasix 40 mg daily with no anticoagulation due to where multiple falls.  Her atrial fibrillation was rate controlled with the low-dose Toprol-XL.  She was readmitted with progressive increased shortness of breath with CTA of the chest demonstrating large bilateral pleural effusions and possible multifocal pneumonia.  The patient has been started on empiric antibiotic therapy with IV ceftriaxone and azithromycin.  She has been thought to be a high risk candidate for aspiration pneumonia in the past.  The pleural effusions presumably are related to her  progressively more severe aortic valvular stenosis along with severe pulmonary hypertension.  Pulmonology will be consulted with respect to treatment of her possible pneumonia and to determine whether her respiratory status would benefit from thoracentesis by interventional radiology.      1/30: The patient is lying in bed comfortable eating breakfast which she enjoyed.  No respiratory distress on O2 nasal cannula.  She has been seen by pulmonology and interventional radiology has been contacted for thoracentesis.  Patient's acute on chronic respiratory failure thought to be multifactorial related to volume overload due to worsening valvular heart disease and pulmonary hypertension possible noncompliance with diuretic therapy and?  Pneumonia.  The patient's comprehensive metabolic panel relatively unremarkable creatinine is 0.8.  CBC notable for only mild leukocytosis 12,900.  The patient's p.o. Lasix will be increased for now to 40 mg twice daily.  The patient is now agreeable along with other family members to be placed into assisted living at the time of hospital discharge     1/31: The patient underwent an ultrasound-guided thoracentesis of the right pleural effusion yesterday well-tolerated 1.5 L of fluid approximately removed.  Fluid analysis is still in process.  Breathing remains unlabored.  On examination there is improved air movement at the right lung base.  Left-sided thoracentesis will evidently be deferred unless she has ongoing and persistent dyspnea.  No new lab work from today will recheck tomorrow.  Telemetry monitor demonstrates sinus rhythm first-degree AV block in the 70/min range.  Systolic blood pressures in the 120-140 mmHg range.  Patient remains on increased dose of Lasix 40 mg twice daily.    2/1: The patient is somewhat more somnolent today and receiving a breathing treatment.  The patient did have a modified barium swallow performed with recommendations being regular solids nectar  thickened liquids and chin tuck with clear liquids.  The patient had a limited chest x-ray done today showing cardiomegaly congestive changes.  Her main issues have been recurring pleural effusions related to severe aortic stenosis and pulmonary hypertension along with aspiration pneumonitis.  Patient will remain on the slightly higher dose of Lasix 40 mg twice daily for now.  Systolic blood pressures are in the 130-140 mmHg range.    Peripheral IV 01/29/24 22 G Right Hand (Active)   Site Assessment Clean;Dry;Intact 02/01/24 0800   Dressing Status Clean;Dry;Occlusive 02/01/24 0800   Number of days: 3       Code Status:  DNR and No Intubation    I spent 20 minutes in the professional and overall care of this patient.        Jim Mckinney MD

## 2024-02-02 ENCOUNTER — NURSING HOME VISIT (OUTPATIENT)
Dept: POST ACUTE CARE | Facility: EXTERNAL LOCATION | Age: 89
End: 2024-02-02
Payer: MEDICARE

## 2024-02-02 DIAGNOSIS — I50.9 CONGESTIVE HEART FAILURE, UNSPECIFIED HF CHRONICITY, UNSPECIFIED HEART FAILURE TYPE (MULTI): Primary | ICD-10-CM

## 2024-02-02 DIAGNOSIS — F32.A DEPRESSION, UNSPECIFIED DEPRESSION TYPE: ICD-10-CM

## 2024-02-02 DIAGNOSIS — K21.9 GASTROESOPHAGEAL REFLUX DISEASE WITHOUT ESOPHAGITIS: ICD-10-CM

## 2024-02-02 DIAGNOSIS — R53.1 WEAKNESS: ICD-10-CM

## 2024-02-02 DIAGNOSIS — M19.90 OSTEOARTHRITIS, UNSPECIFIED OSTEOARTHRITIS TYPE, UNSPECIFIED SITE: ICD-10-CM

## 2024-02-02 DIAGNOSIS — E78.5 HYPERLIPIDEMIA, UNSPECIFIED HYPERLIPIDEMIA TYPE: ICD-10-CM

## 2024-02-02 DIAGNOSIS — S72.001A HIP FRACTURE, RIGHT, CLOSED, INITIAL ENCOUNTER (MULTI): ICD-10-CM

## 2024-02-02 DIAGNOSIS — D64.9 ANEMIA, UNSPECIFIED TYPE: ICD-10-CM

## 2024-02-02 DIAGNOSIS — J44.9 CHRONIC OBSTRUCTIVE PULMONARY DISEASE, UNSPECIFIED COPD TYPE (MULTI): ICD-10-CM

## 2024-02-02 DIAGNOSIS — I10 HYPERTENSION, UNSPECIFIED TYPE: ICD-10-CM

## 2024-02-02 DIAGNOSIS — Z91.81 AT RISK FOR FALLING: ICD-10-CM

## 2024-02-02 LAB
ANION GAP SERPL CALC-SCNC: 8 MMOL/L
BACTERIA BLD CULT: NORMAL
BACTERIA BLD CULT: NORMAL
BASOPHILS # BLD AUTO: 0.04 X10*3/UL (ref 0–0.1)
BASOPHILS NFR BLD AUTO: 0.5 %
BUN SERPL-MCNC: 9 MG/DL (ref 8–25)
CALCIUM SERPL-MCNC: 8.1 MG/DL (ref 8.5–10.4)
CHLORIDE SERPL-SCNC: 100 MMOL/L (ref 97–107)
CO2 SERPL-SCNC: 29 MMOL/L (ref 24–31)
CREAT SERPL-MCNC: 0.8 MG/DL (ref 0.4–1.6)
EGFRCR SERPLBLD CKD-EPI 2021: 68 ML/MIN/1.73M*2
EOSINOPHIL # BLD AUTO: 0.68 X10*3/UL (ref 0–0.4)
EOSINOPHIL NFR BLD AUTO: 8.3 %
ERYTHROCYTE [DISTWIDTH] IN BLOOD BY AUTOMATED COUNT: 15.5 % (ref 11.5–14.5)
GLUCOSE SERPL-MCNC: 101 MG/DL (ref 65–99)
HCT VFR BLD AUTO: 35 % (ref 36–46)
HGB BLD-MCNC: 10.9 G/DL (ref 12–16)
IMM GRANULOCYTES # BLD AUTO: 0.05 X10*3/UL (ref 0–0.5)
IMM GRANULOCYTES NFR BLD AUTO: 0.6 % (ref 0–0.9)
LABORATORY COMMENT REPORT: NORMAL
LABORATORY COMMENT REPORT: NORMAL
LYMPHOCYTES # BLD AUTO: 1.03 X10*3/UL (ref 0.8–3)
LYMPHOCYTES NFR BLD AUTO: 12.6 %
MCH RBC QN AUTO: 28.9 PG (ref 26–34)
MCHC RBC AUTO-ENTMCNC: 31.1 G/DL (ref 32–36)
MCV RBC AUTO: 93 FL (ref 80–100)
MONOCYTES # BLD AUTO: 0.59 X10*3/UL (ref 0.05–0.8)
MONOCYTES NFR BLD AUTO: 7.2 %
NEUTROPHILS # BLD AUTO: 5.8 X10*3/UL (ref 1.6–5.5)
NEUTROPHILS NFR BLD AUTO: 70.8 %
NRBC BLD-RTO: 0 /100 WBCS (ref 0–0)
PATH REPORT.FINAL DX SPEC: NORMAL
PATH REPORT.GROSS SPEC: NORMAL
PATH REPORT.RELEVANT HX SPEC: NORMAL
PATH REPORT.TOTAL CANCER: NORMAL
PLATELET # BLD AUTO: 276 X10*3/UL (ref 150–450)
POTASSIUM SERPL-SCNC: 3.6 MMOL/L (ref 3.4–5.1)
RBC # BLD AUTO: 3.77 X10*6/UL (ref 4–5.2)
SODIUM SERPL-SCNC: 137 MMOL/L (ref 133–145)
WBC # BLD AUTO: 8.2 X10*3/UL (ref 4.4–11.3)

## 2024-02-02 PROCEDURE — 80048 BASIC METABOLIC PNL TOTAL CA: CPT | Performed by: INTERNAL MEDICINE

## 2024-02-02 PROCEDURE — 99232 SBSQ HOSP IP/OBS MODERATE 35: CPT | Performed by: INTERNAL MEDICINE

## 2024-02-02 PROCEDURE — 85025 COMPLETE CBC W/AUTO DIFF WBC: CPT | Performed by: INTERNAL MEDICINE

## 2024-02-02 PROCEDURE — 2500000005 HC RX 250 GENERAL PHARMACY W/O HCPCS: Performed by: INTERNAL MEDICINE

## 2024-02-02 PROCEDURE — 94760 N-INVAS EAR/PLS OXIMETRY 1: CPT

## 2024-02-02 PROCEDURE — 9420000001 HC RT PATIENT EDUCATION 5 MIN

## 2024-02-02 PROCEDURE — 2500000002 HC RX 250 W HCPCS SELF ADMINISTERED DRUGS (ALT 637 FOR MEDICARE OP, ALT 636 FOR OP/ED): Performed by: INTERNAL MEDICINE

## 2024-02-02 PROCEDURE — 99308 SBSQ NF CARE LOW MDM 20: CPT | Performed by: INTERNAL MEDICINE

## 2024-02-02 PROCEDURE — 2500000001 HC RX 250 WO HCPCS SELF ADMINISTERED DRUGS (ALT 637 FOR MEDICARE OP): Performed by: INTERNAL MEDICINE

## 2024-02-02 PROCEDURE — 1200000002 HC GENERAL ROOM WITH TELEMETRY DAILY

## 2024-02-02 PROCEDURE — 92526 ORAL FUNCTION THERAPY: CPT | Mod: GN | Performed by: SPEECH-LANGUAGE PATHOLOGIST

## 2024-02-02 PROCEDURE — 2500000004 HC RX 250 GENERAL PHARMACY W/ HCPCS (ALT 636 FOR OP/ED): Performed by: INTERNAL MEDICINE

## 2024-02-02 PROCEDURE — 36415 COLL VENOUS BLD VENIPUNCTURE: CPT | Performed by: INTERNAL MEDICINE

## 2024-02-02 PROCEDURE — 94640 AIRWAY INHALATION TREATMENT: CPT | Mod: MUE

## 2024-02-02 RX ADMIN — Medication 3 MG: at 21:29

## 2024-02-02 RX ADMIN — PANTOPRAZOLE SODIUM 40 MG: 40 TABLET, DELAYED RELEASE ORAL at 08:18

## 2024-02-02 RX ADMIN — FUROSEMIDE 40 MG: 40 TABLET ORAL at 08:18

## 2024-02-02 RX ADMIN — LIDOCAINE 1 PATCH: 4 PATCH TOPICAL at 08:18

## 2024-02-02 RX ADMIN — FAMOTIDINE 20 MG: 20 TABLET, FILM COATED ORAL at 21:29

## 2024-02-02 RX ADMIN — Medication 38 MG OF IRON: at 08:18

## 2024-02-02 RX ADMIN — PIPERACILLIN SODIUM AND TAZOBACTAM SODIUM 2.25 G: 2; .25 INJECTION, SOLUTION INTRAVENOUS at 08:22

## 2024-02-02 RX ADMIN — METOPROLOL SUCCINATE 25 MG: 25 TABLET, EXTENDED RELEASE ORAL at 08:18

## 2024-02-02 RX ADMIN — ROPINIROLE HYDROCHLORIDE 5 MG: 2 TABLET, FILM COATED ORAL at 21:28

## 2024-02-02 RX ADMIN — Medication 2 L/MIN: at 15:00

## 2024-02-02 RX ADMIN — IPRATROPIUM BROMIDE AND ALBUTEROL SULFATE 3 ML: 2.5; .5 SOLUTION RESPIRATORY (INHALATION) at 19:52

## 2024-02-02 RX ADMIN — CALCITONIN SALMON 1 SPRAY: 200 SPRAY, METERED NASAL at 09:00

## 2024-02-02 RX ADMIN — HEPARIN SODIUM 5000 UNITS: 5000 INJECTION, SOLUTION INTRAVENOUS; SUBCUTANEOUS at 15:36

## 2024-02-02 RX ADMIN — Medication 2 L/MIN: at 23:00

## 2024-02-02 RX ADMIN — ASPIRIN 325 MG: 325 TABLET, COATED ORAL at 21:29

## 2024-02-02 RX ADMIN — Medication 2 L/MIN: at 09:42

## 2024-02-02 RX ADMIN — LOSARTAN POTASSIUM 25 MG: 25 TABLET, FILM COATED ORAL at 08:18

## 2024-02-02 RX ADMIN — IPRATROPIUM BROMIDE AND ALBUTEROL SULFATE 3 ML: 2.5; .5 SOLUTION RESPIRATORY (INHALATION) at 12:41

## 2024-02-02 RX ADMIN — SERTRALINE HYDROCHLORIDE 50 MG: 50 TABLET ORAL at 08:18

## 2024-02-02 RX ADMIN — HEPARIN SODIUM 5000 UNITS: 5000 INJECTION, SOLUTION INTRAVENOUS; SUBCUTANEOUS at 08:18

## 2024-02-02 RX ADMIN — PIPERACILLIN SODIUM AND TAZOBACTAM SODIUM 2.25 G: 2; .25 INJECTION, SOLUTION INTRAVENOUS at 15:36

## 2024-02-02 RX ADMIN — PIPERACILLIN SODIUM AND TAZOBACTAM SODIUM 2.25 G: 2; .25 INJECTION, SOLUTION INTRAVENOUS at 03:36

## 2024-02-02 RX ADMIN — FUROSEMIDE 40 MG: 40 TABLET ORAL at 15:38

## 2024-02-02 RX ADMIN — ASPIRIN 325 MG: 325 TABLET, COATED ORAL at 08:18

## 2024-02-02 RX ADMIN — ATORVASTATIN CALCIUM 10 MG: 10 TABLET, FILM COATED ORAL at 21:29

## 2024-02-02 RX ADMIN — FAMOTIDINE 20 MG: 20 TABLET, FILM COATED ORAL at 08:17

## 2024-02-02 RX ADMIN — PIPERACILLIN SODIUM AND TAZOBACTAM SODIUM 2.25 G: 2; .25 INJECTION, SOLUTION INTRAVENOUS at 21:28

## 2024-02-02 ASSESSMENT — COGNITIVE AND FUNCTIONAL STATUS - GENERAL
PERSONAL GROOMING: A LITTLE
TURNING FROM BACK TO SIDE WHILE IN FLAT BAD: A LITTLE
MOBILITY SCORE: 15
MOBILITY SCORE: 16
MOVING TO AND FROM BED TO CHAIR: A LOT
DRESSING REGULAR LOWER BODY CLOTHING: A LITTLE
TOILETING: A LITTLE
CLIMB 3 TO 5 STEPS WITH RAILING: TOTAL
DAILY ACTIVITIY SCORE: 18
MOVING TO AND FROM BED TO CHAIR: A LITTLE
CLIMB 3 TO 5 STEPS WITH RAILING: TOTAL
DRESSING REGULAR UPPER BODY CLOTHING: A LITTLE
STANDING UP FROM CHAIR USING ARMS: A LITTLE
MOVING FROM LYING ON BACK TO SITTING ON SIDE OF FLAT BED WITH BEDRAILS: A LITTLE
HELP NEEDED FOR BATHING: A LITTLE
WALKING IN HOSPITAL ROOM: A LITTLE
TURNING FROM BACK TO SIDE WHILE IN FLAT BAD: A LITTLE
STANDING UP FROM CHAIR USING ARMS: A LITTLE
WALKING IN HOSPITAL ROOM: A LITTLE
MOVING FROM LYING ON BACK TO SITTING ON SIDE OF FLAT BED WITH BEDRAILS: A LITTLE
EATING MEALS: A LITTLE

## 2024-02-02 ASSESSMENT — PAIN - FUNCTIONAL ASSESSMENT: PAIN_FUNCTIONAL_ASSESSMENT: 0-10

## 2024-02-02 ASSESSMENT — PAIN SCALES - GENERAL
PAINLEVEL_OUTOF10: 0 - NO PAIN
PAINLEVEL_OUTOF10: 0 - NO PAIN

## 2024-02-02 ASSESSMENT — PAIN SCALES - WONG BAKER: WONGBAKER_NUMERICALRESPONSE: NO HURT

## 2024-02-02 NOTE — PROGRESS NOTES
Speech-Language Pathology    Inpatient Speech Language Pathology Dysphagia Treatment note     Patient Name: Kalie Ramos  MRN: [id number]  : 1929  Today's Date: 24  Time Calculation  Start Time: 0950  Stop Time: 1008  Time Calculation (min): 18 min       Total Number of Visits: 2/3 (, THURS)    PLAN:  Skilled speech therapy for dysphagia treatment continues to be warranted to provide training and instruction regarding the use of compensatory swallow strategies, to complete oropharyngeal strengthening exercises, for pt/caregiver education in order to reduce risk of aspiration, dehydration and malnutrition. , to assess tolerance of diet , to determine ability to upgrade diet after PO trials with SLP     SLP Frequency: 3x per week  Discussed POC: Patient, Caregiver/family, Physician, Nursing  Discussed Risks/Benefits: Yes  Patient/Caregiver Agreeable: Yes  SLP - OK to Discharge: Yes    Recommended Diet:         Compensatory strategies: Chin tuck  Medication administration: whole in puree or with thickened liquid    Subjective:  Pt. Seen at bedside for skilled dysphagia treatment.   Pain:  Pain Assessment  Pain Assessment: 0-10         Oxygen Status:   nasal cannula             Goals:   - Pt. to tolerate least restrictive diet without pulmonary compromise,    Good diet shelly per pt and RN  - Pt. to use safe swallow strategies independently in all observed trials ,    Pt (I) recalls need for use of chin tuck strategy  - Pt. to tolerate trials of thin liquids with SLP only with no s/s aspiration or laryngeal penetration ,    Not addressed  - Pt. to complete pharyngeal strengthening techniques   Reviewed suprglottic swallow, effortful swallow, and tongue base retraction therex with home exercise program handout provided.     SLP Assessment:   Pt participated in review of MBSS recs, need for thickened liquids. (I) recalls chin tuck strategy. She participated in oropharyngeal therex with mod-max cues  provided for placement and technique for each.    -Supraglottic swallow x5 reps   -Effortful swallow x10 reps   -Tongue base elevation/retraction x15 reps    Treatment Outcome:         Pt tolerated treatment well and is progressing toward goals.                   Education:  Pt. Given skilled instruction on compensatory strategies, home exercise program, diet [id number]  Pt. gave verbal understanding

## 2024-02-02 NOTE — PROGRESS NOTES
"Kalie Ramos is a 89 y.o. female on day 4 of admission seen in follow-up for chronic hypoxic respiratory failure, bilateral pleural effusions    Subjective   On 3 L nasal cannula oxygen; O2 sats 99%.  No respiratory complaints.  Denies pain.  Afebrile.       Objective     Physical Exam  Vitals and nursing note reviewed.   Constitutional:       Appearance: Normal appearance.   HENT:      Head: Normocephalic and atraumatic.      Nose: Nose normal.      Mouth/Throat:      Mouth: Mucous membranes are moist.   Eyes:      Extraocular Movements: Extraocular movements intact.      Conjunctiva/sclera: Conjunctivae normal.      Pupils: Pupils are equal, round, and reactive to light.   Cardiovascular:      Rate and Rhythm: Normal rate and regular rhythm.      Pulses: Normal pulses.      Heart sounds: Normal heart sounds.   Pulmonary:      Effort: Pulmonary effort is normal.      Breath sounds: Normal breath sounds.   Abdominal:      General: Bowel sounds are normal.      Palpations: Abdomen is soft.   Musculoskeletal:         General: Normal range of motion.   Skin:     General: Skin is warm and dry.      Capillary Refill: Capillary refill takes less than 2 seconds.   Neurological:      General: No focal deficit present.      Mental Status: She is alert and oriented to person, place, and time.   Psychiatric:         Mood and Affect: Mood normal.         Behavior: Behavior normal.         Last Recorded Vitals  Blood pressure 136/68, pulse 61, temperature 36.2 °C (97.2 °F), temperature source Temporal, resp. rate 14, height 1.575 m (5' 2\"), weight 55.2 kg (121 lb 11.1 oz), SpO2 99 %.  Intake/Output last 3 Shifts:  No intake/output data recorded.  aspirin, 325 mg, oral, BID  atorvastatin, 10 mg, oral, Nightly  calcitonin (salmon), 1 spray, One Nostril, Daily  famotidine, 20 mg, oral, BID  ferrous gluconate, 38 mg of iron, oral, Once per day on Mon Wed Fri  furosemide, 40 mg, oral, BID with meals  heparin (porcine), 5,000 " Units, subcutaneous, q8h  ipratropium-albuteroL, 3 mL, nebulization, TID  lidocaine, 1 patch, transdermal, Daily  losartan, 25 mg, oral, Daily  magnesium oxide, 400 mg, oral, Once per day on Tue Thu Sat  melatonin, 3 mg, oral, Nightly  metoprolol succinate XL, 25 mg, oral, Daily  oxygen, 2 L/min, inhalation, q8h  pantoprazole, 40 mg, oral, Daily  piperacillin-tazobactam, 2.25 g, intravenous, q6h  rOPINIRole, 5 mg, oral, Nightly  sertraline, 50 mg, oral, Daily       PRN medications: [DISCONTINUED] acetaminophen **OR** acetaminophen **OR** acetaminophen, acetaminophen, albuterol, benzocaine-menthol, dextromethorphan-guaifenesin, guaiFENesin, HYDROcodone-acetaminophen, nitroglycerin, ondansetron, sodium chloride, traZODone         Relevant Results  Results for orders placed or performed during the hospital encounter of 01/29/24 (from the past 24 hour(s))   CBC and Auto Differential   Result Value Ref Range    WBC 8.2 4.4 - 11.3 x10*3/uL    nRBC 0.0 0.0 - 0.0 /100 WBCs    RBC 3.77 (L) 4.00 - 5.20 x10*6/uL    Hemoglobin 10.9 (L) 12.0 - 16.0 g/dL    Hematocrit 35.0 (L) 36.0 - 46.0 %    MCV 93 80 - 100 fL    MCH 28.9 26.0 - 34.0 pg    MCHC 31.1 (L) 32.0 - 36.0 g/dL    RDW 15.5 (H) 11.5 - 14.5 %    Platelets 276 150 - 450 x10*3/uL    Neutrophils % 70.8 40.0 - 80.0 %    Immature Granulocytes %, Automated 0.6 0.0 - 0.9 %    Lymphocytes % 12.6 13.0 - 44.0 %    Monocytes % 7.2 2.0 - 10.0 %    Eosinophils % 8.3 0.0 - 6.0 %    Basophils % 0.5 0.0 - 2.0 %    Neutrophils Absolute 5.80 (H) 1.60 - 5.50 x10*3/uL    Immature Granulocytes Absolute, Automated 0.05 0.00 - 0.50 x10*3/uL    Lymphocytes Absolute 1.03 0.80 - 3.00 x10*3/uL    Monocytes Absolute 0.59 0.05 - 0.80 x10*3/uL    Eosinophils Absolute 0.68 (H) 0.00 - 0.40 x10*3/uL    Basophils Absolute 0.04 0.00 - 0.10 x10*3/uL   Basic Metabolic Panel   Result Value Ref Range    Glucose 101 (H) 65 - 99 mg/dL    Sodium 137 133 - 145 mmol/L    Potassium 3.6 3.4 - 5.1 mmol/L    Chloride  100 97 - 107 mmol/L    Bicarbonate 29 24 - 31 mmol/L    Urea Nitrogen 9 8 - 25 mg/dL    Creatinine 0.80 0.40 - 1.60 mg/dL    eGFR 68 >60 mL/min/1.73m*2    Calcium 8.1 (L) 8.5 - 10.4 mg/dL    Anion Gap 8 <=19 mmol/L      FL modified barium swallow study    Result Date: 2/1/2024  Interpreted By:  Jeras, Vjekoslav, and Casey Corinne STUDY: FL MODIFIED BARIUM SWALLOW STUDY;; 2/1/2024 11:25 am   INDICATION: Signs/Symptoms:Recurrent pneumonia.   COMPARISON: 08/01/2023   ACCESSION NUMBER(S): [id number]   ORDERING CLINICIAN: WARREN ROLLINS   TECHNIQUE: MBSS completed. Informed verbal consent obtained prior to completion of exam. Trials of thin, nectar thick, honey thick, puree, soft-solids, and regular solids given. Fluoroscopy time :  2 minutes 42 seconds with radiation dose air kerma 6.3 mGy.   SLP: Corinne E. Casey, M.A. CCC-SLP Phone/Pager: [phone] opt 2, via [employer], or via EPIC secure chat   SPEECH FINDINGS: Reason for referral: Recurrent pneumonia Patient hx: Anemia, COPD, GERD, dysphagia Respiratory status: Nasal cannula Previous diet: Regular solids with thin liquids. Chin tuck with liquids.   FINAL SPEECH RECOMMENDATIONS   Diet recommendations/feeding strategies: Regular solids with nectar thickened liquids (chin tuck with liquids). Consider use of hanks free water protocol to mitigate risks of dehydration associated with thickened liquids.   Follow-up speech therapy recommended: Yes.   Short term goals: Pt. to tolerate least restrictive diet without pulmonary compromise, Pt. to use safe swallow strategies independently in all observed trials , Pt. to tolerate trials of thin liquids with SLP only with no s/s aspiration or laryngeal penetration , Pt. to complete pharyngeal strengthening techniques   Education provided: Yes. Results and recommendations discussed verbally with pt following exam. She verbalizes understanding, asking appropriate questions to follow up. Discussed with pt's DtrAnnette, via telephone  per pt request as well. Emailed Annette handouts of education material regarding aspiration pneumonia, thickened liquids, and the Rodriguez free water protocol per request.   Treatment Provided today: Yes - discussed compensatory swallowing strategies with pt demonstrating adequate knowledge and return. However, given her h/o difficulty with recalling swallowing strategies, further review and treatment is indicated.   Additional consult suggested: Follow up SLP tx at next level of care   Repeat study/ dc plan: Continue dysphagia therapy during admission and at next level of care. Repeat MBSS in 4-6 weeks or prior to diet upgrade.   Mechanics of the swallow summary: *Oral phase: Prolonged mastication, but functional for adequate PO intake. Delayed swallow onset with bolus head at the pyriform sinuses for thin and nectar liquids. Bolus head at the level of the valleculae for honey liquids, puree, and cracker trials. *Pharyngeal phase: Decreased hyolaryngeal excursion, partial epiglottic inversion, diminished laryngeal vestibule closure with contrast present for thin and nectar liquid trials. *Esophageal phase: WNL duration/distention of UES. AP view and esophageal sweep deferred in light of pt positioning, fatigue, and limitations of spacing with Hausted chair.   SLP impressions with severity rating: Moderate oropharyngeal dysphagia is present with deficits as discussed above. Patient is noted with SILENT aspiration during and after the swallow of thin liquids. Aspiration is eliminated during the with use of chin tuck strategy, however deep penetration remains with thin liquid residues in laryngeal vestibule that are noted to slowly aspirate throughout testing. Given patient's history of dysphagia and difficulty with recalling and applying use of chin tuck strategy in prior opportunities, recommend liquid downgrade to nectar thickened liquids. To help mitigate risks of dehydration second to thickened liquids, the latonya  free water protocol is also recommended.   Thin Liquids (MBSS) Rosenbek's Penetration Aspiration Scale, Thin Liquids (MBSS): 8. SILENT ASPIRATION - contrast passes glottis, visible residue, NO pt response   Response to Aspiration, Thin Liquid (MBSS): absent response, silent aspiration,       Nectar Thick Liquids (MBSS) Rosenbek's Penetration Aspiration Scale, Nectar thick liquids (MBSS): 3. PENETRATION with LOW ASPIRATION risk - contrast remains above vocal cords, visible residue     Honey Thick Liquids (MBSS) Rosenbek's Penetration Aspiration Scale, Honey thick liquids (MBSS): 1. NO ASPIRATION & NO PENETRATION - no aspiration, contrast does not enter airway     Purees (MBSS) Rosenbek's Penetration Aspiration Scale, Purees (MBSS): 1. NO ASPIRATION & NO PENETRATION - no aspiration, contrast does not enter airway     Solids (MBSS) Rosenbek's Penetration Aspiration Scale, Solids (MBSS): 1. NO ASPIRATION & NO PENETRATION - no aspiration, contrast does not enter airway     Speech Therapy section of this report signed by Corinne E. Casey, M.A. CCC-SLP on 2/1/2024 at 11:47 am.   RADIOLOGY FINDINGS: Limited characterization visualized portions of the osseous structures unremarkable within limits of this exam         1. Full report as described above by speech pathology.   MACRO: None   Signed by: Jan Sheridan 2/1/2024 12:10 PM Dictation workstation:   [id number]    XR chest 1 view  Result Date: 2/1/2024  Interpreted By:  Severiano Cano, STUDY: XR CHEST 1 VIEW; 2/1/2024 5:55 am   INDICATION: Signs/Symptoms:Follow-up thoracentesis CHF pneumonia   COMPARISON: 01/29/2024   ACCESSION NUMBER(S): [id number]   ORDERING CLINICIAN: DIANA DELUNA   FINDINGS: The study is limited due to rotation, respiratory motion, projection of patient's head over the upper chest and poor inspiratory effort, with resultant crowding of the pulmonary vasculature. The cardiac silhouette appears prominent, exaggerated by the technique. Bilateral  infiltrates and pleural effusions are again seen, somehow worsened on the right side and improved on the left. There is no pneumothorax. Degenerative changes involve the spine and shoulders.       Very limited exam. Cardiomegaly and congestive heart failure again seen.   Signed by: Severiano Cano 2/1/2024 9:59 AM Dictation workstation:   [id number]    US thoracentesis  Result Date: 1/31/2024  Interpreted By:  Rey Monte, STUDY: US THORACENTESIS; 1/30/2024 4:52 pm   INDICATION: Signs/Symptoms:hypoxia, shortness of breath.   COMPARISON: None   ACCESSION NUMBER(S): [id number]   ORDERING CLINICIAN: JESS BONILLA   TECHNIQUE: Informed consent obtained. Patient positionedsitting upright. Skin prepped, draped and anesthetized. Under ultrasound guidance, a centesis catheter/needle was advanced into leftpleural cavity.   FINDINGS: A total of 1.1 L of dark yellow fluid was aspirated. A sample was sent for analysis. The patient tolerated the procedure well.       Ultrasound-guided left thoracentesis.     Signed by: Rey Monte 1/31/2024 4:14 PM Dictation workstation:   [id number]      CT angio chest for pulmonary embolism  Result Date: 1/29/2024  STUDY: CT Angiogram of the Chest; 1/29/2024 5:34 AM INDICATION: Shortness of breath.  Elevated D-dimer. COMPARISON: CXR 1/29/2024.  CTA chest 10/22/2023. ACCESSION NUMBER(S): [id number] ORDERING CLINICIAN: GISELA GUALLPA TECHNIQUE:  CTA of the chest was performed with intravenous contrast. Images are reviewed and processed at a workstation according to the CT angiogram protocol with 3-D and/or MIP post processing imaging generated.  Omnipaque 350 75 mL was administered intravenously. Automated mA/kV exposure control was utilized and patient examination was performed in strict accordance with principles of ALARA. FINDINGS: Pulmonary arteries are adequately opacified without large central acute or chronic filling defects.  Respiratory motion limits evaluation of  the smaller pulmonary arteries.  The thoracic aorta is normal in course and caliber without dissection or aneurysm.  Mild calcified atheromatous plaque is seen in the thoracic aorta. Enlarged, heterogeneous thyroid is identified with a confluent area of hypodense nodularity in the thyroid isthmus measuring 5.0 x 6.1 cm in diameter.  Calcifications are seen in the thyroid lobes. The heart is normal in size without pericardial effusion.  Mild calcified coronary plaque is noted.  Thoracic lymph nodes are not enlarged. Trachea and mainstem bronchi are patent and clear of debris.  There are large bilateral pleural effusions layering dependently with collapse and consolidation of the left lower lobe as well as the dependent portion of the left upper lobe and lingular segment of left upper lobe.  Patchy airspace disease is seen in the right upper lobe. Relaxation atelectasis and mild consolidation is seen in the dependent portion of the right lower lobe.  There is atelectasis in the central portion of the right middle lobe.  Increased AP diameter of the thorax may indicate chronic changes of COPD.  No interstitial lung disease or definite suspicious pulmonary nodules are identified.  There is no pneumothorax. Upper abdomen demonstrates no acute pathology.  Surgical clips are noted in the upper abdomen.  Mild calcified plaque is seen in the included upper abdominal aorta. There are no acute fractures.  No suspicious bony lesions.  Pronounced thoracic kyphosis is noted.  There is moderate multilevel disc space narrowing throughout the mid and lower thoracic spine.  Sclerotic focus is seen within the T2 vertebrae measuring 6 mm in diameter. Generalized osseous demineralization is noted.    Large bilateral pleural effusions with multifocal areas of consolidation in both lungs, most pronounced in the left lower lobe and dependent portions of the left upper lobe and right lower lobe, indicating probable severe  pneumonia/aspiration with patchy airspace disease in the right upper lobe. Enlarged, heterogeneous thyroid with a 6.1 cm nodule in the thyroid isthmus with poorly defined margins, nonemergent thyroid ultrasound is recommended for further characterization. Chronic changes suggesting COPD. Pronounced thoracic kyphosis with multilevel age-related osteoporotic changes in the spine. Signed by Trent Salinas    XR chest 1 view    Result Date: 1/29/2024  STUDY: Chest Radiograph;  1/29/2024 3:42 AM INDICATION: Shortness of breath. COMPARISON: 12/14/2023 XR Chest one view ACCESSION NUMBER(S): [id number] ORDERING CLINICIAN: GISELA GUALLPA TECHNIQUE:  Frontal chest was obtained at 3:42 hours. FINDINGS: CARDIOMEDIASTINAL SILHOUETTE: Cardiomediastinal silhouette is obscured but appears enlarged. Calcified plaque is seen in the aorta.  LUNGS: There is extensive airspace disease in the left midlung zone and bilateral lung bases.  Increased interstitial markings are seen bilaterally.  There is a probable moderate left effusion and suspected small right effusion.  ABDOMEN: No remarkable upper abdominal findings.  BONES: No acute osseous changes.    Extensive multifocal bilateral airspace disease with probable effusions, likely multilobar pneumonia in the appropriate clinical setting with question of mild superimposed pulmonary edema. Signed by Trent Salinas        Assessment/Plan   Principal Problem:    Respiratory distress  Active Problems:    Anxiety    Aortic stenosis    Dysphagia    Hyperlipidemia    Hypertension    Thyroid nodule    Pneumonia due to infectious organism    Pleural effusion    SIRS (systemic inflammatory response syndrome) (CMS/HCC)  Status post left-sided thoracentesis 1/31 yielding 1.1 L dark yellow pleural fluid via IR    Plan  Oxygen at baseline  Continue IBD/ICS  Senna spirometry/pulmonary hygiene  Continue empiric antibiotics  Follow-up cultures  Oral Lasix  Prophylaxis  Will defer right-sided  thoracentesis at this time  Consider Palliative Medicine consultation  PT/OT/out of bed  Discharge planning-return to [facility] tomorrow  Stable for discharge to rehab from a pulmonary standpoint  Follow-up with Dr. Maddox in 2 weeks with a chest x-ray    SARAH Wooten-CNP  [facility]

## 2024-02-02 NOTE — PROGRESS NOTES
"Nutrition Follow up Note    Nutrition Assessment      Thoracentesis on 1/30 with 1.1 L removed. Current diet order per [facility] recs from 2/1. Plan for discharge tomorrow to SNF.    Nutrition History:  Energy Intake: Poor < 50 %    Anthropometrics:  Ht: 157.5 cm (5' 2\"), Wt: 55.2 kg (121 lb 11.1 oz), BMI: 22.25    Weight Change:  Daily Weight  02/01/24 : 55.2 kg (121 lb 11.1 oz)  01/22/24 : 48.5 kg (107 lb)  12/14/23 : 53.3 kg (117 lb 8.1 oz)  12/01/23 : 53.3 kg (117 lb 8 oz)  11/27/23 : 52.2 kg (115 lb)  11/12/23 : 53.2 kg (117 lb 4.6 oz)  11/06/23 : 53.5 kg (118 lb)  10/22/23 : 55.3 kg (121 lb 14.6 oz)  09/29/23 : 57.2 kg (126 lb)  09/05/23 : 56.7 kg (125 lb)     Weight History / % Weight Change: wt loss duiring this admission likely r/t fluid.    Nutrition Focused Physical Exam Findings: assessed 1/30/24  Subcutaneous Fat Loss  Orbital Fat Pads: Severe (dark circles, hollowing and loose skin)  Buccal Fat Pads: Mild-Moderate (flat cheeks, minimal bounce)    Muscle Wasting  Temporalis: Severe (hollowed scooping depression)  Pectoralis (Clavicular Region): Mild-Moderate (some protrusion of clavicle)  Deltoid/Trapezius: Mild-Moderate (slight protrusion of acromion process)  Interosseous: Mild-Moderate (slightly depressed area between thumb and forefinger)    Nutrition Significant Labs:  Lab Results   Component Value Date    WBC 8.2 02/02/2024    HGB 10.9 (L) 02/02/2024    HCT 35.0 (L) 02/02/2024     02/02/2024    CHOL 137 01/27/2023    TRIG 74 01/27/2023    HDL 56 01/27/2023    ALT 9 01/30/2024    AST 13 01/30/2024     02/02/2024    K 3.6 02/02/2024     02/02/2024    CREATININE 0.80 02/02/2024    BUN 9 02/02/2024    CO2 29 02/02/2024    TSH 0.87 09/05/2023    INR 1.0 01/26/2023    HGBA1C 5.7 05/23/2022       Current Facility-Administered Medications:     [DISCONTINUED] acetaminophen (Tylenol) tablet 650 mg, 650 mg, oral, q4h PRN **OR** acetaminophen (Tylenol) oral liquid 650 mg, 650 mg, oral, q4h PRN " **OR** acetaminophen (Tylenol) suppository 650 mg, 650 mg, rectal, q4h PRN, Nakia Bailey MD    acetaminophen (Tylenol) tablet 650 mg, 650 mg, oral, q6h PRN, Nakia Bailey MD    albuterol 2.5 mg /3 mL (0.083 %) nebulizer solution 2.5 mg, 2.5 mg, nebulization, q2h PRN, Nakia Bailey MD    aspirin EC tablet 325 mg, 325 mg, oral, BID, Nakia Bailey MD, 325 mg at 02/02/24 0818    atorvastatin (Lipitor) tablet 10 mg, 10 mg, oral, Nightly, Nakia Bailey MD, 10 mg at 02/01/24 2122    benzocaine-menthol (Cepastat Sore Throat) 15-3.6 mg lozenge 1 lozenge, 1 lozenge, Mouth/Throat, q4h PRN, Nakia Bailey MD    calcitonin (salmon) (Miacalcin) nasal spray 1 spray, 1 spray, One Nostril, Daily, Nakia Bailey MD, 1 spray at 02/02/24 0900    dextromethorphan-guaifenesin (Robitussin DM)  mg/5 mL oral liquid 5 mL, 5 mL, oral, q4h PRN, Nakia Bailey MD    famotidine (Pepcid) tablet 20 mg, 20 mg, oral, BID, Nakia Bailey MD, 20 mg at 02/02/24 0817    ferrous gluconate (Fergon) 324 (38 Fe) mg tablet 38 mg of iron, 38 mg of iron, oral, Once per day on Mon Wed Fri, Nakia Bailey MD, 38 mg of iron at 02/02/24 0818    furosemide (Lasix) tablet 40 mg, 40 mg, oral, BID with meals, Jim Mckinney MD, 40 mg at 02/02/24 0818    guaiFENesin (Mucinex) 12 hr tablet 600 mg, 600 mg, oral, q12h PRN, Nakia Bailey MD    heparin (porcine) injection 5,000 Units, 5,000 Units, subcutaneous, q8h, Nakia Bailey MD, 5,000 Units at 02/02/24 0818    HYDROcodone-acetaminophen (Norco) 5-325 mg per tablet 1 tablet, 1 tablet, oral, q4h PRN, Nakia Bailey MD    ipratropium-albuteroL (Duo-Neb) 0.5-2.5 mg/3 mL nebulizer solution 3 mL, 3 mL, nebulization, TID, Nakia Bailey MD, 3 mL at 02/01/24 1914    lidocaine 4 % patch 1 patch, 1 patch, transdermal, Daily, Nakia Bailey MD, 1 patch at 02/02/24 0818    losartan (Cozaar) tablet 25 mg, 25 mg, oral, Daily, Nakia Bailey MD, 25 mg at 02/02/24 0818    magnesium oxide (Mag-Ox) tablet 400 mg, 400 mg, oral, Once per day on Tue Thu  Sat, Aftab Bee, PharmD, 400 mg at 02/01/24 0958    melatonin tablet 3 mg, 3 mg, oral, Nightly, Nakia Bailey MD, 3 mg at 02/01/24 2122    metoprolol succinate XL (Toprol-XL) 24 hr tablet 25 mg, 25 mg, oral, Daily, Nakia Bailey MD, 25 mg at 02/02/24 0818    nitroglycerin (Nitrostat) SL tablet 0.4 mg, 0.4 mg, sublingual, q5 min PRN, Nakia Bailey MD    ondansetron (Zofran) injection 4 mg, 4 mg, intravenous, q8h PRN, Nakia Bailey MD    oxygen (O2) therapy, 2 L/min, inhalation, q8h, Nakia Bailey MD, 2 L/min at 02/02/24 0942    pantoprazole (ProtoNix) EC tablet 40 mg, 40 mg, oral, Daily, Nakia Bailye MD, 40 mg at 02/02/24 0818    piperacillin-tazobactam-dextrose (Zosyn) IV 2.25 g, 2.25 g, intravenous, q6h, Nakia Bailey MD, Stopped at 02/02/24 0852    rOPINIRole (Requip) tablet 5 mg, 5 mg, oral, Nightly, Nakia Bailey MD, 5 mg at 02/01/24 2122    sertraline (Zoloft) tablet 50 mg, 50 mg, oral, Daily, Nakia Bailey MD, 50 mg at 02/02/24 0818    sodium chloride (Ocean) 0.65 % nasal spray 1 spray, 1 spray, Each Nostril, 4x daily PRN, Nakia Bailey MD    traZODone (Desyrel) tablet 50 mg, 50 mg, oral, Nightly PRN, Nakia Bailey MD, 50 mg at 01/31/24 2057    Dietary Orders (From admission, onward)       Start     Ordered    02/01/24 1150  Adult diet Regular; Mild thick 2  Diet effective now        Question Answer Comment   Diet type Regular    Fluid consistency Mild thick 2        02/01/24 1149    01/30/24 1359  Oral nutritional supplements  Until discontinued        Comments: vanilla   Question Answer Comment   Deliver with All meals    Select supplement: Ensure Compact        01/30/24 9760                  Estimated Needs:   Estimated Energy Needs  Total Energy Estimated Needs (kCal): 1691 kCal  Total Estimated Energy Need per Day (kCal/kg): 30 kCal/kg  Method for Estimating Needs: actual wt    Estimated Protein Needs  Total Protein Estimated Needs (g): 68 g  Total Protein Estimated Needs (g/kg): 1.2 g/kg  Method for  Estimating Needs: actual wt    Estimated Fluid Needs  Method for Estimating Needs: 1 ml/kcal        Nutrition Diagnosis   Nutrition Diagnosis:  Malnutrition Diagnosis  Patient has Malnutrition Diagnosis: Yes  Diagnosis Status: Ongoing  Malnutrition Diagnosis: Severe malnutrition related to acute disease or injury  As Evidenced by: moderate to severe subcutaneous fat loss and muscle wasting, po intake </= 75% for >/= 1 month, reported wt loss by pt and family       Nutrition Interventions/Recommendations   Nutrition Interventions and Recommendations:    Nutrition Prescription:  Individualized Nutrition Prescription Provided for : 1691 kcals and 68g protein to be provided via diet and supplements    Nutrition Interventions:   Food and/or Nutrient Delivery Interventions  Interventions: Meals and snacks  Meals and Snacks: General healthful diet  Goal: provide as ordered  Medical Food Supplement: Modified beverage  Goal: will adjust supplement to vanilla mighty shake TID to provide 200 kcals and 7g protein each. this supplement is a mild thick liquid.    Education Documentation  No documentation found.           Nutrition Monitoring and Evaluation   Monitoring/Evaluation:   Food/Nutrient Related History Monitoring  Monitoring and Evaluation Plan: Energy intake  Energy Intake: Estimated energy intake  Criteria: pt to tolerate >/= 75% estimated needs    Body Composition/Growth/Weight History  Monitoring and Evaluation Plan: Weight  Weight: Measured weight  Criteria: pt will maintain/gain non-fluid related wt       Time Spent/Follow-up:

## 2024-02-02 NOTE — CARE PLAN
The patient's goals for the shift include improved breathing    The clinical goals for the shift include MBS per speech      Problem: Pain  Goal: My pain/discomfort is manageable  Outcome: Progressing     Problem: Safety  Goal: Patient will be injury free during hospitalization  Outcome: Progressing  Goal: I will remain free of falls  Outcome: Progressing     Problem: Daily Care  Goal: Daily care needs are met  Outcome: Progressing     Problem: Psychosocial Needs  Goal: Demonstrates ability to cope with hospitalization/illness  Outcome: Progressing  Goal: Collaborate with me, my family, and caregiver to identify my specific goals  Outcome: Progressing     Problem: Discharge Barriers  Goal: My discharge needs are met  Outcome: Progressing     Problem: Skin  Goal: Decreased wound size/increased tissue granulation at next dressing change  Outcome: Progressing  Goal: Participates in plan/prevention/treatment measures  Outcome: Progressing  Goal: Prevent/manage excess moisture  Outcome: Progressing  Goal: Prevent/minimize sheer/friction injuries  Outcome: Progressing  Goal: Promote/optimize nutrition  Outcome: Progressing  Problem: Pain  Goal: My pain/discomfort is manageable  Outcome: Progressing     Problem: Safety  Goal: Patient will be injury free during hospitalization  Outcome: Progressing  Goal: I will remain free of falls  Outcome: Progressing     Problem: Daily Care  Goal: Daily care needs are met  Outcome: Progressing     Problem: Psychosocial Needs  Goal: Demonstrates ability to cope with hospitalization/illness  Outcome: Progressing  Goal: Collaborate with me, my family, and caregiver to identify my specific goals  Outcome: Progressing     Problem: Discharge Barriers  Goal: My discharge needs are met  Outcome: Progressing     Problem: Skin  Goal: Decreased wound size/increased tissue granulation at next dressing change  Outcome: Progressing  Goal: Participates in plan/prevention/treatment measures  Outcome:  Progressing  Goal: Prevent/manage excess moisture  Outcome: Progressing  Goal: Prevent/minimize sheer/friction injuries  Outcome: Progressing  Goal: Promote/optimize nutrition  Outcome: Progressing  Goal: Promote skin healing  Outcome: Progressing     Problem: Respiratory  Goal: Minimal/no exertional discomfort or dyspnea this shift  Outcome: Progressing  Goal: No signs of respiratory distress (eg. Use of accessory muscles. Peds grunting)  Outcome: Progressing  Goal: Wean oxygen to maintain O2 saturation per order/standard this shift  Outcome: Progressing     Goal: Promote skin healing  Outcome: Progressing     Problem: Respiratory  Goal: Minimal/no exertional discomfort or dyspnea this shift  Outcome: Progressing  Goal: No signs of respiratory distress (eg. Use of accessory muscles. Peds grunting)  Outcome: Progressing  Goal: Wean oxygen to maintain O2 saturation per order/standard this shift  Outcome: Progressing

## 2024-02-02 NOTE — LETTER
Patient: Kalie Ramos  : 1929    Encounter Date: 2024    PLACE OF SERVICE:  [facility].    This is a subsequent visit.    Subjective  Patient ID: Kalie Ramos is a 89 y.o. female who presents for Follow-up.    Ms. Kalie Ramos is a 89-year-old female with history of congestive heart failure.  She has had a recent right hip fracture and did undergo surgical intervention.  She has several medical issues, unable to care for herself.  She requires supportive care.    Review of Systems   Constitutional:  Negative for chills and fever.   Cardiovascular:  Negative for chest pain.   All other systems reviewed and are negative.    Objective  /76   Pulse 82   Temp 36.7 °C (98 °F)   Resp 18     Physical Exam  Vitals reviewed.   Constitutional:       General: She is not in acute distress.     Comments: This is a well-developed, well-nourished female, sitting in a chair.   HENT:      Right Ear: Tympanic membrane, ear canal and external ear normal.      Left Ear: Tympanic membrane, ear canal and external ear normal.   Eyes:      General: No scleral icterus.     Pupils: Pupils are equal, round, and reactive to light.   Neck:      Vascular: No carotid bruit.   Cardiovascular:      Heart sounds: Normal heart sounds, S1 normal and S2 normal. No murmur heard.     No friction rub.   Pulmonary:      Effort: Pulmonary effort is normal.      Breath sounds: Decreased breath sounds (throughout) present.   Abdominal:      Palpations: There is no hepatomegaly, splenomegaly or mass.   Musculoskeletal:         General: No swelling or deformity. Normal range of motion.      Cervical back: Neck supple.      Right lower leg: Edema present.      Left lower leg: Edema present.   Lymphadenopathy:      Cervical: No cervical adenopathy.      Upper Body:      Right upper body: No axillary adenopathy.      Left upper body: No axillary adenopathy.      Lower Body: No right inguinal  adenopathy. No left inguinal adenopathy.   Skin:     Comments: The patient's right hip wound is intact with no sign of infection.   Neurological:      Mental Status: She is oriented to person, place, and time.      Cranial Nerves: Cranial nerves 2-12 are intact. No cranial nerve deficit.      Sensory: No sensory deficit.      Motor: Motor function is intact. No weakness.      Gait: Gait is intact.      Deep Tendon Reflexes: Reflexes normal.   Psychiatric:         Mood and Affect: Mood normal. Mood is not anxious or depressed. Affect is not angry.         Behavior: Behavior is not agitated.         Thought Content: Thought content normal.         Judgment: Judgment normal.     LAB WORK: Laboratory studies were reviewed.    Assessment/Plan  Problem List Items Addressed This Visit             ICD-10-CM       Cardiac and Vasculature    Hyperlipidemia E78.5    Hypertension I10       Gastrointestinal and Abdominal    Gastroesophageal reflux disease K21.9       Hematology and Neoplasia    Anemia D64.9       Pulmonary and Pneumonias    Chronic obstructive pulmonary disease (CMS/HCC) J44.9       Symptoms and Signs    Weakness R53.1       Other    Hip fracture, right, closed, initial encounter (CMS/HCC) S72.001A     Other Visit Diagnoses         Codes    Congestive heart failure, unspecified HF chronicity, unspecified heart failure type (CMS/HCC)    -  Primary I50.9    At risk for falling     Z91.81    Osteoarthritis, unspecified osteoarthritis type, unspecified site     M19.90    Depression, unspecified depression type     F32.A        1. Heart failure, on diuretic.  2. COPD, on bronchodilator therapy.  3. Hip fracture, on pain control.  4. Weakness, on PT/OT.  5. Fall risk, on fall precautions.  6. Osteoarthritis, on Tylenol.  7. Hyperlipidemia, on statin.  8. Hypertension, med controlled.  9. Depression, on medication.  10. Anemia, follow CBC.  11. Reflux disease, on PPI.    Scribe Attestation  By signing my name below, I,  Ross Dunlapibben attest that this documentation has been prepared under the direction and in the presence of Avel Bailey MD.   All medical record entries made by the scribe were personally dictated by me I have reviewed the chart and agree the record accurately reflects my personal performance of his history physical examination and management      Electronically Signed By: Avel Bailey MD   2/28/24  6:14 PM

## 2024-02-02 NOTE — PROGRESS NOTES
Occupational Therapy    OT Treatment    Patient Name: Kalie Ramos  MRN: [id number]  Today's Date: 2/2/2024    General:  General  Missed Visit: Yes  Missed Visit Reason: Patient refused  General Comment: Pt had just returned to bed - requested therapy be deferred at this time

## 2024-02-02 NOTE — CARE PLAN
Problem: Pain  Goal: My pain/discomfort is manageable  Outcome: Progressing     Problem: Safety  Goal: Patient will be injury free during hospitalization  Outcome: Progressing  Goal: I will remain free of falls  Outcome: Progressing     Problem: Daily Care  Goal: Daily care needs are met  Outcome: Progressing     Problem: Psychosocial Needs  Goal: Demonstrates ability to cope with hospitalization/illness  Outcome: Progressing  Goal: Collaborate with me, my family, and caregiver to identify my specific goals  Outcome: Progressing     Problem: Discharge Barriers  Goal: My discharge needs are met  Outcome: Progressing     Problem: Skin  Goal: Decreased wound size/increased tissue granulation at next dressing change  Outcome: Progressing  Goal: Participates in plan/prevention/treatment measures  Outcome: Progressing  Goal: Prevent/manage excess moisture  Outcome: Progressing  Goal: Prevent/minimize sheer/friction injuries  Outcome: Progressing  Goal: Promote/optimize nutrition  Outcome: Progressing  Goal: Promote skin healing  Outcome: Progressing     Problem: Respiratory  Goal: Minimal/no exertional discomfort or dyspnea this shift  Outcome: Progressing  Goal: No signs of respiratory distress (eg. Use of accessory muscles. Peds grunting)  Outcome: Progressing  Goal: Wean oxygen to maintain O2 saturation per order/standard this shift  Outcome: Progressing   The patient's goals for the shift include improved breathing    The clinical goals for the shift include Safety

## 2024-02-02 NOTE — PROGRESS NOTES
Subjective Data:  Patient is awake alert conversing with daughter not short of breath on nasal cannula    Overnight Events:    As described below     Objective Data:  Last Recorded Vitals:  Vitals:    02/02/24 0300 02/02/24 0717 02/02/24 0942 02/02/24 1118   BP: 124/62 138/72  136/68   BP Location: Right arm Right arm  Right arm   Patient Position: Lying Lying  Sitting   Pulse: 53 68  61   Resp: 14 17  14   Temp: 35.7 °C (96.3 °F) 36.7 °C (98.1 °F)  36.2 °C (97.2 °F)   TempSrc: Temporal Temporal  Temporal   SpO2: 94% 99% 99% 99%   Weight:       Height:           Last Labs:  CBC - 2/2/2024:  4:45 AM  8.2 10.9 276    35.0      CMP - 2/2/2024:  4:45 AM  8.1 6.6 13 --- 0.3   _ 3.2 9 114      PTT - No results in last year.  _   _ _     HGBA1C   Date/Time Value Ref Range Status   05/23/2022 10:43 PM 5.7 4.0 - 6.0 % Final     Comment:     Hemoglobin A1C levels are related to mean blood glucose during the   preceding 2-3 months. The relationship table below may be used as a   general guide. Each 1% increase in HGB A1C is a reflection of an   increase in mean glucose of approximately 30 mg/dl.   Reference: Diabetes Care, volume 29, supplement 1 Jan. 2006                        HGB A1C ................. Approx. Mean Glucose   _______________________________________________   6%   ...............................  120 mg/dl   7%   ...............................  150 mg/dl   8%   ...............................  180 mg/dl   9%   ...............................  210 mg/dl   10%  ...............................  240 mg/dl  Performed at [address] [address]     12/17/2020 03:16 PM 5.7 4.0 - 6.0 % Final     Comment:     Hemoglobin A1C levels are related to mean blood glucose during the   preceding 2-3 months. The relationship table below may be used as a   general guide. Each 1% increase in HGB A1C is a reflection of an   increase in mean glucose of approximately 30 mg/dl.   Reference: Diabetes Care,  "volume 29, supplement 1 Jan. 2006                        HGB A1C ................. Approx. Mean Glucose   _______________________________________________   6%   ...............................  120 mg/dl   7%   ...............................  150 mg/dl   8%   ...............................  180 mg/dl   9%   ...............................  210 mg/dl   10%  ...............................  240 mg/dl  Performed at [address] [address]       LDLCALC   Date/Time Value Ref Range Status   01/27/2023 06:04 AM 66 65 - 130 MG/DL Final   11/20/2022 02:47 AM 54 65 - 130 MG/DL Final   07/06/2022 01:13 AM 54 65 - 130 MG/DL Final     VLDL   Date/Time Value Ref Range Status   08/03/2018 09:41 AM 25 0 - 40 mg/dL Final   01/18/2018 02:30 PM 47 0 - 40 mg/dL Final   10/17/2017 11:18 AM 50 0 - 40 mg/dL Final      Last I/O:  No intake/output data recorded.    Past Cardiology Tests (Last 3 Years):  EKG:  ECG 12 lead 01/29/2024      ECG 12 lead       Electrocardiogram, 12-lead PRN ACS symptoms       ECG 12 lead       ECG 12 lead 11/17/2023      ECG 12 Lead       ECG 12 Lead       ECG 12 lead 10/24/2023    Echo:  No results found for this or any previous visit from the past 1095 days.    Ejection Fractions:  No results found for: \"EF\"  Cath:  No results found for this or any previous visit from the past 1095 days.    Stress Test:  No results found for this or any previous visit from the past 1095 days.    Cardiac Imaging:  No results found for this or any previous visit from the past 1095 days.      Inpatient Medications:  Scheduled medications   Medication Dose Route Frequency    aspirin  325 mg oral BID    atorvastatin  10 mg oral Nightly    calcitonin (salmon)  1 spray One Nostril Daily    famotidine  20 mg oral BID    ferrous gluconate  38 mg of iron oral Once per day on Mon Wed Fri    furosemide  40 mg oral BID with meals    heparin (porcine)  5,000 Units subcutaneous q8h    ipratropium-albuteroL  3 mL " nebulization TID    lidocaine  1 patch transdermal Daily    losartan  25 mg oral Daily    magnesium oxide  400 mg oral Once per day on Tue Thu Sat    melatonin  3 mg oral Nightly    metoprolol succinate XL  25 mg oral Daily    oxygen  2 L/min inhalation q8h    pantoprazole  40 mg oral Daily    piperacillin-tazobactam  2.25 g intravenous q6h    rOPINIRole  5 mg oral Nightly    sertraline  50 mg oral Daily     PRN medications   Medication    acetaminophen    Or    acetaminophen    acetaminophen    albuterol    benzocaine-menthol    dextromethorphan-guaifenesin    guaiFENesin    HYDROcodone-acetaminophen    nitroglycerin    ondansetron    sodium chloride    traZODone     Continuous Medications   Medication Dose Last Rate       Physical Exam:     The patient is a upper elderly white female awake alert conversant not overtly dyspneic on nasal cannula visiting with daughter.  JVP not elevated carotid and pulses 2+  Moderately severe thoracic kyphosis shallow air movement breath sounds are absent one half of the posterior lung bases bilaterally  Cardiac rhythm irregular S1-S2 obscured grade 3/6 to 4/6 systolic ejection murmur  Abdomen is benign no paraspinal megaly no focal tenderness  No peripheral edema pedal pulses are present     Assessment/Plan   1/29: This patient is a 89-year-old white female with extensive issues that include coronary artery disease, severe aortic valvular stenosis, moderate tricuspid and mitral valve regurgitation, severe pulmonary hypertension, COPD with oxygen dependence, hypertension, left bundle branch block conduction delay, lower extremity edema due to venous insufficiency, chronic DVT left lower extremity femoral vein, laparoscopic paraesophageal hiatal hernia repair, ongoing aspiration pneumonias, nontoxic multinodular goiter.  The patient's last echocardiogram on 7/25/2023 again demonstrated a LV ejection fraction 35-40% severe aortic valve stenosis peak systolic gradient 56 mmHg, mild  mitral moderate tricuspid valve regurgitation and severe pulmonary hypertension estimated PA systolic pressure 67 mmHg.  The patient was thought not to be a candidate for transcatheter aortic valve replacement.  She does wear continuous oxygen at 3 L/min at home.  She surprisingly still lives independently in her own condominium.  She has been having increased shortness of breath.  Multiple potential etiologies including her chronic lung disease aspiration pneumonias interstitial fibrosis possible CHF.  The patient was recently admitted for several days on 10/23/2023 with increased shortness of breath and ultimately was released on Lasix 40 mg twice daily which she has reduced to daily.  Patient admitted again on 11/12/2023 after having fallen in the bathroom and not being able to get up due to generalized weakness.  She lives independently in her own condominium and has been very resistant to review our recommendation for assisted living.  At that time it was decided to continue the patient on her usual therapy which  at that point included Lasix 40 mg once daily along with her low-dose aspirin plus atorvastatin 40 mg daily and metoprolol succinate 25 mg twice daily.    Patient was admitted after a mechanical fall with a right femoral neck fracture and subsequently underwent right total hip replacement on 12/16/2023.  The patient was discharged to rehab facility on usual preadmission low-dose losartan 25 mg daily Toprol-XL 25 mg daily Lasix 40 mg daily with no anticoagulation due to where multiple falls.  Her atrial fibrillation was rate controlled with the low-dose Toprol-XL.  She was readmitted with progressive increased shortness of breath with CTA of the chest demonstrating large bilateral pleural effusions and possible multifocal pneumonia.  The patient has been started on empiric antibiotic therapy with IV ceftriaxone and azithromycin.  She has been thought to be a high risk candidate for aspiration pneumonia  in the past.  The pleural effusions presumably are related to her progressively more severe aortic valvular stenosis along with severe pulmonary hypertension.  Pulmonology will be consulted with respect to treatment of her possible pneumonia and to determine whether her respiratory status would benefit from thoracentesis by interventional radiology.      1/30: The patient is lying in bed comfortable eating breakfast which she enjoyed.  No respiratory distress on O2 nasal cannula.  She has been seen by pulmonology and interventional radiology has been contacted for thoracentesis.  Patient's acute on chronic respiratory failure thought to be multifactorial related to volume overload due to worsening valvular heart disease and pulmonary hypertension possible noncompliance with diuretic therapy and?  Pneumonia.  The patient's comprehensive metabolic panel relatively unremarkable creatinine is 0.8.  CBC notable for only mild leukocytosis 12,900.  The patient's p.o. Lasix will be increased for now to 40 mg twice daily.  The patient is now agreeable along with other family members to be placed into assisted living at the time of hospital discharge     1/31: The patient underwent an ultrasound-guided thoracentesis of the right pleural effusion yesterday well-tolerated 1.5 L of fluid approximately removed.  Fluid analysis is still in process.  Breathing remains unlabored.  On examination there is improved air movement at the right lung base.  Left-sided thoracentesis will evidently be deferred unless she has ongoing and persistent dyspnea.  No new lab work from today will recheck tomorrow.  Telemetry monitor demonstrates sinus rhythm first-degree AV block in the 70/min range.  Systolic blood pressures in the 120-140 mmHg range.  Patient remains on increased dose of Lasix 40 mg twice daily.     2/1: The patient is somewhat more somnolent today and receiving a breathing treatment.  The patient did have a modified barium  swallow performed with recommendations being regular solids nectar thickened liquids and chin tuck with clear liquids.  The patient had a limited chest x-ray done today showing cardiomegaly congestive changes.  Her main issues have been recurring pleural effusions related to severe aortic stenosis and pulmonary hypertension along with aspiration pneumonitis.  Patient will remain on the slightly higher dose of Lasix 40 mg twice daily for now.  Systolic blood pressures are in the 130-140 mmHg range.    2/2: Patient was transferred to regular nursing floor with telemetry.  Currently she denies any shortness of breath visiting with daughter.  She continues on with her usual O2 nasal cannula.  Systolic blood pressures in the 120-130 mmHg range.  She continues on the Lasix 40 mg p.o. twice daily.  The patient is at high risk for reaccumulation of her pleural effusion related to her severe aortic stenosis pulmonary hypertension and despite the Lasix she may require repetitive thoracenteses in the future.  Patient is aware of her risk for aspiration and has been educated with respect to swallowing thickened liquids etc.  She evidently is scheduled for rehab transfer tomorrow and then ultimately will go to assisted living.  Renal parameters stable creatinine 0.8 potassium 3.6.  CBC is unremarkable hematocrit 35.0.      Peripheral IV 01/29/24 22 G Right Hand (Active)   Site Assessment Clean;Dry;Intact 02/02/24 0737   Dressing Status Clean;Dry 02/02/24 0737   Number of days: 4       Code Status:  DNR and No Intubation    I spent 20 minutes in the professional and overall care of this patient.        Jim Mckinney MD

## 2024-02-03 VITALS
SYSTOLIC BLOOD PRESSURE: 124 MMHG | DIASTOLIC BLOOD PRESSURE: 82 MMHG | HEIGHT: 62 IN | BODY MASS INDEX: 22.39 KG/M2 | RESPIRATION RATE: 16 BRPM | WEIGHT: 121.69 LBS | OXYGEN SATURATION: 90 % | TEMPERATURE: 98.1 F | HEART RATE: 76 BPM

## 2024-02-03 LAB
BACTERIA FLD CULT: NORMAL
GRAM STN SPEC: NORMAL
GRAM STN SPEC: NORMAL

## 2024-02-03 PROCEDURE — 97535 SELF CARE MNGMENT TRAINING: CPT | Mod: GO

## 2024-02-03 PROCEDURE — 2500000004 HC RX 250 GENERAL PHARMACY W/ HCPCS (ALT 636 FOR OP/ED): Performed by: INTERNAL MEDICINE

## 2024-02-03 PROCEDURE — 99239 HOSP IP/OBS DSCHRG MGMT >30: CPT | Performed by: INTERNAL MEDICINE

## 2024-02-03 PROCEDURE — 2500000004 HC RX 250 GENERAL PHARMACY W/ HCPCS (ALT 636 FOR OP/ED)

## 2024-02-03 PROCEDURE — 2500000005 HC RX 250 GENERAL PHARMACY W/O HCPCS: Performed by: INTERNAL MEDICINE

## 2024-02-03 PROCEDURE — 2500000001 HC RX 250 WO HCPCS SELF ADMINISTERED DRUGS (ALT 637 FOR MEDICARE OP): Performed by: INTERNAL MEDICINE

## 2024-02-03 PROCEDURE — 2500000002 HC RX 250 W HCPCS SELF ADMINISTERED DRUGS (ALT 637 FOR MEDICARE OP, ALT 636 FOR OP/ED): Performed by: INTERNAL MEDICINE

## 2024-02-03 PROCEDURE — 9420000001 HC RT PATIENT EDUCATION 5 MIN

## 2024-02-03 PROCEDURE — 97530 THERAPEUTIC ACTIVITIES: CPT | Mod: GO

## 2024-02-03 PROCEDURE — 94640 AIRWAY INHALATION TREATMENT: CPT

## 2024-02-03 RX ORDER — IPRATROPIUM BROMIDE AND ALBUTEROL SULFATE 2.5; .5 MG/3ML; MG/3ML
3 SOLUTION RESPIRATORY (INHALATION)
Start: 2024-02-03

## 2024-02-03 RX ORDER — FUROSEMIDE 40 MG/1
40 TABLET ORAL
Start: 2024-02-03 | End: 2024-04-02 | Stop reason: HOSPADM

## 2024-02-03 RX ORDER — AMOXICILLIN AND CLAVULANATE POTASSIUM 875; 125 MG/1; MG/1
875 TABLET, FILM COATED ORAL 2 TIMES DAILY
Qty: 20 TABLET | Refills: 0 | Status: SHIPPED | OUTPATIENT
Start: 2024-02-03 | End: 2024-02-13

## 2024-02-03 RX ORDER — METOPROLOL SUCCINATE 25 MG/1
25 TABLET, EXTENDED RELEASE ORAL DAILY
Start: 2024-02-04

## 2024-02-03 RX ORDER — ATORVASTATIN CALCIUM 10 MG/1
10 TABLET, FILM COATED ORAL NIGHTLY
Start: 2024-02-03

## 2024-02-03 RX ADMIN — SERTRALINE HYDROCHLORIDE 50 MG: 50 TABLET ORAL at 09:20

## 2024-02-03 RX ADMIN — PIPERACILLIN SODIUM AND TAZOBACTAM SODIUM 2.25 G: 2; .25 INJECTION, SOLUTION INTRAVENOUS at 09:25

## 2024-02-03 RX ADMIN — HEPARIN SODIUM 5000 UNITS: 5000 INJECTION, SOLUTION INTRAVENOUS; SUBCUTANEOUS at 02:06

## 2024-02-03 RX ADMIN — FUROSEMIDE 40 MG: 40 TABLET ORAL at 09:21

## 2024-02-03 RX ADMIN — LOSARTAN POTASSIUM 25 MG: 25 TABLET, FILM COATED ORAL at 09:21

## 2024-02-03 RX ADMIN — PIPERACILLIN SODIUM AND TAZOBACTAM SODIUM 2.25 G: 2; .25 INJECTION, SOLUTION INTRAVENOUS at 02:06

## 2024-02-03 RX ADMIN — Medication 400 MG: at 09:21

## 2024-02-03 RX ADMIN — HEPARIN SODIUM 5000 UNITS: 5000 INJECTION, SOLUTION INTRAVENOUS; SUBCUTANEOUS at 09:23

## 2024-02-03 RX ADMIN — LIDOCAINE 1 PATCH: 4 PATCH TOPICAL at 09:25

## 2024-02-03 RX ADMIN — PANTOPRAZOLE SODIUM 40 MG: 40 TABLET, DELAYED RELEASE ORAL at 09:21

## 2024-02-03 RX ADMIN — METOPROLOL SUCCINATE 25 MG: 25 TABLET, EXTENDED RELEASE ORAL at 09:21

## 2024-02-03 RX ADMIN — FAMOTIDINE 20 MG: 20 TABLET, FILM COATED ORAL at 09:21

## 2024-02-03 RX ADMIN — ASPIRIN 325 MG: 325 TABLET, COATED ORAL at 09:21

## 2024-02-03 RX ADMIN — CALCITONIN SALMON 1 SPRAY: 200 SPRAY, METERED NASAL at 09:21

## 2024-02-03 RX ADMIN — Medication 2 L/MIN: at 07:00

## 2024-02-03 RX ADMIN — IPRATROPIUM BROMIDE AND ALBUTEROL SULFATE 3 ML: 2.5; .5 SOLUTION RESPIRATORY (INHALATION) at 07:31

## 2024-02-03 ASSESSMENT — COGNITIVE AND FUNCTIONAL STATUS - GENERAL
DRESSING REGULAR LOWER BODY CLOTHING: A LITTLE
DRESSING REGULAR UPPER BODY CLOTHING: A LITTLE
MOVING FROM LYING ON BACK TO SITTING ON SIDE OF FLAT BED WITH BEDRAILS: A LITTLE
EATING MEALS: A LITTLE
CLIMB 3 TO 5 STEPS WITH RAILING: TOTAL
WALKING IN HOSPITAL ROOM: A LITTLE
PERSONAL GROOMING: A LITTLE
TOILETING: A LITTLE
DAILY ACTIVITIY SCORE: 18
STANDING UP FROM CHAIR USING ARMS: A LITTLE
HELP NEEDED FOR BATHING: A LITTLE
TURNING FROM BACK TO SIDE WHILE IN FLAT BAD: A LITTLE
MOVING TO AND FROM BED TO CHAIR: A LITTLE
MOBILITY SCORE: 16

## 2024-02-03 ASSESSMENT — PAIN SCALES - GENERAL
PAINLEVEL_OUTOF10: 0 - NO PAIN
PAINLEVEL_OUTOF10: 0 - NO PAIN

## 2024-02-03 ASSESSMENT — ACTIVITIES OF DAILY LIVING (ADL): HOME_MANAGEMENT_TIME_ENTRY: 15

## 2024-02-03 ASSESSMENT — PAIN - FUNCTIONAL ASSESSMENT
PAIN_FUNCTIONAL_ASSESSMENT: 0-10
PAIN_FUNCTIONAL_ASSESSMENT: 0-10

## 2024-02-03 NOTE — PROGRESS NOTES
"Kalie Ramos is a 89 y.o. female on day 5 of admission presenting with Respiratory distress.    Subjective   Patient is doing really well       Objective     Physical Exam  Vitals reviewed.   Constitutional:       Appearance: Normal appearance.   HENT:      Head: Normocephalic and atraumatic.      Right Ear: Tympanic membrane, ear canal and external ear normal.      Left Ear: Tympanic membrane, ear canal and external ear normal.      Nose: Nose normal.      Mouth/Throat:      Pharynx: Oropharynx is clear.   Eyes:      Extraocular Movements: Extraocular movements intact.      Conjunctiva/sclera: Conjunctivae normal.      Pupils: Pupils are equal, round, and reactive to light.   Cardiovascular:      Rate and Rhythm: Normal rate and regular rhythm.      Pulses: Normal pulses.      Heart sounds: Normal heart sounds.   Pulmonary:      Effort: Pulmonary effort is normal.      Breath sounds: Normal breath sounds.   Abdominal:      General: Abdomen is flat. Bowel sounds are normal.      Palpations: Abdomen is soft.   Musculoskeletal:      Cervical back: Normal range of motion and neck supple.   Skin:     General: Skin is warm and dry.   Neurological:      General: No focal deficit present.      Mental Status: She is alert and oriented to person, place, and time.   Psychiatric:         Mood and Affect: Mood normal.         Last Recorded Vitals  Blood pressure 124/82, pulse 76, temperature 36.7 °C (98.1 °F), temperature source Temporal, resp. rate 16, height 1.575 m (5' 2\"), weight 55.2 kg (121 lb 11.1 oz), SpO2 90 %.  Intake/Output last 3 Shifts:  I/O last 3 completed shifts:  In: 350 (6.3 mL/kg) [P.O.:350]  Out: - (0 mL/kg)   Weight: 55.2 kg     Relevant Results              Scheduled medications  aspirin, 325 mg, oral, BID  atorvastatin, 10 mg, oral, Nightly  calcitonin (salmon), 1 spray, One Nostril, Daily  famotidine, 20 mg, oral, BID  ferrous gluconate, 38 mg of iron, oral, Once per day on Mon Wed Fri  furosemide, " 40 mg, oral, BID with meals  heparin (porcine), 5,000 Units, subcutaneous, q8h  ipratropium-albuteroL, 3 mL, nebulization, TID  lidocaine, 1 patch, transdermal, Daily  losartan, 25 mg, oral, Daily  magnesium oxide, 400 mg, oral, Once per day on Tue Thu Sat  melatonin, 3 mg, oral, Nightly  metoprolol succinate XL, 25 mg, oral, Daily  oxygen, 2 L/min, inhalation, q8h  pantoprazole, 40 mg, oral, Daily  piperacillin-tazobactam, 2.25 g, intravenous, q6h  rOPINIRole, 5 mg, oral, Nightly  sertraline, 50 mg, oral, Daily      Continuous medications     PRN medications  PRN medications: [DISCONTINUED] acetaminophen **OR** acetaminophen **OR** acetaminophen, acetaminophen, albuterol, benzocaine-menthol, dextromethorphan-guaifenesin, guaiFENesin, HYDROcodone-acetaminophen, nitroglycerin, ondansetron, sodium chloride, traZODone                   Assessment/Plan   Principal Problem:    Respiratory distress  Active Problems:    Anxiety    Aortic stenosis    Dysphagia    Hyperlipidemia    Hypertension    Thyroid nodule    Pneumonia due to infectious organism    Pleural effusion    SIRS (systemic inflammatory response syndrome) (CMS/HCC)    After thoracocentesis patient is doing better  CHF compensated  Blood pressure stable  Finish oral antibiotics  DC to rehab  [place] [facility]ie       I spent 35 minutes in the professional and overall care of this patient.      Nakia Bailey MD

## 2024-02-03 NOTE — CARE PLAN
Problem: Pain  Goal: My pain/discomfort is manageable  Outcome: Progressing     Problem: Safety  Goal: Patient will be injury free during hospitalization  Outcome: Progressing  Goal: I will remain free of falls  Outcome: Progressing     Problem: Daily Care  Goal: Daily care needs are met  Outcome: Progressing     Problem: Psychosocial Needs  Goal: Demonstrates ability to cope with hospitalization/illness  Outcome: Progressing  Goal: Collaborate with me, my family, and caregiver to identify my specific goals  Outcome: Progressing   The patient's goals for the shift include improved breathing    The clinical goals for the shift include safety

## 2024-02-03 NOTE — CARE PLAN
Pt has a discharge; 7000 completed in HENS, sent  order,  summary (falguni) and the After Visit summary to [facility]  Phoned pts dtr Annette at [phone] and notified her of the pts discharge today at 2:30 to [facility]. Informed Annette that her mother is going to [facility] by [employer] and her mothers insurance may or may not pay for the transportation. Her mother will receive a bill in the mail if the insurance does not cover the transportation; the cost is around $200.00  fee and $11.00 per mile.  Also went over the IMM on the phone and emailed the info to her at [email or web address]  Floor nurse is aware of the discharge, and discharge time.      DISCHARGE PLAN: [facility] TODAY AT 2:30

## 2024-02-03 NOTE — PROGRESS NOTES
"Kalie Ramos is a 89 y.o. female on day 4 of admission presenting with Respiratory distress.    Subjective   Alert awake less short of breath       Objective     Physical Exam  Vitals reviewed.   Constitutional:       Appearance: Normal appearance.   HENT:      Head: Normocephalic and atraumatic.      Right Ear: Tympanic membrane, ear canal and external ear normal.      Left Ear: Tympanic membrane, ear canal and external ear normal.      Nose: Nose normal.      Mouth/Throat:      Pharynx: Oropharynx is clear.   Eyes:      Extraocular Movements: Extraocular movements intact.      Conjunctiva/sclera: Conjunctivae normal.      Pupils: Pupils are equal, round, and reactive to light.   Cardiovascular:      Rate and Rhythm: Normal rate and regular rhythm.      Pulses: Normal pulses.      Heart sounds: Normal heart sounds.   Pulmonary:      Effort: Pulmonary effort is normal.      Breath sounds: Normal breath sounds.   Abdominal:      General: Abdomen is flat. Bowel sounds are normal.      Palpations: Abdomen is soft.   Musculoskeletal:      Cervical back: Normal range of motion and neck supple.   Skin:     General: Skin is warm and dry.   Neurological:      General: No focal deficit present.      Mental Status: She is alert and oriented to person, place, and time.   Psychiatric:         Mood and Affect: Mood normal.         Last Recorded Vitals  Blood pressure 120/64, pulse 71, temperature 36.3 °C (97.3 °F), temperature source Temporal, resp. rate 16, height 1.575 m (5' 2\"), weight 55.2 kg (121 lb 11.1 oz), SpO2 98 %.  Intake/Output last 3 Shifts:  I/O last 3 completed shifts:  In: 250 (4.5 mL/kg) [P.O.:250]  Out: - (0 mL/kg)   Weight: 55.2 kg     Relevant Results              Scheduled medications  aspirin, 325 mg, oral, BID  atorvastatin, 10 mg, oral, Nightly  calcitonin (salmon), 1 spray, One Nostril, Daily  famotidine, 20 mg, oral, BID  ferrous gluconate, 38 mg of iron, oral, Once per day on Mon Wed " Fri  furosemide, 40 mg, oral, BID with meals  heparin (porcine), 5,000 Units, subcutaneous, q8h  ipratropium-albuteroL, 3 mL, nebulization, TID  lidocaine, 1 patch, transdermal, Daily  losartan, 25 mg, oral, Daily  magnesium oxide, 400 mg, oral, Once per day on Tue Thu Sat  melatonin, 3 mg, oral, Nightly  metoprolol succinate XL, 25 mg, oral, Daily  oxygen, 2 L/min, inhalation, q8h  pantoprazole, 40 mg, oral, Daily  piperacillin-tazobactam, 2.25 g, intravenous, q6h  rOPINIRole, 5 mg, oral, Nightly  sertraline, 50 mg, oral, Daily      Continuous medications     PRN medications  PRN medications: [DISCONTINUED] acetaminophen **OR** acetaminophen **OR** acetaminophen, acetaminophen, albuterol, benzocaine-menthol, dextromethorphan-guaifenesin, guaiFENesin, HYDROcodone-acetaminophen, nitroglycerin, ondansetron, sodium chloride, traZODone  Results for orders placed or performed during the hospital encounter of 01/29/24 (from the past 24 hour(s))   CBC and Auto Differential   Result Value Ref Range    WBC 8.2 4.4 - 11.3 x10*3/uL    nRBC 0.0 0.0 - 0.0 /100 WBCs    RBC 3.77 (L) 4.00 - 5.20 x10*6/uL    Hemoglobin 10.9 (L) 12.0 - 16.0 g/dL    Hematocrit 35.0 (L) 36.0 - 46.0 %    MCV 93 80 - 100 fL    MCH 28.9 26.0 - 34.0 pg    MCHC 31.1 (L) 32.0 - 36.0 g/dL    RDW 15.5 (H) 11.5 - 14.5 %    Platelets 276 150 - 450 x10*3/uL    Neutrophils % 70.8 40.0 - 80.0 %    Immature Granulocytes %, Automated 0.6 0.0 - 0.9 %    Lymphocytes % 12.6 13.0 - 44.0 %    Monocytes % 7.2 2.0 - 10.0 %    Eosinophils % 8.3 0.0 - 6.0 %    Basophils % 0.5 0.0 - 2.0 %    Neutrophils Absolute 5.80 (H) 1.60 - 5.50 x10*3/uL    Immature Granulocytes Absolute, Automated 0.05 0.00 - 0.50 x10*3/uL    Lymphocytes Absolute 1.03 0.80 - 3.00 x10*3/uL    Monocytes Absolute 0.59 0.05 - 0.80 x10*3/uL    Eosinophils Absolute 0.68 (H) 0.00 - 0.40 x10*3/uL    Basophils Absolute 0.04 0.00 - 0.10 x10*3/uL   Basic Metabolic Panel   Result Value Ref Range    Glucose 101 (H) 65  - 99 mg/dL    Sodium 137 133 - 145 mmol/L    Potassium 3.6 3.4 - 5.1 mmol/L    Chloride 100 97 - 107 mmol/L    Bicarbonate 29 24 - 31 mmol/L    Urea Nitrogen 9 8 - 25 mg/dL    Creatinine 0.80 0.40 - 1.60 mg/dL    eGFR 68 >60 mL/min/1.73m*2    Calcium 8.1 (L) 8.5 - 10.4 mg/dL    Anion Gap 8 <=19 mmol/L     FL modified barium swallow study    Result Date: 2/1/2024  Interpreted By:  Jeras, Vjekoslav, and Casey Corinne STUDY: FL MODIFIED BARIUM SWALLOW STUDY;; 2/1/2024 11:25 am   INDICATION: Signs/Symptoms:Recurrent pneumonia.   COMPARISON: 08/01/2023   ACCESSION NUMBER(S): [id number]   ORDERING CLINICIAN: WARREN ROLLINS   TECHNIQUE: MBSS completed. Informed verbal consent obtained prior to completion of exam. Trials of thin, nectar thick, honey thick, puree, soft-solids, and regular solids given. Fluoroscopy time :  2 minutes 42 seconds with radiation dose air kerma 6.3 mGy.   SLP: Corinne E. Casey, M.A. CCC-SLP Phone/Pager: [phone] opt 2, via [employer], or via EPIC secure chat   SPEECH FINDINGS: Reason for referral: Recurrent pneumonia Patient hx: Anemia, COPD, GERD, dysphagia Respiratory status: Nasal cannula Previous diet: Regular solids with thin liquids. Chin tuck with liquids.   FINAL SPEECH RECOMMENDATIONS   Diet recommendations/feeding strategies: Regular solids with nectar thickened liquids (chin tuck with liquids). Consider use of hanks free water protocol to mitigate risks of dehydration associated with thickened liquids.   Follow-up speech therapy recommended: Yes.   Short term goals: Pt. to tolerate least restrictive diet without pulmonary compromise, Pt. to use safe swallow strategies independently in all observed trials , Pt. to tolerate trials of thin liquids with SLP only with no s/s aspiration or laryngeal penetration , Pt. to complete pharyngeal strengthening techniques   Education provided: Yes. Results and recommendations discussed verbally with pt following exam. She verbalizes understanding,  asking appropriate questions to follow up. Discussed with pt's Dtr, Annette, via telephone per pt request as well. Emailed Annette handouts of education material regarding aspiration pneumonia, thickened liquids, and the Rodriguez free water protocol per request.   Treatment Provided today: Yes - discussed compensatory swallowing strategies with pt demonstrating adequate knowledge and return. However, given her h/o difficulty with recalling swallowing strategies, further review and treatment is indicated.   Additional consult suggested: Follow up SLP tx at next level of care   Repeat study/ dc plan: Continue dysphagia therapy during admission and at next level of care. Repeat MBSS in 4-6 weeks or prior to diet upgrade.   Mechanics of the swallow summary: *Oral phase: Prolonged mastication, but functional for adequate PO intake. Delayed swallow onset with bolus head at the pyriform sinuses for thin and nectar liquids. Bolus head at the level of the valleculae for honey liquids, puree, and cracker trials. *Pharyngeal phase: Decreased hyolaryngeal excursion, partial epiglottic inversion, diminished laryngeal vestibule closure with contrast present for thin and nectar liquid trials. *Esophageal phase: WNL duration/distention of UES. AP view and esophageal sweep deferred in light of pt positioning, fatigue, and limitations of spacing with Hausted chair.   SLP impressions with severity rating: Moderate oropharyngeal dysphagia is present with deficits as discussed above. Patient is noted with SILENT aspiration during and after the swallow of thin liquids. Aspiration is eliminated during the with use of chin tuck strategy, however deep penetration remains with thin liquid residues in laryngeal vestibule that are noted to slowly aspirate throughout testing. Given patient's history of dysphagia and difficulty with recalling and applying use of chin tuck strategy in prior opportunities, recommend liquid downgrade to nectar thickened  liquids. To help mitigate risks of dehydration second to thickened liquids, the hanks free water protocol is also recommended.   Thin Liquids (MBSS) Rosenbek's Penetration Aspiration Scale, Thin Liquids (MBSS): 8. SILENT ASPIRATION - contrast passes glottis, visible residue, NO pt response   Response to Aspiration, Thin Liquid (MBSS): absent response, silent aspiration,       Nectar Thick Liquids (MBSS) Rosenbek's Penetration Aspiration Scale, Nectar thick liquids (MBSS): 3. PENETRATION with LOW ASPIRATION risk - contrast remains above vocal cords, visible residue     Honey Thick Liquids (MBSS) Rosenbek's Penetration Aspiration Scale, Honey thick liquids (MBSS): 1. NO ASPIRATION & NO PENETRATION - no aspiration, contrast does not enter airway     Purees (MBSS) Rosenbek's Penetration Aspiration Scale, Purees (MBSS): 1. NO ASPIRATION & NO PENETRATION - no aspiration, contrast does not enter airway     Solids (MBSS) Rosenbek's Penetration Aspiration Scale, Solids (MBSS): 1. NO ASPIRATION & NO PENETRATION - no aspiration, contrast does not enter airway     Speech Therapy section of this report signed by Corinne E. Casey, M.A. CCC-SLP on 2/1/2024 at 11:47 am.   RADIOLOGY FINDINGS: Limited characterization visualized portions of the osseous structures unremarkable within limits of this exam         1. Full report as described above by speech pathology.   MACRO: None   Signed by: Jan Sheridan 2/1/2024 12:10 PM Dictation workstation:   [id number]    XR chest 1 view    Result Date: 2/1/2024  Interpreted By:  Severiano Cano, STUDY: XR CHEST 1 VIEW; 2/1/2024 5:55 am   INDICATION: Signs/Symptoms:Follow-up thoracentesis CHF pneumonia   COMPARISON: 01/29/2024   ACCESSION NUMBER(S): [id number]   ORDERING CLINICIAN: DIANA DELUNA   FINDINGS: The study is limited due to rotation, respiratory motion, projection of patient's head over the upper chest and poor inspiratory effort, with resultant crowding of the pulmonary  vasculature. The cardiac silhouette appears prominent, exaggerated by the technique. Bilateral infiltrates and pleural effusions are again seen, somehow worsened on the right side and improved on the left. There is no pneumothorax. Degenerative changes involve the spine and shoulders.       Very limited exam. Cardiomegaly and congestive heart failure again seen.   Signed by: Severiano Cano 2/1/2024 9:59 AM Dictation workstation:   [id number]                  Assessment/Plan   Principal Problem:    Respiratory distress  Active Problems:    Anxiety    Aortic stenosis    Dysphagia    Hyperlipidemia    Hypertension    Thyroid nodule    Pneumonia due to infectious organism    Pleural effusion    SIRS (systemic inflammatory response syndrome) (CMS/HCC)    Aspiration precautions to continue  Continue diuretics  Patient has risk for repeat accumulation of pleural effusion and may need thoracocentesis down the road  Family is aware  Blood pressure is stable  Plan for discharge to rehab once arrangements made       I spent  minutes in the professional and overall care of this patient.      Nakia Bailey MD

## 2024-02-03 NOTE — PROGRESS NOTES
Occupational Therapy    OT Treatment    Patient Name: Kalie Ramos  MRN: [id number]  Today's Date: 2/3/2024  Time Calculation  Start Time: 1150  Stop Time: 1215  Time Calculation (min): 25 min         Assessment:  End of Session Communication: Bedside nurse  End of Session Patient Position: Up in chair  OT Assessment Results: Decreased ADL status, Decreased upper extremity strength, Decreased safe judgment during ADL, Decreased endurance, Decreased functional mobility, Decreased gross motor control, Decreased IADLs  Strengths: Ability to acquire knowledge  Plan:  Treatment Interventions: ADL retraining, Patient/family training, Functional transfer training  OT Frequency: 2 times per week  OT Discharge Recommendations: Moderate intensity level of continued care  Equipment Recommended upon Discharge: Wheeled walker  OT Recommended Transfer Status: Assist of 1  OT - OK to Discharge: Yes  Treatment Interventions: ADL retraining, Patient/family training, Functional transfer training    Subjective   Previous Visit Info:     General:  General  Reason for Referral: decline in ADLs  Referred By: Dr. JENN Bailey  Past Medical History Relevant to Rehab: Patient is 89 year old female admits with PNA, Acute on Chronic Respiratory Failure, COPD exacerbation, CHF, and +TpCOPD, Recent R Hip Fx sp ORIF--WBAT RLE post op  Prior to Session Communication: Bedside nurse  Patient Position Received: Bed, 4 rail up  Preferred Learning Style: verbal  General Comment: Pt. was met in supine with son present and was agreeable to participate in skilled OT tx  Precautions:  Hearing/Visual Limitations: very hard of hearing  Medical Precautions: Fall precautions, Oxygen therapy device and L/min  Vital Signs:     Pain:  Pain Assessment  Pain Assessment: 0-10  Pain Score: 0 - No pain    Objective    Cognition:     Coordination:     Activities of Daily Living: Feeding  Feeding Level of Assistance: Setup  Feeding Where Assessed: Chair           Toileting  Toileting Level of Assistance: Maximum assistance  Where Assessed: Bed level  Toileting Comments: Pt incontinent this date but was able to assist with front surya hygiene  Functional Standing Tolerance:     Bed Mobility/Transfers: Bed Mobility 1  Bed Mobility 1: Supine to sitting  Level of Assistance 1: Minimum assistance    Transfer 1  Transfer From 1: Bed to  Transfer to 1: Stand  Technique 1: Sit to stand  Transfer Device 1: Walker  Transfer Level of Assistance 1: Minimum assistance  Trials/Comments 1: assist with blocking BLE due to sliding on floor  Transfers 2  Technique 2: Stand pivot  Transfer Device 2: Walker  Transfer Level of Assistance 2: Minimum assistance  Trials/Comments 2: significant cues for walker management throughout        Education Documentation  Body Mechanics, taught by Jayla Montesinos OT at 2/3/2024  1:39 PM.  Learner: Patient  Readiness: Acceptance  Method: Explanation  Response: Verbalizes Understanding    Precautions, taught by Jayla Montesinos OT at 2/3/2024  1:39 PM.  Learner: Patient  Readiness: Acceptance  Method: Explanation  Response: Verbalizes Understanding    Home Exercise Program, taught by Jayla Montesinos OT at 2/3/2024  1:39 PM.  Learner: Patient  Readiness: Acceptance  Method: Explanation  Response: Verbalizes Understanding    Education Comments  No comments found.        OP EDUCATION:  Education  Individual(s) Educated: Patient  Education Provided: Risk and benefits of OT discussed with patient or other, Fall precautons  Home Program: [facility]  Patient Response to Education: Patient/Caregiver Verbalized Understanding of Information  Education Comment: provided pt. with education on HEP to continue use of    Goals:  Encounter Problems       Encounter Problems (Active)       Balance       Standing Balance (Progressing)       Start:  01/29/24    Expected End:  02/12/24       Pt will demonstrate good static standing balance to promote safe participation  with out of bed activity, transfers, and mobility              Mobility       Ambulation (Progressing)       Start:  01/29/24    Expected End:  02/12/24       Pt will ambulate 50' modified independent assist with walker to promote safe home mobility           Entry Step Negotiation (Progressing)       Start:  01/29/24    Expected End:  02/12/24       Pt will ascend/descend 1 stairs with rail(s) on B and modified independent assist to promote safe entry and exit in home environment                OT Goals       ADLs (Progressing)       Start:  01/29/24    Expected End:  02/29/24       Patient will complete ADL tasks with Mod I, using AE as needed, in order to increase patient's safety and independence with self-care tasks.         Functional Transfers (Progressing)       Start:  01/29/24    Expected End:  02/29/24       Patient will complete functional transfers with Mod I in order to increase patient's safety and independence with daily tasks.         B UE Strengthening (Progressing)       Start:  01/29/24    Expected End:  02/29/24       Patient will increase B UE strength to 4+/5 for functional transfers.         Functional Mobility (Progressing)       Start:  01/29/24    Expected End:  02/29/24       Patient will demonstrate the ability to complete item retrieval and functional mobility with Mod I in order to increase patient's safety and independence with daily tasks.            Safety       Safe Mobility Techniques (Progressing)       Start:  01/29/24    Expected End:  02/12/24       Pt will correctly identify and demonstrate safe mobility techniques to reduce their risks for falls during their acute care stay               Transfers       Supine to sit (Progressing)       Start:  01/29/24    Expected End:  02/12/24       Pt will transfer supine to sitting at edge of bed with modified independent assist to promote acute care out of bed activity           Sit to stand (Progressing)       Start:  01/29/24     Expected End:  02/12/24       Pt will transfer sit to standing position with modified independent assist and walker to promote safe out of bed activity           Bed to Chair (Progressing)       Start:  01/29/24    Expected End:  02/12/24       Pt will transfer from sitting edge of bed to the chair with modified independent assist and walker to promote out of bed activity and reduce the risks of prolonged acute care bedrest

## 2024-02-03 NOTE — NURSING NOTE
Report called and given to Katherine at [facility]. Son at bedside and was updated on discharge plan. Has no questions or concerns at this time.

## 2024-02-03 NOTE — DISCHARGE SUMMARY
Discharge Diagnosis  Respiratory distress    Issues Requiring Follow-Up  CHF  Severe aortic stenosis  Hypertension    Test Results Pending At Discharge  Pending Labs       Order Current Status    Extra Urine Gray Tube Collected (01/29/24 2591)    Urinalysis with Reflex Culture and Microscopic In process    AFB Culture/Smear Preliminary result    Fungal Culture/Smear Preliminary result            Hospital Course   Kalie Ramos is a 89 y.o. female presenting with shortness of breath.  Patient lives alone independently.  Patient has history of aortic valve stenosis had a fall causing right hip fracture underwent ORIF after that she went to the rehab facility.  Patient was discharged from the rehab facility went home better after a week or 2 she started feeling weak again.  She was not moving around much.  Yesterday family found her short of breath   Patient treated for CHF.  Found to have large pleural effusion not chest x-ray thoracocentesis done with improvement in the symptoms.  Empirically treated for pneumonia.  Pleural effusion attributed from aortic stenosis and aspiration.  She did modified barium swallow and her diet was modified.  Discharged to rehab facility possibility of long-term placement from there    Pertinent Physical Exam At Time of Discharge  Physical Exam  Vitals reviewed.   Constitutional:       Appearance: Normal appearance.   HENT:      Head: Normocephalic and atraumatic.      Right Ear: Tympanic membrane, ear canal and external ear normal.      Left Ear: Tympanic membrane, ear canal and external ear normal.      Nose: Nose normal.      Mouth/Throat:      Pharynx: Oropharynx is clear.   Eyes:      Extraocular Movements: Extraocular movements intact.      Conjunctiva/sclera: Conjunctivae normal.      Pupils: Pupils are equal, round, and reactive to light.   Cardiovascular:      Rate and Rhythm: Normal rate and regular rhythm.      Pulses: Normal pulses.      Heart sounds: Normal heart  sounds.   Pulmonary:      Effort: Pulmonary effort is normal.      Breath sounds: Normal breath sounds.   Abdominal:      General: Abdomen is flat. Bowel sounds are normal.      Palpations: Abdomen is soft.   Musculoskeletal:      Cervical back: Normal range of motion and neck supple.   Skin:     General: Skin is warm and dry.   Neurological:      General: No focal deficit present.      Mental Status: She is alert and oriented to person, place, and time.   Psychiatric:         Mood and Affect: Mood normal.         Home Medications     Medication List      START taking these medications     amoxicillin-pot clavulanate 875-125 mg tablet; Commonly known as:   Augmentin; Take 1 tablet (875 mg) by mouth 2 times a day for 10 days.   ipratropium-albuteroL 0.5-2.5 mg/3 mL nebulizer solution; Commonly known   as: Duo-Neb; Take 3 mL by nebulization 3 times a day.     CHANGE how you take these medications     atorvastatin 10 mg tablet; Commonly known as: Lipitor; Take 1 tablet (10   mg) by mouth once daily at bedtime.; What changed: medication strength,   See the new instructions.   furosemide 40 mg tablet; Commonly known as: Lasix; Take 1 tablet (40 mg)   by mouth 2 times a day with meals.; What changed: when to take this   metoprolol succinate XL 25 mg 24 hr tablet; Commonly known as:   Toprol-XL; Take 1 tablet (25 mg) by mouth once daily. Do not crush or   chew. Do not start before February 4, 2024.; Start taking on: February 4, 2024; What changed: medication strength, how much to take     CONTINUE taking these medications     * acetaminophen 325 mg tablet; Commonly known as: Tylenol; Take 2   tablets (650 mg) by mouth every 6 hours if needed for moderate pain (4 -   6).   * acetaminophen 325 mg tablet; Commonly known as: Tylenol; Take 2   tablets (650 mg) by mouth every 4 hours if needed for mild pain (1 - 3).   albuterol 2.5 mg /3 mL (0.083 %) nebulizer solution; Take 3 mL (2.5 mg)   by nebulization every 2 hours if  needed for wheezing.   aspirin 325 mg EC tablet; Take 1 tablet (325 mg) by mouth 2 times a day.   calcitonin (salmon) 200 unit/actuation nasal spray; Commonly known as:   Miacalcin; Administer 1 spray into one nostril once daily. Do not start   before October 28, 2023.   guaiFENesin 600 mg 12 hr tablet; Commonly known as: Mucinex; Take 1   tablet (600 mg) by mouth every 12 hours if needed for congestion. Do not   crush, chew, or split.   HYDROcodone-acetaminophen 5-325 mg tablet; Commonly known as: Norco;   Take 1 tablet by mouth every 4 hours if needed for severe pain (7 - 10).   lidocaine 4 % patch; Place 1 patch over 12 hours on the skin once daily.   Remove & discard patch within 12 hours or as directed by MD. Do not start   before November 16, 2023.   losartan 25 mg tablet; Commonly known as: Cozaar; Take 1 tablet (25 mg)   by mouth once daily.   magnesium oxide 400 mg (241.3 mg magnesium) tablet; Commonly known as:   Mag-Ox   melatonin 3 mg tablet   nitroglycerin 0.4 mg SL tablet; Commonly known as: Nitrostat; Place 1   tablet (0.4 mg) under the tongue every 5 minutes if needed for chest pain   (UP TO 3 TIMES. IF NO RELIEF CALL 911.).   oxygen gas therapy; Commonly known as: O2; Inhale 2 L/min once every 24   hours.   pantoprazole 40 mg EC tablet; Commonly known as: ProtoNix; TAKE 1 TABLET   DAILY   rOPINIRole 5 mg tablet; Commonly known as: Requip   sertraline 50 mg tablet; Commonly known as: Zoloft; Take 1 tablet (50   mg) by mouth once daily.   traZODone 50 mg tablet; Commonly known as: Desyrel; TAKE 1 TABLET AT   BEDTIME AS NEEDED FOR SLEEP  * This list has 2 medication(s) that are the same as other medications   prescribed for you. Read the directions carefully, and ask your doctor or   other care provider to review them with you.     STOP taking these medications     benzocaine-menthol 15-3.6 mg lozenge; Commonly known as: Cepastat Sore   Throat   docusate sodium 100 mg capsule; Commonly known as:  Colace   famotidine 20 mg tablet; Commonly known as: Pepcid   ferrous gluconate 324 (38 Fe) mg tablet; Commonly known as: Fergon   loratadine 10 mg tablet; Commonly known as: Claritin   ondansetron 4 mg/2 mL injection; Commonly known as: Zofran   polyethylene glycol 17 gram packet; Commonly known as: Glycolax, Miralax   psyllium 3.4 gram packet; Commonly known as: Metamucil   sennosides 8.6 mg tablet; Commonly known as: Senokot   sodium chloride 0.65 % nasal spray; Commonly known as: [place]       Outpatient Follow-Up  Future Appointments   Date Time Provider Department Center   2/20/2024  1:00 PM Avel Bailey MD [id number] [facility]   2/26/2024  4:00 PM Jim Mckinney MD [id number] [facility]   5/8/2024  1:00 PM [id number] INFUSION [id number] [facility]       Nakia Bailey MD

## 2024-02-03 NOTE — CARE PLAN
The patient's goals for the shift include improved breathing    The clinical goals for the shift include free from fall this shift. using call light      Problem: Pain  Goal: My pain/discomfort is manageable  2/3/2024 0306 by Natividad Gonzalez RN  Outcome: Progressing  2/3/2024 0306 by Natividad Gonzalez RN  Outcome: Progressing  2/3/2024 0109 by Natividad Gonzalez RN  Outcome: Progressing     Problem: Daily Care  Goal: Daily care needs are met  2/3/2024 0306 by Natividad Gonzalez RN  Outcome: Progressing  2/3/2024 0306 by Natividad Gonzalez RN  Outcome: Progressing  2/3/2024 0109 by Natividad Gonzalez RN  Outcome: Progressing     Problem: Psychosocial Needs  Goal: Demonstrates ability to cope with hospitalization/illness  2/3/2024 0306 by Natividad Gonzalez RN  Outcome: Progressing  2/3/2024 0306 by Natividad Gonzalez RN  Outcome: Progressing  2/3/2024 0109 by Natividad Gonzalez RN  Outcome: Progressing     Problem: Psychosocial Needs  Goal: Collaborate with me, my family, and caregiver to identify my specific goals  2/3/2024 0306 by Natividad Gonzalez RN  Outcome: Progressing  2/3/2024 0306 by Natividad Gonzalez RN  Outcome: Progressing  2/3/2024 0109 by Natividad Gonzalez RN  Outcome: Progressing     Problem: Skin  Goal: Decreased wound size/increased tissue granulation at next dressing change  2/3/2024 0306 by Natividad Gonzalez RN  Outcome: Progressing  2/3/2024 0306 by Natividad Gonzalez RN  Outcome: Progressing  2/3/2024 0109 by Natividad Gonzalez RN  Outcome: Progressing  Goal: Participates in plan/prevention/treatment measures  2/3/2024 0306 by Natividad Gonzalez RN  Outcome: Progressing  2/3/2024 0306 by Natividad Gonzalez RN  Outcome: Progressing  2/3/2024 0109 by Natividad Gonzalez RN  Outcome: Progressing  Goal: Prevent/manage excess moisture  2/3/2024 0306 by Natividad Gonzalez RN  Outcome: Progressing  2/3/2024 0306 by Natividad Gonzalez RN  Outcome: Progressing  2/3/2024 0109 by Natividad Gonzalez, RN  Outcome: Progressing  Goal:  Prevent/minimize sheer/friction injuries  2/3/2024 0306 by Natividad Gonzalez RN  Outcome: Progressing  2/3/2024 0306 by Natividad Gonzalez RN  Outcome: Progressing  2/3/2024 0109 by Natividad Gonzalez RN  Outcome: Progressing  Goal: Promote/optimize nutrition  2/3/2024 0306 by Natividad Gonzalez RN  Outcome: Progressing  2/3/2024 0306 by Natividad Gonzalez RN  Outcome: Progressing  2/3/2024 0109 by Natividad Gonzalez RN  Outcome: Progressing  Goal: Promote skin healing  2/3/2024 0306 by Natividad Gonzalez RN  Outcome: Progressing  2/3/2024 0306 by Natividad Gonzalez RN  Outcome: Progressing  2/3/2024 0109 by Natividad Gonzalez RN  Outcome: Progressing

## 2024-02-03 NOTE — CARE PLAN
The patient's goals for the shift include improved breathing and safety.    The clinical goals for the shift include adequate ventilation, safety, monitor labs/VS, work with PT/OT, and OOB in chair for meals.     Problem: Respiratory  Goal: Minimal/no exertional discomfort or dyspnea this shift  Outcome: Progressing  Goal: No signs of respiratory distress (eg. Use of accessory muscles. Peds grunting)  Outcome: Progressing  Goal: Wean oxygen to maintain O2 saturation per order/standard this shift  Outcome: Progressing     Problem: Deep Vein Thrombosis  Goal: I will remain free from complications of deep vein thrombosis and maintain current level of mobility  Outcome: Progressing     Problem: Fall/Injury  Goal: Not fall by end of shift  Outcome: Progressing     Problem: Fall/Injury  Goal: Verbalize understanding of personal risk factors for fall in the hospital  Outcome: Progressing     Problem: Fall/Injury  Goal: Verbalize understanding of personal risk factors for fall in the hospital  Outcome: Progressing     Problem: Fall/Injury  Goal: Use assistive devices by end of the shift  Outcome: Progressing     Problem: Pain  Goal: My pain/discomfort is manageable  Outcome: Progressing     Problem: Safety  Goal: Patient will be injury free during hospitalization  Outcome: Progressing     Problem: Daily Care  Goal: Daily care needs are met  Outcome: Progressing     Problem: Psychosocial Needs  Goal: Demonstrates ability to cope with hospitalization/illness  Outcome: Progressing  Goal: Collaborate with me, my family, and caregiver to identify my specific goals  Outcome: Progressing     Problem: Discharge Barriers  Goal: My discharge needs are met  Outcome: Progressing

## 2024-02-05 ENCOUNTER — DOCUMENTATION (OUTPATIENT)
Dept: CARE COORDINATION | Facility: CLINIC | Age: 89
End: 2024-02-05
Payer: MEDICARE

## 2024-02-05 PROBLEM — R09.02 HYPOXEMIA: Status: ACTIVE | Noted: 2024-02-05

## 2024-02-05 PROBLEM — K92.9 DISORDER OF DIGESTIVE TRACT: Status: ACTIVE | Noted: 2022-11-22

## 2024-02-05 PROBLEM — B99.9: Status: ACTIVE | Noted: 2023-02-10

## 2024-02-05 PROBLEM — R93.89 STANDARD CHEST X-RAY ABNORMAL: Status: ACTIVE | Noted: 2024-02-05

## 2024-02-05 PROBLEM — Z86.73 HISTORY OF STROKE WITHOUT RESIDUAL DEFICITS: Status: ACTIVE | Noted: 2024-02-05

## 2024-02-05 PROBLEM — R19.7 DIARRHEA: Status: ACTIVE | Noted: 2024-02-05

## 2024-02-05 PROBLEM — M25.539 PAIN IN WRIST: Status: ACTIVE | Noted: 2024-02-05

## 2024-02-05 PROBLEM — N76.0 VAGINITIS: Status: ACTIVE | Noted: 2024-02-05

## 2024-02-05 PROBLEM — M41.04 INFANTILE IDIOPATHIC SCOLIOSIS OF THORACIC REGION: Status: ACTIVE | Noted: 2024-02-05

## 2024-02-05 PROBLEM — D72.9 WHITE BLOOD CELL DISORDER: Status: ACTIVE | Noted: 2024-02-05

## 2024-02-05 PROBLEM — R00.0 TACHYCARDIA: Status: ACTIVE | Noted: 2023-02-10

## 2024-02-05 PROBLEM — R47.9 SPEECH DISTURBANCE: Status: ACTIVE | Noted: 2023-02-10

## 2024-02-05 PROBLEM — M81.0 OSTEOPOROSIS: Status: ACTIVE | Noted: 2021-07-29

## 2024-02-05 PROBLEM — N39.0 ACUTE URINARY TRACT INFECTION: Status: ACTIVE | Noted: 2024-02-05

## 2024-02-05 PROBLEM — R15.9 INCONTINENCE OF FECES: Status: ACTIVE | Noted: 2024-02-05

## 2024-02-05 PROBLEM — R63.0 LOSS OF APPETITE: Status: ACTIVE | Noted: 2024-02-05

## 2024-02-05 PROBLEM — K13.79 EROSION OF ORAL MUCOSA: Status: ACTIVE | Noted: 2024-02-05

## 2024-02-05 PROBLEM — G25.81 RESTLESS LEGS SYNDROME: Status: ACTIVE | Noted: 2023-02-10

## 2024-02-05 PROBLEM — J32.9 SINUSITIS: Status: ACTIVE | Noted: 2024-02-05

## 2024-02-05 PROBLEM — J30.9 ALLERGIC RHINITIS: Status: ACTIVE | Noted: 2024-02-05

## 2024-02-05 PROBLEM — G82.20 PARAPARESIS (MULTI): Status: ACTIVE | Noted: 2023-11-12

## 2024-02-05 PROBLEM — R09.02 HYPOXIA: Status: ACTIVE | Noted: 2024-02-05

## 2024-02-05 PROBLEM — R10.9 ABDOMINAL DISCOMFORT: Status: ACTIVE | Noted: 2024-02-05

## 2024-02-05 PROBLEM — J69.0 ASPIRATION PNEUMONIA (MULTI): Status: ACTIVE | Noted: 2023-01-26

## 2024-02-05 PROBLEM — Z93.1 PRESENCE OF GASTROSTOMY (MULTI): Status: ACTIVE | Noted: 2024-02-05

## 2024-02-05 PROBLEM — R19.8 ALTERED BOWEL FUNCTION: Status: ACTIVE | Noted: 2024-02-05

## 2024-02-05 PROBLEM — F33.9 RECURRENT MAJOR DEPRESSIVE DISORDER (CMS-HCC): Status: ACTIVE | Noted: 2018-09-25

## 2024-02-05 PROBLEM — M41.9 SCOLIOSIS: Status: ACTIVE | Noted: 2022-01-01

## 2024-02-05 PROBLEM — J96.20 ACUTE ON CHRONIC RESPIRATORY FAILURE (MULTI): Status: ACTIVE | Noted: 2023-07-24

## 2024-02-05 PROBLEM — Z85.01 HISTORY OF MALIGNANT NEOPLASM OF ESOPHAGUS: Status: ACTIVE | Noted: 2021-07-29

## 2024-02-05 PROBLEM — S09.90XA INJURY OF HEAD: Status: ACTIVE | Noted: 2024-02-05

## 2024-02-05 PROBLEM — M79.643 PAIN OF HAND: Status: ACTIVE | Noted: 2024-02-05

## 2024-02-05 PROBLEM — H91.90 HEARING LOSS: Status: ACTIVE | Noted: 2022-11-22

## 2024-02-05 PROBLEM — M65.30 ACQUIRED TRIGGER FINGER: Status: ACTIVE | Noted: 2024-02-05

## 2024-02-05 PROBLEM — S80.811A ABRASION OF SKIN OF RIGHT LOWER LEG: Status: ACTIVE | Noted: 2022-11-21

## 2024-02-05 PROBLEM — S12.9XXA FRACTURE OF CERVICAL VERTEBRA (MULTI): Status: ACTIVE | Noted: 2023-02-10

## 2024-02-05 PROBLEM — D52.0 DIETARY FOLATE DEFICIENCY ANEMIA: Status: ACTIVE | Noted: 2024-02-05

## 2024-02-05 PROBLEM — I63.9 CEREBROVASCULAR ACCIDENT (MULTI): Status: ACTIVE | Noted: 2024-02-05

## 2024-02-05 PROBLEM — R62.7 ADULT FAILURE TO THRIVE SYNDROME: Status: ACTIVE | Noted: 2024-02-05

## 2024-02-05 PROBLEM — J44.9 CHRONIC OBSTRUCTIVE PULMONARY DISEASE (MULTI): Status: ACTIVE | Noted: 2023-02-10

## 2024-02-05 PROBLEM — E78.5 DYSLIPIDEMIA: Status: ACTIVE | Noted: 2023-01-26

## 2024-02-05 PROBLEM — I44.7 LEFT BUNDLE BRANCH BLOCK (LBBB): Status: ACTIVE | Noted: 2023-01-26

## 2024-02-05 PROBLEM — E87.6 HYPOKALEMIA: Status: ACTIVE | Noted: 2024-02-05

## 2024-02-05 PROBLEM — S72.009A CLOSED FRACTURE OF HIP (MULTI): Status: ACTIVE | Noted: 2023-12-14

## 2024-02-05 PROBLEM — G62.9 NEUROPATHY: Status: ACTIVE | Noted: 2021-07-29

## 2024-02-05 PROBLEM — R41.82 ALTERED MENTAL STATUS: Status: ACTIVE | Noted: 2022-11-22

## 2024-02-05 PROBLEM — L90.9 ATROPHY OF SKIN: Status: ACTIVE | Noted: 2024-02-05

## 2024-02-05 PROBLEM — S16.1XXA STRAIN OF NECK MUSCLE: Status: ACTIVE | Noted: 2024-02-05

## 2024-02-05 PROBLEM — K59.00 CONSTIPATION: Status: ACTIVE | Noted: 2024-02-05

## 2024-02-05 PROBLEM — R06.00 DYSPNEA: Status: ACTIVE | Noted: 2023-02-10

## 2024-02-05 PROBLEM — R47.01 APHASIA: Status: ACTIVE | Noted: 2024-02-05

## 2024-02-05 PROBLEM — I73.9 INTERMITTENT CLAUDICATION (CMS-HCC): Status: ACTIVE | Noted: 2022-11-21

## 2024-02-05 PROBLEM — I26.99 ACUTE PULMONARY EMBOLISM (MULTI): Status: ACTIVE | Noted: 2022-11-22

## 2024-02-05 PROBLEM — R52 PAIN: Status: ACTIVE | Noted: 2024-02-05

## 2024-02-05 PROBLEM — G45.9 TRANSIENT ISCHEMIC ATTACK: Status: ACTIVE | Noted: 2024-02-05

## 2024-02-05 PROBLEM — R42 VERTIGO: Status: ACTIVE | Noted: 2023-02-10

## 2024-02-05 PROBLEM — I87.2 PERIPHERAL VENOUS INSUFFICIENCY: Status: ACTIVE | Noted: 2023-01-26

## 2024-02-05 PROBLEM — R60.0 EDEMA OF LOWER EXTREMITY: Status: ACTIVE | Noted: 2024-02-05

## 2024-02-05 PROBLEM — M15.9 GENERALIZED OSTEOARTHRITIS: Status: ACTIVE | Noted: 2023-02-10

## 2024-02-05 PROBLEM — M25.559 ARTHRALGIA OF HIP: Status: ACTIVE | Noted: 2024-02-05

## 2024-02-05 PROBLEM — Z86.73 HISTORY OF CEREBROVASCULAR ACCIDENT: Status: ACTIVE | Noted: 2024-02-05

## 2024-02-05 PROBLEM — R29.6 FALLING: Status: ACTIVE | Noted: 2022-11-21

## 2024-02-05 PROBLEM — N28.9 RENAL INSUFFICIENCY: Status: ACTIVE | Noted: 2023-11-06

## 2024-02-05 PROBLEM — R55 NEAR SYNCOPE: Status: ACTIVE | Noted: 2024-02-05

## 2024-02-05 PROBLEM — R44.3 HALLUCINATIONS: Status: ACTIVE | Noted: 2022-11-21

## 2024-02-05 PROBLEM — I27.20 PULMONARY HYPERTENSION (MULTI): Status: ACTIVE | Noted: 2023-01-26

## 2024-02-05 PROBLEM — J96.00 ACUTE RESPIRATORY FAILURE (MULTI): Status: ACTIVE | Noted: 2024-02-05

## 2024-02-05 PROBLEM — C50.919 CARCINOMA OF BREAST (MULTI): Status: ACTIVE | Noted: 2024-02-05

## 2024-02-05 PROBLEM — R09.81 NASAL CONGESTION: Status: ACTIVE | Noted: 2024-02-05

## 2024-02-05 PROBLEM — A04.72 CLOSTRIDIOIDES DIFFICILE DIARRHEA: Status: ACTIVE | Noted: 2023-02-10

## 2024-02-05 PROBLEM — R63.4 WEIGHT LOSS: Status: ACTIVE | Noted: 2024-02-05

## 2024-02-05 PROBLEM — I35.0 AORTIC VALVE STENOSIS: Status: ACTIVE | Noted: 2023-01-26

## 2024-02-05 PROBLEM — R01.1 HEART MURMUR: Status: ACTIVE | Noted: 2023-02-10

## 2024-02-05 PROBLEM — K21.9 GASTROESOPHAGEAL REFLUX DISEASE: Status: ACTIVE | Noted: 2021-07-29

## 2024-02-05 PROBLEM — J81.1 PULMONARY EDEMA (HHS-HCC): Status: ACTIVE | Noted: 2023-06-06

## 2024-02-05 PROBLEM — N18.9 CHRONIC RENAL FAILURE SYNDROME: Status: ACTIVE | Noted: 2024-02-05

## 2024-02-05 PROBLEM — Z66 DO NOT RESUSCITATE: Status: ACTIVE | Noted: 2022-11-21

## 2024-02-05 PROBLEM — L97.909: Status: ACTIVE | Noted: 2024-02-05

## 2024-02-05 PROBLEM — I82.409 DEEP VEIN THROMBOSIS (DVT) OF LOWER EXTREMITY (MULTI): Status: ACTIVE | Noted: 2021-07-29

## 2024-02-05 PROBLEM — I63.9 ISCHEMIC CEREBROVASCULAR ACCIDENT (CVA) (MULTI): Status: ACTIVE | Noted: 2021-07-29

## 2024-02-05 PROBLEM — M79.673 PAIN OF FOOT: Status: ACTIVE | Noted: 2023-02-10

## 2024-02-05 PROBLEM — I50.9 HEART FAILURE (MULTI): Status: ACTIVE | Noted: 2022-11-19

## 2024-02-05 PROBLEM — I67.9 CEREBROVASCULAR DISEASE: Status: ACTIVE | Noted: 2022-11-21

## 2024-02-05 PROBLEM — R91.1 LUNG NODULE: Status: ACTIVE | Noted: 2023-02-10

## 2024-02-05 PROBLEM — S01.91XA LACERATION OF HEAD: Status: ACTIVE | Noted: 2022-11-21

## 2024-02-05 PROBLEM — J93.9 PNEUMOTHORAX: Status: ACTIVE | Noted: 2023-07-24

## 2024-02-05 PROBLEM — Z85.3 HISTORY OF MALIGNANT NEOPLASM OF BREAST: Status: ACTIVE | Noted: 2021-07-29

## 2024-02-05 RX ORDER — POTASSIUM CHLORIDE 20 MEQ/1
TABLET, EXTENDED RELEASE ORAL
COMMUNITY
Start: 2024-01-03 | End: 2024-03-29 | Stop reason: WASHOUT

## 2024-02-05 NOTE — PROGRESS NOTES
Per chart, readmit 1/29 to 2/3 to [facility] and will transition to [facility] after SNF discharge.     Maureen GORE RN CCM  RN Care Coordinator  [facility]  Phone [phone]

## 2024-02-11 ENCOUNTER — NURSING HOME VISIT (OUTPATIENT)
Dept: POST ACUTE CARE | Facility: EXTERNAL LOCATION | Age: 89
End: 2024-02-11
Payer: MEDICARE

## 2024-02-11 DIAGNOSIS — I10 HYPERTENSION, UNSPECIFIED TYPE: ICD-10-CM

## 2024-02-11 DIAGNOSIS — R53.1 WEAKNESS: ICD-10-CM

## 2024-02-11 DIAGNOSIS — D64.9 ANEMIA, UNSPECIFIED TYPE: ICD-10-CM

## 2024-02-11 DIAGNOSIS — Z91.81 AT RISK FOR FALLING: ICD-10-CM

## 2024-02-11 DIAGNOSIS — K21.9 GASTROESOPHAGEAL REFLUX DISEASE WITHOUT ESOPHAGITIS: ICD-10-CM

## 2024-02-11 DIAGNOSIS — S72.001A HIP FRACTURE, RIGHT, CLOSED, INITIAL ENCOUNTER (MULTI): Primary | ICD-10-CM

## 2024-02-11 DIAGNOSIS — F32.A DEPRESSION, UNSPECIFIED DEPRESSION TYPE: ICD-10-CM

## 2024-02-11 DIAGNOSIS — E78.5 HYPERLIPIDEMIA, UNSPECIFIED HYPERLIPIDEMIA TYPE: ICD-10-CM

## 2024-02-11 DIAGNOSIS — J44.9 CHRONIC OBSTRUCTIVE PULMONARY DISEASE, UNSPECIFIED COPD TYPE (MULTI): ICD-10-CM

## 2024-02-11 DIAGNOSIS — M19.90 OSTEOARTHRITIS, UNSPECIFIED OSTEOARTHRITIS TYPE, UNSPECIFIED SITE: ICD-10-CM

## 2024-02-11 DIAGNOSIS — I50.9 CONGESTIVE HEART FAILURE, UNSPECIFIED HF CHRONICITY, UNSPECIFIED HEART FAILURE TYPE (MULTI): ICD-10-CM

## 2024-02-11 PROCEDURE — 99305 1ST NF CARE MODERATE MDM 35: CPT | Performed by: INTERNAL MEDICINE

## 2024-02-11 NOTE — LETTER
Patient: Kalie Ramos  : 1929    Encounter Date: 2024    PLACE OF SERVICE:  [facility].    This is a history and physical visit.    Subjective  Patient ID: Kalie Ramos is a 89 y.o. female who presents for Follow-up.    Ms. Kalie Ramos is a 89-year-old female with history of COPD and congestive heart failure.  She has had a prior fracture to her right hip and continues to undergo physical and occupational therapy.    Review of Systems   Constitutional:  Negative for chills and fever.   Cardiovascular:  Negative for chest pain.   All other systems reviewed and are negative.    Objective  /80   Pulse 78   Temp 36.6 °C (97.9 °F)   Resp 18     Physical Exam  Vitals reviewed.   Constitutional:       General: She is not in acute distress.     Comments: This is a well-developed, well-nourished female, sitting in a chair.   HENT:      Right Ear: Tympanic membrane, ear canal and external ear normal.      Left Ear: Tympanic membrane, ear canal and external ear normal.   Eyes:      General: No scleral icterus.     Pupils: Pupils are equal, round, and reactive to light.   Neck:      Vascular: No carotid bruit.   Cardiovascular:      Heart sounds: Normal heart sounds, S1 normal and S2 normal. No murmur heard.     No friction rub.   Pulmonary:      Effort: Pulmonary effort is normal.      Breath sounds: Decreased breath sounds (throughout) present.   Abdominal:      Palpations: There is no hepatomegaly, splenomegaly or mass.   Musculoskeletal:         General: No swelling or deformity. Normal range of motion.      Cervical back: Neck supple.      Right lower leg: Edema (trace) present.      Left lower leg: Edema (trace) present.   Lymphadenopathy:      Cervical: No cervical adenopathy.      Upper Body:      Right upper body: No axillary adenopathy.      Left upper body: No axillary adenopathy.      Lower Body: No right inguinal adenopathy. No left inguinal  adenopathy.   Skin:     Comments: The right hip wound is intact with no sign of infection.   Neurological:      Mental Status: She is oriented to person, place, and time.      Cranial Nerves: Cranial nerves 2-12 are intact. No cranial nerve deficit.      Sensory: No sensory deficit.      Motor: Motor function is intact. No weakness.      Gait: Gait is intact.      Deep Tendon Reflexes: Reflexes normal.   Psychiatric:         Mood and Affect: Mood normal. Mood is not anxious or depressed. Affect is not angry.         Behavior: Behavior is not agitated.         Thought Content: Thought content normal.         Judgment: Judgment normal.     LAB WORK: Laboratory studies reviewed.    Assessment/Plan  Problem List Items Addressed This Visit             ICD-10-CM       Cardiac and Vasculature    Hyperlipidemia E78.5    Hypertension I10       Gastrointestinal and Abdominal    Gastroesophageal reflux disease K21.9       Hematology and Neoplasia    Anemia D64.9       Pulmonary and Pneumonias    Chronic obstructive pulmonary disease (CMS/HCC) J44.9       Symptoms and Signs    Weakness R53.1       Other    Hip fracture, right, closed, initial encounter (CMS/HCC) - Primary S72.001A     Other Visit Diagnoses         Codes    At risk for falling     Z91.81    Congestive heart failure, unspecified HF chronicity, unspecified heart failure type (CMS/HCC)     I50.9    Depression, unspecified depression type     F32.A    Osteoarthritis, unspecified osteoarthritis type, unspecified site     M19.90        1. Right hip fracture, on pain control.  2. Weakness, on PT/OT.  3. Fall risk, on fall precautions.  4. COPD, on bronchodilator therapy.  5. Congestive heart failure, on diuretic.  6. Depression, on medication.  7. Reflux disease, on PPI.  8. Hypertension, med controlled.  9. Hyperlipidemia, on statin.  10. Anemia, follow CBC.  11. Osteoarthritis, on Tylenol.    Scribe Attestation  By signing my name below, ILizy, Scribe attest  that this documentation has been prepared under the direction and in the presence of Avel Bailey MD.   All medical record entries made by the scribe were personally dictated by me I have reviewed the chart and agree the record accurately reflects my personal performance of his history physical examination and management      Electronically Signed By: Avel Bailey MD   2/28/24  6:14 PM

## 2024-02-18 ENCOUNTER — NURSING HOME VISIT (OUTPATIENT)
Dept: POST ACUTE CARE | Facility: EXTERNAL LOCATION | Age: 89
End: 2024-02-18
Payer: MEDICARE

## 2024-02-18 DIAGNOSIS — Z91.81 AT RISK FOR FALLING: ICD-10-CM

## 2024-02-18 DIAGNOSIS — I10 HYPERTENSION, UNSPECIFIED TYPE: ICD-10-CM

## 2024-02-18 DIAGNOSIS — J44.9 CHRONIC OBSTRUCTIVE PULMONARY DISEASE, UNSPECIFIED COPD TYPE (MULTI): ICD-10-CM

## 2024-02-18 DIAGNOSIS — S72.001A HIP FRACTURE, RIGHT, CLOSED, INITIAL ENCOUNTER (MULTI): ICD-10-CM

## 2024-02-18 DIAGNOSIS — I50.9 CONGESTIVE HEART FAILURE, UNSPECIFIED HF CHRONICITY, UNSPECIFIED HEART FAILURE TYPE (MULTI): Primary | ICD-10-CM

## 2024-02-18 DIAGNOSIS — F32.A DEPRESSION, UNSPECIFIED DEPRESSION TYPE: ICD-10-CM

## 2024-02-18 DIAGNOSIS — D64.9 ANEMIA, UNSPECIFIED TYPE: ICD-10-CM

## 2024-02-18 DIAGNOSIS — E78.5 HYPERLIPIDEMIA, UNSPECIFIED HYPERLIPIDEMIA TYPE: ICD-10-CM

## 2024-02-18 DIAGNOSIS — R53.1 WEAKNESS: ICD-10-CM

## 2024-02-18 DIAGNOSIS — K21.9 GASTROESOPHAGEAL REFLUX DISEASE WITHOUT ESOPHAGITIS: ICD-10-CM

## 2024-02-18 PROCEDURE — 99309 SBSQ NF CARE MODERATE MDM 30: CPT | Performed by: INTERNAL MEDICINE

## 2024-02-18 NOTE — LETTER
Patient: Kalie Ramos  : 1929    Encounter Date: 2024    PLACE OF SERVICE:  [facility] \[facility].\"" Nursing [facility].    This is a subsequent visit.    Subjective  Patient ID: Kalie Ramos is a 89 y.o. female who presents for Follow-up.    Ms. Kalie Ramos is a 89-year-old female with history of CHF and COPD.  She has had a prior fracture to her right hip after a fall.  She requires supportive care.    Review of Systems   Constitutional:  Negative for chills and fever.   Cardiovascular:  Negative for chest pain.   All other systems reviewed and are negative.    Objective  /82   Pulse 80   Temp 36.6 °C (97.9 °F)   Resp 18     Physical Exam  Vitals reviewed.   Constitutional:       General: She is not in acute distress.     Comments: This is a well-developed, well-nourished female, sitting in a chair.   HENT:      Right Ear: Tympanic membrane, ear canal and external ear normal.      Left Ear: Tympanic membrane, ear canal and external ear normal.   Eyes:      General: No scleral icterus.     Pupils: Pupils are equal, round, and reactive to light.   Neck:      Vascular: No carotid bruit.   Cardiovascular:      Heart sounds: Normal heart sounds, S1 normal and S2 normal. No murmur heard.     No friction rub.   Pulmonary:      Effort: Pulmonary effort is normal.      Breath sounds: Decreased breath sounds (throughout) present.   Abdominal:      Palpations: There is no hepatomegaly, splenomegaly or mass.   Musculoskeletal:         General: No swelling or deformity. Normal range of motion.      Cervical back: Neck supple.      Right lower leg: Edema present.      Left lower leg: Edema present.   Lymphadenopathy:      Cervical: No cervical adenopathy.      Upper Body:      Right upper body: No axillary adenopathy.      Left upper body: No axillary adenopathy.      Lower Body: No right inguinal adenopathy. No left inguinal adenopathy.   Skin:     Comments: The patient's right hip wound is  intact with no sign of infection.   Neurological:      Mental Status: She is oriented to person, place, and time.      Cranial Nerves: Cranial nerves 2-12 are intact. No cranial nerve deficit.      Sensory: No sensory deficit.      Motor: Motor function is intact. No weakness.      Gait: Gait is intact.      Deep Tendon Reflexes: Reflexes normal.   Psychiatric:         Mood and Affect: Mood normal. Mood is not anxious or depressed. Affect is not angry.         Behavior: Behavior is not agitated.         Thought Content: Thought content normal.         Judgment: Judgment normal.     LAB WORK: Laboratory studies were reviewed.    Assessment/Plan  Problem List Items Addressed This Visit             ICD-10-CM       Cardiac and Vasculature    Hyperlipidemia E78.5    Hypertension I10       Gastrointestinal and Abdominal    Gastroesophageal reflux disease K21.9       Hematology and Neoplasia    Anemia D64.9       Pulmonary and Pneumonias    Chronic obstructive pulmonary disease (CMS/HCC) J44.9       Symptoms and Signs    Weakness R53.1       Other    Hip fracture, right, closed, initial encounter (CMS/HCC) S72.001A     Other Visit Diagnoses         Codes    Congestive heart failure, unspecified HF chronicity, unspecified heart failure type (CMS/HCC)    -  Primary I50.9    Depression, unspecified depression type     F32.A    At risk for falling     Z91.81        1. Congestive heart failure, on diuretic.  2. COPD, on bronchodilator therapy.  3. Hip fracture, on pain control.  4. Hypertension, med controlled.  5. Anemia, follow CBC.  6. Hyperlipidemia, on statin.  7. Depression, on medication.  8. Reflux disease, on PPI.  9. Weakness, on PT/OT.  10. Fall risk, on fall precautions.    Scribe Attestation  By signing my name below, ILizy Scribe attest that this documentation has been prepared under the direction and in the presence of Avel Bailey MD.   All medical record entries made by the scribe were personally  dictated by me I have reviewed the chart and agree the record accurately reflects my personal performance of his history physical examination and management      Electronically Signed By: Avel Bailey MD   2/27/24  9:25 AM

## 2024-02-19 LAB
ATRIAL RATE: 98 BPM
FUNGUS SPEC CULT: NORMAL
FUNGUS SPEC FUNGUS STN: NORMAL
P AXIS: 104 DEGREES
P OFFSET: 158 MS
P ONSET: 119 MS
PR INTERVAL: 192 MS
Q ONSET: 215 MS
QRS COUNT: 12 BEATS
QRS DURATION: 122 MS
QT INTERVAL: 494 MS
QTC CALCULATION(BAZETT): 529 MS
QTC FREDERICIA: 517 MS
R AXIS: 1 DEGREES
T AXIS: 160 DEGREES
T OFFSET: 462 MS
VENTRICULAR RATE: 69 BPM

## 2024-02-20 ENCOUNTER — APPOINTMENT (OUTPATIENT)
Dept: PRIMARY CARE | Facility: CLINIC | Age: 89
End: 2024-02-20
Payer: MEDICARE

## 2024-02-20 ENCOUNTER — DOCUMENTATION (OUTPATIENT)
Dept: CARE COORDINATION | Facility: CLINIC | Age: 89
End: 2024-02-20

## 2024-02-20 NOTE — PROGRESS NOTES
Referral received for transition of care call.  Outreach to patient to assess needs and provide support.  Spoke with Zandra daughter, and explained my role and outreach purpose.  Kalie was  discharged from her rehab facility to an [facility] called [facility].   No further outreach from this care manager.  gian

## 2024-02-21 NOTE — DOCUMENTATION CLARIFICATION NOTE
"    PATIENT:               ROBBIN BOONE  ACCT #:                  [id number]  MRN:                       [id number]  :                       1929  ADMIT DATE:       2024 2:47 AM  DISCH DATE:        2/3/2024 3:40 PM  RESPONDING PROVIDER #:        [id number]          PROVIDER RESPONSE TEXT:    Acute on Chronic Systolic Congestive Heart Failure    CDI QUERY TEXT:    [facility]_CHF        Instruction:    Based on your assessment of the patient and the clinical information, please provide the requested documentation by clicking on the appropriate radio button and enter any additional information if prompted.    Question: Please further clarify the type and acuity of congestive heart failure    When answering this query, please exercise your independent professional judgment. The fact that a question is being asked, does not imply that any particular answer is desired or expected.    The patient's clinical indicators include:  Clinical Information: 89 Y/F with a history of hypertension, CHF, COPD on supplemental oxygen, recent right hip fracture, anemia, GERD and dyslipidemia brought to ED with chief complaint of shortness of breath    Clinical Indicators:   PROBNP: 9,761     Cardiology Consult: \"echocardiogram performed 2023 showing LV ejection fraction again 35-40% moderate left atrial enlargement mild to moderate right atrial enlargement severe aortic valve stenosis peak systolic pressure gradient 56 mmHg. ?There was 1+ mitral valve regurgitation 2+ tricuspid valve regurgitation severe pulmonary hypertension estimated PA systolic pressure 67 mmHg.\"     Pulmonary Progress Note: \"Congestive heart failure leading to acute on chronic respiratory failure.  Bilateral effusions are likely in the setting of CHF.\"     Cardiology Progress Note: \"Patient's acute on chronic respiratory failure thought to be multifactorial related to volume overload due to worsening valvular heart disease and pulmonary " "hypertension possible noncompliance with diuretic therapy and?  Pneumonia... The patient's p.o. Lasix will be increased for now to 40 mg twice daily\"    Treatment: Oxygen, Lasix 40mg oral daily increased to BID, Cozaar 25mg oral daily, Toprol-XL 25mg oral daily, thoracentesis    Risk Factors: Advanced age, severe aortic valve stenosis, CHF, COPD dependent on supplemental oxygen  Options provided:  -- Acute Systolic Congestive Heart Failure  -- Acute on Chronic Systolic Congestive Heart Failure  -- Chronic Systolic Congestive Heart Failure  -- Acute Diastolic Congestive Heart Failure  -- Acute on Chronic Diastolic Congestive Heart Failure  -- Chronic Diastolic Congestive Heart Failure  -- Acute combined systolic/diastolic Congestive Heart Failure  -- Acute on Chronic combined systolic/diastolic Congestive Heart Failure  -- Chronic combined systolic/diastolic Congestive Heart Failure  -- Other - I will add my own diagnosis  -- Refer to Clinical Documentation Reviewer    Query created by: Ashly Roman on 2/7/2024 3:27 PM      Electronically signed by:  DIANA DELUNA MD 2/21/2024 11:01 AM          "

## 2024-02-23 ENCOUNTER — PATIENT OUTREACH (OUTPATIENT)
Dept: CARE COORDINATION | Facility: CLINIC | Age: 89
End: 2024-02-23
Payer: MEDICARE

## 2024-02-23 NOTE — PROGRESS NOTES
Readmit 1/29 to 2/3 heart failure.  Large pleural effusion s/p thoracentesis due to aortic stenosis and aspiration   Per cardiology possible med noncompliance.   Discharged to [place] [facility]   Discharged from [facility] 2/19 to [facility] [place].  Outreach to dtr who states patient likes [employer], goes to dining room for all meals, and gets assistance with medication management. ...............................                                                                                                                                                                                                      Readmit 1/29 to 2/3 with heart failure, large pleural effusion s/p thoracentesis due to aortic stenosis and aspiration   Discharged to [place] [facility]   Discharged from [facility] 2/19 to [facility] [place].  Outreach to dtr who states patient like [facility], goes to dining room for meals, and has assistance with medication management..   Will continue to monitor for 30 day transition period and outreach if issues arise.    Maureen GORE RN CCM  RN Care Coordinator  [facility]  Phone [phone]

## 2024-02-26 ENCOUNTER — DOCUMENTATION (OUTPATIENT)
Dept: CARE COORDINATION | Facility: CLINIC | Age: 89
End: 2024-02-26
Payer: MEDICARE

## 2024-02-26 ENCOUNTER — APPOINTMENT (OUTPATIENT)
Dept: CARDIOLOGY | Facility: CLINIC | Age: 89
End: 2024-02-26
Payer: MEDICARE

## 2024-02-26 VITALS
HEART RATE: 78 BPM | DIASTOLIC BLOOD PRESSURE: 80 MMHG | RESPIRATION RATE: 18 BRPM | SYSTOLIC BLOOD PRESSURE: 126 MMHG | TEMPERATURE: 97.9 F

## 2024-02-26 VITALS
DIASTOLIC BLOOD PRESSURE: 76 MMHG | RESPIRATION RATE: 18 BRPM | TEMPERATURE: 98 F | SYSTOLIC BLOOD PRESSURE: 124 MMHG | HEART RATE: 82 BPM

## 2024-02-26 VITALS
DIASTOLIC BLOOD PRESSURE: 82 MMHG | SYSTOLIC BLOOD PRESSURE: 128 MMHG | HEART RATE: 80 BPM | TEMPERATURE: 97.9 F | RESPIRATION RATE: 18 BRPM

## 2024-02-26 ASSESSMENT — ENCOUNTER SYMPTOMS
FEVER: 0
CHILLS: 0
FEVER: 0
FEVER: 0

## 2024-02-26 NOTE — PROGRESS NOTES
PLACE OF SERVICE:  [facility].    This is a subsequent visit.    Subjective   Patient ID: aKlie Ramos is a 89 y.o. female who presents for Follow-up.    Ms. Kalie Ramos is a 89-year-old female with history of CHF and COPD.  She has had a prior fracture to her right hip after a fall.  She requires supportive care.    Review of Systems   Constitutional:  Negative for chills and fever.   Cardiovascular:  Negative for chest pain.   All other systems reviewed and are negative.    Objective   /82   Pulse 80   Temp 36.6 °C (97.9 °F)   Resp 18     Physical Exam  Vitals reviewed.   Constitutional:       General: She is not in acute distress.     Comments: This is a well-developed, well-nourished female, sitting in a chair.   HENT:      Right Ear: Tympanic membrane, ear canal and external ear normal.      Left Ear: Tympanic membrane, ear canal and external ear normal.   Eyes:      General: No scleral icterus.     Pupils: Pupils are equal, round, and reactive to light.   Neck:      Vascular: No carotid bruit.   Cardiovascular:      Heart sounds: Normal heart sounds, S1 normal and S2 normal. No murmur heard.     No friction rub.   Pulmonary:      Effort: Pulmonary effort is normal.      Breath sounds: Decreased breath sounds (throughout) present.   Abdominal:      Palpations: There is no hepatomegaly, splenomegaly or mass.   Musculoskeletal:         General: No swelling or deformity. Normal range of motion.      Cervical back: Neck supple.      Right lower leg: Edema present.      Left lower leg: Edema present.   Lymphadenopathy:      Cervical: No cervical adenopathy.      Upper Body:      Right upper body: No axillary adenopathy.      Left upper body: No axillary adenopathy.      Lower Body: No right inguinal adenopathy. No left inguinal adenopathy.   Skin:     Comments: The patient's right hip wound is intact with no sign of infection.   Neurological:      Mental Status: She is  oriented to person, place, and time.      Cranial Nerves: Cranial nerves 2-12 are intact. No cranial nerve deficit.      Sensory: No sensory deficit.      Motor: Motor function is intact. No weakness.      Gait: Gait is intact.      Deep Tendon Reflexes: Reflexes normal.   Psychiatric:         Mood and Affect: Mood normal. Mood is not anxious or depressed. Affect is not angry.         Behavior: Behavior is not agitated.         Thought Content: Thought content normal.         Judgment: Judgment normal.     LAB WORK: Laboratory studies were reviewed.    Assessment/Plan   Problem List Items Addressed This Visit             ICD-10-CM       Cardiac and Vasculature    Hyperlipidemia E78.5    Hypertension I10       Gastrointestinal and Abdominal    Gastroesophageal reflux disease K21.9       Hematology and Neoplasia    Anemia D64.9       Pulmonary and Pneumonias    Chronic obstructive pulmonary disease (CMS/HCC) J44.9       Symptoms and Signs    Weakness R53.1       Other    Hip fracture, right, closed, initial encounter (CMS/HCC) S72.001A     Other Visit Diagnoses         Codes    Congestive heart failure, unspecified HF chronicity, unspecified heart failure type (CMS/HCC)    -  Primary I50.9    Depression, unspecified depression type     F32.A    At risk for falling     Z91.81        1. Congestive heart failure, on diuretic.  2. COPD, on bronchodilator therapy.  3. Hip fracture, on pain control.  4. Hypertension, med controlled.  5. Anemia, follow CBC.  6. Hyperlipidemia, on statin.  7. Depression, on medication.  8. Reflux disease, on PPI.  9. Weakness, on PT/OT.  10. Fall risk, on fall precautions.    Scribe Attestation  By signing my name below, ILizy Scribe attest that this documentation has been prepared under the direction and in the presence of Avel Bailey MD.   All medical record entries made by the scribben were personally dictated by me I have reviewed the chart and agree the record accurately  reflects my personal performance of his history physical examination and management

## 2024-02-26 NOTE — PROGRESS NOTES
PLACE OF SERVICE:  [facility].    This is a subsequent visit.    Subjective   Patient ID: Kalie Ramos is a 89 y.o. female who presents for Follow-up.    Ms. Kalie Ramos is a 89-year-old female with history of congestive heart failure.  She has had a recent right hip fracture and did undergo surgical intervention.  She has several medical issues, unable to care for herself.  She requires supportive care.    Review of Systems   Constitutional:  Negative for chills and fever.   Cardiovascular:  Negative for chest pain.   All other systems reviewed and are negative.    Objective   /76   Pulse 82   Temp 36.7 °C (98 °F)   Resp 18     Physical Exam  Vitals reviewed.   Constitutional:       General: She is not in acute distress.     Comments: This is a well-developed, well-nourished female, sitting in a chair.   HENT:      Right Ear: Tympanic membrane, ear canal and external ear normal.      Left Ear: Tympanic membrane, ear canal and external ear normal.   Eyes:      General: No scleral icterus.     Pupils: Pupils are equal, round, and reactive to light.   Neck:      Vascular: No carotid bruit.   Cardiovascular:      Heart sounds: Normal heart sounds, S1 normal and S2 normal. No murmur heard.     No friction rub.   Pulmonary:      Effort: Pulmonary effort is normal.      Breath sounds: Decreased breath sounds (throughout) present.   Abdominal:      Palpations: There is no hepatomegaly, splenomegaly or mass.   Musculoskeletal:         General: No swelling or deformity. Normal range of motion.      Cervical back: Neck supple.      Right lower leg: Edema present.      Left lower leg: Edema present.   Lymphadenopathy:      Cervical: No cervical adenopathy.      Upper Body:      Right upper body: No axillary adenopathy.      Left upper body: No axillary adenopathy.      Lower Body: No right inguinal adenopathy. No left inguinal adenopathy.   Skin:     Comments: The patient's right  hip wound is intact with no sign of infection.   Neurological:      Mental Status: She is oriented to person, place, and time.      Cranial Nerves: Cranial nerves 2-12 are intact. No cranial nerve deficit.      Sensory: No sensory deficit.      Motor: Motor function is intact. No weakness.      Gait: Gait is intact.      Deep Tendon Reflexes: Reflexes normal.   Psychiatric:         Mood and Affect: Mood normal. Mood is not anxious or depressed. Affect is not angry.         Behavior: Behavior is not agitated.         Thought Content: Thought content normal.         Judgment: Judgment normal.     LAB WORK: Laboratory studies were reviewed.    Assessment/Plan   Problem List Items Addressed This Visit             ICD-10-CM       Cardiac and Vasculature    Hyperlipidemia E78.5    Hypertension I10       Gastrointestinal and Abdominal    Gastroesophageal reflux disease K21.9       Hematology and Neoplasia    Anemia D64.9       Pulmonary and Pneumonias    Chronic obstructive pulmonary disease (CMS/HCC) J44.9       Symptoms and Signs    Weakness R53.1       Other    Hip fracture, right, closed, initial encounter (CMS/HCC) S72.001A     Other Visit Diagnoses         Codes    Congestive heart failure, unspecified HF chronicity, unspecified heart failure type (CMS/HCC)    -  Primary I50.9    At risk for falling     Z91.81    Osteoarthritis, unspecified osteoarthritis type, unspecified site     M19.90    Depression, unspecified depression type     F32.A        1. Heart failure, on diuretic.  2. COPD, on bronchodilator therapy.  3. Hip fracture, on pain control.  4. Weakness, on PT/OT.  5. Fall risk, on fall precautions.  6. Osteoarthritis, on Tylenol.  7. Hyperlipidemia, on statin.  8. Hypertension, med controlled.  9. Depression, on medication.  10. Anemia, follow CBC.  11. Reflux disease, on PPI.    Scribe Attestation  By signing my name below, Lizy LOPES Scribe attest that this documentation has been prepared under the  direction and in the presence of Avel Bailey MD.   All medical record entries made by the scribe were personally dictated by me I have reviewed the chart and agree the record accurately reflects my personal performance of his history physical examination and management

## 2024-02-26 NOTE — PROGRESS NOTES
Clinical round discussion with insurer.  Insurer CM will outreach patient.    This writer will follow up with patient later in the week.    Maureen GORE RN CCM  RN Care Coordinator  [facility]  Phone [phone]

## 2024-02-26 NOTE — PROGRESS NOTES
PLACE OF SERVICE:  [facility].    This is a history and physical visit.    Subjective   Patient ID: Kalie Ramos is a 89 y.o. female who presents for Follow-up.    Ms. Kalie Ramos is a 89-year-old female with history of COPD and congestive heart failure.  She has had a prior fracture to her right hip and continues to undergo physical and occupational therapy.    Review of Systems   Constitutional:  Negative for chills and fever.   Cardiovascular:  Negative for chest pain.   All other systems reviewed and are negative.    Objective   /80   Pulse 78   Temp 36.6 °C (97.9 °F)   Resp 18     Physical Exam  Vitals reviewed.   Constitutional:       General: She is not in acute distress.     Comments: This is a well-developed, well-nourished female, sitting in a chair.   HENT:      Right Ear: Tympanic membrane, ear canal and external ear normal.      Left Ear: Tympanic membrane, ear canal and external ear normal.   Eyes:      General: No scleral icterus.     Pupils: Pupils are equal, round, and reactive to light.   Neck:      Vascular: No carotid bruit.   Cardiovascular:      Heart sounds: Normal heart sounds, S1 normal and S2 normal. No murmur heard.     No friction rub.   Pulmonary:      Effort: Pulmonary effort is normal.      Breath sounds: Decreased breath sounds (throughout) present.   Abdominal:      Palpations: There is no hepatomegaly, splenomegaly or mass.   Musculoskeletal:         General: No swelling or deformity. Normal range of motion.      Cervical back: Neck supple.      Right lower leg: Edema (trace) present.      Left lower leg: Edema (trace) present.   Lymphadenopathy:      Cervical: No cervical adenopathy.      Upper Body:      Right upper body: No axillary adenopathy.      Left upper body: No axillary adenopathy.      Lower Body: No right inguinal adenopathy. No left inguinal adenopathy.   Skin:     Comments: The right hip wound is intact with no sign of  infection.   Neurological:      Mental Status: She is oriented to person, place, and time.      Cranial Nerves: Cranial nerves 2-12 are intact. No cranial nerve deficit.      Sensory: No sensory deficit.      Motor: Motor function is intact. No weakness.      Gait: Gait is intact.      Deep Tendon Reflexes: Reflexes normal.   Psychiatric:         Mood and Affect: Mood normal. Mood is not anxious or depressed. Affect is not angry.         Behavior: Behavior is not agitated.         Thought Content: Thought content normal.         Judgment: Judgment normal.     LAB WORK: Laboratory studies reviewed.    Assessment/Plan   Problem List Items Addressed This Visit             ICD-10-CM       Cardiac and Vasculature    Hyperlipidemia E78.5    Hypertension I10       Gastrointestinal and Abdominal    Gastroesophageal reflux disease K21.9       Hematology and Neoplasia    Anemia D64.9       Pulmonary and Pneumonias    Chronic obstructive pulmonary disease (CMS/HCC) J44.9       Symptoms and Signs    Weakness R53.1       Other    Hip fracture, right, closed, initial encounter (CMS/HCC) - Primary S72.001A     Other Visit Diagnoses         Codes    At risk for falling     Z91.81    Congestive heart failure, unspecified HF chronicity, unspecified heart failure type (CMS/HCC)     I50.9    Depression, unspecified depression type     F32.A    Osteoarthritis, unspecified osteoarthritis type, unspecified site     M19.90        1. Right hip fracture, on pain control.  2. Weakness, on PT/OT.  3. Fall risk, on fall precautions.  4. COPD, on bronchodilator therapy.  5. Congestive heart failure, on diuretic.  6. Depression, on medication.  7. Reflux disease, on PPI.  8. Hypertension, med controlled.  9. Hyperlipidemia, on statin.  10. Anemia, follow CBC.  11. Osteoarthritis, on Tylenol.    Scribe Attestation  By signing my name below, Lizy LOPES Scribe attest that this documentation has been prepared under the direction and in the  presence of Avel Bailey MD.   All medical record entries made by the scribe were personally dictated by me I have reviewed the chart and agree the record accurately reflects my personal performance of his history physical examination and management

## 2024-03-01 ENCOUNTER — PATIENT OUTREACH (OUTPATIENT)
Dept: CARE COORDINATION | Facility: CLINIC | Age: 89
End: 2024-03-01
Payer: MEDICARE

## 2024-03-01 NOTE — PROGRESS NOTES
Outreach to dtr who states her mother is settling in nicely at [facility], eats all meals in dining room.  Obtained patient phone to contact at [facility]   [phone].   Attemed outreach x2 to patient; left voice mail message     Maureen GORE RN CCM  RN Care Coordinator  [facility]  Phone [phone]

## 2024-03-20 ENCOUNTER — PATIENT OUTREACH (OUTPATIENT)
Dept: CARE COORDINATION | Facility: CLINIC | Age: 89
End: 2024-03-20
Payer: MEDICARE

## 2024-03-20 LAB
ACID FAST STN SPEC: NORMAL
MYCOBACTERIUM SPEC CULT: NORMAL

## 2024-03-20 NOTE — PROGRESS NOTES
Attempted outreach to patient; left voice mail message.   Outreach to dtr Annette who states patient is doing well at [facility] and agrees to make PCP appt.     Closing to case management as patient has been unresponsive to outreach and no new episodes of care.  Will reopen to case management if issues airse.    Maureen GORE RN CCM  RN Care Coordinator  [facility]  Phone [phone]

## 2024-03-28 ENCOUNTER — LAB (OUTPATIENT)
Dept: LAB | Facility: LAB | Age: 89
End: 2024-03-28
Payer: MEDICARE

## 2024-03-28 ENCOUNTER — OFFICE VISIT (OUTPATIENT)
Dept: PRIMARY CARE | Facility: CLINIC | Age: 89
End: 2024-03-28
Payer: COMMERCIAL

## 2024-03-28 VITALS
WEIGHT: 95.2 LBS | BODY MASS INDEX: 17.52 KG/M2 | SYSTOLIC BLOOD PRESSURE: 118 MMHG | DIASTOLIC BLOOD PRESSURE: 60 MMHG | HEIGHT: 62 IN

## 2024-03-28 DIAGNOSIS — I10 HYPERTENSION, UNSPECIFIED TYPE: ICD-10-CM

## 2024-03-28 DIAGNOSIS — D64.9 ANEMIA, UNSPECIFIED TYPE: ICD-10-CM

## 2024-03-28 DIAGNOSIS — Z87.01 HISTORY OF ASPIRATION PNEUMONIA: ICD-10-CM

## 2024-03-28 DIAGNOSIS — L03.90 CELLULITIS, UNSPECIFIED CELLULITIS SITE: ICD-10-CM

## 2024-03-28 DIAGNOSIS — R60.0 LEG EDEMA: Primary | ICD-10-CM

## 2024-03-28 DIAGNOSIS — R09.02 HYPOXIA: ICD-10-CM

## 2024-03-28 DIAGNOSIS — R05.9 COUGH, UNSPECIFIED TYPE: ICD-10-CM

## 2024-03-28 DIAGNOSIS — L85.3 DRY SKIN: ICD-10-CM

## 2024-03-28 PROBLEM — R55 SYNCOPE AND COLLAPSE: Status: ACTIVE | Noted: 2022-11-22

## 2024-03-28 PROBLEM — R91.8 OTHER NONSPECIFIC ABNORMAL FINDING OF LUNG FIELD: Status: ACTIVE | Noted: 2023-02-10

## 2024-03-28 PROBLEM — G58.9 MONONEUROPATHY, UNSPECIFIED: Status: ACTIVE | Noted: 2023-02-10

## 2024-03-28 PROBLEM — F32.A DEPRESSION, UNSPECIFIED: Status: ACTIVE | Noted: 2023-12-19

## 2024-03-28 PROCEDURE — 85027 COMPLETE CBC AUTOMATED: CPT

## 2024-03-28 PROCEDURE — 3074F SYST BP LT 130 MM HG: CPT | Performed by: INTERNAL MEDICINE

## 2024-03-28 PROCEDURE — 1036F TOBACCO NON-USER: CPT | Performed by: INTERNAL MEDICINE

## 2024-03-28 PROCEDURE — 36415 COLL VENOUS BLD VENIPUNCTURE: CPT

## 2024-03-28 PROCEDURE — 99213 OFFICE O/P EST LOW 20 MIN: CPT | Performed by: INTERNAL MEDICINE

## 2024-03-28 PROCEDURE — 3078F DIAST BP <80 MM HG: CPT | Performed by: INTERNAL MEDICINE

## 2024-03-28 PROCEDURE — 80053 COMPREHEN METABOLIC PANEL: CPT

## 2024-03-28 PROCEDURE — 1159F MED LIST DOCD IN RCRD: CPT | Performed by: INTERNAL MEDICINE

## 2024-03-28 PROCEDURE — 1157F ADVNC CARE PLAN IN RCRD: CPT | Performed by: INTERNAL MEDICINE

## 2024-03-28 RX ORDER — DOXYCYCLINE 100 MG/1
100 CAPSULE ORAL 2 TIMES DAILY
Qty: 14 CAPSULE | Refills: 0 | Status: SHIPPED | OUTPATIENT
Start: 2024-03-28 | End: 2024-03-29 | Stop reason: WASHOUT

## 2024-03-28 RX ORDER — BACITRACIN ZINC 500 UNIT/G
OINTMENT (GRAM) TOPICAL 2 TIMES DAILY
Qty: 14 G | Refills: 0 | Status: SHIPPED | OUTPATIENT
Start: 2024-03-28 | End: 2024-03-29 | Stop reason: WASHOUT

## 2024-03-28 ASSESSMENT — ENCOUNTER SYMPTOMS
DEPRESSION: 0
LOSS OF SENSATION IN FEET: 0
OCCASIONAL FEELINGS OF UNSTEADINESS: 0

## 2024-03-29 ENCOUNTER — HOSPITAL ENCOUNTER (INPATIENT)
Facility: HOSPITAL | Age: 89
LOS: 4 days | Discharge: HOME | DRG: 640 | End: 2024-04-02
Attending: EMERGENCY MEDICINE | Admitting: INTERNAL MEDICINE
Payer: MEDICARE

## 2024-03-29 ENCOUNTER — APPOINTMENT (OUTPATIENT)
Dept: RADIOLOGY | Facility: HOSPITAL | Age: 89
DRG: 640 | End: 2024-03-29
Payer: MEDICARE

## 2024-03-29 ENCOUNTER — APPOINTMENT (OUTPATIENT)
Dept: CARDIOLOGY | Facility: HOSPITAL | Age: 89
DRG: 640 | End: 2024-03-29
Payer: MEDICARE

## 2024-03-29 DIAGNOSIS — I50.9 CHRONIC CONGESTIVE HEART FAILURE, UNSPECIFIED HEART FAILURE TYPE (MULTI): ICD-10-CM

## 2024-03-29 DIAGNOSIS — R55 SYNCOPE AND COLLAPSE: ICD-10-CM

## 2024-03-29 DIAGNOSIS — R53.1 GENERALIZED WEAKNESS: Primary | ICD-10-CM

## 2024-03-29 DIAGNOSIS — E87.6 POTASSIUM (K) DEFICIENCY: ICD-10-CM

## 2024-03-29 DIAGNOSIS — E87.8 ELECTROLYTE IMBALANCE: ICD-10-CM

## 2024-03-29 DIAGNOSIS — N39.0 LOWER URINARY TRACT INFECTION, ACUTE: ICD-10-CM

## 2024-03-29 DIAGNOSIS — R79.89 ELEVATED TROPONIN: ICD-10-CM

## 2024-03-29 DIAGNOSIS — R00.1 SINUS BRADYCARDIA: ICD-10-CM

## 2024-03-29 PROBLEM — E87.0 HYPERNATREMIA: Status: ACTIVE | Noted: 2024-03-29

## 2024-03-29 LAB
ALBUMIN SERPL BCP-MCNC: 3.5 G/DL (ref 3.4–5)
ALBUMIN SERPL-MCNC: 3.3 G/DL (ref 3.5–5)
ALP BLD-CCNC: 160 U/L (ref 35–125)
ALP SERPL-CCNC: 141 U/L (ref 33–136)
ALT SERPL W P-5'-P-CCNC: 12 U/L (ref 7–45)
ALT SERPL-CCNC: 11 U/L (ref 5–40)
ANION GAP SERPL CALC-SCNC: 10 MMOL/L
ANION GAP SERPL CALC-SCNC: 16 MMOL/L (ref 10–20)
APPEARANCE UR: ABNORMAL
AST SERPL W P-5'-P-CCNC: 19 U/L (ref 9–39)
AST SERPL-CCNC: 22 U/L (ref 5–40)
BACTERIA #/AREA URNS AUTO: ABNORMAL /HPF
BASOPHILS # BLD AUTO: 0.04 X10*3/UL (ref 0–0.1)
BASOPHILS NFR BLD AUTO: 0.5 %
BILIRUB SERPL-MCNC: 0.8 MG/DL (ref 0.1–1.2)
BILIRUB SERPL-MCNC: 0.8 MG/DL (ref 0–1.2)
BILIRUB UR STRIP.AUTO-MCNC: NEGATIVE MG/DL
BUN SERPL-MCNC: 22 MG/DL (ref 8–25)
BUN SERPL-MCNC: 27 MG/DL (ref 6–23)
CALCIUM SERPL-MCNC: 9 MG/DL (ref 8.5–10.4)
CALCIUM SERPL-MCNC: 9.3 MG/DL (ref 8.6–10.6)
CHLORIDE SERPL-SCNC: 102 MMOL/L (ref 98–107)
CHLORIDE SERPL-SCNC: 104 MMOL/L (ref 97–107)
CO2 SERPL-SCNC: 35 MMOL/L (ref 24–31)
CO2 SERPL-SCNC: 36 MMOL/L (ref 21–32)
COLOR UR: ABNORMAL
CREAT SERPL-MCNC: 1.1 MG/DL (ref 0.4–1.6)
CREAT SERPL-MCNC: 1.3 MG/DL (ref 0.5–1.05)
EGFRCR SERPLBLD CKD-EPI 2021: 38 ML/MIN/1.73M*2
EGFRCR SERPLBLD CKD-EPI 2021: 47 ML/MIN/1.73M*2
EOSINOPHIL # BLD AUTO: 0.46 X10*3/UL (ref 0–0.4)
EOSINOPHIL NFR BLD AUTO: 5.3 %
ERYTHROCYTE [DISTWIDTH] IN BLOOD BY AUTOMATED COUNT: 15.9 % (ref 11.5–14.5)
ERYTHROCYTE [DISTWIDTH] IN BLOOD BY AUTOMATED COUNT: 15.9 % (ref 11.5–14.5)
FLUAV RNA RESP QL NAA+PROBE: NOT DETECTED
FLUBV RNA RESP QL NAA+PROBE: NOT DETECTED
GLUCOSE SERPL-MCNC: 84 MG/DL (ref 65–99)
GLUCOSE SERPL-MCNC: 90 MG/DL (ref 74–99)
GLUCOSE UR STRIP.AUTO-MCNC: NORMAL MG/DL
HCT VFR BLD AUTO: 42 % (ref 36–46)
HCT VFR BLD AUTO: 42.8 % (ref 36–46)
HGB BLD-MCNC: 13.1 G/DL (ref 12–16)
HGB BLD-MCNC: 13.4 G/DL (ref 12–16)
IMM GRANULOCYTES # BLD AUTO: 0.03 X10*3/UL (ref 0–0.5)
IMM GRANULOCYTES NFR BLD AUTO: 0.3 % (ref 0–0.9)
KETONES UR STRIP.AUTO-MCNC: NEGATIVE MG/DL
LACTATE BLDV-SCNC: 1.5 MMOL/L (ref 0.4–2)
LEUKOCYTE ESTERASE UR QL STRIP.AUTO: ABNORMAL
LYMPHOCYTES # BLD AUTO: 1.22 X10*3/UL (ref 0.8–3)
LYMPHOCYTES NFR BLD AUTO: 14.1 %
MCH RBC QN AUTO: 28.9 PG (ref 26–34)
MCH RBC QN AUTO: 29.5 PG (ref 26–34)
MCHC RBC AUTO-ENTMCNC: 31.2 G/DL (ref 32–36)
MCHC RBC AUTO-ENTMCNC: 31.3 G/DL (ref 32–36)
MCV RBC AUTO: 93 FL (ref 80–100)
MCV RBC AUTO: 94 FL (ref 80–100)
MONOCYTES # BLD AUTO: 0.62 X10*3/UL (ref 0.05–0.8)
MONOCYTES NFR BLD AUTO: 7.2 %
MUCOUS THREADS #/AREA URNS AUTO: ABNORMAL /LPF
NEUTROPHILS # BLD AUTO: 6.28 X10*3/UL (ref 1.6–5.5)
NEUTROPHILS NFR BLD AUTO: 72.6 %
NITRITE UR QL STRIP.AUTO: ABNORMAL
NRBC BLD-RTO: 0 /100 WBCS (ref 0–0)
NRBC BLD-RTO: 0 /100 WBCS (ref 0–0)
NT-PROBNP SERPL-MCNC: 6796 PG/ML (ref 0–624)
PH UR STRIP.AUTO: 7.5 [PH]
PLATELET # BLD AUTO: 257 X10*3/UL (ref 150–450)
PLATELET # BLD AUTO: 276 X10*3/UL (ref 150–450)
POTASSIUM SERPL-SCNC: 3.2 MMOL/L (ref 3.4–5.1)
POTASSIUM SERPL-SCNC: 3.2 MMOL/L (ref 3.5–5.3)
PROT SERPL-MCNC: 6.7 G/DL (ref 5.9–7.9)
PROT SERPL-MCNC: 6.8 G/DL (ref 6.4–8.2)
PROT UR STRIP.AUTO-MCNC: NEGATIVE MG/DL
RBC # BLD AUTO: 4.53 X10*6/UL (ref 4–5.2)
RBC # BLD AUTO: 4.55 X10*6/UL (ref 4–5.2)
RBC # UR STRIP.AUTO: NEGATIVE /UL
RBC #/AREA URNS AUTO: ABNORMAL /HPF
SARS-COV-2 RNA RESP QL NAA+PROBE: NOT DETECTED
SODIUM SERPL-SCNC: 149 MMOL/L (ref 133–145)
SODIUM SERPL-SCNC: 151 MMOL/L (ref 136–145)
SP GR UR STRIP.AUTO: 1.01
TROPONIN T SERPL-MCNC: 51 NG/L
TROPONIN T SERPL-MCNC: 54 NG/L
TROPONIN T SERPL-MCNC: 56 NG/L
TROPONIN T SERPL-MCNC: 57 NG/L
UROBILINOGEN UR STRIP.AUTO-MCNC: NORMAL MG/DL
WBC # BLD AUTO: 10.3 X10*3/UL (ref 4.4–11.3)
WBC # BLD AUTO: 8.7 X10*3/UL (ref 4.4–11.3)
WBC #/AREA URNS AUTO: >50 /HPF
WBC CLUMPS #/AREA URNS AUTO: ABNORMAL /HPF

## 2024-03-29 PROCEDURE — 94664 DEMO&/EVAL PT USE INHALER: CPT

## 2024-03-29 PROCEDURE — 96361 HYDRATE IV INFUSION ADD-ON: CPT

## 2024-03-29 PROCEDURE — 70450 CT HEAD/BRAIN W/O DYE: CPT | Performed by: RADIOLOGY

## 2024-03-29 PROCEDURE — 36415 COLL VENOUS BLD VENIPUNCTURE: CPT

## 2024-03-29 PROCEDURE — 80053 COMPREHEN METABOLIC PANEL: CPT | Performed by: EMERGENCY MEDICINE

## 2024-03-29 PROCEDURE — 71045 X-RAY EXAM CHEST 1 VIEW: CPT | Performed by: RADIOLOGY

## 2024-03-29 PROCEDURE — 70450 CT HEAD/BRAIN W/O DYE: CPT

## 2024-03-29 PROCEDURE — 71045 X-RAY EXAM CHEST 1 VIEW: CPT

## 2024-03-29 PROCEDURE — 84484 ASSAY OF TROPONIN QUANT: CPT

## 2024-03-29 PROCEDURE — 2500000005 HC RX 250 GENERAL PHARMACY W/O HCPCS: Performed by: INTERNAL MEDICINE

## 2024-03-29 PROCEDURE — 2500000004 HC RX 250 GENERAL PHARMACY W/ HCPCS (ALT 636 FOR OP/ED): Performed by: EMERGENCY MEDICINE

## 2024-03-29 PROCEDURE — 96365 THER/PROPH/DIAG IV INF INIT: CPT

## 2024-03-29 PROCEDURE — 83605 ASSAY OF LACTIC ACID: CPT | Performed by: EMERGENCY MEDICINE

## 2024-03-29 PROCEDURE — 94640 AIRWAY INHALATION TREATMENT: CPT

## 2024-03-29 PROCEDURE — 2500000002 HC RX 250 W HCPCS SELF ADMINISTERED DRUGS (ALT 637 FOR MEDICARE OP, ALT 636 FOR OP/ED): Performed by: INTERNAL MEDICINE

## 2024-03-29 PROCEDURE — 36415 COLL VENOUS BLD VENIPUNCTURE: CPT | Performed by: EMERGENCY MEDICINE

## 2024-03-29 PROCEDURE — 84484 ASSAY OF TROPONIN QUANT: CPT | Performed by: EMERGENCY MEDICINE

## 2024-03-29 PROCEDURE — 2060000001 HC INTERMEDIATE ICU ROOM DAILY

## 2024-03-29 PROCEDURE — 87636 SARSCOV2 & INF A&B AMP PRB: CPT | Performed by: EMERGENCY MEDICINE

## 2024-03-29 PROCEDURE — 99285 EMERGENCY DEPT VISIT HI MDM: CPT | Mod: 25

## 2024-03-29 PROCEDURE — 97161 PT EVAL LOW COMPLEX 20 MIN: CPT | Mod: GP

## 2024-03-29 PROCEDURE — 83880 ASSAY OF NATRIURETIC PEPTIDE: CPT | Performed by: EMERGENCY MEDICINE

## 2024-03-29 PROCEDURE — 81001 URINALYSIS AUTO W/SCOPE: CPT | Performed by: EMERGENCY MEDICINE

## 2024-03-29 PROCEDURE — 93005 ELECTROCARDIOGRAM TRACING: CPT

## 2024-03-29 PROCEDURE — 2500000002 HC RX 250 W HCPCS SELF ADMINISTERED DRUGS (ALT 637 FOR MEDICARE OP, ALT 636 FOR OP/ED): Performed by: EMERGENCY MEDICINE

## 2024-03-29 PROCEDURE — 2500000004 HC RX 250 GENERAL PHARMACY W/ HCPCS (ALT 636 FOR OP/ED): Performed by: INTERNAL MEDICINE

## 2024-03-29 PROCEDURE — 2500000001 HC RX 250 WO HCPCS SELF ADMINISTERED DRUGS (ALT 637 FOR MEDICARE OP): Performed by: INTERNAL MEDICINE

## 2024-03-29 PROCEDURE — 85025 COMPLETE CBC W/AUTO DIFF WBC: CPT | Performed by: EMERGENCY MEDICINE

## 2024-03-29 PROCEDURE — 99222 1ST HOSP IP/OBS MODERATE 55: CPT | Performed by: INTERNAL MEDICINE

## 2024-03-29 RX ORDER — ROPINIROLE 1 MG/1
5 TABLET, FILM COATED ORAL NIGHTLY
Status: DISCONTINUED | OUTPATIENT
Start: 2024-03-29 | End: 2024-04-02 | Stop reason: HOSPADM

## 2024-03-29 RX ORDER — ACETAMINOPHEN 325 MG/1
650 TABLET ORAL EVERY 4 HOURS PRN
Status: DISCONTINUED | OUTPATIENT
Start: 2024-03-29 | End: 2024-04-02 | Stop reason: HOSPADM

## 2024-03-29 RX ORDER — SERTRALINE HYDROCHLORIDE 50 MG/1
50 TABLET, FILM COATED ORAL DAILY
Status: DISCONTINUED | OUTPATIENT
Start: 2024-03-29 | End: 2024-04-02 | Stop reason: HOSPADM

## 2024-03-29 RX ORDER — NITROGLYCERIN 0.4 MG/1
0.4 TABLET SUBLINGUAL EVERY 5 MIN PRN
Status: DISCONTINUED | OUTPATIENT
Start: 2024-03-29 | End: 2024-04-02 | Stop reason: HOSPADM

## 2024-03-29 RX ORDER — LIDOCAINE 560 MG/1
1 PATCH PERCUTANEOUS; TOPICAL; TRANSDERMAL DAILY
Status: DISCONTINUED | OUTPATIENT
Start: 2024-03-29 | End: 2024-04-02 | Stop reason: HOSPADM

## 2024-03-29 RX ORDER — PANTOPRAZOLE SODIUM 40 MG/1
40 TABLET, DELAYED RELEASE ORAL DAILY
Status: DISCONTINUED | OUTPATIENT
Start: 2024-03-29 | End: 2024-03-29 | Stop reason: SDUPTHER

## 2024-03-29 RX ORDER — ASPIRIN 325 MG
325 TABLET, DELAYED RELEASE (ENTERIC COATED) ORAL DAILY
Status: DISCONTINUED | OUTPATIENT
Start: 2024-03-29 | End: 2024-04-02

## 2024-03-29 RX ORDER — GUAIFENESIN 600 MG/1
600 TABLET, EXTENDED RELEASE ORAL EVERY 12 HOURS PRN
Status: DISCONTINUED | OUTPATIENT
Start: 2024-03-29 | End: 2024-04-02 | Stop reason: HOSPADM

## 2024-03-29 RX ORDER — PANTOPRAZOLE SODIUM 40 MG/1
40 TABLET, DELAYED RELEASE ORAL DAILY
Status: DISCONTINUED | OUTPATIENT
Start: 2024-03-30 | End: 2024-04-02 | Stop reason: HOSPADM

## 2024-03-29 RX ORDER — FUROSEMIDE 40 MG/1
40 TABLET ORAL
Status: DISCONTINUED | OUTPATIENT
Start: 2024-03-29 | End: 2024-04-01

## 2024-03-29 RX ORDER — ONDANSETRON 4 MG/1
4 TABLET, ORALLY DISINTEGRATING ORAL EVERY 8 HOURS PRN
Status: DISCONTINUED | OUTPATIENT
Start: 2024-03-29 | End: 2024-04-02 | Stop reason: HOSPADM

## 2024-03-29 RX ORDER — ACETAMINOPHEN 650 MG/1
650 SUPPOSITORY RECTAL EVERY 4 HOURS PRN
Status: DISCONTINUED | OUTPATIENT
Start: 2024-03-29 | End: 2024-04-02 | Stop reason: HOSPADM

## 2024-03-29 RX ORDER — DOCUSATE SODIUM 100 MG/1
100 CAPSULE, LIQUID FILLED ORAL 2 TIMES DAILY
Status: DISCONTINUED | OUTPATIENT
Start: 2024-03-29 | End: 2024-04-02 | Stop reason: HOSPADM

## 2024-03-29 RX ORDER — HEPARIN SODIUM 5000 [USP'U]/ML
5000 INJECTION, SOLUTION INTRAVENOUS; SUBCUTANEOUS EVERY 8 HOURS
Status: DISCONTINUED | OUTPATIENT
Start: 2024-03-29 | End: 2024-04-02 | Stop reason: HOSPADM

## 2024-03-29 RX ORDER — IPRATROPIUM BROMIDE AND ALBUTEROL SULFATE 2.5; .5 MG/3ML; MG/3ML
3 SOLUTION RESPIRATORY (INHALATION) EVERY 2 HOUR PRN
Status: DISCONTINUED | OUTPATIENT
Start: 2024-03-29 | End: 2024-04-02 | Stop reason: HOSPADM

## 2024-03-29 RX ORDER — POLYETHYLENE GLYCOL 3350 17 G/17G
17 POWDER, FOR SOLUTION ORAL DAILY
Status: DISCONTINUED | OUTPATIENT
Start: 2024-03-30 | End: 2024-04-02 | Stop reason: HOSPADM

## 2024-03-29 RX ORDER — ADHESIVE BANDAGE
30 BANDAGE TOPICAL DAILY PRN
COMMUNITY
End: 2024-04-02 | Stop reason: HOSPADM

## 2024-03-29 RX ORDER — GUAIFENESIN/DEXTROMETHORPHAN 100-10MG/5
5 SYRUP ORAL EVERY 4 HOURS PRN
Status: DISCONTINUED | OUTPATIENT
Start: 2024-03-29 | End: 2024-04-02 | Stop reason: HOSPADM

## 2024-03-29 RX ORDER — PANTOPRAZOLE SODIUM 40 MG/10ML
40 INJECTION, POWDER, LYOPHILIZED, FOR SOLUTION INTRAVENOUS DAILY
Status: DISCONTINUED | OUTPATIENT
Start: 2024-03-30 | End: 2024-04-02 | Stop reason: HOSPADM

## 2024-03-29 RX ORDER — POTASSIUM CHLORIDE 20 MEQ/1
40 TABLET, EXTENDED RELEASE ORAL ONCE
Status: COMPLETED | OUTPATIENT
Start: 2024-03-29 | End: 2024-03-29

## 2024-03-29 RX ORDER — ACETAMINOPHEN 160 MG/5ML
650 SOLUTION ORAL EVERY 4 HOURS PRN
Status: DISCONTINUED | OUTPATIENT
Start: 2024-03-29 | End: 2024-04-02 | Stop reason: HOSPADM

## 2024-03-29 RX ORDER — ATORVASTATIN CALCIUM 10 MG/1
10 TABLET, FILM COATED ORAL NIGHTLY
Status: DISCONTINUED | OUTPATIENT
Start: 2024-03-29 | End: 2024-04-02 | Stop reason: HOSPADM

## 2024-03-29 RX ORDER — METOPROLOL SUCCINATE 25 MG/1
25 TABLET, EXTENDED RELEASE ORAL DAILY
Status: DISCONTINUED | OUTPATIENT
Start: 2024-03-29 | End: 2024-04-02 | Stop reason: HOSPADM

## 2024-03-29 RX ORDER — ONDANSETRON HYDROCHLORIDE 2 MG/ML
4 INJECTION, SOLUTION INTRAVENOUS EVERY 8 HOURS PRN
Status: DISCONTINUED | OUTPATIENT
Start: 2024-03-29 | End: 2024-04-02 | Stop reason: HOSPADM

## 2024-03-29 RX ORDER — TRAZODONE HYDROCHLORIDE 50 MG/1
50 TABLET ORAL NIGHTLY PRN
Status: DISCONTINUED | OUTPATIENT
Start: 2024-03-29 | End: 2024-04-02 | Stop reason: HOSPADM

## 2024-03-29 RX ORDER — CALCITONIN SALMON 200 [IU]/.09ML
1 SPRAY, METERED NASAL DAILY
Status: DISCONTINUED | OUTPATIENT
Start: 2024-03-29 | End: 2024-04-02 | Stop reason: HOSPADM

## 2024-03-29 RX ORDER — LANOLIN ALCOHOL/MO/W.PET/CERES
400 CREAM (GRAM) TOPICAL 3 TIMES WEEKLY
Status: DISCONTINUED | OUTPATIENT
Start: 2024-03-29 | End: 2024-04-02 | Stop reason: HOSPADM

## 2024-03-29 RX ORDER — TALC
3 POWDER (GRAM) TOPICAL NIGHTLY
Status: DISCONTINUED | OUTPATIENT
Start: 2024-03-29 | End: 2024-04-02 | Stop reason: HOSPADM

## 2024-03-29 RX ORDER — CEFTRIAXONE 1 G/50ML
1 INJECTION, SOLUTION INTRAVENOUS ONCE
Status: COMPLETED | OUTPATIENT
Start: 2024-03-29 | End: 2024-03-29

## 2024-03-29 RX ORDER — IPRATROPIUM BROMIDE AND ALBUTEROL SULFATE 2.5; .5 MG/3ML; MG/3ML
3 SOLUTION RESPIRATORY (INHALATION)
Status: DISCONTINUED | OUTPATIENT
Start: 2024-03-29 | End: 2024-04-02 | Stop reason: HOSPADM

## 2024-03-29 RX ORDER — LOSARTAN POTASSIUM 25 MG/1
25 TABLET ORAL DAILY
Status: DISCONTINUED | OUTPATIENT
Start: 2024-03-29 | End: 2024-03-31

## 2024-03-29 RX ORDER — CEFTRIAXONE 1 G/50ML
1 INJECTION, SOLUTION INTRAVENOUS EVERY 24 HOURS
Status: DISCONTINUED | OUTPATIENT
Start: 2024-03-30 | End: 2024-04-02 | Stop reason: HOSPADM

## 2024-03-29 RX ADMIN — ATORVASTATIN CALCIUM 10 MG: 10 TABLET, FILM COATED ORAL at 21:55

## 2024-03-29 RX ADMIN — DOCUSATE SODIUM 100 MG: 100 CAPSULE, LIQUID FILLED ORAL at 21:55

## 2024-03-29 RX ADMIN — LOSARTAN POTASSIUM 25 MG: 25 TABLET, FILM COATED ORAL at 17:18

## 2024-03-29 RX ADMIN — SERTRALINE 50 MG: 50 TABLET, FILM COATED ORAL at 17:18

## 2024-03-29 RX ADMIN — ROPINIROLE HYDROCHLORIDE 5 MG: 1 TABLET, FILM COATED ORAL at 21:55

## 2024-03-29 RX ADMIN — HEPARIN SODIUM 5000 UNITS: 5000 INJECTION, SOLUTION INTRAVENOUS; SUBCUTANEOUS at 21:55

## 2024-03-29 RX ADMIN — Medication 400 MG: at 17:18

## 2024-03-29 RX ADMIN — SODIUM CHLORIDE 500 ML: 900 INJECTION, SOLUTION INTRAVENOUS at 11:01

## 2024-03-29 RX ADMIN — POTASSIUM CHLORIDE 40 MEQ: 1500 TABLET, FILM COATED, EXTENDED RELEASE ORAL at 11:50

## 2024-03-29 RX ADMIN — Medication 3 MG: at 21:55

## 2024-03-29 RX ADMIN — Medication 2 L/MIN: at 15:30

## 2024-03-29 RX ADMIN — FUROSEMIDE 40 MG: 40 TABLET ORAL at 17:18

## 2024-03-29 RX ADMIN — LIDOCAINE 1 PATCH: 4 PATCH TOPICAL at 17:19

## 2024-03-29 RX ADMIN — IPRATROPIUM BROMIDE AND ALBUTEROL SULFATE 3 ML: 2.5; .5 SOLUTION RESPIRATORY (INHALATION) at 19:15

## 2024-03-29 RX ADMIN — CEFTRIAXONE SODIUM 1 G: 1 INJECTION, SOLUTION INTRAVENOUS at 11:50

## 2024-03-29 SDOH — SOCIAL STABILITY: SOCIAL INSECURITY: WERE YOU ABLE TO COMPLETE ALL THE BEHAVIORAL HEALTH SCREENINGS?: YES

## 2024-03-29 SDOH — SOCIAL STABILITY: SOCIAL INSECURITY: ARE THERE ANY APPARENT SIGNS OF INJURIES/BEHAVIORS THAT COULD BE RELATED TO ABUSE/NEGLECT?: NO

## 2024-03-29 SDOH — SOCIAL STABILITY: SOCIAL INSECURITY: HAVE YOU HAD THOUGHTS OF HARMING ANYONE ELSE?: NO

## 2024-03-29 SDOH — SOCIAL STABILITY: SOCIAL INSECURITY: DO YOU FEEL UNSAFE GOING BACK TO THE PLACE WHERE YOU ARE LIVING?: NO

## 2024-03-29 SDOH — SOCIAL STABILITY: SOCIAL INSECURITY: HAS ANYONE EVER THREATENED TO HURT YOUR FAMILY OR YOUR PETS?: NO

## 2024-03-29 SDOH — SOCIAL STABILITY: SOCIAL INSECURITY: ARE YOU OR HAVE YOU BEEN THREATENED OR ABUSED PHYSICALLY, EMOTIONALLY, OR SEXUALLY BY ANYONE?: NO

## 2024-03-29 SDOH — SOCIAL STABILITY: SOCIAL INSECURITY: DOES ANYONE TRY TO KEEP YOU FROM HAVING/CONTACTING OTHER FRIENDS OR DOING THINGS OUTSIDE YOUR HOME?: NO

## 2024-03-29 SDOH — SOCIAL STABILITY: SOCIAL INSECURITY: DO YOU FEEL ANYONE HAS EXPLOITED OR TAKEN ADVANTAGE OF YOU FINANCIALLY OR OF YOUR PERSONAL PROPERTY?: NO

## 2024-03-29 ASSESSMENT — LIFESTYLE VARIABLES
HOW OFTEN DO YOU HAVE A DRINK CONTAINING ALCOHOL: NEVER
PRESCIPTION_ABUSE_PAST_12_MONTHS: NO
AUDIT-C TOTAL SCORE: 0
AUDIT-C TOTAL SCORE: 0
HOW OFTEN DO YOU HAVE 6 OR MORE DRINKS ON ONE OCCASION: NEVER
SUBSTANCE_ABUSE_PAST_12_MONTHS: NO
SKIP TO QUESTIONS 9-10: 1
HOW MANY STANDARD DRINKS CONTAINING ALCOHOL DO YOU HAVE ON A TYPICAL DAY: PATIENT DOES NOT DRINK

## 2024-03-29 ASSESSMENT — ACTIVITIES OF DAILY LIVING (ADL)
LACK_OF_TRANSPORTATION: NO
HEARING - RIGHT EAR: DIFFICULTY WITH NOISE
TOILETING: NEEDS ASSISTANCE
JUDGMENT_ADEQUATE_SAFELY_COMPLETE_DAILY_ACTIVITIES: YES
BATHING: NEEDS ASSISTANCE
PATIENT'S MEMORY ADEQUATE TO SAFELY COMPLETE DAILY ACTIVITIES?: YES
ADEQUATE_TO_COMPLETE_ADL: YES
HEARING - LEFT EAR: DIFFICULTY WITH NOISE
GROOMING: NEEDS ASSISTANCE
FEEDING YOURSELF: INDEPENDENT
DRESSING YOURSELF: NEEDS ASSISTANCE
ASSISTIVE_DEVICE: HEARING AID - RIGHT;HEARING AID - LEFT

## 2024-03-29 ASSESSMENT — ENCOUNTER SYMPTOMS
EYE PAIN: 0
DYSURIA: 0
POLYPHAGIA: 0
PHOTOPHOBIA: 0
DIZZINESS: 0
FEVER: 0
ARTHRALGIAS: 0
FATIGUE: 1
HEMATURIA: 0
FLANK PAIN: 0
SHORTNESS OF BREATH: 1
RHINORRHEA: 0
NECK PAIN: 0
BLOOD IN STOOL: 0
DYSPHORIC MOOD: 0
ABDOMINAL DISTENTION: 0
ACTIVITY CHANGE: 0
WOUND: 0
DIFFICULTY URINATING: 0
AGITATION: 0
POLYDIPSIA: 0
MYALGIAS: 0
FREQUENCY: 0
BRUISES/BLEEDS EASILY: 0
HYPERACTIVE: 0
UNEXPECTED WEIGHT CHANGE: 0
NECK STIFFNESS: 0
SEIZURES: 0
HEADACHES: 0
WEAKNESS: 1
FACIAL SWELLING: 0
RECTAL PAIN: 0
NUMBNESS: 0
WHEEZING: 0
NERVOUS/ANXIOUS: 0
DECREASED CONCENTRATION: 0
CONFUSION: 0
STRIDOR: 0
EYE DISCHARGE: 0
EYE REDNESS: 0
TREMORS: 0
ABDOMINAL PAIN: 0
SLEEP DISTURBANCE: 0
PALPITATIONS: 0
CONSTIPATION: 0
SPEECH DIFFICULTY: 0
SINUS PAIN: 0
SORE THROAT: 0
HALLUCINATIONS: 0
JOINT SWELLING: 0
APNEA: 0
APPETITE CHANGE: 0
CHILLS: 0
CHOKING: 0
TROUBLE SWALLOWING: 0
COUGH: 0
VOMITING: 0
VOICE CHANGE: 0
LIGHT-HEADEDNESS: 0
FACIAL ASYMMETRY: 0
NAUSEA: 0
ANAL BLEEDING: 0
ADENOPATHY: 0
EYE ITCHING: 0
DIAPHORESIS: 0
COLOR CHANGE: 0
CHEST TIGHTNESS: 0
DIARRHEA: 0
SINUS PRESSURE: 0
BACK PAIN: 0

## 2024-03-29 ASSESSMENT — COGNITIVE AND FUNCTIONAL STATUS - GENERAL
WALKING IN HOSPITAL ROOM: A LITTLE
TURNING FROM BACK TO SIDE WHILE IN FLAT BAD: A LITTLE
MOVING FROM LYING ON BACK TO SITTING ON SIDE OF FLAT BED WITH BEDRAILS: A LITTLE
MOVING TO AND FROM BED TO CHAIR: A LITTLE
STANDING UP FROM CHAIR USING ARMS: A LITTLE
CLIMB 3 TO 5 STEPS WITH RAILING: A LITTLE
MOBILITY SCORE: 18

## 2024-03-29 ASSESSMENT — PAIN SCALES - GENERAL: PAINLEVEL_OUTOF10: 0 - NO PAIN

## 2024-03-29 ASSESSMENT — PAIN - FUNCTIONAL ASSESSMENT
PAIN_FUNCTIONAL_ASSESSMENT: 0-10
PAIN_FUNCTIONAL_ASSESSMENT: 0-10

## 2024-03-29 ASSESSMENT — PATIENT HEALTH QUESTIONNAIRE - PHQ9
2. FEELING DOWN, DEPRESSED OR HOPELESS: NOT AT ALL
SUM OF ALL RESPONSES TO PHQ9 QUESTIONS 1 & 2: 0
1. LITTLE INTEREST OR PLEASURE IN DOING THINGS: NOT AT ALL

## 2024-03-29 NOTE — CARE PLAN
Problem: Respiratory  Goal: No signs of respiratory distress (eg. Use of accessory muscles. Peds grunting)  Outcome: Progressing  Goal: Patent airway maintained this shift  Outcome: Progressing

## 2024-03-29 NOTE — PROGRESS NOTES
"Subjective   Patient ID: Kalie Ramos is a 89 y.o. female who presents for Follow-up (multiple medical issues).    Ms. Ramos today came here for multiple medical issues.  She was accompanied by her son and the daughter-in-law from [place], both are very nice.  She came for follow-up on various conditions.  Her skin is dry, itchy and scratchy.  Left leg swollen, some edematous, looks like early cellulitis.  Overall, she is okay.  She is in good spirits.  She is enjoying the place where she lives.  Appetite and weight are okay.  Bowel movements are okay.  She came for follow-up.  Mainly problem is dry skin and itchy.    I have personally reviewed the patient's Past Medical History, Medications, Allergies, Social History, and Family History in the EMR.    Review of Systems   All other systems reviewed and are negative.    Objective   /60   Ht 1.575 m (5' 2\")   Wt (!) 43.2 kg (95 lb 3.2 oz)   BMI 17.41 kg/m²     Physical Exam  Vitals reviewed.   Cardiovascular:      Heart sounds: Normal heart sounds, S1 normal and S2 normal. No murmur heard.     No friction rub.   Pulmonary:      Effort: Pulmonary effort is normal.      Breath sounds: Normal breath sounds and air entry.   Abdominal:      Palpations: There is no hepatomegaly, splenomegaly or mass.   Musculoskeletal:      Right lower leg: No edema.      Left lower leg: No edema.      Comments: Left leg below knee above ankle edema.   Lymphadenopathy:      Lower Body: No right inguinal adenopathy. No left inguinal adenopathy.   Skin:     Comments: Left leg cellulitis mild present.  Dry skin.  There are some excoriation marks.  Otherwise okay.  No sign of any acute infection or dermatitis.   Neurological:      Cranial Nerves: Cranial nerves 2-12 are intact.      Sensory: No sensory deficit.      Motor: Motor function is intact.      Deep Tendon Reflexes: Reflexes are normal and symmetric.     LAB WORK: Laboratory testing discussed.    Assessment/Plan "   Problem List Items Addressed This Visit             ICD-10-CM       Cardiac and Vasculature    Hypertension I10    Relevant Orders    CBC    Comprehensive Metabolic Panel    Basic Metabolic Panel       Hematology and Neoplasia    Anemia D64.9    Relevant Orders    CBC    Comprehensive Metabolic Panel    Basic Metabolic Panel       Pulmonary and Pneumonias    Hypoxia R09.02     Other Visit Diagnoses         Codes    Leg edema    -  Primary R60.0    Cough, unspecified type     R05.9    Relevant Medications    doxycycline (Vibramycin) 100 mg capsule    bacitracin 500 unit/gram ointment    Cellulitis, unspecified cellulitis site     L03.90    Dry skin     L85.3    History of aspiration pneumonia     Z87.01        1. Leg edema, cellulitis.  We gave her some doxycycline, local cream.  2. Edema.  She is on water pill.  I will check kidney function.  3. Dry skin.  Just coconut oil on wet skin, explained.  4. Anemia.  Monitor.  5. History of aspiration, stable.  6. Hypoxia, on oxygen for lifetime.  7. Blood work ordered.  8. I plan to see her back in four to six weeks.  9. Today’s blood work I will communicate over the phone.  Daughter is in-charge and son and she allows me to communicate with them.    Scribe Attestation  By signing my name below, I, Angela Paul, Scrarsen attest that this documentation has been prepared under the direction and in the presence of Avel Bailey MD.

## 2024-03-29 NOTE — NURSING NOTE
Patient has an active DNR DNI in system. This was confirmed with patient and her son.  Dr JENN Bailey was notified of this through chat.

## 2024-03-29 NOTE — ED PROVIDER NOTES
HPI   Chief Complaint   Patient presents with    Weakness, Gen     Patient bib mentor for generalized weakness and uti symptoms. Per ems patient was running low grade fever earlier today at facility ([facility]). Ems did say that patient did slide out of chair but denies any injury.        Patient is a 89-year-old female presenting to the emergency department from [facility] nursing [facility] for evaluation of confusion and generalized weakness.  Patient states over the past few days she has been feeling more confused and weak.  Facility was concerned that patient possibly has a UTI.  Patient denies chest pain, shortness of breath, fever, chills, nausea, vomiting abdominal pain, recent travel, recent illness, cough, congestion, headaches, numbness, tingling, hematuria, dysuria, constipation, diarrhea, changes in vision.                           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    Body mass index (BMI) 22.0-22.9, adult     BMI 22.0-22.9, adult    Cervicalgia 05/08/2015    Neck pain    CHF (congestive heart failure) (CMS/[facility])     Closed right hip fracture (CMS/[facility])     COPD (chronic obstructive pulmonary disease) (CMS/HCC)     Depression     GERD (gastroesophageal reflux disease)     High cholesterol     Hip fracture (CMS/HCC)     Hyperlipidemia     Hypertension     Insomnia     Leg swelling     Malignant neoplasm of unspecified site of unspecified female breast (CMS/HCC) 07/19/2013    Adenocarcinoma of breast    Neuropathy     Osteoarthritis     Other conditions influencing health status 10/21/2013    Urinary Tract Infection    Personal history of other infectious and parasitic diseases 03/17/2015    History of candidiasis of mouth    Personal history of other specified conditions 03/17/2015    History of abdominal pain    Personal  history of pneumonia (recurrent) 09/10/2015    History of pneumonia    Personal history of transient ischemic attack (TIA), and cerebral infarction without residual deficits 09/04/2014    History of stroke    Pneumonia     Pulmonary embolism (CMS/HCC)     PVD (peripheral vascular disease) (CMS/HCC)     Respiratory failure (CMS/HCC)     Syncope     Weakness      Past Surgical History:   Procedure Laterality Date    BREAST BIOPSY  09/10/2015    Biopsy Breast Open    BREAST LUMPECTOMY  07/19/2013    Breast Surgery Lumpectomy    CARPAL TUNNEL RELEASE  09/04/2014    Neuroplasty Decompression Median Nerve At Carpal Tunnel    CATARACT EXTRACTION  11/02/2015    Cataract Surgery    HAND TENDON SURGERY  11/02/2015    Hand Incision Tendon Sheath Of A Finger    HYSTERECTOMY  07/19/2013    Hysterectomy    MR HEAD ANGIO WO IV CONTRAST  12/17/2020    MR HEAD ANGIO WO IV CONTRAST [facility]    MR HEAD ANGIO WO IV CONTRAST  5/13/2021    MR HEAD ANGIO WO IV CONTRAST [facility]    MR HEAD ANGIO WO IV CONTRAST  5/24/2022    MR HEAD ANGIO WO IV CONTRAST [facility]    MR NECK ANGIO WO IV CONTRAST  5/24/2022    MR NECK ANGIO WO IV CONTRAST [facility]    OTHER SURGICAL HISTORY  11/02/2015    Wrist Carpectomy    ROTATOR CUFF REPAIR  09/10/2015    Rotator Cuff Repair    SENTINEL LYMPH NODE BIOPSY  09/10/2015    [place] Lymph Node Biopsy    SHOULDER SURGERY  07/19/2013    Shoulder Surgery     Family History   Problem Relation Name Age of Onset    No Known Problems Mother      Alzheimer's disease Father      Breast cancer Sister      Pancreatic cancer Sister      Arthritis Other       Social History     Tobacco Use    Smoking status: Never    Smokeless tobacco: Never   Substance Use Topics    Alcohol use: Never    Drug use: Never       Physical Exam   ED Triage Vitals [03/29/24 1044]   Temperature Heart Rate Respirations BP   36.6 °C (97.9 °F) 56 17 152/64      Pulse Ox Temp Source Heart Rate Source  Patient Position   100 % Oral -- --      BP Location FiO2 (%)     -- --       Physical Exam  Vitals and nursing note reviewed.   Constitutional:       General: She is not in acute distress.     Appearance: Normal appearance. She is normal weight. She is not ill-appearing or toxic-appearing.   HENT:      Head: Normocephalic and atraumatic.      Nose: Nose normal.      Mouth/Throat:      Mouth: Mucous membranes are dry.   Eyes:      Extraocular Movements: Extraocular movements intact.      Conjunctiva/sclera: Conjunctivae normal.      Pupils: Pupils are equal, round, and reactive to light.   Cardiovascular:      Rate and Rhythm: Normal rate and regular rhythm.      Pulses: Normal pulses.      Heart sounds: Normal heart sounds. No murmur heard.     No friction rub. No gallop.   Pulmonary:      Effort: Pulmonary effort is normal.      Breath sounds: Normal breath sounds.   Abdominal:      General: Abdomen is flat.      Palpations: Abdomen is soft.      Tenderness: There is no abdominal tenderness. There is no guarding or rebound.   Musculoskeletal:         General: Normal range of motion.      Cervical back: Normal range of motion.   Skin:     General: Skin is warm and dry.   Neurological:      General: No focal deficit present.      Mental Status: She is alert and oriented to person, place, and time.   Psychiatric:         Mood and Affect: Mood normal.         Behavior: Behavior normal.         ED Course & MDM   Diagnoses as of 03/29/24 1337   Generalized weakness   Lower urinary tract infection, acute   Sinus bradycardia   Electrolyte imbalance   Chronic congestive heart failure, unspecified heart failure type (CMS/HCC)   Elevated troponin       Medical Decision Making  **Disclaimer parts of this chart have been completed using voice recognition software. Please excuse any errors of transcription.     Patient seen in conjunction with attending physician .     HPI: Detailed above.    Exam: A medically  appropriate exam performed, outlined above, given the known history and presentation.    History obtained from: Patient    EKG: Reviewed and interpreted by my attending physician    Labs/Diagnostics:  Labs Reviewed   URINALYSIS WITH REFLEX MICROSCOPIC - Abnormal       Result Value    Color, Urine Light-Yellow      Appearance, Urine Turbid (*)     Specific Gravity, Urine 1.013      pH, Urine 7.5      Protein, Urine NEGATIVE      Glucose, Urine Normal      Blood, Urine NEGATIVE      Ketones, Urine NEGATIVE      Bilirubin, Urine NEGATIVE      Urobilinogen, Urine Normal      Nitrite, Urine 2+ (*)     Leukocyte Esterase, Urine 500 Anurag/µL (*)    CBC WITH AUTO DIFFERENTIAL - Abnormal    WBC 8.7      nRBC 0.0      RBC 4.53      Hemoglobin 13.1      Hematocrit 42.0      MCV 93      MCH 28.9      MCHC 31.2 (*)     RDW 15.9 (*)     Platelets 257      Neutrophils % 72.6      Immature Granulocytes %, Automated 0.3      Lymphocytes % 14.1      Monocytes % 7.2      Eosinophils % 5.3      Basophils % 0.5      Neutrophils Absolute 6.28 (*)     Immature Granulocytes Absolute, Automated 0.03      Lymphocytes Absolute 1.22      Monocytes Absolute 0.62      Eosinophils Absolute 0.46 (*)     Basophils Absolute 0.04     COMPREHENSIVE METABOLIC PANEL - Abnormal    Glucose 84      Sodium 149 (*)     Potassium 3.2 (*)     Chloride 104      Bicarbonate 35 (*)     Urea Nitrogen 22      Creatinine 1.10      eGFR 47 (*)     Calcium 9.0      Albumin 3.3 (*)     Alkaline Phosphatase 160 (*)     Total Protein 6.7      AST 22      Bilirubin, Total 0.8      ALT 11      Anion Gap 10     N-TERMINAL PROBNP - Abnormal    PROBNP 6,796 (*)     Narrative:     Reference ranges are based on clinical submission data. These ranges represent the 95th percentile of normal cut-off points. As NT Pro- BNP values approach 1000 pg/ml, clinical symptoms are more likely associated with CHF.   SERIAL TROPONIN, INITIAL ([facility]) - Abnormal    Troponin T, High Sensitivity 57  (*)    MICROSCOPIC ONLY, URINE - Abnormal    WBC, Urine >50 (*)     WBC Clumps, Urine FEW      RBC, Urine 3-5      Bacteria, Urine 1+ (*)     Mucus, Urine FEW     SARS-COV-2 AND INFLUENZA A/B PCR - Normal    Flu A Result Not Detected      Flu B Result Not Detected      Coronavirus 2019, PCR Not Detected      Narrative:     This assay has received FDA Emergency Use Authorization (EUA) and  is only authorized for the duration of time that circumstances exist to justify the authorization of the emergency use of in vitro diagnostic tests for the detection of SARS-CoV-2 virus and/or diagnosis of COVID-19 infection under section 564(b)(1) of the Act, 21 U.S.C. 360bbb-3(b)(1). Testing for SARS-CoV-2 is only recommended for patients who meet current clinical and/or epidemiological criteria as defined by federal, state, or local public health directives. This assay is an in vitro diagnostic nucleic acid amplification test for the qualitative detection of SARS-CoV-2, Influenza A, and Influenza B from nasopharyngeal specimens and has been validated for use at [facility]. Negative results do not preclude COVID-19 infections or Influenza A/B infections, and should not be used as the sole basis for diagnosis, treatment, or other management decisions. If Influenza A/B and RSV PCR results are negative, testing for Parainfluenza virus, Adenovirus and Metapneumovirus is routinely performed for [facility] pediatric oncology and intensive care inpatients, and is available on other patients by placing an add-on request.    BLOOD GAS LACTIC ACID, VENOUS - Normal    POCT Lactate, Venous 1.5     TROPONIN T SERIES, HIGH SENSITIVITY (0, 2 HR, 6 HR)    Narrative:     The following orders were created for panel order Troponin T Series, High Sensitivity (0, 2HR, 6HR).  Procedure                               Abnormality         Status                     ---------                               -----------         ------                      Serial Troponin, Initial...[[id number]]  Abnormal            Final result                 Please view results for these tests on the individual orders.   TROPONIN T SERIES, HIGH SENSITIVITY (0, 2 HR, 6 HR)    Narrative:     The following orders were created for panel order Troponin T Series, High Sensitivity (0, 2HR, 6HR).  Procedure                               Abnormality         Status                     ---------                               -----------         ------                     Serial Troponin, Initial...[[id number]]                      In process                 Serial Troponin, 2 Hour ...[[id number]]                                                   Please view results for these tests on the individual orders.   SERIAL TROPONIN, INITIAL ([facility])   SERIAL TROPONIN,  2 HOUR ([facility])     XR chest 1 view   Final Result   Stable cardiac enlargement with small bilateral pleural effusions and   mild pulmonary vascular congestion. Pulmonary edema seen on prior   study has resolved.        Small amount of aspirated barium within the left lower lung zone.        Signed by: Nelli Brown 3/29/2024 12:06 PM   Dictation workstation:   [id number]      CT head wo IV contrast   Final Result   Age-appropriate atrophy and chronic microvascular ischemic disease   without acute intracranial process.        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        No change since prior study.        MACRO:   None             Signed by: Nelli Brown 3/29/2024 12:12 PM   Dictation workstation:   [id number]        EMERGENCY DEPARTMENT COURSE and DIFFERENTIAL DIAGNOSIS/MDM:  Patient is a 89-year-old female presenting to the emergency department for evaluation of confusion and generalized weakness.  On physical exam vital signs remarkable for systolic hypertension but otherwise stable patient is in no acute distress.  Abdominal exam benign.  Diagnostic labs and imaging ordered.  CBC showed no leukocytosis or anemia.  Lactate  "normal.  Patient tested negative for COVID and flu.  Initial troponin 57.  CMP showed a potassium of 3.2 therefore oral potassium was given.  Urine showed evidence of infection with 2+ nitrites, 500 leukocyte esterase, and bacteria and therefore ceftriaxone was given.  proBNP 6796.  Chest x-ray showed stable cardiac enlargement with small bilateral pleural effusions and mild pulmonary vascular congestion with pulmonary edema seen on prior study with being resolved.  CT of the head showed age-related atrophy without acute intracranial process.  Due to patient's elevated troponin, altered mental status, and acute UTI we spoke with patient's primary care physician  who agreed to admission for further management.       Vitals:    Vitals:    03/29/24 1044 03/29/24 1316   BP: 152/64 135/52   BP Location:  Right arm   Patient Position:  Lying   Pulse: 56 (!) 46   Resp: 17 12   Temp: 36.6 °C (97.9 °F) 36.8 °C (98.2 °F)   TempSrc: Oral Temporal   SpO2: 100%    Weight: (!) 43.1 kg (95 lb)    Height: 1.575 m (5' 2\")      History Limited by:    None    Independent history obtained from:    None      External records reviewed:    None      Diagnostics interpreted by me:    CT Scan(s) see MDM and Xrays - see my independent interpretation in MDM      Discussions with other clinicians:    Hospitalist/Admitting Team Dr. Bailey      Chronic conditions impacting care:    None      Social determinants of health affecting care:    None    Diagnostic tests considered but not performed: None    ED Medications managed:    Medications   sodium chloride 0.9 % bolus 500 mL (0 mL intravenous Stopped 3/29/24 1244)   potassium chloride CR (Klor-Con M20) ER tablet 40 mEq (40 mEq oral Given 3/29/24 1150)   cefTRIAXone (Rocephin) IVPB 1 g (0 g intravenous Stopped 3/29/24 1244)         Prescription drugs considered:    None      Procedure  Procedures     Laura Marvin PA-C  03/29/24 1337    "

## 2024-03-29 NOTE — PROGRESS NOTES
Physical Therapy    Physical Therapy Evaluation    Patient Name: Kalie Ramos  MRN: [id number]  Today's Date: 3/29/2024   Time Calculation  Start Time: 1600  Stop Time: 1619  Time Calculation (min): 19 min    Assessment/Plan   PT Assessment  PT Assessment Results: Decreased strength, Decreased endurance, Impaired balance, Decreased mobility, Decreased safety awareness  Rehab Prognosis: Good  Evaluation/Treatment Tolerance: Patient tolerated treatment well  Medical Staff Made Aware: Yes  End of Session Communication: Bedside nurse  End of Session Patient Position: Bed, 3 rail up, Alarm on  IP OR SWING BED PT PLAN  Inpatient or Swing Bed: Inpatient  PT Plan  Treatment/Interventions: Bed mobility, Transfer training, Gait training, Balance training, Strengthening, Endurance training, Therapeutic exercise, Therapeutic activity, Home exercise program  PT Plan: Skilled PT  PT Frequency: 4 times per week  PT Discharge Recommendations: Low intensity level of continued care, 24/7 supervision for safety  Equipment Recommended upon Discharge: Wheeled walker  PT Recommended Transfer Status: Assist x1, Assistive device  PT - OK to Discharge: Yes      Subjective   General Visit Information:  General  Reason for Referral: Impaired Mobility  Referred By: Dr. Bailey  Family/Caregiver Present: Yes  Caregiver Feedback: family x2 present  Prior to Session Communication: Bedside nurse  Patient Position Received: Bed, 3 rail up, Alarm on  Preferred Learning Style: verbal  General Comment: 89 year old female admits from half-way after fall with malaise, fatigue, weakness, confusion, cute UTI, hypokalemia, and elevated Tp  Home Living:  Home Living  Type of Home: Assisted living (newly moved into [facility])  Lives With:  (staff)  Home Adaptive Equipment: Walker rolling or standard  Prior Level of Function:  Prior Function Per Pt/Caregiver Report  Level of [place]: Needs assistance with ADLs, Needs assistance with homemaking  Receives Help From:   (staff)  ADL Assistance:  (staff assist with ADLs as needed.)  Homemaking Assistance: Needs assistance  Ambulatory Assistance: Independent (Pt reports ind with rollator)  Precautions:  Precautions  Hearing/Visual Limitations: very [place]  Medical Precautions: Fall precautions    Objective   Pain:  Pain Assessment  Pain Assessment: 0-10  Pain Score: 0 - No pain  Cognition:  Cognition  Overall Cognitive Status: Within Functional Limits  Orientation Level: Oriented X4    General Assessments:  Activity Tolerance  Endurance: Decreased tolerance for upright activites    Sensation  Light Touch: No apparent deficits    Strength  Strength Comments: Grossly 4/5 BLEs on MMT  Functional Assessments:  Bed Mobility  Bed Mobility: Yes  Bed Mobility 1  Bed Mobility 1: Supine to sitting  Level of Assistance 1: Close supervision  Bed Mobility Comments 1: cues on technique  Bed Mobility 2  Bed Mobility  2: Sitting to supine  Level of Assistance 2: Close supervision  Bed Mobility Comments 2: cues on technique    Transfers  Transfer: Yes  Transfer 1  Transfer From 1: Bed to  Transfer to 1: Stand  Technique 1: Sit to stand, Stand to sit  Transfer Device 1: Walker  Transfer Level of Assistance 1: Contact guard  Trials/Comments 1: mild unsteadiness. CGA for safety without LOB this attempt    Ambulation/Gait Training  Ambulation/Gait Training Performed: Yes  Ambulation/Gait Training 1  Surface 1: Level tile  Device 1: Rolling walker  Assistance 1: Contact guard  Quality of Gait 1:  (mild sway laterally, can push device too far out in front limiting SEEMA provided. CGA for safety)  Comments/Distance (ft) 1: 25    Outcome Measures:  [facility] Basic Mobility  Turning from your back to your side while in a flat bed without using bedrails: A little  Moving from lying on your back to sitting on the side of a flat bed without using bedrails: A little  Moving to and from bed to chair (including a wheelchair): A little  Standing up from a chair using your  arms (e.g. wheelchair or bedside chair): A little  To walk in hospital room: A little  Climbing 3-5 steps with railing: A little  Basic Mobility - Total Score: 18    Encounter Problems       Encounter Problems (Active)       Balance       Standing Balance (Progressing)       Start:  03/29/24    Expected End:  04/12/24       Pt will demonstrate good static standing balance to promote safe participation with out of bed activity, transfers, and mobility              Mobility       Ambulation (Progressing)       Start:  03/29/24    Expected End:  04/12/24       Pt will ambulate 100' modified independent assist with walker to promote safe home mobility              PT Transfers       Supine to sit (Progressing)       Start:  03/29/24    Expected End:  04/12/24       Pt will transfer supine to sitting at edge of bed with modified independent assist to promote acute care out of bed activity           Sit to stand (Progressing)       Start:  03/29/24    Expected End:  04/12/24       Pt will transfer sit to standing position with modified independent assist and walker to promote safe out of bed activity              Safety       Safe Mobility Techniques (Progressing)       Start:  03/29/24    Expected End:  04/12/24       Pt will correctly identify and demonstrate safe mobility techniques to reduce their risks for falls during their acute care stay                   Education Documentation  Mobility Training, taught by Dimitris Narayan, PT at 3/29/2024  4:25 PM.  Learner: Family, Patient  Readiness: Acceptance  Method: Explanation, Demonstration  Response: Needs Reinforcement  Comment: safe mobility techniques    Education Comments  No comments found.

## 2024-03-29 NOTE — PROGRESS NOTES
Pharmacy Medication History Review    Kalie Ramos is a 89 y.o. female admitted for Generalized weakness. Pharmacy reviewed the patient's [id number] medications and allergies for accuracy.    The list below reflects the updated PTA list. Please review each medication in order reconciliation for additional clarification and justification.  Prior to Admission Medications   Prescriptions Last Dose   aspirin 325 mg EC tablet Past Week   Sig: Take 1 tablet (325 mg) by mouth 2 times a day.   Patient taking differently: Take 1 tablet (325 mg) by mouth once daily.   atorvastatin (Lipitor) 10 mg tablet Past Week   Sig: Take 1 tablet (10 mg) by mouth once daily at bedtime.   calcitonin, salmon, (Miacalcin) 200 unit/actuation nasal spray Past Week   Sig: Administer 1 spray into one nostril once daily. Do not start before October 28, 2023.   furosemide (Lasix) 40 mg tablet Past Week   Sig: Take 1 tablet (40 mg) by mouth 2 times a day with meals.   ipratropium-albuteroL (Duo-Neb) 0.5-2.5 mg/3 mL nebulizer solution Past Week   Sig: Take 3 mL by nebulization 3 times a day.   lidocaine 4 % patch Past Week   Sig: Place 1 patch over 12 hours on the skin once daily. Remove & discard patch within 12 hours or as directed by MD. Do not start before November 16, 2023.   Patient taking differently: Place 1 patch on the skin once daily. Right knee. Remove & discard patch within 12 hours or as directed by MD.   losartan (Cozaar) 25 mg tablet Past Week   Sig: Take 1 tablet (25 mg) by mouth once daily.   magnesium oxide (Mag-Ox) 400 mg (241.3 mg magnesium) tablet Past Week   Sig: Take 1 tablet (400 mg) by mouth 3 times a week. Tues thurs sat   melatonin 3 mg tablet Past Week   Sig: Take 1 tablet (3 mg) by mouth once daily at bedtime.   metoprolol succinate XL (Toprol-XL) 25 mg 24 hr tablet Past Week   Sig: Take 1 tablet (25 mg) by mouth once daily. Do not crush or chew. Do not start before February 4, 2024.   nitroglycerin  (Nitrostat) 0.4 mg SL tablet    Sig: Place 1 tablet (0.4 mg) under the tongue every 5 minutes if needed for chest pain (UP TO 3 TIMES. IF NO RELIEF CALL 911.).   oxygen (O2) gas therapy    Sig: Inhale 2 L/min once every 24 hours.   pantoprazole (ProtoNix) 40 mg EC tablet Past Week   Sig: TAKE 1 TABLET BY MOUTH ONCE DAILY   rOPINIRole (Requip) 5 mg tablet Past Week   Sig: Take 1 tablet (5 mg) by mouth once daily at bedtime.   sertraline (Zoloft) 50 mg tablet Past Week   Sig: Take 1 tablet (50 mg) by mouth once daily.   traZODone (Desyrel) 50 mg tablet Past Week   Sig: TAKE 1 TABLET AT BEDTIME AS NEEDED FOR SLEEP      Facility-Administered Medications: None          The list below reflects the updated allergy list. Please review each documented allergy for additional clarification and justification.  Allergies  Reviewed by Cony Root RN on 3/29/2024        Severity Reactions Comments    Naproxen Low Hives             Below are additional concerns with the patient's PTA list.  - Utilized med list from outside facility/SNF as primary source for medication history. Clarified food/drug allergies with pt and caregiver (naproxen vs. niacin).    - Taking  mg 1 tablet ONCE daily per facility list, not BID as prescribed.      Jordon Foster, JanaeD

## 2024-03-29 NOTE — PROGRESS NOTES
03/29/24 1730   Discharge Planning   Living Arrangements Other (Comment)  (came from assisted living)   Support Systems Children;Family members   Assistance Needed Needs assistance with ADLs, Needs assistance with homemaking  Receives help from AL staff.   ADL Assistance:  (staff assist with ADLs as needed.)  Homemaking Assistance: Needs assistance  Ambulatory Assistance: Independent (Pt reports ind with rollator).   Type of Residence Assisted living   Home or Post Acute Services Post acute facilities (Rehab/SNF/etc)   Type of Post Acute Facility Services Assisted living   Patient expects to be discharged to: Return back to the assisted living facility   Financial Resource Strain   How hard is it for you to pay for the very basics like food, housing, medical care, and heating? Not hard   Housing Stability   In the last 12 months, was there a time when you were not able to pay the mortgage or rent on time? N   In the last 12 months, was there a time when you did not have a steady place to sleep or slept in a shelter (including now)? N   Transportation Needs   In the past 12 months, has lack of transportation kept you from medical appointments or from getting medications? no   In the past 12 months, has lack of transportation kept you from meetings, work, or from getting things needed for daily living? No     89 year old female admits from [facility] after fall with malaise, fatigue, weakness, confusion, cute UTI, hypokalemia, and elevated Tp    Patient lives in an Assisted living (newly moved into senior care), Patient uses a walker rolling or standard. Needs assistance with ADLs, Needs assistance with homemaking and receives help from the staff.  ADL Assistance:  (staff assist with ADLs as needed.)  Homemaking Assistance: Needs assistance. Ambulatory Assistance: Independent (Pt reports ind with rollator). Hearing/Visual Limitations: very [place]  PT Discharge Recommendations: Low intensity level of continued care, 24/7 supervision  for safety  Equipment Recommended upon Discharge: Wheeled walker  PT Recommended Transfer Status: Assist x1, Assistive device    Patient will return back to the assisted living.

## 2024-03-29 NOTE — NURSING NOTE
Patient arrived on unit and oriented to room.  Patient's son and daughter in law was with patient.

## 2024-03-29 NOTE — PROGRESS NOTES
Attestation/Supervisory note for MARIO ALBERTO Marvin      The patient is a 89-year-old female presenting to the emergency department by EMS from the [facility] where she resides for evaluation of generalized malaise, confusion, and a fall out of her chair.  The patient reportedly slipped out of her chair earlier in the day.  She reportedly has been having some generalized malaise, confusion and generalized weakness for the past day or 2.  She did not injure herself when she fell.  No headache or visual changes.  No chest pain or shortness of breath.  No abdominal pain.  No nausea vomiting.  No diarrhea or constipation.  No urinary complaints.  No focal weakness or numbness.  All pertinent positives and negatives are recorded above.  All other systems reviewed and otherwise negative.  Vital signs with mild bradycardia and mild hypertension but otherwise within normal limits.  Physical exam with a well-nourished well-developed female with dry mucous membranes but otherwise unremarkable.  She has no evidence of airway compromise or respiratory distress.  Abdominal exam is benign.  She has no gross motor, neurologic or vascular deficits on exam.  NIH stroke scale score of 0.      EKG with sinus rhythm at 61 bpm, occasional PACs, left bundle branch block pattern, normal ST segment, and inverted T waves in the inferior and lateral leads      IV fluids ordered.      Diagnostic labs with evidence of urinary tract infection, elevated BNP, elevated troponin T, electrolyte imbalance      Initial Troponin T 57.  Repeat troponin T pending at the time of admission      IV Rocephin and oral KCl ordered      XR chest 1 view   Final Result   Stable cardiac enlargement with small bilateral pleural effusions and   mild pulmonary vascular congestion. Pulmonary edema seen on prior   study has resolved.        Small amount of aspirated barium within the left lower lung zone.        Signed by: Nelli Brown 3/29/2024 12:06 PM   Dictation  workstation:   [id number]      CT head wo IV contrast   Final Result   Age-appropriate atrophy and chronic microvascular ischemic disease   without acute intracranial process.        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        No change since prior study.        MACRO:   None             Signed by: Nelli Brown 3/29/2024 12:12 PM   Dictation workstation:   [id number]             tPA/TNKase not indicated given last known well time of 1 to 2 days ago and NIH stroke scale score of 0.      The patient does not have any evidence of STEMI on the EKG.  She does have a mildly elevated troponin T.  Diagnostic labs also show evidence of urinary tract infection and elevated BNP.  She had a mild electrolyte imbalance with mild hypokalemia.  Oral KCl and IV Rocephin were ordered.  The case was discussed with the patient's primary care physician and Dr. Bailey admitted the patient for further management with trending of her cardiac enzymes and treatment for the UTI        Impression/diagnosis  Malaise and fatigue  Generalized weakness  Confusion, acute on chronic  Acute lower urinary tract infection  Electrolyte imbalance with mild hypokalemia  Elevated troponin T      Critical care time billing is not warranted at this time      I personally saw the patient and made/approve the management plan and take responsibility for the patient management.      I independently interpreted the following study (S): EKG and diagnostic labs      I personally discussed the patient's management with the patient      I reviewed the results of the diagnostic labs and diagnostic imaging.  Formal radiology read was completed by the radiologist.      Mali Carrasco MD

## 2024-03-30 LAB
ALBUMIN SERPL-MCNC: 2.8 G/DL (ref 3.5–5)
ALP BLD-CCNC: 132 U/L (ref 35–125)
ALT SERPL-CCNC: 9 U/L (ref 5–40)
ANION GAP SERPL CALC-SCNC: 10 MMOL/L
ANION GAP SERPL CALC-SCNC: 9 MMOL/L
AST SERPL-CCNC: 17 U/L (ref 5–40)
BASOPHILS # BLD AUTO: 0.04 X10*3/UL (ref 0–0.1)
BASOPHILS NFR BLD AUTO: 0.5 %
BILIRUB SERPL-MCNC: 0.6 MG/DL (ref 0.1–1.2)
BUN SERPL-MCNC: 15 MG/DL (ref 8–25)
BUN SERPL-MCNC: 16 MG/DL (ref 8–25)
CALCIUM SERPL-MCNC: 8.2 MG/DL (ref 8.5–10.4)
CALCIUM SERPL-MCNC: 8.3 MG/DL (ref 8.5–10.4)
CHLORIDE SERPL-SCNC: 103 MMOL/L (ref 97–107)
CHLORIDE SERPL-SCNC: 104 MMOL/L (ref 97–107)
CO2 SERPL-SCNC: 34 MMOL/L (ref 24–31)
CO2 SERPL-SCNC: 35 MMOL/L (ref 24–31)
CREAT SERPL-MCNC: 0.8 MG/DL (ref 0.4–1.6)
CREAT SERPL-MCNC: 0.8 MG/DL (ref 0.4–1.6)
EGFRCR SERPLBLD CKD-EPI 2021: 68 ML/MIN/1.73M*2
EGFRCR SERPLBLD CKD-EPI 2021: 68 ML/MIN/1.73M*2
EOSINOPHIL # BLD AUTO: 0.16 X10*3/UL (ref 0–0.4)
EOSINOPHIL NFR BLD AUTO: 1.8 %
ERYTHROCYTE [DISTWIDTH] IN BLOOD BY AUTOMATED COUNT: 15.7 % (ref 11.5–14.5)
ERYTHROCYTE [DISTWIDTH] IN BLOOD BY AUTOMATED COUNT: 16 % (ref 11.5–14.5)
GLUCOSE SERPL-MCNC: 132 MG/DL (ref 65–99)
GLUCOSE SERPL-MCNC: 89 MG/DL (ref 65–99)
HCT VFR BLD AUTO: 36.1 % (ref 36–46)
HCT VFR BLD AUTO: 38.5 % (ref 36–46)
HGB BLD-MCNC: 11.5 G/DL (ref 12–16)
HGB BLD-MCNC: 12.1 G/DL (ref 12–16)
IMM GRANULOCYTES # BLD AUTO: 0.02 X10*3/UL (ref 0–0.5)
IMM GRANULOCYTES NFR BLD AUTO: 0.2 % (ref 0–0.9)
LYMPHOCYTES # BLD AUTO: 1.03 X10*3/UL (ref 0.8–3)
LYMPHOCYTES NFR BLD AUTO: 11.6 %
MCH RBC QN AUTO: 29.1 PG (ref 26–34)
MCH RBC QN AUTO: 29.2 PG (ref 26–34)
MCHC RBC AUTO-ENTMCNC: 31.4 G/DL (ref 32–36)
MCHC RBC AUTO-ENTMCNC: 31.9 G/DL (ref 32–36)
MCV RBC AUTO: 91 FL (ref 80–100)
MCV RBC AUTO: 93 FL (ref 80–100)
MONOCYTES # BLD AUTO: 0.57 X10*3/UL (ref 0.05–0.8)
MONOCYTES NFR BLD AUTO: 6.4 %
NEUTROPHILS # BLD AUTO: 7.05 X10*3/UL (ref 1.6–5.5)
NEUTROPHILS NFR BLD AUTO: 79.5 %
NRBC BLD-RTO: 0 /100 WBCS (ref 0–0)
NRBC BLD-RTO: 0 /100 WBCS (ref 0–0)
PLATELET # BLD AUTO: 226 X10*3/UL (ref 150–450)
PLATELET # BLD AUTO: 230 X10*3/UL (ref 150–450)
POTASSIUM SERPL-SCNC: 2.9 MMOL/L (ref 3.4–5.1)
POTASSIUM SERPL-SCNC: 3.4 MMOL/L (ref 3.4–5.1)
PROT SERPL-MCNC: 5.5 G/DL (ref 5.9–7.9)
RBC # BLD AUTO: 3.95 X10*6/UL (ref 4–5.2)
RBC # BLD AUTO: 4.15 X10*6/UL (ref 4–5.2)
SODIUM SERPL-SCNC: 147 MMOL/L (ref 133–145)
SODIUM SERPL-SCNC: 148 MMOL/L (ref 133–145)
WBC # BLD AUTO: 11.4 X10*3/UL (ref 4.4–11.3)
WBC # BLD AUTO: 8.9 X10*3/UL (ref 4.4–11.3)

## 2024-03-30 PROCEDURE — 2500000001 HC RX 250 WO HCPCS SELF ADMINISTERED DRUGS (ALT 637 FOR MEDICARE OP): Performed by: INTERNAL MEDICINE

## 2024-03-30 PROCEDURE — 9420000001 HC RT PATIENT EDUCATION 5 MIN

## 2024-03-30 PROCEDURE — 36415 COLL VENOUS BLD VENIPUNCTURE: CPT | Performed by: INTERNAL MEDICINE

## 2024-03-30 PROCEDURE — 97165 OT EVAL LOW COMPLEX 30 MIN: CPT | Mod: GO

## 2024-03-30 PROCEDURE — 85027 COMPLETE CBC AUTOMATED: CPT | Performed by: INTERNAL MEDICINE

## 2024-03-30 PROCEDURE — 84075 ASSAY ALKALINE PHOSPHATASE: CPT | Performed by: INTERNAL MEDICINE

## 2024-03-30 PROCEDURE — 99232 SBSQ HOSP IP/OBS MODERATE 35: CPT | Performed by: INTERNAL MEDICINE

## 2024-03-30 PROCEDURE — 2500000002 HC RX 250 W HCPCS SELF ADMINISTERED DRUGS (ALT 637 FOR MEDICARE OP, ALT 636 FOR OP/ED): Performed by: INTERNAL MEDICINE

## 2024-03-30 PROCEDURE — 99222 1ST HOSP IP/OBS MODERATE 55: CPT | Performed by: INTERNAL MEDICINE

## 2024-03-30 PROCEDURE — 85025 COMPLETE CBC W/AUTO DIFF WBC: CPT | Performed by: INTERNAL MEDICINE

## 2024-03-30 PROCEDURE — 94640 AIRWAY INHALATION TREATMENT: CPT

## 2024-03-30 PROCEDURE — 2060000001 HC INTERMEDIATE ICU ROOM DAILY

## 2024-03-30 PROCEDURE — 2500000004 HC RX 250 GENERAL PHARMACY W/ HCPCS (ALT 636 FOR OP/ED): Performed by: INTERNAL MEDICINE

## 2024-03-30 PROCEDURE — 80048 BASIC METABOLIC PNL TOTAL CA: CPT | Mod: CCI | Performed by: INTERNAL MEDICINE

## 2024-03-30 RX ORDER — POTASSIUM CHLORIDE 14.9 MG/ML
20 INJECTION INTRAVENOUS
Status: DISCONTINUED | OUTPATIENT
Start: 2024-03-30 | End: 2024-03-30

## 2024-03-30 RX ORDER — POTASSIUM CHLORIDE 20 MEQ/1
40 TABLET, EXTENDED RELEASE ORAL DAILY
Status: DISCONTINUED | OUTPATIENT
Start: 2024-03-30 | End: 2024-03-31

## 2024-03-30 RX ADMIN — IPRATROPIUM BROMIDE AND ALBUTEROL SULFATE 3 ML: 2.5; .5 SOLUTION RESPIRATORY (INHALATION) at 18:57

## 2024-03-30 RX ADMIN — IPRATROPIUM BROMIDE AND ALBUTEROL SULFATE 3 ML: 2.5; .5 SOLUTION RESPIRATORY (INHALATION) at 08:18

## 2024-03-30 RX ADMIN — IPRATROPIUM BROMIDE AND ALBUTEROL SULFATE 3 ML: 2.5; .5 SOLUTION RESPIRATORY (INHALATION) at 13:04

## 2024-03-30 RX ADMIN — ATORVASTATIN CALCIUM 10 MG: 10 TABLET, FILM COATED ORAL at 20:15

## 2024-03-30 RX ADMIN — DOCUSATE SODIUM 100 MG: 100 CAPSULE, LIQUID FILLED ORAL at 09:11

## 2024-03-30 RX ADMIN — PANTOPRAZOLE SODIUM 40 MG: 40 TABLET, DELAYED RELEASE ORAL at 09:12

## 2024-03-30 RX ADMIN — Medication 3 MG: at 20:15

## 2024-03-30 RX ADMIN — SERTRALINE 50 MG: 50 TABLET, FILM COATED ORAL at 09:12

## 2024-03-30 RX ADMIN — DOCUSATE SODIUM 100 MG: 100 CAPSULE, LIQUID FILLED ORAL at 20:15

## 2024-03-30 RX ADMIN — POTASSIUM CHLORIDE 40 MEQ: 1500 TABLET, EXTENDED RELEASE ORAL at 09:26

## 2024-03-30 RX ADMIN — ROPINIROLE HYDROCHLORIDE 5 MG: 1 TABLET, FILM COATED ORAL at 20:15

## 2024-03-30 RX ADMIN — HEPARIN SODIUM 5000 UNITS: 5000 INJECTION, SOLUTION INTRAVENOUS; SUBCUTANEOUS at 20:15

## 2024-03-30 RX ADMIN — ASPIRIN 325 MG: 325 TABLET, COATED ORAL at 09:18

## 2024-03-30 RX ADMIN — HEPARIN SODIUM 5000 UNITS: 5000 INJECTION, SOLUTION INTRAVENOUS; SUBCUTANEOUS at 04:39

## 2024-03-30 RX ADMIN — HEPARIN SODIUM 5000 UNITS: 5000 INJECTION, SOLUTION INTRAVENOUS; SUBCUTANEOUS at 12:38

## 2024-03-30 RX ADMIN — CEFTRIAXONE SODIUM 1 G: 1 INJECTION, SOLUTION INTRAVENOUS at 08:54

## 2024-03-30 ASSESSMENT — ACTIVITIES OF DAILY LIVING (ADL)
ASSISTIVE_DEVICE: HEARING AID - RIGHT;HEARING AID - LEFT
JUDGMENT_ADEQUATE_SAFELY_COMPLETE_DAILY_ACTIVITIES: YES
GROOMING: NEEDS ASSISTANCE
ADEQUATE_TO_COMPLETE_ADL: YES
FEEDING YOURSELF: INDEPENDENT
BATHING_ASSISTANCE: MODERATE
LACK_OF_TRANSPORTATION: NO
HEARING - LEFT EAR: DIFFICULTY WITH NOISE
TOILETING: NEEDS ASSISTANCE
HEARING - RIGHT EAR: DIFFICULTY WITH NOISE
WALKS IN HOME: NEEDS ASSISTANCE
ADL_ASSISTANCE: INDEPENDENT
DRESSING YOURSELF: NEEDS ASSISTANCE
PATIENT'S MEMORY ADEQUATE TO SAFELY COMPLETE DAILY ACTIVITIES?: YES
BATHING: NEEDS ASSISTANCE

## 2024-03-30 ASSESSMENT — PAIN - FUNCTIONAL ASSESSMENT
PAIN_FUNCTIONAL_ASSESSMENT: 0-10

## 2024-03-30 ASSESSMENT — COGNITIVE AND FUNCTIONAL STATUS - GENERAL
HELP NEEDED FOR BATHING: A LOT
DAILY ACTIVITIY SCORE: 15
DRESSING REGULAR UPPER BODY CLOTHING: A LITTLE
EATING MEALS: A LITTLE
WALKING IN HOSPITAL ROOM: A LITTLE
DRESSING REGULAR UPPER BODY CLOTHING: A LITTLE
CLIMB 3 TO 5 STEPS WITH RAILING: A LITTLE
MOVING TO AND FROM BED TO CHAIR: A LITTLE
DRESSING REGULAR LOWER BODY CLOTHING: A LOT
DAILY ACTIVITIY SCORE: 18
MOBILITY SCORE: 18
STANDING UP FROM CHAIR USING ARMS: A LITTLE
EATING MEALS: A LITTLE
DRESSING REGULAR UPPER BODY CLOTHING: A LITTLE
CLIMB 3 TO 5 STEPS WITH RAILING: A LITTLE
TOILETING: A LOT
PATIENT BASELINE BEDBOUND: NO
PERSONAL GROOMING: A LITTLE
TURNING FROM BACK TO SIDE WHILE IN FLAT BAD: A LITTLE
HELP NEEDED FOR BATHING: A LOT
TURNING FROM BACK TO SIDE WHILE IN FLAT BAD: A LITTLE
HELP NEEDED FOR BATHING: A LITTLE
STANDING UP FROM CHAIR USING ARMS: A LITTLE
DRESSING REGULAR LOWER BODY CLOTHING: A LOT
TOILETING: A LITTLE
MOBILITY SCORE: 18
WALKING IN HOSPITAL ROOM: A LITTLE
PERSONAL GROOMING: A LITTLE
MOVING TO AND FROM BED TO CHAIR: A LITTLE
EATING MEALS: A LITTLE
STANDING UP FROM CHAIR USING ARMS: A LITTLE
TOILETING: A LOT
DRESSING REGULAR LOWER BODY CLOTHING: A LITTLE
MOVING FROM LYING ON BACK TO SITTING ON SIDE OF FLAT BED WITH BEDRAILS: A LITTLE
MOBILITY SCORE: 18
DAILY ACTIVITIY SCORE: 15
TURNING FROM BACK TO SIDE WHILE IN FLAT BAD: A LITTLE
MOVING TO AND FROM BED TO CHAIR: A LITTLE
WALKING IN HOSPITAL ROOM: A LITTLE
PERSONAL GROOMING: A LITTLE
MOVING FROM LYING ON BACK TO SITTING ON SIDE OF FLAT BED WITH BEDRAILS: A LITTLE
CLIMB 3 TO 5 STEPS WITH RAILING: A LITTLE
MOVING FROM LYING ON BACK TO SITTING ON SIDE OF FLAT BED WITH BEDRAILS: A LITTLE

## 2024-03-30 ASSESSMENT — PAIN SCALES - GENERAL
PAINLEVEL_OUTOF10: 0 - NO PAIN

## 2024-03-30 NOTE — CONSULTS
Consults  History Of Present Illness:    Kalie Ramos is a 89 y.o. female presenting with .     This patient is a 89-year-old white female with a past history including mild to moderate aortic valve stenosis, hypertension, complete left bundle branch block conduction delay, chronic lower extremity edema due to venous insufficiency, nontoxic multinodular goiter, breast lumpectomy for breast carcinoma, bilateral cataract extraction, carpal tunnel release, rotator cuff repair, sentinel lymph node biopsy at time of lumpectomy, and diaphragmatic hernia.  She has a longstanding history of hiatal hernia treated with proton pump inhibitor therapy and H2 blocker therapy with no symptoms of reflux.     The patient was admitted to [facility], [place] 9/5/2018 for laparoscopic paraesophageal hernia repair along with laparoscopic cholecystectomy for documented cholelithiasis.  As part of preoperative evaluation echocardiogram 8/31/2018 demonstrated preserved LV ejection fraction 60 to 65% with moderate concentric LVH mild to moderate left atrial lodgment mild to moderate aortic valvular stenosis estimated PA systolic pressure 35 mmHg.  The peak systolic pressure gradient across the aortic valve was 21 mmHg.  She had a pharmacological nuclear stress test on 8/20/2018 that showed a preserved LV ejection fraction no ischemia or scar there.  The patient's operative procedure was done on 9/5/2018 with some persistent postoperative hypoxia requiring overnight observation in the ICU after extubation.  Patient ultimately restarted on usual Lasix 40 mg daily.     Patient presented back to [facility] emergency room 9/17/2018 with shortness of breath.  CT of the chest abdomen and pelvis showed diffuse thyroid megaly consistent with multinodular goiter no pulmonary embolism.  There was moderate to large bilateral pleural effusions basilar atelectasis.  There is evidence of cholecystectomy and subcutaneous emphysema. Stool cultures  were positive for Giardia antigen.  Repeat echocardiogram again demonstrated mild to moderate LVH preserved LV ejection fraction 60 to 64% mild to moderate aortic valve stenosis trace aortic valve regurgitation mild tricuspid valve regurgitation mild pulmonary hypertension estimated PA systolic pressure in the range of 40 mmHg range patient was treated with IV Lasix.  She developed an extensive DVT left lower extremity by ultrasound 10/2/2018.  A repeat chest CT showed segmental subsegmental pulmonary emboli right lower lobe and lingula without evidence of right-sided cardiac strain with bilateral pleural effusions by basilar atelectasis and large substernal goiter.  She was started on anticoagulation with Coumadin.     Patient was admitted to [facility] 8/4/2019 with shortness of breath and palpitations.  CT angiogram of the chest showed no pulmonary embolism.  There were bilateral pulmonary infiltrates left lower lobe greatest particularly in the upper portion.  There was moderate left pleural effusion small right pleural effusion enlarged mediastinal nodes reactive adenopathy.  There was again marked for bilateral thyroid megaly multinodular goiter.  There was a suspected nonocclusive DVT left lower extremity.  Was thought to be possibly chronic or recurrent.  proBNP was 3049.  Troponins were negative.  EKG showed sinus rhythm left axis deviation poor R wave progression.  She was switched from Coumadin to Xarelto 10 mg daily.  There was some concern for ongoing aspiration.     Patient was admitted 3/19/2020 for concerns of aspiration with dysphagia gagging choking occasional regurgitation.  CT scan of the neck demonstrated inflammation of the upper third of the esophagus possible neoplasm.  She was seen by speech therapy.  EGD 3/25/2020 showed small hiatal hernia large ulcer in the proximal esophagus highly suggestive of pill induced esophagitis.     Patient admitted again 4/6/2020 with ongoing dysphagia and  inability to swallow almost any consistency.  She was dehydrated Lasix was placed on hold.  Modified barium swallow showed tracheal penetration of thin liquids and some laryngeal penetration with nectar thick liquids.  There was some discussion about PEG tube placement ultimately deferred.     Patient sustained a fall going to the bathroom 12/16/2020 with a sensation of dizziness vertigo.  Her evaluation in the hospital was essentially unremarkable head CT negative for intracranial process.  Repeat echocardiogram demonstrated moderate aortic valve stenosis preserved LV function.     She was admitted again 5/12/2021 with dizziness the patient has been taken off Xarelto for several days prior to that and for dental extraction.  Head CT was negative for any acute findings.  Carotid ultrasound showed mild bilateral plaque.  EKG showed sinus rhythm left bundle branch block.  MRI of brain negative for CVA.  MRI did show 70% stenosis of the P1 segment on the left.  Echocardiogram again showed moderate concentric LV hypertrophy normal LV ejection fraction 55% with moderate aortic valve stenosis.  There was 1+ mitral 2+ tricuspid valve regurgitation moderate pulmonary pretension.     Patient was brought back to the hospital 5/19/2021 with dizziness despite as needed meclizine.  MRI of the brain showed no significant change.  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  Patient was kept on low-dose Lasix for peripheral edema.  Repeat echocardiogram again showed mild to moderate concentric LVH moderate to severe aortic valve stenosis.     Patient again admitted 5/22/2022 with shortness of breath chest x-ray showing bilateral pleural effusions.  Ultrasound lower extremities showed chronic nonocclusive thrombus in the left lower extremity.  Echocardiogram 5 3/23/2022 showed  LV ejection fraction up to 50% range abnormal septal motion due to left bundle branch block conduction delay moderately severe aortic valve stenosis peak systolic pressure gradient 56 mmHg trivial aortic insufficiency 2+ tricuspid valve vegetation moderately severe pulmonary hypertension.  Patient clinically not thought to be candidate for transcatheter aortic valve replacement.  MRI brain showed tiny punctate lacunar infarct right corona radiata related to small vessel disease rather than embolic.  She was kept on lower dose of Xarelto 10 mg daily.  MRA of the neck was negative for high-grade carotid stenosis MRI of brain and again showed high-grade stenosis distal P1 segment of left posterior cerebral artery.     Patient was admitted to [facility] 7/5/2022 with dizziness and unsteadiness.  Work-up was again relatively similar to past evaluations.     She was readmitted to [facility] 11/19/2022 after mechanical fall laceration of her forehead requiring suture repair.  Echocardiogram showed LV ejection fraction had been reduced to 40% with moderately severe aortic valve stenosis trace aortic insufficiency with moderate tricuspid valve regurgitation severe pulm hypertension PA systolic pressure 70 mmHg.  Patient again not thought to be candidate for TAVR.     Patient admitted again [facility] 1/6/2023 with increasing shortness of breath.  Both portable chest x-ray showed multifocal pneumonia bilateral infiltrates.  Ultrasound lower extremity done again positive for chronic DVT left common femoral vein.  Patient was continued on usual low-dose aspirin Atorvastatin Toprol.  Chest CT showed extensive right upper lobe consolidation due to pneumonia possibly tumor.  Moderate bilateral pleural effusions.     Patient again admitted 7/25/2023 with shortness of breath.  Creatinine was 0.9 proBNP 16,000 943.  High-sensitivity troponins negative.  Clinically she was thought not to be volume overloaded despite  elevated proBNP.  Patient had another echocardiogram performed 7/25/2023 showing LV ejection fraction again 35-40% moderate left atrial enlargement mild to moderate right atrial enlargement severe aortic valve stenosis peak systolic pressure gradient 56 mmHg.  There was 1+ mitral valve regurgitation 2+ tricuspid valve regurgitation severe pulmonary hypertension estimated PA systolic pressure 67 mmHg.     Patient was admitted through [facility] emergency room on 10/23/2023 with confusion and increased weakness.  Evaluation in the emergency room included a comprehensive metabolic panel creatinine is 0.90.  Arterial blood gases pH 7.5 PCO2 44 PO2 93.  proBNP was 13,676.  CBC unremarkable except hemoglobin 11.3.  Portable chest x-ray showed patchy bilateral airspace opacities in both lungs particularly within the right midlung concerningly for pneumonia with small bibasilar effusions.  CT angiogram of the chest showed no pulmonary emboli by collateral confluent airspace disease all lung lobes right greater than left moderate bilateral pleural effusions enlarged bilateral main pulmonary arteries consistent pulmonary hypertension and cardiomegaly.  The patient again was thought not to be a candidate for transcatheter aortic valve replacement and remained on continuous nasal oxygen 3 L/min.  Multiple potential etiologies for her chronic lung disease were thought to be possible including aspiration pneumonitis interstitial fibrosis and a possible mild element of CHF.  The patient was placed on IV ceftriaxone and azithromycin empirically.  She was also continued on Lasix and the dose was increased to 40 mg twice daily empirically.  The patient had already received prior instructions to reduce her potential for aspiration while swallowing.     The patient was admitted through [facility] emergency room 11/12/2023 with increasing weakness lethargy and an apparent fall in the bathroom where she was found.  Initial evaluation in the  emergency room included a head CT showing redemonstration of age-related changes without acute intracranial process.  PA lateral chest x-ray showed cardiomegaly mild CHF on a background of COPD with improved infiltrate in the right upper lobe.  There is right paratracheal tissue prominence thought to be due to ectatic vessels.  X-ray of the right hip showed DJD but no fracture or subluxation.  She also had a CT angiogram of the chest which was negative for pulmonary embolization.  There was bilateral confluent airspace disease in all lung lobes right greater than left moderate bilateral pleural effusions and enlarged main pulmonary arteries consistent with pulmonary artery hypertension.  This was actually performed during her prior admission in 10/2023.  The initial labs included a CBC WBC of 15,700 hematocrit 46.5.  The comprehensive metabolic panel was relatively unremarkable creatinine 0.70 potassium of 5.0.  CK was 189 high-sensitivity troponin 35.  The second and third high-sensitivity troponins were 35 and 38 respectively.  Repeat CBC today notable for a significant decrease in hemoglobin 10.7 hematocrit 33.8.  A repeat confirmatory's CBC notable for hemoglobin 11.4 hematocrit 36.3 the patient's overall clinical status at that time was relatively stable and she was given antibiotics for possible urinary tract infection.  The patient remained very resistant to the strong recommendation for assisted living.  She was continued on usual therapy of Lasix 40 mg daily aspirin 81 mg daily atorvastatin 40 mg daily metoprolol succinate 25 mg twice daily.  Her telemetry monitor demonstrated atrial fibrillation with a controlled ventricular rate.  The systolic blood pressures were in the 120-150 mmHg range.     The patient was admitted to [facility] on 12/14/2023 with a mechanical fall resulting in a fracture of the right femoral neck.  Patient seen by orthopedics and is has been recommended to undergo right total  hip replacement scheduled 12/16.  Labs from today include a hemoglobin of 12.9 WBC 11,400.  Comprehensive metabolic panel is unremarkable with a creatinine of 0.90.  The patient did undergo a right total hip replacement on 12/16/2023 that was tolerated relatively uneventfully.  Postoperatively her O2 saturations were 100% on 3 L nasal cannula.  Her hemoglobin decreased slightly to 10.9 but did not require transfusion.  Her renal parameters were stable with a creatinine 0.8.  She was given intravenous iron for a serum iron of less than 20.  The patient was released to a rehab facility.     The patient was brought in by EMS from home on 1/28/2024 with a complaint of shortness of breath.  A family member had evidently come to check on her yesterday and she complained of being short of breath and unable to speak in full sentences due to dyspnea.  According to the patient's daughter the patient has been wearing her nasal oxygen at home continuously at 3 L/min.  In the past she was able to take the oxygen off for up to 2 hours without any documented O2 desaturation.  Recently however when the patient had taken her nasal oxygen for only 5 minutes her O2 saturations were decreased to the 85% range with minimal activity.  She claims to have been compliant with her medications.  She had a CT angiogram performed which demonstrated large bilateral pleural effusions with multifocal areas of consolidation in both lungs most pronounced in the left lower lobe and dependent portion of the left upper lobe and right lower lobe consistent with pneumonia/aspiration with patchy airspace disease in the right upper lobe.  She also had an enlarged heterogeneous thyroid 6.1 cm nodule in the thyroid isthmus with poorly defined margins.  She also had chronic changes of COPD and a pronounced thoracic kyphosis with multilevel age-related osteoporosis.  There was no evidence of pulmonary embolism.  The patient was started on IV azithromycin and  ceftriaxone in the emergency room.  The patient's labs performed in the emergency room included a CBC with a WBC of 12,200.  proBNP was 9761.  The high-sensitivity troponin was 27.  Comprehensive metabolic panel showed a normal creatinine of 0.7 glucose of 161.  2 sets of blood cultures were done still pending.  The nasal swab was negative for influenza AB and coronavirus.  The patient's pleural effusions were thought to be related to more severe aortic valvular stenosis along with severe pulmonary hypertension.  Patient also thought to be at high risk for aspiration.  Lasix was increased to 40 mg twice daily.  She had a right-sided thoracentesis performed on 1/31/2024 1.5 L removed.  A left-sided thoracentesis was deferred.  The patient had a modified barium swallow performed and was cleared for regular solids nectar thickened liquids with chin tuck with clear liquids.  Her EKG showed sinus rhythm with a first-degree AV block.  She ultimately was sent to a rehab facility on 2/3/2024    The patient was sent to [facility] emergency room on 3/29/2024 from skilled nursing facility due to generalized weakness and confusion.  The facility was concerned about the possibility of urinary tract infection.  The patient had evidently fallen out of her chair earlier in the day.  Initial evaluation included CBC that was unremarkable WBC 8700 hematocrit 42.0.  Comprehensive metabolic panel included creatinine 1.1 sodium 149 potassium 3.2.  proBNP 6796.  High-sensitivity troponin was 57.  Nasal swab was negative.  Head CT showed age-appropriate atrophy and chronic microvascular ischemic disease with an old lacunar infarct left corona radiata.  Portable chest x-ray showed stable cardiac enlargement small bilateral pleural effusions and mild pulmonary vascular congestion.  Patient's usual Lasix held due to the hypernatremia.  Last Recorded Vitals:  Vitals:    03/29/24 2338 03/30/24 0819 03/30/24 0900 03/30/24 1140   BP: (!) 133/43   99/72 110/78   BP Location: Left arm  Right arm Right arm   Patient Position: Lying  Lying Lying   Pulse: 81  67 68   Resp: 16  17 17   Temp: 36.7 °C (98.1 °F)   36.5 °C (97.7 °F)   TempSrc: Oral  Oral Oral   SpO2: 99% 99% 100% 98%   Weight:       Height:           Last Labs:  CBC - 3/30/2024:  4:22 AM  11.4 11.5 230    36.1      CMP - 3/30/2024:  4:22 AM  8.2 5.5 17 --- 0.6   _ 2.8 9 132      PTT - No results in last year.  _   _ _     Hemoglobin A1C   Date/Time Value Ref Range Status   05/23/2022 10:43 PM 5.7 4.0 - 6.0 % Final     Comment:     Hemoglobin A1C levels are related to mean blood glucose during the   preceding 2-3 months. The relationship table below may be used as a   general guide. Each 1% increase in HGB A1C is a reflection of an   increase in mean glucose of approximately 30 mg/dl.   Reference: Diabetes [employer], volume 29, supplement 1 Jan. 2006                        HGB A1C ................. Approx. Mean Glucose   _______________________________________________   6%   ...............................  120 mg/dl   7%   ...............................  150 mg/dl   8%   ...............................  180 mg/dl   9%   ...............................  210 mg/dl   10%  ...............................  240 mg/dl  Performed at [address] [place] [address]     12/17/2020 03:16 PM 5.7 4.0 - 6.0 % Final     Comment:     Hemoglobin A1C levels are related to mean blood glucose during the   preceding 2-3 months. The relationship table below may be used as a   general guide. Each 1% increase in HGB A1C is a reflection of an   increase in mean glucose of approximately 30 mg/dl.   Reference: Diabetes Care, volume 29, supplement 1 Jan. 2006                        HGB A1C ................. Approx. Mean Glucose   _______________________________________________   6%   ...............................  120 mg/dl   7%   ...............................  150 mg/dl   8%   ...............................  180  "mg/dl   9%   ...............................  210 mg/dl   10%  ...............................  240 mg/dl  Performed at [address] [address]       LDL Calculated   Date/Time Value Ref Range Status   01/27/2023 06:04 AM 66 65 - 130 MG/DL Final   11/20/2022 02:47 AM 54 (L) 65 - 130 MG/DL Final   07/06/2022 01:13 AM 54 (L) 65 - 130 MG/DL Final     VLDL   Date/Time Value Ref Range Status   08/03/2018 09:41 AM 25 0 - 40 mg/dL Final   01/18/2018 02:30 PM 47 (H) 0 - 40 mg/dL Final   10/17/2017 11:18 AM 50 (H) 0 - 40 mg/dL Final      Last I/O:  No intake/output data recorded.    Past Cardiology Tests (Last 3 Years):  EKG:  ECG 12 lead 03/29/2024 (Preliminary)      ECG 12 lead 01/29/2024      ECG 12 lead 12/15/2023      Electrocardiogram, 12-lead PRN ACS symptoms 12/15/2023      ECG 12 lead       ECG 12 lead 12/14/2023      ECG 12 lead 11/17/2023      ECG 12 Lead       ECG 12 Lead       ECG 12 lead 10/24/2023    Echo:  No results found for this or any previous visit from the past 1095 days.    Ejection Fractions:  No results found for: \"EF\"  Cath:  No results found for this or any previous visit from the past 1095 days.    Stress Test:  No results found for this or any previous visit from the past 1095 days.    Cardiac Imaging:  No results found for this or any previous visit from the past 1095 days.      Past Medical History:  She has a past medical history of Anemia, Arthritis, At risk for falling, Atrophic disorder of skin, unspecified (07/19/2013), Body mass index (BMI) 22.0-22.9, adult, Cervicalgia (05/08/2015), CHF (congestive heart failure) (CMS/[facility]), Closed right hip fracture (CMS/HCC), COPD (chronic obstructive pulmonary disease) (CMS/HCC), Depression, GERD (gastroesophageal reflux disease), High cholesterol, Hip fracture (CMS/HCC), Hyperlipidemia, Hypertension, Insomnia, Leg swelling, Malignant neoplasm of unspecified site of unspecified female breast (CMS/HCC) (07/19/2013), Neuropathy, " Osteoarthritis, Other conditions influencing health status (10/21/2013), Personal history of other infectious and parasitic diseases (03/17/2015), Personal history of other specified conditions (03/17/2015), Personal history of pneumonia (recurrent) (09/10/2015), Personal history of transient ischemic attack (TIA), and cerebral infarction without residual deficits (09/04/2014), Pneumonia, Pulmonary embolism (CMS/HCC), PVD (peripheral vascular disease) (CMS/HCC), Respiratory failure (CMS/[facility]), Syncope, and Weakness.    Past Surgical History:  She has a past surgical history that includes Breast biopsy (09/10/2015); Rotator cuff repair (09/10/2015); [place] lymph node biopsy (09/10/2015); Cataract extraction (11/02/2015); Other surgical history (11/02/2015); Hand tendon surgery (11/02/2015); Carpal tunnel release (09/04/2014); Breast lumpectomy (07/19/2013); Hysterectomy (07/19/2013); Shoulder surgery (07/19/2013); MR angio head wo IV contrast (12/17/2020); MR angio head wo IV contrast (5/13/2021); MR angio neck wo IV contrast (5/24/2022); and MR angio head wo IV contrast (5/24/2022).      Social History:  She reports that she has never smoked. She has never used smokeless tobacco. She reports that she does not drink alcohol and does not use drugs.    Family History:  Family History   Problem Relation Name Age of Onset    No Known Problems Mother      Alzheimer's disease Father      Breast cancer Sister      Pancreatic cancer Sister      Arthritis Other          Allergies:  Naproxen    Inpatient Medications:  Scheduled medications   Medication Dose Route Frequency    aspirin  325 mg oral Daily    atorvastatin  10 mg oral Nightly    calcitonin (salmon)  1 spray One Nostril Daily    cefTRIAXone  1 g intravenous q24h    docusate sodium  100 mg oral BID    [Held by provider] furosemide  40 mg oral BID with meals    heparin (porcine)  5,000 Units subcutaneous q8h    ipratropium-albuteroL  3 mL nebulization TID     lidocaine  1 patch transdermal Daily    losartan  25 mg oral Daily    magnesium oxide  400 mg oral Once per day on Mon Wed Fri    melatonin  3 mg oral Nightly    metoprolol succinate XL  25 mg oral Daily    oxygen  2 L/min inhalation q24h    pantoprazole  40 mg oral Daily    Or    pantoprazole  40 mg intravenous Daily    polyethylene glycol  17 g oral Daily    potassium chloride CR  40 mEq oral Daily    rOPINIRole  5 mg oral Nightly    sertraline  50 mg oral Daily     PRN medications   Medication    acetaminophen    Or    acetaminophen    Or    acetaminophen    benzocaine-menthol    dextromethorphan-guaifenesin    guaiFENesin    ipratropium-albuteroL    nitroglycerin    ondansetron ODT    Or    ondansetron    traZODone     Continuous Medications   Medication Dose Last Rate     Outpatient Medications:  Current Outpatient Medications   Medication Instructions    aspirin 325 mg, oral, 2 times daily    atorvastatin (LIPITOR) 10 mg, oral, Nightly    calcitonin, salmon, (Miacalcin) 200 unit/actuation nasal spray 1 spray, One Nostril, Daily    furosemide (LASIX) 40 mg, oral, 2 times daily with meals    ipratropium-albuteroL (Duo-Neb) 0.5-2.5 mg/3 mL nebulizer solution 3 mL, nebulization, 3 times daily RT    lidocaine 4 % patch 1 patch, transdermal, Daily, Remove & discard patch within 12 hours or as directed by MD.    losartan (COZAAR) 25 mg, oral, Daily    magnesium hydroxide (Milk of Magnesia) 400 mg/5 mL suspension 30 mL, oral, Daily PRN    magnesium oxide (Mag-Ox) 400 mg (241.3 mg magnesium) tablet 1 tablet, oral, 3 times weekly, Tues thurs sat    melatonin 3 mg, oral, Nightly    metoprolol succinate XL (TOPROL-XL) 25 mg, oral, Daily, Do not crush or chew.    nitroglycerin (NITROSTAT) 0.4 mg, sublingual, Every 5 min PRN    oxygen (O2) 2 L/min, inhalation, Every 24 hours    pantoprazole (PROTONIX) 40 mg, oral, Daily    rOPINIRole (REQUIP) 5 mg, oral, Nightly    sertraline (ZOLOFT) 50 mg, oral, Daily    traZODone  (DESYREL) 50 mg, oral, Nightly PRN       Physical Exam:  The patient is a upper elderly white female awake alert conversant not overtly dyspneic on nasal cannula visiting with daughter.  JVP not elevated carotid and pulses 2+  Moderately severe thoracic kyphosis shallow air movement breath sounds are absent one half of the posterior lung bases bilaterally  Cardiac rhythm irregular S1-S2 obscured grade 3/6 to 4/6 systolic ejection murmur  Abdomen is benign no paraspinal megaly no focal tenderness  No peripheral edema pedal pulses are present     Assessment/Plan   3/30: This patient is a 89-year-old white female with extensive issues that include coronary artery disease, severe aortic valvular stenosis, moderate tricuspid and mitral valve regurgitation, severe pulmonary hypertension, COPD with oxygen dependence, hypertension, left bundle branch block conduction delay, lower extremity edema due to venous insufficiency, chronic DVT left lower extremity femoral vein, laparoscopic paraesophageal hiatal hernia repair, ongoing aspiration pneumonias, nontoxic multinodular goiter.  The patient's echocardiogram on 7/25/2023 again demonstrated a LV ejection fraction 35-40% severe aortic valve stenosis peak systolic gradient 56 mmHg, mild mitral moderate tricuspid valve regurgitation and severe pulmonary hypertension estimated PA systolic pressure 67 mmHg.  The patient was thought not to be a candidate for transcatheter aortic valve replacement.  She does wear continuous oxygen at 3 L/min at home.    The patient was recently admitted for several days on 10/23/2023 with increased shortness of breath and ultimately was released on Lasix 40 mg twice daily which she has reduced to daily.  Patient admitted again on 11/12/2023 after having fallen in the bathroom and not being able to get up due to generalized weakness.  She lived independently in her own [facility] and had been very resistant to review our recommendation for assisted  living.  At that time it was decided to continue the patient on her usual therapy which  at that point included Lasix 40 mg once daily along with her low-dose aspirin plus atorvastatin 40 mg daily and metoprolol succinate 25 mg twice daily.    Patient was admitted after a mechanical fall with a right femoral neck fracture and subsequently underwent right total hip replacement on 12/16/2023.  The patient was discharged to rehab facility on usual preadmission low-dose losartan 25 mg daily Toprol-XL 25 mg daily Lasix 40 mg daily with no anticoagulation due to where multiple falls.  Her atrial fibrillation was rate controlled with the low-dose Toprol-XL.  She was readmitted on 1/28/2024 with progressive increased shortness of breath with CTA of the chest demonstrating large bilateral pleural effusions and possible multifocal pneumonia.  The patient has been started on empiric antibiotic therapy with IV ceftriaxone and azithromycin.  She had been thought to be a high risk candidate for aspiration pneumonia in the past.  The pleural effusions presumably were related to her progressively more severe aortic valvular stenosis along with severe pulmonary hypertension.  Pulmonology was consulted with respect to treatment of her possible pneumonia and to determine whether her respiratory status would benefit from thoracentesis by interventional radiology.  The patient ultimately did have a right-sided thoracentesis performed and her Lasix was increased to 40 mg twice daily.  She had a modified barium swallow performed and was cleared to have mechanical soft and nectar thick liquids with thin liquids under supervision.  She was sent to a skilled nursing facility on 2/3/2024 and is now readmitted because of some generalized weakness and slight change in mental status.  The patient is currently back to usual mental status alert oriented and conversant no respiratory distress on her O2 nasal cannula.  The initial chest x-ray on  current admission showed small bilateral pleural effusions and only mild pulmonary vascular congestion with resolution of pulmonary edema that have been present during last admission.  She had a small amount of aspirated barium within the left lower lung zone again consistent with her risk at aspiration.  Her head CT did not demonstrate a new stroke and her hypernatremia is slowly improving with temporary moratorium of Lasix therapy.  In this regard her creatinine is actually decreased from 1.3-0.8 and the sodium is decreased from 151-148.  Will continue to hold Lasix for now.  Potassium supplementation is still being continued due to a hypokalemia potassium only 2.9 today.      Code Status:  DNR and No Intubation and No ICU    I spent  minutes in the professional and overall care of this patient.        Jim Mckinney MD

## 2024-03-30 NOTE — H&P
History Of Present Illness  Kalie Ramos is a 89 y.o. female presenting with generalized weakness.  Patient presented to the emergency room from [facility] where she resides for evaluation of generalized malaise confusion and fall out of her chair.  Patient reportedly slipped out of her chair earlier in the day.  She is having some weakness malaise and confusion for past 2 days.  No injury.  No signs of infection.  No chest pain or shortness of breath     Past Medical History  Past Medical History:   Diagnosis Date    Anemia     Arthritis     At risk for falling     Atrophic disorder of skin, unspecified 07/19/2013    Atrophoderma    Body mass index (BMI) 22.0-22.9, adult     BMI 22.0-22.9, adult    Cervicalgia 05/08/2015    Neck pain    CHF (congestive heart failure) (CMS/[facility])     Closed right hip fracture (CMS/[facility])     COPD (chronic obstructive pulmonary disease) (CMS/HCC)     Depression     GERD (gastroesophageal reflux disease)     High cholesterol     Hip fracture (CMS/HCC)     Hyperlipidemia     Hypertension     Insomnia     Leg swelling     Malignant neoplasm of unspecified site of unspecified female breast (CMS/[facility]) 07/19/2013    Adenocarcinoma of breast    Neuropathy     Osteoarthritis     Other conditions influencing health status 10/21/2013    Urinary Tract Infection    Personal history of other infectious and parasitic diseases 03/17/2015    History of candidiasis of mouth    Personal history of other specified conditions 03/17/2015    History of abdominal pain    Personal history of pneumonia (recurrent) 09/10/2015    History of pneumonia    Personal history of transient ischemic attack (TIA), and cerebral infarction without residual deficits 09/04/2014    History of stroke    Pneumonia     Pulmonary embolism (CMS/HCC)     PVD (peripheral vascular disease) (CMS/HCC)     Respiratory failure (CMS/HCC)     Syncope     Weakness        Surgical History  Past Surgical History:   Procedure  Laterality Date    BREAST BIOPSY  09/10/2015    Biopsy Breast Open    BREAST LUMPECTOMY  07/19/2013    Breast Surgery Lumpectomy    CARPAL TUNNEL RELEASE  09/04/2014    Neuroplasty Decompression Median Nerve At Carpal Tunnel    CATARACT EXTRACTION  11/02/2015    Cataract Surgery    HAND TENDON SURGERY  11/02/2015    Hand Incision Tendon Sheath Of A Finger    HYSTERECTOMY  07/19/2013    Hysterectomy    MR HEAD ANGIO WO IV CONTRAST  12/17/2020    MR HEAD ANGIO WO IV CONTRAST [facility]    MR HEAD ANGIO WO IV CONTRAST  5/13/2021    MR HEAD ANGIO WO IV CONTRAST [facility]    MR HEAD ANGIO WO IV CONTRAST  5/24/2022    MR HEAD ANGIO WO IV CONTRAST [facility]    MR NECK ANGIO WO IV CONTRAST  5/24/2022    MR NECK ANGIO WO IV CONTRAST [facility]    OTHER SURGICAL HISTORY  11/02/2015    Wrist Carpectomy    ROTATOR CUFF REPAIR  09/10/2015    Rotator Cuff Repair    SENTINEL LYMPH NODE BIOPSY  09/10/2015    [place] Lymph Node Biopsy    SHOULDER SURGERY  07/19/2013    Shoulder Surgery        Social History  She reports that she has never smoked. She has never used smokeless tobacco. She reports that she does not drink alcohol and does not use drugs.    Family History  Family History   Problem Relation Name Age of Onset    No Known Problems Mother      Alzheimer's disease Father      Breast cancer Sister      Pancreatic cancer Sister      Arthritis Other          Allergies  Naproxen    Review of Systems   Constitutional:  Positive for fatigue. Negative for activity change, appetite change, chills, diaphoresis, fever and unexpected weight change.   HENT:  Negative for congestion, dental problem, drooling, ear discharge, ear pain, facial swelling, hearing loss, mouth sores, nosebleeds, postnasal drip, rhinorrhea, sinus pressure, sinus pain, sneezing, sore throat, tinnitus, trouble swallowing and voice change.    Eyes:  Negative for photophobia, pain, discharge, redness, itching and visual  disturbance.   Respiratory:  Positive for shortness of breath. Negative for apnea, cough, choking, chest tightness, wheezing and stridor.    Cardiovascular:  Negative for chest pain, palpitations and leg swelling.   Gastrointestinal:  Negative for abdominal distention, abdominal pain, anal bleeding, blood in stool, constipation, diarrhea, nausea, rectal pain and vomiting.   Endocrine: Negative for cold intolerance, heat intolerance, polydipsia, polyphagia and polyuria.   Genitourinary:  Negative for decreased urine volume, difficulty urinating, dysuria, enuresis, flank pain, frequency, genital sores, hematuria and urgency.   Musculoskeletal:  Negative for arthralgias, back pain, gait problem, joint swelling, myalgias, neck pain and neck stiffness.   Skin:  Negative for color change, pallor, rash and wound.   Allergic/Immunologic: Negative for environmental allergies, food allergies and immunocompromised state.   Neurological:  Positive for weakness. Negative for dizziness, tremors, seizures, syncope, facial asymmetry, speech difficulty, light-headedness, numbness and headaches.   Hematological:  Negative for adenopathy. Does not bruise/bleed easily.   Psychiatric/Behavioral:  Negative for agitation, behavioral problems, confusion, decreased concentration, dysphoric mood, hallucinations, self-injury, sleep disturbance and suicidal ideas. The patient is not nervous/anxious and is not hyperactive.         Physical Exam  Vitals reviewed.   Constitutional:       Appearance: Normal appearance.   HENT:      Head: Normocephalic and atraumatic.      Right Ear: Tympanic membrane, ear canal and external ear normal.      Left Ear: Tympanic membrane, ear canal and external ear normal.      Nose: Nose normal.      Mouth/Throat:      Pharynx: Oropharynx is clear.   Eyes:      Extraocular Movements: Extraocular movements intact.      Conjunctiva/sclera: Conjunctivae normal.      Pupils: Pupils are equal, round, and reactive to  "light.   Cardiovascular:      Rate and Rhythm: Normal rate and regular rhythm.      Pulses: Normal pulses.      Heart sounds: Normal heart sounds.   Pulmonary:      Effort: Pulmonary effort is normal.      Breath sounds: Normal breath sounds.   Abdominal:      General: Abdomen is flat. Bowel sounds are normal.      Palpations: Abdomen is soft.   Musculoskeletal:      Cervical back: Normal range of motion and neck supple.   Skin:     General: Skin is warm and dry.      Findings: Bruising present.   Neurological:      General: No focal deficit present.      Mental Status: She is alert and oriented to person, place, and time.   Psychiatric:         Mood and Affect: Mood normal.          Last Recorded Vitals  Blood pressure 150/54, pulse 59, temperature 36.5 °C (97.7 °F), temperature source Oral, resp. rate 17, height 1.575 m (5' 2\"), weight (!) 43.1 kg (95 lb), SpO2 100 %.    Relevant Results        Scheduled medications  aspirin, 325 mg, oral, Daily  atorvastatin, 10 mg, oral, Nightly  calcitonin (salmon), 1 spray, One Nostril, Daily  docusate sodium, 100 mg, oral, BID  furosemide, 40 mg, oral, BID with meals  heparin (porcine), 5,000 Units, subcutaneous, q8h  ipratropium-albuteroL, 3 mL, nebulization, TID  lidocaine, 1 patch, transdermal, Daily  losartan, 25 mg, oral, Daily  magnesium oxide, 400 mg, oral, Once per day on Mon Wed Fri  melatonin, 3 mg, oral, Nightly  metoprolol succinate XL, 25 mg, oral, Daily  oxygen, 2 L/min, inhalation, q24h  [START ON 3/30/2024] pantoprazole, 40 mg, oral, Daily   Or  [START ON 3/30/2024] pantoprazole, 40 mg, intravenous, Daily  [START ON 3/30/2024] polyethylene glycol, 17 g, oral, Daily  rOPINIRole, 5 mg, oral, Nightly  sertraline, 50 mg, oral, Daily      Continuous medications     PRN medications  PRN medications: acetaminophen **OR** acetaminophen **OR** acetaminophen, benzocaine-menthol, dextromethorphan-guaifenesin, guaiFENesin, ipratropium-albuteroL, nitroglycerin, ondansetron " ODT **OR** ondansetron, traZODone  Results for orders placed or performed during the hospital encounter of 03/29/24 (from the past 24 hour(s))   CBC with Differential   Result Value Ref Range    WBC 8.7 4.4 - 11.3 x10*3/uL    nRBC 0.0 0.0 - 0.0 /100 WBCs    RBC 4.53 4.00 - 5.20 x10*6/uL    Hemoglobin 13.1 12.0 - 16.0 g/dL    Hematocrit 42.0 36.0 - 46.0 %    MCV 93 80 - 100 fL    MCH 28.9 26.0 - 34.0 pg    MCHC 31.2 (L) 32.0 - 36.0 g/dL    RDW 15.9 (H) 11.5 - 14.5 %    Platelets 257 150 - 450 x10*3/uL    Neutrophils % 72.6 40.0 - 80.0 %    Immature Granulocytes %, Automated 0.3 0.0 - 0.9 %    Lymphocytes % 14.1 13.0 - 44.0 %    Monocytes % 7.2 2.0 - 10.0 %    Eosinophils % 5.3 0.0 - 6.0 %    Basophils % 0.5 0.0 - 2.0 %    Neutrophils Absolute 6.28 (H) 1.60 - 5.50 x10*3/uL    Immature Granulocytes Absolute, Automated 0.03 0.00 - 0.50 x10*3/uL    Lymphocytes Absolute 1.22 0.80 - 3.00 x10*3/uL    Monocytes Absolute 0.62 0.05 - 0.80 x10*3/uL    Eosinophils Absolute 0.46 (H) 0.00 - 0.40 x10*3/uL    Basophils Absolute 0.04 0.00 - 0.10 x10*3/uL   Comprehensive Metabolic Panel   Result Value Ref Range    Glucose 84 65 - 99 mg/dL    Sodium 149 (H) 133 - 145 mmol/L    Potassium 3.2 (L) 3.4 - 5.1 mmol/L    Chloride 104 97 - 107 mmol/L    Bicarbonate 35 (H) 24 - 31 mmol/L    Urea Nitrogen 22 8 - 25 mg/dL    Creatinine 1.10 0.40 - 1.60 mg/dL    eGFR 47 (L) >60 mL/min/1.73m*2    Calcium 9.0 8.5 - 10.4 mg/dL    Albumin 3.3 (L) 3.5 - 5.0 g/dL    Alkaline Phosphatase 160 (H) 35 - 125 U/L    Total Protein 6.7 5.9 - 7.9 g/dL    AST 22 5 - 40 U/L    Bilirubin, Total 0.8 0.1 - 1.2 mg/dL    ALT 11 5 - 40 U/L    Anion Gap 10 <=19 mmol/L   NT Pro-BNP   Result Value Ref Range    PROBNP 6,796 (H) 0 - 624 pg/mL   Serial Troponin, Initial ([facility])   Result Value Ref Range    Troponin T, High Sensitivity 57 (HH) <=14 ng/L   BLOOD GAS LACTIC ACID, VENOUS   Result Value Ref Range    POCT Lactate, Venous 1.5 0.4 - 2.0 mmol/L   ECG 12 lead   Result  Value Ref Range    Ventricular Rate 61 BPM    Atrial Rate 67 BPM    KS Interval 198 ms    QRS Duration 138 ms    QT Interval 558 ms    QTC Calculation(Bazett) 561 ms    P Axis 71 degrees    R Axis 39 degrees    T Axis 235 degrees    QRS Count 10 beats    Q Onset 209 ms    P Onset 110 ms    P Offset 145 ms    T Offset 488 ms    QTC Fredericia 560 ms   Urinalysis with Reflex Microscopic   Result Value Ref Range    Color, Urine Light-Yellow Light-Yellow, Yellow, Dark-Yellow    Appearance, Urine Turbid (N) Clear    Specific Gravity, Urine 1.013 1.005 - 1.035    pH, Urine 7.5 5.0, 5.5, 6.0, 6.5, 7.0, 7.5, 8.0    Protein, Urine NEGATIVE NEGATIVE, 10 (TRACE), 20 (TRACE) mg/dL    Glucose, Urine Normal Normal mg/dL    Blood, Urine NEGATIVE NEGATIVE    Ketones, Urine NEGATIVE NEGATIVE mg/dL    Bilirubin, Urine NEGATIVE NEGATIVE    Urobilinogen, Urine Normal Normal mg/dL    Nitrite, Urine 2+ (A) NEGATIVE    Leukocyte Esterase, Urine 500 Anurag/µL (A) NEGATIVE   Microscopic Only, Urine   Result Value Ref Range    WBC, Urine >50 (A) 1-5, NONE /HPF    WBC Clumps, Urine FEW Reference range not established. /HPF    RBC, Urine 3-5 NONE, 1-2, 3-5 /HPF    Bacteria, Urine 1+ (A) NONE SEEN /HPF    Mucus, Urine FEW Reference range not established. /LPF   Sars-CoV-2 and Influenza A/B PCR   Result Value Ref Range    Flu A Result Not Detected Not Detected    Flu B Result Not Detected Not Detected    Coronavirus 2019, PCR Not Detected Not Detected   Serial Troponin, Initial ([facility])   Result Value Ref Range    Troponin T, High Sensitivity 51 (HH) <=14 ng/L   Serial Troponin, 2 Hour ([facility])   Result Value Ref Range    Troponin T, High Sensitivity 56 (HH) <=14 ng/L     ECG 12 lead    Result Date: 3/29/2024  Sinus rhythm with Premature atrial complexes Left bundle branch block Abnormal ECG When compared with ECG of 29-JAN-2024 02:54, Premature ventricular complexes are no longer Present Premature atrial complexes are now Present Vent. rate has  decreased BY  35 BPM QRS duration has increased ST now depressed in Inferior leads T wave inversion now evident in Inferior leads    CT head wo IV contrast    Result Date: 3/29/2024  Interpreted By:  Nelli Brown, STUDY: CT HEAD WO IV CONTRAST;  3/29/2024 11:50 am   INDICATION: Signs/Symptoms:altered mental status and generalized weakness   COMPARISON: 12/14/2023   ACCESSION NUMBER(S): [id number]   ORDERING CLINICIAN: MALACHI SMITH   TECHNIQUE: Noncontrast axial CT scan of head was performed with sagittal and coronal reconstructions completed by the technologist at the acquisition scanner.   All CT examinations are performed with 1 or more of the following dose reduction techniques: Automated exposure control, adjustment of mA and/or kv according to patient's size, or use of iterative reconstruction techniques.   FINDINGS: CSF Spaces: There is enlargement of the ventricles, sulci, and the sylvian fissures.   Parenchyma: Diminished density is observed within the periventricular white matter of each cerebral hemisphere indicating chronic microvascular ischemic disease. The grey-white differentiation is intact. There is no mass effect or midline shift.  There is no intracranial hemorrhage. Partial empty sella is incidentally noted. There is an old lacunar infarct within the left corona radiata.   Calvarium: The calvarium is unremarkable.   Paranasal sinuses and mastoids: Visualized paranasal sinuses and mastoids are clear.       Age-appropriate atrophy and chronic microvascular ischemic disease without acute intracranial process.   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   No change since prior study.   MACRO: None     Signed by: Nelli Brown 3/29/2024 12:12 PM Dictation workstation:   [id number]    XR chest 1 view    Result Date: 3/29/2024  Interpreted By:  Nelli Brown, STUDY: XR CHEST 1 VIEW 3/29/2024 11:51 am   INDICATION: Generalized weakness   COMPARISON: 02/01/2024   ACCESSION NUMBER(S): [id number]   ORDERING  CLINICIAN: MALACHI SMITH   TECHNIQUE: AP erect view of the chest   FINDINGS: There is stable cardiac enlargement. There is a small amount of barium aspirated into the left lower lung zone, with mild clearing of the aspirated barium since 02/01/2024.   When compared with prior study, the pulmonary edema has resolved. There is mild pulmonary vascular congestion noted with small bilateral pleural effusion seen.       Stable cardiac enlargement with small bilateral pleural effusions and mild pulmonary vascular congestion. Pulmonary edema seen on prior study has resolved.   Small amount of aspirated barium within the left lower lung zone.   Signed by: Nelli Brown 3/29/2024 12:06 PM Dictation workstation:   [id number]        Assessment/Plan   Principal Problem:    Generalized weakness  Active Problems:    Chest pain    Hyperlipidemia    Hypertension    UTI (urinary tract infection)    Chronic renal failure syndrome    Hypokalemia    Peripheral venous insufficiency    Aortic valve stenosis    Chronic obstructive pulmonary disease (CMS/HCC)    Gastroesophageal reflux disease    Hypernatremia    Replace potassium  Will hold diuretics Because of hypernatremia and resume after 2 days  Antibiotics for UTI  Consult cardiology to adjust her diuretics  Home medications  PT OT  Orders for details       I spent  minutes in the professional and overall care of this patient.      Nakia Bailey MD

## 2024-03-30 NOTE — PROGRESS NOTES
"Kalie Ramos is a 89 y.o. female on day 1 of admission presenting with Generalized weakness.    Subjective   Patient is feeling better.  No signs of confusion patient is at her baseline.  No respiratory distress       Objective     Physical Exam  Vitals reviewed.   Constitutional:       Appearance: Normal appearance.   HENT:      Head: Normocephalic and atraumatic.      Right Ear: Tympanic membrane, ear canal and external ear normal.      Left Ear: Tympanic membrane, ear canal and external ear normal.      Nose: Nose normal.      Mouth/Throat:      Pharynx: Oropharynx is clear.   Eyes:      Extraocular Movements: Extraocular movements intact.      Conjunctiva/sclera: Conjunctivae normal.      Pupils: Pupils are equal, round, and reactive to light.   Cardiovascular:      Rate and Rhythm: Normal rate and regular rhythm.      Pulses: Normal pulses.      Heart sounds: Normal heart sounds.   Pulmonary:      Effort: Pulmonary effort is normal.      Breath sounds: Normal breath sounds.   Abdominal:      General: Abdomen is flat. Bowel sounds are normal.      Palpations: Abdomen is soft.   Musculoskeletal:      Cervical back: Normal range of motion and neck supple.   Skin:     General: Skin is warm and dry.   Neurological:      General: No focal deficit present.      Mental Status: She is alert and oriented to person, place, and time.   Psychiatric:         Mood and Affect: Mood normal.         Last Recorded Vitals  Blood pressure (!) 141/40, pulse 72, temperature 36.6 °C (97.9 °F), temperature source Oral, resp. rate 18, height 1.575 m (5' 2\"), weight (!) 43.1 kg (95 lb), SpO2 97 %.  Intake/Output last 3 Shifts:  No intake/output data recorded.    Relevant Results              Scheduled medications  aspirin, 325 mg, oral, Daily  atorvastatin, 10 mg, oral, Nightly  calcitonin (salmon), 1 spray, One Nostril, Daily  cefTRIAXone, 1 g, intravenous, q24h  docusate sodium, 100 mg, oral, BID  [Held by provider] furosemide, " 40 mg, oral, BID with meals  heparin (porcine), 5,000 Units, subcutaneous, q8h  ipratropium-albuteroL, 3 mL, nebulization, TID  lidocaine, 1 patch, transdermal, Daily  losartan, 25 mg, oral, Daily  magnesium oxide, 400 mg, oral, Once per day on Mon Wed Fri  melatonin, 3 mg, oral, Nightly  metoprolol succinate XL, 25 mg, oral, Daily  oxygen, 2 L/min, inhalation, q24h  pantoprazole, 40 mg, oral, Daily   Or  pantoprazole, 40 mg, intravenous, Daily  polyethylene glycol, 17 g, oral, Daily  potassium chloride CR, 40 mEq, oral, Daily  rOPINIRole, 5 mg, oral, Nightly  sertraline, 50 mg, oral, Daily      Continuous medications     PRN medications  PRN medications: acetaminophen **OR** acetaminophen **OR** acetaminophen, benzocaine-menthol, dextromethorphan-guaifenesin, guaiFENesin, ipratropium-albuteroL, nitroglycerin, ondansetron ODT **OR** ondansetron, traZODone  Results for orders placed or performed during the hospital encounter of 03/29/24 (from the past 24 hour(s))   Serial Troponin, 6 Hour ([facility])   Result Value Ref Range    Troponin T, High Sensitivity 54 (HH) <=14 ng/L   CBC   Result Value Ref Range    WBC 11.4 (H) 4.4 - 11.3 x10*3/uL    nRBC 0.0 0.0 - 0.0 /100 WBCs    RBC 3.95 (L) 4.00 - 5.20 x10*6/uL    Hemoglobin 11.5 (L) 12.0 - 16.0 g/dL    Hematocrit 36.1 36.0 - 46.0 %    MCV 91 80 - 100 fL    MCH 29.1 26.0 - 34.0 pg    MCHC 31.9 (L) 32.0 - 36.0 g/dL    RDW 15.7 (H) 11.5 - 14.5 %    Platelets 230 150 - 450 x10*3/uL   Comprehensive metabolic panel   Result Value Ref Range    Glucose 89 65 - 99 mg/dL    Sodium 148 (H) 133 - 145 mmol/L    Potassium 2.9 (LL) 3.4 - 5.1 mmol/L    Chloride 104 97 - 107 mmol/L    Bicarbonate 34 (H) 24 - 31 mmol/L    Urea Nitrogen 16 8 - 25 mg/dL    Creatinine 0.80 0.40 - 1.60 mg/dL    eGFR 68 >60 mL/min/1.73m*2    Calcium 8.2 (L) 8.5 - 10.4 mg/dL    Albumin 2.8 (L) 3.5 - 5.0 g/dL    Alkaline Phosphatase 132 (H) 35 - 125 U/L    Total Protein 5.5 (L) 5.9 - 7.9 g/dL    AST 17 5 - 40 U/L     Bilirubin, Total 0.6 0.1 - 1.2 mg/dL    ALT 9 5 - 40 U/L    Anion Gap 10 <=19 mmol/L   Basic Metabolic Panel   Result Value Ref Range    Glucose 132 (H) 65 - 99 mg/dL    Sodium 147 (H) 133 - 145 mmol/L    Potassium 3.4 3.4 - 5.1 mmol/L    Chloride 103 97 - 107 mmol/L    Bicarbonate 35 (H) 24 - 31 mmol/L    Urea Nitrogen 15 8 - 25 mg/dL    Creatinine 0.80 0.40 - 1.60 mg/dL    eGFR 68 >60 mL/min/1.73m*2    Calcium 8.3 (L) 8.5 - 10.4 mg/dL    Anion Gap 9 <=19 mmol/L   CBC and Auto Differential   Result Value Ref Range    WBC 8.9 4.4 - 11.3 x10*3/uL    nRBC 0.0 0.0 - 0.0 /100 WBCs    RBC 4.15 4.00 - 5.20 x10*6/uL    Hemoglobin 12.1 12.0 - 16.0 g/dL    Hematocrit 38.5 36.0 - 46.0 %    MCV 93 80 - 100 fL    MCH 29.2 26.0 - 34.0 pg    MCHC 31.4 (L) 32.0 - 36.0 g/dL    RDW 16.0 (H) 11.5 - 14.5 %    Platelets 226 150 - 450 x10*3/uL    Neutrophils % 79.5 40.0 - 80.0 %    Immature Granulocytes %, Automated 0.2 0.0 - 0.9 %    Lymphocytes % 11.6 13.0 - 44.0 %    Monocytes % 6.4 2.0 - 10.0 %    Eosinophils % 1.8 0.0 - 6.0 %    Basophils % 0.5 0.0 - 2.0 %    Neutrophils Absolute 7.05 (H) 1.60 - 5.50 x10*3/uL    Immature Granulocytes Absolute, Automated 0.02 0.00 - 0.50 x10*3/uL    Lymphocytes Absolute 1.03 0.80 - 3.00 x10*3/uL    Monocytes Absolute 0.57 0.05 - 0.80 x10*3/uL    Eosinophils Absolute 0.16 0.00 - 0.40 x10*3/uL    Basophils Absolute 0.04 0.00 - 0.10 x10*3/uL     ECG 12 lead    Result Date: 3/29/2024  Sinus rhythm with Premature atrial complexes Left bundle branch block Abnormal ECG When compared with ECG of 29-JAN-2024 02:54, Premature ventricular complexes are no longer Present Premature atrial complexes are now Present Vent. rate has decreased BY  35 BPM QRS duration has increased ST now depressed in Inferior leads T wave inversion now evident in Inferior leads    CT head wo IV contrast    Result Date: 3/29/2024  Interpreted By:  Nelli Brown, STUDY: CT HEAD WO IV CONTRAST;  3/29/2024 11:50 am   INDICATION:  Signs/Symptoms:altered mental status and generalized weakness   COMPARISON: 12/14/2023   ACCESSION NUMBER(S): [id number]   ORDERING CLINICIAN: MALACHI SMITH   TECHNIQUE: Noncontrast axial CT scan of head was performed with sagittal and coronal reconstructions completed by the technologist at the acquisition scanner.   All CT examinations are performed with 1 or more of the following dose reduction techniques: Automated exposure control, adjustment of mA and/or kv according to patient's size, or use of iterative reconstruction techniques.   FINDINGS: CSF Spaces: There is enlargement of the ventricles, sulci, and the sylvian fissures.   Parenchyma: Diminished density is observed within the periventricular white matter of each cerebral hemisphere indicating chronic microvascular ischemic disease. The grey-white differentiation is intact. There is no mass effect or midline shift.  There is no intracranial hemorrhage. Partial empty sella is incidentally noted. There is an old lacunar infarct within the left corona radiata.   Calvarium: The calvarium is unremarkable.   Paranasal sinuses and mastoids: Visualized paranasal sinuses and mastoids are clear.       Age-appropriate atrophy and chronic microvascular ischemic disease without acute intracranial process.   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   No change since prior study.   MACRO: None     Signed by: Nelli Brown 3/29/2024 12:12 PM Dictation workstation:   [id number]    XR chest 1 view    Result Date: 3/29/2024  Interpreted By:  Nelli Brown, STUDY: XR CHEST 1 VIEW 3/29/2024 11:51 am   INDICATION: Generalized weakness   COMPARISON: 02/01/2024   ACCESSION NUMBER(S): [id number]   ORDERING CLINICIAN: MALACHI SMITH   TECHNIQUE: AP erect view of the chest   FINDINGS: There is stable cardiac enlargement. There is a small amount of barium aspirated into the left lower lung zone, with mild clearing of the aspirated barium since 02/01/2024.   When compared with prior  study, the pulmonary edema has resolved. There is mild pulmonary vascular congestion noted with small bilateral pleural effusion seen.       Stable cardiac enlargement with small bilateral pleural effusions and mild pulmonary vascular congestion. Pulmonary edema seen on prior study has resolved.   Small amount of aspirated barium within the left lower lung zone.   Signed by: Nelli Brown 3/29/2024 12:06 PM Dictation workstation:   [id number]             Malnutrition Diagnosis Status: New  Malnutrition Diagnosis: Severe malnutrition related to chronic disease or condition  As Evidenced by: probable wt loss, mod to severe muscle and fat depletion  I agree with the dietitian's malnutrition diagnosis.      Assessment/Plan   Principal Problem:    Generalized weakness  Active Problems:    Chest pain    Hyperlipidemia    Hypertension    UTI (urinary tract infection)    Chronic renal failure syndrome    Hypokalemia    Peripheral venous insufficiency    Aortic valve stenosis    Chronic obstructive pulmonary disease (CMS/HCC)    Gastroesophageal reflux disease    Hypernatremia    Hyponatremia is improving by holding the Lasix  Potassium supplement is helping the hypokalemia.  CHF compensated so far  Monitor electrolyte  Urine culture still pending  Will send UA again  Redness in the legs is doing better  Solu-Medrol  Start PT OT   for discharge planning       I spent  minutes in the professional and overall care of this patient.      Nakia Bailey MD

## 2024-03-30 NOTE — CARE PLAN
Problem: Respiratory  Goal: No signs of respiratory distress (eg. Use of accessory muscles. Peds grunting)  3/30/2024 0821 by Courtney Moreland, RRT  Outcome: Progressing  3/29/2024 1919 by Courtney Moreland, RRT  Outcome: Progressing  Goal: Patent airway maintained this shift  3/30/2024 0821 by Courtney Moreland, RRT  Outcome: Progressing  3/29/2024 1919 by Courtney Moreland, RRT  Outcome: Progressing

## 2024-03-30 NOTE — PROGRESS NOTES
Occupational Therapy    Occupational Therapy Treatment    Name: Kalie Ramos  MRN: [id number]  : 1929  Date: 24  Time Calculation  Start Time: 1330  Stop Time: 1348  Time Calculation (min): 18 min    Assessment:  OT Assessment: 89 y/o female here with generalized weakness, falls, malaise.  On eval she requires increased assist for xfers, mobility and self care d/t decreased strength, balance, activity tolerance.  Skilled OT services are required to maximize safety/IND prior to DC  Prognosis: Good  Barriers to Discharge: None  Evaluation/Treatment Tolerance: Patient tolerated treatment well  Medical Staff Made Aware: Yes  End of Session Communication: Bedside nurse  End of Session Patient Position: Up in chair, Alarm on  Plan:  Treatment Interventions: ADL retraining, Functional transfer training, UE strengthening/ROM, Endurance training, Patient/family training, Equipment evaluation/education, Neuromuscular reeducation, Compensatory technique education  OT Frequency: 2 times per week  OT Discharge Recommendations: Low intensity level of continued care, 24 hr supervision due to cognition  Equipment Recommended upon Discharge: Wheeled walker  OT Recommended Transfer Status: Assist of 1  OT - OK to Discharge: Yes    Subjective   Previous Visit Info:  OT Last Visit  OT Received On: 24  General:  General  Reason for Referral: Decreased ADLs  Referred By: Dr. Bailey  Past Medical History Relevant to Rehab: anemia, neck pain, CHF, COPD, depression, hip fx, neuropathy  Family/Caregiver Present: No  Prior to Session Communication: Bedside nurse  Patient Position Received: Bed, 3 rail up, Alarm on  Preferred Learning Style: verbal  General Comment: Cleared by nsg, pt met in supine, agreeable to OT session.    Precautions:  Medical Precautions: Fall precautions, Oxygen therapy device and L/min (on 2L O2  via NC)    Pain Assessment:  Pain Assessment  Pain Assessment: 0-10     Objective   Bed  Mobility/Transfers:   Bed Mobility  Bed Mobility: Yes  Bed Mobility 1  Bed Mobility 1: Supine to sitting  Level of Assistance 1: Close supervision  Bed Mobility Comments 1: HOB elevated, increased time/effort    Transfers  Transfer: Yes  Transfer 1  Transfer From 1: Bed to  Transfer to 1: Stand  Technique 1: Stand to sit, Sit to stand  Transfer Device 1: Walker  Transfer Level of Assistance 1: Minimum assistance  Trials/Comments 1: effortful, retropulsive. two attempts required to achieve full stand  Transfers 2  Transfer From 2: Bed to  Transfer to 2: Chair with arms  Technique 2: Stand pivot  Transfer Device 2: Walker  Transfer Level of Assistance 2: Minimum assistance, Moderate assistance  Trials/Comments 2: min-mod A for safe/controlled descent. requires cues for safe hand placement as she tends to reach for external supports while using FWW.  Very retropulsive during transfers, mod A at times for controlled descent    Functional Mobility:  Functional Mobility  Functional Mobility Performed: No    Strength:  Strength  Strength Comments: BUEs at least >/= 3/5  RUE   RUE : Within Functional Limits and LUE   LUE: Within Functional Limits    Outcome Measures:  [facility] Daily Activity  Putting on and taking off regular lower body clothing: A lot  Bathing (including washing, rinsing, drying): A lot  Putting on and taking off regular upper body clothing: A little  Toileting, which includes using toilet, bedpan or urinal: A lot  Taking care of personal grooming such as brushing teeth: A little  Eating Meals: A little  Daily Activity - Total Score: 15        Education Documentation  Precautions, taught by Duncan Chua OT at 3/30/2024  2:35 PM.  Learner: Patient  Readiness: Acceptance  Method: Explanation  Response: Needs Reinforcement    ADL Training, taught by Duncan Chua OT at 3/30/2024  2:35 PM.  Learner: Patient  Readiness: Acceptance  Method: Explanation  Response: Needs Reinforcement    Education  Comments  No comments found.      Goals:  Encounter Problems       Encounter Problems (Active)       OT Goals       ADLs (Progressing)       Start:  03/30/24    Expected End:  04/13/24       Patient will perform ADLs with supervision using AE/compensatory techniques as needed.          Functional Transfers (Progressing)       Start:  03/30/24    Expected End:  04/13/24       Patient will perform bed, chair, toilet transfers with distant supervision using LRD.         Activity Tolerance (Progressing)       Start:  03/30/24    Expected End:  04/13/24       Patient will demonstrate improved activity tolerance AEB completing functional task for >/= 15 minutes while remaining hemodynamically stable.

## 2024-03-30 NOTE — CONSULTS
"Nutrition Assessement Note    Nutrition Assessment    Reason for Assessment: Dietitian discretion. Pt known to dept from previous admissions, was diagnosed w/ severe malnutrition, may have lost more wt . Pt is not sure. Lives in a LTCF. Pt states she did not like the Ensure Compacts that were provided previously, agreeable to receiving Mighty shakes    Reason for Hospital Admission:  Kalie Ramos is a 89 y.o. female who is admitted for generalized weakness, chronic CHF    Nutrition History:        Food Allergies/Intolerances:  None  GI Symptoms: None  Oral Problems: None    Anthropometrics:  Ht: 157.5 cm (5' 2\"), Wt: (!) 43.1 kg (95 lb), BMI: 17.37  IBW/kg (Dietitian Calculated): 50 kg  Percent of IBW: 86 %       Weight Change:  Daily Weight  03/29/24 : (!) 43.1 kg (95 lb)  03/28/24 : (!) 43.2 kg (95 lb 3.2 oz)  02/01/24 : 55.2 kg (121 lb 11.1 oz)  01/22/24 : 48.5 kg (107 lb)  12/14/23 : 53.3 kg (117 lb 8.1 oz)  12/01/23 : 53.3 kg (117 lb 8 oz)  11/27/23 : 52.2 kg (115 lb)  11/12/23 : 53.2 kg (117 lb 4.6 oz)  11/06/23 : 53.5 kg (118 lb)  10/22/23 : 55.3 kg (121 lb 14.6 oz)                 Nutrition Focused Physical Exam Findings:   Subcutaneous Fat Loss  Orbital Fat Pads: Mild-Moderate (slight dark circles and slight hollowing)  Buccal Fat Pads: Mild-Moderate (flat cheeks, minimal bounce)  Triceps: Mild-Moderate (less than ample fat tissue)    Muscle Wasting  Temporalis: Severe (hollowed scooping depression)  Pectoralis (Clavicular Region): Severe (protruding prominent clavicle)  Deltoid/Trapezius: Severe (squared shoulders, acromion process prominent)  Interosseous: Severe (depressed area between thumb and forefinger)        Nutrition Significant Labs:  Lab Results   Component Value Date    WBC 8.9 03/30/2024    HGB 12.1 03/30/2024    HCT 38.5 03/30/2024     03/30/2024    CHOL 137 01/27/2023    TRIG 74 01/27/2023    HDL 56 01/27/2023    ALT 9 03/30/2024    AST 17 03/30/2024     (H) 03/30/2024    " K 3.4 03/30/2024     03/30/2024    CREATININE 0.80 03/30/2024    BUN 15 03/30/2024    CO2 35 (H) 03/30/2024    TSH 0.87 09/05/2023    INR 1.0 01/26/2023    HGBA1C 5.7 05/23/2022       Current Facility-Administered Medications:     acetaminophen (Tylenol) tablet 650 mg, 650 mg, oral, q4h PRN **OR** acetaminophen (Tylenol) oral liquid 650 mg, 650 mg, oral, q4h PRN **OR** acetaminophen (Tylenol) suppository 650 mg, 650 mg, rectal, q4h PRN, Nakia Bailey MD    aspirin EC tablet 325 mg, 325 mg, oral, Daily, Nakia Bailey MD, 325 mg at 03/30/24 0918    atorvastatin (Lipitor) tablet 10 mg, 10 mg, oral, Nightly, Nakia Bailey MD, 10 mg at 03/29/24 2155    benzocaine-menthol (Cepastat Sore Throat) 15-3.6 mg lozenge 1 lozenge, 1 lozenge, Mouth/Throat, q2h PRN, Nakia Bailey MD    calcitonin (salmon) (Miacalcin) nasal spray 1 spray, 1 spray, One Nostril, Daily, Nakia Bailey MD    cefTRIAXone (Rocephin) IVPB 1 g, 1 g, intravenous, q24h, Nakia Bailey MD, Stopped at 03/30/24 0924    dextromethorphan-guaifenesin (Robitussin DM)  mg/5 mL oral liquid 5 mL, 5 mL, oral, q4h PRN, Nakia Bailey MD    docusate sodium (Colace) capsule 100 mg, 100 mg, oral, BID, Nakia Bailey MD, 100 mg at 03/30/24 0911    [Held by provider] furosemide (Lasix) tablet 40 mg, 40 mg, oral, BID with meals, Nakia Bailey MD, 40 mg at 03/29/24 1718    guaiFENesin (Mucinex) 12 hr tablet 600 mg, 600 mg, oral, q12h PRN, Nakia Bailey MD    heparin (porcine) injection 5,000 Units, 5,000 Units, subcutaneous, q8h, Nakia Bailey MD, 5,000 Units at 03/30/24 1238    ipratropium-albuteroL (Duo-Neb) 0.5-2.5 mg/3 mL nebulizer solution 3 mL, 3 mL, nebulization, TID, Nakia Bailey MD, 3 mL at 03/30/24 1304    ipratropium-albuteroL (Duo-Neb) 0.5-2.5 mg/3 mL nebulizer solution 3 mL, 3 mL, nebulization, q2h PRN, Nakia Bailey MD    lidocaine 4 % patch 1 patch, 1 patch, transdermal, Daily, Nakia Bailey MD, 1 patch at 03/29/24 1719    losartan (Cozaar) tablet 25 mg, 25 mg, oral,  Daily, Nakia Bailey MD, 25 mg at 03/29/24 1718    magnesium oxide (Mag-Ox) tablet 400 mg, 400 mg, oral, Once per day on Mon Wed Fri, Nakia Bailey MD, 400 mg at 03/29/24 1718    melatonin tablet 3 mg, 3 mg, oral, Nightly, Nakia Bailey MD, 3 mg at 03/29/24 2155    metoprolol succinate XL (Toprol-XL) 24 hr tablet 25 mg, 25 mg, oral, Daily, Nakia Bailey MD    nitroglycerin (Nitrostat) SL tablet 0.4 mg, 0.4 mg, sublingual, q5 min PRN, Nakia Bailey MD    ondansetron ODT (Zofran-ODT) disintegrating tablet 4 mg, 4 mg, oral, q8h PRN **OR** ondansetron (Zofran) injection 4 mg, 4 mg, intravenous, q8h PRN, Nakia Bailey MD    oxygen (O2) therapy, 2 L/min, inhalation, q24h, Nakia Bailey MD, 2 L/min at 03/29/24 1530    pantoprazole (ProtoNix) EC tablet 40 mg, 40 mg, oral, Daily, 40 mg at 03/30/24 0912 **OR** pantoprazole (ProtoNix) injection 40 mg, 40 mg, intravenous, Daily, Nakia Bailey MD    polyethylene glycol (Glycolax, Miralax) packet 17 g, 17 g, oral, Daily, Nakia Bailey MD    potassium chloride CR (Klor-Con M20) ER tablet 40 mEq, 40 mEq, oral, Daily, Nakia Bailey MD, 40 mEq at 03/30/24 0926    rOPINIRole (Requip) tablet 5 mg, 5 mg, oral, Nightly, Nakia Bailey MD, 5 mg at 03/29/24 2155    sertraline (Zoloft) tablet 50 mg, 50 mg, oral, Daily, Nakia Bailey MD, 50 mg at 03/30/24 0912    traZODone (Desyrel) tablet 50 mg, 50 mg, oral, Nightly PRN, Nakia Bailey MD    Dietary Orders (From admission, onward)       Start     Ordered    03/29/24 1508  Adult diet 2-3 grams sodium  Diet effective now        Question:  Diet type  Answer:  2-3 grams sodium    03/29/24 1510                     Estimated Needs:   Estimated Energy Needs  Total Energy Estimated Needs (kCal):  (8112-0015)  Total Estimated Energy Need per Day (kCal/kg):  (30-35)  Method for Estimating Needs: actual wt    Estimated Protein Needs  Total Protein Estimated Needs (g): 52 g  Total Protein Estimated Needs (g/kg): 1.2 g/kg  Method for Estimating Needs: actual  wt    Estimated Fluid Needs  Total Fluid Estimated Needs (mL):  (5688-0540)  Total Fluid Estimated Needs (mL/kg): 1 mL/kg  Method for Estimating Needs: 1ml/kcal      Nutrition Diagnosis   Nutrition Diagnosis:  Malnutrition Diagnosis  Patient has Malnutrition Diagnosis: Yes  Diagnosis Status: New  Malnutrition Diagnosis: Severe malnutrition related to chronic disease or condition  As Evidenced by: probable wt loss, mod to severe muscle and fat depletion        Nutrition Interventions/Recommendations   Nutrition Interventions and Recommendations:    Nutrition Prescription:  Individualized Nutrition Prescription Provided for : 6680-3426 calories, 52gm protein    Nutrition Interventions:   Food and/or Nutrient Delivery Interventions  Interventions: Medical food supplement  Medical Food Supplement: Modified beverage  Goal: Mighty shake w/ meals, each provides 200 calories and 7gm protein    Education Documentation  No documentation found.       Nutrition Monitoring and Evaluation   Monitoring/Evaluation:   Food/Nutrient Related History Monitoring  Monitoring and Evaluation Plan: Energy intake  Energy Intake: Estimated energy intake  Criteria: Pt will consume >/=75% estimated energy needs via po intake and/or supplements    Body Composition/Growth/Weight History  Monitoring and Evaluation Plan: Weight  Weight: Measured weight  Criteria: Pt will maintain/gain wt        Time Spent/Follow-up:   Follow Up  Time Spent (min): 30 minutes  Last Date of Nutrition Visit: 03/30/24  Nutrition Follow-Up Needed?: 3-5 days  Follow up Comment: 4/2/24

## 2024-03-30 NOTE — NURSING NOTE
This nurse notified Dr. Bailey regarding patient K+ lab value of 2.9. New orders received via telephone: Potassium 40 meq 1x now and in 4 hours. Repeat K+ lab after second dose. This nurse unsuccessful with attempt to enter new orders. MD notified via chat, will let oncoming shift nurse know.

## 2024-03-31 LAB
ANION GAP SERPL CALC-SCNC: 11 MMOL/L
ANION GAP SERPL CALC-SCNC: 9 MMOL/L
ATRIAL RATE: 67 BPM
BUN SERPL-MCNC: 12 MG/DL (ref 8–25)
BUN SERPL-MCNC: 12 MG/DL (ref 8–25)
CALCIUM SERPL-MCNC: 8.5 MG/DL (ref 8.5–10.4)
CALCIUM SERPL-MCNC: 9 MG/DL (ref 8.5–10.4)
CHLORIDE SERPL-SCNC: 100 MMOL/L (ref 97–107)
CHLORIDE SERPL-SCNC: 106 MMOL/L (ref 97–107)
CO2 SERPL-SCNC: 34 MMOL/L (ref 24–31)
CO2 SERPL-SCNC: 34 MMOL/L (ref 24–31)
CREAT SERPL-MCNC: 0.7 MG/DL (ref 0.4–1.6)
CREAT SERPL-MCNC: 0.7 MG/DL (ref 0.4–1.6)
EGFRCR SERPLBLD CKD-EPI 2021: 80 ML/MIN/1.73M*2
EGFRCR SERPLBLD CKD-EPI 2021: 80 ML/MIN/1.73M*2
ERYTHROCYTE [DISTWIDTH] IN BLOOD BY AUTOMATED COUNT: 16.1 % (ref 11.5–14.5)
GLUCOSE SERPL-MCNC: 105 MG/DL (ref 65–99)
GLUCOSE SERPL-MCNC: 95 MG/DL (ref 65–99)
HCT VFR BLD AUTO: 38.2 % (ref 36–46)
HGB BLD-MCNC: 11.7 G/DL (ref 12–16)
MCH RBC QN AUTO: 28.7 PG (ref 26–34)
MCHC RBC AUTO-ENTMCNC: 30.6 G/DL (ref 32–36)
MCV RBC AUTO: 94 FL (ref 80–100)
NRBC BLD-RTO: 0 /100 WBCS (ref 0–0)
P AXIS: 71 DEGREES
P OFFSET: 145 MS
P ONSET: 110 MS
PLATELET # BLD AUTO: 204 X10*3/UL (ref 150–450)
POTASSIUM SERPL-SCNC: 3.2 MMOL/L (ref 3.4–5.1)
POTASSIUM SERPL-SCNC: 3.4 MMOL/L (ref 3.4–5.1)
PR INTERVAL: 198 MS
Q ONSET: 209 MS
QRS COUNT: 10 BEATS
QRS DURATION: 138 MS
QT INTERVAL: 558 MS
QTC CALCULATION(BAZETT): 561 MS
QTC FREDERICIA: 560 MS
R AXIS: 39 DEGREES
RBC # BLD AUTO: 4.07 X10*6/UL (ref 4–5.2)
SODIUM SERPL-SCNC: 145 MMOL/L (ref 133–145)
SODIUM SERPL-SCNC: 149 MMOL/L (ref 133–145)
T AXIS: 235 DEGREES
T OFFSET: 488 MS
VENTRICULAR RATE: 61 BPM
WBC # BLD AUTO: 7.9 X10*3/UL (ref 4.4–11.3)

## 2024-03-31 PROCEDURE — 2500000001 HC RX 250 WO HCPCS SELF ADMINISTERED DRUGS (ALT 637 FOR MEDICARE OP): Performed by: INTERNAL MEDICINE

## 2024-03-31 PROCEDURE — 94760 N-INVAS EAR/PLS OXIMETRY 1: CPT

## 2024-03-31 PROCEDURE — 99232 SBSQ HOSP IP/OBS MODERATE 35: CPT | Performed by: INTERNAL MEDICINE

## 2024-03-31 PROCEDURE — 80048 BASIC METABOLIC PNL TOTAL CA: CPT | Performed by: INTERNAL MEDICINE

## 2024-03-31 PROCEDURE — 36415 COLL VENOUS BLD VENIPUNCTURE: CPT | Performed by: INTERNAL MEDICINE

## 2024-03-31 PROCEDURE — 2500000002 HC RX 250 W HCPCS SELF ADMINISTERED DRUGS (ALT 637 FOR MEDICARE OP, ALT 636 FOR OP/ED): Performed by: INTERNAL MEDICINE

## 2024-03-31 PROCEDURE — 94640 AIRWAY INHALATION TREATMENT: CPT

## 2024-03-31 PROCEDURE — 82374 ASSAY BLOOD CARBON DIOXIDE: CPT | Performed by: INTERNAL MEDICINE

## 2024-03-31 PROCEDURE — 2060000001 HC INTERMEDIATE ICU ROOM DAILY

## 2024-03-31 PROCEDURE — 85027 COMPLETE CBC AUTOMATED: CPT | Performed by: INTERNAL MEDICINE

## 2024-03-31 PROCEDURE — 2500000004 HC RX 250 GENERAL PHARMACY W/ HCPCS (ALT 636 FOR OP/ED): Performed by: INTERNAL MEDICINE

## 2024-03-31 PROCEDURE — 9420000001 HC RT PATIENT EDUCATION 5 MIN

## 2024-03-31 RX ORDER — LOSARTAN POTASSIUM 50 MG/1
50 TABLET ORAL DAILY
Status: DISCONTINUED | OUTPATIENT
Start: 2024-04-01 | End: 2024-04-02 | Stop reason: HOSPADM

## 2024-03-31 RX ORDER — POTASSIUM CHLORIDE 20 MEQ/1
20 TABLET, EXTENDED RELEASE ORAL 2 TIMES DAILY
Status: DISCONTINUED | OUTPATIENT
Start: 2024-03-31 | End: 2024-04-01

## 2024-03-31 RX ADMIN — METOPROLOL SUCCINATE 25 MG: 25 TABLET, EXTENDED RELEASE ORAL at 08:50

## 2024-03-31 RX ADMIN — ASPIRIN 325 MG: 325 TABLET, COATED ORAL at 09:00

## 2024-03-31 RX ADMIN — ATORVASTATIN CALCIUM 10 MG: 10 TABLET, FILM COATED ORAL at 20:18

## 2024-03-31 RX ADMIN — SERTRALINE 50 MG: 50 TABLET, FILM COATED ORAL at 08:50

## 2024-03-31 RX ADMIN — IPRATROPIUM BROMIDE AND ALBUTEROL SULFATE 3 ML: 2.5; .5 SOLUTION RESPIRATORY (INHALATION) at 13:15

## 2024-03-31 RX ADMIN — ROPINIROLE HYDROCHLORIDE 5 MG: 1 TABLET, FILM COATED ORAL at 20:18

## 2024-03-31 RX ADMIN — IPRATROPIUM BROMIDE AND ALBUTEROL SULFATE 3 ML: 2.5; .5 SOLUTION RESPIRATORY (INHALATION) at 19:22

## 2024-03-31 RX ADMIN — LOSARTAN POTASSIUM 25 MG: 25 TABLET, FILM COATED ORAL at 08:51

## 2024-03-31 RX ADMIN — IPRATROPIUM BROMIDE AND ALBUTEROL SULFATE 3 ML: 2.5; .5 SOLUTION RESPIRATORY (INHALATION) at 07:14

## 2024-03-31 RX ADMIN — POTASSIUM CHLORIDE 40 MEQ: 1500 TABLET, EXTENDED RELEASE ORAL at 08:50

## 2024-03-31 RX ADMIN — HEPARIN SODIUM 5000 UNITS: 5000 INJECTION, SOLUTION INTRAVENOUS; SUBCUTANEOUS at 13:37

## 2024-03-31 RX ADMIN — Medication 3 MG: at 20:18

## 2024-03-31 RX ADMIN — POTASSIUM CHLORIDE 20 MEQ: 1500 TABLET, EXTENDED RELEASE ORAL at 20:18

## 2024-03-31 RX ADMIN — CEFTRIAXONE SODIUM 1 G: 1 INJECTION, SOLUTION INTRAVENOUS at 08:51

## 2024-03-31 RX ADMIN — PANTOPRAZOLE SODIUM 40 MG: 40 TABLET, DELAYED RELEASE ORAL at 08:50

## 2024-03-31 RX ADMIN — DOCUSATE SODIUM 100 MG: 100 CAPSULE, LIQUID FILLED ORAL at 09:00

## 2024-03-31 RX ADMIN — HEPARIN SODIUM 5000 UNITS: 5000 INJECTION, SOLUTION INTRAVENOUS; SUBCUTANEOUS at 20:18

## 2024-03-31 RX ADMIN — HEPARIN SODIUM 5000 UNITS: 5000 INJECTION, SOLUTION INTRAVENOUS; SUBCUTANEOUS at 04:52

## 2024-03-31 RX ADMIN — DOCUSATE SODIUM 100 MG: 100 CAPSULE, LIQUID FILLED ORAL at 20:18

## 2024-03-31 ASSESSMENT — COGNITIVE AND FUNCTIONAL STATUS - GENERAL
EATING MEALS: A LITTLE
HELP NEEDED FOR BATHING: A LOT
DAILY ACTIVITIY SCORE: 15
TURNING FROM BACK TO SIDE WHILE IN FLAT BAD: A LITTLE
PERSONAL GROOMING: A LITTLE
EATING MEALS: A LITTLE
TOILETING: A LOT
DRESSING REGULAR LOWER BODY CLOTHING: A LOT
CLIMB 3 TO 5 STEPS WITH RAILING: A LITTLE
TURNING FROM BACK TO SIDE WHILE IN FLAT BAD: A LITTLE
CLIMB 3 TO 5 STEPS WITH RAILING: A LITTLE
STANDING UP FROM CHAIR USING ARMS: A LITTLE
WALKING IN HOSPITAL ROOM: A LITTLE
DRESSING REGULAR UPPER BODY CLOTHING: A LITTLE
MOBILITY SCORE: 18
HELP NEEDED FOR BATHING: A LOT
MOVING TO AND FROM BED TO CHAIR: A LITTLE
MOVING TO AND FROM BED TO CHAIR: A LITTLE
MOBILITY SCORE: 18
DRESSING REGULAR LOWER BODY CLOTHING: A LOT
PERSONAL GROOMING: A LITTLE
DRESSING REGULAR UPPER BODY CLOTHING: A LITTLE
STANDING UP FROM CHAIR USING ARMS: A LITTLE
TOILETING: A LOT
MOVING FROM LYING ON BACK TO SITTING ON SIDE OF FLAT BED WITH BEDRAILS: A LITTLE
DAILY ACTIVITIY SCORE: 15
MOVING FROM LYING ON BACK TO SITTING ON SIDE OF FLAT BED WITH BEDRAILS: A LITTLE
WALKING IN HOSPITAL ROOM: A LITTLE

## 2024-03-31 ASSESSMENT — PAIN SCALES - GENERAL
PAINLEVEL_OUTOF10: 0 - NO PAIN
PAINLEVEL_OUTOF10: 0 - NO PAIN

## 2024-03-31 NOTE — PROGRESS NOTES
"Kalie Ramos is a 89 y.o. female on day 2 of admission presenting with Generalized weakness.    Subjective   Patient is little forgetful but breathing is doing better       Objective     Physical Exam  Vitals reviewed.   Constitutional:       Appearance: Normal appearance.   HENT:      Head: Normocephalic and atraumatic.      Right Ear: Tympanic membrane, ear canal and external ear normal.      Left Ear: Tympanic membrane, ear canal and external ear normal.      Nose: Nose normal.      Mouth/Throat:      Pharynx: Oropharynx is clear.   Eyes:      Extraocular Movements: Extraocular movements intact.      Conjunctiva/sclera: Conjunctivae normal.      Pupils: Pupils are equal, round, and reactive to light.   Cardiovascular:      Rate and Rhythm: Normal rate and regular rhythm.      Pulses: Normal pulses.      Heart sounds: Normal heart sounds.   Pulmonary:      Effort: Pulmonary effort is normal.      Breath sounds: Normal breath sounds.   Abdominal:      General: Abdomen is flat. Bowel sounds are normal.      Palpations: Abdomen is soft.   Musculoskeletal:      Cervical back: Normal range of motion and neck supple.   Skin:     General: Skin is warm and dry.   Neurological:      General: No focal deficit present.      Mental Status: She is alert.      Gait: Gait abnormal.   Psychiatric:         Mood and Affect: Mood normal.         Last Recorded Vitals  Blood pressure 133/51, pulse 77, temperature 37.1 °C (98.8 °F), temperature source Oral, resp. rate 17, height 1.575 m (5' 2\"), weight (!) 43.1 kg (95 lb), SpO2 99 %.  Intake/Output last 3 Shifts:  I/O last 3 completed shifts:  In: 690 (16 mL/kg) [P.O.:640; IV Piggyback:50]  Out: - (0 mL/kg)   Weight: 43.1 kg     Relevant Results              Scheduled medications  aspirin, 325 mg, oral, Daily  atorvastatin, 10 mg, oral, Nightly  calcitonin (salmon), 1 spray, One Nostril, Daily  cefTRIAXone, 1 g, intravenous, q24h  docusate sodium, 100 mg, oral, BID  [Held by " provider] furosemide, 40 mg, oral, BID with meals  heparin (porcine), 5,000 Units, subcutaneous, q8h  ipratropium-albuteroL, 3 mL, nebulization, TID  lidocaine, 1 patch, transdermal, Daily  [START ON 4/1/2024] losartan, 50 mg, oral, Daily  magnesium oxide, 400 mg, oral, Once per day on Mon Wed Fri  melatonin, 3 mg, oral, Nightly  metoprolol succinate XL, 25 mg, oral, Daily  oxygen, 2 L/min, inhalation, q24h  pantoprazole, 40 mg, oral, Daily   Or  pantoprazole, 40 mg, intravenous, Daily  polyethylene glycol, 17 g, oral, Daily  potassium chloride CR, 20 mEq, oral, BID  rOPINIRole, 5 mg, oral, Nightly  sertraline, 50 mg, oral, Daily      Continuous medications     PRN medications  PRN medications: acetaminophen **OR** acetaminophen **OR** acetaminophen, benzocaine-menthol, dextromethorphan-guaifenesin, guaiFENesin, ipratropium-albuteroL, nitroglycerin, ondansetron ODT **OR** ondansetron, traZODone  Results for orders placed or performed during the hospital encounter of 03/29/24 (from the past 24 hour(s))   Basic Metabolic Panel   Result Value Ref Range    Glucose 95 65 - 99 mg/dL    Sodium 149 (H) 133 - 145 mmol/L    Potassium 3.4 3.4 - 5.1 mmol/L    Chloride 106 97 - 107 mmol/L    Bicarbonate 34 (H) 24 - 31 mmol/L    Urea Nitrogen 12 8 - 25 mg/dL    Creatinine 0.70 0.40 - 1.60 mg/dL    eGFR 80 >60 mL/min/1.73m*2    Calcium 8.5 8.5 - 10.4 mg/dL    Anion Gap 9 <=19 mmol/L   CBC   Result Value Ref Range    WBC 7.9 4.4 - 11.3 x10*3/uL    nRBC 0.0 0.0 - 0.0 /100 WBCs    RBC 4.07 4.00 - 5.20 x10*6/uL    Hemoglobin 11.7 (L) 12.0 - 16.0 g/dL    Hematocrit 38.2 36.0 - 46.0 %    MCV 94 80 - 100 fL    MCH 28.7 26.0 - 34.0 pg    MCHC 30.6 (L) 32.0 - 36.0 g/dL    RDW 16.1 (H) 11.5 - 14.5 %    Platelets 204 150 - 450 x10*3/uL   Basic Metabolic Panel   Result Value Ref Range    Glucose 105 (H) 65 - 99 mg/dL    Sodium 145 133 - 145 mmol/L    Potassium 3.2 (L) 3.4 - 5.1 mmol/L    Chloride 100 97 - 107 mmol/L    Bicarbonate 34 (H) 24 -  31 mmol/L    Urea Nitrogen 12 8 - 25 mg/dL    Creatinine 0.70 0.40 - 1.60 mg/dL    eGFR 80 >60 mL/min/1.73m*2    Calcium 9.0 8.5 - 10.4 mg/dL    Anion Gap 11 <=19 mmol/L     No results found.            Malnutrition Diagnosis Status: New  Malnutrition Diagnosis: Severe malnutrition related to chronic disease or condition  As Evidenced by: probable wt loss, mod to severe muscle and fat depletion  I agree with the dietitian's malnutrition diagnosis.      Assessment/Plan   Principal Problem:    Generalized weakness  Active Problems:    Chest pain    Hyperlipidemia    Hypertension    UTI (urinary tract infection)    Chronic renal failure syndrome    Hypokalemia    Peripheral venous insufficiency    Aortic valve stenosis    Chronic obstructive pulmonary disease (CMS/HCC)    Gastroesophageal reflux disease    Hypernatremia    Patient sitting in the chair.  Denies any shortness of breath.  Little forgetful  Blood pressure is stable  Replace potassium  Sodium is still elevated but coming down with holding diuretics  CHF appears to be compensated  Urine culture still pending       I spent  minutes in the professional and overall care of this patient.      Nakia Bailey MD

## 2024-03-31 NOTE — PROGRESS NOTES
03/31/24 1803   Discharge Planning   Living Arrangements Other (Comment)  (Lives in an assisted living ([facility]))   Support Systems Children;Family members   Assistance Needed Needs some assistance but will need to do her ADLs nd toileting upon return to [facility].   Type of Residence Assisted living   Care Facility Name [facility]   Home or Post Acute Services Post acute facilities (Rehab/SNF/etc)   Type of Post Acute Facility Services Assisted living   Type of Home Care Services Home PT   Patient expects to be discharged to: [facility] with physical therapy. They do have a therapy department at [facility].   Does the patient need discharge transport arranged? No     Spoke with patient's daughter and grand-daughter. Patient lives at [facility] and they want her to return there with physical therapy. Will call facility to ask about their therapy.     PLAN: Return to [facility] with physical therapy.

## 2024-03-31 NOTE — PROGRESS NOTES
Subjective Data:      Overnight Events:         Objective Data:  Last Recorded Vitals:  Vitals:    03/30/24 1859 03/30/24 2343 03/31/24 0715 03/31/24 0739   BP:    147/53   BP Location:    Right arm   Patient Position:    Sitting   Pulse:    87   Resp:  18  18   Temp:  36.6 °C (97.9 °F)  37 °C (98.6 °F)   TempSrc:  Oral  Oral   SpO2: 99%  98% 99%   Weight:       Height:           Last Labs:  CBC - 3/31/2024:  4:53 AM  7.9 11.7 204    38.2      CMP - 3/31/2024:  4:53 AM  8.5 5.5 17 --- 0.6   _ 2.8 9 132      PTT - No results in last year.  _   _ _     HGBA1C   Date/Time Value Ref Range Status   05/23/2022 10:43 PM 5.7 4.0 - 6.0 % Final     Comment:     Hemoglobin A1C levels are related to mean blood glucose during the   preceding 2-3 months. The relationship table below may be used as a   general guide. Each 1% increase in HGB A1C is a reflection of an   increase in mean glucose of approximately 30 mg/dl.   Reference: Diabetes Care, volume 29, supplement 1 Jan. 2006                        HGB A1C ................. Approx. Mean Glucose   _______________________________________________   6%   ...............................  120 mg/dl   7%   ...............................  150 mg/dl   8%   ...............................  180 mg/dl   9%   ...............................  210 mg/dl   10%  ...............................  240 mg/dl  Performed at [address] [place] [address]     12/17/2020 03:16 PM 5.7 4.0 - 6.0 % Final     Comment:     Hemoglobin A1C levels are related to mean blood glucose during the   preceding 2-3 months. The relationship table below may be used as a   general guide. Each 1% increase in HGB A1C is a reflection of an   increase in mean glucose of approximately 30 mg/dl.   Reference: Diabetes Care, volume 29, supplement 1 Jan. 2006                        HGB A1C ................. Approx. Mean Glucose   _______________________________________________   6%    "...............................  120 mg/dl   7%   ...............................  150 mg/dl   8%   ...............................  180 mg/dl   9%   ...............................  210 mg/dl   10%  ...............................  240 mg/dl  Performed at [address] [address]       LDLCALC   Date/Time Value Ref Range Status   01/27/2023 06:04 AM 66 65 - 130 MG/DL Final   11/20/2022 02:47 AM 54 65 - 130 MG/DL Final   07/06/2022 01:13 AM 54 65 - 130 MG/DL Final     VLDL   Date/Time Value Ref Range Status   08/03/2018 09:41 AM 25 0 - 40 mg/dL Final   01/18/2018 02:30 PM 47 0 - 40 mg/dL Final   10/17/2017 11:18 AM 50 0 - 40 mg/dL Final      Last I/O:  I/O last 3 completed shifts:  In: 50 (1.2 mL/kg) [IV Piggyback:50]  Out: - (0 mL/kg)   Weight: 43.1 kg     Past Cardiology Tests (Last 3 Years):  EKG:  ECG 12 lead 03/29/2024 (Preliminary)      ECG 12 lead 01/29/2024      ECG 12 lead 12/15/2023      Electrocardiogram, 12-lead PRN ACS symptoms 12/15/2023      ECG 12 lead       ECG 12 lead 12/14/2023      ECG 12 lead 11/17/2023      ECG 12 Lead       ECG 12 Lead       ECG 12 lead 10/24/2023    Echo:  No results found for this or any previous visit from the past 1095 days.    Ejection Fractions:  No results found for: \"EF\"  Cath:  No results found for this or any previous visit from the past 1095 days.    Stress Test:  No results found for this or any previous visit from the past 1095 days.    Cardiac Imaging:  No results found for this or any previous visit from the past 1095 days.      Inpatient Medications:  Scheduled medications   Medication Dose Route Frequency    aspirin  325 mg oral Daily    atorvastatin  10 mg oral Nightly    calcitonin (salmon)  1 spray One Nostril Daily    cefTRIAXone  1 g intravenous q24h    docusate sodium  100 mg oral BID    [Held by provider] furosemide  40 mg oral BID with meals    heparin (porcine)  5,000 Units subcutaneous q8h    ipratropium-albuteroL  3 mL " nebulization TID    lidocaine  1 patch transdermal Daily    losartan  25 mg oral Daily    magnesium oxide  400 mg oral Once per day on Mon Wed Fri    melatonin  3 mg oral Nightly    metoprolol succinate XL  25 mg oral Daily    oxygen  2 L/min inhalation q24h    pantoprazole  40 mg oral Daily    Or    pantoprazole  40 mg intravenous Daily    polyethylene glycol  17 g oral Daily    potassium chloride CR  40 mEq oral Daily    rOPINIRole  5 mg oral Nightly    sertraline  50 mg oral Daily     PRN medications   Medication    acetaminophen    Or    acetaminophen    Or    acetaminophen    benzocaine-menthol    dextromethorphan-guaifenesin    guaiFENesin    ipratropium-albuteroL    nitroglycerin    ondansetron ODT    Or    ondansetron    traZODone     Continuous Medications   Medication Dose Last Rate       Physical Exam:  The patient is a upper elderly white female awake alert conversant not overtly dyspneic on nasal cannula visiting with daughter.  JVP not elevated carotid and pulses 2+  Moderately severe thoracic kyphosis shallow air movement breath sounds are absent one half of the posterior lung bases bilaterally  Cardiac rhythm irregular S1-S2 obscured grade 3/6 to 4/6 systolic ejection murmur  Abdomen is benign no paraspinal megaly no focal tenderness  No peripheral edema pedal pulses are present     Assessment/Plan   3/30: This patient is a 89-year-old white female with extensive issues that include coronary artery disease, severe aortic valvular stenosis, moderate tricuspid and mitral valve regurgitation, severe pulmonary hypertension, COPD with oxygen dependence, hypertension, left bundle branch block conduction delay, lower extremity edema due to venous insufficiency, chronic DVT left lower extremity femoral vein, laparoscopic paraesophageal hiatal hernia repair, ongoing aspiration pneumonias, nontoxic multinodular goiter.  The patient's echocardiogram on 7/25/2023 again demonstrated a LV ejection fraction 35-40%  severe aortic valve stenosis peak systolic gradient 56 mmHg, mild mitral moderate tricuspid valve regurgitation and severe pulmonary hypertension estimated PA systolic pressure 67 mmHg.  The patient was thought not to be a candidate for transcatheter aortic valve replacement.  She does wear continuous oxygen at 3 L/min at home.    The patient was recently admitted for several days on 10/23/2023 with increased shortness of breath and ultimately was released on Lasix 40 mg twice daily which she has reduced to daily.  Patient admitted again on 11/12/2023 after having fallen in the bathroom and not being able to get up due to generalized weakness.  She lived independently in her own condominium and had been very resistant to review our recommendation for assisted living.  At that time it was decided to continue the patient on her usual therapy which  at that point included Lasix 40 mg once daily along with her low-dose aspirin plus atorvastatin 40 mg daily and metoprolol succinate 25 mg twice daily.    Patient was admitted after a mechanical fall with a right femoral neck fracture and subsequently underwent right total hip replacement on 12/16/2023.  The patient was discharged to rehab facility on usual preadmission low-dose losartan 25 mg daily Toprol-XL 25 mg daily Lasix 40 mg daily with no anticoagulation due to where multiple falls.  Her atrial fibrillation was rate controlled with the low-dose Toprol-XL.  She was readmitted on 1/28/2024 with progressive increased shortness of breath with CTA of the chest demonstrating large bilateral pleural effusions and possible multifocal pneumonia.  The patient has been started on empiric antibiotic therapy with IV ceftriaxone and azithromycin.  She had been thought to be a high risk candidate for aspiration pneumonia in the past.  The pleural effusions presumably were related to her progressively more severe aortic valvular stenosis along with severe pulmonary hypertension.   Pulmonology was consulted with respect to treatment of her possible pneumonia and to determine whether her respiratory status would benefit from thoracentesis by interventional radiology.  The patient ultimately did have a right-sided thoracentesis performed and her Lasix was increased to 40 mg twice daily.  She had a modified barium swallow performed and was cleared to have mechanical soft and nectar thick liquids with thin liquids under supervision.  She was sent to a skilled nursing facility on 2/3/2024 and is now readmitted because of some generalized weakness and slight change in mental status.  The patient is currently back to usual mental status alert oriented and conversant no respiratory distress on her O2 nasal cannula.  The initial chest x-ray on current admission showed small bilateral pleural effusions and only mild pulmonary vascular congestion with resolution of pulmonary edema that have been present during last admission.  She had a small amount of aspirated barium within the left lower lung zone again consistent with her risk at aspiration.  Her head CT did not demonstrate a new stroke and her hypernatremia is slowly improving with temporary moratorium of Lasix therapy.  In this regard her creatinine is actually decreased from 1.3-0.8 and the sodium is decreased from 151-148.  Will continue to hold Lasix for now.  Potassium supplementation is still being continued due to a hypokalemia potassium only 2.9 today.     3/31: Patient is sitting in bedside chair being assisted with cleaning no shortness of breath on standard O2 nasal cannula.  Electrolyte panel notable for persistent hypernatremia sodium 149.  Renal parameters stable creatinine 0.7.  Potassium is improved from 2.9-3.4 and will provide additional supplements today.  Agree with holding Lasix for another 24-48 hours due to the hypernatremia even though her proBNP is elevated.  Continue potassium supplementation.  The patient's chest x-ray  from 3/29 showed only tiny bilateral pleural effusions and mild pulmonary vascular congestion substantially improved in comparison to the last chest x-ray from 2/1/2024.    Peripheral IV 03/29/24 20 G Left Forearm (Active)   Site Assessment Clean;Dry;Intact 03/31/24 0852   Dressing Status Dry;Clean 03/31/24 0852   Number of days: 2       Code Status:  DNR and No Intubation and No ICU    I spent  minutes in the professional and overall care of this patient.        Jim Mckinney MD

## 2024-04-01 ENCOUNTER — APPOINTMENT (OUTPATIENT)
Dept: CARDIOLOGY | Facility: HOSPITAL | Age: 89
DRG: 640 | End: 2024-04-01
Payer: MEDICARE

## 2024-04-01 LAB
ANION GAP SERPL CALC-SCNC: 10 MMOL/L
ANION GAP SERPL CALC-SCNC: 6 MMOL/L
BUN SERPL-MCNC: 15 MG/DL (ref 8–25)
BUN SERPL-MCNC: 15 MG/DL (ref 8–25)
CALCIUM SERPL-MCNC: 8.6 MG/DL (ref 8.5–10.4)
CALCIUM SERPL-MCNC: 8.8 MG/DL (ref 8.5–10.4)
CHLORIDE SERPL-SCNC: 103 MMOL/L (ref 97–107)
CHLORIDE SERPL-SCNC: 105 MMOL/L (ref 97–107)
CO2 SERPL-SCNC: 31 MMOL/L (ref 24–31)
CO2 SERPL-SCNC: 34 MMOL/L (ref 24–31)
CREAT SERPL-MCNC: 0.8 MG/DL (ref 0.4–1.6)
CREAT SERPL-MCNC: 0.8 MG/DL (ref 0.4–1.6)
EGFRCR SERPLBLD CKD-EPI 2021: 68 ML/MIN/1.73M*2
EGFRCR SERPLBLD CKD-EPI 2021: 68 ML/MIN/1.73M*2
GLUCOSE SERPL-MCNC: 101 MG/DL (ref 65–99)
GLUCOSE SERPL-MCNC: 113 MG/DL (ref 65–99)
POTASSIUM SERPL-SCNC: 4.4 MMOL/L (ref 3.4–5.1)
POTASSIUM SERPL-SCNC: 4.5 MMOL/L (ref 3.4–5.1)
SODIUM SERPL-SCNC: 144 MMOL/L (ref 133–145)
SODIUM SERPL-SCNC: 145 MMOL/L (ref 133–145)

## 2024-04-01 PROCEDURE — 2500000004 HC RX 250 GENERAL PHARMACY W/ HCPCS (ALT 636 FOR OP/ED): Performed by: INTERNAL MEDICINE

## 2024-04-01 PROCEDURE — 94640 AIRWAY INHALATION TREATMENT: CPT

## 2024-04-01 PROCEDURE — 82374 ASSAY BLOOD CARBON DIOXIDE: CPT | Performed by: INTERNAL MEDICINE

## 2024-04-01 PROCEDURE — 2060000001 HC INTERMEDIATE ICU ROOM DAILY

## 2024-04-01 PROCEDURE — 9420000001 HC RT PATIENT EDUCATION 5 MIN

## 2024-04-01 PROCEDURE — 2500000002 HC RX 250 W HCPCS SELF ADMINISTERED DRUGS (ALT 637 FOR MEDICARE OP, ALT 636 FOR OP/ED): Performed by: INTERNAL MEDICINE

## 2024-04-01 PROCEDURE — 93005 ELECTROCARDIOGRAM TRACING: CPT

## 2024-04-01 PROCEDURE — 2500000001 HC RX 250 WO HCPCS SELF ADMINISTERED DRUGS (ALT 637 FOR MEDICARE OP): Performed by: INTERNAL MEDICINE

## 2024-04-01 PROCEDURE — 99232 SBSQ HOSP IP/OBS MODERATE 35: CPT | Performed by: INTERNAL MEDICINE

## 2024-04-01 PROCEDURE — 36415 COLL VENOUS BLD VENIPUNCTURE: CPT | Performed by: INTERNAL MEDICINE

## 2024-04-01 PROCEDURE — 97535 SELF CARE MNGMENT TRAINING: CPT | Mod: GO,CO

## 2024-04-01 PROCEDURE — 36415 COLL VENOUS BLD VENIPUNCTURE: CPT | Mod: IPSPLIT | Performed by: INTERNAL MEDICINE

## 2024-04-01 PROCEDURE — 80048 BASIC METABOLIC PNL TOTAL CA: CPT | Performed by: INTERNAL MEDICINE

## 2024-04-01 PROCEDURE — 97530 THERAPEUTIC ACTIVITIES: CPT | Mod: GO,CO

## 2024-04-01 RX ORDER — POTASSIUM CHLORIDE 20 MEQ/1
20 TABLET, EXTENDED RELEASE ORAL DAILY
Status: DISCONTINUED | OUTPATIENT
Start: 2024-04-02 | End: 2024-04-02

## 2024-04-01 RX ORDER — FUROSEMIDE 40 MG/1
40 TABLET ORAL DAILY
Status: DISCONTINUED | OUTPATIENT
Start: 2024-04-02 | End: 2024-04-02 | Stop reason: HOSPADM

## 2024-04-01 RX ADMIN — HEPARIN SODIUM 5000 UNITS: 5000 INJECTION, SOLUTION INTRAVENOUS; SUBCUTANEOUS at 20:15

## 2024-04-01 RX ADMIN — Medication 3 MG: at 20:14

## 2024-04-01 RX ADMIN — Medication 400 MG: at 10:01

## 2024-04-01 RX ADMIN — HEPARIN SODIUM 5000 UNITS: 5000 INJECTION, SOLUTION INTRAVENOUS; SUBCUTANEOUS at 04:05

## 2024-04-01 RX ADMIN — CEFTRIAXONE SODIUM 1 G: 1 INJECTION, SOLUTION INTRAVENOUS at 10:12

## 2024-04-01 RX ADMIN — LOSARTAN POTASSIUM 50 MG: 50 TABLET, FILM COATED ORAL at 10:01

## 2024-04-01 RX ADMIN — IPRATROPIUM BROMIDE AND ALBUTEROL SULFATE 3 ML: 2.5; .5 SOLUTION RESPIRATORY (INHALATION) at 07:44

## 2024-04-01 RX ADMIN — ASPIRIN 325 MG: 325 TABLET, COATED ORAL at 09:00

## 2024-04-01 RX ADMIN — ATORVASTATIN CALCIUM 10 MG: 10 TABLET, FILM COATED ORAL at 20:14

## 2024-04-01 RX ADMIN — CALCITONIN SALMON 1 SPRAY: 200 SPRAY, METERED NASAL at 10:48

## 2024-04-01 RX ADMIN — HEPARIN SODIUM 5000 UNITS: 5000 INJECTION, SOLUTION INTRAVENOUS; SUBCUTANEOUS at 13:56

## 2024-04-01 RX ADMIN — POTASSIUM CHLORIDE 20 MEQ: 1500 TABLET, EXTENDED RELEASE ORAL at 10:02

## 2024-04-01 RX ADMIN — POLYETHYLENE GLYCOL 3350 17 G: 17 POWDER, FOR SOLUTION ORAL at 10:02

## 2024-04-01 RX ADMIN — ROPINIROLE HYDROCHLORIDE 5 MG: 1 TABLET, FILM COATED ORAL at 20:14

## 2024-04-01 RX ADMIN — PANTOPRAZOLE SODIUM 40 MG: 40 TABLET, DELAYED RELEASE ORAL at 10:10

## 2024-04-01 RX ADMIN — METOPROLOL SUCCINATE 25 MG: 25 TABLET, EXTENDED RELEASE ORAL at 10:01

## 2024-04-01 RX ADMIN — SERTRALINE 50 MG: 50 TABLET, FILM COATED ORAL at 10:02

## 2024-04-01 RX ADMIN — IPRATROPIUM BROMIDE AND ALBUTEROL SULFATE 3 ML: 2.5; .5 SOLUTION RESPIRATORY (INHALATION) at 13:03

## 2024-04-01 RX ADMIN — IPRATROPIUM BROMIDE AND ALBUTEROL SULFATE 3 ML: 2.5; .5 SOLUTION RESPIRATORY (INHALATION) at 19:59

## 2024-04-01 ASSESSMENT — COGNITIVE AND FUNCTIONAL STATUS - GENERAL
MOVING FROM LYING ON BACK TO SITTING ON SIDE OF FLAT BED WITH BEDRAILS: A LITTLE
WALKING IN HOSPITAL ROOM: A LITTLE
HELP NEEDED FOR BATHING: A LOT
DRESSING REGULAR UPPER BODY CLOTHING: A LITTLE
MOBILITY SCORE: 18
TOILETING: A LOT
HELP NEEDED FOR BATHING: A LOT
DAILY ACTIVITIY SCORE: 15
MOVING TO AND FROM BED TO CHAIR: A LITTLE
DRESSING REGULAR LOWER BODY CLOTHING: A LOT
PERSONAL GROOMING: A LITTLE
TOILETING: A LOT
EATING MEALS: A LITTLE
CLIMB 3 TO 5 STEPS WITH RAILING: A LITTLE
DRESSING REGULAR LOWER BODY CLOTHING: A LOT
EATING MEALS: A LITTLE
DAILY ACTIVITIY SCORE: 15
STANDING UP FROM CHAIR USING ARMS: A LITTLE
PERSONAL GROOMING: A LITTLE
DRESSING REGULAR UPPER BODY CLOTHING: A LITTLE
TURNING FROM BACK TO SIDE WHILE IN FLAT BAD: A LITTLE

## 2024-04-01 ASSESSMENT — PAIN SCALES - GENERAL
PAINLEVEL_OUTOF10: 0 - NO PAIN
PAINLEVEL_OUTOF10: 0 - NO PAIN

## 2024-04-01 ASSESSMENT — PAIN - FUNCTIONAL ASSESSMENT
PAIN_FUNCTIONAL_ASSESSMENT: 0-10
PAIN_FUNCTIONAL_ASSESSMENT: 0-10

## 2024-04-01 ASSESSMENT — ACTIVITIES OF DAILY LIVING (ADL): HOME_MANAGEMENT_TIME_ENTRY: 30

## 2024-04-01 NOTE — PROGRESS NOTES
"Kalie Ramos is a 89 y.o. female on day 3 of admission presenting with Generalized weakness.    Subjective   Patient is feeling better.  No swelling in the legs.  Breathing is doing better.  Remains quite weak       Objective     Physical Exam  Vitals reviewed.   Constitutional:       Appearance: Normal appearance.   HENT:      Head: Normocephalic and atraumatic.      Right Ear: Tympanic membrane, ear canal and external ear normal.      Left Ear: Tympanic membrane, ear canal and external ear normal.      Nose: Nose normal.      Mouth/Throat:      Pharynx: Oropharynx is clear.   Eyes:      Extraocular Movements: Extraocular movements intact.      Conjunctiva/sclera: Conjunctivae normal.      Pupils: Pupils are equal, round, and reactive to light.   Cardiovascular:      Rate and Rhythm: Normal rate and regular rhythm.      Pulses: Normal pulses.      Heart sounds: Normal heart sounds.   Pulmonary:      Effort: Pulmonary effort is normal.      Breath sounds: Normal breath sounds.   Abdominal:      General: Abdomen is flat. Bowel sounds are normal.      Palpations: Abdomen is soft.   Musculoskeletal:      Cervical back: Normal range of motion and neck supple.   Skin:     General: Skin is warm and dry.   Neurological:      General: No focal deficit present.      Mental Status: She is alert and oriented to person, place, and time.   Psychiatric:         Mood and Affect: Mood normal.         Last Recorded Vitals  Blood pressure 133/61, pulse 72, temperature 36.5 °C (97.7 °F), temperature source Oral, resp. rate 18, height 1.575 m (5' 2\"), weight (!) 43.1 kg (95 lb), SpO2 98 %.  Intake/Output last 3 Shifts:  I/O last 3 completed shifts:  In: 640 (14.9 mL/kg) [P.O.:640]  Out: - (0 mL/kg)   Weight: 43.1 kg     Relevant Results              Scheduled medications  aspirin, 325 mg, oral, Daily  atorvastatin, 10 mg, oral, Nightly  calcitonin (salmon), 1 spray, One Nostril, Daily  cefTRIAXone, 1 g, intravenous, " q24h  docusate sodium, 100 mg, oral, BID  [START ON 4/2/2024] furosemide, 40 mg, oral, Daily  heparin (porcine), 5,000 Units, subcutaneous, q8h  ipratropium-albuteroL, 3 mL, nebulization, TID  lidocaine, 1 patch, transdermal, Daily  losartan, 50 mg, oral, Daily  magnesium oxide, 400 mg, oral, Once per day on Mon Wed Fri  melatonin, 3 mg, oral, Nightly  metoprolol succinate XL, 25 mg, oral, Daily  oxygen, 2 L/min, inhalation, q24h  pantoprazole, 40 mg, oral, Daily   Or  pantoprazole, 40 mg, intravenous, Daily  polyethylene glycol, 17 g, oral, Daily  [START ON 4/2/2024] potassium chloride CR, 20 mEq, oral, Daily  rOPINIRole, 5 mg, oral, Nightly  sertraline, 50 mg, oral, Daily      Continuous medications     PRN medications  PRN medications: acetaminophen **OR** acetaminophen **OR** acetaminophen, benzocaine-menthol, dextromethorphan-guaifenesin, guaiFENesin, ipratropium-albuteroL, nitroglycerin, ondansetron ODT **OR** ondansetron, traZODone  Results for orders placed or performed during the hospital encounter of 03/29/24 (from the past 24 hour(s))   Basic Metabolic Panel   Result Value Ref Range    Glucose 101 (H) 65 - 99 mg/dL    Sodium 145 133 - 145 mmol/L    Potassium 4.5 3.4 - 5.1 mmol/L    Chloride 105 97 - 107 mmol/L    Bicarbonate 34 (H) 24 - 31 mmol/L    Urea Nitrogen 15 8 - 25 mg/dL    Creatinine 0.80 0.40 - 1.60 mg/dL    eGFR 68 >60 mL/min/1.73m*2    Calcium 8.6 8.5 - 10.4 mg/dL    Anion Gap 6 <=19 mmol/L   Basic Metabolic Panel   Result Value Ref Range    Glucose 113 (H) 65 - 99 mg/dL    Sodium 144 133 - 145 mmol/L    Potassium 4.4 3.4 - 5.1 mmol/L    Chloride 103 97 - 107 mmol/L    Bicarbonate 31 24 - 31 mmol/L    Urea Nitrogen 15 8 - 25 mg/dL    Creatinine 0.80 0.40 - 1.60 mg/dL    eGFR 68 >60 mL/min/1.73m*2    Calcium 8.8 8.5 - 10.4 mg/dL    Anion Gap 10 <=19 mmol/L     No results found.            Malnutrition Diagnosis Status: New  Malnutrition Diagnosis: Severe malnutrition related to chronic disease  or condition  As Evidenced by: probable wt loss, mod to severe muscle and fat depletion  I agree with the dietitian's malnutrition diagnosis.      Assessment/Plan   Principal Problem:    Generalized weakness  Active Problems:    Chest pain    Hyperlipidemia    Hypertension    UTI (urinary tract infection)    Chronic renal failure syndrome    Hypokalemia    Peripheral venous insufficiency    Aortic valve stenosis    Chronic obstructive pulmonary disease (CMS/HCC)    Gastroesophageal reflux disease    Hypernatremia    Hypernatremia is doing better  Lasix resumed at lower dose  Blood pressure stable  Breathing stable  Monitor labs tomorrow if stable plan discharge tomorrow         I spent  minutes in the professional and overall care of this patient.      Nakia Bailey MD

## 2024-04-01 NOTE — PROGRESS NOTES
04/01/24 9442   [facility] Disability Status   Are you deaf or do you have serious difficulty hearing? Y  (wears hearing aides)   Are you blind or do you have serious difficulty seeing, even when wearing glasses? N   Because of a physical, mental, or emotional condition, do you have serious difficulty concentrating, remembering, or making decisions? (5 years old or older) N   Do you have serious difficulty walking or climbing stairs? Y  (lives in an assisted living)   Do you have serious difficulty dressing or bathing? N   Because of a physical, mental, or emotional condition, do you have serious difficulty doing errands alone such as visiting the doctor? N

## 2024-04-01 NOTE — PROGRESS NOTES
Occupational Therapy    OT Treatment    Patient Name: Kalie Ramos  MRN: [id number]  Today's Date: 4/1/2024  Time Calculation  Start Time: 1315  Stop Time: 1400  Time Calculation (min): 45 min         Assessment:  OT Assessment: Pt highly motiovated with all requested tasks progressing with established POC.  Will continue with skilled therapeutic intervention to address remaiing deficits  Prognosis: Good  Barriers to Discharge: None  Evaluation/Treatment Tolerance: Patient tolerated treatment well  Medical Staff Made Aware: Yes  End of Session Communication: Bedside nurse  End of Session Patient Position: Bed, 3 rail up, Alarm on (call light in reach all needs met)  OT Assessment Results: Decreased ADL status, Decreased upper extremity strength, Decreased safe judgment during ADL, Decreased endurance, Decreased functional mobility  Prognosis: Good  Barriers to Discharge: None  Evaluation/Treatment Tolerance: Patient tolerated treatment well  Medical Staff Made Aware: Yes  Strengths: Ability to acquire knowledge, Attitude of self, Premorbid level of function, Support of extended family/friends  Barriers to Participation: Comorbidities  Plan:  Treatment Interventions: ADL retraining, Functional transfer training, Endurance training, Compensatory technique education, Equipment evaluation/education  OT Frequency: 2 times per week  OT Discharge Recommendations: Low intensity level of continued care, 24 hr supervision due to cognition  Equipment Recommended upon Discharge:  (rollator)  OT Recommended Transfer Status: Assist of 1  OT - OK to Discharge: Yes  Treatment Interventions: ADL retraining, Functional transfer training, Endurance training, Compensatory technique education, Equipment evaluation/education    Subjective   Previous Visit Info:  OT Last Visit  OT Received On: 04/01/24  General:  General  Reason for Referral: Decreased ADLs  Referred By: Dr. Bailey  Past Medical History Relevant to Rehab: anemia, neck  pain, CHF, COPD, depression, hip fx, neuropathy  Missed Visit: No  Family/Caregiver Present: No  Patient Position Received: Up in chair, Alarm on  Preferred Learning Style: verbal  General Comment: Confered with NSG.  Pt agreeable to [id number] therapeutic iuntervention  Precautions:  Hearing/Visual Limitations: very [place]  LE Weight Bearing Status: Weight Bearing as Tolerated  Medical Precautions: Fall precautions, Oxygen therapy device and L/min (2LO2 NC)  Vital Signs:     Pain:  Pain Assessment  Pain Assessment: 0-10  Pain Score: 0 - No pain  Objective    Cognition:  Cognition  Overall Cognitive Status: Within Functional Limits  Orientation Level: Oriented X4  Coordination:  Movements are Fluid and Coordinated: Yes  Activities of Daily Living: Feeding  Feeding Level of Assistance: Setup  Feeding Where Assessed: Bed level  Feeding Comments: Pt consume beverage self feed thickened  Grooming  Grooming Level of Assistance: Setup  Grooming Where Assessed: Chair  Grooming Comments: Pt brushing teeth  use thickened liquid items in reach  LE Dressing  Sock Level of Assistance: Close supervision (Pt using figure four doff/don socks S to avoid over reaching)  Adult Briefs Level of Assistance: Maximum assistance  LE Dressing Where Assessed: Chair, Toilet  LE Dressing Comments: Pt highly motivated vc techniique    Toileting  Toileting Level of Assistance: Maximum assistance  Where Assessed: Toilet  Toileting Comments: assist doff/partially don brief assist hygiene after BM  Functional Standing Tolerance:  Time: 2 minutes  Activity: stance phase ADL skills  Functional Standing Tolerance Comments: BUE support Rollator  Bed Mobility/Transfers: Bed Mobility  Bed Mobility: Yes  Bed Mobility 1  Bed Mobility 1: Sitting to supine, Rolling left  Level of Assistance 1: Close supervision  Bed Mobility Comments 1: HOB elevagted 35' use side teransfer bar    Transfers  Transfer: Yes  Transfer 1  Transfer From 1: Chair with arms to  Transfer  "to 1: Stand  Technique 1: Sit to stand  Transfer Device 1:  (rollator)  Transfer Level of Assistance 1: Minimum assistance  Trials/Comments 1: vc hand placement  Transfers 2  Transfer From 2: Stand to  Transfer to 2: Bed  Technique 2: Stand to sit  Transfer Device 2:  (Rollator)  Transfer Level of Assistance 2: Minimum assistance  Trials/Comments 2: vc hand placement    Toilet Transfers  Toilet Transfer From: Other (Comment) (Rollator)  Toilet Transfer Type: To  Toilet Transfer to: Standard toilet  Toilet Transfer Technique: Ambulating  Toilet Transfers: Minimal assistance, Moderate assistance  Toilet Transfers Comments: Mod A stance vc wiht controlled descent Pt state \"I like to just fallinto the toilet\" educated Pt with safety in transfer  Functional Mobility:  Functional Mobility  Functional Mobility Performed: Yes  Functional Mobility 1  Surface 1: Level tile  Device 1: Rollator  Functional Mobility Support Devices: Gait belt  Assistance 1: Minimum assistance  Quality of Functional Mobility 1: Diminished heel strike  Comments 1: 15' x 2 to include doorway, backing and turns  Therapy/Activity:    Therapeutic Activity  Therapeutic Activity Performed: Yes  Therapeutic Activity 1: Instructed Pt with deep breathing application energy conservation principles application pacing and grouping of tasks to ADL skills  Outcome Measures:[facility] Daily Activity  Putting on and taking off regular lower body clothing: A lot  Bathing (including washing, rinsing, drying): A lot  Putting on and taking off regular upper body clothing: A little  Toileting, which includes using toilet, bedpan or urinal: A lot  Taking care of personal grooming such as brushing teeth: A little  Eating Meals: A little  Daily Activity - Total Score: 15      OP EDUCATION:     Goals:  Encounter Problems       Encounter Problems (Active)       OT Goals       ADLs (Progressing)       Start:  03/30/24    Expected End:  04/13/24       Patient will perform ADLs " with supervision using AE/compensatory techniques as needed.          Functional Transfers (Progressing)       Start:  03/30/24    Expected End:  04/13/24       Patient will perform bed, chair, toilet transfers with distant supervision using LRD.         Activity Tolerance (Progressing)       Start:  03/30/24    Expected End:  04/13/24       Patient will demonstrate improved activity tolerance AEB completing functional task for >/= 15 minutes while remaining hemodynamically stable.

## 2024-04-01 NOTE — PROGRESS NOTES
Subjective Data:      Overnight Events:         Objective Data:  Last Recorded Vitals:  Vitals:    03/31/24 1925 03/31/24 2325 04/01/24 0739 04/01/24 0744   BP:  (!) 117/48 133/61    BP Location:  Right arm Right arm    Patient Position:  Lying Lying    Pulse:  74 72    Resp:  17 18    Temp:  37 °C (98.6 °F) 36.5 °C (97.7 °F)    TempSrc:  Oral Oral    SpO2: 96% 96% 100% 99%   Weight:       Height:           Last Labs:  CBC - 3/31/2024:  4:53 AM  7.9 11.7 204    38.2      CMP - 4/1/2024:  4:49 AM  8.6 5.5 17 --- 0.6   _ 2.8 9 132      PTT - No results in last year.  _   _ _     HGBA1C   Date/Time Value Ref Range Status   05/23/2022 10:43 PM 5.7 4.0 - 6.0 % Final     Comment:     Hemoglobin A1C levels are related to mean blood glucose during the   preceding 2-3 months. The relationship table below may be used as a   general guide. Each 1% increase in HGB A1C is a reflection of an   increase in mean glucose of approximately 30 mg/dl.   Reference: Diabetes Care, volume 29, supplement 1 Jan. 2006                        HGB A1C ................. Approx. Mean Glucose   _______________________________________________   6%   ...............................  120 mg/dl   7%   ...............................  150 mg/dl   8%   ...............................  180 mg/dl   9%   ...............................  210 mg/dl   10%  ...............................  240 mg/dl  Performed at [address] [place] [address]     12/17/2020 03:16 PM 5.7 4.0 - 6.0 % Final     Comment:     Hemoglobin A1C levels are related to mean blood glucose during the   preceding 2-3 months. The relationship table below may be used as a   general guide. Each 1% increase in HGB A1C is a reflection of an   increase in mean glucose of approximately 30 mg/dl.   Reference: Diabetes Care, volume 29, supplement 1 Jan. 2006                        HGB A1C ................. Approx. Mean Glucose   _______________________________________________   6%    "...............................  120 mg/dl   7%   ...............................  150 mg/dl   8%   ...............................  180 mg/dl   9%   ...............................  210 mg/dl   10%  ...............................  240 mg/dl  Performed at [address] [address]       LDLCALC   Date/Time Value Ref Range Status   01/27/2023 06:04 AM 66 65 - 130 MG/DL Final   11/20/2022 02:47 AM 54 65 - 130 MG/DL Final   07/06/2022 01:13 AM 54 65 - 130 MG/DL Final     VLDL   Date/Time Value Ref Range Status   08/03/2018 09:41 AM 25 0 - 40 mg/dL Final   01/18/2018 02:30 PM 47 0 - 40 mg/dL Final   10/17/2017 11:18 AM 50 0 - 40 mg/dL Final      Last I/O:  I/O last 3 completed shifts:  In: 640 (14.9 mL/kg) [P.O.:640]  Out: - (0 mL/kg)   Weight: 43.1 kg     Past Cardiology Tests (Last 3 Years):  EKG:  ECG 12 lead 03/29/2024      ECG 12 lead 01/29/2024      ECG 12 lead 12/15/2023      Electrocardiogram, 12-lead PRN ACS symptoms 12/15/2023      ECG 12 lead       ECG 12 lead 12/14/2023      ECG 12 lead 11/17/2023      ECG 12 Lead       ECG 12 Lead       ECG 12 lead 10/24/2023    Echo:  No results found for this or any previous visit from the past 1095 days.    Ejection Fractions:  No results found for: \"EF\"  Cath:  No results found for this or any previous visit from the past 1095 days.    Stress Test:  No results found for this or any previous visit from the past 1095 days.    Cardiac Imaging:  No results found for this or any previous visit from the past 1095 days.      Inpatient Medications:  Scheduled medications   Medication Dose Route Frequency    aspirin  325 mg oral Daily    atorvastatin  10 mg oral Nightly    calcitonin (salmon)  1 spray One Nostril Daily    cefTRIAXone  1 g intravenous q24h    docusate sodium  100 mg oral BID    [Held by provider] furosemide  40 mg oral BID with meals    heparin (porcine)  5,000 Units subcutaneous q8h    ipratropium-albuteroL  3 mL nebulization TID    " lidocaine  1 patch transdermal Daily    losartan  50 mg oral Daily    magnesium oxide  400 mg oral Once per day on Mon Wed Fri    melatonin  3 mg oral Nightly    metoprolol succinate XL  25 mg oral Daily    oxygen  2 L/min inhalation q24h    pantoprazole  40 mg oral Daily    Or    pantoprazole  40 mg intravenous Daily    polyethylene glycol  17 g oral Daily    potassium chloride CR  20 mEq oral BID    rOPINIRole  5 mg oral Nightly    sertraline  50 mg oral Daily     PRN medications   Medication    acetaminophen    Or    acetaminophen    Or    acetaminophen    benzocaine-menthol    dextromethorphan-guaifenesin    guaiFENesin    ipratropium-albuteroL    nitroglycerin    ondansetron ODT    Or    ondansetron    traZODone     Continuous Medications   Medication Dose Last Rate       Physical Exam:  The patient is a upper elderly white female awake alert conversant not overtly dyspneic on nasal cannula visiting with daughter.  JVP not elevated carotid and pulses 2+  Moderately severe thoracic kyphosis shallow air movement breath sounds are absent one half of the posterior lung bases bilaterally  Cardiac rhythm irregular S1-S2 obscured grade 3/6 to 4/6 systolic ejection murmur  Abdomen is benign no paraspinal megaly no focal tenderness  No peripheral edema pedal pulses are present     Assessment/Plan     3/30: This patient is a 89-year-old white female with extensive issues that include coronary artery disease, severe aortic valvular stenosis, moderate tricuspid and mitral valve regurgitation, severe pulmonary hypertension, COPD with oxygen dependence, hypertension, left bundle branch block conduction delay, lower extremity edema due to venous insufficiency, chronic DVT left lower extremity femoral vein, laparoscopic paraesophageal hiatal hernia repair, ongoing aspiration pneumonias, nontoxic multinodular goiter.  The patient's echocardiogram on 7/25/2023 again demonstrated a LV ejection fraction 35-40% severe aortic valve  stenosis peak systolic gradient 56 mmHg, mild mitral moderate tricuspid valve regurgitation and severe pulmonary hypertension estimated PA systolic pressure 67 mmHg.  The patient was thought not to be a candidate for transcatheter aortic valve replacement.  She does wear continuous oxygen at 3 L/min at home.    The patient was recently admitted for several days on 10/23/2023 with increased shortness of breath and ultimately was released on Lasix 40 mg twice daily which she has reduced to daily.  Patient admitted again on 11/12/2023 after having fallen in the bathroom and not being able to get up due to generalized weakness.  She lived independently in her own condominium and had been very resistant to review our recommendation for assisted living.  At that time it was decided to continue the patient on her usual therapy which  at that point included Lasix 40 mg once daily along with her low-dose aspirin plus atorvastatin 40 mg daily and metoprolol succinate 25 mg twice daily.    Patient was admitted after a mechanical fall with a right femoral neck fracture and subsequently underwent right total hip replacement on 12/16/2023.  The patient was discharged to rehab facility on usual preadmission low-dose losartan 25 mg daily Toprol-XL 25 mg daily Lasix 40 mg daily with no anticoagulation due to where multiple falls.  Her atrial fibrillation was rate controlled with the low-dose Toprol-XL.  She was readmitted on 1/28/2024 with progressive increased shortness of breath with CTA of the chest demonstrating large bilateral pleural effusions and possible multifocal pneumonia.  The patient has been started on empiric antibiotic therapy with IV ceftriaxone and azithromycin.  She had been thought to be a high risk candidate for aspiration pneumonia in the past.  The pleural effusions presumably were related to her progressively more severe aortic valvular stenosis along with severe pulmonary hypertension.  Pulmonology was  consulted with respect to treatment of her possible pneumonia and to determine whether her respiratory status would benefit from thoracentesis by interventional radiology.  The patient ultimately did have a right-sided thoracentesis performed and her Lasix was increased to 40 mg twice daily.  She had a modified barium swallow performed and was cleared to have mechanical soft and nectar thick liquids with thin liquids under supervision.  She was sent to a skilled nursing facility on 2/3/2024 and is now readmitted because of some generalized weakness and slight change in mental status.  The patient is currently back to usual mental status alert oriented and conversant no respiratory distress on her O2 nasal cannula.  The initial chest x-ray on current admission showed small bilateral pleural effusions and only mild pulmonary vascular congestion with resolution of pulmonary edema that have been present during last admission.  She had a small amount of aspirated barium within the left lower lung zone again consistent with her risk at aspiration.  Her head CT did not demonstrate a new stroke and her hypernatremia is slowly improving with temporary moratorium of Lasix therapy.  In this regard her creatinine is actually decreased from 1.3-0.8 and the sodium is decreased from 151-148.  Will continue to hold Lasix for now.  Potassium supplementation is still being continued due to a hypokalemia potassium only 2.9 today.      3/31: Patient is sitting in bedside chair being assisted with cleaning no shortness of breath on standard O2 nasal cannula.  Electrolyte panel notable for persistent hypernatremia sodium 149.  Renal parameters stable creatinine 0.7.  Potassium is improved from 2.9-3.4 and will provide additional supplements today.  Agree with holding Lasix for another 24-48 hours due to the hypernatremia even though her proBNP is elevated.  Continue potassium supplementation.  The patient's chest x-ray from 3/29 showed  only tiny bilateral pleural effusions and mild pulmonary vascular congestion substantially improved in comparison to the last chest x-ray from 2/1/2024.    4/1: The patient is resting comfortably without any new complaints.  Renal parameters remain stable with a creatinine of 0.80 and the hypernatremia has resolved with a serum sodium of 145.  Will restart Lasix at a lower dose of 40 mg daily for now as opposed to twice daily.  She will require potassium supplementation.  Blood pressure is in the 120-130 mmHg range and will not require any added treatment to the losartan 50 mg daily and Toprol-XL 25 mg daily at this time    Peripheral IV 03/29/24 20 G Left Forearm (Active)   Site Assessment Clean;Dry;Intact 03/31/24 2100   Dressing Status Clean;Dry 03/31/24 2100   Number of days: 3       Code Status:  DNR and No Intubation and No ICU    I spent 20 minutes in the professional and overall care of this patient.        Jim Mckinney MD

## 2024-04-01 NOTE — PROGRESS NOTES
04/01/24 1246   Discharge Planning   Living Arrangements   (lives at [facility])   Support Systems Children;Family members   Assistance Needed Some assistance with mobilty   Type of Residence Assisted living   Do you have animals or pets at home? No   Care Facility Name [facility]   Home or Post Acute Services In home services   Type of Home Care Services Home PT   Patient expects to be discharged to: [facility] with Home PT     The patient is resting comfortably without any new complaints.   Per nephrology note: Renal parameters remain stable with a creatinine of 0.80 and the hypernatremia has resolved with a serum sodium of 145.   Will restart Lasix at a lower dose of 40 mg daily for now as opposed to twice daily. She will require potassium supplementation.     PLAN: Return to [facility] with PT    1330 Spoke with Jacquelin Connolly PT ([facility]) and requested that patient needs physical therapy. Asked that we send an outpatient PT referral/order and PT notes.

## 2024-04-02 ENCOUNTER — PHARMACY VISIT (OUTPATIENT)
Dept: PHARMACY | Facility: CLINIC | Age: 89
End: 2024-04-02
Payer: COMMERCIAL

## 2024-04-02 VITALS
BODY MASS INDEX: 17.48 KG/M2 | RESPIRATION RATE: 14 BRPM | WEIGHT: 95 LBS | DIASTOLIC BLOOD PRESSURE: 63 MMHG | HEIGHT: 62 IN | TEMPERATURE: 98.6 F | OXYGEN SATURATION: 97 % | HEART RATE: 75 BPM | SYSTOLIC BLOOD PRESSURE: 138 MMHG

## 2024-04-02 LAB
ANION GAP SERPL CALC-SCNC: 7 MMOL/L
BUN SERPL-MCNC: 13 MG/DL (ref 8–25)
CALCIUM SERPL-MCNC: 8.6 MG/DL (ref 8.5–10.4)
CHLORIDE SERPL-SCNC: 104 MMOL/L (ref 97–107)
CO2 SERPL-SCNC: 32 MMOL/L (ref 24–31)
CREAT SERPL-MCNC: 0.7 MG/DL (ref 0.4–1.6)
EGFRCR SERPLBLD CKD-EPI 2021: 80 ML/MIN/1.73M*2
ERYTHROCYTE [DISTWIDTH] IN BLOOD BY AUTOMATED COUNT: 16 % (ref 11.5–14.5)
GLUCOSE SERPL-MCNC: 112 MG/DL (ref 65–99)
HCT VFR BLD AUTO: 38.4 % (ref 36–46)
HGB BLD-MCNC: 12 G/DL (ref 12–16)
MCH RBC QN AUTO: 29.1 PG (ref 26–34)
MCHC RBC AUTO-ENTMCNC: 31.3 G/DL (ref 32–36)
MCV RBC AUTO: 93 FL (ref 80–100)
NRBC BLD-RTO: 0 /100 WBCS (ref 0–0)
PLATELET # BLD AUTO: 218 X10*3/UL (ref 150–450)
POTASSIUM SERPL-SCNC: 4.9 MMOL/L (ref 3.4–5.1)
RBC # BLD AUTO: 4.13 X10*6/UL (ref 4–5.2)
SODIUM SERPL-SCNC: 143 MMOL/L (ref 133–145)
WBC # BLD AUTO: 11.5 X10*3/UL (ref 4.4–11.3)

## 2024-04-02 PROCEDURE — 2500000004 HC RX 250 GENERAL PHARMACY W/ HCPCS (ALT 636 FOR OP/ED): Performed by: INTERNAL MEDICINE

## 2024-04-02 PROCEDURE — 2500000001 HC RX 250 WO HCPCS SELF ADMINISTERED DRUGS (ALT 637 FOR MEDICARE OP): Performed by: INTERNAL MEDICINE

## 2024-04-02 PROCEDURE — 94640 AIRWAY INHALATION TREATMENT: CPT

## 2024-04-02 PROCEDURE — 97116 GAIT TRAINING THERAPY: CPT | Mod: GP,CQ

## 2024-04-02 PROCEDURE — 99239 HOSP IP/OBS DSCHRG MGMT >30: CPT | Performed by: INTERNAL MEDICINE

## 2024-04-02 PROCEDURE — 2500000005 HC RX 250 GENERAL PHARMACY W/O HCPCS: Performed by: INTERNAL MEDICINE

## 2024-04-02 PROCEDURE — 80048 BASIC METABOLIC PNL TOTAL CA: CPT | Performed by: INTERNAL MEDICINE

## 2024-04-02 PROCEDURE — 2500000002 HC RX 250 W HCPCS SELF ADMINISTERED DRUGS (ALT 637 FOR MEDICARE OP, ALT 636 FOR OP/ED): Performed by: INTERNAL MEDICINE

## 2024-04-02 PROCEDURE — RXMED WILLOW AMBULATORY MEDICATION CHARGE

## 2024-04-02 PROCEDURE — 97110 THERAPEUTIC EXERCISES: CPT | Mod: GP,CQ

## 2024-04-02 PROCEDURE — 36415 COLL VENOUS BLD VENIPUNCTURE: CPT | Performed by: INTERNAL MEDICINE

## 2024-04-02 PROCEDURE — 99232 SBSQ HOSP IP/OBS MODERATE 35: CPT | Performed by: INTERNAL MEDICINE

## 2024-04-02 PROCEDURE — 9420000001 HC RT PATIENT EDUCATION 5 MIN

## 2024-04-02 PROCEDURE — 85027 COMPLETE CBC AUTOMATED: CPT | Performed by: INTERNAL MEDICINE

## 2024-04-02 PROCEDURE — 94760 N-INVAS EAR/PLS OXIMETRY 1: CPT

## 2024-04-02 RX ORDER — ASPIRIN 81 MG/1
81 TABLET ORAL DAILY
Status: DISCONTINUED | OUTPATIENT
Start: 2024-04-03 | End: 2024-04-02 | Stop reason: HOSPADM

## 2024-04-02 RX ORDER — FUROSEMIDE 40 MG/1
40 TABLET ORAL DAILY
Start: 2024-04-03

## 2024-04-02 RX ORDER — POTASSIUM CHLORIDE 750 MG/1
10 TABLET, FILM COATED, EXTENDED RELEASE ORAL DAILY
Status: DISCONTINUED | OUTPATIENT
Start: 2024-04-03 | End: 2024-04-02 | Stop reason: HOSPADM

## 2024-04-02 RX ORDER — LOSARTAN POTASSIUM 50 MG/1
50 TABLET ORAL DAILY
Qty: 30 TABLET | Refills: 0 | Status: SHIPPED | OUTPATIENT
Start: 2024-04-03

## 2024-04-02 RX ORDER — CEPHALEXIN 500 MG/1
500 CAPSULE ORAL 3 TIMES DAILY
Qty: 15 CAPSULE | Refills: 0 | Status: SHIPPED | OUTPATIENT
Start: 2024-04-02 | End: 2024-05-27 | Stop reason: ALTCHOICE

## 2024-04-02 RX ORDER — ASPIRIN 81 MG/1
81 TABLET ORAL DAILY
Start: 2024-04-03

## 2024-04-02 RX ORDER — POTASSIUM CHLORIDE 750 MG/1
10 TABLET, FILM COATED, EXTENDED RELEASE ORAL DAILY
Qty: 30 TABLET | Refills: 0 | Status: SHIPPED | OUTPATIENT
Start: 2024-04-03

## 2024-04-02 RX ADMIN — CALCITONIN SALMON 1 SPRAY: 200 SPRAY, METERED NASAL at 09:47

## 2024-04-02 RX ADMIN — LOSARTAN POTASSIUM 50 MG: 50 TABLET, FILM COATED ORAL at 09:49

## 2024-04-02 RX ADMIN — METOPROLOL SUCCINATE 25 MG: 25 TABLET, EXTENDED RELEASE ORAL at 09:49

## 2024-04-02 RX ADMIN — HEPARIN SODIUM 5000 UNITS: 5000 INJECTION, SOLUTION INTRAVENOUS; SUBCUTANEOUS at 04:55

## 2024-04-02 RX ADMIN — IPRATROPIUM BROMIDE AND ALBUTEROL SULFATE 3 ML: 2.5; .5 SOLUTION RESPIRATORY (INHALATION) at 12:09

## 2024-04-02 RX ADMIN — FUROSEMIDE 40 MG: 40 TABLET ORAL at 09:49

## 2024-04-02 RX ADMIN — IPRATROPIUM BROMIDE AND ALBUTEROL SULFATE 3 ML: 2.5; .5 SOLUTION RESPIRATORY (INHALATION) at 08:38

## 2024-04-02 RX ADMIN — Medication 2 L/MIN: at 07:00

## 2024-04-02 RX ADMIN — PANTOPRAZOLE SODIUM 40 MG: 40 TABLET, DELAYED RELEASE ORAL at 09:51

## 2024-04-02 RX ADMIN — HEPARIN SODIUM 5000 UNITS: 5000 INJECTION, SOLUTION INTRAVENOUS; SUBCUTANEOUS at 13:54

## 2024-04-02 RX ADMIN — Medication 2 L/MIN: at 15:00

## 2024-04-02 RX ADMIN — CEFTRIAXONE SODIUM 1 G: 1 INJECTION, SOLUTION INTRAVENOUS at 09:50

## 2024-04-02 RX ADMIN — SERTRALINE 50 MG: 50 TABLET, FILM COATED ORAL at 09:50

## 2024-04-02 ASSESSMENT — PAIN - FUNCTIONAL ASSESSMENT: PAIN_FUNCTIONAL_ASSESSMENT: 0-10

## 2024-04-02 ASSESSMENT — COGNITIVE AND FUNCTIONAL STATUS - GENERAL
WALKING IN HOSPITAL ROOM: A LITTLE
MOVING TO AND FROM BED TO CHAIR: A LITTLE
MOVING FROM LYING ON BACK TO SITTING ON SIDE OF FLAT BED WITH BEDRAILS: A LITTLE
TURNING FROM BACK TO SIDE WHILE IN FLAT BAD: A LITTLE
CLIMB 3 TO 5 STEPS WITH RAILING: TOTAL
MOBILITY SCORE: 16
STANDING UP FROM CHAIR USING ARMS: A LITTLE

## 2024-04-02 ASSESSMENT — PAIN SCALES - GENERAL
PAINLEVEL_OUTOF10: 0 - NO PAIN
PAINLEVEL_OUTOF10: 0 - NO PAIN

## 2024-04-02 NOTE — PROGRESS NOTES
"Nutrition Follow up Note    Nutrition Assessment      Plan for discharge back to [facility]TC. No significant changes in po intake.     Nutrition History:  Energy Intake: Fair 50-75 %    Anthropometrics:  Ht: 157.5 cm (5' 2\"), Wt: (!) 43.1 kg (95 lb), BMI: 17.37    Weight Change:  Daily Weight  03/29/24 : (!) 43.1 kg (95 lb)  03/28/24 : (!) 43.2 kg (95 lb 3.2 oz)  02/01/24 : 55.2 kg (121 lb 11.1 oz)  01/22/24 : 48.5 kg (107 lb)  12/14/23 : 53.3 kg (117 lb 8.1 oz)  12/01/23 : 53.3 kg (117 lb 8 oz)  11/27/23 : 52.2 kg (115 lb)  11/12/23 : 53.2 kg (117 lb 4.6 oz)  11/06/23 : 53.5 kg (118 lb)  10/22/23 : 55.3 kg (121 lb 14.6 oz)     Weight History / % Weight Change: wt from 2/1/24 likely inaccurate.  Significant Weight Loss: Yes  Interpretation of Weight Loss:  (12# (11.2%) wt loss over ~2 months (1/22-3/28))    Nutrition Focused Physical Exam Findings: assessed 3/30/24  Subcutaneous Fat Loss  Orbital Fat Pads: Mild-Moderate (slight dark circles and slight hollowing)  Buccal Fat Pads: Mild-Moderate (flat cheeks, minimal bounce)  Triceps: Mild-Moderate (less than ample fat tissue)    Muscle Wasting  Temporalis: Severe (hollowed scooping depression)  Pectoralis (Clavicular Region): Severe (protruding prominent clavicle)  Deltoid/Trapezius: Severe (squared shoulders, acromion process prominent)  Interosseous: Severe (depressed area between thumb and forefinger)    Nutrition Significant Labs:  Lab Results   Component Value Date    WBC 11.5 (H) 04/02/2024    HGB 12.0 04/02/2024    HCT 38.4 04/02/2024     04/02/2024    CHOL 137 01/27/2023    TRIG 74 01/27/2023    HDL 56 01/27/2023    ALT 9 03/30/2024    AST 17 03/30/2024     04/02/2024    K 4.9 04/02/2024     04/02/2024    CREATININE 0.70 04/02/2024    BUN 13 04/02/2024    CO2 32 (H) 04/02/2024    TSH 0.87 09/05/2023    INR 1.0 01/26/2023    HGBA1C 5.7 05/23/2022       Current Facility-Administered Medications:     acetaminophen (Tylenol) tablet 650 mg, 650 mg, " oral, q4h PRN **OR** acetaminophen (Tylenol) oral liquid 650 mg, 650 mg, oral, q4h PRN **OR** acetaminophen (Tylenol) suppository 650 mg, 650 mg, rectal, q4h PRN, Nakia Bailey MD    aspirin EC tablet 325 mg, 325 mg, oral, Daily, Nakia Bailey MD, 325 mg at 04/01/24 0900    atorvastatin (Lipitor) tablet 10 mg, 10 mg, oral, Nightly, Nakia Bailey MD, 10 mg at 04/01/24 2014    benzocaine-menthol (Cepastat Sore Throat) 15-3.6 mg lozenge 1 lozenge, 1 lozenge, Mouth/Throat, q2h PRN, Nakia Bailey MD    calcitonin (salmon) (Miacalcin) nasal spray 1 spray, 1 spray, One Nostril, Daily, Nakia Bailey MD, 1 spray at 04/01/24 1048    cefTRIAXone (Rocephin) IVPB 1 g, 1 g, intravenous, q24h, Nakia Bailey MD, Stopped at 04/01/24 1042    dextromethorphan-guaifenesin (Robitussin DM)  mg/5 mL oral liquid 5 mL, 5 mL, oral, q4h PRN, Nakia Bailey MD    docusate sodium (Colace) capsule 100 mg, 100 mg, oral, BID, Nakia Bailey MD, 100 mg at 03/31/24 2018    furosemide (Lasix) tablet 40 mg, 40 mg, oral, Daily, Jim Mckinney MD    guaiFENesin (Mucinex) 12 hr tablet 600 mg, 600 mg, oral, q12h PRN, Nakia Bailey MD    heparin (porcine) injection 5,000 Units, 5,000 Units, subcutaneous, q8h, Nakia Bailey MD, 5,000 Units at 04/02/24 0455    ipratropium-albuteroL (Duo-Neb) 0.5-2.5 mg/3 mL nebulizer solution 3 mL, 3 mL, nebulization, TID, Nakia Bailey MD, 3 mL at 04/02/24 0838    ipratropium-albuteroL (Duo-Neb) 0.5-2.5 mg/3 mL nebulizer solution 3 mL, 3 mL, nebulization, q2h PRN, Nakia Bailey MD    lidocaine 4 % patch 1 patch, 1 patch, transdermal, Daily, Nakia Bailey MD, 1 patch at 03/29/24 1719    losartan (Cozaar) tablet 50 mg, 50 mg, oral, Daily, Jim Mckinney MD, 50 mg at 04/01/24 1001    magnesium oxide (Mag-Ox) tablet 400 mg, 400 mg, oral, Once per day on Mon Wed Fri, Nakia Bailey MD, 400 mg at 04/01/24 1001    melatonin tablet 3 mg, 3 mg, oral, Nightly, Nakia Bailey MD, 3 mg at 04/01/24 2014    metoprolol succinate XL (Toprol-XL) 24  hr tablet 25 mg, 25 mg, oral, Daily, Nakia Bialey MD, 25 mg at 04/01/24 1001    nitroglycerin (Nitrostat) SL tablet 0.4 mg, 0.4 mg, sublingual, q5 min PRN, Nakia Bailey MD    ondansetron ODT (Zofran-ODT) disintegrating tablet 4 mg, 4 mg, oral, q8h PRN **OR** ondansetron (Zofran) injection 4 mg, 4 mg, intravenous, q8h PRN, Nakia Bailey MD    oxygen (O2) therapy, 2 L/min, inhalation, q8h, Nakia Bailey MD, 2 L/min at 04/02/24 0700    pantoprazole (ProtoNix) EC tablet 40 mg, 40 mg, oral, Daily, 40 mg at 04/01/24 1010 **OR** pantoprazole (ProtoNix) injection 40 mg, 40 mg, intravenous, Daily, Nakia Bailey MD    polyethylene glycol (Glycolax, Miralax) packet 17 g, 17 g, oral, Daily, Nakia Bailey MD, 17 g at 04/01/24 1002    potassium chloride CR (Klor-Con M20) ER tablet 20 mEq, 20 mEq, oral, Daily, Jim Mckinney MD    rOPINIRole (Requip) tablet 5 mg, 5 mg, oral, Nightly, Nakia Bailey MD, 5 mg at 04/01/24 2014    sertraline (Zoloft) tablet 50 mg, 50 mg, oral, Daily, Nakia Bailey MD, 50 mg at 04/01/24 1002    traZODone (Desyrel) tablet 50 mg, 50 mg, oral, Nightly PRN, Nakia Bailey MD    Dietary Orders (From admission, onward)       Start     Ordered    03/30/24 1543  Oral nutritional supplements  Until discontinued        Comments: vanilla   Question Answer Comment   Deliver with Breakfast    Deliver with Lunch    Deliver with Dinner    Select supplement: Sugar Free Mighty Shake        03/30/24 1542    03/29/24 1508  Adult diet 2-3 grams sodium  Diet effective now        Question:  Diet type  Answer:  2-3 grams sodium    03/29/24 1510                  Estimated Needs:   Estimated Energy Needs  Total Energy Estimated Needs (kCal):  (1207-3198)  Total Estimated Energy Need per Day (kCal/kg):  (30-35)  Method for Estimating Needs: actual wt    Estimated Protein Needs  Total Protein Estimated Needs (g): 52 g  Total Protein Estimated Needs (g/kg): 1.2 g/kg  Method for Estimating Needs: actual wt    Estimated Fluid Needs  Total  Fluid Estimated Needs (mL):  (1768-3445)  Total Fluid Estimated Needs (mL/kg): 1 mL/kg  Method for Estimating Needs: 1ml/kcal        Nutrition Diagnosis   Nutrition Diagnosis:  Malnutrition Diagnosis  Patient has Malnutrition Diagnosis: Yes  Diagnosis Status: Ongoing  Malnutrition Diagnosis: Severe malnutrition related to chronic disease or condition  As Evidenced by: 12# (11.2%) wt loss over ~2 months (1/22-3/28), moderate-severe subcutaneous fat loss and muscle wasting, po intake </= 75% estimated needs for >/= 1 month       Nutrition Interventions/Recommendations   Nutrition Interventions and Recommendations:    Nutrition Prescription:  Individualized Nutrition Prescription Provided for : 2691-6201 kcals and 52g protein to be provided via diet and supplements    Nutrition Interventions:   Food and/or Nutrient Delivery Interventions  Interventions: Meals and snacks, Medical food supplement  Meals and Snacks: Mineral-modified diet  Goal: provide as ordered  Medical Food Supplement: Modified beverage  Goal: mighty shake TID to provide 200 kcals and 7g protein each    Education Documentation  No documentation found.           Nutrition Monitoring and Evaluation   Monitoring/Evaluation:   Food/Nutrient Related History Monitoring  Monitoring and Evaluation Plan: Energy intake  Energy Intake: Estimated energy intake  Criteria: pt to consume >/= 75% estimated needs    Body Composition/Growth/Weight History  Monitoring and Evaluation Plan: Weight  Weight: Measured weight  Criteria: pt will maintain/gain wt       Time Spent/Follow-up:   Follow Up  Time Spent (min): 20 minutes  Last Date of Nutrition Visit: 04/02/24  Nutrition Follow-Up Needed?: 5-7 days  Follow up Comment: 4/8/24

## 2024-04-02 NOTE — PROGRESS NOTES
Subjective Data:      Overnight Events:         Objective Data:  Last Recorded Vitals:  Vitals:    04/02/24 0308 04/02/24 0700 04/02/24 0758 04/02/24 0838   BP: 136/62  138/63    BP Location: Right arm  Left arm    Patient Position: Lying  Lying    Pulse: 65  75    Resp: 18  14    Temp: 36.5 °C (97.7 °F)  37 °C (98.6 °F)    TempSrc: Oral  Oral    SpO2: 98% 98% 98% 98%   Weight:       Height:           Last Labs:  CBC - 4/2/2024:  4:20 AM  11.5 12.0 218    38.4      CMP - 4/2/2024:  4:20 AM  8.6 5.5 17 --- 0.6   _ 2.8 9 132      PTT - No results in last year.  _   _ _     HGBA1C   Date/Time Value Ref Range Status   05/23/2022 10:43 PM 5.7 4.0 - 6.0 % Final     Comment:     Hemoglobin A1C levels are related to mean blood glucose during the   preceding 2-3 months. The relationship table below may be used as a   general guide. Each 1% increase in HGB A1C is a reflection of an   increase in mean glucose of approximately 30 mg/dl.   Reference: Diabetes [employer], volume 29, supplement 1 Jan. 2006                        HGB A1C ................. Approx. Mean Glucose   _______________________________________________   6%   ...............................  120 mg/dl   7%   ...............................  150 mg/dl   8%   ...............................  180 mg/dl   9%   ...............................  210 mg/dl   10%  ...............................  240 mg/dl  Performed at [address] [place] [facility] [address]     12/17/2020 03:16 PM 5.7 4.0 - 6.0 % Final     Comment:     Hemoglobin A1C levels are related to mean blood glucose during the   preceding 2-3 months. The relationship table below may be used as a   general guide. Each 1% increase in HGB A1C is a reflection of an   increase in mean glucose of approximately 30 mg/dl.   Reference: Diabetes Care, volume 29, supplement 1 Jan. 2006                        HGB A1C ................. Approx. Mean Glucose   _______________________________________________   6%    "...............................  120 mg/dl   7%   ...............................  150 mg/dl   8%   ...............................  180 mg/dl   9%   ...............................  210 mg/dl   10%  ...............................  240 mg/dl  Performed at [address] [address]       LDLCALC   Date/Time Value Ref Range Status   01/27/2023 06:04 AM 66 65 - 130 MG/DL Final   11/20/2022 02:47 AM 54 65 - 130 MG/DL Final   07/06/2022 01:13 AM 54 65 - 130 MG/DL Final     VLDL   Date/Time Value Ref Range Status   08/03/2018 09:41 AM 25 0 - 40 mg/dL Final   01/18/2018 02:30 PM 47 0 - 40 mg/dL Final   10/17/2017 11:18 AM 50 0 - 40 mg/dL Final      Last I/O:  No intake/output data recorded.    Past Cardiology Tests (Last 3 Years):  EKG:  ECG 12 lead 03/29/2024      ECG 12 lead 01/29/2024      ECG 12 lead 12/15/2023      Electrocardiogram, 12-lead PRN ACS symptoms 12/15/2023      ECG 12 lead       ECG 12 lead 12/14/2023      ECG 12 lead 11/17/2023      ECG 12 Lead       ECG 12 Lead       ECG 12 lead 10/24/2023    Echo:  No results found for this or any previous visit from the past 1095 days.    Ejection Fractions:  No results found for: \"EF\"  Cath:  No results found for this or any previous visit from the past 1095 days.    Stress Test:  No results found for this or any previous visit from the past 1095 days.    Cardiac Imaging:  No results found for this or any previous visit from the past 1095 days.      Inpatient Medications:  Scheduled medications   Medication Dose Route Frequency    aspirin  325 mg oral Daily    atorvastatin  10 mg oral Nightly    calcitonin (salmon)  1 spray One Nostril Daily    cefTRIAXone  1 g intravenous q24h    docusate sodium  100 mg oral BID    furosemide  40 mg oral Daily    heparin (porcine)  5,000 Units subcutaneous q8h    ipratropium-albuteroL  3 mL nebulization TID    lidocaine  1 patch transdermal Daily    losartan  50 mg oral Daily    magnesium oxide  400 mg oral " Once per day on Mon Wed Fri    melatonin  3 mg oral Nightly    metoprolol succinate XL  25 mg oral Daily    oxygen  2 L/min inhalation q8h    pantoprazole  40 mg oral Daily    Or    pantoprazole  40 mg intravenous Daily    polyethylene glycol  17 g oral Daily    potassium chloride CR  20 mEq oral Daily    rOPINIRole  5 mg oral Nightly    sertraline  50 mg oral Daily     PRN medications   Medication    acetaminophen    Or    acetaminophen    Or    acetaminophen    benzocaine-menthol    dextromethorphan-guaifenesin    guaiFENesin    ipratropium-albuteroL    nitroglycerin    ondansetron ODT    Or    ondansetron    traZODone     Continuous Medications   Medication Dose Last Rate       Physical Exam:  The patient is a upper elderly white female awake alert conversant not overtly dyspneic on nasal cannula visiting with daughter.  JVP not elevated carotid and pulses 2+  Moderately severe thoracic kyphosis shallow air movement breath sounds are absent one half of the posterior lung bases bilaterally  Cardiac rhythm irregular S1-S2 obscured grade 3/6 to 4/6 systolic ejection murmur  Abdomen is benign no paraspinal megaly no focal tenderness  No peripheral edema pedal pulses are present     Assessment/Plan     3/30: This patient is a 89-year-old white female with extensive issues that include coronary artery disease, severe aortic valvular stenosis, moderate tricuspid and mitral valve regurgitation, severe pulmonary hypertension, COPD with oxygen dependence, hypertension, left bundle branch block conduction delay, lower extremity edema due to venous insufficiency, chronic DVT left lower extremity femoral vein, laparoscopic paraesophageal hiatal hernia repair, ongoing aspiration pneumonias, nontoxic multinodular goiter.  The patient's echocardiogram on 7/25/2023 again demonstrated a LV ejection fraction 35-40% severe aortic valve stenosis peak systolic gradient 56 mmHg, mild mitral moderate tricuspid valve regurgitation and  severe pulmonary hypertension estimated PA systolic pressure 67 mmHg.  The patient was thought not to be a candidate for transcatheter aortic valve replacement.  She does wear continuous oxygen at 3 L/min at home.    The patient was recently admitted for several days on 10/23/2023 with increased shortness of breath and ultimately was released on Lasix 40 mg twice daily which she has reduced to daily.  Patient admitted again on 11/12/2023 after having fallen in the bathroom and not being able to get up due to generalized weakness.  She lived independently in her own condominium and had been very resistant to review our recommendation for assisted living.  At that time it was decided to continue the patient on her usual therapy which  at that point included Lasix 40 mg once daily along with her low-dose aspirin plus atorvastatin 40 mg daily and metoprolol succinate 25 mg twice daily.    Patient was admitted after a mechanical fall with a right femoral neck fracture and subsequently underwent right total hip replacement on 12/16/2023.  The patient was discharged to rehab facility on usual preadmission low-dose losartan 25 mg daily Toprol-XL 25 mg daily Lasix 40 mg daily with no anticoagulation due to where multiple falls.  Her atrial fibrillation was rate controlled with the low-dose Toprol-XL.  She was readmitted on 1/28/2024 with progressive increased shortness of breath with CTA of the chest demonstrating large bilateral pleural effusions and possible multifocal pneumonia.  The patient has been started on empiric antibiotic therapy with IV ceftriaxone and azithromycin.  She had been thought to be a high risk candidate for aspiration pneumonia in the past.  The pleural effusions presumably were related to her progressively more severe aortic valvular stenosis along with severe pulmonary hypertension.  Pulmonology was consulted with respect to treatment of her possible pneumonia and to determine whether her  respiratory status would benefit from thoracentesis by interventional radiology.  The patient ultimately did have a right-sided thoracentesis performed and her Lasix was increased to 40 mg twice daily.  She had a modified barium swallow performed and was cleared to have mechanical soft and nectar thick liquids with thin liquids under supervision.  She was sent to a skilled nursing facility on 2/3/2024 and is now readmitted because of some generalized weakness and slight change in mental status.  The patient is currently back to usual mental status alert oriented and conversant no respiratory distress on her O2 nasal cannula.  The initial chest x-ray on current admission showed small bilateral pleural effusions and only mild pulmonary vascular congestion with resolution of pulmonary edema that have been present during last admission.  She had a small amount of aspirated barium within the left lower lung zone again consistent with her risk at aspiration.  Her head CT did not demonstrate a new stroke and her hypernatremia is slowly improving with temporary moratorium of Lasix therapy.  In this regard her creatinine is actually decreased from 1.3-0.8 and the sodium is decreased from 151-148.  Will continue to hold Lasix for now.  Potassium supplementation is still being continued due to a hypokalemia potassium only 2.9 today.      3/31: Patient is sitting in bedside chair being assisted with cleaning no shortness of breath on standard O2 nasal cannula.  Electrolyte panel notable for persistent hypernatremia sodium 149.  Renal parameters stable creatinine 0.7.  Potassium is improved from 2.9-3.4 and will provide additional supplements today.  Agree with holding Lasix for another 24-48 hours due to the hypernatremia even though her proBNP is elevated.  Continue potassium supplementation.  The patient's chest x-ray from 3/29 showed only tiny bilateral pleural effusions and mild pulmonary vascular congestion substantially  improved in comparison to the last chest x-ray from 2/1/2024.     4/1: The patient is resting comfortably without any new complaints.  Renal parameters remain stable with a creatinine of 0.80 and the hypernatremia has resolved with a serum sodium of 145.  Will restart Lasix at a lower dose of 40 mg daily for now as opposed to twice daily.  She will require potassium supplementation.  Blood pressure is in the 120-130 mmHg range and will not require any added treatment to the losartan 50 mg daily and Toprol-XL 25 mg daily at this time    4/2: Patient resting comfortably not short of breath on standard chronic low flow O2 nasal cannula.  Electrolytes remain satisfactory creatinine 0.70 sodium 143 potassium of 4.9.  Patient has been restarted on Lasix 40 mg daily rather than twice daily.  Will reduce potassium supplement.  CBC remains relatively unchanged hematocrit 30.4 WBC 11,500.  Patient remains on empiric IV Rocephin.  Possible discharge home today or tomorrow.    Peripheral IV 03/29/24 20 G Left Forearm (Active)   Site Assessment Clean;Dry;Intact 04/01/24 2020   Dressing Status Clean;Dry 04/01/24 2020   Number of days: 4       Code Status:  DNR and No Intubation and No ICU    I spent 20 minutes in the professional and overall care of this patient.        Jim Mckinney MD

## 2024-04-02 NOTE — DISCHARGE SUMMARY
Discharge Diagnosis  Generalized weakness    Issues Requiring Follow-Up  Congestive heart failure  Hypertension  Unsteady gait generalized weakness    Test Results Pending At Discharge  Pending Labs       No current pending labs.            Hospital Course   Kalie Ramos is a 89 y.o. female presenting with generalized weakness.  Patient presented to the emergency room from [facility] where she resides for evaluation of generalized malaise confusion and fall out of her chair.  Patient reportedly slipped out of her chair earlier in the day.  She is having some weakness malaise and confusion for past 2 days.  No injury.  No signs of infection.  No chest pain or shortness of breath   Treated for UTI.  Lasix held for hyponatremia.  Patient sodium was better.  Her dose of Lasix reduced to once a day.  She states in good condition.  She was weak all over.  Outpatient physical therapy arranged    Pertinent Physical Exam At Time of Discharge  Physical Exam  Vitals reviewed.   Constitutional:       Appearance: Normal appearance.   HENT:      Head: Normocephalic and atraumatic.      Right Ear: Tympanic membrane, ear canal and external ear normal.      Left Ear: Tympanic membrane, ear canal and external ear normal.      Nose: Nose normal.      Mouth/Throat:      Pharynx: Oropharynx is clear.   Eyes:      Extraocular Movements: Extraocular movements intact.      Conjunctiva/sclera: Conjunctivae normal.      Pupils: Pupils are equal, round, and reactive to light.   Cardiovascular:      Rate and Rhythm: Normal rate and regular rhythm.      Pulses: Normal pulses.      Heart sounds: Normal heart sounds.   Pulmonary:      Effort: Pulmonary effort is normal.      Breath sounds: Normal breath sounds.   Abdominal:      General: Abdomen is flat. Bowel sounds are normal.      Palpations: Abdomen is soft.   Musculoskeletal:      Cervical back: Normal range of motion and neck supple.   Skin:     General: Skin is warm and  dry.   Neurological:      General: No focal deficit present.      Mental Status: She is alert and oriented to person, place, and time.   Psychiatric:         Mood and Affect: Mood normal.         Home Medications     Medication List      START taking these medications     cephalexin 500 mg capsule; Commonly known as: Keflex; Take 1 capsule   (500 mg) by mouth 3 times a day.   potassium chloride CR 10 mEq ER tablet; Commonly known as: Klor-Con;   Take 1 tablet (10 mEq) by mouth once daily. Do not crush, chew, or split.   Do not start before April 3, 2024.; Start taking on: April 3, 2024     CHANGE how you take these medications     aspirin 81 mg EC tablet; Take 1 tablet (81 mg) by mouth once daily. Do   not start before April 3, 2024.; Start taking on: April 3, 2024; What   changed: medication strength, how much to take, when to take this   furosemide 40 mg tablet; Commonly known as: Lasix; Take 1 tablet (40 mg)   by mouth once daily. Do not start before April 3, 2024.; Start taking on:   April 3, 2024; What changed: when to take this   lidocaine 4 % patch; Place 1 patch over 12 hours on the skin once daily.   Remove & discard patch within 12 hours or as directed by MD. Do not start   before November 16, 2023.; What changed: additional instructions   losartan 50 mg tablet; Commonly known as: Cozaar; Take 1 tablet (50 mg)   by mouth once daily. Do not start before April 3, 2024.; Start taking on:   April 3, 2024; What changed: medication strength, how much to take     CONTINUE taking these medications     atorvastatin 10 mg tablet; Commonly known as: Lipitor; Take 1 tablet (10   mg) by mouth once daily at bedtime.   calcitonin (salmon) 200 unit/actuation nasal spray; Commonly known as:   Miacalcin; Administer 1 spray into one nostril once daily. Do not start   before October 28, 2023.   ipratropium-albuteroL 0.5-2.5 mg/3 mL nebulizer solution; Commonly known   as: Duo-Neb; Take 3 mL by nebulization 3 times a day.    magnesium oxide 400 mg (241.3 mg magnesium) tablet; Commonly known as:   Mag-Ox   melatonin 3 mg tablet   metoprolol succinate XL 25 mg 24 hr tablet; Commonly known as:   Toprol-XL; Take 1 tablet (25 mg) by mouth once daily. Do not crush or   chew. Do not start before February 4, 2024.   nitroglycerin 0.4 mg SL tablet; Commonly known as: Nitrostat; Place 1   tablet (0.4 mg) under the tongue every 5 minutes if needed for chest pain   (UP TO 3 TIMES. IF NO RELIEF CALL 911.).   oxygen gas therapy; Commonly known as: O2; Inhale 2 L/min once every 24   hours.   pantoprazole 40 mg EC tablet; Commonly known as: ProtoNix; TAKE 1 TABLET   DAILY   rOPINIRole 5 mg tablet; Commonly known as: Requip   sertraline 50 mg tablet; Commonly known as: Zoloft; Take 1 tablet (50   mg) by mouth once daily.   traZODone 50 mg tablet; Commonly known as: Desyrel; TAKE 1 TABLET AT   BEDTIME AS NEEDED FOR SLEEP     STOP taking these medications     magnesium hydroxide 400 mg/5 mL suspension; Commonly known as: Milk of   Magnesia       Outpatient Follow-Up  Future Appointments   Date Time Provider Department Center   5/8/2024  1:00 PM [id number]       Nakia Bailey MD

## 2024-04-02 NOTE — CARE PLAN
The patient's goals for the shift include to feel better    The clinical goals for the shift include no falls, safety    Problem: Respiratory  Goal: No signs of respiratory distress (eg. Use of accessory muscles. Peds grunting)  Outcome: Progressing  Goal: Patent airway maintained this shift  Outcome: Progressing     Problem: Safety  Goal: Patient will be injury free during hospitalization  Outcome: Progressing  Goal: I will remain free of falls  Outcome: Progressing     Problem: Skin  Goal: Decreased wound size/increased tissue granulation at next dressing change  Outcome: Progressing  Goal: Participates in plan/prevention/treatment measures  Outcome: Progressing  Goal: Prevent/manage excess moisture  Outcome: Progressing  Goal: Prevent/minimize sheer/friction injuries  Outcome: Progressing  Goal: Promote/optimize nutrition  Outcome: Progressing  Goal: Promote skin healing  Outcome: Progressing

## 2024-04-02 NOTE — PROGRESS NOTES
"Kalie Ramos is a 89 y.o. female on day 4 of admission presenting with Generalized weakness.    Subjective   Patient is feeling good no new complaints just generalized weakness       Objective     Physical Exam  Vitals reviewed.   Constitutional:       Appearance: Normal appearance.   HENT:      Head: Normocephalic and atraumatic.      Right Ear: Tympanic membrane, ear canal and external ear normal.      Left Ear: Tympanic membrane, ear canal and external ear normal.      Nose: Nose normal.      Mouth/Throat:      Pharynx: Oropharynx is clear.   Eyes:      Extraocular Movements: Extraocular movements intact.      Conjunctiva/sclera: Conjunctivae normal.      Pupils: Pupils are equal, round, and reactive to light.   Cardiovascular:      Rate and Rhythm: Normal rate and regular rhythm.      Pulses: Normal pulses.      Heart sounds: Normal heart sounds.   Pulmonary:      Effort: Pulmonary effort is normal.      Breath sounds: Normal breath sounds.   Abdominal:      General: Abdomen is flat. Bowel sounds are normal.      Palpations: Abdomen is soft.   Musculoskeletal:      Cervical back: Normal range of motion and neck supple.   Skin:     General: Skin is warm and dry.   Neurological:      General: No focal deficit present.      Mental Status: She is alert and oriented to person, place, and time.   Psychiatric:         Mood and Affect: Mood normal.         Last Recorded Vitals  Blood pressure 138/63, pulse 75, temperature 37 °C (98.6 °F), temperature source Oral, resp. rate 14, height 1.575 m (5' 2\"), weight (!) 43.1 kg (95 lb), SpO2 97 %.  Intake/Output last 3 Shifts:  No intake/output data recorded.    Relevant Results              Scheduled medications  [START ON 4/3/2024] aspirin, 81 mg, oral, Daily  atorvastatin, 10 mg, oral, Nightly  calcitonin (salmon), 1 spray, One Nostril, Daily  cefTRIAXone, 1 g, intravenous, q24h  docusate sodium, 100 mg, oral, BID  furosemide, 40 mg, oral, Daily  heparin (porcine), " 5,000 Units, subcutaneous, q8h  ipratropium-albuteroL, 3 mL, nebulization, TID  lidocaine, 1 patch, transdermal, Daily  losartan, 50 mg, oral, Daily  magnesium oxide, 400 mg, oral, Once per day on Mon Wed Fri  melatonin, 3 mg, oral, Nightly  metoprolol succinate XL, 25 mg, oral, Daily  oxygen, 2 L/min, inhalation, q8h  pantoprazole, 40 mg, oral, Daily   Or  pantoprazole, 40 mg, intravenous, Daily  polyethylene glycol, 17 g, oral, Daily  [START ON 4/3/2024] potassium chloride CR, 10 mEq, oral, Daily  rOPINIRole, 5 mg, oral, Nightly  sertraline, 50 mg, oral, Daily      Continuous medications     PRN medications  PRN medications: acetaminophen **OR** acetaminophen **OR** acetaminophen, benzocaine-menthol, dextromethorphan-guaifenesin, guaiFENesin, ipratropium-albuteroL, nitroglycerin, ondansetron ODT **OR** ondansetron, traZODone  Results for orders placed or performed during the hospital encounter of 03/29/24 (from the past 24 hour(s))   Basic Metabolic Panel   Result Value Ref Range    Glucose 112 (H) 65 - 99 mg/dL    Sodium 143 133 - 145 mmol/L    Potassium 4.9 3.4 - 5.1 mmol/L    Chloride 104 97 - 107 mmol/L    Bicarbonate 32 (H) 24 - 31 mmol/L    Urea Nitrogen 13 8 - 25 mg/dL    Creatinine 0.70 0.40 - 1.60 mg/dL    eGFR 80 >60 mL/min/1.73m*2    Calcium 8.6 8.5 - 10.4 mg/dL    Anion Gap 7 <=19 mmol/L   CBC   Result Value Ref Range    WBC 11.5 (H) 4.4 - 11.3 x10*3/uL    nRBC 0.0 0.0 - 0.0 /100 WBCs    RBC 4.13 4.00 - 5.20 x10*6/uL    Hemoglobin 12.0 12.0 - 16.0 g/dL    Hematocrit 38.4 36.0 - 46.0 %    MCV 93 80 - 100 fL    MCH 29.1 26.0 - 34.0 pg    MCHC 31.3 (L) 32.0 - 36.0 g/dL    RDW 16.0 (H) 11.5 - 14.5 %    Platelets 218 150 - 450 x10*3/uL     No results found.                 Assessment/Plan   Principal Problem:    Generalized weakness  Active Problems:    Chest pain    Hyperlipidemia    Hypertension    UTI (urinary tract infection)    Chronic renal failure syndrome    Hypokalemia    Peripheral venous  insufficiency    Aortic valve stenosis    Chronic obstructive pulmonary disease (CMS/HCC)    Gastroesophageal reflux disease    Hypernatremia    Sodium is doing better  Will reduce dose of Lasix to 1 a day  Generalized weakness will arrange outpatient PT OT urine culture still pending  Will switch to oral antibiotic and Follow culture  DC home  Orders for outpatient therapy done       I spent  minutes in the professional and overall care of this patient.      Nakia Bailey MD

## 2024-04-02 NOTE — NURSING NOTE
Spoke with nurse at [facility] they are going to call me back they are reviewing if pt is appropriate for there

## 2024-04-02 NOTE — PROGRESS NOTES
"Music Therapy Note    Kalie Ramos     Therapy Session  Referral Type: New referral this admission  Visit Type: New visit  Session Start Time: 1059  Session End Time: 1114  Intervention Delivery: In-person     Pre-assessment  Pain Score: 0 - No pain  Stress Level (0-10): 0         Treatment/Interventions  Areas of Focus: Emotional support, Relaxation, Self-expression  Music Therapy Interventions: Assessment, Live music listening    Post-assessment  Pain Score: 0 - No pain  Total Session Time (min): 15 minutes    Narrative  Assessment Detail: Pt found sitting in bed. MT introduced self and services, educating Pt on the benefits of music therapy. Pt agreeable to session.  Plan: MT provided live music to support relaxation, emotional support, and self expression.  Intervention: MT provided Pt with song options to choose from. Pt picked two songs and MT played the guitar and sang them.  Evaluation: Pt responded to the music by closing her eyes, tapping her fingers, and singing along. Pt expressed gratitude to MT.  Follow-up: MT will follow-up if Pt remains admitted.  Patient Comments: \"I hope you know what you're doing brings luis to people's lives.\"    Education Documentation  No documentation found.          "

## 2024-04-02 NOTE — CARE PLAN
The patient's goals for the shift include to feel better    The clinical goals for the shift include no falls, safety    Over the shift, the patient did make progress toward the following goals.     Problem: Skin  Goal: Prevent/minimize sheer/friction injuries  Outcome: Progressing     Problem: Respiratory  Goal: No signs of respiratory distress (eg. Use of accessory muscles. Peds grunting)  Outcome: Progressing     Problem: Safety  Goal: Patient will be injury free during hospitalization  Outcome: Progressing

## 2024-04-02 NOTE — PROGRESS NOTES
Physical Therapy    Physical Therapy Treatment    Patient Name: Kalie Ramos  MRN: [id number]  Today's Date: 4/2/2024  Time Calculation  Start Time: 1400  Stop Time: 1428  Time Calculation (min): 28 min       Assessment/Plan   PT Assessment  End of Session Communication: Bedside nurse  Assessment Comment: Did walk 30-35' x 2 with Min A  with her rollator  End of Session Patient Position: Up in chair, Alarm on     PT Plan  Treatment/Interventions: Bed mobility, Transfer training, Gait training, Balance training, Strengthening, Endurance training, Therapeutic exercise, Therapeutic activity, Home exercise program  PT Plan: Skilled PT  PT Frequency: 4 times per week  PT Discharge Recommendations: Low intensity level of continued care  Equipment Recommended upon Discharge:  (Rollator)  PT Recommended Transfer Status: Assist x1, Assistive device  PT - OK to Discharge: Yes      General Visit Information:   PT  Visit  PT Received On: 04/02/24  General  Reason for Referral: iimpaired mobility  Referred By: Dr. Bailey  Past Medical History Relevant to Rehab: anemia, neck pain, CHF, COPD, depression, hip fx, neuropathy  Prior to Session Communication: Bedside nurse  Patient Position Received: Up in chair, Alarm on  Preferred Learning Style: verbal  General Comment: Cleared by nurse to see. Patient agreeable    Subjective   Precautions:  Precautions  Hearing/Visual Limitations: very [place]  LE Weight Bearing Status: Weight Bearing as Tolerated  Medical Precautions: Fall precautions, Oxygen therapy device and L/min  Vital Signs:       Objective   Pain:  Pain Assessment  Pain Assessment: 0-10  Pain Score: 0 - No pain  Cognition:  Cognition  Orientation Level: Oriented X4  Postural Control:     Extremity/Trunk Assessments:    Activity Tolerance:  Activity Tolerance  Activity Tolerance Comments: 2liters nasal canula Mild SOB  Treatments:  Therapeutic Exercise  Therapeutic Exercise Performed: Yes  Therapeutic Exercise Activity 1: B  LE seated ther ex: heel and toe raises, LAQs, [place] hip adduction x 15    Ambulation/Gait Training  Ambulation/Gait Training Performed: Yes  Ambulation/Gait Training 1  Surface 1: Level tile  Device 1: Rolling walker (Rollator)  Assistance 1: Minimum assistance  Quality of Gait 1: Shuffling gait  Comments/Distance (ft) 1: Gait 30-35' x 2 with rollator with Min A with initial stance to steady Seated rest between walks Keeps back wheels of rollator locked with gait (Sp02 96% desating to 89-90% Pursed lip breathing performed between walks)  Transfers  Transfer: Yes  Transfer 1  Transfer From 1: Chair with arms to  Transfer to 1: Stand  Technique 1: Sit to stand  Transfer Device 1:  (Rollator)  Transfer Level of Assistance 1: Minimum assistance  Trials/Comments 1: x 2  trials with Min A STS to steady with rollator Cues for safe hand placement  Transfers 2  Transfer From 2: Stand to  Transfer to 2: Sit, Chair with arms  Technique 2: Stand to sit  Transfer Device 2:  (Rollator)  Transfer Level of Assistance 2: Minimum assistance  Trials/Comments 2: x 2 trails with Min A STS from bedside chair with cues for safe hand placement    Outcome Measures:  [facility] Basic Mobility  Turning from your back to your side while in a flat bed without using bedrails: A little  Moving from lying on your back to sitting on the side of a flat bed without using bedrails: A little  Moving to and from bed to chair (including a wheelchair): A little  Standing up from a chair using your arms (e.g. wheelchair or bedside chair): A little  To walk in hospital room: A little  Climbing 3-5 steps with railing: Total  Basic Mobility - Total Score: 16    Education Documentation  Body Mechanics, taught by Bettie Horne PTA at 4/2/2024  2:49 PM.  Learner: Patient  Readiness: Acceptance  Method: Explanation, Teach-back  Response: Verbalizes Understanding, Needs Reinforcement    Home Exercise Program, taught by Bettie Horne PTA at 4/2/2024  2:49  PM.  Learner: Patient  Readiness: Acceptance  Method: Explanation, Teach-back  Response: Verbalizes Understanding, Needs Reinforcement    Mobility Training, taught by Bettie Horne PTA at 4/2/2024  2:49 PM.  Learner: Patient  Readiness: Acceptance  Method: Explanation, Teach-back  Response: Verbalizes Understanding, Needs Reinforcement    Education Comments  No comments found.        OP EDUCATION:       Encounter Problems       Encounter Problems (Active)       Balance       Standing Balance (Progressing)       Start:  03/29/24    Expected End:  04/12/24       Pt will demonstrate good static standing balance to promote safe participation with out of bed activity, transfers, and mobility              Mobility       Ambulation (Progressing)       Start:  03/29/24    Expected End:  04/12/24       Pt will ambulate 100' modified independent assist with walker to promote safe home mobility              PT Transfers       Supine to sit (Progressing)       Start:  03/29/24    Expected End:  04/12/24       Pt will transfer supine to sitting at edge of bed with modified independent assist to promote acute care out of bed activity           Sit to stand (Progressing)       Start:  03/29/24    Expected End:  04/12/24       Pt will transfer sit to standing position with modified independent assist and walker to promote safe out of bed activity              Safety       Safe Mobility Techniques (Progressing)       Start:  03/29/24    Expected End:  04/12/24       Pt will correctly identify and demonstrate safe mobility techniques to reduce their risks for falls during their acute care stay

## 2024-04-02 NOTE — NURSING NOTE
Spoke with pt's daughter Annette and let her know I was sending pt back to the [facility] she is going to call her brother and see if he can transport her

## 2024-04-04 ENCOUNTER — HOSPITAL ENCOUNTER (EMERGENCY)
Facility: HOSPITAL | Age: 89
Discharge: HOME | End: 2024-04-04
Attending: EMERGENCY MEDICINE
Payer: MEDICARE

## 2024-04-04 ENCOUNTER — APPOINTMENT (OUTPATIENT)
Dept: RADIOLOGY | Facility: HOSPITAL | Age: 89
End: 2024-04-04
Payer: MEDICARE

## 2024-04-04 VITALS
BODY MASS INDEX: 20.99 KG/M2 | TEMPERATURE: 98.1 F | SYSTOLIC BLOOD PRESSURE: 134 MMHG | OXYGEN SATURATION: 96 % | WEIGHT: 114.1 LBS | HEIGHT: 62 IN | RESPIRATION RATE: 18 BRPM | HEART RATE: 67 BPM | DIASTOLIC BLOOD PRESSURE: 52 MMHG
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
## 2024-04-04 DIAGNOSIS — S40.012A CONTUSION OF LEFT SHOULDER, INITIAL ENCOUNTER: ICD-10-CM

## 2024-04-04 DIAGNOSIS — M25.572 ACUTE LEFT ANKLE PAIN: ICD-10-CM

## 2024-04-04 DIAGNOSIS — S09.90XA HEAD INJURY, INITIAL ENCOUNTER: ICD-10-CM

## 2024-04-04 PROCEDURE — 72125 CT NECK SPINE W/O DYE: CPT | Performed by: RADIOLOGY

## 2024-04-04 PROCEDURE — 72125 CT NECK SPINE W/O DYE: CPT

## 2024-04-04 PROCEDURE — 72170 X-RAY EXAM OF PELVIS: CPT

## 2024-04-04 PROCEDURE — 70450 CT HEAD/BRAIN W/O DYE: CPT

## 2024-04-04 PROCEDURE — 71045 X-RAY EXAM CHEST 1 VIEW: CPT

## 2024-04-04 PROCEDURE — 73060 X-RAY EXAM OF HUMERUS: CPT | Mod: LEFT SIDE | Performed by: RADIOLOGY

## 2024-04-04 PROCEDURE — 73610 X-RAY EXAM OF ANKLE: CPT | Mod: LT

## 2024-04-04 PROCEDURE — 70450 CT HEAD/BRAIN W/O DYE: CPT | Performed by: RADIOLOGY

## 2024-04-04 PROCEDURE — 71045 X-RAY EXAM CHEST 1 VIEW: CPT | Performed by: RADIOLOGY

## 2024-04-04 PROCEDURE — 73610 X-RAY EXAM OF ANKLE: CPT | Mod: LEFT SIDE | Performed by: RADIOLOGY

## 2024-04-04 PROCEDURE — 99285 EMERGENCY DEPT VISIT HI MDM: CPT | Mod: 25

## 2024-04-04 PROCEDURE — 73060 X-RAY EXAM OF HUMERUS: CPT | Mod: LT

## 2024-04-04 PROCEDURE — 72170 X-RAY EXAM OF PELVIS: CPT | Performed by: RADIOLOGY

## 2024-04-04 ASSESSMENT — PAIN SCALES - GENERAL
PAINLEVEL_OUTOF10: 0 - NO PAIN
PAINLEVEL_OUTOF10: 0 - NO PAIN

## 2024-04-04 ASSESSMENT — PAIN - FUNCTIONAL ASSESSMENT
PAIN_FUNCTIONAL_ASSESSMENT: FLACC (FACE, LEGS, ACTIVITY, CRY, CONSOLABILITY)
PAIN_FUNCTIONAL_ASSESSMENT: 0-10

## 2024-04-04 NOTE — DISCHARGE INSTRUCTIONS
Contact her primary care provider today for further advice.  Return to the ER for any new or worsening symptoms

## 2024-04-04 NOTE — ED PROVIDER NOTES
HPI   Chief Complaint   Patient presents with    Fall     Patient brought in from nursing home by EMS for fall. Patient complains of pain in her left shoulder, ankle and foot. Medication list provided does not list any blood thinners other than aspirin.       HPI       89-year-old female with fall.  Patient was found on the floor at the nursing facility.  Unwitnessed fall.  Nursing home reported that she had pain everywhere.  Upon arrival she denied pain then she reported pain to the shoulder and left ankle.  Patient has dementia and unsure what happened.  Does take aspirin.  Recently released from the hospital             No data recorded                   Patient History   Past Medical History:   Diagnosis Date    Anemia     Arthritis     At risk for falling     Atrophic disorder of skin, unspecified 07/19/2013    Atrophoderma    Body mass index (BMI) 22.0-22.9, adult     BMI 22.0-22.9, adult    Cervicalgia 05/08/2015    Neck pain    CHF (congestive heart failure) (CMS/[facility])     Closed right hip fracture (CMS/[facility])     COPD (chronic obstructive pulmonary disease) (CMS/[facility])     Depression     GERD (gastroesophageal reflux disease)     High cholesterol     Hip fracture (CMS/[facility])     Hyperlipidemia     Hypertension     Insomnia     Leg swelling     Malignant neoplasm of unspecified site of unspecified female breast (CMS/[facility]) 07/19/2013    Adenocarcinoma of breast    Neuropathy     Osteoarthritis     Other conditions influencing health status 10/21/2013    Urinary Tract Infection    Personal history of other infectious and parasitic diseases 03/17/2015    History of candidiasis of mouth    Personal history of other specified conditions 03/17/2015    History of abdominal pain    Personal history of pneumonia (recurrent) 09/10/2015    History of pneumonia    Personal history of transient ischemic attack (TIA), and cerebral infarction without residual deficits 09/04/2014    History of stroke    Pneumonia     Pulmonary  embolism (CMS/HCC)     PVD (peripheral vascular disease) (CMS/HCC)     Respiratory failure (CMS/HCC)     Syncope     Weakness      Past Surgical History:   Procedure Laterality Date    BREAST BIOPSY  09/10/2015    Biopsy Breast Open    BREAST LUMPECTOMY  07/19/2013    Breast Surgery Lumpectomy    CARPAL TUNNEL RELEASE  09/04/2014    Neuroplasty Decompression Median Nerve At Carpal Tunnel    CATARACT EXTRACTION  11/02/2015    Cataract Surgery    HAND TENDON SURGERY  11/02/2015    Hand Incision Tendon Sheath Of A Finger    HYSTERECTOMY  07/19/2013    Hysterectomy    MR HEAD ANGIO WO IV CONTRAST  12/17/2020    MR HEAD ANGIO WO IV CONTRAST [facility]    MR HEAD ANGIO WO IV CONTRAST  5/13/2021    MR HEAD ANGIO WO IV CONTRAST [facility]    MR HEAD ANGIO WO IV CONTRAST  5/24/2022    MR HEAD ANGIO WO IV CONTRAST [facility]    MR NECK ANGIO WO IV CONTRAST  5/24/2022    MR NECK ANGIO WO IV CONTRAST [facility]    OTHER SURGICAL HISTORY  11/02/2015    Wrist Carpectomy    ROTATOR CUFF REPAIR  09/10/2015    Rotator Cuff Repair    SENTINEL LYMPH NODE BIOPSY  09/10/2015    [place] Lymph Node Biopsy    SHOULDER SURGERY  07/19/2013    Shoulder Surgery     Family History   Problem Relation Name Age of Onset    No Known Problems Mother      Alzheimer's disease Father      Breast cancer Sister      Pancreatic cancer Sister      Arthritis Other       Social History     Tobacco Use    Smoking status: Never    Smokeless tobacco: Never   Substance Use Topics    Alcohol use: Never    Drug use: Never       Physical Exam   ED Triage Vitals [04/04/24 0345]   Temperature Heart Rate Respirations BP   36.7 °C (98.1 °F) 67 18 134/52      Pulse Ox Temp Source Heart Rate Source Patient Position   96 % Oral Monitor --      BP Location FiO2 (%)     -- --       Physical Exam    Gen: Frail, elderly female, no distress, arrives in cervical collar, confused but responds to questioning  Head: Normocephalic,  atraumatic  Eyes: PERRL, EOMI, conjunctiva clear  ENT: External ears and nose normal, OP clear, Mucosa moist  Neck: Removed collar, no midline tenderness, no pain with movement  Chest: No tenderness, no crepitus  CV: Regular rate and rhythm, no Murmur  Lungs: Clear bilaterally, no distress  Abd: No tenderness, no rebound or guarding  Extremities: Reports tenderness to left shoulder but no obvious deformity.  Does have tenderness to the left ankle with some mild soft tissue swelling.  Neuro: Cranial nerves intact, moving all 4 extremities, A&O x 2  Psych: Appropriate behavior and affect  Back: No focal tenderness, no CVA tenderness  Skin: No rashes or lesions noted    ED Course & MDM   Diagnoses as of 04/04/24 0529   Fall, initial encounter   Head injury, initial encounter   Contusion of left shoulder, initial encounter   Acute left ankle pain   CT of the head and cervical spine, x-rays of the left humerus, left ankle, pelvis and chest were all ordered by me and interpreted by radiology.  She has nonspecific swelling of the left ankle but no obvious fracture dislocation abnormality.  No signs of cervical spine injury or intracranial hemorrhage      X-rays and CTs here all negative.  She appears to be at her baseline.  There is no real external signs of any significant trauma.  She has generalized weakness for which she had been admitted and is currently undergoing physical therapy at the nursing facility.  At this point there is no indication for admission.  Appears to be at her baseline and unchanged from discharge 2 days ago.  No signs of any acute intracranial injury, sepsis, acute thoracic or blunt chest or abdominal trauma.  Will discharge back to nursing home return as needed    Medical Decision Making      Procedure  Procedures     Robb Henson MD  04/04/24 0524

## 2024-04-04 NOTE — DOCUMENTATION CLARIFICATION NOTE
"    PATIENT:               ROBBIN BOONE  ACCT #:                  [id number]  MRN:                       [id number]  :                       1929  ADMIT DATE:       3/29/2024 10:34 AM  DISCH DATE:        2024 4:47 PM  RESPONDING PROVIDER #:        [id number]          PROVIDER RESPONSE TEXT:    Dehydration  related to Generalized Weakness    CDI QUERY TEXT:    [facility]_Etiology Linkage        Instruction:    Based on your assessment of the patient and the clinical information, please provide the requested documentation by clicking on the appropriate radio button and enter any additional information if prompted.    Question: Please clarify if a relationship exists between    When answering this query, please exercise your independent professional judgment. The fact that a question is being asked, does not imply that any particular answer is desired or expected.    The patient's clinical indicators include:  Clinical Information:  Patient is a 89-year-old female presenting to the emergency department from skilled nursing facility for evaluation of confusion and generalized weakness.  Patient states over the past few days she has been feeling more confused and weak.    Clinical Indicators:  3/29 ED PN:  \"The patient does not have any evidence of STEMI on the EKG.  She does have a mildly elevated troponin T\"  \"Patient denies chest pain, shortness of breath, fever, chills, nausea,\"  \"Malaise and fatigue  Generalized weakness  Confusion, acute on chronic  Acute lower urinary tract infection  Electrolyte imbalance with mild hypokalemia  \"Elevated troponin T\"  -EKG: \"Sinus rhythm with Premature atrial complexes Left bundle branch block Abnormal ECG When compared with ECG of -  -Troponin T:  57/51/56/54     DC Summary:  \"Lasix held for hyponatremia.   Her dose of Lasix reduced to once a day.\"    4/3 Query Respose:  \"Troponin elevation due to other dehydration\"    Treatment:  EKG, Cards Consult; CXR; BNP; Serial " Troponin T; Lasix held for dehydration and then restarted; ASA; IVF    Risk Factors:  Age, CHF; COPD; Sever Pulmonary HTN; CAD;  Options provided:  -- Dehydration related to Generalized Weakness  -- Dehydration unrelated to Generalized Weakness  -- Other - I will add my own diagnosis  -- Refer to Clinical Documentation Reviewer    Query created by: Harini Cotto on 4/4/2024 8:26 AM      Electronically signed by:  WARREN ROLLINS MD 4/4/2024 10:41 AM

## 2024-04-04 NOTE — DOCUMENTATION CLARIFICATION NOTE
"    PATIENT:               ROBBIN BOONE  ACCT #:                  [id number]  MRN:                       [id number]  :                       1929  ADMIT DATE:       3/29/2024 10:34 AM  DISCH DATE:        2024 4:47 PM  RESPONDING PROVIDER #:        [id number]          PROVIDER RESPONSE TEXT:    Troponin elevation due to other dehydration    CDI QUERY TEXT:    [facility]_MI        Instruction:    Based on your assessment of the patient and the clinical information, please provide the requested documentation by clicking on the appropriate radio button and enter any additional information if prompted.    Question: Is there a diagnosis indicative of the patient elevated Troponins and symptoms    When answering this query, please exercise your independent professional judgment. The fact that a question is being asked, does not imply that any particular answer is desired or expected.    The patient's clinical indicators include:  Clinical Information:  Patient is a 89-year-old female presenting to the emergency department from [facility] nursing facility for evaluation of confusion and generalized weakness.  Patient states over the past few days she has been feeling more confused and weak.    Clinical Indicators:  3/29 ED PN:  \"The patient does not have any evidence of STEMI on the EKG.  She does have a mildly elevated troponin T\"  \"Patient denies chest pain, shortness of breath, fever, chills, nausea,\"  \"Elevated troponin T\"  -EKG: \"Sinus rhythm with Premature atrial complexes Left bundle branch block Abnormal ECG When compared with ECG of 2024 02:54,\"  -CXR:  \"Stable cardiac enlargement with small bilateral pleural effusions and  mild pulmonary vascular congestion. Pulmonary edema seen on prior  study has resolved.\"  -BNP:  6796  -Troponin T:  57/51/56/54    3/30 Cards Consult:  \"2023 again demonstrated a LV ejection fraction 35-40% severe aortic valve stenosis peak systolic gradient 56 mmHg, mild mitral " "moderate tricuspid valve regurgitation and severe pulmonary hypertension estimated PA systolic pressure 67 mmHg.\"    Treatment:  EKG, Cards Consult; CXR; BNP; Serial Troponin T; Lasix held for dehydration and then restarted; ASA    Risk Factors:  Age, CHF; COPD; Sever Pulmonary HTN; CAD;  Options provided:  -- Type II MI 2/2 Acute on Chronic Diastolic CHF  -- Troponin elevation due to other, Please specify additional information below  -- Other - I will add my own diagnosis  -- Refer to Clinical Documentation Reviewer    Query created by: Harini Cotto on 4/2/2024 2:14 PM      Electronically signed by:  WARREN ROLLINS MD 4/3/2024 10:03 PM          "

## 2024-04-04 NOTE — DOCUMENTATION CLARIFICATION NOTE
"    PATIENT:               ROBBIN BOONE  ACCT #:                  [id number]  MRN:                       [id number]  :                       1929  ADMIT DATE:       3/29/2024 10:34 AM  DISCH DATE:        2024 4:47 PM  RESPONDING PROVIDER #:        [id number]          PROVIDER RESPONSE TEXT:    Metabolic Encephalopathy 2/2 UTI/Electrolyte Abnormalities    CDI QUERY TEXT:    [facility]_Encephalopathy Type        Instruction:    Based on your assessment of the patient and the clinical information, please provide the requested documentation by clicking on the appropriate radio button and enter any additional information if prompted.    Question: Please further clarify the type of Encephalopathy as      When answering this query, please exercise your independent professional judgment. The fact that a question is being asked, does not imply that any particular answer is desired or expected.    The patient's clinical indicators include:  Clinical Information:  Patient Presents for generalized weakness and uti symptoms. Per ems patient was running low grade fever earlier today at facility.    Clinical Indicators:  3/29 ED:  \"Malaise and fatigue  Generalized weakness  Confusion, acute on chronic  Acute lower urinary tract infection  Electrolyte imbalance with mild hypokalemia\"  -UA:  Leuk Christy:  500; Nitrite +2  -CT Head WO:  -WBC:  11.4/7.9  -NA: 149/145/144/143  -K:  3.4/3.4/3.2/4.5  -CT Head WO:  \"Age-appropriate atrophy and chronic microvascular ischemic disease  without acute intracranial process.  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  No change since prior study.\"    3/29 H/P:  \"She is having some weakness malaise and confusion for past 2 days.\"    Treatment:  UA/C&S; Rocephin IV; WBC; IVF; BMP; Lasix Held; KCL given PO    Risk Factors:  Age; Afib; CKD; CHF; COPD  Options provided:  -- Metabolic Encephalopathy 2/2 UTI/Electrolyte Abnormalities  -- Other - I will add my own diagnosis  -- Refer to Clinical " Documentation Reviewer    Query created by: Harini Cotto on 4/2/2024 1:52 PM      Electronically signed by:  WARREN ROLLINS MD 4/3/2024 10:03 PM

## 2024-04-24 ENCOUNTER — APPOINTMENT (OUTPATIENT)
Dept: RADIOLOGY | Facility: HOSPITAL | Age: 89
End: 2024-04-24
Payer: MEDICARE

## 2024-04-24 ENCOUNTER — HOSPITAL ENCOUNTER (EMERGENCY)
Facility: HOSPITAL | Age: 89
Discharge: HOME | End: 2024-04-24
Attending: EMERGENCY MEDICINE
Payer: MEDICARE

## 2024-04-24 ENCOUNTER — APPOINTMENT (OUTPATIENT)
Dept: CARDIOLOGY | Facility: HOSPITAL | Age: 89
End: 2024-04-24
Payer: MEDICARE

## 2024-04-24 VITALS
SYSTOLIC BLOOD PRESSURE: 116 MMHG | OXYGEN SATURATION: 100 % | HEIGHT: 61 IN | HEART RATE: 61 BPM | RESPIRATION RATE: 16 BRPM | TEMPERATURE: 97.2 F | BODY MASS INDEX: 20.77 KG/M2 | WEIGHT: 110 LBS | DIASTOLIC BLOOD PRESSURE: 52 MMHG

## 2024-04-24 DIAGNOSIS — R07.9 ACUTE CHEST PAIN: Primary | ICD-10-CM

## 2024-04-24 LAB
ANION GAP SERPL CALC-SCNC: 8 MMOL/L
BASOPHILS # BLD AUTO: 0.06 X10*3/UL (ref 0–0.1)
BASOPHILS NFR BLD AUTO: 0.7 %
BUN SERPL-MCNC: 23 MG/DL (ref 8–25)
CALCIUM SERPL-MCNC: 9.3 MG/DL (ref 8.5–10.4)
CHLORIDE SERPL-SCNC: 104 MMOL/L (ref 97–107)
CO2 SERPL-SCNC: 33 MMOL/L (ref 24–31)
CREAT SERPL-MCNC: 1.2 MG/DL (ref 0.4–1.6)
EGFRCR SERPLBLD CKD-EPI 2021: 42 ML/MIN/1.73M*2
EOSINOPHIL # BLD AUTO: 0.19 X10*3/UL (ref 0–0.4)
EOSINOPHIL NFR BLD AUTO: 2.3 %
ERYTHROCYTE [DISTWIDTH] IN BLOOD BY AUTOMATED COUNT: 15.9 % (ref 11.5–14.5)
GLUCOSE SERPL-MCNC: 110 MG/DL (ref 65–99)
HCT VFR BLD AUTO: 39.8 % (ref 36–46)
HGB BLD-MCNC: 12.4 G/DL (ref 12–16)
IMM GRANULOCYTES # BLD AUTO: 0.03 X10*3/UL (ref 0–0.5)
IMM GRANULOCYTES NFR BLD AUTO: 0.4 % (ref 0–0.9)
LYMPHOCYTES # BLD AUTO: 1.14 X10*3/UL (ref 0.8–3)
LYMPHOCYTES NFR BLD AUTO: 13.8 %
MCH RBC QN AUTO: 29.7 PG (ref 26–34)
MCHC RBC AUTO-ENTMCNC: 31.2 G/DL (ref 32–36)
MCV RBC AUTO: 95 FL (ref 80–100)
MONOCYTES # BLD AUTO: 0.68 X10*3/UL (ref 0.05–0.8)
MONOCYTES NFR BLD AUTO: 8.2 %
NEUTROPHILS # BLD AUTO: 6.17 X10*3/UL (ref 1.6–5.5)
NEUTROPHILS NFR BLD AUTO: 74.6 %
NRBC BLD-RTO: 0 /100 WBCS (ref 0–0)
PLATELET # BLD AUTO: 236 X10*3/UL (ref 150–450)
POTASSIUM SERPL-SCNC: 4.4 MMOL/L (ref 3.4–5.1)
RBC # BLD AUTO: 4.18 X10*6/UL (ref 4–5.2)
SODIUM SERPL-SCNC: 145 MMOL/L (ref 133–145)
TROPONIN T SERPL-MCNC: 58 NG/L
TROPONIN T SERPL-MCNC: 62 NG/L
WBC # BLD AUTO: 8.3 X10*3/UL (ref 4.4–11.3)

## 2024-04-24 PROCEDURE — 80048 BASIC METABOLIC PNL TOTAL CA: CPT | Performed by: EMERGENCY MEDICINE

## 2024-04-24 PROCEDURE — 84484 ASSAY OF TROPONIN QUANT: CPT | Performed by: EMERGENCY MEDICINE

## 2024-04-24 PROCEDURE — 36415 COLL VENOUS BLD VENIPUNCTURE: CPT | Performed by: EMERGENCY MEDICINE

## 2024-04-24 PROCEDURE — 71045 X-RAY EXAM CHEST 1 VIEW: CPT | Performed by: RADIOLOGY

## 2024-04-24 PROCEDURE — 99283 EMERGENCY DEPT VISIT LOW MDM: CPT | Mod: 25

## 2024-04-24 PROCEDURE — 85025 COMPLETE CBC W/AUTO DIFF WBC: CPT | Performed by: EMERGENCY MEDICINE

## 2024-04-24 PROCEDURE — 71045 X-RAY EXAM CHEST 1 VIEW: CPT

## 2024-04-24 PROCEDURE — 93005 ELECTROCARDIOGRAM TRACING: CPT

## 2024-04-24 ASSESSMENT — PAIN - FUNCTIONAL ASSESSMENT: PAIN_FUNCTIONAL_ASSESSMENT: 0-10

## 2024-04-24 ASSESSMENT — PAIN SCALES - GENERAL: PAINLEVEL_OUTOF10: 0 - NO PAIN

## 2024-04-24 NOTE — PROGRESS NOTES
Kalie Ramos is a 89 y.o. female who was care was endorsed to me by Dr. Galvan pending repeat troponin after presented with chest pain.  Plan to discharge back to facility if troponin not uptrending.  Initial troponin 61, repeat 58 not uptrending any pattern consistent with ACS and is around her chronic baseline likely chronic elevation.  Discharged back to skilled nursing facility for further management.  Return precautions discussed.  Blanca Martinez MD

## 2024-04-24 NOTE — ED PROVIDER NOTES
HPI   Chief Complaint   Patient presents with    Chest Pain     Pt presents to ED after endorsing CP for 30 minutes, pt took 3 at home nitros, endorsing no CP at this time, DNR-CCA       HPI  89-year-old female presents from nursing facility with complaint of chest pain.  Apparently patient had some chest pain tonight she was given 3 nitroglycerin at the nurse facility her pain went away.  EMS responded recommended come to the emergency department.  Patient fairly poor historian.  She denies any chest pain.  No shortness of breath, dizziness lightheadedness, nausea or vomiting, no other complaints.                  No data recorded                   Patient History   Past Medical History:   Diagnosis Date    Anemia     Arthritis     At risk for falling     Atrophic disorder of skin, unspecified 07/19/2013    Atrophoderma    Body mass index (BMI) 22.0-22.9, adult     BMI 22.0-22.9, adult    Cervicalgia 05/08/2015    Neck pain    CHF (congestive heart failure) (Multi)     Closed right hip fracture (Multi)     COPD (chronic obstructive pulmonary disease) (Multi)     Depression     GERD (gastroesophageal reflux disease)     High cholesterol     Hip fracture (Multi)     Hyperlipidemia     Hypertension     Insomnia     Leg swelling     Malignant neoplasm of unspecified site of unspecified female breast (Multi) 07/19/2013    Adenocarcinoma of breast    Neuropathy     Osteoarthritis     Other conditions influencing health status 10/21/2013    Urinary Tract Infection    Personal history of other infectious and parasitic diseases 03/17/2015    History of candidiasis of mouth    Personal history of other specified conditions 03/17/2015    History of abdominal pain    Personal history of pneumonia (recurrent) 09/10/2015    History of pneumonia    Personal history of transient ischemic attack (TIA), and cerebral infarction without residual deficits 09/04/2014    History of stroke    Pneumonia     Pulmonary embolism (Multi)      PVD (peripheral vascular disease) (CMS-HCC)     Respiratory failure (Multi)     Syncope     Weakness      Past Surgical History:   Procedure Laterality Date    BREAST BIOPSY  09/10/2015    Biopsy Breast Open    BREAST LUMPECTOMY  07/19/2013    Breast Surgery Lumpectomy    CARPAL TUNNEL RELEASE  09/04/2014    Neuroplasty Decompression Median Nerve At Carpal Tunnel    CATARACT EXTRACTION  11/02/2015    Cataract Surgery    HAND TENDON SURGERY  11/02/2015    Hand Incision Tendon Sheath Of A Finger    HYSTERECTOMY  07/19/2013    Hysterectomy    MR HEAD ANGIO WO IV CONTRAST  12/17/2020    MR HEAD ANGIO WO IV CONTRAST [facility]    MR HEAD ANGIO WO IV CONTRAST  5/13/2021    MR HEAD ANGIO WO IV CONTRAST [facility]    MR HEAD ANGIO WO IV CONTRAST  5/24/2022    MR HEAD ANGIO WO IV CONTRAST [facility]    MR NECK ANGIO WO IV CONTRAST  5/24/2022    MR NECK ANGIO WO IV CONTRAST [facility]    OTHER SURGICAL HISTORY  11/02/2015    Wrist Carpectomy    ROTATOR CUFF REPAIR  09/10/2015    Rotator Cuff Repair    SENTINEL LYMPH NODE BIOPSY  09/10/2015    [place] Lymph Node Biopsy    SHOULDER SURGERY  07/19/2013    Shoulder Surgery     Family History   Problem Relation Name Age of Onset    No Known Problems Mother      Alzheimer's disease Father      Breast cancer Sister      Pancreatic cancer Sister      Arthritis Other       Social History     Tobacco Use    Smoking status: Never    Smokeless tobacco: Never   Substance Use Topics    Alcohol use: Never    Drug use: Never       Physical Exam   ED Triage Vitals [04/24/24 0320]   Temperature Heart Rate Respirations BP   36.2 °C (97.2 °F) 52 16 133/63      Pulse Ox Temp Source Heart Rate Source Patient Position   100 % Temporal Monitor --      BP Location FiO2 (%)     -- --       Physical Exam  General:  Awake, alert, no acute distress.  Head: Normocephalic, Atraumatic  Neck: Supple, trachea midline, no stridor  Skin: Warm and dry, no rashes   Lungs:  Clear to auscultation bilaterally no acute respiratory distress, speaking in full sentences without difficulty  CV: Regular Rate Rhythm with no obvious murmurs gallops rubs noted, no jugular venous distention, no pedal edema   Abdomen: Soft, nontender, nondistended, positive bowel sounds, no peritoneal signs  Neuro:  No gross focal neurologic deficits, NIH is 0  Musculoskeletal:  Full range of motion in all 4 extremities  Psychiatric:  Alert, pleasantly confused  ED Course & MDM   ED Course as of 04/26/24 2154 Wed Apr 24, 2024 0318 My EKG interpretation:  Sinus rhythm 60 bpm QTc 518 no ectopy or acute ischemic changes noted [KW]      ED Course User Index  [KW] Manuel Galvan DO         Diagnoses as of 04/26/24 2154   Acute chest pain       Medical Decision Making  Patient had been pain-free in the emergency department.  Her initial troponin is 61.  Reviewing her previous troponins have always been in the 50s.  Delta troponin is ordered.  If it is flat I feel she can be discharged back to the nursing facility.    Patient endorsed to the oncoming physician.    Procedure  Procedures     Manuel Galvan DO  04/24/24 0603       Manuel Galvan,   04/26/24 2154

## 2024-04-24 NOTE — DISCHARGE INSTRUCTIONS
The cause of your chest pain could not be completely identified at this time.  However, you likely need further testing in your primary care physician's or cardiologist's office such as a stress test to further evaluate the possibility of cardiac cause such as blockages of the arteries in your heart that could be causing your symptoms.  We use a scoring system called the HEART score to determine your risk of significant cardiac event in the next 6 weeks based on year emergency department evaluation.  You have been determined to be moderate risk which means that the risk of significant cardiac event is approximately 12-19% over the next 6 weeks.  Therefore, it is extremely important that you follow-up as soon as possible for further testing particularly if symptoms do not resolve.  You should return immediately to this or any emergency department if your symptoms recur, worsen, or you would like further evaulation in the hospital prior to follow-up.

## 2024-04-25 LAB
Q ONSET: 210 MS
QRS COUNT: 10 BEATS
QRS DURATION: 140 MS
QT INTERVAL: 518 MS
QTC CALCULATION(BAZETT): 518 MS
QTC FREDERICIA: 518 MS
R AXIS: 42 DEGREES
T AXIS: 195 DEGREES
T OFFSET: 469 MS
VENTRICULAR RATE: 60 BPM

## 2024-05-03 ENCOUNTER — TELEPHONE (OUTPATIENT)
Dept: INFUSION THERAPY | Facility: CLINIC | Age: 89
End: 2024-05-03
Payer: MEDICARE

## 2024-05-03 ENCOUNTER — DOCUMENTATION (OUTPATIENT)
Dept: INFUSION THERAPY | Facility: CLINIC | Age: 89
End: 2024-05-03
Payer: MEDICARE

## 2024-05-03 DIAGNOSIS — M81.8 ADULT IDIOPATHIC GENERALIZED OSTEOPOROSIS: Primary | ICD-10-CM

## 2024-05-03 NOTE — PROGRESS NOTES
Patient to be scheduled for Continuation of Prolia injections.  For Diagnosis: Osteoporosis    Labs..  Calcium drawn on:   Lab Results   Component Value Date    CALCIUM 9.3 04/24/2024    PHOS 3.7 08/09/2021      Lab Results   Component Value Date    CAION 1.19 11/06/2023      Lab Results   Component Value Date    GLUCOSE 110 (H) 04/24/2024    CALCIUM 9.3 04/24/2024     04/24/2024    K 4.4 04/24/2024    CO2 33 (H) 04/24/2024     04/24/2024    BUN 23 04/24/2024    CREATININE 1.20 04/24/2024        Chemistry    Lab Results   Component Value Date/Time     04/24/2024 0326    K 4.4 04/24/2024 0326     04/24/2024 0326    CO2 33 (H) 04/24/2024 0326    BUN 23 04/24/2024 0326    CREATININE 1.20 04/24/2024 0326    Lab Results   Component Value Date/Time    CALCIUM 9.3 04/24/2024 0326    ALKPHOS 132 (H) 03/30/2024 0422    AST 17 03/30/2024 0422    ALT 9 03/30/2024 0422    BILITOT 0.6 03/30/2024 0422           (>8.6mg/dl or ionized calcium WNL.  Hold / Contact provider if <8.6) (must be within 28 days of scheduled injection)    Lab Results   Component Value Date    CREATININE 1.20 04/24/2024        Crcl: 22  (Patients with a crcl <30 are at increased risk of hypocalcemia)      *Not a hard stop. If crcl < 30 pt to discuss supplementation with prescribing provider.    Calcium and Vitamin D supplement on medication list? Yes    Current Outpatient Medications:     aspirin 81 mg EC tablet, Take 1 tablet (81 mg) by mouth once daily. Do not start before April 3, 2024., Disp: , Rfl:     atorvastatin (Lipitor) 10 mg tablet, Take 1 tablet (10 mg) by mouth once daily at bedtime., Disp: , Rfl:     calcitonin, salmon, (Miacalcin) 200 unit/actuation nasal spray, Administer 1 spray into one nostril once daily. Do not start before October 28, 2023., Disp: 10 mL, Rfl: 0    cephalexin (Keflex) 500 mg capsule, Take 1 capsule (500 mg) by mouth 3 times a day., Disp: 15 capsule, Rfl: 0    furosemide (Lasix) 40 mg tablet,  Take 1 tablet (40 mg) by mouth once daily. Do not start before April 3, 2024., Disp: , Rfl:     ipratropium-albuteroL (Duo-Neb) 0.5-2.5 mg/3 mL nebulizer solution, Take 3 mL by nebulization 3 times a day., Disp: , Rfl:     lidocaine 4 % patch, Place 1 patch over 12 hours on the skin once daily. Remove & discard patch within 12 hours or as directed by MD. Do not start before November 16, 2023., Disp: 30 patch, Rfl: 0    losartan (Cozaar) 50 mg tablet, Take 1 tablet (50 mg) by mouth once daily. Do not start before April 3, 2024., Disp: 30 tablet, Rfl: 0    magnesium oxide (Mag-Ox) 400 mg (241.3 mg magnesium) tablet, Take 1 tablet (400 mg) by mouth 3 times a week. Tues thurs sat, Disp: , Rfl:     melatonin 3 mg tablet, Take 1 tablet (3 mg) by mouth once daily at bedtime., Disp: , Rfl:     metoprolol succinate XL (Toprol-XL) 25 mg 24 hr tablet, Take 1 tablet (25 mg) by mouth once daily. Do not crush or chew. Do not start before February 4, 2024., Disp: , Rfl:     nitroglycerin (Nitrostat) 0.4 mg SL tablet, Place 1 tablet (0.4 mg) under the tongue every 5 minutes if needed for chest pain (UP TO 3 TIMES. IF NO RELIEF CALL 911.)., Disp: 90 tablet, Rfl: 12    oxygen (O2) gas therapy, Inhale 2 L/min once every 24 hours., Disp: , Rfl:     pantoprazole (ProtoNix) 40 mg EC tablet, TAKE 1 TABLET DAILY, Disp: 90 tablet, Rfl: 3    potassium chloride CR (Klor-Con) 10 mEq ER tablet, Take 1 tablet (10 mEq) by mouth once daily. Do not crush, chew, or split. Do not start before April 3, 2024., Disp: 30 tablet, Rfl: 0    rOPINIRole (Requip) 5 mg tablet, Take 1 tablet (5 mg) by mouth once daily at bedtime., Disp: , Rfl:     sertraline (Zoloft) 50 mg tablet, Take 1 tablet (50 mg) by mouth once daily., Disp: 90 tablet, Rfl: 1    traZODone (Desyrel) 50 mg tablet, TAKE 1 TABLET AT BEDTIME AS NEEDED FOR SLEEP, Disp: 90 tablet, Rfl: 0   (if no nurse to encourage discussion of supplementation at visit)    No obvious recent dental work per chart  review. Nurse to confirm no dental within past/next 4 weeks at encounter.  Urine Hcg test ordered prior to first injection?Not applicable (If female pt <60 years of age and with reproductive capability)  (If urine Hcg test ordered please instruct pt upon scheduling to drink 32 ounces of water 1 hour before arrival so bladder is full for needed urine sample)    Last injection received: 11/10/24 (if continuation)    Due: May 2024    This result meets treatment criteria. Ok to schedule for prolia within 28 days of the calcium lab draw date listed above. OR… Ok to schedule for prolia; please instruct pt to have labs drawn no more than 28 days prior to their scheduled appointment

## 2024-05-07 DIAGNOSIS — M81.0 OSTEOPOROSIS, UNSPECIFIED OSTEOPOROSIS TYPE, UNSPECIFIED PATHOLOGICAL FRACTURE PRESENCE: Primary | ICD-10-CM

## 2024-05-07 RX ORDER — EPINEPHRINE 0.3 MG/.3ML
0.3 INJECTION SUBCUTANEOUS EVERY 5 MIN PRN
OUTPATIENT
Start: 2024-05-07

## 2024-05-07 RX ORDER — ALBUTEROL SULFATE 0.83 MG/ML
3 SOLUTION RESPIRATORY (INHALATION) AS NEEDED
OUTPATIENT
Start: 2024-05-07

## 2024-05-07 RX ORDER — FAMOTIDINE 10 MG/ML
20 INJECTION INTRAVENOUS ONCE AS NEEDED
OUTPATIENT
Start: 2024-05-07

## 2024-05-07 RX ORDER — DIPHENHYDRAMINE HYDROCHLORIDE 50 MG/ML
50 INJECTION INTRAMUSCULAR; INTRAVENOUS AS NEEDED
OUTPATIENT
Start: 2024-05-07

## 2024-05-08 ENCOUNTER — HOSPITAL ENCOUNTER (EMERGENCY)
Facility: HOSPITAL | Age: 89
Discharge: HOME | End: 2024-05-08
Payer: MEDICARE

## 2024-05-08 ENCOUNTER — INFUSION (OUTPATIENT)
Dept: INFUSION THERAPY | Facility: CLINIC | Age: 89
End: 2024-05-08
Payer: MEDICARE

## 2024-05-08 VITALS
HEIGHT: 60 IN | RESPIRATION RATE: 16 BRPM | SYSTOLIC BLOOD PRESSURE: 132 MMHG | TEMPERATURE: 97.9 F | HEART RATE: 54 BPM | OXYGEN SATURATION: 100 % | WEIGHT: 115 LBS | BODY MASS INDEX: 22.58 KG/M2 | DIASTOLIC BLOOD PRESSURE: 55 MMHG

## 2024-05-08 DIAGNOSIS — S61.412A SKIN TEAR OF LEFT HAND WITHOUT COMPLICATION, INITIAL ENCOUNTER: Primary | ICD-10-CM

## 2024-05-08 DIAGNOSIS — M81.0 OSTEOPOROSIS, UNSPECIFIED OSTEOPOROSIS TYPE, UNSPECIFIED PATHOLOGICAL FRACTURE PRESENCE: Primary | ICD-10-CM

## 2024-05-08 PROCEDURE — 99282 EMERGENCY DEPT VISIT SF MDM: CPT

## 2024-05-08 PROCEDURE — 96372 THER/PROPH/DIAG INJ SC/IM: CPT | Performed by: NURSE PRACTITIONER

## 2024-05-08 RX ORDER — ALBUTEROL SULFATE 0.83 MG/ML
3 SOLUTION RESPIRATORY (INHALATION) AS NEEDED
OUTPATIENT
Start: 2024-11-04

## 2024-05-08 RX ORDER — FAMOTIDINE 10 MG/ML
20 INJECTION INTRAVENOUS ONCE AS NEEDED
OUTPATIENT
Start: 2024-11-04

## 2024-05-08 RX ORDER — EPINEPHRINE 0.3 MG/.3ML
0.3 INJECTION SUBCUTANEOUS EVERY 5 MIN PRN
OUTPATIENT
Start: 2024-11-04

## 2024-05-08 RX ORDER — DIPHENHYDRAMINE HYDROCHLORIDE 50 MG/ML
50 INJECTION INTRAMUSCULAR; INTRAVENOUS AS NEEDED
OUTPATIENT
Start: 2024-11-04

## 2024-05-08 ASSESSMENT — PAIN SCALES - GENERAL
PAINLEVEL_OUTOF10: 0 - NO PAIN
PAINLEVEL_OUTOF10: 0 - NO PAIN

## 2024-05-08 ASSESSMENT — PAIN - FUNCTIONAL ASSESSMENT: PAIN_FUNCTIONAL_ASSESSMENT: 0-10

## 2024-05-08 NOTE — ED PROVIDER NOTES
HPI   Chief Complaint   Patient presents with    Laceration     Patient scraped her hand on something in the car, daughter covered it but is concerned for infection because surrounding area is turning red.       Patient is a 89-year-old female presenting to the emergency department for evaluation of skin tear to the dorsal aspect of the left hand.  Patient states she was getting out of the car and hit it on the car door approximately 2 hours ago.  She denies any pain in the hand and just states that the cut hurts a little bit.  She denies any other further trauma or injury.  Family is concerned about infection.                          No data recorded                   Patient History   Past Medical History:   Diagnosis Date    Anemia     Arthritis     At risk for falling     Atrophic disorder of skin, unspecified 07/19/2013    Atrophoderma    Body mass index (BMI) 22.0-22.9, adult     BMI 22.0-22.9, adult    Cervicalgia 05/08/2015    Neck pain    CHF (congestive heart failure) (Multi)     Closed right hip fracture (Multi)     COPD (chronic obstructive pulmonary disease) (Multi)     Depression     GERD (gastroesophageal reflux disease)     High cholesterol     Hip fracture (Multi)     Hyperlipidemia     Hypertension     Insomnia     Leg swelling     Malignant neoplasm of unspecified site of unspecified female breast (Multi) 07/19/2013    Adenocarcinoma of breast    Neuropathy     Osteoarthritis     Other conditions influencing health status 10/21/2013    Urinary Tract Infection    Personal history of other infectious and parasitic diseases 03/17/2015    History of candidiasis of mouth    Personal history of other specified conditions 03/17/2015    History of abdominal pain    Personal history of pneumonia (recurrent) 09/10/2015    History of pneumonia    Personal history of transient ischemic attack (TIA), and cerebral infarction without residual deficits 09/04/2014    History of stroke    Pneumonia     Pulmonary  embolism (Multi)     PVD (peripheral vascular disease) (CMS-HCC)     Respiratory failure (Multi)     Syncope     Weakness      Past Surgical History:   Procedure Laterality Date    BREAST BIOPSY  09/10/2015    Biopsy Breast Open    BREAST LUMPECTOMY  07/19/2013    Breast Surgery Lumpectomy    CARPAL TUNNEL RELEASE  09/04/2014    Neuroplasty Decompression Median Nerve At Carpal Tunnel    CATARACT EXTRACTION  11/02/2015    Cataract Surgery    HAND TENDON SURGERY  11/02/2015    Hand Incision Tendon Sheath Of A Finger    HYSTERECTOMY  07/19/2013    Hysterectomy    MR HEAD ANGIO WO IV CONTRAST  12/17/2020    MR HEAD ANGIO WO IV CONTRAST [facility]    MR HEAD ANGIO WO IV CONTRAST  5/13/2021    MR HEAD ANGIO WO IV CONTRAST [facility]    MR HEAD ANGIO WO IV CONTRAST  5/24/2022    MR HEAD ANGIO WO IV CONTRAST [facility]    MR NECK ANGIO WO IV CONTRAST  5/24/2022    MR NECK ANGIO WO IV CONTRAST [facility]    OTHER SURGICAL HISTORY  11/02/2015    Wrist Carpectomy    ROTATOR CUFF REPAIR  09/10/2015    Rotator Cuff Repair    SENTINEL LYMPH NODE BIOPSY  09/10/2015    [place] Lymph Node Biopsy    SHOULDER SURGERY  07/19/2013    Shoulder Surgery     Family History   Problem Relation Name Age of Onset    No Known Problems Mother      Alzheimer's disease Father      Breast cancer Sister      Pancreatic cancer Sister      Arthritis Other       Social History     Tobacco Use    Smoking status: Never    Smokeless tobacco: Never   Substance Use Topics    Alcohol use: Never    Drug use: Never       Physical Exam   ED Triage Vitals [05/08/24 1351]   Temperature Heart Rate Respirations BP   36.6 °C (97.8 °F) 54 16 132/55      Pulse Ox Temp Source Heart Rate Source Patient Position   100 % Temporal Monitor Sitting      BP Location FiO2 (%)     Left arm --       Physical Exam  Vitals and nursing note reviewed.     GENERAL APPEARANCE: This patient is in no acute respiratory distress. Awake and alert.  Talking appropriately. Answering questions appropriately. No evidence of pressured speech.    VITAL SIGNS: As per the nurses' triage record.    HEENT: Normocephalic, atraumatic.    NECK:  full gross ROM during exam    MUSCULOSKELETAL:  Full gross active range of motion. Ambulating on own with no acute difficulties.  Patient has full range of motion of the left hand including wrist and fingers and has no pain with movement.     NEUROLOGICAL: Awake, alert and oriented x 3.    IMMUNOLOGICAL: No palpable lymphadenopathy or lymphatic streaking noted on visible skin.    DERM: Superficial skin tear noted to the dorsal aspect of the left hand approximately 1.3 cm in length.  Hemostasis achieved.  There is some surrounding ecchymosis around to the skin tear however no warmth or purulent drainage    PSYCH: mood and affect appear normal.      ED Course & MDM   Diagnoses as of 05/08/24 1533   Skin tear of left hand without complication, initial encounter       Medical Decision Making  **Disclaimer parts of this chart have been completed using voice recognition software. Please excuse any errors of transcription.     Evaluated this patient independently and my supervising physician was available for consultation.    HPI: Detailed above.    Exam: A medically appropriate exam performed, outlined above, given the known history and presentation.    History obtained from: Patient and daughter at bedside    EMERGENCY DEPARTMENT COURSE and DIFFERENTIAL DIAGNOSIS/MDM:  Patient is a 89-year-old female presenting to the emergency department for skin tear to the dorsal aspect of the left hand.  On physical exam vital signs remarkable for systolic hypertension but otherwise stable and patient is in no acute distress.  She has a superficial skin tear noted to the dorsal aspect of the left hand with full range of motion of the wrist and fingers.  There is some surrounding ecchymosis developing around to the skin tear however no significant  erythema, swelling, or warmth concerning for cellulitis.  This also happened approximately 1 to 2 hours ago and therefore do not feel infection could have developed that quickly.  No x-ray necessary at this time as patient has full range of motion of the hand and wrist and does not have any pain with palpation over the bones in her hand.  Bacitracin was placed over the skin tear and it was dressed appropriately.  She was advised to follow-up with primary care physician outpatient within the next 1 to 2 days.  She will return to the emergency department any new or worsening symptoms.      Vitals:    Vitals:    05/08/24 1351   BP: 132/55   BP Location: Left arm   Patient Position: Sitting   Pulse: 54   Resp: 16   Temp: 36.6 °C (97.8 °F)   TempSrc: Temporal   SpO2: 100%   Weight: 52.2 kg (115 lb)   Height: 1.524 m (5')     History Limited by:    None    Independent history obtained from:    None      External records reviewed:    None      Diagnostics interpreted by me:    None      Discussions with other clinicians:    None      Chronic conditions impacting care:    None      Social determinants of health affecting care:    None    Diagnostic tests considered but not performed: None    ED Medications managed:    Medications - No data to display      Prescription drugs considered:    None        Procedure  Procedures     Laura Marvin PA-C  05/08/24 [id number]

## 2024-05-08 NOTE — PROGRESS NOTES
[facility]   Infusion Clinic Note   Date: May 8, 2024   Name: Kalie Ramos  : 1929   MRN: [id number]         Reason for Visit: Injections (Continuance of Prolia every 6 months)         Visit Type: INJECTION       Ordered By: Dr. GARRETT Bailey      Accompanied by:Child/Children      Diagnosis: Osteoporosis, unspecified osteoporosis type, unspecified pathological fracture presence       Allergies:   Allergies as of 2024 - Reviewed 2024   Allergen Reaction Noted    Naproxen Hives 02/10/2023         Current Medications has a current medication list which includes the following prescription(s): aspirin, atorvastatin, calcitonin (salmon), cephalexin, denosumab, furosemide, ipratropium-albuterol, lidocaine, losartan, magnesium oxide, melatonin, metoprolol succinate xl, nitroglycerin, oxygen, pantoprazole, potassium chloride cr, ropinirole, sertraline, and trazodone, and the following Facility-Administered Medications: denosumab.       Vitals:   There were no vitals filed for this visit.          Infusion Pre-procedure Checklist:   - Allergies reviewed: yes   - Medications reviewed: yes       - Previous reaction to current treatment: no      Assess patient for the concerns below. Document provider notification as appropriate.  - Active or recent infection with/without current antibiotic use: no  - Recent or planned invasive dental work: no  - Recent or planned surgeries: no  - Recently received or plans to receive vaccinations: no  - Has treatment related toxicities: no  - Is pregnant:  no      Pain: 0   - Is the pain different from normal: n/a   - Is the pain tolerable: n/a   - Is your Doctor aware:  n/a      Labs: N/A         Fall Risk Screening: Jack Fall Risk  History of Falling, Immediate or Within 3 Months: Yes  Secondary Diagnosis: Yes  Ambulatory Aid: Crutches/cane/walker  Intravenous Therapy/Heparin Lock: No  Gait/Transferring: Impaired   Mental Status: Forgets  limitations  Santiago Fall Risk Score: 90       Review Of Systems:  Review of Systems - Oncology      Infusion Readiness:   - Assessment Concerns Related to Infusion: No  - Provider notified: n/a      Document Below Only If Indicated:   New Patient Education:    N/A (returning patient for continuation of therapy. Ongoing education provided as needed.)        Treatment Conditions & Drug Specific Questions:    Denosumab  (PROLIA. XGEVA)    (Unless otherwise specified on patient specific therapy plan):     TREATMENT CONDITIONS:  Unless otherwise specified on patient specific therapy plan HOLD and notify provider prior to proceeding with today's injection if patients:  o Calcium is LESS THAN 8.6 mg/dL OR  Ionized Calcium LESS THAN 1.1 mmol/L  o Recent or planned invasive dental procedure (within 4 weeks)    Lab Results   Component Value Date    CALCIUM 9.3 04/24/2024    PHOS 3.7 08/09/2021      Lab Results   Component Value Date    CAION 1.19 11/06/2023       Labs reviewed and patient meets treatment conditions? Yes    DRUG SPECIFIC QUESTIONS:  Is the patient taking calcium and vitamin D? Yes  (Recommended)    Pt Instructed on following risks: (1) hypocalcemia, (2) osteonecrosis of the jaw, (3) atypical femoral fractures, (4) serious infections, and (5) dermatologic reactions?  Yes      REMINDER:  PREGNANCY CATEGORY X DRUG. OBTAIN NEGTATIVE PREGNANCY TEST PRIOR TO FIRST INFUSION FOR WOMEN OF CHILDBEARING ABILITY   REMS DRUG    Recommended Vitals/Observation:  Vitals: Obtain vitals prior to injection.  Observation: Patient may leave immediately following injection.        Weight Based Drug Calculations:    WEIGHT BASED DRUGS: NOT APPLICABLE / FLAT DOSE          Initiated By: SARAH Reyes-AMANDEEP   Time: 2:07 PM     Patient received her Prolia injection today. On the way in to the appt. Kalie hit her left hand on her car door and tore her skin on the top of her hand. Patient came in with this covered with a napkin.  I removed the napkin and covered with sterile gauze and coban. Patient's daughter is taking her to the ER to have her hand assessed.

## 2024-05-22 ENCOUNTER — TELEMEDICINE (OUTPATIENT)
Dept: PRIMARY CARE | Facility: CLINIC | Age: 89
End: 2024-05-22
Payer: MEDICARE

## 2024-05-22 DIAGNOSIS — E46 MALNUTRITION, UNSPECIFIED TYPE (MULTI): ICD-10-CM

## 2024-05-22 DIAGNOSIS — R53.1 GENERALIZED WEAKNESS: ICD-10-CM

## 2024-05-22 DIAGNOSIS — I10 HYPERTENSION, UNSPECIFIED TYPE: ICD-10-CM

## 2024-05-22 DIAGNOSIS — G47.00 INSOMNIA, UNSPECIFIED TYPE: Primary | ICD-10-CM

## 2024-05-22 DIAGNOSIS — R63.4 WEIGHT LOSS: ICD-10-CM

## 2024-05-22 DIAGNOSIS — M15.9 GENERALIZED OSTEOARTHRITIS: ICD-10-CM

## 2024-05-22 DIAGNOSIS — G25.81 RESTLESS LEGS: ICD-10-CM

## 2024-05-22 DIAGNOSIS — E78.00 HIGH CHOLESTEROL: ICD-10-CM

## 2024-05-22 PROCEDURE — 1157F ADVNC CARE PLAN IN RCRD: CPT | Performed by: INTERNAL MEDICINE

## 2024-05-22 PROCEDURE — 1159F MED LIST DOCD IN RCRD: CPT | Performed by: INTERNAL MEDICINE

## 2024-05-22 PROCEDURE — 99213 OFFICE O/P EST LOW 20 MIN: CPT | Performed by: INTERNAL MEDICINE

## 2024-05-22 ASSESSMENT — ENCOUNTER SYMPTOMS
OCCASIONAL FEELINGS OF UNSTEADINESS: 0
DEPRESSION: 0
LOSS OF SENSATION IN FEET: 0

## 2024-05-23 NOTE — PROGRESS NOTES
Subjective   Patient ID: Kalie Ramos is a 89 y.o. female who presents for Follow-up (on various conditions.).    1. Kalie Ramos today came here on virtual visit.  It was made possible by my staff and assisted care with daughter.  The patient is not feeling good.  Somewhat weak, tired, fatigued, exhausted.  Needs PT and OT.  2. She is losing weight, no appetite.  She came for follow-up on various conditions.    I have personally reviewed the patient's Past Medical History, Medications, Allergies, Social History, and Family History in the EMR.    Review of Systems   All other systems reviewed and are negative.    Objective   Virtual.   There were no vitals taken for this visit.    Physical Exam  Constitutional:       Comments: Feeling okay.   Musculoskeletal:      Right lower leg: No edema.      Left lower leg: No edema.     LAB WORK: Laboratory testing discussed.    Assessment/Plan   Problem List Items Addressed This Visit             ICD-10-CM       Cardiac and Vasculature    Hypertension I10    Relevant Orders    Ammonia    CBC       Endocrine/Metabolic    Weight loss R63.4       Musculoskeletal and Injuries    Generalized osteoarthritis M15.9    Relevant Orders    Referral to Physical Therapy    Referral to Occupational Therapy       Sleep    Insomnia - Primary G47.00       Symptoms and Signs    Generalized weakness R53.1     Other Visit Diagnoses         Codes    High cholesterol     E78.00    Relevant Orders    Comprehensive metabolic panel    Restless legs     G25.81    Malnutrition, unspecified type (Multi)     E46        1. Insomnia.  Melatonin, trazodone works.  2. Restless legs.  Requip given.  3. Weight loss, malnutrition.  She will benefit from Ensure one can three times a day.  4. Generalized weakness.  She will benefit from PT and OT, especially swallowing and speech therapy.  5. Discussed with the caregiver team at assisted care.  Everything is in medical parameters.  6. Continue to  follow.  7. The patient looks appropriate to her age and conditions.    Scribe Attestation  By signing my name below, IAngela Scribe attest that this documentation has been prepared under the direction and in the presence of Avel Bailey MD.

## 2024-05-27 ENCOUNTER — APPOINTMENT (OUTPATIENT)
Dept: RADIOLOGY | Facility: HOSPITAL | Age: 89
End: 2024-05-27
Payer: MEDICARE

## 2024-05-27 ENCOUNTER — HOSPITAL ENCOUNTER (OUTPATIENT)
Facility: HOSPITAL | Age: 89
Setting detail: OBSERVATION
Discharge: NURSING, REHAB OR LONG-TERM CARE | End: 2024-05-31
Attending: STUDENT IN AN ORGANIZED HEALTH CARE EDUCATION/TRAINING PROGRAM | Admitting: INTERNAL MEDICINE
Payer: MEDICARE

## 2024-05-27 DIAGNOSIS — M25.531 PAIN IN BOTH WRISTS: ICD-10-CM

## 2024-05-27 DIAGNOSIS — M25.532 PAIN IN BOTH WRISTS: ICD-10-CM

## 2024-05-27 DIAGNOSIS — I87.2 PERIPHERAL VENOUS INSUFFICIENCY: ICD-10-CM

## 2024-05-27 DIAGNOSIS — S42.209A CLOSED FRACTURE OF PROXIMAL END OF HUMERUS, UNSPECIFIED FRACTURE MORPHOLOGY, UNSPECIFIED LATERALITY, INITIAL ENCOUNTER: ICD-10-CM

## 2024-05-27 DIAGNOSIS — M79.642 PAIN IN BOTH HANDS: ICD-10-CM

## 2024-05-27 DIAGNOSIS — M79.641 PAIN IN BOTH HANDS: ICD-10-CM

## 2024-05-27 DIAGNOSIS — R60.0 ARM EDEMA: Primary | ICD-10-CM

## 2024-05-27 LAB
BASOPHILS # BLD AUTO: 0.04 X10*3/UL (ref 0–0.1)
BASOPHILS NFR BLD AUTO: 0.4 %
EOSINOPHIL # BLD AUTO: 0.1 X10*3/UL (ref 0–0.4)
EOSINOPHIL NFR BLD AUTO: 1.1 %
ERYTHROCYTE [DISTWIDTH] IN BLOOD BY AUTOMATED COUNT: 15.2 % (ref 11.5–14.5)
HCT VFR BLD AUTO: 38 % (ref 36–46)
HGB BLD-MCNC: 12 G/DL (ref 12–16)
IMM GRANULOCYTES # BLD AUTO: 0.04 X10*3/UL (ref 0–0.5)
IMM GRANULOCYTES NFR BLD AUTO: 0.4 % (ref 0–0.9)
LYMPHOCYTES # BLD AUTO: 0.95 X10*3/UL (ref 0.8–3)
LYMPHOCYTES NFR BLD AUTO: 10.6 %
MCH RBC QN AUTO: 30.4 PG (ref 26–34)
MCHC RBC AUTO-ENTMCNC: 31.6 G/DL (ref 32–36)
MCV RBC AUTO: 96 FL (ref 80–100)
MONOCYTES # BLD AUTO: 0.74 X10*3/UL (ref 0.05–0.8)
MONOCYTES NFR BLD AUTO: 8.3 %
NEUTROPHILS # BLD AUTO: 7.09 X10*3/UL (ref 1.6–5.5)
NEUTROPHILS NFR BLD AUTO: 79.2 %
NRBC BLD-RTO: 0 /100 WBCS (ref 0–0)
PLATELET # BLD AUTO: 385 X10*3/UL (ref 150–450)
RBC # BLD AUTO: 3.95 X10*6/UL (ref 4–5.2)
WBC # BLD AUTO: 9 X10*3/UL (ref 4.4–11.3)

## 2024-05-27 PROCEDURE — G0378 HOSPITAL OBSERVATION PER HR: HCPCS

## 2024-05-27 PROCEDURE — 2500000001 HC RX 250 WO HCPCS SELF ADMINISTERED DRUGS (ALT 637 FOR MEDICARE OP): Performed by: INTERNAL MEDICINE

## 2024-05-27 PROCEDURE — 36415 COLL VENOUS BLD VENIPUNCTURE: CPT | Performed by: STUDENT IN AN ORGANIZED HEALTH CARE EDUCATION/TRAINING PROGRAM

## 2024-05-27 PROCEDURE — 85025 COMPLETE CBC W/AUTO DIFF WBC: CPT | Performed by: STUDENT IN AN ORGANIZED HEALTH CARE EDUCATION/TRAINING PROGRAM

## 2024-05-27 PROCEDURE — 99285 EMERGENCY DEPT VISIT HI MDM: CPT

## 2024-05-27 PROCEDURE — 2500000004 HC RX 250 GENERAL PHARMACY W/ HCPCS (ALT 636 FOR OP/ED): Performed by: INTERNAL MEDICINE

## 2024-05-27 PROCEDURE — 96372 THER/PROPH/DIAG INJ SC/IM: CPT | Performed by: INTERNAL MEDICINE

## 2024-05-27 RX ORDER — FUROSEMIDE 40 MG/1
40 TABLET ORAL DAILY
Status: DISCONTINUED | OUTPATIENT
Start: 2024-05-28 | End: 2024-05-31 | Stop reason: HOSPADM

## 2024-05-27 RX ORDER — DOCUSATE SODIUM 100 MG/1
100 CAPSULE, LIQUID FILLED ORAL 2 TIMES DAILY
Status: DISCONTINUED | OUTPATIENT
Start: 2024-05-27 | End: 2024-05-31 | Stop reason: HOSPADM

## 2024-05-27 RX ORDER — TALC
3 POWDER (GRAM) TOPICAL NIGHTLY
Status: DISCONTINUED | OUTPATIENT
Start: 2024-05-27 | End: 2024-05-31 | Stop reason: HOSPADM

## 2024-05-27 RX ORDER — ASPIRIN 81 MG/1
81 TABLET ORAL DAILY
Status: DISCONTINUED | OUTPATIENT
Start: 2024-05-28 | End: 2024-05-31 | Stop reason: HOSPADM

## 2024-05-27 RX ORDER — ATORVASTATIN CALCIUM 10 MG/1
10 TABLET, FILM COATED ORAL NIGHTLY
Status: DISCONTINUED | OUTPATIENT
Start: 2024-05-27 | End: 2024-05-31 | Stop reason: HOSPADM

## 2024-05-27 RX ORDER — PANTOPRAZOLE SODIUM 40 MG/1
40 TABLET, DELAYED RELEASE ORAL DAILY
Status: DISCONTINUED | OUTPATIENT
Start: 2024-05-27 | End: 2024-05-27 | Stop reason: SDUPTHER

## 2024-05-27 RX ORDER — GUAIFENESIN 600 MG/1
600 TABLET, EXTENDED RELEASE ORAL EVERY 12 HOURS PRN
Status: DISCONTINUED | OUTPATIENT
Start: 2024-05-27 | End: 2024-05-31 | Stop reason: HOSPADM

## 2024-05-27 RX ORDER — HEPARIN SODIUM 5000 [USP'U]/ML
5000 INJECTION, SOLUTION INTRAVENOUS; SUBCUTANEOUS EVERY 8 HOURS SCHEDULED
Status: DISCONTINUED | OUTPATIENT
Start: 2024-05-27 | End: 2024-05-31 | Stop reason: HOSPADM

## 2024-05-27 RX ORDER — CALCITONIN SALMON 200 [IU]/.09ML
1 SPRAY, METERED NASAL DAILY
Status: DISCONTINUED | OUTPATIENT
Start: 2024-05-28 | End: 2024-05-31 | Stop reason: HOSPADM

## 2024-05-27 RX ORDER — ROPINIROLE 2 MG/1
5 TABLET, FILM COATED ORAL NIGHTLY
Status: DISCONTINUED | OUTPATIENT
Start: 2024-05-28 | End: 2024-05-31 | Stop reason: HOSPADM

## 2024-05-27 RX ORDER — GUAIFENESIN/DEXTROMETHORPHAN 100-10MG/5
5 SYRUP ORAL EVERY 4 HOURS PRN
Status: DISCONTINUED | OUTPATIENT
Start: 2024-05-27 | End: 2024-05-31 | Stop reason: HOSPADM

## 2024-05-27 RX ORDER — SERTRALINE HYDROCHLORIDE 50 MG/1
50 TABLET, FILM COATED ORAL DAILY
Status: DISCONTINUED | OUTPATIENT
Start: 2024-05-28 | End: 2024-05-31 | Stop reason: HOSPADM

## 2024-05-27 RX ORDER — ACETAMINOPHEN 650 MG/1
650 SUPPOSITORY RECTAL EVERY 4 HOURS PRN
Status: DISCONTINUED | OUTPATIENT
Start: 2024-05-27 | End: 2024-05-31 | Stop reason: HOSPADM

## 2024-05-27 RX ORDER — ACETAMINOPHEN 160 MG/5ML
650 SOLUTION ORAL EVERY 4 HOURS PRN
Status: DISCONTINUED | OUTPATIENT
Start: 2024-05-27 | End: 2024-05-31 | Stop reason: HOSPADM

## 2024-05-27 RX ORDER — POLYETHYLENE GLYCOL 3350 17 G/17G
17 POWDER, FOR SOLUTION ORAL DAILY
Status: DISCONTINUED | OUTPATIENT
Start: 2024-05-28 | End: 2024-05-31 | Stop reason: HOSPADM

## 2024-05-27 RX ORDER — IPRATROPIUM BROMIDE AND ALBUTEROL SULFATE 2.5; .5 MG/3ML; MG/3ML
3 SOLUTION RESPIRATORY (INHALATION)
Status: DISCONTINUED | OUTPATIENT
Start: 2024-05-27 | End: 2024-05-31 | Stop reason: HOSPADM

## 2024-05-27 RX ORDER — ONDANSETRON 4 MG/1
4 TABLET, ORALLY DISINTEGRATING ORAL EVERY 8 HOURS PRN
Status: DISCONTINUED | OUTPATIENT
Start: 2024-05-27 | End: 2024-05-31 | Stop reason: HOSPADM

## 2024-05-27 RX ORDER — LOSARTAN POTASSIUM 50 MG/1
50 TABLET ORAL DAILY
Status: DISCONTINUED | OUTPATIENT
Start: 2024-05-28 | End: 2024-05-31 | Stop reason: HOSPADM

## 2024-05-27 RX ORDER — METOPROLOL SUCCINATE 25 MG/1
25 TABLET, EXTENDED RELEASE ORAL DAILY
Status: DISCONTINUED | OUTPATIENT
Start: 2024-05-28 | End: 2024-05-31 | Stop reason: HOSPADM

## 2024-05-27 RX ORDER — ACETAMINOPHEN 325 MG/1
650 TABLET ORAL EVERY 4 HOURS PRN
Status: DISCONTINUED | OUTPATIENT
Start: 2024-05-27 | End: 2024-05-31 | Stop reason: HOSPADM

## 2024-05-27 RX ORDER — TRAZODONE HYDROCHLORIDE 50 MG/1
50 TABLET ORAL NIGHTLY PRN
Status: DISCONTINUED | OUTPATIENT
Start: 2024-05-27 | End: 2024-05-31 | Stop reason: HOSPADM

## 2024-05-27 RX ORDER — POTASSIUM CHLORIDE 750 MG/1
10 TABLET, FILM COATED, EXTENDED RELEASE ORAL DAILY
Status: DISCONTINUED | OUTPATIENT
Start: 2024-05-28 | End: 2024-05-31 | Stop reason: HOSPADM

## 2024-05-27 RX ORDER — ONDANSETRON HYDROCHLORIDE 2 MG/ML
4 INJECTION, SOLUTION INTRAVENOUS EVERY 8 HOURS PRN
Status: DISCONTINUED | OUTPATIENT
Start: 2024-05-27 | End: 2024-05-31 | Stop reason: HOSPADM

## 2024-05-27 RX ORDER — NITROGLYCERIN 0.4 MG/1
0.4 TABLET SUBLINGUAL EVERY 5 MIN PRN
Status: DISCONTINUED | OUTPATIENT
Start: 2024-05-27 | End: 2024-05-31 | Stop reason: HOSPADM

## 2024-05-27 RX ORDER — PANTOPRAZOLE SODIUM 40 MG/10ML
40 INJECTION, POWDER, LYOPHILIZED, FOR SOLUTION INTRAVENOUS
Status: DISCONTINUED | OUTPATIENT
Start: 2024-05-28 | End: 2024-05-31 | Stop reason: HOSPADM

## 2024-05-27 RX ORDER — PANTOPRAZOLE SODIUM 40 MG/1
40 TABLET, DELAYED RELEASE ORAL
Status: DISCONTINUED | OUTPATIENT
Start: 2024-05-28 | End: 2024-05-31 | Stop reason: HOSPADM

## 2024-05-27 RX ORDER — LANOLIN ALCOHOL/MO/W.PET/CERES
400 CREAM (GRAM) TOPICAL 3 TIMES WEEKLY
Status: DISCONTINUED | OUTPATIENT
Start: 2024-05-28 | End: 2024-05-31 | Stop reason: HOSPADM

## 2024-05-27 RX ADMIN — DOCUSATE SODIUM 100 MG: 100 CAPSULE, LIQUID FILLED ORAL at 21:11

## 2024-05-27 RX ADMIN — Medication 3 MG: at 21:11

## 2024-05-27 RX ADMIN — HEPARIN SODIUM 5000 UNITS: 5000 INJECTION, SOLUTION INTRAVENOUS; SUBCUTANEOUS at 21:11

## 2024-05-27 RX ADMIN — TRAZODONE HYDROCHLORIDE 50 MG: 50 TABLET ORAL at 21:11

## 2024-05-27 RX ADMIN — ATORVASTATIN CALCIUM 10 MG: 10 TABLET, FILM COATED ORAL at 21:11

## 2024-05-27 SDOH — SOCIAL STABILITY: SOCIAL INSECURITY: HAVE YOU HAD THOUGHTS OF HARMING ANYONE ELSE?: NO

## 2024-05-27 SDOH — SOCIAL STABILITY: SOCIAL INSECURITY: WERE YOU ABLE TO COMPLETE ALL THE BEHAVIORAL HEALTH SCREENINGS?: YES

## 2024-05-27 SDOH — SOCIAL STABILITY: SOCIAL INSECURITY: DOES ANYONE TRY TO KEEP YOU FROM HAVING/CONTACTING OTHER FRIENDS OR DOING THINGS OUTSIDE YOUR HOME?: NO

## 2024-05-27 SDOH — SOCIAL STABILITY: SOCIAL INSECURITY: HAS ANYONE EVER THREATENED TO HURT YOUR FAMILY OR YOUR PETS?: NO

## 2024-05-27 SDOH — SOCIAL STABILITY: SOCIAL INSECURITY: DO YOU FEEL UNSAFE GOING BACK TO THE PLACE WHERE YOU ARE LIVING?: NO

## 2024-05-27 SDOH — SOCIAL STABILITY: SOCIAL INSECURITY: ABUSE: ADULT

## 2024-05-27 SDOH — SOCIAL STABILITY: SOCIAL INSECURITY: DO YOU FEEL ANYONE HAS EXPLOITED OR TAKEN ADVANTAGE OF YOU FINANCIALLY OR OF YOUR PERSONAL PROPERTY?: NO

## 2024-05-27 SDOH — SOCIAL STABILITY: SOCIAL INSECURITY: ARE THERE ANY APPARENT SIGNS OF INJURIES/BEHAVIORS THAT COULD BE RELATED TO ABUSE/NEGLECT?: NO

## 2024-05-27 SDOH — SOCIAL STABILITY: SOCIAL INSECURITY: ARE YOU OR HAVE YOU BEEN THREATENED OR ABUSED PHYSICALLY, EMOTIONALLY, OR SEXUALLY BY ANYONE?: NO

## 2024-05-27 SDOH — SOCIAL STABILITY: SOCIAL INSECURITY: HAVE YOU HAD ANY THOUGHTS OF HARMING ANYONE ELSE?: NO

## 2024-05-27 ASSESSMENT — COGNITIVE AND FUNCTIONAL STATUS - GENERAL
PERSONAL GROOMING: A LOT
CLIMB 3 TO 5 STEPS WITH RAILING: A LOT
MOBILITY SCORE: 14
DRESSING REGULAR UPPER BODY CLOTHING: A LOT
DRESSING REGULAR LOWER BODY CLOTHING: A LOT
TOILETING: TOTAL
MOVING FROM LYING ON BACK TO SITTING ON SIDE OF FLAT BED WITH BEDRAILS: A LITTLE
MOVING TO AND FROM BED TO CHAIR: A LOT
TURNING FROM BACK TO SIDE WHILE IN FLAT BAD: A LITTLE
DRESSING REGULAR LOWER BODY CLOTHING: A LOT
STANDING UP FROM CHAIR USING ARMS: A LOT
TURNING FROM BACK TO SIDE WHILE IN FLAT BAD: A LITTLE
HELP NEEDED FOR BATHING: A LOT
EATING MEALS: A LITTLE
WALKING IN HOSPITAL ROOM: A LOT
WALKING IN HOSPITAL ROOM: A LOT
TOILETING: TOTAL
EATING MEALS: A LITTLE
PATIENT BASELINE BEDBOUND: NO
DRESSING REGULAR UPPER BODY CLOTHING: A LOT
DAILY ACTIVITIY SCORE: 12
MOVING TO AND FROM BED TO CHAIR: A LOT
MOVING FROM LYING ON BACK TO SITTING ON SIDE OF FLAT BED WITH BEDRAILS: A LITTLE
MOBILITY SCORE: 14
HELP NEEDED FOR BATHING: A LOT
STANDING UP FROM CHAIR USING ARMS: A LOT
PERSONAL GROOMING: A LOT
DAILY ACTIVITIY SCORE: 12
CLIMB 3 TO 5 STEPS WITH RAILING: A LOT

## 2024-05-27 ASSESSMENT — ACTIVITIES OF DAILY LIVING (ADL)
ADEQUATE_TO_COMPLETE_ADL: YES
TOILETING: NEEDS ASSISTANCE
HEARING - RIGHT EAR: HEARING AID
FEEDING YOURSELF: INDEPENDENT
HEARING - LEFT EAR: HEARING AID
PATIENT'S MEMORY ADEQUATE TO SAFELY COMPLETE DAILY ACTIVITIES?: YES
WALKS IN HOME: NEEDS ASSISTANCE
JUDGMENT_ADEQUATE_SAFELY_COMPLETE_DAILY_ACTIVITIES: YES
BATHING: NEEDS ASSISTANCE
DRESSING YOURSELF: NEEDS ASSISTANCE
GROOMING: NEEDS ASSISTANCE

## 2024-05-27 ASSESSMENT — PATIENT HEALTH QUESTIONNAIRE - PHQ9
1. LITTLE INTEREST OR PLEASURE IN DOING THINGS: NOT AT ALL
2. FEELING DOWN, DEPRESSED OR HOPELESS: NOT AT ALL
SUM OF ALL RESPONSES TO PHQ9 QUESTIONS 1 & 2: 0

## 2024-05-27 ASSESSMENT — COLUMBIA-SUICIDE SEVERITY RATING SCALE - C-SSRS

## 2024-05-27 ASSESSMENT — PAIN SCALES - GENERAL
PAINLEVEL_OUTOF10: 9
PAINLEVEL_OUTOF10: 2

## 2024-05-27 ASSESSMENT — LIFESTYLE VARIABLES
AUDIT-C TOTAL SCORE: 0
HOW OFTEN DO YOU HAVE A DRINK CONTAINING ALCOHOL: NEVER
HOW MANY STANDARD DRINKS CONTAINING ALCOHOL DO YOU HAVE ON A TYPICAL DAY: PATIENT DOES NOT DRINK
HOW OFTEN DO YOU HAVE 6 OR MORE DRINKS ON ONE OCCASION: NEVER
PRESCIPTION_ABUSE_PAST_12_MONTHS: NO
SKIP TO QUESTIONS 9-10: 1
SUBSTANCE_ABUSE_PAST_12_MONTHS: NO
AUDIT-C TOTAL SCORE: 0

## 2024-05-27 ASSESSMENT — PAIN - FUNCTIONAL ASSESSMENT
PAIN_FUNCTIONAL_ASSESSMENT: 0-10
PAIN_FUNCTIONAL_ASSESSMENT: 0-10

## 2024-05-27 NOTE — PROGRESS NOTES
Pharmacy Medication History Review    Kalie Ramos is a 89 y.o. female admitted for arm swelling. Pharmacy reviewed the patient's [id number] medications and allergies for accuracy.    The list below reflects the updated PTA list. Please review each medication in order reconciliation for additional clarification and justification.  Prior to Admission Medications   Prescriptions Last Dose   aspirin 81 mg EC tablet 5/27/2024   Sig: Take 1 tablet (81 mg) by mouth once daily. Do not start before April 3, 2024.   atorvastatin (Lipitor) 10 mg tablet 5/26/2024   Sig: Take 1 tablet (10 mg) by mouth once daily at bedtime.   calcitonin, salmon, (Miacalcin) 200 unit/actuation nasal spray 5/27/2024   Sig: Administer 1 spray into one nostril once daily. Do not start before October 28, 2023.   denosumab (Prolia) 60 mg/mL syringe Past Month   Sig: Inject 1 mL (60 mg total) under the skin every 6 months.   furosemide (Lasix) 40 mg tablet 5/27/2024   Sig: Take 1 tablet (40 mg) by mouth once daily. Do not start before April 3, 2024.   ipratropium-albuteroL (Duo-Neb) 0.5-2.5 mg/3 mL nebulizer solution 5/27/2024   Sig: Take 3 mL by nebulization 3 times a day.   losartan (Cozaar) 50 mg tablet 5/27/2024   Sig: Take 1 tablet (50 mg) by mouth once daily. Do not start before April 3, 2024.   magnesium oxide (Mag-Ox) 400 mg (241.3 mg magnesium) tablet Past Week   Sig: Take 1 tablet (400 mg) by mouth 3 times a week. Every Tuesday, Thursday and Saturday   melatonin 3 mg tablet 5/26/2024   Sig: Take 1 tablet (3 mg) by mouth once daily at bedtime.   metoprolol succinate XL (Toprol-XL) 25 mg 24 hr tablet 5/27/2024   Sig: Take 1 tablet (25 mg) by mouth once daily. Do not crush or chew. Do not start before February 4, 2024.   nitroglycerin (Nitrostat) 0.4 mg SL tablet    Sig: Place 1 tablet (0.4 mg) under the tongue every 5 minutes if needed for chest pain (UP TO 3 TIMES. IF NO RELIEF CALL 911.).   oxygen (O2) gas therapy    Sig:  Inhale 2 L/min once every 24 hours.   pantoprazole (ProtoNix) 40 mg EC tablet 5/27/2024   Sig: TAKE 1 TABLET DAILY   potassium chloride CR (Klor-Con) 10 mEq ER tablet 5/27/2024   Sig: Take 1 tablet (10 mEq) by mouth once daily. Do not crush, chew, or split. Do not start before April 3, 2024.   rOPINIRole (Requip) 5 mg tablet 5/27/2024   Sig: Take 1 tablet (5 mg) by mouth once daily at bedtime.   sertraline (Zoloft) 50 mg tablet 5/27/2024   Sig: Take 1 tablet (50 mg) by mouth once daily.   traZODone (Desyrel) 50 mg tablet Past Week   Sig: TAKE 1 TABLET AT BEDTIME AS NEEDED FOR SLEEP      Facility-Administered Medications: None          The list below reflects the updated allergy list. Please review each documented allergy for additional clarification and justification.  Allergies  Reviewed by Jordon Foster RPh on 5/27/2024        Severity Reactions Comments    Naproxen Low Hives             Below are additional concerns with the patient's PTA list.  - Home med list provided by family member, reviewed at bedside. The following discrepancies were identified with med list vs. list identified per chart review:  Aspirin Rx for 81 mg once daily, but home med list states 325 mg once daily  Losartan Rx for 50 mg once daily, but home med list states 25 mg once daily  Pt was using lidocaine 4% patch topically to R. knee post-hip Fx, not resolved. No longer using lido patch or having knee pain at this time.    Jordon Foster, PharmD

## 2024-05-28 ENCOUNTER — APPOINTMENT (OUTPATIENT)
Dept: RADIOLOGY | Facility: HOSPITAL | Age: 89
End: 2024-05-28
Payer: MEDICARE

## 2024-05-28 PROBLEM — S42.202A CLOSED FRACTURE OF PROXIMAL END OF LEFT HUMERUS: Status: ACTIVE | Noted: 2024-05-28

## 2024-05-28 LAB
ALBUMIN SERPL-MCNC: 2.6 G/DL (ref 3.5–5)
ALP BLD-CCNC: 132 U/L (ref 35–125)
ALT SERPL-CCNC: 11 U/L (ref 5–40)
ANION GAP SERPL CALC-SCNC: 6 MMOL/L
AST SERPL-CCNC: 18 U/L (ref 5–40)
BILIRUB SERPL-MCNC: 0.4 MG/DL (ref 0.1–1.2)
BUN SERPL-MCNC: 23 MG/DL (ref 8–25)
CALCIUM SERPL-MCNC: 7.9 MG/DL (ref 8.5–10.4)
CHLORIDE SERPL-SCNC: 104 MMOL/L (ref 97–107)
CO2 SERPL-SCNC: 30 MMOL/L (ref 24–31)
CREAT SERPL-MCNC: 0.8 MG/DL (ref 0.4–1.6)
EGFRCR SERPLBLD CKD-EPI 2021: 68 ML/MIN/1.73M*2
ERYTHROCYTE [DISTWIDTH] IN BLOOD BY AUTOMATED COUNT: 15.2 % (ref 11.5–14.5)
GLUCOSE SERPL-MCNC: 87 MG/DL (ref 65–99)
HCT VFR BLD AUTO: 32 % (ref 36–46)
HGB BLD-MCNC: 10.2 G/DL (ref 12–16)
MCH RBC QN AUTO: 30.6 PG (ref 26–34)
MCHC RBC AUTO-ENTMCNC: 31.9 G/DL (ref 32–36)
MCV RBC AUTO: 96 FL (ref 80–100)
NRBC BLD-RTO: 0 /100 WBCS (ref 0–0)
PLATELET # BLD AUTO: 321 X10*3/UL (ref 150–450)
POTASSIUM SERPL-SCNC: 4.4 MMOL/L (ref 3.4–5.1)
PROT SERPL-MCNC: 5.7 G/DL (ref 5.9–7.9)
RBC # BLD AUTO: 3.33 X10*6/UL (ref 4–5.2)
SODIUM SERPL-SCNC: 140 MMOL/L (ref 133–145)
WBC # BLD AUTO: 10 X10*3/UL (ref 4.4–11.3)

## 2024-05-28 PROCEDURE — 85027 COMPLETE CBC AUTOMATED: CPT | Performed by: INTERNAL MEDICINE

## 2024-05-28 PROCEDURE — 2500000004 HC RX 250 GENERAL PHARMACY W/ HCPCS (ALT 636 FOR OP/ED): Performed by: INTERNAL MEDICINE

## 2024-05-28 PROCEDURE — 96372 THER/PROPH/DIAG INJ SC/IM: CPT | Performed by: INTERNAL MEDICINE

## 2024-05-28 PROCEDURE — 94640 AIRWAY INHALATION TREATMENT: CPT

## 2024-05-28 PROCEDURE — 2500000001 HC RX 250 WO HCPCS SELF ADMINISTERED DRUGS (ALT 637 FOR MEDICARE OP): Performed by: INTERNAL MEDICINE

## 2024-05-28 PROCEDURE — 97116 GAIT TRAINING THERAPY: CPT | Mod: GP

## 2024-05-28 PROCEDURE — 2500000002 HC RX 250 W HCPCS SELF ADMINISTERED DRUGS (ALT 637 FOR MEDICARE OP, ALT 636 FOR OP/ED): Performed by: INTERNAL MEDICINE

## 2024-05-28 PROCEDURE — 97535 SELF CARE MNGMENT TRAINING: CPT | Mod: GO

## 2024-05-28 PROCEDURE — 93970 EXTREMITY STUDY: CPT

## 2024-05-28 PROCEDURE — 94760 N-INVAS EAR/PLS OXIMETRY 1: CPT

## 2024-05-28 PROCEDURE — 97166 OT EVAL MOD COMPLEX 45 MIN: CPT | Mod: GO

## 2024-05-28 PROCEDURE — 9420000001 HC RT PATIENT EDUCATION 5 MIN

## 2024-05-28 PROCEDURE — 97162 PT EVAL MOD COMPLEX 30 MIN: CPT | Mod: GP

## 2024-05-28 PROCEDURE — 93970 EXTREMITY STUDY: CPT | Performed by: RADIOLOGY

## 2024-05-28 PROCEDURE — 99222 1ST HOSP IP/OBS MODERATE 55: CPT | Performed by: INTERNAL MEDICINE

## 2024-05-28 PROCEDURE — 80053 COMPREHEN METABOLIC PANEL: CPT | Performed by: INTERNAL MEDICINE

## 2024-05-28 PROCEDURE — 36415 COLL VENOUS BLD VENIPUNCTURE: CPT | Performed by: INTERNAL MEDICINE

## 2024-05-28 PROCEDURE — G0378 HOSPITAL OBSERVATION PER HR: HCPCS

## 2024-05-28 RX ADMIN — HEPARIN SODIUM 5000 UNITS: 5000 INJECTION, SOLUTION INTRAVENOUS; SUBCUTANEOUS at 21:00

## 2024-05-28 RX ADMIN — TRAZODONE HYDROCHLORIDE 50 MG: 50 TABLET ORAL at 20:01

## 2024-05-28 RX ADMIN — ATORVASTATIN CALCIUM 10 MG: 10 TABLET, FILM COATED ORAL at 20:01

## 2024-05-28 RX ADMIN — Medication 400 MG: at 09:26

## 2024-05-28 RX ADMIN — HEPARIN SODIUM 5000 UNITS: 5000 INJECTION, SOLUTION INTRAVENOUS; SUBCUTANEOUS at 05:13

## 2024-05-28 RX ADMIN — DOCUSATE SODIUM 100 MG: 100 CAPSULE, LIQUID FILLED ORAL at 21:00

## 2024-05-28 RX ADMIN — IPRATROPIUM BROMIDE AND ALBUTEROL SULFATE 3 ML: 2.5; .5 SOLUTION RESPIRATORY (INHALATION) at 19:52

## 2024-05-28 RX ADMIN — METOPROLOL SUCCINATE 25 MG: 25 TABLET, FILM COATED, EXTENDED RELEASE ORAL at 09:26

## 2024-05-28 RX ADMIN — ASPIRIN 81 MG: 81 TABLET, COATED ORAL at 09:27

## 2024-05-28 RX ADMIN — ACETAMINOPHEN 650 MG: 325 TABLET ORAL at 15:03

## 2024-05-28 RX ADMIN — IPRATROPIUM BROMIDE AND ALBUTEROL SULFATE 3 ML: 2.5; .5 SOLUTION RESPIRATORY (INHALATION) at 13:08

## 2024-05-28 RX ADMIN — IPRATROPIUM BROMIDE AND ALBUTEROL SULFATE 3 ML: 2.5; .5 SOLUTION RESPIRATORY (INHALATION) at 07:25

## 2024-05-28 RX ADMIN — Medication 3 MG: at 20:01

## 2024-05-28 RX ADMIN — HEPARIN SODIUM 5000 UNITS: 5000 INJECTION, SOLUTION INTRAVENOUS; SUBCUTANEOUS at 15:03

## 2024-05-28 RX ADMIN — ROPINIROLE HYDROCHLORIDE 5 MG: 2 TABLET, FILM COATED ORAL at 20:01

## 2024-05-28 RX ADMIN — POTASSIUM CHLORIDE 10 MEQ: 750 TABLET, EXTENDED RELEASE ORAL at 09:27

## 2024-05-28 RX ADMIN — SERTRALINE HYDROCHLORIDE 50 MG: 50 TABLET ORAL at 09:27

## 2024-05-28 RX ADMIN — FUROSEMIDE 40 MG: 40 TABLET ORAL at 09:26

## 2024-05-28 RX ADMIN — DOCUSATE SODIUM 100 MG: 100 CAPSULE, LIQUID FILLED ORAL at 09:27

## 2024-05-28 SDOH — HEALTH STABILITY: MENTAL HEALTH
HOW OFTEN DO YOU NEED TO HAVE SOMEONE HELP YOU WHEN YOU READ INSTRUCTIONS, PAMPHLETS, OR OTHER WRITTEN MATERIAL FROM YOUR DOCTOR OR PHARMACY?: NEVER

## 2024-05-28 SDOH — SOCIAL STABILITY: SOCIAL INSECURITY: WITHIN THE LAST YEAR, HAVE YOU BEEN AFRAID OF YOUR PARTNER OR EX-PARTNER?: NO

## 2024-05-28 SDOH — SOCIAL STABILITY: SOCIAL INSECURITY: WITHIN THE LAST YEAR, HAVE YOU BEEN HUMILIATED OR EMOTIONALLY ABUSED IN OTHER WAYS BY YOUR PARTNER OR EX-PARTNER?: NO

## 2024-05-28 SDOH — HEALTH STABILITY: PHYSICAL HEALTH: ON AVERAGE, HOW MANY DAYS PER WEEK DO YOU ENGAGE IN MODERATE TO STRENUOUS EXERCISE (LIKE A BRISK WALK)?: 0 DAYS

## 2024-05-28 SDOH — ECONOMIC STABILITY: FOOD INSECURITY: WITHIN THE PAST 12 MONTHS, YOU WORRIED THAT YOUR FOOD WOULD RUN OUT BEFORE YOU GOT MONEY TO BUY MORE.: NEVER TRUE

## 2024-05-28 SDOH — ECONOMIC STABILITY: FOOD INSECURITY: WITHIN THE PAST 12 MONTHS, THE FOOD YOU BOUGHT JUST DIDN'T LAST AND YOU DIDN'T HAVE MONEY TO GET MORE.: NEVER TRUE

## 2024-05-28 SDOH — ECONOMIC STABILITY: INCOME INSECURITY: HOW HARD IS IT FOR YOU TO PAY FOR THE VERY BASICS LIKE FOOD, HOUSING, MEDICAL CARE, AND HEATING?: NOT HARD AT ALL

## 2024-05-28 SDOH — ECONOMIC STABILITY: INCOME INSECURITY: IN THE LAST 12 MONTHS, WAS THERE A TIME WHEN YOU WERE NOT ABLE TO PAY THE MORTGAGE OR RENT ON TIME?: NO

## 2024-05-28 SDOH — ECONOMIC STABILITY: HOUSING INSECURITY: IN THE LAST 12 MONTHS, HOW MANY PLACES HAVE YOU LIVED?: 1

## 2024-05-28 SDOH — ECONOMIC STABILITY: INCOME INSECURITY: IN THE PAST 12 MONTHS, HAS THE ELECTRIC, GAS, OIL, OR WATER COMPANY THREATENED TO SHUT OFF SERVICE IN YOUR HOME?: NO

## 2024-05-28 SDOH — HEALTH STABILITY: PHYSICAL HEALTH: ON AVERAGE, HOW MANY MINUTES DO YOU ENGAGE IN EXERCISE AT THIS LEVEL?: 0 MIN

## 2024-05-28 ASSESSMENT — COGNITIVE AND FUNCTIONAL STATUS - GENERAL
DRESSING REGULAR LOWER BODY CLOTHING: A LOT
DRESSING REGULAR UPPER BODY CLOTHING: A LOT
EATING MEALS: A LITTLE
PERSONAL GROOMING: A LITTLE
TURNING FROM BACK TO SIDE WHILE IN FLAT BAD: A LOT
EATING MEALS: A LOT
DAILY ACTIVITIY SCORE: 13
MOBILITY SCORE: 12
DRESSING REGULAR LOWER BODY CLOTHING: A LOT
TURNING FROM BACK TO SIDE WHILE IN FLAT BAD: A LOT
MOVING FROM LYING ON BACK TO SITTING ON SIDE OF FLAT BED WITH BEDRAILS: A LITTLE
STANDING UP FROM CHAIR USING ARMS: A LOT
MOVING TO AND FROM BED TO CHAIR: A LOT
DAILY ACTIVITIY SCORE: 12
PERSONAL GROOMING: A LOT
MOVING FROM LYING ON BACK TO SITTING ON SIDE OF FLAT BED WITH BEDRAILS: A LOT
STANDING UP FROM CHAIR USING ARMS: A LOT
WALKING IN HOSPITAL ROOM: A LOT
CLIMB 3 TO 5 STEPS WITH RAILING: A LOT
DRESSING REGULAR LOWER BODY CLOTHING: A LOT
STANDING UP FROM CHAIR USING ARMS: A LOT
DRESSING REGULAR UPPER BODY CLOTHING: A LOT
HELP NEEDED FOR BATHING: A LOT
WALKING IN HOSPITAL ROOM: A LOT
CLIMB 3 TO 5 STEPS WITH RAILING: A LOT
HELP NEEDED FOR BATHING: A LOT
CLIMB 3 TO 5 STEPS WITH RAILING: A LOT
MOVING FROM LYING ON BACK TO SITTING ON SIDE OF FLAT BED WITH BEDRAILS: A LITTLE
WALKING IN HOSPITAL ROOM: A LOT
DAILY ACTIVITIY SCORE: 12
MOBILITY SCORE: 14
DRESSING REGULAR UPPER BODY CLOTHING: A LOT
EATING MEALS: A LITTLE
MOVING TO AND FROM BED TO CHAIR: A LOT
TOILETING: A LOT
TOILETING: TOTAL
HELP NEEDED FOR BATHING: A LOT
MOBILITY SCORE: 13
TOILETING: TOTAL
PERSONAL GROOMING: A LOT
MOVING TO AND FROM BED TO CHAIR: A LOT
TURNING FROM BACK TO SIDE WHILE IN FLAT BAD: A LITTLE

## 2024-05-28 ASSESSMENT — ACTIVITIES OF DAILY LIVING (ADL)
LACK_OF_TRANSPORTATION: NO
ADL_ASSISTANCE: NEEDS ASSISTANCE
BATHING_ASSISTANCE: MAXIMAL
HOME_MANAGEMENT_TIME_ENTRY: 11
ADL_ASSISTANCE: NEEDS ASSISTANCE

## 2024-05-28 ASSESSMENT — PAIN SCALES - WONG BAKER
WONGBAKER_NUMERICALRESPONSE: HURTS EVEN MORE
WONGBAKER_NUMERICALRESPONSE: NO HURT

## 2024-05-28 ASSESSMENT — PAIN - FUNCTIONAL ASSESSMENT
PAIN_FUNCTIONAL_ASSESSMENT: 0-10
PAIN_FUNCTIONAL_ASSESSMENT: WONG-BAKER FACES

## 2024-05-28 ASSESSMENT — PAIN SCALES - GENERAL
PAINLEVEL_OUTOF10: 0 - NO PAIN
PAINLEVEL_OUTOF10: 0 - NO PAIN
PAINLEVEL_OUTOF10: 2

## 2024-05-28 NOTE — PROGRESS NOTES
Occupational Therapy    Evaluation    Patient Name: Kalie Ramos  MRN: [id number]  Today's Date: 5/28/2024  Time Calculation  Start Time: 1332  Stop Time: 1429  Time Calculation (min): 57 min    Assessment  IP OT Assessment  OT Assessment: Pt is 89 y/o female admitted for arm edema LUE. Pt w/ recent humeral neck fx Lt . Pt presents w/ decreased ADL skills/ funcitonal mobility, decreaseda ctivity tolerance , impaired LUE. REcommend OT services toa ddress above deficits.  Prognosis: Good  Barriers to Discharge: None  End of Session Communication: Bedside nurse  End of Session Patient Position: Up in chair, Alarm on  Plan:  Treatment Interventions: ADL retraining, Functional transfer training, Endurance training, Patient/family training, Compensatory technique education, Equipment evaluation/education  OT Frequency: 4 times per week  OT Discharge Recommendations: Moderate intensity level of continued care  OT Recommended Transfer Status: Assist of 2  OT - OK to Discharge: Yes    Subjective   Current Problem:  1. Arm edema        2. Closed fracture of proximal end of humerus, unspecified fracture morphology, unspecified laterality, initial encounter        3. Pain in both hands  Upper extremity venous duplex bilateral    Upper extremity venous duplex bilateral      4. Peripheral venous insufficiency  Upper extremity venous duplex bilateral    Upper extremity venous duplex bilateral      5. Pain in both wrists  Upper extremity venous duplex bilateral    Upper extremity venous duplex bilateral        General:  General  Reason for Referral: decreased ADL's  Referred By: Dr. JUSTINO Bailey  Past Medical History Relevant to Rehab: Cervicalgia,CHF, COPD, depression, HTN, breast cancer s/p lumpectomy, neuropathy, PNA, TIA, CVA, PE, PVD  Family/Caregiver Present: No (PT spoke w/ Dtr over the phone per pt request)  Co-Treatment: PT  Co-Treatment Reason: for safety in mobility and pt tolerance  Prior to Session Communication:  Bedside nurse  Patient Position Received: Bed, 3 rail up, Alarm on  Preferred Learning Style: verbal  General Comment: Pt agreeabled to therapy  Precautions:  Hearing/Visual Limitations: glasses, hearing Aides  UE Weight Bearing Status: Left Non-Weight Bearing  Medical Precautions: Fall precautions  Braces Applied: LUE fracture brace  Vital Signs:     Pain:  Pain Assessment  Pain Assessment: 0-10  Pain Score: 0 - No pain    Objective   Cognition:  Overall Cognitive Status: Impaired (disoriented to situation, anxious, overwhelmed)           Home Living:  Type of Home: Assisted living  Lives With:  (care staff)  Home Adaptive Equipment: Walker rolling or standard  Home Layout: One level  Home Access: Level entry   Prior Function:  Level of [place]: Needs assistance with ADLs, Needs assistance with functional transfers  Receives Help From: Other (Comment) (care staff)  ADL Assistance: Needs assistance  Homemaking Assistance: Needs assistance (facility provides)  Ambulatory Assistance: Needs assistance  Hand Dominance: Right  IADL History:     ADL:  Eating Assistance: Stand by  Eating Deficit: Setup (per clinical judgement)  Grooming Assistance: Stand by  Grooming Deficit: Setup (to wash face)  Bathing Assistance: Maximal  Bathing Deficit: Buttocks, Right lower leg including foot, Left lower leg including foot, Right arm, Perineal area (per clinical judgement)  UE Dressing Assistance: Maximal  UE Dressing Deficit: Pull over head, Pull around back, Pull down in back, Thread RUE, Thread LUE (per clinical judgement)  LE Dressing Assistance: Maximal  LE Dressing Deficit: Thread RLE into pants, Thread LLE into pants, Pull up over hips (to don pants, don/ doff depends)  Toileting Assistance with Device: Total  Toileting Deficit: Perineal hygiene, Clothing management down, Clothing management up  Functional Assistance: Not performed  Activity Tolerance:  Endurance: Decreased tolerance for upright activites  Bed  Mobility/Transfers: Bed Mobility  Bed Mobility: Yes  Bed Mobility 1  Bed Mobility 1: Supine to sitting  Level of Assistance 1: Moderate assistance  Bed Mobility Comments 1: for trunk up, HOB elev.    Transfers  Transfer: Yes  Transfer 1  Transfer From 1: Bed to  Transfer to 1: Stand  Technique 1: Sit to stand  Transfer Device 1: Gait belt  Transfer Level of Assistance 1: Maximum assistance (x2)  Trials/Comments 1: Therapists supporting MEG pt w/ arm on other therapist [facility] for support.  Transfers 2  Transfer From 2: Bed to  Transfer to 2: Chair with arms  Technique 2: To right  Transfer Device 2: Gait belt  Transfer Level of Assistance 2: Arm in arm assistance, Maximum assistance (x2)  Trials/Comments 2: Pt sidestepped to chair w/ max. assist x2 w/ arm in arm assist and gait belt  Transfers 3  Transfer From 3: Stand to, Sit to  Transfer to 3: Stand, Sit, Chair with arms  Technique 3: Stand to sit, Sit to stand  Transfer Device 3: Gait belt  Transfer Level of Assistance 3: Arm in arm assistance, Maximum assistance (x2)  Trials/Comments 3: x2 trials    Standing Balance:  Static Standing Balance  Static Standing-Balance Support: Bilateral upper extremity supported  Static Standing-Level of Assistance: Maximum assistance  Static Standing-Comment/Number of Minutes: Therapist supporting Ron BOATENG stood ~ 1 :30 secs for change of depends and hygiene, to pull pants up/ down.  Modalities:     IADL's:      Vision: Vision - Basic Assessment  Current Vision: Wears glasses all the time  Sensation:  Light Touch: No apparent deficits  Strength:  Strength Comments: RUE =/> 3/5 , LUE NT tested,  in shld brace  Perception:     Coordination:  Movements are Fluid and Coordinated:  (impaired LUE, RUE WFL)   Hand Function:  Hand Function  Gross Grasp: Functional  Coordination: Functional  Extremities: RUE   RUE : Within Functional Limits and LUE   LUE:  (impaired , in shld brace, swelling from below shld brace throughout elbow to just  above wrist)    Treatment: See transfer sectiion/ standing balance section.    Outcome Measures: [facility] Daily Activity  Putting on and taking off regular lower body clothing: A lot  Bathing (including washing, rinsing, drying): A lot  Putting on and taking off regular upper body clothing: A lot  Toileting, which includes using toilet, bedpan or urinal: Total  Taking care of personal grooming such as brushing teeth: A little  Eating Meals: A little  Daily Activity - Total Score: 13      Education Documentation  ADL Training, taught by Millie Jamison OT at 5/28/2024  4:37 PM.  Learner: Patient  Readiness: Acceptance  Method: Explanation  Response: Needs Reinforcement    Education Comments  No comments found.      Goals:   Encounter Problems       Encounter Problems (Active)       OT Goals       ADL's (Progressing)       Start:  05/28/24    Expected End:  06/11/24       Pt will complete bathing, dressing and grooming tasks w/ min. Assist w/ AE/ compensatory tech as needed for safety and increased independence in self care tasks.         Functional transfers (Progressing)       Start:  05/28/24    Expected End:  06/11/24       Pt will demon bed, chair and toilet transfers w/ min. Assist w/ LRD for safety and increased independence in daily tasks.         Functional endurance  (Progressing)       Start:  05/28/24    Expected End:  06/11/24       Pt will tolerate 15 minutes of functional activity to improve functional endurance for daily tasks and functional mobility.            OT Goals       Functional mobility (Progressing)       Start:  05/28/24    Expected End:  06/11/24       Pt will demon. household distance to improve functional mobility for daily tasks.

## 2024-05-28 NOTE — NURSING NOTE
Patient to room 320 from ED, this RN and staff transferred patient to bed. Family at bedside, no c/o voiced. This RN oriented patient to room and call light system, call light in reach.

## 2024-05-28 NOTE — NURSING NOTE
This RN notified Dr. JENN Bailey that patient has DNRCCA no intubation no ICU paperwork. Also notified Dr. JENN Bailey that ER unable to establish IV access after several attempts

## 2024-05-28 NOTE — NURSING NOTE
This RN attempt IV, unsuccessful. Called IV nurse, also unsuccessful x2. Will let dr. Bailey know.

## 2024-05-28 NOTE — PROGRESS NOTES
Therapy Communication Note    Patient Name: Kalie Ramos  MRN: [id number]  Today's Date: 5/28/2024    Discipline: Physical Therapy    Missed Visit Reason:      Missed Time: Cancel    Comment AM Cxl--pending UE US to r/o DVT   Vss/afebrile  Patient remains awake conversant and confused.  Output persist from subdural drain

## 2024-05-28 NOTE — CARE PLAN
The patient's goals for the shift include  rest    The clinical goals for the shift include feel better

## 2024-05-28 NOTE — NURSING NOTE
Pt went for ultrasound arm - no dvt.   Dtr at bedside. Pt given tylenol for pain. Pt is upset with our food.

## 2024-05-28 NOTE — PROGRESS NOTES
Physical Therapy    Physical Therapy Evaluation & Treatment    Patient Name: Kalie Ramos  MRN: [id number]  Today's Date: 5/28/2024   Time Calculation  Start Time: 1343  Stop Time: 1430  Time Calculation (min): 47 min    Assessment/Plan   PT Assessment  PT Assessment Results: Decreased strength, Decreased range of motion, Decreased endurance, Impaired balance, Decreased mobility, Decreased coordination, Decreased cognition, Impaired judgement, Decreased safety awareness, Impaired hearing, Impaired vision, Decreased skin integrity, Orthopedic restrictions, Pain  Rehab Prognosis: Good  Evaluation/Treatment Tolerance: Patient limited by pain, Patient limited by fatigue  Medical Staff Made Aware: Yes  Strengths: Support of Caregivers  Barriers to Participation: Comorbidities  End of Session Communication: Bedside nurse  Assessment Comment: Pt required mod to max assist of 2 for limited activity, very anxious prior to mobility, c/o  pain, however, afterwards, pt feeling better, very appreciative and thankful. Pt w/ decline since LUE fracture, would benefit from skilled PT services to progress safe, functional mobility  End of Session Patient Position: Up in chair, Alarm on   IP OR SWING BED PT PLAN  Inpatient or Swing Bed: Inpatient  PT Plan  Treatment/Interventions: Bed mobility, Transfer training, Gait training, Balance training, Strengthening, Endurance training, Range of motion, Therapeutic exercise, Therapeutic activity  PT Plan: Skilled PT  PT Frequency: 4 times per week  PT Discharge Recommendations: Moderate intensity level of continued care  Equipment Recommended upon Discharge: Other (comment) (TBD, gait belt)  PT Recommended Transfer Status: Assist x2, Assistive device  PT - OK to Discharge: Yes      Subjective     General Visit Information:  General  Reason for Referral: impaired mobiilty  Referred By: Dr JENN Bailey  Past Medical History Relevant to Rehab: cervicalgia, CHF, COPD, depression, HTN, breast  cancer s/p lumpectomy, neuropathy, PNA, TIA, CVA, PE, PVD  Family/Caregiver Present: No (spoke w/ dtr over the phone per pt request)  Caregiver Feedback: Dtr Annette reports pt transition to new LUE brace, allowed to use elbow to hand for light activity. Brace was  taken off for shower on Saturday, had swelling distal to brace afterwards. Dtr was informed by staff at [facility] that pt had another fall and landed on her bottom w/o noted injury.  Co-Treatment: OT  Co-Treatment Reason: Pt tolerance and safety  Prior to Session Communication: Bedside nurse  Patient Position Received: Bed, 3 rail up, Alarm on  Preferred Learning Style: verbal  General Comment: Pt agreeable to PT eval, cleared per nurse due to inability to complete further imaging. Pt was very overwhelmed and frustrated initially, c/o high pain level w/ mobility, just wanted to go home.  Home Living:  Home Living  Type of Home: Assisted living  Lives With:  (care staff)  Home Adaptive Equipment: Walker rolling or standard  Home Layout: One level  Home Access: Level entry  Prior Level of Function:  Prior Function Per Pt/Caregiver Report  Level of [place]: Needs assistance with ADLs, Needs assistance with functional transfers, Needs assistance with homemaking  Receives Help From:  (staff, dtr)  ADL Assistance: Needs assistance  Homemaking Assistance: Needs assistance (provided by facility)  Ambulatory Assistance: Needs assistance (RW)  Hand Dominance: Right  Prior Function Comments: Per dtr, pt had another fall since LUE fx according to staff at [facility]  Precautions:  Precautions  Hearing/Visual Limitations: glasses, hearing aids  UE Weight Bearing Status: Left Non-Weight Bearing  Medical Precautions: Fall precautions, Other (comment) (fracture brace LUE)  Braces Applied: LUE fracture brace on    Objective   Pain:  Pain Assessment  Pain Assessment: Blount-Baker FACES  Blount-Baker FACES Pain Rating: Hurts even more  Pain Type: Acute pain  Pain Location: Shoulder  Pain  Orientation: Left  Cognition:  Cognition  Overall Cognitive Status: Impaired  Arousal/Alertness: Appropriate responses to stimuli  Orientation Level: Disoriented to situation  Following Commands: Follows one step commands with repetition  Safety Judgment: Decreased awareness of need for safety precautions  Cognition Comments: pt very anxious and overwhelmed w/ current medical situation  Safety/Judgement: Exceptions to WFL  Insight: Moderate  Impulsive: Mildly  Processing Speed: Delayed ([place], decreased focus)    General Assessments:  Activity Tolerance  Endurance: Decreased tolerance for upright activites  Activity Tolerance Comments: Poor+ due to pain, fatigue, weakness and anxiety    Strength  Strength Comments: BLEs 3/5 or >  Coordination  Coordination Comment: decreased w/ LUE in brace    Postural Control  Posture Comment: significant kyphosis    Static Standing Balance  Static Standing-Balance Support: Right upper extremity supported (on therapist's shoulder)  Static Standing-Level of Assistance: Moderate assistance (use of gait belt)  Static Standing-Comment/Number of Minutes: stood ~2' for changing of brief, hygiene.  Dynamic Standing Balance  Dynamic Standing-Balance Support: Right upper extremity supported (on therapist's shoulder)  Dynamic Standing-Balance:  (steps  to chair, ~ 7-8 small)  Dynamic Standing-Comments: Poor+, assist to steady, guide pt  Functional Assessments:  Bed Mobility 1  Bed Mobility 1: Supine to sitting, Scooting  Level of Assistance 1: Moderate assistance  Bed Mobility Comments 1: to Rt EOB, HOB elevated part way, assist for trunk up, scooting balance, support for LUE w/ LUE brace in place.    Transfer 1  Technique 1: Sit to stand, Stand to sit  Transfer Device 1: Gait belt (pt using RUE to hold onto therapist's shoulder)  Transfer Level of Assistance 1: Maximum assistance, +2, Moderate verbal cues  Trials/Comments 1: performed from EOB, to chair w/ arms x 2, from chair w/ arms x 1, pt  holding chair for return to sit, required assist for eccentric lowering.    Ambulation/Gait Training  Ambulation/Gait Training Performed: Yes  Ambulation/Gait Training 1  Surface 1: Level tile  Device 1:  (pt w/ RT hand on therapist's shoulder)  Gait Support Devices: Gait belt  Assistance 1: Maximum assistance (+2)  Comments/Distance (ft) 1: Pt able to take several short, shuffled steps  to bedside chair, cues to direct activity, support to LUE w/ brace in place  Extremity/Trunk Assessments:  RLE   RLE : Within Functional Limits  LLE   LLE : Within Functional Limits  Treatments:  Ambulation/Gait Training  Ambulation/Gait Training Performed: Yes  Ambulation/Gait Training 1  Surface 1: Level tile  Device 1:  (pt w/ RT hand on therapist's shoulder)  Gait Support Devices: Gait belt  Assistance 1: Maximum assistance (+2)  Comments/Distance (ft) 1: Pt able to take several short, shuffled steps  to bedside chair, cues to direct activity, support to LUE w/ brace in place  Transfer 1  Technique 1: Sit to stand, Stand to sit  Transfer Device 1: Gait belt (pt using RUE to hold onto therapist's shoulder)  Transfer Level of Assistance 1: Maximum assistance, +2, Moderate verbal cues  Trials/Comments 1: performed from EOB, to chair w/ arms x 2, from chair w/ arms x 1, pt holding chair for return to sit, required assist for eccentric lowering.  Outcome Measures:  [facility] Basic Mobility  Turning from your back to your side while in a flat bed without using bedrails: A lot  Moving from lying on your back to sitting on the side of a flat bed without using bedrails: A lot  Moving to and from bed to chair (including a wheelchair): A lot  Standing up from a chair using your arms (e.g. wheelchair or bedside chair): A lot  To walk in hospital room: A lot  Climbing 3-5 steps with railing: A lot  Basic Mobility - Total Score: 12    Encounter Problems       Encounter Problems (Active)       Balance       LTG - Patient will maintain balance to  allow for safe mobility (Progressing)       Start:  05/28/24    Expected End:  06/07/24               Mobility       STG - Patient will ambulate 45' w/ LRAD and min asisst (Progressing)       Start:  05/28/24    Expected End:  06/07/24            Pt will tolerate BLE exercises to promote functional strength and endurance (Progressing)       Start:  05/28/24    Expected End:  06/07/24               PT Transfers       STG - Patient to transfer to and from sit to supine w/ min assist (Progressing)       Start:  05/28/24    Expected End:  06/07/24            STG - Patient will transfer sit to and from stand w/ min assist (Progressing)       Start:  05/28/24    Expected End:  06/07/24                   Education Documentation  Precautions, taught by Olivia Rivas, PT at 5/28/2024  6:39 PM.  Learner: Patient  Readiness: Acceptance  Method: Explanation, Demonstration  Response: Needs Reinforcement  Comment: safety technique    Mobility Training, taught by Olivia Rivas, PT at 5/28/2024  6:39 PM.  Learner: Patient  Readiness: Acceptance  Method: Explanation, Demonstration  Response: Needs Reinforcement  Comment: safety technique    Education Comments  No comments found.

## 2024-05-28 NOTE — ED PROVIDER NOTES
HPI   Chief Complaint   Patient presents with    Arm Swelling     Pt has swelling in her left arm x2 days.  Has a humeral fx from 10 days ago. MSPs intact.        89-year-old female presented emergency department with a chief complaint of swelling to her left mid humerus distally.  She had a proximal humerus fracture 10 days ago.  Was initially in a sling but has not displacement and was put in a commercial coaptation splint that had orthopedic office.  She then had the splint readjusted at the nursing home and the next day noticed marked swelling to her distal left arm.  There is concern for potential DVT so patient was referred to the emergency department.  She does not have any increased or worsening pain of the arm.  She denies any numbness or weakness.  She denies any new injury or trauma.  She is not anticoagulated.  No other complaint.                          [place] Coma Scale Score: 15                     Patient History   Past Medical History:   Diagnosis Date    Anemia     Arthritis     At risk for falling     Atrophic disorder of skin, unspecified 07/19/2013    Atrophoderma    Body mass index (BMI) 22.0-22.9, adult     BMI 22.0-22.9, adult    Cervicalgia 05/08/2015    Neck pain    CHF (congestive heart failure) (Multi)     Closed right hip fracture (Multi)     COPD (chronic obstructive pulmonary disease) (Multi)     Depression     GERD (gastroesophageal reflux disease)     High cholesterol     Hip fracture (Multi)     Hyperlipidemia     Hypertension     Insomnia     Leg swelling     Malignant neoplasm of unspecified site of unspecified female breast (Multi) 07/19/2013    Adenocarcinoma of breast    Neuropathy     Osteoarthritis     Other conditions influencing health status 10/21/2013    Urinary Tract Infection    Personal history of other infectious and parasitic diseases 03/17/2015    History of candidiasis of mouth    Personal history of other specified conditions 03/17/2015    History of abdominal  pain    Personal history of pneumonia (recurrent) 09/10/2015    History of pneumonia    Personal history of transient ischemic attack (TIA), and cerebral infarction without residual deficits 09/04/2014    History of stroke    Pneumonia     Pulmonary embolism (Multi)     PVD (peripheral vascular disease) (CMS-HCC)     Respiratory failure (Multi)     Syncope     Weakness      Past Surgical History:   Procedure Laterality Date    BREAST BIOPSY  09/10/2015    Biopsy Breast Open    BREAST LUMPECTOMY  07/19/2013    Breast Surgery Lumpectomy    CARPAL TUNNEL RELEASE  09/04/2014    Neuroplasty Decompression Median Nerve At Carpal Tunnel    CATARACT EXTRACTION  11/02/2015    Cataract Surgery    HAND TENDON SURGERY  11/02/2015    Hand Incision Tendon Sheath Of A Finger    HYSTERECTOMY  07/19/2013    Hysterectomy    MR HEAD ANGIO WO IV CONTRAST  12/17/2020    MR HEAD ANGIO WO IV CONTRAST [facility]    MR HEAD ANGIO WO IV CONTRAST  5/13/2021    MR HEAD ANGIO WO IV CONTRAST [facility]    MR HEAD ANGIO WO IV CONTRAST  5/24/2022    MR HEAD ANGIO WO IV CONTRAST [facility]    MR NECK ANGIO WO IV CONTRAST  5/24/2022    MR NECK ANGIO WO IV CONTRAST [facility]    OTHER SURGICAL HISTORY  11/02/2015    Wrist Carpectomy    ROTATOR CUFF REPAIR  09/10/2015    Rotator Cuff Repair    SENTINEL LYMPH NODE BIOPSY  09/10/2015    [place] Lymph Node Biopsy    SHOULDER SURGERY  07/19/2013    Shoulder Surgery     Family History   Problem Relation Name Age of Onset    No Known Problems Mother      Alzheimer's disease Father      Breast cancer Sister      Pancreatic cancer Sister      Arthritis Other       Social History     Tobacco Use    Smoking status: Never    Smokeless tobacco: Never   Substance Use Topics    Alcohol use: Never    Drug use: Never       Physical Exam   ED Triage Vitals   Temp Heart Rate Resp BP   05/27/24 1617 05/27/24 1617 05/27/24 1617 05/27/24 1617   36.9 °C (98.5 °F) 62 20 (!) 121/48       SpO2 Temp Source Heart Rate Source Patient Position   05/27/24 1617 05/27/24 2001 05/27/24 1921 05/27/24 1921   99 % Oral Monitor Lying      BP Location FiO2 (%)     05/27/24 1921 --     Right arm        Physical Exam  Vitals and nursing note reviewed.   Constitutional:       Appearance: Normal appearance.   HENT:      Head: Normocephalic.      Nose: Nose normal.      Mouth/Throat:      Mouth: Mucous membranes are moist.   Cardiovascular:      Rate and Rhythm: Normal rate and regular rhythm.      Pulses: Normal pulses.   Pulmonary:      Effort: Pulmonary effort is normal.   Abdominal:      General: Abdomen is flat.   Musculoskeletal:         General: Normal range of motion.      Cervical back: Normal range of motion.   Skin:     Comments: Left upper extremity from mid humerus distally is markedly edematous, there is intact pulses arm is well-perfused and patient has capillary refill less than 2 seconds   Neurological:      General: No focal deficit present.      Mental Status: She is alert and oriented to person, place, and time.   Psychiatric:         Mood and Affect: Mood normal.         ED Course & MDM   Diagnoses as of 05/27/24 2042   Arm edema   Closed fracture of proximal end of humerus, unspecified fracture morphology, unspecified laterality, initial encounter       Medical Decision Making  I have seen and evaluated this patient.  The attending physician has also seen and evaluated this patient.  Vital signs, laboratory testing and diagnostic images if applicable have been reviewed.  All laboratory and imaging is interpreted by myself unless otherwise stated.  Radiology studies are also formally interpreted by radiologist.    Ultrasound is indicated to rule out DVT given extensive amount of swelling after compressive event.  Unfortunately we do not have availability of an ultrasound technician today.  Alternatively a CT of the shoulder with IV contrast was to be performed to ensure adequate blood flow of  the venous and arterial systems.  Patient has had multiple IVs in her unaffected arm that have subsequently blown with administration of fluids.  Due to the concern of contrast extravasation the contrasted CT is aborted.  Options then discussed on whether to come back tomorrow for a outpatient ultrasound versus observe in the hospital overnight and obtain ultrasound in the morning.  Patient is from independent/assisted living facility and has family from out of town visiting her.  This is very difficult to get her here and strongly preferred to be observed in the hospital overnight for ultrasound tomorrow potentially orthopedic consultation.  Patient ordered for further treatment management.    Labs Reviewed  CBC WITH AUTO DIFFERENTIAL - Abnormal     WBC                           9.0                    nRBC                          0.0                    RBC                           3.95 (*)               Hemoglobin                    12.0                   Hematocrit                    38.0                   MCV                           96                     MCH                           30.4                   MCHC                          31.6 (*)               RDW                           15.2 (*)               Platelets                     385                    Neutrophils %                 79.2                   Immature Granulocytes %, Automated   0.4                    Lymphocytes %                 10.6                   Monocytes %                   8.3                    Eosinophils %                 1.1                    Basophils %                   0.4                    Neutrophils Absolute          7.09 (*)               Immature Granulocytes Absolute, Au*   0.04                   Lymphocytes Absolute          0.95                   Monocytes Absolute            0.74                   Eosinophils Absolute          0.10                   Basophils Absolute            0.04                BASIC  METABOLIC PANEL  CBC  COMPREHENSIVE METABOLIC PANEL  CT angio upper extremity left w and or wo IV contrast    (Results Pending)  Upper extremity venous duplex bilateral    (Results Pending)  Medications  acetaminophen (Tylenol) tablet 650 mg (has no administration in time range)    Or  acetaminophen (Tylenol) oral liquid 650 mg (has no administration in time range)    Or  acetaminophen (Tylenol) suppository 650 mg (has no administration in time range)  melatonin tablet 3 mg (has no administration in time range)  polyethylene glycol (Glycolax, Miralax) packet 17 g (has no administration in time range)  docusate sodium (Colace) capsule 100 mg (has no administration in time range)  benzocaine-menthol (Cepastat Sore Throat) lozenge 1 lozenge (has no administration in time range)  dextromethorphan-guaifenesin (Robitussin DM)  mg/5 mL oral liquid 5 mL (has no administration in time range)  guaiFENesin (Mucinex) 12 hr tablet 600 mg (has no administration in time range)  heparin (porcine) injection 5,000 Units (has no administration in time range)  pantoprazole (ProtoNix) EC tablet 40 mg (has no administration in time range)    Or  pantoprazole (ProtoNix) injection 40 mg (has no administration in time range)  ondansetron ODT (Zofran-ODT) disintegrating tablet 4 mg (has no administration in time range)    Or  ondansetron (Zofran) injection 4 mg (has no administration in time range)  aspirin EC tablet 81 mg (has no administration in time range)  atorvastatin (Lipitor) tablet 10 mg (has no administration in time range)  calcitonin (salmon) (Miacalcin) nasal spray 1 spray (has no administration in time range)  furosemide (Lasix) tablet 40 mg (has no administration in time range)  ipratropium-albuteroL (Duo-Neb) 0.5-2.5 mg/3 mL nebulizer solution 3 mL (has no administration in time range)  losartan (Cozaar) tablet 50 mg (has no administration in time range)  magnesium oxide (Mag-Ox) tablet 400 mg (has no administration in  time range)   metoprolol succinate XL (Toprol-XL) 24 hr tablet 25 mg (has no administration in time range)  nitroglycerin (Nitrostat) SL tablet 0.4 mg (has no administration in time range)  oxygen (O2) therapy (has no administration in time range)  potassium chloride CR (Klor-Con) ER tablet 10 mEq (has no administration in time range)  rOPINIRole (Requip) tablet 5 mg (has no administration in time range)  sertraline (Zoloft) tablet 50 mg (has no administration in time range)  traZODone (Desyrel) tablet 50 mg (has no administration in time range)  Current Discharge Medication List                Procedure  Procedures     Silvano Shields PA-C  05/27/24 8807

## 2024-05-28 NOTE — PROGRESS NOTES
05/28/24 1206   Discharge Planning   Living Arrangements Other (Comment)  (Patient is from an assisted living)   Support Systems Children   Assistance Needed Patient needs assistance for ADLs and IADLs. Patient is in a wheelchair at baseline per her report.   Type of Residence Assisted living   Do you have animals or pets at home? No   Care Facility Name [facility]   Who is requesting discharge planning? Provider   Home or Post Acute Services Post acute facilities (Rehab/SNF/etc)   Type of Post Acute Facility Services Assisted living   Patient expects to be discharged to: return to [facility]   Does the patient need discharge transport arranged? Yes   RoundTrip coordination needed? Yes   Has discharge transport been arranged? No   Financial Resource Strain   How hard is it for you to pay for the very basics like food, housing, medical care, and heating? Not hard   Housing Stability   In the last 12 months, was there a time when you were not able to pay the mortgage or rent on time? N   In the last 12 months, how many places have you lived? 1   In the last 12 months, was there a time when you did not have a steady place to sleep or slept in a shelter (including now)? N   Transportation Needs   In the past 12 months, has lack of transportation kept you from medical appointments or from getting medications? no   In the past 12 months, has lack of transportation kept you from meetings, work, or from getting things needed for daily living? No     Patient is a resident of [facility] [employer] [facility] and wants to return there upon discharge. Reports she is in a wheelchair at baseline and they assist her with ADLs and provide for all IADLs.

## 2024-05-29 LAB
ANION GAP SERPL CALC-SCNC: 7 MMOL/L
BUN SERPL-MCNC: 20 MG/DL (ref 8–25)
CALCIUM SERPL-MCNC: 7.8 MG/DL (ref 8.5–10.4)
CHLORIDE SERPL-SCNC: 105 MMOL/L (ref 97–107)
CO2 SERPL-SCNC: 29 MMOL/L (ref 24–31)
CREAT SERPL-MCNC: 0.7 MG/DL (ref 0.4–1.6)
EGFRCR SERPLBLD CKD-EPI 2021: 80 ML/MIN/1.73M*2
ERYTHROCYTE [DISTWIDTH] IN BLOOD BY AUTOMATED COUNT: 15.4 % (ref 11.5–14.5)
GLUCOSE SERPL-MCNC: 95 MG/DL (ref 65–99)
HCT VFR BLD AUTO: 32.3 % (ref 36–46)
HGB BLD-MCNC: 10.2 G/DL (ref 12–16)
MCH RBC QN AUTO: 30.3 PG (ref 26–34)
MCHC RBC AUTO-ENTMCNC: 31.6 G/DL (ref 32–36)
MCV RBC AUTO: 96 FL (ref 80–100)
NRBC BLD-RTO: 0 /100 WBCS (ref 0–0)
PLATELET # BLD AUTO: 342 X10*3/UL (ref 150–450)
POTASSIUM SERPL-SCNC: 4.6 MMOL/L (ref 3.4–5.1)
RBC # BLD AUTO: 3.37 X10*6/UL (ref 4–5.2)
SODIUM SERPL-SCNC: 141 MMOL/L (ref 133–145)
WBC # BLD AUTO: 10 X10*3/UL (ref 4.4–11.3)

## 2024-05-29 PROCEDURE — 36415 COLL VENOUS BLD VENIPUNCTURE: CPT | Performed by: INTERNAL MEDICINE

## 2024-05-29 PROCEDURE — 80048 BASIC METABOLIC PNL TOTAL CA: CPT | Performed by: INTERNAL MEDICINE

## 2024-05-29 PROCEDURE — 2500000002 HC RX 250 W HCPCS SELF ADMINISTERED DRUGS (ALT 637 FOR MEDICARE OP, ALT 636 FOR OP/ED): Performed by: INTERNAL MEDICINE

## 2024-05-29 PROCEDURE — 97110 THERAPEUTIC EXERCISES: CPT | Mod: GO

## 2024-05-29 PROCEDURE — 85027 COMPLETE CBC AUTOMATED: CPT | Performed by: INTERNAL MEDICINE

## 2024-05-29 PROCEDURE — 2500000004 HC RX 250 GENERAL PHARMACY W/ HCPCS (ALT 636 FOR OP/ED): Performed by: INTERNAL MEDICINE

## 2024-05-29 PROCEDURE — 96372 THER/PROPH/DIAG INJ SC/IM: CPT | Performed by: INTERNAL MEDICINE

## 2024-05-29 PROCEDURE — 94640 AIRWAY INHALATION TREATMENT: CPT

## 2024-05-29 PROCEDURE — 99232 SBSQ HOSP IP/OBS MODERATE 35: CPT | Performed by: INTERNAL MEDICINE

## 2024-05-29 PROCEDURE — 9420000001 HC RT PATIENT EDUCATION 5 MIN

## 2024-05-29 PROCEDURE — 99233 SBSQ HOSP IP/OBS HIGH 50: CPT | Performed by: PHYSICIAN ASSISTANT

## 2024-05-29 PROCEDURE — 94760 N-INVAS EAR/PLS OXIMETRY 1: CPT

## 2024-05-29 PROCEDURE — 2500000005 HC RX 250 GENERAL PHARMACY W/O HCPCS: Performed by: INTERNAL MEDICINE

## 2024-05-29 PROCEDURE — 2500000001 HC RX 250 WO HCPCS SELF ADMINISTERED DRUGS (ALT 637 FOR MEDICARE OP): Performed by: INTERNAL MEDICINE

## 2024-05-29 PROCEDURE — G0378 HOSPITAL OBSERVATION PER HR: HCPCS

## 2024-05-29 PROCEDURE — 97535 SELF CARE MNGMENT TRAINING: CPT | Mod: GO

## 2024-05-29 RX ADMIN — DOCUSATE SODIUM 100 MG: 100 CAPSULE, LIQUID FILLED ORAL at 21:09

## 2024-05-29 RX ADMIN — ATORVASTATIN CALCIUM 10 MG: 10 TABLET, FILM COATED ORAL at 21:07

## 2024-05-29 RX ADMIN — ACETAMINOPHEN 650 MG: 160 SOLUTION ORAL at 14:11

## 2024-05-29 RX ADMIN — PANTOPRAZOLE SODIUM 40 MG: 40 TABLET, DELAYED RELEASE ORAL at 08:18

## 2024-05-29 RX ADMIN — DOCUSATE SODIUM 100 MG: 100 CAPSULE, LIQUID FILLED ORAL at 08:18

## 2024-05-29 RX ADMIN — HEPARIN SODIUM 5000 UNITS: 5000 INJECTION, SOLUTION INTRAVENOUS; SUBCUTANEOUS at 21:09

## 2024-05-29 RX ADMIN — SERTRALINE HYDROCHLORIDE 50 MG: 50 TABLET ORAL at 08:19

## 2024-05-29 RX ADMIN — ONDANSETRON 4 MG: 4 TABLET, ORALLY DISINTEGRATING ORAL at 04:07

## 2024-05-29 RX ADMIN — ROPINIROLE HYDROCHLORIDE 5 MG: 2 TABLET, FILM COATED ORAL at 21:07

## 2024-05-29 RX ADMIN — HEPARIN SODIUM 5000 UNITS: 5000 INJECTION, SOLUTION INTRAVENOUS; SUBCUTANEOUS at 14:13

## 2024-05-29 RX ADMIN — METOPROLOL SUCCINATE 25 MG: 25 TABLET, FILM COATED, EXTENDED RELEASE ORAL at 08:18

## 2024-05-29 RX ADMIN — IPRATROPIUM BROMIDE AND ALBUTEROL SULFATE 3 ML: 2.5; .5 SOLUTION RESPIRATORY (INHALATION) at 15:40

## 2024-05-29 RX ADMIN — IPRATROPIUM BROMIDE AND ALBUTEROL SULFATE 3 ML: 2.5; .5 SOLUTION RESPIRATORY (INHALATION) at 07:27

## 2024-05-29 RX ADMIN — ASPIRIN 81 MG: 81 TABLET, COATED ORAL at 08:19

## 2024-05-29 RX ADMIN — POLYETHYLENE GLYCOL 3350 17 G: 17 POWDER, FOR SOLUTION ORAL at 08:15

## 2024-05-29 RX ADMIN — Medication 3 MG: at 21:06

## 2024-05-29 RX ADMIN — FUROSEMIDE 40 MG: 40 TABLET ORAL at 08:18

## 2024-05-29 RX ADMIN — IPRATROPIUM BROMIDE AND ALBUTEROL SULFATE 3 ML: 2.5; .5 SOLUTION RESPIRATORY (INHALATION) at 19:08

## 2024-05-29 RX ADMIN — LOSARTAN POTASSIUM 50 MG: 50 TABLET, FILM COATED ORAL at 08:18

## 2024-05-29 RX ADMIN — HEPARIN SODIUM 5000 UNITS: 5000 INJECTION, SOLUTION INTRAVENOUS; SUBCUTANEOUS at 05:17

## 2024-05-29 ASSESSMENT — PAIN DESCRIPTION - LOCATION: LOCATION: SHOULDER

## 2024-05-29 ASSESSMENT — COGNITIVE AND FUNCTIONAL STATUS - GENERAL
WALKING IN HOSPITAL ROOM: A LOT
HELP NEEDED FOR BATHING: A LOT
TURNING FROM BACK TO SIDE WHILE IN FLAT BAD: A LOT
DAILY ACTIVITIY SCORE: 12
DRESSING REGULAR LOWER BODY CLOTHING: A LOT
CLIMB 3 TO 5 STEPS WITH RAILING: A LOT
TOILETING: TOTAL
DRESSING REGULAR UPPER BODY CLOTHING: A LOT
STANDING UP FROM CHAIR USING ARMS: A LOT
CLIMB 3 TO 5 STEPS WITH RAILING: A LOT
PERSONAL GROOMING: A LITTLE
TURNING FROM BACK TO SIDE WHILE IN FLAT BAD: A LOT
PERSONAL GROOMING: A LITTLE
MOBILITY SCORE: 13
MOVING FROM LYING ON BACK TO SITTING ON SIDE OF FLAT BED WITH BEDRAILS: A LITTLE
DAILY ACTIVITIY SCORE: 12
DRESSING REGULAR LOWER BODY CLOTHING: TOTAL
WALKING IN HOSPITAL ROOM: A LOT
TOILETING: TOTAL
EATING MEALS: A LITTLE
DRESSING REGULAR UPPER BODY CLOTHING: A LOT
MOVING TO AND FROM BED TO CHAIR: A LOT
TOILETING: TOTAL
DAILY ACTIVITIY SCORE: 12
MOBILITY SCORE: 13
EATING MEALS: A LITTLE
DRESSING REGULAR UPPER BODY CLOTHING: A LOT
HELP NEEDED FOR BATHING: A LOT
MOVING FROM LYING ON BACK TO SITTING ON SIDE OF FLAT BED WITH BEDRAILS: A LITTLE
PERSONAL GROOMING: A LOT
MOVING TO AND FROM BED TO CHAIR: A LOT
STANDING UP FROM CHAIR USING ARMS: A LOT
EATING MEALS: A LITTLE
DRESSING REGULAR LOWER BODY CLOTHING: TOTAL
HELP NEEDED FOR BATHING: A LOT

## 2024-05-29 ASSESSMENT — PAIN - FUNCTIONAL ASSESSMENT
PAIN_FUNCTIONAL_ASSESSMENT: 0-10

## 2024-05-29 ASSESSMENT — PAIN SCALES - GENERAL
PAINLEVEL_OUTOF10: 8
PAINLEVEL_OUTOF10: 0 - NO PAIN

## 2024-05-29 ASSESSMENT — ACTIVITIES OF DAILY LIVING (ADL)
HOME_MANAGEMENT_TIME_ENTRY: 28
BATHING_WHERE_ASSESSED: EDGE OF BED
BATHING_LEVEL_OF_ASSISTANCE: MAXIMUM ASSISTANCE

## 2024-05-29 NOTE — CONSULTS
Inpatient consult to orthopaedic surgery  Consult performed by: Crystal Ahn PA-C  Consult ordered by: Nakia Bailey MD  Reason for consult: left arm fracture          Reason For Consult  Left humerus fracture    History Of Present Illness  Kalie Ramos is a 89 y.o. female presenting with left humerus fracture. She suffered a fracture of the left upper extremity on 5-. She went to the ER at [facility] . Subsequently was evaluated by ortho surg on 5- for further evaluation and treated with non operative treatment.  She is to be in an immobilizer 6 weeks and has follow up scheduled on 7-5-2024. She is nonweightbearing. She presented to the hospital with left upper extremity swelling. We were consulted for re-evaluation.      Past Medical History  She has a past medical history of Anemia, Arthritis, At risk for falling, Atrophic disorder of skin, unspecified (07/19/2013), Body mass index (BMI) 22.0-22.9, adult, Cervicalgia (05/08/2015), CHF (congestive heart failure) (Multi), Closed right hip fracture (Multi), COPD (chronic obstructive pulmonary disease) (Multi), Depression, GERD (gastroesophageal reflux disease), High cholesterol, Hip fracture (Multi), Hyperlipidemia, Hypertension, Insomnia, Leg swelling, Malignant neoplasm of unspecified site of unspecified female breast (Multi) (07/19/2013), Neuropathy, Osteoarthritis, Other conditions influencing health status (10/21/2013), Personal history of other infectious and parasitic diseases (03/17/2015), Personal history of other specified conditions (03/17/2015), Personal history of pneumonia (recurrent) (09/10/2015), Personal history of transient ischemic attack (TIA), 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    Surgical History  She has a past surgical history that includes Breast biopsy  (09/10/2015); Rotator cuff repair (09/10/2015); [place] lymph node biopsy (09/10/2015); Cataract extraction (11/02/2015); Other surgical history (11/02/2015); Hand tendon surgery (11/02/2015); Carpal tunnel release (09/04/2014); Breast lumpectomy (07/19/2013); Hysterectomy (07/19/2013); Shoulder surgery (07/19/2013); MR angio head wo IV contrast (12/17/2020); MR angio head wo IV contrast (5/13/2021); MR angio neck wo IV contrast (5/24/2022); and MR angio head wo IV contrast (5/24/2022).     Social History  She reports that she has never smoked. She has never used smokeless tobacco. She reports that she does not drink alcohol and does not use drugs.    Family History  Family History   Problem Relation Name Age of Onset    No Known Problems Mother      Alzheimer's disease Father      Breast cancer Sister      Pancreatic cancer Sister      Arthritis Other          Allergies  Naproxen    Review of Systems   All other systems reviewed and are negative.       Physical Exam  Constitutional:       Appearance: Normal appearance.   HENT:      Head: Normocephalic.   Eyes:      Pupils: Pupils are equal, round, and reactive to light.   Pulmonary:      Effort: Pulmonary effort is normal.   Abdominal:      General: Abdomen is flat.   Musculoskeletal:      Cervical back: Normal range of motion and neck supple.      Comments: Left upper extremity: LUE immobilize in a sling. There is ecchymosis and swelling present. Compartments soft and compressible. Neurovascular is intact. Radial pulse palpable.    Neurological:      Mental Status: She is alert.          Last Recorded Vitals  Blood pressure (!) 119/48, pulse 60, temperature 37.2 °C (99 °F), temperature source Tympanic, resp. rate 16, height 1.524 m (5'), weight 52.2 kg (115 lb), SpO2 98%.    Relevant Results  Upper extremity venous duplex bilateral    Result Date: 5/28/2024  Interpreted By:  Nelli Brown, STUDY: [facility] UPPER EXTREMITY VENOUS DUPLEX BILATERAL;  5/28/2024 11:28 am    INDICATION: Bilateral arm pain and swelling with   COMPARISON: None.   ACCESSION NUMBER(S): [id number]   ORDERING CLINICIAN: WARREN ROLLINS   TECHNIQUE: Vascular ultrasound of the bilateral upper extremities was performed. Evaluation was performed with grayscale, color, and spectral Doppler. When possible, compression views of the evaluated veins was also performed.   FINDINGS: RIGHT UPPER EXTREMITY: Evaluation of the visualized portions of the right internal jugular, innominate, subclavian, axillary, brachial, cephalic, and basilic veins was performed.   There is normal compressibility and flow observed within right internal jugular, innominate, subclavian, axillary, brachial, and basilic veins. The cephalic vein on the right was not imaged.   LEFT UPPER EXTREMITY: On the left, the internal jugular vein is seen and exhibits normal compressibility and flow as does the subclavian vein. Flow is seen within the left axillary vein. However, the brachial, basilic, and cephalic veins could not be evaluated on the left due to immobilization of the left arm.         No evidence for deep venous thrombosis of the right upper extremity.   Evaluation of the deep venous system draining left upper extremity was limited due to immobilization of the left arm. There is no evidence for deep venous thrombosis within left jugular, subclavian, and axillary veins.   MACRO: None   Signed by: Nelli Brown 5/28/2024 12:45 PM Dictation workstation:   [id number]    XR hand left 3+ views    Result Date: 5/17/2024  * * *Final Report* * * DATE OF EXAM: May 17 2024  2:49PM   NOAH   [id number]  -  XR HAND 3V PA/LAT/OBL LT  / ACCESSION #  [id number] PROCEDURE REASON: Trauma      * * * * Physician Interpretation * * * *  EXAMINATION / TECHNIQUE:  XR HAND 3V PA/LAT/OBL LT PATIENT/TECHNOLOGIST PROVIDED HISTORY:   Patient had a fall and has bruising involving right hip / Patient also has redness and swelling posterior left hand. CLINICAL INFORMATION (AS PROVIDED  BY ORDERING CLINICIAN) :  Trauma COMPARISON: Same day left wrist radiographs. RESULT: Diffuse osteopenia.  There is a small ossicle adjacent to the ulnar styloid process, likely due to remote injury.  No acute fracture or dislocation.  There are severe degenerative changes of the first CMC joint, triscaphe joint, radiocarpal joint, and thumb IP joint.   Additional moderate to severe degenerative changes of the IP joints.   There is widening of the scapholunate interval.  There is chondrocalcinosis of the radiocarpal and ulnocarpal joints. No radiopaque foreign body.    IMPRESSION: No acute osseous abnormality. Severe degenerative changes, as described. If there is specific point tenderness, recommend repeat radiographs in 7-10 days. : [facility]   Transcribe Date/Time: May 17 2024  3:16P  Dictated by : KAYLA BRAR MD This examination was interpreted and the report reviewed and electronically signed by: KAYLA BRAR MD on May 17 2024  3:22PM  EST    XR HIP GENERAL 3V PELV/AP/LAT RIGHT    Result Date: 5/17/2024  * * *Final Report* * * DATE OF EXAM: May 17 2024  2:49PM   MYX   [id number]  -  XR HIP 3V PELV+ AP/LAT RT  / ACCESSION #  [id number] PROCEDURE REASON: Fracture, hip      * * * * Physician Interpretation * * * *  EXAMINATION / TECHNIQUE:  XR HIP 3V PELV+ AP/LAT RT PATIENT/TECHNOLOGIST PROVIDED HISTORY:   Patient had a fall and has bruising involving right hip / Patient also has redness and swelling posterior left hand. CLINICAL INFORMATION (AS PROVIDED BY ORDERING CLINICIAN) :  Fracture, hip COMPARISON: None RESULT: Osteopenia.  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   Hardware appears intact without evidence of complication.  No acute fracture or dislocation.  Partially visualized severe degenerative changes of the left hip joint and at least moderate degenerative changes of the lower lumbosacral spine, sacroiliac joints, and pubic symphysis.   No radiopaque foreign body.   Vascular calcifications.    IMPRESSION: Postsurgical changes from right total hip arthroplasty.  No acute radiographic abnormality. No acute osseous abnormality. : [facility]   Transcribe Date/Time: May 17 2024  3:12P Dictated by : KAYLA BRAR MD This examination was interpreted and the report reviewed and electronically signed by: KAYLA BRAR MD on May 17 2024  3:16PM  EST    XR humerus left    Result Date: 5/17/2024  * * *Final Report* * * DATE OF EXAM: May 17 2024  1:03PM   MYX   [id number]  -  XR HUMERUS 2V AP/LAT LT  / ACCESSION #  [id number] PROCEDURE REASON: Trauma      * * * * Physician Interpretation * * * *  EXAMINATION / TECHNIQUE:  XR HUMERUS 2V AP/LAT LT, XR SHLDR >/=3V AP/JAYDEN AP/OTHR LT, XR WRIST 4V PA/LAT/OBL/SCAPH LT HISTORY:   Fall, best possible due to patient pain and condition  Trauma. COMPARISON: None RESULT: Left shoulder: Acromioclavicular and glenohumeral joints are aligned.  Degenerative changes at the glenohumeral and acromioclavicular joints.  Comminuted fracture of the left humeral neck with 7 mm of medial displacement of the distal fragment. There is left pleural effusion. Left humerus: Left humeral neck fracture described above. No distal humeral fracture identified. Left wrist: Advanced degenerative changes at the first CMC joint.  Some degenerative changes at the triscaphe joint.  There is scapholunate widening.   Degenerative changes at the first interphalangeal joint.  Generalized osteopenia.  Possible ulnar styloid fracture with adjacent soft tissue swelling.  Joint space narrowing at the radiocarpal joint.    IMPRESSION: Displaced fracture of the left humeral neck. Possible nondisplaced ulnar styloid fracture.  Correlate for point tenderness in that area. Scapholunate widening. Left pleural effusion. : [employer]   Transcribe Date/Time: May 17 2024  1:37P Dictated by : SHILPI MUIR MD This examination was interpreted and the report reviewed and  electronically signed by: SHILPI MUIR MD on May 17 2024  1:44PM  EST    XR WRIST INJURY 4V PA/LAT/OBL/SCAPH LEFT    Result Date: 5/17/2024  * * *Final Report* * * DATE OF EXAM: May 17 2024  1:03PM   MYX   [id number]  -  XR WRIST 4V PA/LAT/OBL/SCAPH LT  / ACCESSION #  [id number] PROCEDURE REASON: Trauma      * * * * Physician Interpretation * * * *  EXAMINATION / TECHNIQUE:  XR HUMERUS 2V AP/LAT LT, XR SHLDR >/=3V AP/JAYDEN AP/OTHR LT, XR WRIST 4V PA/LAT/OBL/SCAPH LT HISTORY:   Fall, best possible due to patient pain and condition  Trauma. COMPARISON: None RESULT: Left shoulder: Acromioclavicular and glenohumeral joints are aligned.  Degenerative changes at the glenohumeral and acromioclavicular joints.  Comminuted fracture of the left humeral neck with 7 mm of medial displacement of the distal fragment. There is left pleural effusion. Left humerus: Left humeral neck fracture described above. No distal humeral fracture identified. Left wrist: Advanced degenerative changes at the first CMC joint.  Some degenerative changes at the triscaphe joint.  There is scapholunate widening.   Degenerative changes at the first interphalangeal joint.  Generalized osteopenia.  Possible ulnar styloid fracture with adjacent soft tissue swelling.  Joint space narrowing at the radiocarpal joint.    IMPRESSION: Displaced fracture of the left humeral neck. Possible nondisplaced ulnar styloid fracture.  Correlate for point tenderness in that area. Scapholunate widening. Left pleural effusion. : [facility]   Transcribe Date/Time: May 17 2024  1:37P Dictated by : SHILPI MUIR MD This examination was interpreted and the report reviewed and electronically signed by: SHILPI MUIR MD on May 17 2024  1:44PM  EST    XR shoulder left 2+ views    Result Date: 5/17/2024  * * *Final Report* * * DATE OF EXAM: May 17 2024  1:03PM   MYX   [id number]  -  XR SHLDR >/=3V AP/JAYDEN AP/OTHR LT  / ACCESSION #  [id number] PROCEDURE REASON: Trauma      * * * *  Physician Interpretation * * * *  EXAMINATION / TECHNIQUE:  XR HUMERUS 2V AP/LAT LT, XR SHLDR >/=3V AP/JAYDEN AP/OTHR LT, XR WRIST 4V PA/LAT/OBL/SCAPH LT HISTORY:   Fall, best possible due to patient pain and condition  Trauma. COMPARISON: None RESULT: Left shoulder: Acromioclavicular and glenohumeral joints are aligned.  Degenerative changes at the glenohumeral and acromioclavicular joints.  Comminuted fracture of the left humeral neck with 7 mm of medial displacement of the distal fragment. There is left pleural effusion. Left humerus: Left humeral neck fracture described above. No distal humeral fracture identified. Left wrist: Advanced degenerative changes at the first CMC joint.  Some degenerative changes at the triscaphe joint.  There is scapholunate widening.   Degenerative changes at the first interphalangeal joint.  Generalized osteopenia.  Possible ulnar styloid fracture with adjacent soft tissue swelling.  Joint space narrowing at the radiocarpal joint.    IMPRESSION: Displaced fracture of the left humeral neck. Possible nondisplaced ulnar styloid fracture.  Correlate for point tenderness in that area. Scapholunate widening. Left pleural effusion. : [facility]   Transcribe Date/Time: May 17 2024  1:37P Dictated by : SHILPI MUIR MD This examination was interpreted and the report reviewed and electronically signed by: SHILPI MUIR MD on May 17 2024  1:44PM  EST      Scheduled medications  aspirin, 81 mg, oral, Daily  atorvastatin, 10 mg, oral, Nightly  calcitonin (salmon), 1 spray, One Nostril, Daily  docusate sodium, 100 mg, oral, BID  furosemide, 40 mg, oral, Daily  heparin (porcine), 5,000 Units, subcutaneous, q8h DOV  ipratropium-albuteroL, 3 mL, nebulization, TID  losartan, 50 mg, oral, Daily  magnesium oxide, 400 mg, oral, Once per day on Tuesday Thursday Saturday  melatonin, 3 mg, oral, Nightly  metoprolol succinate XL, 25 mg, oral, Daily  pantoprazole, 40 mg, oral, Daily before breakfast    Or  pantoprazole, 40 mg, intravenous, Daily before breakfast  polyethylene glycol, 17 g, oral, Daily  potassium chloride CR, 10 mEq, oral, Daily  rOPINIRole, 5 mg, oral, Nightly  sertraline, 50 mg, oral, Daily      Continuous medications     PRN medications  PRN medications: acetaminophen **OR** acetaminophen **OR** acetaminophen, benzocaine-menthol, dextromethorphan-guaifenesin, guaiFENesin, nitroglycerin, ondansetron ODT **OR** ondansetron, traZODone  Results for orders placed or performed during the hospital encounter of 05/27/24 (from the past 24 hour(s))   CBC   Result Value Ref Range    WBC 10.0 4.4 - 11.3 x10*3/uL    nRBC 0.0 0.0 - 0.0 /100 WBCs    RBC 3.37 (L) 4.00 - 5.20 x10*6/uL    Hemoglobin 10.2 (L) 12.0 - 16.0 g/dL    Hematocrit 32.3 (L) 36.0 - 46.0 %    MCV 96 80 - 100 fL    MCH 30.3 26.0 - 34.0 pg    MCHC 31.6 (L) 32.0 - 36.0 g/dL    RDW 15.4 (H) 11.5 - 14.5 %    Platelets 342 150 - 450 x10*3/uL   Basic Metabolic Panel   Result Value Ref Range    Glucose 95 65 - 99 mg/dL    Sodium 141 133 - 145 mmol/L    Potassium 4.6 3.4 - 5.1 mmol/L    Chloride 105 97 - 107 mmol/L    Bicarbonate 29 24 - 31 mmol/L    Urea Nitrogen 20 8 - 25 mg/dL    Creatinine 0.70 0.40 - 1.60 mg/dL    eGFR 80 >60 mL/min/1.73m*2    Calcium 7.8 (L) 8.5 - 10.4 mg/dL    Anion Gap 7 <=19 mmol/L        Assessment/Plan     Patient case and imaging reviewed with Dr. Cheney. Patient to follow up as scheduled at [facility]. Keeping the left upper extremity immobilized in a sling for support nonweightbearing. She is stable from Orthopedic standpoint.     Crystal Ahn PA-C

## 2024-05-29 NOTE — H&P
History Of Present Illness  Kalie Ramos is a 89 y.o. female presenting with left arm swelling.  She had a proximal humerus fracture 10 days ago.  Placed in a sling then switched to splint at orthopedic office.  She had her splint readjusted at the nursing home and  Noticed marked swelling of the distal left arm.  Concern for DVT came to the emergency room for worsening swelling and pain  Past Medical History  Past Medical History:   Diagnosis Date    Anemia     Arthritis     At risk for falling     Atrophic disorder of skin, unspecified 07/19/2013    Atrophoderma    Body mass index (BMI) 22.0-22.9, adult     BMI 22.0-22.9, adult    Cervicalgia 05/08/2015    Neck pain    CHF (congestive heart failure) (Multi)     Closed right hip fracture (Multi)     COPD (chronic obstructive pulmonary disease) (Multi)     Depression     GERD (gastroesophageal reflux disease)     High cholesterol     Hip fracture (Multi)     Hyperlipidemia     Hypertension     Insomnia     Leg swelling     Malignant neoplasm of unspecified site of unspecified female breast (Multi) 07/19/2013    Adenocarcinoma of breast    Neuropathy     Osteoarthritis     Other conditions influencing health status 10/21/2013    Urinary Tract Infection    Personal history of other infectious and parasitic diseases 03/17/2015    History of candidiasis of mouth    Personal history of other specified conditions 03/17/2015    History of abdominal pain    Personal history of pneumonia (recurrent) 09/10/2015    History of pneumonia    Personal history of transient ischemic attack (TIA), and cerebral infarction without residual deficits 09/04/2014    History of stroke    Pneumonia     Pulmonary embolism (Multi)     PVD (peripheral vascular disease) ([facility])     Respiratory failure (Multi)     Syncope     Weakness        Surgical History  Past Surgical History:   Procedure Laterality Date    BREAST BIOPSY  09/10/2015    Biopsy Breast Open    BREAST LUMPECTOMY   07/19/2013    Breast Surgery Lumpectomy    CARPAL TUNNEL RELEASE  09/04/2014    Neuroplasty Decompression Median Nerve At Carpal Tunnel    CATARACT EXTRACTION  11/02/2015    Cataract Surgery    HAND TENDON SURGERY  11/02/2015    Hand Incision Tendon Sheath Of A Finger    HYSTERECTOMY  07/19/2013    Hysterectomy    MR HEAD ANGIO WO IV CONTRAST  12/17/2020    MR HEAD ANGIO WO IV CONTRAST [facility]    MR HEAD ANGIO WO IV CONTRAST  5/13/2021    MR HEAD ANGIO WO IV CONTRAST [facility]    MR HEAD ANGIO WO IV CONTRAST  5/24/2022    MR HEAD ANGIO WO IV CONTRAST [facility]    MR NECK ANGIO WO IV CONTRAST  5/24/2022    MR NECK ANGIO WO IV CONTRAST [facility]    OTHER SURGICAL HISTORY  11/02/2015    Wrist Carpectomy    ROTATOR CUFF REPAIR  09/10/2015    Rotator Cuff Repair    SENTINEL LYMPH NODE BIOPSY  09/10/2015    [place] Lymph Node Biopsy    SHOULDER SURGERY  07/19/2013    Shoulder Surgery        Social History  She reports that she has never smoked. She has never used smokeless tobacco. She reports that she does not drink alcohol and does not use drugs.    Family History  Family History   Problem Relation Name Age of Onset    No Known Problems Mother      Alzheimer's disease Father      Breast cancer Sister      Pancreatic cancer Sister      Arthritis Other          Allergies  Naproxen    Review of Systems     Physical Exam  Vitals reviewed.   Constitutional:       Appearance: Normal appearance.   HENT:      Head: Normocephalic and atraumatic.      Right Ear: Tympanic membrane, ear canal and external ear normal.      Left Ear: Tympanic membrane, ear canal and external ear normal.      Nose: Nose normal.      Mouth/Throat:      Pharynx: Oropharynx is clear.   Eyes:      Extraocular Movements: Extraocular movements intact.      Conjunctiva/sclera: Conjunctivae normal.      Pupils: Pupils are equal, round, and reactive to light.   Cardiovascular:      Rate and Rhythm: Normal rate and  regular rhythm.      Pulses: Normal pulses.      Heart sounds: Normal heart sounds.   Pulmonary:      Effort: Pulmonary effort is normal.      Breath sounds: Normal breath sounds.   Abdominal:      General: Abdomen is flat. Bowel sounds are normal.      Palpations: Abdomen is soft.   Musculoskeletal:         General: Swelling present.      Cervical back: Normal range of motion and neck supple.   Skin:     General: Skin is warm and dry.   Neurological:      General: No focal deficit present.      Mental Status: She is alert and oriented to person, place, and time.   Psychiatric:         Mood and Affect: Mood normal.          Last Recorded Vitals  Blood pressure (!) 130/47, pulse 69, temperature 36.6 °C (97.9 °F), temperature source Oral, resp. rate 18, height 1.524 m (5'), weight 52.2 kg (115 lb), SpO2 98%.    Relevant Results        Scheduled medications  aspirin, 81 mg, oral, Daily  atorvastatin, 10 mg, oral, Nightly  calcitonin (salmon), 1 spray, One Nostril, Daily  docusate sodium, 100 mg, oral, BID  furosemide, 40 mg, oral, Daily  heparin (porcine), 5,000 Units, subcutaneous, q8h DOV  ipratropium-albuteroL, 3 mL, nebulization, TID  losartan, 50 mg, oral, Daily  magnesium oxide, 400 mg, oral, Once per day on Tuesday Thursday Saturday  melatonin, 3 mg, oral, Nightly  metoprolol succinate XL, 25 mg, oral, Daily  pantoprazole, 40 mg, oral, Daily before breakfast   Or  pantoprazole, 40 mg, intravenous, Daily before breakfast  polyethylene glycol, 17 g, oral, Daily  potassium chloride CR, 10 mEq, oral, Daily  rOPINIRole, 5 mg, oral, Nightly  sertraline, 50 mg, oral, Daily      Continuous medications     PRN medications  PRN medications: acetaminophen **OR** acetaminophen **OR** acetaminophen, benzocaine-menthol, dextromethorphan-guaifenesin, guaiFENesin, nitroglycerin, ondansetron ODT **OR** ondansetron, traZODone  Results for orders placed or performed during the hospital encounter of 05/27/24 (from the past 24  hour(s))   CBC   Result Value Ref Range    WBC 10.0 4.4 - 11.3 x10*3/uL    nRBC 0.0 0.0 - 0.0 /100 WBCs    RBC 3.33 (L) 4.00 - 5.20 x10*6/uL    Hemoglobin 10.2 (L) 12.0 - 16.0 g/dL    Hematocrit 32.0 (L) 36.0 - 46.0 %    MCV 96 80 - 100 fL    MCH 30.6 26.0 - 34.0 pg    MCHC 31.9 (L) 32.0 - 36.0 g/dL    RDW 15.2 (H) 11.5 - 14.5 %    Platelets 321 150 - 450 x10*3/uL   Comprehensive metabolic panel   Result Value Ref Range    Glucose 87 65 - 99 mg/dL    Sodium 140 133 - 145 mmol/L    Potassium 4.4 3.4 - 5.1 mmol/L    Chloride 104 97 - 107 mmol/L    Bicarbonate 30 24 - 31 mmol/L    Urea Nitrogen 23 8 - 25 mg/dL    Creatinine 0.80 0.40 - 1.60 mg/dL    eGFR 68 >60 mL/min/1.73m*2    Calcium 7.9 (L) 8.5 - 10.4 mg/dL    Albumin 2.6 (L) 3.5 - 5.0 g/dL    Alkaline Phosphatase 132 (H) 35 - 125 U/L    Total Protein 5.7 (L) 5.9 - 7.9 g/dL    AST 18 5 - 40 U/L    Bilirubin, Total 0.4 0.1 - 1.2 mg/dL    ALT 11 5 - 40 U/L    Anion Gap 6 <=19 mmol/L     Upper extremity venous duplex bilateral    Result Date: 5/28/2024  Interpreted By:  Nelli Brown, STUDY: [facility] US UPPER EXTREMITY VENOUS DUPLEX BILATERAL;  5/28/2024 11:28 am   INDICATION: Bilateral arm pain and swelling with   COMPARISON: None.   ACCESSION NUMBER(S): [id number]   ORDERING CLINICIAN: WARREN ROLLINS   TECHNIQUE: Vascular ultrasound of the bilateral upper extremities was performed. Evaluation was performed with grayscale, color, and spectral Doppler. When possible, compression views of the evaluated veins was also performed.   FINDINGS: RIGHT UPPER EXTREMITY: Evaluation of the visualized portions of the right internal jugular, innominate, subclavian, axillary, brachial, cephalic, and basilic veins was performed.   There is normal compressibility and flow observed within right internal jugular, innominate, subclavian, axillary, brachial, and basilic veins. The cephalic vein on the right was not imaged.   LEFT UPPER EXTREMITY: On the left, the internal jugular vein is seen and  exhibits normal compressibility and flow as does the subclavian vein. Flow is seen within the left axillary vein. However, the brachial, basilic, and cephalic veins could not be evaluated on the left due to immobilization of the left arm.         No evidence for deep venous thrombosis of the right upper extremity.   Evaluation of the deep venous system draining left upper extremity was limited due to immobilization of the left arm. There is no evidence for deep venous thrombosis within left jugular, subclavian, and axillary veins.   MACRO: None   Signed by: Nelli Brown 5/28/2024 12:45 PM Dictation workstation:   [id number]        Assessment/Plan   Principal Problem:    Arm edema  Active Problems:    COPD (chronic obstructive pulmonary disease) (Multi)    Esophageal reflux    Hiatal hernia    Hypertension    Dyslipidemia    Closed fracture of proximal end of left humerus      Ultrasound ordered  No DVT  Consult orthopedics regarding swelling of the arm and reassessment of the splint  Continue home medication  Orders for details       I spent  minutes in the professional and overall care of this patient.      Nakia Bailey MD

## 2024-05-29 NOTE — PROGRESS NOTES
05/29/24 1035   Discharge Planning   Patient expects to be discharged to: Therapy recommending [facility] for the patient. Called [facility] and left message for their director of wellness Laura, to discuss what they are able to provide. Requested a return call and provided contact information. Called patient's daughterAnnette, and discussed patient's care, recommendations, current level of support/care from [facility]. Daughter believes patient would benefit from additional care in a SNF and does not feel [facility] can provide for patient's needs at this time. Freedom of choice provided, and daughter requested [facility]. Referral submitted at this time; requested they review and advise.   Does the patient need discharge transport arranged? Yes   RoundTrip coordination needed? Yes   Has discharge transport been arranged? No   Patient Choice   Provider Choice list and CMS website (https://medicare.gov/care-compare#search) for post-acute Quality and Resource Measure Data were provided and reviewed with: Family   Patient / Family choosing to utilize agency / facility established prior to hospitalization No     11:18 AM [facility] is reviewing patient's referral and will advise.     2:40 PM [facility] has accepted the patient and confirmed we can submit for pre-cert. Patient and daughter, Annette, at bedside updated. They confirmed understanding and agreement to the same. Requested direct pre-cert submission team submit authorization request. Need authorization and [id number] before patient can be discharged to [facility].

## 2024-05-29 NOTE — PROGRESS NOTES
Occupational Therapy Treatment    Name: Kalie Ramos  MRN: [id number]  : 1929  Date: 24  Time Calculation  Start Time: 1105  Stop Time: 1138  Time Calculation (min): 33 min    Assessment:  OT Assessment: Pt demonstrated progress toward established goals and was receptive to therapy and instructions throughout.  Prognosis: Good  Evaluation/Treatment Tolerance: Patient limited by fatigue  Medical Staff Made Aware: Yes  End of Session Communication: Bedside nurse  End of Session Patient Position: Up in chair, Alarm on    Plan:  Treatment Interventions: ADL retraining, Functional transfer training, UE strengthening/ROM, Endurance training, Patient/family training, Equipment evaluation/education, Compensatory technique education  OT Frequency: 4 times per week  OT Discharge Recommendations: Moderate intensity level of continued care  Equipment Recommended upon Discharge: Other (comment) (Gait belt)  OT Recommended Transfer Status: Assist of 2  OT - OK to Discharge: Yes        Subjective     Previous Visit Info:  OT Last Visit  OT Received On: 24    General:  General  Reason for Referral: impaired ADLs  Past Medical History Relevant to Rehab: cervicalgia, CHF, COPD, depression, HTN, breast cancer s/p lumpectomy, neuropathy, PNA, TIA, CVA, PE, PVD  Family/Caregiver Present: No  Patient Position Received: Bed, 3 rail up, Alarm on  Preferred Learning Style: verbal, visual  General Comment: Pt cleared by nursing for therapy and agreeable to same.    Precautions:  UE Weight Bearing Status: Left Non-Weight Bearing  Medical Precautions: Fall precautions, Other (comment)  Braces Applied: left UE fracture brace with sling    Pain Assessment:  Pain Assessment  Pain Assessment: 0-10  Pain Score: 0 - No pain (no pain at rest, exhibits severe pain with mobility)         Objective     Activities of Daily Living: Grooming  Grooming Level of Assistance: Setup  Grooming Where Assessed: Chair  Grooming Comments:  assist opening containers and for application of toothpaste on brush for oral care; setup assist to comb hair    UE Bathing  UE Bathing Level of Assistance: Moderate assistance  UE Bathing Where Assessed: Edge of bed  UE Bathing Comments: assist with right UE    LE Bathing  LE Bathing Level of Assistance: Maximum assistance  LE Bathing Where Assessed: Edge of bed (assist with buttocks and below knees)    UE Dressing  UE Dressing Level of Assistance: Dependent  UE Dressing Where Assessed: Edge of bed  UE Dressing Comments: doffing shirt, donning gown (patient able to assiswt with un/threading right UE)    LE Dressing  LE Dressing: Yes  Sock Level of Assistance: Dependent (donning/doffing bilateral socks)  Adult Briefs Level of Assistance: Dependent (donning/doffing briefs)  LE Dressing Where Assessed: Edge of bed    Bed Mobility/Transfers: Bed Mobility  Bed Mobility: Yes  Bed Mobility 1  Bed Mobility 1: Supine to sitting  Level of Assistance 1: Moderate assistance  Bed Mobility Comments 1: toward the right side of bed with head elevated ~50 degrees    Transfers  Transfer: Yes  Transfer 1  Transfer From 1: Bed to  Transfer to 1: Chair with arms  Technique 1: Stand pivot, To right  Transfer Device 1: Gait belt  Transfer Level of Assistance 1: Maximum assistance, +2  Trials/Comments 1: cues for hand placement and slow descent    Therapy/Activity: Therapeutic Exercise  Therapeutic Exercise Performed: Yes (Instructed in/performed right UE AROM exercises and left hand AROM exercises with cues for technique in order to increase functional endurance needed for ADLs.)    Strength:  Strength  Strength Comments: BLEs 3/5 or >    Outcome Measures:  [facility] Daily Activity  Putting on and taking off regular lower body clothing: Total  Bathing (including washing, rinsing, drying): A lot  Putting on and taking off regular upper body clothing: A lot  Toileting, which includes using toilet, bedpan or urinal: Total  Taking care of  personal grooming such as brushing teeth: A little  Eating Meals: A little  Daily Activity - Total Score: 12      Education Documentation  Home Exercise Program, taught by Lynne Castañeda OT at 5/29/2024 11:58 AM.  Learner: Patient  Readiness: Acceptance  Method: Explanation  Response: Verbalizes Understanding, Needs Reinforcement  Comment: ADL retraining utilizing one-handed compensatory techniques to maximize functional capacity; functional transfer retraining to maximize safety with mobility; UE exercise instructions to increase activity tolerance for ADLs.    ADL Training, taught by Lynne Castañeda OT at 5/29/2024 11:58 AM.  Learner: Patient  Readiness: Acceptance  Method: Explanation  Response: Verbalizes Understanding, Needs Reinforcement  Comment: ADL retraining utilizing one-handed compensatory techniques to maximize functional capacity; functional transfer retraining to maximize safety with mobility; UE exercise instructions to increase activity tolerance for ADLs.      Goals:  Encounter Problems       Encounter Problems (Active)       OT Goals       ADL's (Progressing)       Start:  05/28/24    Expected End:  06/11/24       Pt will complete bathing, dressing and grooming tasks w/ min. Assist w/ AE/ compensatory tech as needed for safety and increased independence in self care tasks.         Functional transfers (Progressing)       Start:  05/28/24    Expected End:  06/11/24       Pt will demon bed, chair and toilet transfers w/ min. Assist w/ LRD for safety and increased independence in daily tasks.         Functional endurance  (Progressing)       Start:  05/28/24    Expected End:  06/11/24       Pt will tolerate 15 minutes of functional activity to improve functional endurance for daily tasks and functional mobility.            OT Goals       Functional mobility (Progressing)       Start:  05/28/24    Expected End:  06/11/24       Pt will demon. household distance to improve functional mobility  for daily tasks.

## 2024-05-29 NOTE — CARE PLAN
Problem: Pain  Goal: My pain/discomfort is manageable  Outcome: Progressing     Problem: Safety  Goal: Patient will be injury free during hospitalization  Outcome: Progressing  Goal: I will remain free of falls  Outcome: Progressing     Problem: Daily Care  Goal: Daily care needs are met  Outcome: Progressing     Problem: Psychosocial Needs  Goal: Demonstrates ability to cope with hospitalization/illness  Outcome: Progressing  Goal: Collaborate with me, my family, and caregiver to identify my specific goals  Outcome: Progressing     Problem: Discharge Barriers  Goal: My discharge needs are met  Outcome: Progressing     Problem: Respiratory  Goal: No signs of respiratory distress (eg. Use of accessory muscles. Peds grunting)  Outcome: Progressing

## 2024-05-29 NOTE — CARE PLAN
Problem: Respiratory  Goal: No signs of respiratory distress (eg. Use of accessory muscles. Peds grunting)  Outcome: Progressing  Patient states she does not feel SOB. She has been on room air; tolerating it well. RN notified of patients anxiety.

## 2024-05-29 NOTE — PROGRESS NOTES
Kalie Ramos is a 89 y.o. female on day 0 of admission presenting with Arm edema.    Subjective   Patient still has significant swelling in the left arm.  Not seen by the orthopedic yet to adjust the splint       Objective     Physical Exam  Vitals reviewed.   Constitutional:       Appearance: Normal appearance.   HENT:      Head: Normocephalic and atraumatic.      Right Ear: Tympanic membrane, ear canal and external ear normal.      Left Ear: Tympanic membrane, ear canal and external ear normal.      Nose: Nose normal.      Mouth/Throat:      Pharynx: Oropharynx is clear.   Eyes:      Extraocular Movements: Extraocular movements intact.      Conjunctiva/sclera: Conjunctivae normal.      Pupils: Pupils are equal, round, and reactive to light.   Cardiovascular:      Rate and Rhythm: Normal rate and regular rhythm.      Pulses: Normal pulses.      Heart sounds: Normal heart sounds.   Pulmonary:      Effort: Pulmonary effort is normal.      Breath sounds: Normal breath sounds.   Abdominal:      General: Abdomen is flat. Bowel sounds are normal.      Palpations: Abdomen is soft.   Musculoskeletal:         General: Swelling present.      Cervical back: Normal range of motion and neck supple.   Skin:     General: Skin is warm and dry.   Neurological:      General: No focal deficit present.      Mental Status: She is alert and oriented to person, place, and time.   Psychiatric:         Mood and Affect: Mood normal.         Last Recorded Vitals  Blood pressure 122/52, pulse 66, temperature 36.2 °C (97.2 °F), temperature source Tympanic, resp. rate 16, height 1.524 m (5'), weight 52.2 kg (115 lb), SpO2 94%.  Intake/Output last 3 Shifts:  No intake/output data recorded.    Relevant Results              Scheduled medications  aspirin, 81 mg, oral, Daily  atorvastatin, 10 mg, oral, Nightly  calcitonin (salmon), 1 spray, One Nostril, Daily  docusate sodium, 100 mg, oral, BID  furosemide, 40 mg, oral, Daily  heparin  (porcine), 5,000 Units, subcutaneous, q8h DOV  ipratropium-albuteroL, 3 mL, nebulization, TID  losartan, 50 mg, oral, Daily  magnesium oxide, 400 mg, oral, Once per day on Tuesday Thursday Saturday  melatonin, 3 mg, oral, Nightly  metoprolol succinate XL, 25 mg, oral, Daily  pantoprazole, 40 mg, oral, Daily before breakfast   Or  pantoprazole, 40 mg, intravenous, Daily before breakfast  polyethylene glycol, 17 g, oral, Daily  potassium chloride CR, 10 mEq, oral, Daily  rOPINIRole, 5 mg, oral, Nightly  sertraline, 50 mg, oral, Daily      Continuous medications     PRN medications  PRN medications: acetaminophen **OR** acetaminophen **OR** acetaminophen, benzocaine-menthol, dextromethorphan-guaifenesin, guaiFENesin, nitroglycerin, ondansetron ODT **OR** ondansetron, traZODone  Results for orders placed or performed during the hospital encounter of 05/27/24 (from the past 24 hour(s))   CBC   Result Value Ref Range    WBC 10.0 4.4 - 11.3 x10*3/uL    nRBC 0.0 0.0 - 0.0 /100 WBCs    RBC 3.37 (L) 4.00 - 5.20 x10*6/uL    Hemoglobin 10.2 (L) 12.0 - 16.0 g/dL    Hematocrit 32.3 (L) 36.0 - 46.0 %    MCV 96 80 - 100 fL    MCH 30.3 26.0 - 34.0 pg    MCHC 31.6 (L) 32.0 - 36.0 g/dL    RDW 15.4 (H) 11.5 - 14.5 %    Platelets 342 150 - 450 x10*3/uL   Basic Metabolic Panel   Result Value Ref Range    Glucose 95 65 - 99 mg/dL    Sodium 141 133 - 145 mmol/L    Potassium 4.6 3.4 - 5.1 mmol/L    Chloride 105 97 - 107 mmol/L    Bicarbonate 29 24 - 31 mmol/L    Urea Nitrogen 20 8 - 25 mg/dL    Creatinine 0.70 0.40 - 1.60 mg/dL    eGFR 80 >60 mL/min/1.73m*2    Calcium 7.8 (L) 8.5 - 10.4 mg/dL    Anion Gap 7 <=19 mmol/L     Upper extremity venous duplex bilateral    Result Date: 5/28/2024  Interpreted By:  Nelli Brown, STUDY: [facility] US UPPER EXTREMITY VENOUS DUPLEX BILATERAL;  5/28/2024 11:28 am   INDICATION: Bilateral arm pain and swelling with   COMPARISON: None.   ACCESSION NUMBER(S): [id number]   ORDERING CLINICIAN: WARREN ROLLINS    TECHNIQUE: Vascular ultrasound of the bilateral upper extremities was performed. Evaluation was performed with grayscale, color, and spectral Doppler. When possible, compression views of the evaluated veins was also performed.   FINDINGS: RIGHT UPPER EXTREMITY: Evaluation of the visualized portions of the right internal jugular, innominate, subclavian, axillary, brachial, cephalic, and basilic veins was performed.   There is normal compressibility and flow observed within right internal jugular, innominate, subclavian, axillary, brachial, and basilic veins. The cephalic vein on the right was not imaged.   LEFT UPPER EXTREMITY: On the left, the internal jugular vein is seen and exhibits normal compressibility and flow as does the subclavian vein. Flow is seen within the left axillary vein. However, the brachial, basilic, and cephalic veins could not be evaluated on the left due to immobilization of the left arm.         No evidence for deep venous thrombosis of the right upper extremity.   Evaluation of the deep venous system draining left upper extremity was limited due to immobilization of the left arm. There is no evidence for deep venous thrombosis within left jugular, subclavian, and axillary veins.   MACRO: None   Signed by: Nelli Brown 5/28/2024 12:45 PM Dictation workstation:   [id number]                  Assessment/Plan   Principal Problem:    Arm edema  Active Problems:    COPD (chronic obstructive pulmonary disease) (Multi)    Esophageal reflux    Hiatal hernia    Hypertension    Dyslipidemia    Closed fracture of proximal end of left humerus    Clinically patient is stable  Blood pressure is stable  Breathing status is stable  Leg swelling is slightly better today  It appears to be more of a mechanical issue than a circulation issue  Waiting for orthopedic input  Continue supportive care       I spent  minutes in the professional and overall care of this patient.      Nakia Bailey MD

## 2024-05-30 ENCOUNTER — APPOINTMENT (OUTPATIENT)
Dept: RADIOLOGY | Facility: HOSPITAL | Age: 89
End: 2024-05-30
Payer: MEDICARE

## 2024-05-30 PROBLEM — T14.8XXA HEMATOMA: Status: ACTIVE | Noted: 2024-05-30

## 2024-05-30 PROCEDURE — 97535 SELF CARE MNGMENT TRAINING: CPT | Mod: GO

## 2024-05-30 PROCEDURE — 73060 X-RAY EXAM OF HUMERUS: CPT | Mod: LEFT SIDE | Performed by: RADIOLOGY

## 2024-05-30 PROCEDURE — 96372 THER/PROPH/DIAG INJ SC/IM: CPT | Performed by: INTERNAL MEDICINE

## 2024-05-30 PROCEDURE — G0378 HOSPITAL OBSERVATION PER HR: HCPCS

## 2024-05-30 PROCEDURE — 97530 THERAPEUTIC ACTIVITIES: CPT | Mod: GP

## 2024-05-30 PROCEDURE — 99233 SBSQ HOSP IP/OBS HIGH 50: CPT | Performed by: PHYSICIAN ASSISTANT

## 2024-05-30 PROCEDURE — 99233 SBSQ HOSP IP/OBS HIGH 50: CPT | Performed by: INTERNAL MEDICINE

## 2024-05-30 PROCEDURE — 73060 X-RAY EXAM OF HUMERUS: CPT | Mod: LT

## 2024-05-30 PROCEDURE — 99223 1ST HOSP IP/OBS HIGH 75: CPT | Performed by: NURSE PRACTITIONER

## 2024-05-30 PROCEDURE — 97110 THERAPEUTIC EXERCISES: CPT | Mod: GP

## 2024-05-30 PROCEDURE — 2500000001 HC RX 250 WO HCPCS SELF ADMINISTERED DRUGS (ALT 637 FOR MEDICARE OP): Performed by: INTERNAL MEDICINE

## 2024-05-30 PROCEDURE — 2500000002 HC RX 250 W HCPCS SELF ADMINISTERED DRUGS (ALT 637 FOR MEDICARE OP, ALT 636 FOR OP/ED): Performed by: INTERNAL MEDICINE

## 2024-05-30 PROCEDURE — 94760 N-INVAS EAR/PLS OXIMETRY 1: CPT

## 2024-05-30 PROCEDURE — 2500000004 HC RX 250 GENERAL PHARMACY W/ HCPCS (ALT 636 FOR OP/ED): Performed by: INTERNAL MEDICINE

## 2024-05-30 PROCEDURE — 9420000001 HC RT PATIENT EDUCATION 5 MIN

## 2024-05-30 PROCEDURE — 94640 AIRWAY INHALATION TREATMENT: CPT

## 2024-05-30 RX ADMIN — HEPARIN SODIUM 5000 UNITS: 5000 INJECTION, SOLUTION INTRAVENOUS; SUBCUTANEOUS at 05:49

## 2024-05-30 RX ADMIN — LOSARTAN POTASSIUM 50 MG: 50 TABLET, FILM COATED ORAL at 09:20

## 2024-05-30 RX ADMIN — ROPINIROLE HYDROCHLORIDE 5 MG: 2 TABLET, FILM COATED ORAL at 20:21

## 2024-05-30 RX ADMIN — FUROSEMIDE 40 MG: 40 TABLET ORAL at 09:19

## 2024-05-30 RX ADMIN — IPRATROPIUM BROMIDE AND ALBUTEROL SULFATE 3 ML: 2.5; .5 SOLUTION RESPIRATORY (INHALATION) at 07:10

## 2024-05-30 RX ADMIN — HEPARIN SODIUM 5000 UNITS: 5000 INJECTION, SOLUTION INTRAVENOUS; SUBCUTANEOUS at 20:21

## 2024-05-30 RX ADMIN — HEPARIN SODIUM 5000 UNITS: 5000 INJECTION, SOLUTION INTRAVENOUS; SUBCUTANEOUS at 14:10

## 2024-05-30 RX ADMIN — ATORVASTATIN CALCIUM 10 MG: 10 TABLET, FILM COATED ORAL at 20:21

## 2024-05-30 RX ADMIN — ASPIRIN 81 MG: 81 TABLET, COATED ORAL at 09:20

## 2024-05-30 RX ADMIN — IPRATROPIUM BROMIDE AND ALBUTEROL SULFATE 3 ML: 2.5; .5 SOLUTION RESPIRATORY (INHALATION) at 19:33

## 2024-05-30 RX ADMIN — DOCUSATE SODIUM 100 MG: 100 CAPSULE, LIQUID FILLED ORAL at 20:21

## 2024-05-30 RX ADMIN — Medication 400 MG: at 09:19

## 2024-05-30 RX ADMIN — DOCUSATE SODIUM 100 MG: 100 CAPSULE, LIQUID FILLED ORAL at 09:19

## 2024-05-30 RX ADMIN — METOPROLOL SUCCINATE 25 MG: 25 TABLET, FILM COATED, EXTENDED RELEASE ORAL at 09:18

## 2024-05-30 RX ADMIN — Medication 3 MG: at 20:21

## 2024-05-30 RX ADMIN — IPRATROPIUM BROMIDE AND ALBUTEROL SULFATE 3 ML: 2.5; .5 SOLUTION RESPIRATORY (INHALATION) at 12:12

## 2024-05-30 RX ADMIN — SERTRALINE HYDROCHLORIDE 50 MG: 50 TABLET ORAL at 09:19

## 2024-05-30 RX ADMIN — PANTOPRAZOLE SODIUM 40 MG: 40 TABLET, DELAYED RELEASE ORAL at 05:49

## 2024-05-30 RX ADMIN — ACETAMINOPHEN 650 MG: 160 SOLUTION ORAL at 21:24

## 2024-05-30 ASSESSMENT — COGNITIVE AND FUNCTIONAL STATUS - GENERAL
PERSONAL GROOMING: A LITTLE
HELP NEEDED FOR BATHING: A LOT
DRESSING REGULAR UPPER BODY CLOTHING: TOTAL
MOBILITY SCORE: 13
DAILY ACTIVITIY SCORE: 11
EATING MEALS: A LITTLE
PERSONAL GROOMING: A LITTLE
TOILETING: TOTAL
DRESSING REGULAR UPPER BODY CLOTHING: A LOT
DAILY ACTIVITIY SCORE: 12
TOILETING: TOTAL
MOVING TO AND FROM BED TO CHAIR: A LOT
MOVING TO AND FROM BED TO CHAIR: A LOT
DRESSING REGULAR LOWER BODY CLOTHING: TOTAL
TURNING FROM BACK TO SIDE WHILE IN FLAT BAD: A LOT
WALKING IN HOSPITAL ROOM: A LOT
PERSONAL GROOMING: A LITTLE
MOVING TO AND FROM BED TO CHAIR: A LOT
DAILY ACTIVITIY SCORE: 11
CLIMB 3 TO 5 STEPS WITH RAILING: A LOT
DRESSING REGULAR LOWER BODY CLOTHING: TOTAL
TURNING FROM BACK TO SIDE WHILE IN FLAT BAD: A LOT
DRESSING REGULAR LOWER BODY CLOTHING: TOTAL
HELP NEEDED FOR BATHING: A LOT
WALKING IN HOSPITAL ROOM: A LOT
STANDING UP FROM CHAIR USING ARMS: A LOT
MOVING FROM LYING ON BACK TO SITTING ON SIDE OF FLAT BED WITH BEDRAILS: A LITTLE
WALKING IN HOSPITAL ROOM: A LOT
TURNING FROM BACK TO SIDE WHILE IN FLAT BAD: A LOT
MOBILITY SCORE: 13
STANDING UP FROM CHAIR USING ARMS: A LOT
MOVING FROM LYING ON BACK TO SITTING ON SIDE OF FLAT BED WITH BEDRAILS: A LITTLE
EATING MEALS: A LITTLE
MOVING FROM LYING ON BACK TO SITTING ON SIDE OF FLAT BED WITH BEDRAILS: A LITTLE
STANDING UP FROM CHAIR USING ARMS: A LOT
EATING MEALS: A LITTLE
CLIMB 3 TO 5 STEPS WITH RAILING: A LOT
MOBILITY SCORE: 13
CLIMB 3 TO 5 STEPS WITH RAILING: A LOT
HELP NEEDED FOR BATHING: A LOT
DRESSING REGULAR UPPER BODY CLOTHING: TOTAL
TOILETING: TOTAL

## 2024-05-30 ASSESSMENT — PAIN - FUNCTIONAL ASSESSMENT
PAIN_FUNCTIONAL_ASSESSMENT: 0-10

## 2024-05-30 ASSESSMENT — ACTIVITIES OF DAILY LIVING (ADL)
BATHING_LEVEL_OF_ASSISTANCE: MAXIMUM ASSISTANCE
BATHING_WHERE_ASSESSED: OTHER (COMMENT)
HOME_MANAGEMENT_TIME_ENTRY: 36

## 2024-05-30 ASSESSMENT — PAIN SCALES - GENERAL
PAINLEVEL_OUTOF10: 6
PAINLEVEL_OUTOF10: 0 - NO PAIN
PAINLEVEL_OUTOF10: 0 - NO PAIN
PAINLEVEL_OUTOF10: 2
PAINLEVEL_OUTOF10: 0 - NO PAIN
PAINLEVEL_OUTOF10: 0 - NO PAIN

## 2024-05-30 ASSESSMENT — PAIN DESCRIPTION - LOCATION: LOCATION: SHOULDER

## 2024-05-30 ASSESSMENT — PAIN DESCRIPTION - ORIENTATION: ORIENTATION: LEFT

## 2024-05-30 NOTE — PROGRESS NOTES
Kalie Ramos is a 89 y.o. female on day 0 of admission presenting with Arm edema.    Subjective   Patient still has significant swelling in the left arm.         Objective     Physical Exam  Vitals reviewed.   Constitutional:       Appearance: Normal appearance.   HENT:      Head: Normocephalic and atraumatic.      Right Ear: Tympanic membrane, ear canal and external ear normal.      Left Ear: Tympanic membrane, ear canal and external ear normal.      Nose: Nose normal.      Mouth/Throat:      Pharynx: Oropharynx is clear.   Eyes:      Extraocular Movements: Extraocular movements intact.      Conjunctiva/sclera: Conjunctivae normal.      Pupils: Pupils are equal, round, and reactive to light.   Cardiovascular:      Rate and Rhythm: Normal rate and regular rhythm.      Pulses: Normal pulses.      Heart sounds: Normal heart sounds.   Pulmonary:      Effort: Pulmonary effort is normal.      Breath sounds: Normal breath sounds.   Abdominal:      General: Abdomen is flat. Bowel sounds are normal.      Palpations: Abdomen is soft.   Musculoskeletal:         General: Swelling present.      Cervical back: Normal range of motion and neck supple.   Skin:     General: Skin is warm and dry.   Neurological:      General: No focal deficit present.      Mental Status: She is alert and oriented to person, place, and time.   Psychiatric:         Mood and Affect: Mood normal.         Last Recorded Vitals  Blood pressure 128/53, pulse 66, temperature 36.9 °C (98.4 °F), temperature source Oral, resp. rate 16, height 1.524 m (5'), weight 52.2 kg (115 lb), SpO2 95%.  Intake/Output last 3 Shifts:  I/O last 3 completed shifts:  In: 480 (9.2 mL/kg) [P.O.:480]  Out: - (0 mL/kg)   Weight: 52.2 kg     Relevant Results              Scheduled medications  aspirin, 81 mg, oral, Daily  atorvastatin, 10 mg, oral, Nightly  calcitonin (salmon), 1 spray, One Nostril, Daily  docusate sodium, 100 mg, oral, BID  furosemide, 40 mg, oral,  Daily  heparin (porcine), 5,000 Units, subcutaneous, q8h DOV  ipratropium-albuteroL, 3 mL, nebulization, TID  losartan, 50 mg, oral, Daily  magnesium oxide, 400 mg, oral, Once per day on Tuesday Thursday Saturday  melatonin, 3 mg, oral, Nightly  metoprolol succinate XL, 25 mg, oral, Daily  pantoprazole, 40 mg, oral, Daily before breakfast   Or  pantoprazole, 40 mg, intravenous, Daily before breakfast  polyethylene glycol, 17 g, oral, Daily  potassium chloride CR, 10 mEq, oral, Daily  rOPINIRole, 5 mg, oral, Nightly  sertraline, 50 mg, oral, Daily      Continuous medications     PRN medications  PRN medications: acetaminophen **OR** acetaminophen **OR** acetaminophen, benzocaine-menthol, dextromethorphan-guaifenesin, guaiFENesin, nitroglycerin, ondansetron ODT **OR** ondansetron, traZODone  No results found for this or any previous visit (from the past 24 hour(s)).    XR humerus left    Result Date: 5/30/2024  Interpreted By:  Nelli Brown, STUDY: XR HUMERUS LEFT 5/30/2024 4:33 pm   INDICATION: Signs/Symptoms:left humerus fracture and left elbow swelling   COMPARISON: 04/04/2024   ACCESSION NUMBER(S): [id number]   ORDERING CLINICIAN: KEITH SOL   TECHNIQUE: Two views of the left humerus   FINDINGS: There is an acute comminuted fracture of the surgical neck of the left humerus noted with overriding of the fracture fragments by a distance of roughly 2 cm. No significant angulation at the fracture site is identified.   There is severe swelling of the distal left upper arm, elbow, and proximal left forearm. No obvious effusion of the elbow is seen however.       Acute comminuted fracture through the surgical neck of the left humerus with overriding of the fracture fragments.   Probable large soft tissue hematoma of the distal left upper arm, elbow, and proximal left forearm.   Signed by: Nelli Brown 5/30/2024 4:51 PM Dictation workstation:   [id number]                  Assessment/Plan   Principal Problem:    Arm  edema  Active Problems:    COPD (chronic obstructive pulmonary disease) (Multi)    Esophageal reflux    Hiatal hernia    Hypertension    Dyslipidemia    Closed fracture of proximal end of left humerus    Hematoma    Clinically patient is stable  Blood pressure is stable  Breathing status is stable  CHF stable  Arm swelling is slightly better today  X-ray shows hematoma  Elevation and Ace wrap   continue supportive care  Plan for rehab    Discussed with the daughter       I spent  minutes in the professional and overall care of this patient.      Nakia Bailey MD

## 2024-05-30 NOTE — PROGRESS NOTES
"Music Therapy Note    Kalie Ramos     Therapy Session  Referral Type: New referral this admission  Visit Type: New visit  Session Start Time: 1444  Session End Time: 1446  Intervention Delivery: In-person  Number of family members present: 1     Pre-assessment  Unable to Assess Reason: Outcomes not applicable  Mood/Affect: Distracted         Treatment/Interventions  Areas of Focus: Emotional support, Anxiety reduction, Self-expression, Pain management, Relaxation, Socialization  Music Therapy Interventions: Assessment    Post-assessment  Total Session Time (min): 2 minutes    Narrative  Assessment Detail: Pt found sitting in chair with family member present. MT introduced self and services. Pt declined session at this time, stating, \"I just want to sit here.\"  Follow-up: MT will follow-up if Pt remains admitted.    Education Documentation  No documentation found.          "

## 2024-05-30 NOTE — PROGRESS NOTES
05/30/24 1752   Discharge Planning   Living Arrangements Other (Comment)  (lives in assisted living [facility])   Support Systems Children;Family members   Assistance Needed Needs assistance with ADLs and IADLs   Type of Residence Assisted living   Do you have animals or pets at home? No   Care Facility Name [facility]   Who is requesting discharge planning? Provider   Home or Post Acute Services Post acute facilities (Rehab/SNF/etc)   Type of Post Acute Facility Services Rehab;Skilled nursing   Patient expects to be discharged to: Patient accepted to [facility] and awaiting precert which was started 5/29   Does the patient need discharge transport arranged? Yes   RoundTrip coordination needed? Yes   Has discharge transport been arranged? No     Awaiting for precert, patient was accepted to [facility].

## 2024-05-30 NOTE — PROGRESS NOTES
Physical Therapy    Physical Therapy Treatment    Patient Name: Kalie Ramos  MRN: [id number]  Today's Date: 5/30/2024  Time Calculation  Start Time: 1344  Stop Time: 1407  Time Calculation (min): 23 min  Documentation and services provided by student physical therapist under the supervision of a licensed physical therapist.  Kyle Pitts, SPT    Assessment/Plan   PT Assessment  PT Assessment Results: Decreased strength, Decreased range of motion, Decreased endurance, Impaired balance, Decreased mobility, Decreased coordination, Decreased cognition, Impaired judgement, Decreased safety awareness, Impaired hearing, Impaired vision, Decreased skin integrity, Orthopedic restrictions, Pain  Rehab Prognosis: Good  Medical Staff Made Aware: Yes  End of Session Communication: Bedside nurse  Assessment Comment: Pt required mod assist of 2 for bed to chair transfer. Pt limited mainly due to pain and required increased time for completion. Pt had no LOB with transfers or side stepping for bed>chair transfer. Pt remains very anxious prior to mobility, c/o  pain, however, afterwards, pt feeling better once sitting in chair and repositioned for comfort of back head/neck. Secondary to pt w/ decline since LUE fracture, would benefit from skilled PT services to progress safe, functional mobility  End of Session Patient Position: Up in chair, Alarm on (daughter at bedside)  PT Plan  Inpatient/Swing Bed or Outpatient: Inpatient  PT Plan  Treatment/Interventions: Bed mobility, Transfer training, Gait training, Strengthening, Range of motion, Therapeutic exercise, Therapeutic activity, Home exercise program  PT Plan: Skilled PT  PT Frequency: 4 times per week  PT Discharge Recommendations: Moderate intensity level of continued care  Equipment Recommended upon Discharge: Other (comment) (TBD, gait belt)  PT Recommended Transfer Status: Assist x2  PT - OK to Discharge: Yes      General Visit Information:   PT  Visit  PT Received On:  "05/30/24  General  Reason for Referral: impaired mobility  Referred By: Dr JENN Bailey  Past Medical History Relevant to Rehab: cervicalgia, CHF, COPD, depression, HTN, breast cancer s/p lumpectomy, neuropathy, PNA, TIA, CVA, PE, PVD  Missed Visit: No  Family/Caregiver Present: Yes  Caregiver Feedback: Daughter present  Prior to Session Communication: Bedside nurse  Patient Position Received: Bed, 3 rail up, Alarm on  Preferred Learning Style: verbal, visual  General Comment: Pt cleared by nursing for therapy. Pt pleasant and agreeable to therapy. Tele and LUE brace intact throughout session. On RA SpO2 94%.    Subjective   Precautions:  Precautions  Hearing/Visual Limitations: glasses, hearing aids  UE Weight Bearing Status: Left Non-Weight Bearing  Medical Precautions: Fall precautions, Other (comment) (LUE non WB)  Braces Applied: left UE fracture brace with sling  Vital Signs:  Vital Signs  Heart Rate: 74  SpO2: 94 %  Patient Position: Sitting    Objective   Pain:  Pain Assessment  Pain Assessment: 0-10  Pain Score: 0 - No pain (reports 0/10 pain but when daughter tried to assist pt in covering L shoulder with gown when seated on EOB pt reported having pain. Did not give number when asked by clinician.)  Cognition:  Cognition  Orientation Level: Disoriented to time (A&O to person, place, reason, but unsure of month and said today is \"Wednesday.\")  Coordination:  Movements are Fluid and Coordinated: Yes  Postural Control:  Postural Control  Postural Control: Impaired  Posture Comment: significant kyphosis  Static Sitting Balance  Static Sitting-Balance Support: Feet supported, Right upper extremity supported  Static Sitting-Level of Assistance: Distant supervision  Static Sitting-Comment/Number of Minutes: sitting at EOB  Dynamic Sitting Balance  Dynamic Sitting-Balance Support: Feet supported, Right upper extremity supported  Dynamic Sitting-Comments: scooting to EOB  Static Standing Balance  Static Standing-Balance " "Support: Right upper extremity supported  Static Standing-Level of Assistance: Minimum assistance  Static Standing-Comment/Number of Minutes: standing at bedside  Dynamic Standing Balance  Dynamic Standing-Balance Support: Right upper extremity supported  Dynamic Standing-Balance: Turning  Dynamic Standing-Comments: Mod A for transfers  Extremity/Trunk Assessments:    Activity Tolerance:  Activity Tolerance  Endurance: Decreased tolerance for upright activites  Activity Tolerance Comments: Limited activity tolerance d/t pain and fatigue.  Treatments:  Therapeutic Exercise  Therapeutic Exercise Performed: Yes  Therapeutic Exercise Activity 1: Seated TE while up in chair. Pt and family education (daughter) on seated exercises. Mult reps x 1 set approx 10-20 ea for the following; HR, TR, marches, LAQ.    Therapeutic Activity  Therapeutic Activity Performed: Yes  Therapeutic Activity 1: To include bed mobility and transfers from bed to chair with RUE arm in arm assist while maintaining NWB on LUE.    Bed Mobility  Bed Mobility: Yes  Bed Mobility 1  Bed Mobility 1: Supine to sitting  Level of Assistance 1: Close supervision  Bed Mobility Comments 1: HOB elevated, pt required increased time to complete reporting she did not want help for bed mobility stating \"don't touch me.\" SBA for safety    Ambulation/Gait Training  Ambulation/Gait Training Performed: Yes  Ambulation/Gait Training 1  Surface 1: Level tile  Device 1: No device  Assistance 1: Arm in arm assistance, Moderate assistance  Quality of Gait 1: Diminished heel strike, Decreased step length, Forward flexed posture, Soft knee(s)  Comments/Distance (ft) 1: Mod assist with Arm in Arm assist for pts RUE ambulating x 2 feet for bed to chair transfer.  Transfers  Transfer: Yes  Transfer 1  Transfer From 1: Sit to  Transfer to 1: Stand  Technique 1: Sit to stand  Transfer Level of Assistance 1: Arm in arm assistance, Moderate assistance  Trials/Comments 1: HOB lowered " all the way down to aid pt in having feet supportive by floor. Cues for RUE placement for safety.  Transfers 2  Transfer From 2: Stand to  Transfer to 2: Sit  Technique 2: Stand to sit  Transfer Level of Assistance 2: Arm in arm assistance, Moderate assistance  Trials/Comments 2: Cues for RUE placement for safety.  Transfers 3  Transfer From 3: Bed to  Transfer to 3: Chair with arms  Technique 3: Sit to stand, Stand to sit  Transfer Level of Assistance 3: Arm in arm assistance, Moderate assistance  Trials/Comments 3: x1         Outcome Measures:  [facility] Basic Mobility  Turning from your back to your side while in a flat bed without using bedrails: A little  Moving from lying on your back to sitting on the side of a flat bed without using bedrails: A lot  Moving to and from bed to chair (including a wheelchair): A lot  Standing up from a chair using your arms (e.g. wheelchair or bedside chair): A lot  To walk in hospital room: A lot  Climbing 3-5 steps with railing: A lot  Basic Mobility - Total Score: 13    Education Documentation  Precautions, taught by Shazia Hernandez PT at 5/30/2024  2:34 PM.  Learner: Family, Patient  Readiness: Acceptance  Method: Explanation  Response: Needs Reinforcement    Mobility Training, taught by Shazia Hernandez PT at 5/30/2024  2:34 PM.  Learner: Family, Patient  Readiness: Acceptance  Method: Explanation  Response: Needs Reinforcement    Education Comments  No comments found.        OP EDUCATION:       Encounter Problems       Encounter Problems (Active)       Balance       LTG - Patient will maintain balance to allow for safe mobility (Progressing)       Start:  05/28/24    Expected End:  06/07/24               Mobility       STG - Patient will ambulate 45' w/ LRAD and min asisst (Progressing)       Start:  05/28/24    Expected End:  06/07/24            Pt will tolerate BLE exercises to promote functional strength and endurance (Progressing)       Start:  05/28/24    Expected End:  06/07/24                PT Transfers       STG - Patient to transfer to and from sit to supine w/ min assist (Progressing)       Start:  05/28/24    Expected End:  06/07/24            STG - Patient will transfer sit to and from stand w/ min assist (Progressing)       Start:  05/28/24    Expected End:  06/07/24               Pain - Adult

## 2024-05-30 NOTE — PROGRESS NOTES
Kalie Ramos is a 89 y.o. female on day 0 of admission presenting with Arm edema.    Subjective   Patient seen today resting in chair bedside working with therapy. Her daughter is bedside. States that the patient has worsening swelling in the left upper extremity. She is currently in a left shoulder immobilizer for support. Of note, the patients daughter tells me that the patient has a history of breast cancer with removal of lymph nodes on the left side.        Objective     Physical Exam  Left upper extremity:   Mild tenderness at the left shoulder. LUE in immobilizer in place. There is ecchymosis and swelling present at the left elbow and left forearm. Skin is clean and dry. No evidence of infection. Compartments soft and compressible. Patient able to flex and extend left hand fingers and wrist with no pain. Neurovascular is intact. Radial pulse readily palpable.     Last Recorded Vitals  Blood pressure 113/59, pulse 74, temperature 36.7 °C (98.1 °F), temperature source Axillary, resp. rate 16, height 1.524 m (5'), weight 52.2 kg (115 lb), SpO2 94%.  Intake/Output last 3 Shifts:  I/O last 3 completed shifts:  In: 480 (9.2 mL/kg) [P.O.:480]  Out: - (0 mL/kg)   Weight: 52.2 kg     Relevant Results  Upper extremity venous duplex bilateral    Result Date: 5/28/2024  Interpreted By:  Nelli Brown, STUDY: [facility] UPPER EXTREMITY VENOUS DUPLEX BILATERAL;  5/28/2024 11:28 am   INDICATION: Bilateral arm pain and swelling with   COMPARISON: None.   ACCESSION NUMBER(S): [id number]   ORDERING CLINICIAN: WARREN ROLLINS   TECHNIQUE: Vascular ultrasound of the bilateral upper extremities was performed. Evaluation was performed with grayscale, color, and spectral Doppler. When possible, compression views of the evaluated veins was also performed.   FINDINGS: RIGHT UPPER EXTREMITY: Evaluation of the visualized portions of the right internal jugular, innominate, subclavian, axillary, brachial, cephalic, and basilic veins was  performed.   There is normal compressibility and flow observed within right internal jugular, innominate, subclavian, axillary, brachial, and basilic veins. The cephalic vein on the right was not imaged.   LEFT UPPER EXTREMITY: On the left, the internal jugular vein is seen and exhibits normal compressibility and flow as does the subclavian vein. Flow is seen within the left axillary vein. However, the brachial, basilic, and cephalic veins could not be evaluated on the left due to immobilization of the left arm.         No evidence for deep venous thrombosis of the right upper extremity.   Evaluation of the deep venous system draining left upper extremity was limited due to immobilization of the left arm. There is no evidence for deep venous thrombosis within left jugular, subclavian, and axillary veins.   MACRO: None   Signed by: Nelli Brown 5/28/2024 12:45 PM Dictation workstation:   [id number]    XR hand left 3+ views    Result Date: 5/17/2024  * * *Final Report* * * DATE OF EXAM: May 17 2024  2:49PM   MYX   [id number]  -  XR HAND 3V PA/LAT/OBL LT  / ACCESSION #  [id number] PROCEDURE REASON: Trauma      * * * * Physician Interpretation * * * *  EXAMINATION / TECHNIQUE:  XR HAND 3V PA/LAT/OBL LT PATIENT/TECHNOLOGIST PROVIDED HISTORY:   Patient had a fall and has bruising involving right hip / Patient also has redness and swelling posterior left hand. CLINICAL INFORMATION (AS PROVIDED BY ORDERING CLINICIAN) :  Trauma COMPARISON: Same day left wrist radiographs. RESULT: Diffuse osteopenia.  There is a small ossicle adjacent to the ulnar styloid process, likely due to remote injury.  No acute fracture or dislocation.  There are severe degenerative changes of the first CMC joint, triscaphe joint, radiocarpal joint, and thumb IP joint.   Additional moderate to severe degenerative changes of the IP joints.   There is widening of the scapholunate interval.  There is chondrocalcinosis of the radiocarpal and ulnocarpal joints.  No radiopaque foreign body.    IMPRESSION: No acute osseous abnormality. Severe degenerative changes, as described. If there is specific point tenderness, recommend repeat radiographs in 7-10 days. : SMITA   Transcribe Date/Time: May 17 2024  3:16P  Dictated by : KAYLA BRAR MD This examination was interpreted and the report reviewed and electronically signed by: KAYLA BRAR MD on May 17 2024  3:22PM  EST    XR HIP GENERAL 3V PELV/AP/LAT RIGHT    Result Date: 5/17/2024  * * *Final Report* * * DATE OF EXAM: May 17 2024  2:49PM   MYX   [id number]  -  XR HIP 3V PELV+ AP/LAT RT  / ACCESSION #  [id number] PROCEDURE REASON: Fracture, hip      * * * * Physician Interpretation * * * *  EXAMINATION / TECHNIQUE:  XR HIP 3V PELV+ AP/LAT RT PATIENT/TECHNOLOGIST PROVIDED HISTORY:   Patient had a fall and has bruising involving right hip / Patient also has redness and swelling posterior left hand. CLINICAL INFORMATION (AS PROVIDED BY ORDERING CLINICIAN) :  Fracture, hip COMPARISON: None RESULT: Osteopenia.  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   Hardware appears intact without evidence of complication.  No acute fracture or dislocation.  Partially visualized severe degenerative changes of the left hip joint and at least moderate degenerative changes of the lower lumbosacral spine, sacroiliac joints, and pubic symphysis.   No radiopaque foreign body.  Vascular calcifications.    IMPRESSION: Postsurgical changes from right total hip arthroplasty.  No acute radiographic abnormality. No acute osseous abnormality. : [facility]   Transcribe Date/Time: May 17 2024  3:12P Dictated by : KAYLA BRAR MD This examination was interpreted and the report reviewed and electronically signed by: KAYLA BRAR MD on May 17 2024  3:16PM  EST    XR humerus left    Result Date: 5/17/2024  * * *Final Report* * * DATE OF EXAM: May 17 2024  1:03PM   MYX   [id number]  -  XR HUMERUS 2V AP/LAT LT  /  ACCESSION #  [id number] PROCEDURE REASON: Trauma      * * * * Physician Interpretation * * * *  EXAMINATION / TECHNIQUE:  XR HUMERUS 2V AP/LAT LT, XR SHLDR >/=3V AP/JAYDEN AP/OTHR LT, XR WRIST 4V PA/LAT/OBL/SCAPH LT HISTORY:   Fall, best possible due to patient pain and condition  Trauma. COMPARISON: None RESULT: Left shoulder: Acromioclavicular and glenohumeral joints are aligned.  Degenerative changes at the glenohumeral and acromioclavicular joints.  Comminuted fracture of the left humeral neck with 7 mm of medial displacement of the distal fragment. There is left pleural effusion. Left humerus: Left humeral neck fracture described above. No distal humeral fracture identified. Left wrist: Advanced degenerative changes at the first CMC joint.  Some degenerative changes at the triscaphe joint.  There is scapholunate widening.   Degenerative changes at the first interphalangeal joint.  Generalized osteopenia.  Possible ulnar styloid fracture with adjacent soft tissue swelling.  Joint space narrowing at the radiocarpal joint.    IMPRESSION: Displaced fracture of the left humeral neck. Possible nondisplaced ulnar styloid fracture.  Correlate for point tenderness in that area. Scapholunate widening. Left pleural effusion. : [facility]   Transcribe Date/Time: May 17 2024  1:37P Dictated by : SHILPI MUIR MD This examination was interpreted and the report reviewed and electronically signed by: SHILPI MUIR MD on May 17 2024  1:44PM  EST    XR WRIST INJURY 4V PA/LAT/OBL/SCAPH LEFT    Result Date: 5/17/2024  * * *Final Report* * * DATE OF EXAM: May 17 2024  1:03PM   MYX   [id number]  -  XR WRIST 4V PA/LAT/OBL/SCAPH LT  / ACCESSION #  [id number] PROCEDURE REASON: Trauma      * * * * Physician Interpretation * * * *  EXAMINATION / TECHNIQUE:  XR HUMERUS 2V AP/LAT LT, XR SHLDR >/=3V AP/JAYDEN AP/OTHR LT, XR WRIST 4V PA/LAT/OBL/SCAPH LT HISTORY:   Fall, best possible due to patient pain and condition  Trauma. COMPARISON: None  RESULT: Left shoulder: Acromioclavicular and glenohumeral joints are aligned.  Degenerative changes at the glenohumeral and acromioclavicular joints.  Comminuted fracture of the left humeral neck with 7 mm of medial displacement of the distal fragment. There is left pleural effusion. Left humerus: Left humeral neck fracture described above. No distal humeral fracture identified. Left wrist: Advanced degenerative changes at the first CMC joint.  Some degenerative changes at the triscaphe joint.  There is scapholunate widening.   Degenerative changes at the first interphalangeal joint.  Generalized osteopenia.  Possible ulnar styloid fracture with adjacent soft tissue swelling.  Joint space narrowing at the radiocarpal joint.    IMPRESSION: Displaced fracture of the left humeral neck. Possible nondisplaced ulnar styloid fracture.  Correlate for point tenderness in that area. Scapholunate widening. Left pleural effusion. : [facility]   Transcribe Date/Time: May 17 2024  1:37P Dictated by : SHILPI MUIR MD This examination was interpreted and the report reviewed and electronically signed by: SHILPI MUIR MD on May 17 2024  1:44PM  EST    XR shoulder left 2+ views    Result Date: 5/17/2024  * * *Final Report* * * DATE OF EXAM: May 17 2024  1:03PM   MYX   [id number]  -  XR SHLDR >/=3V AP/JAYDEN AP/OTHR LT  / ACCESSION #  [id number] PROCEDURE REASON: Trauma      * * * * Physician Interpretation * * * *  EXAMINATION / TECHNIQUE:  XR HUMERUS 2V AP/LAT LT, XR SHLDR >/=3V AP/JAYDEN AP/OTHR LT, XR WRIST 4V PA/LAT/OBL/SCAPH LT HISTORY:   Fall, best possible due to patient pain and condition  Trauma. COMPARISON: None RESULT: Left shoulder: Acromioclavicular and glenohumeral joints are aligned.  Degenerative changes at the glenohumeral and acromioclavicular joints.  Comminuted fracture of the left humeral neck with 7 mm of medial displacement of the distal fragment. There is left pleural effusion. Left humerus: Left humeral neck  fracture described above. No distal humeral fracture identified. Left wrist: Advanced degenerative changes at the first CMC joint.  Some degenerative changes at the triscaphe joint.  There is scapholunate widening.   Degenerative changes at the first interphalangeal joint.  Generalized osteopenia.  Possible ulnar styloid fracture with adjacent soft tissue swelling.  Joint space narrowing at the radiocarpal joint.    IMPRESSION: Displaced fracture of the left humeral neck. Possible nondisplaced ulnar styloid fracture.  Correlate for point tenderness in that area. Scapholunate widening. Left pleural effusion. : [facility]   Transcribe Date/Time: May 17 2024  1:37P Dictated by : SHILPI MUIR MD This examination was interpreted and the report reviewed and electronically signed by: SHILPI MUIR MD on May 17 2024  1:44PM  EST      Scheduled medications  aspirin, 81 mg, oral, Daily  atorvastatin, 10 mg, oral, Nightly  calcitonin (salmon), 1 spray, One Nostril, Daily  docusate sodium, 100 mg, oral, BID  furosemide, 40 mg, oral, Daily  heparin (porcine), 5,000 Units, subcutaneous, q8h DOV  ipratropium-albuteroL, 3 mL, nebulization, TID  losartan, 50 mg, oral, Daily  magnesium oxide, 400 mg, oral, Once per day on Tuesday Thursday Saturday  melatonin, 3 mg, oral, Nightly  metoprolol succinate XL, 25 mg, oral, Daily  pantoprazole, 40 mg, oral, Daily before breakfast   Or  pantoprazole, 40 mg, intravenous, Daily before breakfast  polyethylene glycol, 17 g, oral, Daily  potassium chloride CR, 10 mEq, oral, Daily  rOPINIRole, 5 mg, oral, Nightly  sertraline, 50 mg, oral, Daily      Continuous medications     PRN medications  PRN medications: acetaminophen **OR** acetaminophen **OR** acetaminophen, benzocaine-menthol, dextromethorphan-guaifenesin, guaiFENesin, nitroglycerin, ondansetron ODT **OR** ondansetron, traZODone  No results found for this or any previous visit (from the past 24 hour(s)).                  Assessment/Plan   Principal Problem:    Arm edema  Active Problems:    COPD (chronic obstructive pulmonary disease) (Multi)    Esophageal reflux    Hiatal hernia    Hypertension    Dyslipidemia    Closed fracture of proximal end of left humerus     I spoke with the patients daughter regarding the left upper extremity swelling. Image reports are reviewed from [facility] and from April 4th 2024. Patient has had no new fall or injury. We discussed continuing the left upper extremity immobilizer as instructed by her Orthopedic provider at [facility]. Remain nonweightbearing at the E. US of the left upper extremity negative for DVT. Patient instructed gentle movement at the left hand hand, fingers and wrist as her pain allows. Continue ice and elevation. We will also consult vascular surgery to evaluate patient for worsening swelling possible lymphedema for any further recommendations.      We will order repeat left humerus x-rays.     Patients daughter has no further questions feels comfortable with the plan.     She is stable from Orthopedic standpoint and instructed to keep follow up appointments at [facility]. Please reconsult us as needed.        I spent 50 minutes in the professional and overall care of this patient.      Crystal Ahn PA-C

## 2024-05-30 NOTE — PROGRESS NOTES
Occupational Therapy    Occupational Therapy Treatment    Name: Kalie Ramos  MRN: [id number]  : 1929  Date: 24  Time Calculation  Start Time: 1407  Stop Time: 1443 (returned to put pt back to bed [id number])  Time Calculation (min): 36 min    Assessment:  OT Assessment: Pt continues to make steady progress toward goals.  End of Session Communication: Bedside nurse  End of Session Patient Position: Bed, 3 rail up, Alarm on (dtr presernt)  Plan:  Treatment Interventions: ADL retraining, Functional transfer training, UE strengthening/ROM, Endurance training, Patient/family training, Equipment evaluation/education, Compensatory technique education  OT Frequency: 4 times per week  OT Discharge Recommendations: Moderate intensity level of continued care  Equipment Recommended upon Discharge: Other (comment) (Gait belt)  OT Recommended Transfer Status: Assist of 2  OT - OK to Discharge: Yes    Subjective   Previous Visit Info:  OT Last Visit  OT Received On: 24  General:  General  Prior to Session Communication: Bedside nurse  Patient Position Received: Up in chair, Alarm on (dtr present)  Preferred Learning Style: verbal, visual  General Comment: Pt agreeable to therapy  Precautions:  Hearing/Visual Limitations: glasses, HA's  UE Weight Bearing Status: Left Non-Weight Bearing  Medical Precautions: Fall precautions  Braces Applied: LUE fracture brace  Vitals:     Pain Assessment:  Pain Assessment  Pain Assessment: 0-10  Pain Score: 0 - No pain     Objective   Cognition:  Overall Cognitive Status: Impaired (disoriented to situation, anxious, overwhelmed)  Activities of Daily Living: Grooming  Grooming Level of Assistance: Setup  Grooming Where Assessed: Chair  Grooming Comments: assist for putting toothpaste on toothbrush, openning toothpaste, set up for washcloth to wash face, comb to comb hair    UE Bathing  UE Bathing Level of Assistance: Moderate assistance  UE Bathing Where Assessed: Other  (Comment) (chair)  UE Bathing Comments: assist w/ RUE    LE Bathing  LE Bathing Level of Assistance: Maximum assistance  LE Bathing Where Assessed: Other (Comment) (chair)  LE Bathing Comments: assist w/ bottom, periarea, lower legs    UE Dressing  UE Dressing Level of Assistance: Dependent  UE Dressing Where Assessed: Chair  UE Dressing Comments: to don/ doff gown    LE Dressing  Adult Briefs Level of Assistance: Dependent (to don/ doff brief)  LE Dressing Where Assessed: Chair  LE Dressing Comments: Pt incontinent of urine, dependent to chainge depends    Toileting  Toileting Level of Assistance: Dependent  Where Assessed: Other (Comment) (chair)  Toileting Comments: Incontinent of urine, dependent for hygiene    Functional Standing Tolerance:     Bed Mobility/Transfers: Bed Mobility  Bed Mobility: Yes  Bed Mobility 1  Bed Mobility 1: Sitting to supine  Level of Assistance 1: Minimum assistance  Bed Mobility Comments 1: for trunk down, able to bring legs into bed    Transfer 1  Transfer From 1: Sit to  Transfer to 1: Stand, Chair with arms  Technique 1: Sit to stand, Stand to sit  Transfer Device 1: Gait belt  Transfer Level of Assistance 1: Arm in arm assistance, Moderate assistance  Trials/Comments 1: x2 trials  Transfers 2  Transfer From 2: Stand to  Transfer to 2: Sit  Technique 2: Stand to sit  Transfer Device 2: Gait belt  Transfer Level of Assistance 2: Arm in arm assistance, Moderate assistance  Trials/Comments 2: Verbal cues for hand placemen of rt UE  Transfers 3  Transfer From 3: Chair with arms to  Transfer to 3: Bed  Technique 3: Sit to stand, Stand to sit  Transfer Device 3: Gait belt  Transfer Level of Assistance 3: Arm in arm assistance, Maximum assistance (x2)  IADL's:   Communication:     Splinting:     Therapy/Activity:   Sensory:     Cognitive Skill Development:     Vision:     Strength:     Other Activity:               Outcome Measures:  [facility] Daily Activity  Putting on and taking off regular  lower body clothing: Total  Bathing (including washing, rinsing, drying): A lot  Putting on and taking off regular upper body clothing: Total  Toileting, which includes using toilet, bedpan or urinal: Total  Taking care of personal grooming such as brushing teeth: A little  Eating Meals: A little  Daily Activity - Total Score: 11        Education Documentation  ADL Training, taught by Millie Jamison OT at 5/30/2024  4:00 PM.  Learner: Patient  Readiness: Acceptance  Method: Explanation  Response: Demonstrated Understanding, Needs Reinforcement    Education Comments  No comments found.      Goals:  Encounter Problems       Encounter Problems (Active)       OT Goals       ADL's (Progressing)       Start:  05/28/24    Expected End:  06/11/24       Pt will complete bathing, dressing and grooming tasks w/ min. Assist w/ AE/ compensatory tech as needed for safety and increased independence in self care tasks.         Functional transfers (Progressing)       Start:  05/28/24    Expected End:  06/11/24       Pt will demon bed, chair and toilet transfers w/ min. Assist w/ LRD for safety and increased independence in daily tasks.         Functional endurance  (Progressing)       Start:  05/28/24    Expected End:  06/11/24       Pt will tolerate 15 minutes of functional activity to improve functional endurance for daily tasks and functional mobility.            OT Goals       Functional mobility (Progressing)       Start:  05/28/24    Expected End:  06/11/24       Pt will demon. household distance to improve functional mobility for daily tasks.

## 2024-05-31 VITALS
TEMPERATURE: 99 F | OXYGEN SATURATION: 97 % | HEART RATE: 70 BPM | HEIGHT: 60 IN | RESPIRATION RATE: 16 BRPM | SYSTOLIC BLOOD PRESSURE: 134 MMHG | DIASTOLIC BLOOD PRESSURE: 69 MMHG | BODY MASS INDEX: 22.58 KG/M2 | WEIGHT: 115 LBS

## 2024-05-31 LAB
ANION GAP SERPL CALC-SCNC: 5 MMOL/L
BUN SERPL-MCNC: 16 MG/DL (ref 8–25)
CALCIUM SERPL-MCNC: 7.5 MG/DL (ref 8.5–10.4)
CHLORIDE SERPL-SCNC: 103 MMOL/L (ref 97–107)
CO2 SERPL-SCNC: 30 MMOL/L (ref 24–31)
CREAT SERPL-MCNC: 0.8 MG/DL (ref 0.4–1.6)
EGFRCR SERPLBLD CKD-EPI 2021: 68 ML/MIN/1.73M*2
ERYTHROCYTE [DISTWIDTH] IN BLOOD BY AUTOMATED COUNT: 15.4 % (ref 11.5–14.5)
GLUCOSE SERPL-MCNC: 99 MG/DL (ref 65–99)
HCT VFR BLD AUTO: 31.4 % (ref 36–46)
HGB BLD-MCNC: 10 G/DL (ref 12–16)
MCH RBC QN AUTO: 30.9 PG (ref 26–34)
MCHC RBC AUTO-ENTMCNC: 31.8 G/DL (ref 32–36)
MCV RBC AUTO: 97 FL (ref 80–100)
NRBC BLD-RTO: 0 /100 WBCS (ref 0–0)
PLATELET # BLD AUTO: 369 X10*3/UL (ref 150–450)
POTASSIUM SERPL-SCNC: 4.9 MMOL/L (ref 3.4–5.1)
RBC # BLD AUTO: 3.24 X10*6/UL (ref 4–5.2)
SODIUM SERPL-SCNC: 138 MMOL/L (ref 133–145)
WBC # BLD AUTO: 9.1 X10*3/UL (ref 4.4–11.3)

## 2024-05-31 PROCEDURE — 96372 THER/PROPH/DIAG INJ SC/IM: CPT | Performed by: INTERNAL MEDICINE

## 2024-05-31 PROCEDURE — 9420000001 HC RT PATIENT EDUCATION 5 MIN

## 2024-05-31 PROCEDURE — G0378 HOSPITAL OBSERVATION PER HR: HCPCS

## 2024-05-31 PROCEDURE — 94640 AIRWAY INHALATION TREATMENT: CPT

## 2024-05-31 PROCEDURE — 2500000001 HC RX 250 WO HCPCS SELF ADMINISTERED DRUGS (ALT 637 FOR MEDICARE OP): Performed by: INTERNAL MEDICINE

## 2024-05-31 PROCEDURE — 2500000002 HC RX 250 W HCPCS SELF ADMINISTERED DRUGS (ALT 637 FOR MEDICARE OP, ALT 636 FOR OP/ED): Performed by: INTERNAL MEDICINE

## 2024-05-31 PROCEDURE — 99232 SBSQ HOSP IP/OBS MODERATE 35: CPT | Performed by: NURSE PRACTITIONER

## 2024-05-31 PROCEDURE — 80048 BASIC METABOLIC PNL TOTAL CA: CPT | Performed by: INTERNAL MEDICINE

## 2024-05-31 PROCEDURE — 99239 HOSP IP/OBS DSCHRG MGMT >30: CPT | Performed by: INTERNAL MEDICINE

## 2024-05-31 PROCEDURE — 85027 COMPLETE CBC AUTOMATED: CPT | Performed by: INTERNAL MEDICINE

## 2024-05-31 PROCEDURE — 2500000004 HC RX 250 GENERAL PHARMACY W/ HCPCS (ALT 636 FOR OP/ED): Performed by: INTERNAL MEDICINE

## 2024-05-31 PROCEDURE — 94760 N-INVAS EAR/PLS OXIMETRY 1: CPT

## 2024-05-31 PROCEDURE — 36415 COLL VENOUS BLD VENIPUNCTURE: CPT | Performed by: INTERNAL MEDICINE

## 2024-05-31 RX ORDER — POLYETHYLENE GLYCOL 3350 17 G/17G
17 POWDER, FOR SOLUTION ORAL DAILY
Start: 2024-06-01

## 2024-05-31 RX ORDER — CALCIUM CARBONATE 200(500)MG
500 TABLET,CHEWABLE ORAL 4 TIMES DAILY PRN
Start: 2024-05-31

## 2024-05-31 RX ORDER — ACETAMINOPHEN 325 MG/1
650 TABLET ORAL EVERY 4 HOURS PRN
Qty: 30 TABLET | Refills: 0 | Status: SHIPPED | OUTPATIENT
Start: 2024-05-31

## 2024-05-31 RX ORDER — PANTOPRAZOLE SODIUM 40 MG/1
40 TABLET, DELAYED RELEASE ORAL
Start: 2024-06-01

## 2024-05-31 RX ORDER — CALCIUM CARBONATE 200(500)MG
500 TABLET,CHEWABLE ORAL 4 TIMES DAILY PRN
Status: DISCONTINUED | OUTPATIENT
Start: 2024-05-31 | End: 2024-05-31 | Stop reason: HOSPADM

## 2024-05-31 RX ADMIN — DOCUSATE SODIUM 100 MG: 100 CAPSULE, LIQUID FILLED ORAL at 08:10

## 2024-05-31 RX ADMIN — LOSARTAN POTASSIUM 50 MG: 50 TABLET, FILM COATED ORAL at 11:36

## 2024-05-31 RX ADMIN — FUROSEMIDE 40 MG: 40 TABLET ORAL at 08:10

## 2024-05-31 RX ADMIN — POTASSIUM CHLORIDE 10 MEQ: 750 TABLET, EXTENDED RELEASE ORAL at 08:10

## 2024-05-31 RX ADMIN — ASPIRIN 81 MG: 81 TABLET, COATED ORAL at 08:10

## 2024-05-31 RX ADMIN — METOPROLOL SUCCINATE 25 MG: 25 TABLET, FILM COATED, EXTENDED RELEASE ORAL at 08:10

## 2024-05-31 RX ADMIN — IPRATROPIUM BROMIDE AND ALBUTEROL SULFATE 3 ML: 2.5; .5 SOLUTION RESPIRATORY (INHALATION) at 12:23

## 2024-05-31 RX ADMIN — HEPARIN SODIUM 5000 UNITS: 5000 INJECTION, SOLUTION INTRAVENOUS; SUBCUTANEOUS at 13:55

## 2024-05-31 RX ADMIN — PANTOPRAZOLE SODIUM 40 MG: 40 TABLET, DELAYED RELEASE ORAL at 06:06

## 2024-05-31 RX ADMIN — POLYETHYLENE GLYCOL 3350 17 G: 17 POWDER, FOR SOLUTION ORAL at 08:11

## 2024-05-31 RX ADMIN — SERTRALINE HYDROCHLORIDE 50 MG: 50 TABLET ORAL at 08:09

## 2024-05-31 RX ADMIN — CALCIUM CARBONATE 500 MG: 500 TABLET, CHEWABLE ORAL at 10:36

## 2024-05-31 RX ADMIN — IPRATROPIUM BROMIDE AND ALBUTEROL SULFATE 3 ML: 2.5; .5 SOLUTION RESPIRATORY (INHALATION) at 07:14

## 2024-05-31 RX ADMIN — HEPARIN SODIUM 5000 UNITS: 5000 INJECTION, SOLUTION INTRAVENOUS; SUBCUTANEOUS at 06:06

## 2024-05-31 ASSESSMENT — COGNITIVE AND FUNCTIONAL STATUS - GENERAL
MOVING TO AND FROM BED TO CHAIR: TOTAL
MOVING FROM LYING ON BACK TO SITTING ON SIDE OF FLAT BED WITH BEDRAILS: A LITTLE
WALKING IN HOSPITAL ROOM: TOTAL
DAILY ACTIVITIY SCORE: 9
STANDING UP FROM CHAIR USING ARMS: TOTAL
MOBILITY SCORE: 9
EATING MEALS: A LITTLE
DRESSING REGULAR UPPER BODY CLOTHING: TOTAL
TURNING FROM BACK TO SIDE WHILE IN FLAT BAD: TOTAL
TOILETING: TOTAL
CLIMB 3 TO 5 STEPS WITH RAILING: A LOT
PERSONAL GROOMING: A LOT
HELP NEEDED FOR BATHING: TOTAL
DRESSING REGULAR LOWER BODY CLOTHING: TOTAL

## 2024-05-31 ASSESSMENT — PAIN SCALES - GENERAL: PAINLEVEL_OUTOF10: 0 - NO PAIN

## 2024-05-31 NOTE — PROGRESS NOTES
Kalie Ramos is a 89 y.o. female on day 0 of admission presenting with Arm edema.    Subjective   Left arm swelling is little better today       Objective     Physical Exam  Vitals reviewed.   Constitutional:       Appearance: Normal appearance.   HENT:      Head: Normocephalic and atraumatic.      Right Ear: Tympanic membrane, ear canal and external ear normal.      Left Ear: Tympanic membrane, ear canal and external ear normal.      Nose: Nose normal.      Mouth/Throat:      Pharynx: Oropharynx is clear.   Eyes:      Extraocular Movements: Extraocular movements intact.      Conjunctiva/sclera: Conjunctivae normal.      Pupils: Pupils are equal, round, and reactive to light.   Cardiovascular:      Rate and Rhythm: Normal rate and regular rhythm.      Pulses: Normal pulses.      Heart sounds: Normal heart sounds.   Pulmonary:      Effort: Pulmonary effort is normal.      Breath sounds: Normal breath sounds.   Abdominal:      General: Abdomen is flat. Bowel sounds are normal.      Palpations: Abdomen is soft.   Musculoskeletal:      Cervical back: Normal range of motion and neck supple.   Skin:     General: Skin is warm and dry.   Neurological:      General: No focal deficit present.      Mental Status: She is alert and oriented to person, place, and time.   Psychiatric:         Mood and Affect: Mood normal.         Last Recorded Vitals  Blood pressure 134/69, pulse 70, temperature 37.2 °C (99 °F), temperature source Tympanic, resp. rate 16, height 1.524 m (5'), weight 52.2 kg (115 lb), SpO2 97%.  Intake/Output last 3 Shifts:  I/O last 3 completed shifts:  In: 1440 (27.6 mL/kg) [P.O.:1440]  Out: - (0 mL/kg)   Weight: 52.2 kg     Relevant Results              Scheduled medications  aspirin, 81 mg, oral, Daily  atorvastatin, 10 mg, oral, Nightly  calcitonin (salmon), 1 spray, One Nostril, Daily  docusate sodium, 100 mg, oral, BID  furosemide, 40 mg, oral, Daily  heparin (porcine), 5,000 Units, subcutaneous, q8h  [facility]  ipratropium-albuteroL, 3 mL, nebulization, TID  losartan, 50 mg, oral, Daily  magnesium oxide, 400 mg, oral, Once per day on Tuesday Thursday Saturday  melatonin, 3 mg, oral, Nightly  metoprolol succinate XL, 25 mg, oral, Daily  pantoprazole, 40 mg, oral, Daily before breakfast   Or  pantoprazole, 40 mg, intravenous, Daily before breakfast  polyethylene glycol, 17 g, oral, Daily  potassium chloride CR, 10 mEq, oral, Daily  rOPINIRole, 5 mg, oral, Nightly  sertraline, 50 mg, oral, Daily      Continuous medications     PRN medications  PRN medications: acetaminophen **OR** acetaminophen **OR** acetaminophen, benzocaine-menthol, calcium carbonate, dextromethorphan-guaifenesin, guaiFENesin, nitroglycerin, ondansetron ODT **OR** ondansetron, traZODone  Results for orders placed or performed during the hospital encounter of 05/27/24 (from the past 24 hour(s))   CBC   Result Value Ref Range    WBC 9.1 4.4 - 11.3 x10*3/uL    nRBC 0.0 0.0 - 0.0 /100 WBCs    RBC 3.24 (L) 4.00 - 5.20 x10*6/uL    Hemoglobin 10.0 (L) 12.0 - 16.0 g/dL    Hematocrit 31.4 (L) 36.0 - 46.0 %    MCV 97 80 - 100 fL    MCH 30.9 26.0 - 34.0 pg    MCHC 31.8 (L) 32.0 - 36.0 g/dL    RDW 15.4 (H) 11.5 - 14.5 %    Platelets 369 150 - 450 x10*3/uL   Basic Metabolic Panel   Result Value Ref Range    Glucose 99 65 - 99 mg/dL    Sodium 138 133 - 145 mmol/L    Potassium 4.9 3.4 - 5.1 mmol/L    Chloride 103 97 - 107 mmol/L    Bicarbonate 30 24 - 31 mmol/L    Urea Nitrogen 16 8 - 25 mg/dL    Creatinine 0.80 0.40 - 1.60 mg/dL    eGFR 68 >60 mL/min/1.73m*2    Calcium 7.5 (L) 8.5 - 10.4 mg/dL    Anion Gap 5 <=19 mmol/L     XR humerus left    Result Date: 5/30/2024  Interpreted By:  Nelli Brown, STUDY: XR HUMERUS LEFT 5/30/2024 4:33 pm   INDICATION: Signs/Symptoms:left humerus fracture and left elbow swelling   COMPARISON: 04/04/2024   ACCESSION NUMBER(S): [id number]   ORDERING CLINICIAN: KEITH SOL   TECHNIQUE: Two views of the left humerus   FINDINGS: There  is an acute comminuted fracture of the surgical neck of the left humerus noted with overriding of the fracture fragments by a distance of roughly 2 cm. No significant angulation at the fracture site is identified.   There is severe swelling of the distal left upper arm, elbow, and proximal left forearm. No obvious effusion of the elbow is seen however.       Acute comminuted fracture through the surgical neck of the left humerus with overriding of the fracture fragments.   Probable large soft tissue hematoma of the distal left upper arm, elbow, and proximal left forearm.   Signed by: Nelli Brown 5/30/2024 4:51 PM Dictation workstation:   [id number]                  Assessment/Plan   Principal Problem:    Arm edema  Active Problems:    COPD (chronic obstructive pulmonary disease) (Multi)    Esophageal reflux    Hiatal hernia    Hypertension    Dyslipidemia    Closed fracture of proximal end of left humerus    Hematoma    Keep left arm elevated  Blood pressure stable  COPD stable  CHF stable  Left arm hematoma slow to improve  Pre-CERT done  Discharged to rehab facility       I spent 35 minutes in the professional and overall care of this patient.      Nakia Bailey MD

## 2024-05-31 NOTE — DISCHARGE SUMMARY
Discharge Diagnosis  Arm edema    Issues Requiring Follow-Up  Left arm hematoma  Left humerus fracture  Hypertension  Congestive heart failure    Test Results Pending At Discharge  Pending Labs       No current pending labs.            Hospital Course   Kalie Rmaos is a 89 y.o. female presenting with left arm swelling.  She had a proximal humerus fracture 10 days ago.  Placed in a sling then switched to splint at orthopedic office.  She had her splint readjusted at the nursing home and  Noticed marked swelling of the distal left arm.  Concern for DVT came to the emergency room for worsening swelling and pain  Ultrasound was negative for DVT  X-ray showed hematoma and comminuted fracture of the humerus  Patient instructed to keep the arm in splint and keep it elevated and wrapped  Physical therapy recommended rehab patient discharged on parenteral    Pertinent Physical Exam At Time of Discharge  Physical Exam  Vitals reviewed.   Constitutional:       Appearance: Normal appearance.   HENT:      Head: Normocephalic and atraumatic.      Right Ear: Tympanic membrane, ear canal and external ear normal.      Left Ear: Tympanic membrane, ear canal and external ear normal.      Nose: Nose normal.      Mouth/Throat:      Pharynx: Oropharynx is clear.   Eyes:      Extraocular Movements: Extraocular movements intact.      Conjunctiva/sclera: Conjunctivae normal.      Pupils: Pupils are equal, round, and reactive to light.   Cardiovascular:      Rate and Rhythm: Normal rate and regular rhythm.      Pulses: Normal pulses.      Heart sounds: Normal heart sounds.   Pulmonary:      Effort: Pulmonary effort is normal.      Breath sounds: Normal breath sounds.   Abdominal:      General: Abdomen is flat. Bowel sounds are normal.      Palpations: Abdomen is soft.   Musculoskeletal:         General: Swelling present.      Cervical back: Normal range of motion and neck supple.      Comments: Left upper arm swelling   Skin:      General: Skin is warm and dry.   Neurological:      General: No focal deficit present.      Mental Status: She is alert and oriented to person, place, and time.   Psychiatric:         Mood and Affect: Mood normal.         Home Medications     Medication List      START taking these medications     acetaminophen 325 mg tablet; Commonly known as: Tylenol; Take 2 tablets   (650 mg) by mouth every 4 hours if needed for mild pain (1 - 3).   calcium carbonate 200 mg calcium chewable tablet; Commonly known as:   Tums; Chew 1 tablet (500 mg) 4 times a day as needed for indigestion or   heartburn.   polyethylene glycol 17 gram packet; Commonly known as: Glycolax,   Miralax; Take 17 g by mouth once daily.; Start taking on: June 1, 2024     CHANGE how you take these medications     * pantoprazole 40 mg EC tablet; Commonly known as: ProtoNix; TAKE 1   TABLET DAILY; What changed: Another medication with the same name was   added. Make sure you understand how and when to take each.   * pantoprazole 40 mg EC tablet; Commonly known as: ProtoNix; Take 1   tablet (40 mg) by mouth once daily in the morning. Take before meals. Do   not crush, chew, or split.; Start taking on: June 1, 2024; What changed:   You were already taking a medication with the same name, and this   prescription was added. Make sure you understand how and when to take   each.  * This list has 2 medication(s) that are the same as other medications   prescribed for you. Read the directions carefully, and ask your doctor or   other care provider to review them with you.     CONTINUE taking these medications     aspirin 81 mg EC tablet; Take 1 tablet (81 mg) by mouth once daily. Do   not start before April 3, 2024.   atorvastatin 10 mg tablet; Commonly known as: Lipitor; Take 1 tablet (10   mg) by mouth once daily at bedtime.   calcitonin (salmon) 200 unit/actuation nasal spray; Commonly known as:   Miacalcin; Administer 1 spray into one nostril once daily. Do not  start   before October 28, 2023.   denosumab 60 mg/mL syringe; Commonly known as: Prolia; Inject 1 mL (60   mg total) under the skin every 6 months.; Notes to patient: As directed   furosemide 40 mg tablet; Commonly known as: Lasix; Take 1 tablet (40 mg)   by mouth once daily. Do not start before April 3, 2024.   ipratropium-albuteroL 0.5-2.5 mg/3 mL nebulizer solution; Commonly known   as: Duo-Neb; Take 3 mL by nebulization 3 times a day.   Klor-Con M10 10 mEq ER tablet; Generic drug: potassium chloride CR; Take   1 tablet (10 mEq) by mouth once daily. Do not crush, chew, or split. Do   not start before April 3, 2024.   losartan 50 mg tablet; Commonly known as: Cozaar; Take 1 tablet (50 mg)   by mouth once daily. Do not start before April 3, 2024.   magnesium oxide 400 mg (241.3 mg magnesium) tablet; Commonly known as:   Mag-Ox; Notes to patient: As directed   melatonin 3 mg tablet   metoprolol succinate XL 25 mg 24 hr tablet; Commonly known as:   Toprol-XL; Take 1 tablet (25 mg) by mouth once daily. Do not crush or   chew. Do not start before February 4, 2024.   nitroglycerin 0.4 mg SL tablet; Commonly known as: Nitrostat; Place 1   tablet (0.4 mg) under the tongue every 5 minutes if needed for chest pain   (UP TO 3 TIMES. IF NO RELIEF CALL 911.).   oxygen gas therapy; Commonly known as: O2; Inhale 2 L/min once every 24   hours.   rOPINIRole 5 mg tablet; Commonly known as: Requip   sertraline 50 mg tablet; Commonly known as: Zoloft; Take 1 tablet (50   mg) by mouth once daily.   traZODone 50 mg tablet; Commonly known as: Desyrel; TAKE 1 TABLET AT   BEDTIME AS NEEDED FOR SLEEP       Outpatient Follow-Up  No future appointments.    Nakia Bailey MD

## 2024-05-31 NOTE — CARE PLAN
Problem: Pain  Goal: My pain/discomfort is manageable  Outcome: Progressing     Problem: Safety  Goal: Patient will be injury free during hospitalization  Outcome: Progressing     Problem: Psychosocial Needs  Goal: Demonstrates ability to cope with hospitalization/illness  Outcome: Progressing     Problem: Respiratory  Goal: No signs of respiratory distress (eg. Use of accessory muscles. Peds grunting)  Outcome: Progressing       The clinical goals for the shift include safety    Over the shift, the patient did  make progress toward the above goal

## 2024-05-31 NOTE — PROGRESS NOTES
05/31/24 1248   Discharge Planning   Living Arrangements Other (Comment)  (Assisted Living)   Support Systems Children;Family members   Assistance Needed Needs assistance with ADLs and IADLs   Type of Residence Assisted living   Do you have animals or pets at home? No   Care Facility Name [facility]   Who is requesting discharge planning? Provider   Home or Post Acute Services Post acute facilities (Rehab/SNF/etc)   Type of Post Acute Facility Services Rehab;Skilled nursing   Patient expects to be discharged to: Discharge to [facility], authoriazation approved   Does the patient need discharge transport arranged? Yes   RoundTrip coordination needed? Yes   Has discharge transport been arranged? Yes   What day is the transport expected? 05/31/24

## 2024-05-31 NOTE — CONSULTS
Reason For Consult  Left arm edema    History Of Present Illness  Kalie Ramos is a 89 y.o. female presenting with left arm edema.  Patient has a pertinent past history of left humerus fracture that she suffered on May 17, 2024.  She has seen orthopedic surgery who recommended that she be in an immobilizer for 6 weeks.  No surgery was recommended.  The patient's daughter provided most of the pertinent history and noted significant left arm edema.  The edema is most concentrated between the mid forearm and mid upper arm where the immobilizer straps were placed.  The patient's daughter also noted that she had some bruising to the elbow area.  No known trauma.  Ultrasound negative for upper extremity DVT although visualization was noted to be somewhat limited as the patient was in an immobilizer.  Past Medical History  She has a past medical history of Anemia, Arthritis, At risk for falling, Atrophic disorder of skin, unspecified (07/19/2013), Body mass index (BMI) 22.0-22.9, adult, Cervicalgia (05/08/2015), CHF (congestive heart failure) (Multi), Closed right hip fracture (Multi), COPD (chronic obstructive pulmonary disease) (Multi), Depression, GERD (gastroesophageal reflux disease), High cholesterol, Hip fracture (Multi), Hyperlipidemia, Hypertension, Insomnia, Leg swelling, Malignant neoplasm of unspecified site of unspecified female breast (Multi) (07/19/2013), Neuropathy, Osteoarthritis, Other conditions influencing health status (10/21/2013), Personal history of other infectious and parasitic diseases (03/17/2015), Personal history of other specified conditions (03/17/2015), Personal history of pneumonia (recurrent) (09/10/2015), Personal history of transient ischemic attack (TIA), 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    Surgical History  She has  a past surgical history that includes Breast biopsy (09/10/2015); Rotator cuff repair (09/10/2015); [place] lymph node biopsy (09/10/2015); Cataract extraction (11/02/2015); Other surgical history (11/02/2015); Hand tendon surgery (11/02/2015); Carpal tunnel release (09/04/2014); Breast lumpectomy (07/19/2013); Hysterectomy (07/19/2013); Shoulder surgery (07/19/2013); MR angio head wo IV contrast (12/17/2020); MR angio head wo IV contrast (5/13/2021); MR angio neck wo IV contrast (5/24/2022); and MR angio head wo IV contrast (5/24/2022).     Social History  She reports that she has never smoked. She has never used smokeless tobacco. She reports that she does not drink alcohol and does not use drugs.    Family History  Family History   Problem Relation Name Age of Onset    No Known Problems Mother      Alzheimer's disease Father      Breast cancer Sister      Pancreatic cancer Sister      Arthritis Other          Allergies  Naproxen    Review of Systems    CONSTITUTIONAL: Denies weight loss, fever and chills.    HEENT: Denies changes in vision and hearing.    RESPIRATORY: Denies SOB and cough.    CV: Denies palpitations and CP.    GI: Denies abdominal pain, nausea, vomiting and diarrhea.    : Denies dysuria and urinary frequency.    MSK: Denies myalgia and joint pain.  Positive for left upper arm/shoulder pain.  Positive for left elbow edema    SKIN: Denies rash and pruritus.    VASC: Denies claudication, ischemic rest pain, or open wounds or sores    NEUROLOGICAL: Denies headache and syncope.    PSYCHIATRIC: Denies recent changes in mood. Denies anxiety and depression.     Physical Exam    General: Pt is alert and oriented x 3. Pleasant, conversive.  Daughter at the bedside  HEENT: Head is atraumatic, normocephalic.   Cardiac: Normal S1-S2.  Regular rate and rhythm.  No murmurs.  Respiratory: Lungs clear to auscultation.  No adventitious sounds.  Abdomen: Soft, nondistended, nontender.  Bowel sounds x4  quadrants.  Pulse exam: Palpable brachial and radial pulses bilaterally.  Lower extremities are warm and well-perfused  Extremities:  There is significant arm edema between the mid upper arm and the mid lower arm.  There are some ecchymosis to the posterior elbow area as well  Neuro: Moves all extremities spontaneously.  No focal deficits.  Psych: Appropriate affect.  Answers questions appropriately.     Last Recorded Vitals  Blood pressure 128/53, pulse 66, temperature 36.9 °C (98.4 °F), temperature source Oral, resp. rate 16, height 1.524 m (5'), weight 52.2 kg (115 lb), SpO2 94%.    Relevant Results  No results found for this or any previous visit (from the past 24 hour(s)).     XR humerus left    Result Date: 5/30/2024  Interpreted By:  Nelli Brown, STUDY: XR HUMERUS LEFT 5/30/2024 4:33 pm   INDICATION: Signs/Symptoms:left humerus fracture and left elbow swelling   COMPARISON: 04/04/2024   ACCESSION NUMBER(S): [id number]   ORDERING CLINICIAN: KEITH SOL   TECHNIQUE: Two views of the left humerus   FINDINGS: There is an acute comminuted fracture of the surgical neck of the left humerus noted with overriding of the fracture fragments by a distance of roughly 2 cm. No significant angulation at the fracture site is identified.   There is severe swelling of the distal left upper arm, elbow, and proximal left forearm. No obvious effusion of the elbow is seen however.       Acute comminuted fracture through the surgical neck of the left humerus with overriding of the fracture fragments.   Probable large soft tissue hematoma of the distal left upper arm, elbow, and proximal left forearm.   Signed by: Nelli Brown 5/30/2024 4:51 PM Dictation workstation:   [id number]    Upper extremity venous duplex bilateral    Result Date: 5/28/2024  Interpreted By:  Nelli Brown, STUDY: VASC  UPPER EXTREMITY VENOUS DUPLEX BILATERAL;  5/28/2024 11:28 am   INDICATION: Bilateral arm pain and swelling with   COMPARISON: None.    ACCESSION NUMBER(S): [id number]   ORDERING CLINICIAN: WARREN ROLLINS   TECHNIQUE: Vascular ultrasound of the bilateral upper extremities was performed. Evaluation was performed with grayscale, color, and spectral Doppler. When possible, compression views of the evaluated veins was also performed.   FINDINGS: RIGHT UPPER EXTREMITY: Evaluation of the visualized portions of the right internal jugular, innominate, subclavian, axillary, brachial, cephalic, and basilic veins was performed.   There is normal compressibility and flow observed within right internal jugular, innominate, subclavian, axillary, brachial, and basilic veins. The cephalic vein on the right was not imaged.   LEFT UPPER EXTREMITY: On the left, the internal jugular vein is seen and exhibits normal compressibility and flow as does the subclavian vein. Flow is seen within the left axillary vein. However, the brachial, basilic, and cephalic veins could not be evaluated on the left due to immobilization of the left arm.         No evidence for deep venous thrombosis of the right upper extremity.   Evaluation of the deep venous system draining left upper extremity was limited due to immobilization of the left arm. There is no evidence for deep venous thrombosis within left jugular, subclavian, and axillary veins.   MACRO: None   Signed by: Nelli Brown 5/28/2024 12:45 PM Dictation workstation:   [id number]    XR hand left 3+ views    Result Date: 5/17/2024  * * *Final Report* * * DATE OF EXAM: May 17 2024  2:49PM   MYX   [id number]  -  XR HAND 3V PA/LAT/OBL LT  / ACCESSION #  [id number] PROCEDURE REASON: Trauma      * * * * Physician Interpretation * * * *  EXAMINATION / TECHNIQUE:  XR HAND 3V PA/LAT/OBL LT PATIENT/TECHNOLOGIST PROVIDED HISTORY:   Patient had a fall and has bruising involving right hip / Patient also has redness and swelling posterior left hand. CLINICAL INFORMATION (AS PROVIDED BY ORDERING CLINICIAN) :  Trauma COMPARISON: Same day left wrist  radiographs. RESULT: Diffuse osteopenia.  There is a small ossicle adjacent to the ulnar styloid process, likely due to remote injury.  No acute fracture or dislocation.  There are severe degenerative changes of the first CMC joint, triscaphe joint, radiocarpal joint, and thumb IP joint.   Additional moderate to severe degenerative changes of the IP joints.   There is widening of the scapholunate interval.  There is chondrocalcinosis of the radiocarpal and ulnocarpal joints. No radiopaque foreign body.    IMPRESSION: No acute osseous abnormality. Severe degenerative changes, as described. If there is specific point tenderness, recommend repeat radiographs in 7-10 days. : [employer]   Transcribe Date/Time: May 17 2024  3:16P  Dictated by : KAYLA BRAR MD This examination was interpreted and the report reviewed and electronically signed by: KAYLA BRAR MD on May 17 2024  3:22PM  EST    XR HIP GENERAL 3V PELV/AP/LAT RIGHT    Result Date: 5/17/2024  * * *Final Report* * * DATE OF EXAM: May 17 2024  2:49PM   MYX   [id number]  -  XR HIP 3V PELV+ AP/LAT RT  / ACCESSION #  [id number] PROCEDURE REASON: Fracture, hip      * * * * Physician Interpretation * * * *  EXAMINATION / TECHNIQUE:  XR HIP 3V PELV+ AP/LAT RT PATIENT/TECHNOLOGIST PROVIDED HISTORY:   Patient had a fall and has bruising involving right hip / Patient also has redness and swelling posterior left hand. CLINICAL INFORMATION (AS PROVIDED BY ORDERING CLINICIAN) :  Fracture, hip COMPARISON: None RESULT: Osteopenia.  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   Hardware appears intact without evidence of complication.  No acute fracture or dislocation.  Partially visualized severe degenerative changes of the left hip joint and at least moderate degenerative changes of the lower lumbosacral spine, sacroiliac joints, and pubic symphysis.   No radiopaque foreign body.  Vascular calcifications.    IMPRESSION: Postsurgical changes from right  total hip arthroplasty.  No acute radiographic abnormality. No acute osseous abnormality. : [employer]   Transcribe Date/Time: May 17 2024  3:12P Dictated by : KAYLA BRAR MD This examination was interpreted and the report reviewed and electronically signed by: KAYLA BRAR MD on May 17 2024  3:16PM  EST    XR humerus left    Result Date: 5/17/2024  * * *Final Report* * * DATE OF EXAM: May 17 2024  1:03PM   MYX   [id number]  -  XR HUMERUS 2V AP/LAT LT  / ACCESSION #  [id number] PROCEDURE REASON: Trauma      * * * * Physician Interpretation * * * *  EXAMINATION / TECHNIQUE:  XR HUMERUS 2V AP/LAT LT, XR SHLDR >/=3V AP/JAYDEN AP/OTHR LT, XR WRIST 4V PA/LAT/OBL/SCAPH LT HISTORY:   Fall, best possible due to patient pain and condition  Trauma. COMPARISON: None RESULT: Left shoulder: Acromioclavicular and glenohumeral joints are aligned.  Degenerative changes at the glenohumeral and acromioclavicular joints.  Comminuted fracture of the left humeral neck with 7 mm of medial displacement of the distal fragment. There is left pleural effusion. Left humerus: Left humeral neck fracture described above. No distal humeral fracture identified. Left wrist: Advanced degenerative changes at the first CMC joint.  Some degenerative changes at the triscaphe joint.  There is scapholunate widening.   Degenerative changes at the first interphalangeal joint.  Generalized osteopenia.  Possible ulnar styloid fracture with adjacent soft tissue swelling.  Joint space narrowing at the radiocarpal joint.    IMPRESSION: Displaced fracture of the left humeral neck. Possible nondisplaced ulnar styloid fracture.  Correlate for point tenderness in that area. Scapholunate widening. Left pleural effusion. : [employer]   Transcribe Date/Time: May 17 2024  1:37P Dictated by : SHILPI MUIR MD This examination was interpreted and the report reviewed and electronically signed by: SHILPI MUIR MD on May 17 2024  1:44PM  EST    XR WRIST  INJURY 4V PA/LAT/OBL/SCAPH LEFT    Result Date: 5/17/2024  * * *Final Report* * * DATE OF EXAM: May 17 2024  1:03PM   MYX   [id number]  -  XR WRIST 4V PA/LAT/OBL/SCAPH LT  / ACCESSION #  [id number] PROCEDURE REASON: Trauma      * * * * Physician Interpretation * * * *  EXAMINATION / TECHNIQUE:  XR HUMERUS 2V AP/LAT LT, XR SHLDR >/=3V AP/JAYDEN AP/OTHR LT, XR WRIST 4V PA/LAT/OBL/SCAPH LT HISTORY:   Fall, best possible due to patient pain and condition  Trauma. COMPARISON: None RESULT: Left shoulder: Acromioclavicular and glenohumeral joints are aligned.  Degenerative changes at the glenohumeral and acromioclavicular joints.  Comminuted fracture of the left humeral neck with 7 mm of medial displacement of the distal fragment. There is left pleural effusion. Left humerus: Left humeral neck fracture described above. No distal humeral fracture identified. Left wrist: Advanced degenerative changes at the first CMC joint.  Some degenerative changes at the triscaphe joint.  There is scapholunate widening.   Degenerative changes at the first interphalangeal joint.  Generalized osteopenia.  Possible ulnar styloid fracture with adjacent soft tissue swelling.  Joint space narrowing at the radiocarpal joint.    IMPRESSION: Displaced fracture of the left humeral neck. Possible nondisplaced ulnar styloid fracture.  Correlate for point tenderness in that area. Scapholunate widening. Left pleural effusion. : [facility]   Transcribe Date/Time: May 17 2024  1:37P Dictated by : SHILPI MUIR MD This examination was interpreted and the report reviewed and electronically signed by: SHILPI MUIR MD on May 17 2024  1:44PM  EST    XR shoulder left 2+ views    Result Date: 5/17/2024  * * *Final Report* * * DATE OF EXAM: May 17 2024  1:03PM   MYX   [id number]  -  XR SHLDR >/=3V AP/JAYDEN AP/OTHR LT  / ACCESSION #  [id number] PROCEDURE REASON: Trauma      * * * * Physician Interpretation * * * *  EXAMINATION / TECHNIQUE:  XR HUMERUS 2V AP/LAT LT, XR  SHLDR >/=3V AP/JAYDEN AP/OTHR LT, XR WRIST 4V PA/LAT/OBL/SCAPH LT HISTORY:   Fall, best possible due to patient pain and condition  Trauma. COMPARISON: None RESULT: Left shoulder: Acromioclavicular and glenohumeral joints are aligned.  Degenerative changes at the glenohumeral and acromioclavicular joints.  Comminuted fracture of the left humeral neck with 7 mm of medial displacement of the distal fragment. There is left pleural effusion. Left humerus: Left humeral neck fracture described above. No distal humeral fracture identified. Left wrist: Advanced degenerative changes at the first CMC joint.  Some degenerative changes at the triscaphe joint.  There is scapholunate widening.   Degenerative changes at the first interphalangeal joint.  Generalized osteopenia.  Possible ulnar styloid fracture with adjacent soft tissue swelling.  Joint space narrowing at the radiocarpal joint.    IMPRESSION: Displaced fracture of the left humeral neck. Possible nondisplaced ulnar styloid fracture.  Correlate for point tenderness in that area. Scapholunate widening. Left pleural effusion. : [facility]   Transcribe Date/Time: May 17 2024  1:37P Dictated by : SHILPI MUIR MD This examination was interpreted and the report reviewed and electronically signed by: SHILPI MUIR MD on May 17 2024  1:44PM  EST         Assessment/Plan   Left arm edema  Possible spontaneous hematoma  Displaced left humeral fracture    Patient does have rather pronounced left arm edema that is mostly concentrated between the 2 straps of her arm immobilizer.  There is no significant upper arm edema or lower arm/hand edema. It is possible that the initial trauma from the fracture caused generalized arm edema which has now collected in the part of the arm that has been constantly dependent and contracted since being placed in an arm immobilizer.  However, there is also ecchymosis noted around the elbow and this could possibly be a spontaneous hematoma.  I  compressed the arm with an Ace wrap to aid in decreasing some of the edema.  Could consider ultrasound of the area to assess what the fluid collection looks like.    I spent 55 minutes in the professional and overall care of this patient.      Loc Moreno, APRN-CNP

## 2024-05-31 NOTE — PROGRESS NOTES
05/31/24 1606   Discharge Planning   Patient expects to be discharged to: [facility]; [facility] sent to them for their records. [id number] submitted via [facility] and [facility] made aware of the same.   Does the patient need discharge transport arranged? Yes   [facility] coordination needed? Yes   Has discharge transport been arranged? Yes   What day is the transport expected? 05/31/24     Time for transportation to be determined as awaiting med reconciliation. Dr. Bailey and nurse aware.     4:12 PM AVS sent to [facility] and transportation requested through [facility] for 6:00 PM per nurse's request. Harriet confirmed transportation for 6:00 PM. Nurse updated.     Called patient's daughter, Annette, and updated her to all the above.

## 2024-05-31 NOTE — PROGRESS NOTES
Kalie Ramos is a 89 y.o. female on day 0 of admission presenting with Arm edema.    Subjective   Patient seen and examined.  Her left arm swelling has decreased since my examination yesterday.  She complains of some finger swelling today.        Objective     Physical Exam    General: Pt is alert and oriented x 3. Pleasant, conversive.  Daughter at the bedside  HEENT: Head is atraumatic, normocephalic.   Cardiac: Normal S1-S2.  Regular rate and rhythm.  No murmurs.  Respiratory: Lungs clear to auscultation.  No adventitious sounds.  Abdomen: Soft, nondistended, nontender.  Bowel sounds x4 quadrants.  Pulse exam: Palpable brachial and radial pulses bilaterally.  Lower extremities are warm and well-perfused  Extremities:  There is less arm edema between the mid upper arm and the mid lower arm.  There is some ecchymosis to the posterior elbow area as well.  Fingers are more swollen today  Neuro: Moves all extremities spontaneously.  No focal deficits.  Psych: Appropriate affect.  Answers questions appropriately.         Last Recorded Vitals  Blood pressure 114/54, pulse 74, temperature 37.5 °C (99.5 °F), temperature source Temporal, resp. rate 18, height 1.524 m (5'), weight 52.2 kg (115 lb), SpO2 96%.  Intake/Output last 3 Shifts:  I/O last 3 completed shifts:  In: 720 (13.8 mL/kg) [P.O.:720]  Out: - (0 mL/kg)   Weight: 52.2 kg     Relevant Results    Scheduled medications  aspirin, 81 mg, oral, Daily  atorvastatin, 10 mg, oral, Nightly  calcitonin (salmon), 1 spray, One Nostril, Daily  docusate sodium, 100 mg, oral, BID  furosemide, 40 mg, oral, Daily  heparin (porcine), 5,000 Units, subcutaneous, q8h DOV  ipratropium-albuteroL, 3 mL, nebulization, TID  losartan, 50 mg, oral, Daily  magnesium oxide, 400 mg, oral, Once per day on Tuesday Thursday Saturday  melatonin, 3 mg, oral, Nightly  metoprolol succinate XL, 25 mg, oral, Daily  pantoprazole, 40 mg, oral, Daily before breakfast   Or  pantoprazole, 40 mg,  intravenous, Daily before breakfast  polyethylene glycol, 17 g, oral, Daily  potassium chloride CR, 10 mEq, oral, Daily  rOPINIRole, 5 mg, oral, Nightly  sertraline, 50 mg, oral, Daily      Continuous medications     PRN medications  PRN medications: acetaminophen **OR** acetaminophen **OR** acetaminophen, benzocaine-menthol, dextromethorphan-guaifenesin, guaiFENesin, nitroglycerin, ondansetron ODT **OR** ondansetron, traZODone     Results for orders placed or performed during the hospital encounter of 05/27/24 (from the past 24 hour(s))   CBC   Result Value Ref Range    WBC 9.1 4.4 - 11.3 x10*3/uL    nRBC 0.0 0.0 - 0.0 /100 WBCs    RBC 3.24 (L) 4.00 - 5.20 x10*6/uL    Hemoglobin 10.0 (L) 12.0 - 16.0 g/dL    Hematocrit 31.4 (L) 36.0 - 46.0 %    MCV 97 80 - 100 fL    MCH 30.9 26.0 - 34.0 pg    MCHC 31.8 (L) 32.0 - 36.0 g/dL    RDW 15.4 (H) 11.5 - 14.5 %    Platelets 369 150 - 450 x10*3/uL   Basic Metabolic Panel   Result Value Ref Range    Glucose 99 65 - 99 mg/dL    Sodium 138 133 - 145 mmol/L    Potassium 4.9 3.4 - 5.1 mmol/L    Chloride 103 97 - 107 mmol/L    Bicarbonate 30 24 - 31 mmol/L    Urea Nitrogen 16 8 - 25 mg/dL    Creatinine 0.80 0.40 - 1.60 mg/dL    eGFR 68 >60 mL/min/1.73m*2    Calcium 7.5 (L) 8.5 - 10.4 mg/dL    Anion Gap 5 <=19 mmol/L       Assessment/Plan   Left arm edema  Possible spontaneous hematoma  Displaced left humeral fracture     Patient has less edema noted with some light compression with Ace wrap.  Seems to be more diffuse compared to my examination yesterday.  It is possible that the initial trauma from the fracture caused generalized arm edema which has now collected in the part of the arm that has been constantly dependent and contracted since being placed in an arm immobilizer.  However, there is also ecchymosis noted around the elbow and this could possibly be a spontaneous hematoma.  I compressed the arm with an Ace wrap to aid in decreasing some of the edema.  Could consider  ultrasound of the area to assess what the fluid collection looks like.          I spent 35  minutes in the professional and overall care of this patient.      Loc Moreno, APRN-CNP

## 2024-06-01 ENCOUNTER — NURSING HOME VISIT (OUTPATIENT)
Dept: POST ACUTE CARE | Facility: EXTERNAL LOCATION | Age: 89
End: 2024-06-01
Payer: MEDICARE

## 2024-06-01 DIAGNOSIS — R60.0 ARM EDEMA: ICD-10-CM

## 2024-06-01 DIAGNOSIS — I10 HYPERTENSION, UNSPECIFIED TYPE: ICD-10-CM

## 2024-06-01 DIAGNOSIS — R53.1 WEAKNESS: ICD-10-CM

## 2024-06-01 DIAGNOSIS — Z91.81 AT RISK FOR FALLING: ICD-10-CM

## 2024-06-01 DIAGNOSIS — S42.302D CLOSED FRACTURE OF SHAFT OF LEFT HUMERUS WITH ROUTINE HEALING, UNSPECIFIED FRACTURE MORPHOLOGY, SUBSEQUENT ENCOUNTER: ICD-10-CM

## 2024-06-01 DIAGNOSIS — E78.5 HYPERLIPIDEMIA, UNSPECIFIED HYPERLIPIDEMIA TYPE: ICD-10-CM

## 2024-06-01 DIAGNOSIS — J44.9 CHRONIC OBSTRUCTIVE PULMONARY DISEASE, UNSPECIFIED COPD TYPE (MULTI): Primary | ICD-10-CM

## 2024-06-01 DIAGNOSIS — K21.9 GASTROESOPHAGEAL REFLUX DISEASE WITHOUT ESOPHAGITIS: ICD-10-CM

## 2024-06-01 PROCEDURE — 99305 1ST NF CARE MODERATE MDM 35: CPT | Performed by: INTERNAL MEDICINE

## 2024-06-01 NOTE — LETTER
Patient: Kalie Ramos  : 1929    Encounter Date: 2024    PLACE OF SERVICE:  [facility]    This is new/initial history and physical.    Subjective  Patient ID: Kalie Ramos is a 89 y.o. female who presents for new/initial history and Annual Exam.    Ms. Kalie Ramos is a 89-year-old female with recent history of left humerus fracture.  She has developed arm edema and hematoma.  She is unable to care for herself and requires supportive care.    Review of Systems   Constitutional:  Negative for chills and fever.   Cardiovascular:  Negative for chest pain.   All other systems reviewed and are negative.    Objective  /80   Pulse 78   Temp 36.7 °C (98 °F)   Resp 18     Physical Exam  Vitals reviewed.   Constitutional:       Comments: This is a well-developed, well-nourished female, lying in bed, appearing weak   HENT:      Right Ear: Tympanic membrane, ear canal and external ear normal.      Left Ear: Tympanic membrane, ear canal and external ear normal.   Eyes:      General: No scleral icterus.     Pupils: Pupils are equal, round, and reactive to light.   Neck:      Vascular: No carotid bruit.   Cardiovascular:      Heart sounds: Normal heart sounds, S1 normal and S2 normal. No murmur heard.     No friction rub.   Pulmonary:      Breath sounds: Decreased breath sounds (throughout) present.   Abdominal:      Palpations: There is no hepatomegaly, splenomegaly or mass.   Musculoskeletal:         General: No swelling or deformity. Normal range of motion.      Cervical back: Neck supple.      Right lower leg: No edema.      Left lower leg: No edema.   Lymphadenopathy:      Cervical: No cervical adenopathy.      Upper Body:      Right upper body: No axillary adenopathy.      Left upper body: No axillary adenopathy.      Lower Body: No right inguinal adenopathy. No left inguinal adenopathy.   Neurological:      Mental Status: She is oriented to person, place, and time. She  is lethargic.      Cranial Nerves: Cranial nerves 2-12 are intact. No cranial nerve deficit.      Sensory: No sensory deficit.      Motor: Motor function is intact. No weakness.      Gait: Gait is intact.      Deep Tendon Reflexes: Reflexes normal.      Comments: The patient is wearing a splint to her left upper extremity.   Psychiatric:         Mood and Affect: Mood normal. Mood is not anxious or depressed. Affect is not angry.         Behavior: Behavior is not agitated.         Thought Content: Thought content normal.         Judgment: Judgment normal.     LAB WORK:  Laboratory studies were reviewed.    Assessment/Plan  Problem List Items Addressed This Visit             ICD-10-CM       Cardiac and Vasculature    Hyperlipidemia E78.5    Hypertension I10       Gastrointestinal and Abdominal    Gastroesophageal reflux disease K21.9       Pulmonary and Pneumonias    Chronic obstructive pulmonary disease (Multi) - Primary J44.9       Symptoms and Signs    Weakness R53.1    Arm edema R60.0     Other Visit Diagnoses         Codes    At risk for falling     Z91.81    Closed fracture of shaft of left humerus with routine healing, unspecified fracture morphology, subsequent encounter     S42.302D        1. Left humeral fracture, wearing sling, on pain control.  2. COPD, on bronchodilator therapy.  3. Hyperlipidemia, on statin.  4. Hypertension, medically controlled.  5. Reflux disease, on PPI.  6. Arm edema.  Continue to elevate.  7. Weakness, on PT/OT.  8. Fall risk, on fall precautions.    Scribe Attestation  By signing my name below, IAngela Scribe attest that this documentation has been prepared under the direction and in the presence of Avel Bailey MD.     All medical record entries made by the scribe were personally dictated by me I have reviewed the chart and agree the record accurately reflects my personal performance of his history physical examination and management      Electronically Signed By:  Avel Bailey MD   6/14/24 11:51 PM

## 2024-06-08 ENCOUNTER — NURSING HOME VISIT (OUTPATIENT)
Dept: POST ACUTE CARE | Facility: EXTERNAL LOCATION | Age: 89
End: 2024-06-08
Payer: MEDICARE

## 2024-06-08 DIAGNOSIS — J44.9 CHRONIC OBSTRUCTIVE PULMONARY DISEASE, UNSPECIFIED COPD TYPE (MULTI): ICD-10-CM

## 2024-06-08 DIAGNOSIS — E78.5 HYPERLIPIDEMIA, UNSPECIFIED HYPERLIPIDEMIA TYPE: ICD-10-CM

## 2024-06-08 DIAGNOSIS — Z91.81 AT RISK FOR FALLING: ICD-10-CM

## 2024-06-08 DIAGNOSIS — K21.9 GASTROESOPHAGEAL REFLUX DISEASE WITHOUT ESOPHAGITIS: ICD-10-CM

## 2024-06-08 DIAGNOSIS — R53.1 WEAKNESS: ICD-10-CM

## 2024-06-08 DIAGNOSIS — R60.0 ARM EDEMA: ICD-10-CM

## 2024-06-08 DIAGNOSIS — I10 HYPERTENSION, UNSPECIFIED TYPE: ICD-10-CM

## 2024-06-08 DIAGNOSIS — S42.302D CLOSED FRACTURE OF SHAFT OF LEFT HUMERUS WITH ROUTINE HEALING, UNSPECIFIED FRACTURE MORPHOLOGY, SUBSEQUENT ENCOUNTER: Primary | ICD-10-CM

## 2024-06-08 PROCEDURE — 99308 SBSQ NF CARE LOW MDM 20: CPT | Performed by: INTERNAL MEDICINE

## 2024-06-08 NOTE — LETTER
Patient: Kalie Ramos  : 1929    Encounter Date: 2024    PLACE OF SERVICE:  [facility]    This is a subsequent visit.    Subjective  Patient ID: Kalie Ramos is a 89 y.o. female who presents for Follow-up.    Ms. Kalie Ramos is a 89-year-old female with history of left humerus fracture.  She does suffer from COPD.  She is unable to care for herself and requires supportive care.    Review of Systems   Constitutional:  Negative for chills and fever.   Cardiovascular:  Negative for chest pain.   All other systems reviewed and are negative.    Objective  /78   Pulse 76   Temp 36.7 °C (98.1 °F)   Resp 18     Physical Exam  Vitals reviewed.   Constitutional:       General: She is not in acute distress.     Comments: This is a well-developed, well-nourished female, sitting in a chair   HENT:      Right Ear: Tympanic membrane, ear canal and external ear normal.      Left Ear: Tympanic membrane, ear canal and external ear normal.   Eyes:      General: No scleral icterus.     Pupils: Pupils are equal, round, and reactive to light.   Neck:      Vascular: No carotid bruit.   Cardiovascular:      Heart sounds: Normal heart sounds, S1 normal and S2 normal. No murmur heard.     No friction rub.   Pulmonary:      Breath sounds: Decreased breath sounds (throughout) present.   Abdominal:      Palpations: There is no hepatomegaly, splenomegaly or mass.   Musculoskeletal:         General: No swelling or deformity. Normal range of motion.      Cervical back: Neck supple.      Right lower leg: No edema.      Left lower leg: No edema.      Comments: Left upper extremity, the patient is wearing a sling.  She does have edema to the arm.   Lymphadenopathy:      Cervical: No cervical adenopathy.      Upper Body:      Right upper body: No axillary adenopathy.      Left upper body: No axillary adenopathy.      Lower Body: No right inguinal adenopathy. No left inguinal adenopathy.    Neurological:      Mental Status: She is oriented to person, place, and time.      Cranial Nerves: Cranial nerves 2-12 are intact. No cranial nerve deficit.      Sensory: No sensory deficit.      Motor: Motor function is intact. No weakness.      Gait: Gait is intact.      Deep Tendon Reflexes: Reflexes normal.   Psychiatric:         Mood and Affect: Mood normal. Mood is not anxious or depressed. Affect is not angry.         Behavior: Behavior is not agitated.         Thought Content: Thought content normal.         Judgment: Judgment normal.     LAB WORK:  Laboratory studies were reviewed.    Assessment/Plan  Problem List Items Addressed This Visit             ICD-10-CM       Cardiac and Vasculature    Hyperlipidemia E78.5    Hypertension I10       Gastrointestinal and Abdominal    Gastroesophageal reflux disease K21.9       Pulmonary and Pneumonias    Chronic obstructive pulmonary disease (Multi) J44.9       Symptoms and Signs    Weakness R53.1    Arm edema R60.0     Other Visit Diagnoses         Codes    Closed fracture of shaft of left humerus with routine healing, unspecified fracture morphology, subsequent encounter    -  Primary S42.302D    At risk for falling     Z91.81        1. Left humerus fracture, on pain control, wearing a sling.  2. Arm edema.  Continue to elevate.  3. COPD, on bronchodilator therapy.  4. Hypertension, medically controlled.  5. Hyperlipidemia, on statin.  6. Reflux disease, on PPI.  7. Weakness, on PT/OT.  8. Fall risk, on fall precautions.    Scribe Attestation  By signing my name below, IAngela Scribe attest that this documentation has been prepared under the direction and in the presence of Avel Bailey MD.     All medical record entries made by the scribe were personally dictated by me I have reviewed the chart and agree the record accurately reflects my personal performance of his history physical examination and management      Electronically Signed By: Avel  MD Leo   6/14/24 11:52 PM

## 2024-06-10 VITALS
SYSTOLIC BLOOD PRESSURE: 124 MMHG | HEART RATE: 76 BPM | TEMPERATURE: 98.1 F | RESPIRATION RATE: 18 BRPM | DIASTOLIC BLOOD PRESSURE: 78 MMHG

## 2024-06-10 VITALS
TEMPERATURE: 98 F | HEART RATE: 78 BPM | DIASTOLIC BLOOD PRESSURE: 80 MMHG | RESPIRATION RATE: 18 BRPM | SYSTOLIC BLOOD PRESSURE: 126 MMHG

## 2024-06-10 ASSESSMENT — ENCOUNTER SYMPTOMS
CHILLS: 0
CHILLS: 0
FEVER: 0
FEVER: 0

## 2024-06-10 NOTE — PROGRESS NOTES
PLACE OF SERVICE:  [facility]    This is a subsequent visit.    Subjective   Patient ID: Kalie Ramos is a 89 y.o. female who presents for Follow-up.    Ms. Kalie Ramos is a 89-year-old female with history of left humerus fracture.  She does suffer from COPD.  She is unable to care for herself and requires supportive care.    Review of Systems   Constitutional:  Negative for chills and fever.   Cardiovascular:  Negative for chest pain.   All other systems reviewed and are negative.    Objective   /78   Pulse 76   Temp 36.7 °C (98.1 °F)   Resp 18     Physical Exam  Vitals reviewed.   Constitutional:       General: She is not in acute distress.     Comments: This is a well-developed, well-nourished female, sitting in a chair   HENT:      Right Ear: Tympanic membrane, ear canal and external ear normal.      Left Ear: Tympanic membrane, ear canal and external ear normal.   Eyes:      General: No scleral icterus.     Pupils: Pupils are equal, round, and reactive to light.   Neck:      Vascular: No carotid bruit.   Cardiovascular:      Heart sounds: Normal heart sounds, S1 normal and S2 normal. No murmur heard.     No friction rub.   Pulmonary:      Breath sounds: Decreased breath sounds (throughout) present.   Abdominal:      Palpations: There is no hepatomegaly, splenomegaly or mass.   Musculoskeletal:         General: No swelling or deformity. Normal range of motion.      Cervical back: Neck supple.      Right lower leg: No edema.      Left lower leg: No edema.      Comments: Left upper extremity, the patient is wearing a sling.  She does have edema to the arm.   Lymphadenopathy:      Cervical: No cervical adenopathy.      Upper Body:      Right upper body: No axillary adenopathy.      Left upper body: No axillary adenopathy.      Lower Body: No right inguinal adenopathy. No left inguinal adenopathy.   Neurological:      Mental Status: She is oriented to person, place, and time.       Cranial Nerves: Cranial nerves 2-12 are intact. No cranial nerve deficit.      Sensory: No sensory deficit.      Motor: Motor function is intact. No weakness.      Gait: Gait is intact.      Deep Tendon Reflexes: Reflexes normal.   Psychiatric:         Mood and Affect: Mood normal. Mood is not anxious or depressed. Affect is not angry.         Behavior: Behavior is not agitated.         Thought Content: Thought content normal.         Judgment: Judgment normal.     LAB WORK:  Laboratory studies were reviewed.    Assessment/Plan   Problem List Items Addressed This Visit             ICD-10-CM       Cardiac and Vasculature    Hyperlipidemia E78.5    Hypertension I10       Gastrointestinal and Abdominal    Gastroesophageal reflux disease K21.9       Pulmonary and Pneumonias    Chronic obstructive pulmonary disease (Multi) J44.9       Symptoms and Signs    Weakness R53.1    Arm edema R60.0     Other Visit Diagnoses         Codes    Closed fracture of shaft of left humerus with routine healing, unspecified fracture morphology, subsequent encounter    -  Primary S42.302D    At risk for falling     Z91.81        1. Left humerus fracture, on pain control, wearing a sling.  2. Arm edema.  Continue to elevate.  3. COPD, on bronchodilator therapy.  4. Hypertension, medically controlled.  5. Hyperlipidemia, on statin.  6. Reflux disease, on PPI.  7. Weakness, on PT/OT.  8. Fall risk, on fall precautions.    Scribe Attestation  By signing my name below, IAngela Scribe attest that this documentation has been prepared under the direction and in the presence of Avel Bailey MD.     All medical record entries made by the scribe were personally dictated by me I have reviewed the chart and agree the record accurately reflects my personal performance of his history physical examination and management

## 2024-06-10 NOTE — PROGRESS NOTES
PLACE OF SERVICE:  [facility]    This is new/initial history and physical.    Subjective   Patient ID: Kalie Ramos is a 89 y.o. female who presents for new/initial history and Annual Exam.    Ms. Kalie Ramos is a 89-year-old female with recent history of left humerus fracture.  She has developed arm edema and hematoma.  She is unable to care for herself and requires supportive care.    Review of Systems   Constitutional:  Negative for chills and fever.   Cardiovascular:  Negative for chest pain.   All other systems reviewed and are negative.    Objective   /80   Pulse 78   Temp 36.7 °C (98 °F)   Resp 18     Physical Exam  Vitals reviewed.   Constitutional:       Comments: This is a well-developed, well-nourished female, lying in bed, appearing weak   HENT:      Right Ear: Tympanic membrane, ear canal and external ear normal.      Left Ear: Tympanic membrane, ear canal and external ear normal.   Eyes:      General: No scleral icterus.     Pupils: Pupils are equal, round, and reactive to light.   Neck:      Vascular: No carotid bruit.   Cardiovascular:      Heart sounds: Normal heart sounds, S1 normal and S2 normal. No murmur heard.     No friction rub.   Pulmonary:      Breath sounds: Decreased breath sounds (throughout) present.   Abdominal:      Palpations: There is no hepatomegaly, splenomegaly or mass.   Musculoskeletal:         General: No swelling or deformity. Normal range of motion.      Cervical back: Neck supple.      Right lower leg: No edema.      Left lower leg: No edema.   Lymphadenopathy:      Cervical: No cervical adenopathy.      Upper Body:      Right upper body: No axillary adenopathy.      Left upper body: No axillary adenopathy.      Lower Body: No right inguinal adenopathy. No left inguinal adenopathy.   Neurological:      Mental Status: She is oriented to person, place, and time. She is lethargic.      Cranial Nerves: Cranial nerves 2-12 are intact. No  cranial nerve deficit.      Sensory: No sensory deficit.      Motor: Motor function is intact. No weakness.      Gait: Gait is intact.      Deep Tendon Reflexes: Reflexes normal.      Comments: The patient is wearing a splint to her left upper extremity.   Psychiatric:         Mood and Affect: Mood normal. Mood is not anxious or depressed. Affect is not angry.         Behavior: Behavior is not agitated.         Thought Content: Thought content normal.         Judgment: Judgment normal.     LAB WORK:  Laboratory studies were reviewed.    Assessment/Plan   Problem List Items Addressed This Visit             ICD-10-CM       Cardiac and Vasculature    Hyperlipidemia E78.5    Hypertension I10       Gastrointestinal and Abdominal    Gastroesophageal reflux disease K21.9       Pulmonary and Pneumonias    Chronic obstructive pulmonary disease (Multi) - Primary J44.9       Symptoms and Signs    Weakness R53.1    Arm edema R60.0     Other Visit Diagnoses         Codes    At risk for falling     Z91.81    Closed fracture of shaft of left humerus with routine healing, unspecified fracture morphology, subsequent encounter     S42.302D        1. Left humeral fracture, wearing sling, on pain control.  2. COPD, on bronchodilator therapy.  3. Hyperlipidemia, on statin.  4. Hypertension, medically controlled.  5. Reflux disease, on PPI.  6. Arm edema.  Continue to elevate.  7. Weakness, on PT/OT.  8. Fall risk, on fall precautions.    Scribe Attestation  By signing my name below, I, Jamie Lieberman attest that this documentation has been prepared under the direction and in the presence of Avel Bailey MD.     All medical record entries made by the scribe were personally dictated by me I have reviewed the chart and agree the record accurately reflects my personal performance of his history physical examination and management

## 2024-06-15 ENCOUNTER — NURSING HOME VISIT (OUTPATIENT)
Dept: POST ACUTE CARE | Facility: EXTERNAL LOCATION | Age: 89
End: 2024-06-15
Payer: MEDICARE

## 2024-06-15 DIAGNOSIS — E78.5 HYPERLIPIDEMIA, UNSPECIFIED HYPERLIPIDEMIA TYPE: ICD-10-CM

## 2024-06-15 DIAGNOSIS — R53.1 WEAKNESS: ICD-10-CM

## 2024-06-15 DIAGNOSIS — J44.9 CHRONIC OBSTRUCTIVE PULMONARY DISEASE, UNSPECIFIED COPD TYPE (MULTI): Primary | ICD-10-CM

## 2024-06-15 DIAGNOSIS — Z85.3 HISTORY OF BREAST CANCER: ICD-10-CM

## 2024-06-15 DIAGNOSIS — R60.0 ARM EDEMA: ICD-10-CM

## 2024-06-15 DIAGNOSIS — S42.302D CLOSED FRACTURE OF SHAFT OF LEFT HUMERUS WITH ROUTINE HEALING, UNSPECIFIED FRACTURE MORPHOLOGY, SUBSEQUENT ENCOUNTER: ICD-10-CM

## 2024-06-15 DIAGNOSIS — Z91.81 AT RISK FOR FALLING: ICD-10-CM

## 2024-06-15 DIAGNOSIS — I10 HYPERTENSION, UNSPECIFIED TYPE: ICD-10-CM

## 2024-06-15 PROCEDURE — 99309 SBSQ NF CARE MODERATE MDM 30: CPT | Performed by: INTERNAL MEDICINE

## 2024-06-15 NOTE — LETTER
Patient: Kalie Ramos  : 1929    Encounter Date: 06/15/2024    PLACE OF SERVICE: [facility]    This is a subsequent visit.    Subjective  Patient ID: Kalie Ramos is a 89 y.o. female who presents for Follow-up.    Ms. Kalie Ramos is a 89-year-old female with history of recent fall and fracture to her left humerus.  She suffers from COPD.  She is unable to care for herself.  She requires supportive care.    Review of Systems   Constitutional:  Negative for chills and fever.   Cardiovascular:  Negative for chest pain.   All other systems reviewed and are negative.    Objective  /80   Pulse 76   Temp 36.6 °C (97.8 °F)   Resp 18     Physical Exam  Vitals reviewed.   Constitutional:       Comments:  This is a well-developed, well-nourished female, lying in bed, appearing weak   HENT:      Right Ear: Tympanic membrane, ear canal and external ear normal.      Left Ear: Tympanic membrane, ear canal and external ear normal.   Eyes:      General: No scleral icterus.     Pupils: Pupils are equal, round, and reactive to light.   Neck:      Vascular: No carotid bruit.   Cardiovascular:      Heart sounds: Normal heart sounds, S1 normal and S2 normal. No murmur heard.     No friction rub.   Pulmonary:      Breath sounds: Decreased breath sounds (throughout) present.   Abdominal:      Palpations: There is no hepatomegaly, splenomegaly or mass.   Musculoskeletal:         General: No swelling or deformity. Normal range of motion.      Cervical back: Neck supple.      Right lower leg: No edema.      Left lower leg: No edema.   Lymphadenopathy:      Cervical: No cervical adenopathy.      Upper Body:      Right upper body: No axillary adenopathy.      Left upper body: No axillary adenopathy.      Lower Body: No right inguinal adenopathy. No left inguinal adenopathy.   Neurological:      Mental Status: She is oriented to person, place, and time. She is lethargic.      Cranial Nerves:  Cranial nerves 2-12 are intact. No cranial nerve deficit.      Sensory: No sensory deficit.      Motor: Motor function is intact. No weakness.      Gait: Gait is intact.      Deep Tendon Reflexes: Reflexes normal.      Comments: The patient is wearing a sling to left upper extremity.   Psychiatric:         Mood and Affect: Mood normal. Mood is not anxious or depressed. Affect is not angry.         Behavior: Behavior is not agitated.         Thought Content: Thought content normal.         Judgment: Judgment normal.     LAB WORK:  Laboratory studies were reviewed.    Assessment/Plan  Problem List Items Addressed This Visit             ICD-10-CM       Cardiac and Vasculature    Hyperlipidemia E78.5    Hypertension I10       Pulmonary and Pneumonias    Chronic obstructive pulmonary disease (Multi) - Primary J44.9       Symptoms and Signs    Weakness R53.1    Arm edema R60.0     Other Visit Diagnoses         Codes    Closed fracture of shaft of left humerus with routine healing, unspecified fracture morphology, subsequent encounter     S42.302D    At risk for falling     Z91.81    History of breast cancer     Z85.3        1. COPD, on bronchodilator therapy.  2. Left humerus fracture, on pain control, wearing a sling.  3. Hypertension, medically controlled.  4. Hyperlipidemia, on statin.  5. Left arm edema.  Continue to elevate.  6. Weakness, on PT/OT.  7. Fall risk, on fall precautions.  8. History of breast cancer.  Continue to monitor.    Scribe Attestation  By signing my name below, IAngela Scribe attest that this documentation has been prepared under the direction and in the presence of Avel Bailey MD.    All medical record entries made by the scribe were personally dictated by me I have reviewed the chart and agree the record accurately reflects my personal performance of his history physical examination and management         Electronically Signed By: Avel Bailey MD   6/19/24 10:07 PM

## 2024-06-17 VITALS
TEMPERATURE: 97.8 F | RESPIRATION RATE: 18 BRPM | DIASTOLIC BLOOD PRESSURE: 80 MMHG | SYSTOLIC BLOOD PRESSURE: 126 MMHG | HEART RATE: 76 BPM

## 2024-06-17 ASSESSMENT — ENCOUNTER SYMPTOMS
CHILLS: 0
FEVER: 0

## 2024-06-17 NOTE — PROGRESS NOTES
PLACE OF SERVICE: [facility]    This is a subsequent visit.    Subjective   Patient ID: Kalie Ramos is a 89 y.o. female who presents for Follow-up.    Ms. Kalie Ramos is a 89-year-old female with history of recent fall and fracture to her left humerus.  She suffers from COPD.  She is unable to care for herself.  She requires supportive care.    Review of Systems   Constitutional:  Negative for chills and fever.   Cardiovascular:  Negative for chest pain.   All other systems reviewed and are negative.    Objective   /80   Pulse 76   Temp 36.6 °C (97.8 °F)   Resp 18     Physical Exam  Vitals reviewed.   Constitutional:       Comments:  This is a well-developed, well-nourished female, lying in bed, appearing weak   HENT:      Right Ear: Tympanic membrane, ear canal and external ear normal.      Left Ear: Tympanic membrane, ear canal and external ear normal.   Eyes:      General: No scleral icterus.     Pupils: Pupils are equal, round, and reactive to light.   Neck:      Vascular: No carotid bruit.   Cardiovascular:      Heart sounds: Normal heart sounds, S1 normal and S2 normal. No murmur heard.     No friction rub.   Pulmonary:      Breath sounds: Decreased breath sounds (throughout) present.   Abdominal:      Palpations: There is no hepatomegaly, splenomegaly or mass.   Musculoskeletal:         General: No swelling or deformity. Normal range of motion.      Cervical back: Neck supple.      Right lower leg: No edema.      Left lower leg: No edema.   Lymphadenopathy:      Cervical: No cervical adenopathy.      Upper Body:      Right upper body: No axillary adenopathy.      Left upper body: No axillary adenopathy.      Lower Body: No right inguinal adenopathy. No left inguinal adenopathy.   Neurological:      Mental Status: She is oriented to person, place, and time. She is lethargic.      Cranial Nerves: Cranial nerves 2-12 are intact. No cranial nerve deficit.      Sensory: No  sensory deficit.      Motor: Motor function is intact. No weakness.      Gait: Gait is intact.      Deep Tendon Reflexes: Reflexes normal.      Comments: The patient is wearing a sling to left upper extremity.   Psychiatric:         Mood and Affect: Mood normal. Mood is not anxious or depressed. Affect is not angry.         Behavior: Behavior is not agitated.         Thought Content: Thought content normal.         Judgment: Judgment normal.     LAB WORK:  Laboratory studies were reviewed.    Assessment/Plan   Problem List Items Addressed This Visit             ICD-10-CM       Cardiac and Vasculature    Hyperlipidemia E78.5    Hypertension I10       Pulmonary and Pneumonias    Chronic obstructive pulmonary disease (Multi) - Primary J44.9       Symptoms and Signs    Weakness R53.1    Arm edema R60.0     Other Visit Diagnoses         Codes    Closed fracture of shaft of left humerus with routine healing, unspecified fracture morphology, subsequent encounter     S42.302D    At risk for falling     Z91.81    History of breast cancer     Z85.3        1. COPD, on bronchodilator therapy.  2. Left humerus fracture, on pain control, wearing a sling.  3. Hypertension, medically controlled.  4. Hyperlipidemia, on statin.  5. Left arm edema.  Continue to elevate.  6. Weakness, on PT/OT.  7. Fall risk, on fall precautions.  8. History of breast cancer.  Continue to monitor.    Scribe Attestation  By signing my name below, IAngela Scribe attest that this documentation has been prepared under the direction and in the presence of Avel Bailey MD.    All medical record entries made by the scribe were personally dictated by me I have reviewed the chart and agree the record accurately reflects my personal performance of his history physical examination and management

## 2024-06-22 ENCOUNTER — NURSING HOME VISIT (OUTPATIENT)
Dept: POST ACUTE CARE | Facility: EXTERNAL LOCATION | Age: 89
End: 2024-06-22
Payer: MEDICARE

## 2024-06-22 DIAGNOSIS — R60.0 ARM EDEMA: ICD-10-CM

## 2024-06-22 DIAGNOSIS — I10 HYPERTENSION, UNSPECIFIED TYPE: ICD-10-CM

## 2024-06-22 DIAGNOSIS — Z91.81 AT RISK FOR FALLING: ICD-10-CM

## 2024-06-22 DIAGNOSIS — J44.9 CHRONIC OBSTRUCTIVE PULMONARY DISEASE, UNSPECIFIED COPD TYPE (MULTI): Primary | ICD-10-CM

## 2024-06-22 DIAGNOSIS — Z85.3 HISTORY OF BREAST CANCER: ICD-10-CM

## 2024-06-22 DIAGNOSIS — R53.1 WEAKNESS: ICD-10-CM

## 2024-06-22 DIAGNOSIS — S42.302D CLOSED FRACTURE OF SHAFT OF LEFT HUMERUS WITH ROUTINE HEALING, UNSPECIFIED FRACTURE MORPHOLOGY, SUBSEQUENT ENCOUNTER: ICD-10-CM

## 2024-06-22 DIAGNOSIS — E78.5 HYPERLIPIDEMIA, UNSPECIFIED HYPERLIPIDEMIA TYPE: ICD-10-CM

## 2024-06-22 PROCEDURE — 99309 SBSQ NF CARE MODERATE MDM 30: CPT | Performed by: INTERNAL MEDICINE

## 2024-06-22 NOTE — LETTER
Patient: Kalie Ramos  : 1929    Encounter Date: 2024    PLACE OF SERVICE:  [facility]    This is a subsequent visit.    Subjective  Patient ID: Kalie Ramos is a 89 y.o. female who presents for Follow-up.    Ms. Kalie Ramos is a 89-year-old female with history of fracture to her left humerus after a fall.  She suffers from COPD and is unable to care for herself.  She requires supportive care.    Review of Systems   Constitutional:  Negative for chills and fever.   Cardiovascular:  Negative for chest pain.   All other systems reviewed and are negative.    Objective  /76   Pulse 74   Temp 36.6 °C (97.9 °F)   Resp 18     Physical Exam  Vitals reviewed.   Constitutional:       General: She is not in acute distress.     Comments: This is a well-developed, well-nourished female, sitting in a chair   HENT:      Right Ear: Tympanic membrane, ear canal and external ear normal.      Left Ear: Tympanic membrane, ear canal and external ear normal.   Eyes:      General: No scleral icterus.     Pupils: Pupils are equal, round, and reactive to light.   Neck:      Vascular: No carotid bruit.   Cardiovascular:      Heart sounds: Normal heart sounds, S1 normal and S2 normal. No murmur heard.     No friction rub.   Pulmonary:      Breath sounds: Decreased breath sounds (throughout) present.   Abdominal:      Palpations: There is no hepatomegaly, splenomegaly or mass.   Musculoskeletal:         General: No swelling or deformity. Normal range of motion.      Cervical back: Neck supple.      Right lower leg: No edema.      Left lower leg: No edema.      Comments: The patient is wearing a sling to her left upper extremity.   Lymphadenopathy:      Cervical: No cervical adenopathy.      Upper Body:      Right upper body: No axillary adenopathy.      Left upper body: No axillary adenopathy.      Lower Body: No right inguinal adenopathy. No left inguinal adenopathy.   Neurological:       Mental Status: She is oriented to person, place, and time.      Cranial Nerves: Cranial nerves 2-12 are intact. No cranial nerve deficit.      Sensory: No sensory deficit.      Motor: Motor function is intact. No weakness.      Gait: Gait is intact.      Deep Tendon Reflexes: Reflexes normal.   Psychiatric:         Mood and Affect: Mood normal. Mood is not anxious or depressed. Affect is not angry.         Behavior: Behavior is not agitated.         Thought Content: Thought content normal.         Judgment: Judgment normal.     LAB WORK:  Laboratory studies were reviewed.    Assessment/Plan  Problem List Items Addressed This Visit             ICD-10-CM       Cardiac and Vasculature    Hyperlipidemia E78.5    Hypertension I10       Pulmonary and Pneumonias    Chronic obstructive pulmonary disease (Multi) - Primary J44.9       Symptoms and Signs    Weakness R53.1    Arm edema R60.0     Other Visit Diagnoses         Codes    Closed fracture of shaft of left humerus with routine healing, unspecified fracture morphology, subsequent encounter     S42.302D    At risk for falling     Z91.81    History of breast cancer     Z85.3        1. COPD, on bronchodilator therapy.  2. Left arm edema.  Continue to elevate.  3. Left humerus fracture, on pain control, wearing a sling.  4. Hypertension, medically controlled.  5. Hyperlipidemia, on statin.  6. Weakness, on PT/OT.  7. Fall risk, on fall precautions.  8. History of breast cancer.  Continue to monitor.    Scribe Attestation  By signing my name below, IAngela Scribe attest that this documentation has been prepared under the direction and in the presence of Avel Bailey MD.     All medical record entries made by the scribe were personally dictated by me I have reviewed the chart and agree the record accurately reflects my personal performance of his history physical examination and management      Electronically Signed By: Avel Bailey MD   6/25/24 10:48 PM

## 2024-06-24 ENCOUNTER — OFFICE VISIT (OUTPATIENT)
Dept: PRIMARY CARE | Facility: CLINIC | Age: 89
End: 2024-06-24
Payer: MEDICARE

## 2024-06-24 VITALS
SYSTOLIC BLOOD PRESSURE: 126 MMHG | RESPIRATION RATE: 18 BRPM | DIASTOLIC BLOOD PRESSURE: 76 MMHG | HEART RATE: 74 BPM | TEMPERATURE: 97.9 F

## 2024-06-24 VITALS — DIASTOLIC BLOOD PRESSURE: 60 MMHG | SYSTOLIC BLOOD PRESSURE: 102 MMHG | BODY MASS INDEX: 22.46 KG/M2 | HEIGHT: 60 IN

## 2024-06-24 DIAGNOSIS — R09.02 HYPOXIA: ICD-10-CM

## 2024-06-24 DIAGNOSIS — F41.9 ANXIETY: ICD-10-CM

## 2024-06-24 DIAGNOSIS — L03.90 CELLULITIS, UNSPECIFIED CELLULITIS SITE: ICD-10-CM

## 2024-06-24 DIAGNOSIS — R60.9 EDEMA, UNSPECIFIED TYPE: Primary | ICD-10-CM

## 2024-06-24 PROCEDURE — 1157F ADVNC CARE PLAN IN RCRD: CPT | Performed by: INTERNAL MEDICINE

## 2024-06-24 PROCEDURE — 3074F SYST BP LT 130 MM HG: CPT | Performed by: INTERNAL MEDICINE

## 2024-06-24 PROCEDURE — 1159F MED LIST DOCD IN RCRD: CPT | Performed by: INTERNAL MEDICINE

## 2024-06-24 PROCEDURE — 99214 OFFICE O/P EST MOD 30 MIN: CPT | Performed by: INTERNAL MEDICINE

## 2024-06-24 PROCEDURE — 3078F DIAST BP <80 MM HG: CPT | Performed by: INTERNAL MEDICINE

## 2024-06-24 RX ORDER — FEEDING PUMP
EACH MISCELLANEOUS
COMMUNITY
Start: 2024-06-07

## 2024-06-24 RX ORDER — AMOXICILLIN AND CLAVULANATE POTASSIUM 875; 125 MG/1; MG/1
875 TABLET, FILM COATED ORAL 2 TIMES DAILY
Qty: 20 TABLET | Refills: 0 | Status: SHIPPED | OUTPATIENT
Start: 2024-06-24 | End: 2024-07-04

## 2024-06-24 RX ORDER — DOXYCYCLINE 100 MG/1
100 CAPSULE ORAL 2 TIMES DAILY
Qty: 20 CAPSULE | Refills: 0 | Status: CANCELLED | OUTPATIENT
Start: 2024-06-24 | End: 2024-07-04

## 2024-06-24 RX ORDER — HYDROXYZINE PAMOATE 25 MG/1
25 CAPSULE ORAL 3 TIMES DAILY PRN
Qty: 90 CAPSULE | Refills: 3 | Status: SHIPPED | OUTPATIENT
Start: 2024-06-24

## 2024-06-24 ASSESSMENT — ENCOUNTER SYMPTOMS
OCCASIONAL FEELINGS OF UNSTEADINESS: 0
FEVER: 0
LOSS OF SENSATION IN FEET: 0
DEPRESSION: 0
CHILLS: 0

## 2024-06-24 NOTE — PROGRESS NOTES
PLACE OF SERVICE:  [facility]    This is a subsequent visit.    Subjective   Patient ID: Kalie Ramos is a 89 y.o. female who presents for Follow-up.    Ms. Kalie Ramos is a 89-year-old female with history of fracture to her left humerus after a fall.  She suffers from COPD and is unable to care for herself.  She requires supportive care.    Review of Systems   Constitutional:  Negative for chills and fever.   Cardiovascular:  Negative for chest pain.   All other systems reviewed and are negative.    Objective   /76   Pulse 74   Temp 36.6 °C (97.9 °F)   Resp 18     Physical Exam  Vitals reviewed.   Constitutional:       General: She is not in acute distress.     Comments: This is a well-developed, well-nourished female, sitting in a chair   HENT:      Right Ear: Tympanic membrane, ear canal and external ear normal.      Left Ear: Tympanic membrane, ear canal and external ear normal.   Eyes:      General: No scleral icterus.     Pupils: Pupils are equal, round, and reactive to light.   Neck:      Vascular: No carotid bruit.   Cardiovascular:      Heart sounds: Normal heart sounds, S1 normal and S2 normal. No murmur heard.     No friction rub.   Pulmonary:      Breath sounds: Decreased breath sounds (throughout) present.   Abdominal:      Palpations: There is no hepatomegaly, splenomegaly or mass.   Musculoskeletal:         General: No swelling or deformity. Normal range of motion.      Cervical back: Neck supple.      Right lower leg: No edema.      Left lower leg: No edema.      Comments: The patient is wearing a sling to her left upper extremity.   Lymphadenopathy:      Cervical: No cervical adenopathy.      Upper Body:      Right upper body: No axillary adenopathy.      Left upper body: No axillary adenopathy.      Lower Body: No right inguinal adenopathy. No left inguinal adenopathy.   Neurological:      Mental Status: She is oriented to person, place, and time.      Cranial  Nerves: Cranial nerves 2-12 are intact. No cranial nerve deficit.      Sensory: No sensory deficit.      Motor: Motor function is intact. No weakness.      Gait: Gait is intact.      Deep Tendon Reflexes: Reflexes normal.   Psychiatric:         Mood and Affect: Mood normal. Mood is not anxious or depressed. Affect is not angry.         Behavior: Behavior is not agitated.         Thought Content: Thought content normal.         Judgment: Judgment normal.     LAB WORK:  Laboratory studies were reviewed.    Assessment/Plan   Problem List Items Addressed This Visit             ICD-10-CM       Cardiac and Vasculature    Hyperlipidemia E78.5    Hypertension I10       Pulmonary and Pneumonias    Chronic obstructive pulmonary disease (Multi) - Primary J44.9       Symptoms and Signs    Weakness R53.1    Arm edema R60.0     Other Visit Diagnoses         Codes    Closed fracture of shaft of left humerus with routine healing, unspecified fracture morphology, subsequent encounter     S42.302D    At risk for falling     Z91.81    History of breast cancer     Z85.3        1. COPD, on bronchodilator therapy.  2. Left arm edema.  Continue to elevate.  3. Left humerus fracture, on pain control, wearing a sling.  4. Hypertension, medically controlled.  5. Hyperlipidemia, on statin.  6. Weakness, on PT/OT.  7. Fall risk, on fall precautions.  8. History of breast cancer.  Continue to monitor.    Scribe Attestation  By signing my name below, I, Jamie Lieberman attest that this documentation has been prepared under the direction and in the presence of Avel Bailey MD.     All medical record entries made by the scribe were personally dictated by me I have reviewed the chart and agree the record accurately reflects my personal performance of his history physical examination and management

## 2024-06-25 NOTE — PROGRESS NOTES
Subjective   Patient ID: Kalie Ramos is a 89 y.o. female who presents for Follow-up.    Ms. Ramos today came here for follow-up.  She was discharged from rehab.  Left arm pain, slight discharge, probably infection.  Family is taking good care of her.  She was in assisted care.  She is eating okay.  She has a question of oxygen and anxiety.  Feeling okay.  No problem.  She is much comfortable here.    I have personally reviewed the patient's Past Medical History, Medications, Allergies, Social History, and Family History in the EMR.    Review of Systems   All other systems reviewed and are negative.    Objective   /60   Ht 1.524 m (5')   BMI 22.46 kg/m²     Physical Exam  Vitals reviewed.   Cardiovascular:      Heart sounds: Normal heart sounds, S1 normal and S2 normal. No murmur heard.     No friction rub.   Pulmonary:      Effort: Pulmonary effort is normal.      Breath sounds: Normal breath sounds and air entry.   Abdominal:      Palpations: There is no hepatomegaly, splenomegaly or mass.   Musculoskeletal:      Right lower leg: No edema.      Left lower leg: No edema.   Lymphadenopathy:      Lower Body: No right inguinal adenopathy. No left inguinal adenopathy.   Skin:     Comments: Left arm minimal cellulitis and edema present.   Neurological:      Cranial Nerves: Cranial nerves 2-12 are intact.      Sensory: No sensory deficit.      Motor: Motor function is intact.      Deep Tendon Reflexes: Reflexes are normal and symmetric.     LAB WORK: Laboratory testing discussed.    Assessment/Plan   Problem List Items Addressed This Visit             ICD-10-CM       Mental Health    Anxiety F41.9    Relevant Medications    hydrOXYzine pamoate (VistariL) 25 mg capsule       Pulmonary and Pneumonias    Hypoxia R09.02    Relevant Medications    amoxicillin-pot clavulanate (Augmentin) 875-125 mg tablet       Symptoms and Signs    Edema - Primary R60.9     Other Visit Diagnoses         Codes    Cellulitis,  unspecified cellulitis site     L03.90        1. Edema.  Monitor.  2. Cellulitis and infection.  Augmentin.  3. Anxiety.  Vistaril given.  4. Hypoxia.  Oxygen as needed.  Monitor.  5. Symptomatic ______.  6. Continue to monitor.  7. Follow-up in four weeks or see her ASAP if needed.  Virtual appointment is always an option.    Scribe Attestation  By signing my name below, IAngela Scribe attest that this documentation has been prepared under the direction and in the presence of Avel Bailey MD.

## 2024-06-28 DIAGNOSIS — F41.9 ANXIETY: ICD-10-CM

## 2024-06-28 RX ORDER — HYDROXYZINE PAMOATE 25 MG/1
25 CAPSULE ORAL 3 TIMES DAILY PRN
Qty: 90 CAPSULE | Refills: 0 | Status: CANCELLED | OUTPATIENT
Start: 2024-06-28

## 2024-07-24 ENCOUNTER — APPOINTMENT (OUTPATIENT)
Dept: PRIMARY CARE | Facility: CLINIC | Age: 89
End: 2024-07-24
Payer: MEDICARE

## 2024-07-29 ENCOUNTER — APPOINTMENT (OUTPATIENT)
Dept: PRIMARY CARE | Facility: CLINIC | Age: 89
End: 2024-07-29
Payer: MEDICARE

## 2025-07-02 NOTE — PROGRESS NOTES
OFFICE NOTE    NAME OF THE PATIENT: Kalie Ramos    YOB: 1929    CHIEF COMPLAINT:  Ms. Ramos today came here for multiple medical issues.  She has a very good description ______ oxygen comes out, she has exertion.  She gets very short of breath.  She states she is using the nebulizer.  She is using ______ day.  Family is taking very good care of her.  She is very tired, fatigued spontaneously.    PAST MEDICAL HISTORY:  Reviewed on EMR, unchanged.    CURRENT MEDICATIONS:  Reviewed on EMR, unchanged.  List reviewed.    ALLERGIES:  Reviewed on EMR, unchanged.    SOCIAL HISTORY:  Reviewed on EMR, unchanged.  She does not smoke and does not drink alcohol.    FAMILY HISTORY:  Reviewed on EMR, unchanged.    REVIEW OF SYSTEMS:  All 12 systems reviewed and pertaining covered in history and physical.    PHYSICAL EXAMINATION  VITAL SIGNS:  As recorded and reviewed from EMR.  RESPIRATORY:  Bilateral wheezing present.  CARDIOVASCULAR:  The patient had S1 normal, split S2 without obvious rubs, clicks, or murmurs.    GASTROINTESTINAL:  There was no hepatosplenomegaly.  There were no palpable masses and no inguinal nodes.  EXTREMITIES:  Legs had edema.  NEUROLOGIC:  The patient had normal cranial nerves.  The reflexes, sensory, and motor examination were grossly within normal limits.    LAB WORK:  Laboratory testing discussed.    ASSESSMENT AND PLAN:  Congestive heart failure.  Monitor.  Lasix twice a day.  Chronic pulmonary disease and hypoxia.  I urged her to do nebulizer.  Anemia.  Monitor.  Anticoagulation.  Edema, on Lasix and potassium.  Hypoxia, on oxygen.  Routine blood work ordered.  Nebulizer four times a day, Lasix twice a day.  Monitor.  Safety of the patient.  The patient has all kind of risks, sudden cardiac arrest.  I recommended probably assisted care, but family takes good care of it.  I will keep an eye.      Kindly review this note in conjunction with EMR.   Subjective   Patient ID:  Kalie Ramos is a 89 y.o. female who presents for Follow-up (Trouble breathing).      HPI    Review of Systems    Objective   /70   Ht 1.524 m (5')   Wt 56.2 kg (124 lb)   BMI 24.22 kg/m²       Physical Exam    Assessment/Plan   Problem List Items Addressed This Visit    None         [FreeTextEntry1] : Questions and concerns addressed. Parent verbalized understanding and agreed with plan above.

## (undated) DEVICE — WOUND SYSTEM, DEBRIDEMENT & CLEANING, O.R DUOPAK

## (undated) DEVICE — DRAPE, ISOLATION, INCISE, W/POUCH, STERI DRAPE, 125 X 83 IN, DISPOSABLE, STERILE

## (undated) DEVICE — STRIP, SKIN CLOSURE, STERI STRIP, REINFORCED, 0.5 X 4 IN

## (undated) DEVICE — SUTURE, VICRYL, 0, 36 IN, CT-1, UNDYED

## (undated) DEVICE — Device

## (undated) DEVICE — DRESSING, MEPILEX BORDER, POST-OP AG, 4 X 12 IN

## (undated) DEVICE — DRAPE, SHEET, LARGE, 70 X 85IN, STERILE

## (undated) DEVICE — GLOVE, SURGICAL, PROTEXIS PI ORTHO, 8.0, PF, LF

## (undated) DEVICE — SOLUTION, IRRIGATION, USP, SODIUM CHLORIDE 0.9%, .9 NACL, 1000 ML, BAG

## (undated) DEVICE — INTERPULSE HANDPIECE SET, W/RETRACTABLE COAXIAL FAN SPRAY TIP & SUCTION TUBE

## (undated) DEVICE — APPLICATOR, CHLORAPREP, W/ORANGE TINT, 26ML

## (undated) DEVICE — RETRIEVER, SUTURE, HEWSON

## (undated) DEVICE — SUTURE, MONOCRYL, 4-0, 27 IN, PS-2, UNDYED

## (undated) DEVICE — SUTURE, FIBERWIRE 2, T-5 TAPER NEEDLE, 38"

## (undated) DEVICE — SOLUTION, IRRIGATION, X RX SODIUM CHL 0.9%, 1000ML BTL

## (undated) DEVICE — ELECTRODE, ELECTROSURGICAL, BLADE, EXTENDED

## (undated) DEVICE — CATHETER TRAY, FOLEY, ALOETOUCH, 16FR, 10ML, W/ DRAINBAG

## (undated) DEVICE — SUTURE, VICRYL, 2-0, 36 IN, CT-1, UNDYED

## (undated) DEVICE — SOLUTION, INJECTION, USP, LACTATED RINGERS, LIFECARE, 1000ML

## (undated) DEVICE — SUTURE, VICRYL, 1, 27 IN, CT-1, VIOLET

## (undated) DEVICE — GLOVE, SURGICAL, PROTEXIS PI , 8.0, PF, LF

## (undated) DEVICE — DRAPE, LARGE TOWEL, NON- FENESTRATED, 17X23, CLEAR, N/S